# Patient Record
Sex: MALE | Race: WHITE | Employment: OTHER | URBAN - METROPOLITAN AREA
[De-identification: names, ages, dates, MRNs, and addresses within clinical notes are randomized per-mention and may not be internally consistent; named-entity substitution may affect disease eponyms.]

---

## 2018-01-12 PROBLEM — L97.929 VENOUS ULCER OF LEFT LEG (HCC): Status: ACTIVE | Noted: 2018-01-12

## 2018-01-12 PROBLEM — I83.029 VENOUS ULCER OF LEFT LEG (HCC): Status: ACTIVE | Noted: 2018-01-12

## 2018-01-12 PROBLEM — L97.922 NON-PRESSURE CHRONIC ULCER OF LEFT LOWER LEG WITH FAT LAYER EXPOSED (HCC): Status: ACTIVE | Noted: 2018-01-12

## 2018-03-16 ENCOUNTER — HOSPITAL ENCOUNTER (OUTPATIENT)
Dept: WOUND CARE | Age: 78
Discharge: HOME | End: 2018-03-16
Payer: COMMERCIAL

## 2018-03-16 VITALS
DIASTOLIC BLOOD PRESSURE: 70 MMHG | HEART RATE: 68 BPM | TEMPERATURE: 97.4 F | BODY MASS INDEX: 34.27 KG/M2 | RESPIRATION RATE: 18 BRPM | SYSTOLIC BLOOD PRESSURE: 118 MMHG | WEIGHT: 253 LBS | HEIGHT: 72 IN

## 2018-03-16 PROCEDURE — 11042 DBRDMT SUBQ TIS 1ST 20SQCM/<: CPT

## 2018-03-16 NOTE — PROGRESS NOTES
[facility]aling Center Followup Visit Note    Referring Physician : Shanon Drake DO [address] RECORD NUMBER:  [id number]  AGE: [de-identified] y.o. GENDER: male  : 1940  EPISODE DATE:  3/16/2018    Subjective:     Chief Complaint   Patient presents with    Wound Check     bilateral lower legs      HISTORY of PRESENT ILLNESS MATTHEW Horn is a [de-identified] y.o. male who presents today for wound/ulcer evaluation.    History of Wound Context:  1 month     Wound/Ulcer Pain Timing/Severity: constant  Quality of pain: sharp  Severity:  1 / 10   Modifying Factors: None  Associated Signs/Symptoms: edema    Ulcer Identification:  Ulcer Type: venous  Contributing Factors: edema, venous stasis and lymphedema    Diabetic/Pressure/Non Pressure Ulcers only:  Ulcer: N/A    Wound: N/A        PAST MEDICAL HISTORY      Diagnosis Date    Diabetes mellitus ([address]     Enlarged prostate     Hypertension      Past Surgical History:   Procedure Laterality Date    DOPPLER ECHOCARDIOGRAPHY N/A     FRACTURE SURGERY       Family History   Problem Relation Age of Onset    Diabetes Mother     Kidney Disease Mother     Cancer Father      esophagus    Cancer Sister      Social History   Substance Use Topics    Smoking status: Former Smoker     Packs/day: 2.00     Years: 50.00     Types: Cigarettes     Start date: 3/30/1955     Quit date: 3/30/2006    Smokeless tobacco: Never Used    Alcohol use No     Allergies   Allergen Reactions    Levaquin [Levofloxacin] Other (See Comments)     Lethargy when taken with celebrex     Current Outpatient Prescriptions on File Prior to Encounter   Medication Sig Dispense Refill    Fluticasone Propionate (FLONASE ALLERGY RELIEF NA) 50 mcg by Nasal route daily 2 SPRAYS      insulin detemir (LEVEMIR) 100 UNIT/ML injection vial Inject 10 Units into the skin nightly      diphenhydrAMINE (BENADRYL) 25 MG capsule Take 25 mg by mouth every 6 hours as needed for Itching      708       Wound 04/06/16 Other (Comment) Right (Active)   Number of days: 708       Wound 04/06/16 Other (Comment) Posterior; Left (Active)   Number of days: 708       Wound 04/06/16 Other (Comment) Medial;Left (Active)   Number of days: 708       Wound 10/18/16 Skin tear Ankle Outer;Left (Active)   Number of days: 514       Wound 10/18/16 Skin tear Leg Outer;Left (Active)   Number of days: 514       Wound 10/18/16 Skin tear Elbow Right; Outer (Active)   Number of days: 514       Wound 10/18/16 Skin tear Elbow Outer;Left (Active)   Number of days: 514       Wound 10/19/16 Other (Comment) Foot Right (Active)   Number of days: 513       Wound 08/04/17 Venous ulcer Leg Left;Upper new wound, #1 venous wound left upper leg. date of onset May 2017 (Active)   Wound Type Wound 10/6/2017  9:21 AM   Wound Venous 10/6/2017  9:21 AM   Dressing Status Changed 10/20/2017  9:57 AM   Dressing Changed Changed/New 10/20/2017  9:57 AM   Dressing/Treatment Alginate 10/20/2017  9:57 AM   Wound Cleansed Rinsed/Irrigated with saline 10/20/2017  9:57 AM   Dressing Change Due 10/13/17 10/6/2017 10:23 AM   Wound Length (cm) 0 cm 10/25/2017  4:06 PM   Wound Width (cm) 0 cm 10/25/2017  4:06 PM   Wound Depth (cm)  0 10/25/2017  4:06 PM   Calculated Wound Size (cm^2) (l*w) 0 cm^2 10/25/2017  4:06 PM   Change in Wound Size % (l*w) 100 10/25/2017  4:06 PM   Wound Assessment Epithelialization 10/25/2017  4:06 PM   Drainage Amount Moderate 10/20/2017  8:52 AM   Drainage Description Serosanguinous; Yellow 10/20/2017  8:52 AM   Odor None 10/20/2017  8:52 AM   Susy-wound Assessment Red 10/25/2017  4:06 PM   Red%Wound Bed 100 10/6/2017  9:21 AM   Debridement per physician Full thickness 10/20/2017  9:03 AM   Time out Yes 10/20/2017  9:03 AM   Procedural Pain 1 10/20/2017  9:03 AM   Post procedural Pain 1 10/20/2017  9:03 AM   Number of days: 224       Wound 08/04/17 (Active)   Number of days: 224       Wound 09/08/17 Venous ulcer Ankle Left;Lateral Wound #4 venous ulcer acquired 9/8/2017 (Active)   Wound Type Wound 10/6/2017  9:08 AM   Wound Venous 10/6/2017  9:08 AM   Dressing Status Changed 9/29/2017 10:33 AM   Dressing Changed Changed/New 9/29/2017 10:33 AM   Dressing/Treatment Alginate 9/8/2017 10:14 AM   Wound Cleansed Rinsed/Irrigated with saline 9/29/2017 10:33 AM   Wound Length (cm) 0 cm 10/6/2017  9:21 AM   Wound Width (cm) 0 cm 10/6/2017  9:21 AM   Wound Depth (cm)  0 10/6/2017  9:21 AM   Calculated Wound Size (cm^2) (l*w) 0 cm^2 10/6/2017  9:21 AM   Change in Wound Size % (l*w) 100 10/6/2017  9:21 AM   Wound Assessment Pink 9/29/2017  9:36 AM   Drainage Amount Moderate 10/6/2017  9:08 AM   Drainage Description Serosanguinous 10/6/2017  9:08 AM   Odor None 10/6/2017  9:08 AM   Susy-wound Assessment Dry; Intact 9/29/2017  9:36 AM   [place]%Wound Bed 100 9/22/2017 10:04 AM   Red%Wound Bed 30 9/15/2017 10:12 AM   Yellow%Wound Bed 20 9/15/2017 10:12 AM   Debridement per physician Subcutaneous 9/29/2017 10:00 AM   Time out Yes 9/29/2017 10:00 AM   Procedural Pain 2 9/29/2017 10:00 AM   Post procedural Pain 1 9/29/2017 10:00 AM   Number of days: 189       Wound 09/29/17 Venous ulcer Leg Right;Lateral new wound, #5  right lateral leg.  date of onset  sept. 29,2017 (Active)   Wound Type Wound 10/6/2017  9:08 AM   Wound Venous 10/6/2017  9:08 AM   Dressing Status Changed 10/6/2017 10:23 AM   Dressing Changed Changed/New 10/6/2017 10:23 AM   Dressing/Treatment Other (Comment) 10/6/2017 10:23 AM   Wound Cleansed Rinsed/Irrigated with saline 10/6/2017 10:23 AM   Dressing Change Due 10/13/17 10/6/2017 10:23 AM   Wound Length (cm) 0 cm 10/13/2017 10:00 AM   Wound Width (cm) 0 cm 10/13/2017 10:00 AM   Wound Depth (cm)  0 10/13/2017 10:00 AM   Calculated Wound Size (cm^2) (l*w) 0 cm^2 10/13/2017 10:00 AM   Change in Wound Size % (l*w) 100 10/13/2017 10:00 AM   Wound Assessment Red 10/6/2017  9:08 AM   Drainage Amount Small 10/6/2017  9:08 AM   Drainage Description Serosanguinous 10/6/2017  9:08 AM   Odor None 10/6/2017  9:08 AM   Susy-wound Assessment Purple; Intact;Dry 10/6/2017  9:08 AM   Red%Wound Bed 100 10/6/2017  9:08 AM   Yellow%Wound Bed 20 9/29/2017  9:33 AM   Debridement per physician Full thickness 10/6/2017  9:24 AM   Time out Yes 10/6/2017  9:24 AM   Procedural Pain 0 10/6/2017  9:24 AM   Post procedural Pain 1 9/29/2017 10:00 AM   Number of days: 168       Wound 11/22/17 Venous ulcer Leg Left;Distal;Lateral new wound, #6 venous. datw of onset nov.22, 2017 (Active)   Wound Image   3/16/2018 10:39 AM   Wound Type Wound 11/22/2017  8:20 AM   Wound Venous 11/22/2017  8:20 AM   Dressing Status Changed 3/9/2018 11:00 AM   Dressing Changed Changed/New 3/9/2018 11:00 AM   Dressing/Treatment Dry dressing 3/9/2018 11:00 AM   Wound Cleansed Rinsed/Irrigated with saline 3/9/2018 11:00 AM   Dressing Change Due 02/09/18 2/2/2018 10:37 AM   Wound Length (cm) 0.5 cm 3/16/2018 11:21 AM   Wound Width (cm) 0.5 cm 3/16/2018 11:21 AM   Wound Depth (cm)  0.1 3/16/2018 11:21 AM   Calculated Wound Size (cm^2) (l*w) 0.25 cm^2 3/16/2018 11:21 AM   Change in Wound Size % (l*w) 95.78 3/16/2018 11:21 AM   Wound Assessment Red 3/16/2018 10:39 AM   Drainage Amount Scant 3/16/2018 10:39 AM   Drainage Description Serosanguinous 3/16/2018 10:39 AM   Odor None 3/16/2018 10:39 AM   Susy-wound Assessment Pink;Dry 3/16/2018 10:39 AM   Red%Wound Bed 100 2/9/2018 10:12 AM   Debridement per physician Subcutaneous 3/2/2018 10:01 AM   Time out Yes 3/16/2018 11:21 AM   Procedural Pain 0 3/16/2018 11:21 AM   Post procedural Pain 0 12/22/2017 11:42 AM   Number of days: 114       Wound 02/23/18 Venous ulcer Leg Right;Posterior; Lower wound # 7 acquired 22/18   lim 1 (Active)   Wound Image   3/16/2018 10:39 AM   Wound Type Wound 2/23/2018 10:06 AM   Wound Venous 2/23/2018 10:06 AM   Dressing Status Changed 3/9/2018 11:00 AM   Dressing Changed Changed/New 3/9/2018 11:00 AM   Dressing/Treatment Dry dressing

## 2018-03-23 ENCOUNTER — HOSPITAL ENCOUNTER (OUTPATIENT)
Dept: WOUND CARE | Age: 78
Discharge: HOME | End: 2018-03-23
Payer: COMMERCIAL

## 2018-03-23 VITALS
TEMPERATURE: 98 F | BODY MASS INDEX: 33.18 KG/M2 | WEIGHT: 245 LBS | HEART RATE: 84 BPM | HEIGHT: 72 IN | SYSTOLIC BLOOD PRESSURE: 118 MMHG | DIASTOLIC BLOOD PRESSURE: 70 MMHG | RESPIRATION RATE: 16 BRPM

## 2018-03-23 PROCEDURE — 97597 DBRDMT OPN WND 1ST 20 CM/<: CPT

## 2018-03-23 RX ORDER — DIPHENHYDRAMINE HYDROCHLORIDE, ZINC ACETATE 2; .1 G/100G; G/100G
1 CREAM TOPICAL 3 TIMES DAILY PRN
Status: ON HOLD | COMMUNITY
End: 2018-07-20 | Stop reason: HOSPADM

## 2018-03-23 NOTE — PROGRESS NOTES
Wound Healing Center Followup Visit Note    Referring Physician : DO [address] RECORD NUMBER:  [id number]  AGE: [de-identified] y.o. GENDER: male  : 1940  EPISODE DATE:  3/23/2018    Subjective:     Chief Complaint   Patient presents with    Wound Check     patient here for treatment of left leg ulcer      HISTORY of PRESENT ILLNESS HPI   Melanie Marquez is a [de-identified] y.o. male who presents today for wound/ulcer evaluation.    History of Wound Context:   4 months    Wound/Ulcer Pain Timing/Severity: none  Quality of pain: dull  Severity:  1 / 10   Modifying Factors: Pain worsens with walking  Associated Signs/Symptoms: edema    Ulcer Identification:  Ulcer Type: venous  Contributing Factors: venous stasis and lymphedema    Diabetic/Pressure/Non Pressure Ulcers only:  Ulcer: Non-Pressure ulcer, fat layer exposed    Wound: N/A        PAST MEDICAL HISTORY      Diagnosis Date    Diabetes mellitus ([facility] [address]     Enlarged prostate     Hypertension      Past Surgical History:   Procedure Laterality Date    DOPPLER ECHOCARDIOGRAPHY N/A     FRACTURE SURGERY       Family History   Problem Relation Age of Onset    Diabetes Mother     Kidney Disease Mother     Cancer Father      esophagus    Cancer Sister      Social History   Substance Use Topics    Smoking status: Former Smoker     Packs/day: 2.00     Years: 50.00     Types: Cigarettes     Start date: 3/30/1955     Quit date: 3/30/2006    Smokeless tobacco: Never Used    Alcohol use No     Allergies   Allergen Reactions    Levaquin [Levofloxacin] Other (See Comments)     Lethargy when taken with celebrex     Current Outpatient Prescriptions on File Prior to Encounter   Medication Sig Dispense Refill    Fluticasone Propionate (FLONASE ALLERGY RELIEF NA) 50 mcg by Nasal route daily 2 SPRAYS      insulin detemir (LEVEMIR) 100 UNIT/ML injection vial Inject 10 Units into the skin nightly      diphenhydrAMINE (BENADRYL) 25 MG capsule Take 25 mg by mouth every 6 hours as needed for Itching      Cholecalciferol (VITAMIN D3) 5000 UNITS TABS Take 5,000 Units by mouth daily      mineral oil-hydrophilic petrolatum (AQUAPHOR) ointment As directed      tamsulosin (FLOMAX) 0.4 MG capsule Take 0.4 mg by mouth daily.  atorvastatin (LIPITOR) 20 MG tablet Take 20 mg by mouth daily.  latanoprost (XALATAN) 0.005 % ophthalmic solution Place 1 drop into both eyes nightly       meclizine (ANTIVERT) 25 MG tablet Take 12.5 mg by mouth 3 times daily as needed.  acetaminophen (TYLENOL) 325 MG tablet Take 650 mg by mouth every 4 hours as needed.  busPIRone (BUSPAR) 7.5 MG tablet Take 7.5 mg by mouth daily.  lactulose (CHRONULAC) 10 GM/15ML solution Take 20 g by mouth daily Indications: EVERY MONDAY, WEDNESDAY, FRIDAY      glucose (GLUTOSE) 40 % GEL Take 15 g by mouth as needed 45 g 1    albuterol (PROVENTIL) (2.5 MG/3ML) 0.083% nebulizer solution Take 2.5 mg by nebulization every 6 hours as needed       magnesium hydroxide (MILK OF MAGNESIA) 400 MG/5ML suspension Take 30 mLs by mouth daily as needed.  bisacodyl (DULCOLAX) 5 MG EC tablet Take 5 mg by mouth daily as needed. No current facility-administered medications on file prior to encounter.         REVIEW OF SYSTEMS See HPI    Objective:    /70   Pulse 84   Temp 98 °F (36.7 °C) (Oral)   Resp 16   Ht 6' (1.829 m)   Wt 245 lb (111.1 kg)   BMI 33.23 kg/m²   Wt Readings from Last 3 Encounters:   03/23/18 245 lb (111.1 kg)   03/16/18 253 lb (114.8 kg)   03/02/18 253 lb (114.8 kg)     PHYSICAL EXAM  CONSTITUTIONAL:   Awake, alert, cooperative   EYES:  lids and lashes normal   ENT: external ears and nose without lesions   NECK:  supple, symmetrical, trachea midline   SKIN:  Open wound Present    Assessment:     Patient Active Problem List   Diagnosis Code    Cellulitis L03.90    Sepsis ([facility] [address] A41.9    Dementia P31.69    Metabolic encephalopathy X33.76    Diabetes IF WRAPS SLIDE DOWN ON LEG. IF THIS OCCURS, CAREFULLY CUT WRAPS OFF AND RESUME PREVIOUS DRESSING CHANGE ORDERS TILL SEEN AGAIN IN WOUND CENTER. DO NOT ALLOW WRAPS TO BECOME WET. ENCOURAGE LEG ELEVATION   PATIENT IS ALMOST HEALED, DR Dionne Rodrigues WOULD LIKE TO ORDER JUXTALITE COMPRESSION GARMENTS,  I WILL CONTACT FACILITY ABOUT THIS  [address] [place] [address],[address] followup visit __________1 WEEK__dr. wallace___friday____________  (Please note your next appointment above and if you are unable to keep, kindly give a 24 hour notice.  Thank you.)      Physician signature:__________________________  Santana Drew  If you experience any of the following, please call the [facility] during business hours:      * Increase in Pain  * Temperature over 101  * Increase in drainage from your wound  * Drainage with a foul odor  * Bleeding  * Increase in swelling  * Need for compression bandage changes due to slippage, breakthrough drainage.      If you need medical attention outside of the business hours of the [facility] please contact your PCP or go to the nearest emergency room        Electronically signed by Amanda Boyd DPM on 3/23/2018 at 9:58 AM

## 2018-03-28 ENCOUNTER — HOSPITAL ENCOUNTER (OUTPATIENT)
Dept: WOUND CARE | Age: 78
Discharge: HOME | End: 2018-03-28
Payer: COMMERCIAL

## 2018-03-28 VITALS
TEMPERATURE: 97.6 F | SYSTOLIC BLOOD PRESSURE: 116 MMHG | RESPIRATION RATE: 16 BRPM | HEART RATE: 88 BPM | DIASTOLIC BLOOD PRESSURE: 66 MMHG

## 2018-03-28 PROCEDURE — 99212 OFFICE O/P EST SF 10 MIN: CPT

## 2018-03-28 NOTE — PLAN OF CARE
Problem: Wound:  Goal: Will show signs of wound healing; wound closure and no evidence of infection  Will show signs of wound healing; wound closure and no evidence of infection   Outcome: Completed Date Met: 03/28/18      Problem: Venous:  Goal: Signs of wound healing will improve  Signs of wound healing will improve   Outcome: Completed Date Met: 03/28/18      Problem: Compression therapy:  Goal: Will be free from complications associated with compression therapy  Will be free from complications associated with compression therapy   Outcome: Completed Date Met: 03/28/18

## 2018-03-28 NOTE — PROGRESS NOTES
detemir (LEVEMIR) 100 UNIT/ML injection vial Inject 10 Units into the skin nightly      diphenhydrAMINE (BENADRYL) 25 MG capsule Take 25 mg by mouth every 6 hours as needed for Itching      lactulose (CHRONULAC) 10 GM/15ML solution Take 20 g by mouth daily Indications: EVERY MONDAY, WEDNESDAY, FRIDAY      Cholecalciferol (VITAMIN D3) 5000 UNITS TABS Take 5,000 Units by mouth daily      mineral oil-hydrophilic petrolatum (AQUAPHOR) ointment As directed      tamsulosin (FLOMAX) 0.4 MG capsule Take 0.4 mg by mouth daily.  atorvastatin (LIPITOR) 20 MG tablet Take 20 mg by mouth daily.  latanoprost (XALATAN) 0.005 % ophthalmic solution Place 1 drop into both eyes nightly       acetaminophen (TYLENOL) 325 MG tablet Take 650 mg by mouth every 4 hours as needed.  busPIRone (BUSPAR) 7.5 MG tablet Take 7.5 mg by mouth daily.  glucose (GLUTOSE) 40 % GEL Take 15 g by mouth as needed 45 g 1    albuterol (PROVENTIL) (2.5 MG/3ML) 0.083% nebulizer solution Take 2.5 mg by nebulization every 6 hours as needed       meclizine (ANTIVERT) 25 MG tablet Take 12.5 mg by mouth 3 times daily as needed.  magnesium hydroxide (MILK OF MAGNESIA) 400 MG/5ML suspension Take 30 mLs by mouth daily as needed.  bisacodyl (DULCOLAX) 5 MG EC tablet Take 5 mg by mouth daily as needed. No current facility-administered medications on file prior to encounter.         REVIEW OF SYSTEMS See HPI    Objective:    /66   Pulse 88   Temp 97.6 °F (36.4 °C) (Oral)   Resp 16   Wt Readings from Last 3 Encounters:   03/23/18 245 lb (111.1 kg)   03/16/18 253 lb (114.8 kg)   03/02/18 253 lb (114.8 kg)     PHYSICAL EXAM  CONSTITUTIONAL:   Awake, alert, cooperative   EYES:  lids and lashes normal   ENT: external ears and nose without lesions   NECK:  supple, symmetrical, trachea midline   SKIN:  Open wound Present    Assessment:     Problem List Items Addressed This Visit     None        Wound/Ulcer #: 6    Post Wound Cleansed Rinsed/Irrigated with saline 10/20/2017  9:57 AM   Dressing Change Due 10/13/17 10/6/2017 10:23 AM   Wound Length (cm) 0 cm 10/25/2017  4:06 PM   Wound Width (cm) 0 cm 10/25/2017  4:06 PM   Wound Depth (cm)  0 10/25/2017  4:06 PM   Calculated Wound Size (cm^2) (l*w) 0 cm^2 10/25/2017  4:06 PM   Change in Wound Size % (l*w) 100 10/25/2017  4:06 PM   Wound Assessment Epithelialization 10/25/2017  4:06 PM   Drainage Amount Moderate 10/20/2017  8:52 AM   Drainage Description Serosanguinous; Yellow 10/20/2017  8:52 AM   Odor None 10/20/2017  8:52 AM   Susy-wound Assessment Red 10/25/2017  4:06 PM   Red%Wound Bed 100 10/6/2017  9:21 AM   Debridement per physician Full thickness 10/20/2017  9:03 AM   Time out Yes 10/20/2017  9:03 AM   Procedural Pain 1 10/20/2017  9:03 AM   Post procedural Pain 1 10/20/2017  9:03 AM   Number of days: 235       Wound 08/04/17 (Active)   Number of days: 235       Wound 09/08/17 Venous ulcer Ankle Left;Lateral Wound #4 venous ulcer acquired 9/8/2017 (Active)   Wound Type Wound 10/6/2017  9:08 AM   Wound Venous 10/6/2017  9:08 AM   Dressing Status Changed 9/29/2017 10:33 AM   Dressing Changed Changed/New 9/29/2017 10:33 AM   Dressing/Treatment Alginate 9/8/2017 10:14 AM   Wound Cleansed Rinsed/Irrigated with saline 9/29/2017 10:33 AM   Wound Length (cm) 0 cm 10/6/2017  9:21 AM   Wound Width (cm) 0 cm 10/6/2017  9:21 AM   Wound Depth (cm)  0 10/6/2017  9:21 AM   Calculated Wound Size (cm^2) (l*w) 0 cm^2 10/6/2017  9:21 AM   Change in Wound Size % (l*w) 100 10/6/2017  9:21 AM   Wound Assessment Pink 9/29/2017  9:36 AM   Drainage Amount Moderate 10/6/2017  9:08 AM   Drainage Description Serosanguinous 10/6/2017  9:08 AM   Odor None 10/6/2017  9:08 AM   Susy-wound Assessment Dry; Intact 9/29/2017  9:36 AM   [place]%Wound Bed 100 9/22/2017 10:04 AM   Red%Wound Bed 30 9/15/2017 10:12 AM   Yellow%Wound Bed 20 9/15/2017 10:12 AM   Debridement per physician Subcutaneous 9/29/2017 10:00 AM AM   Wound Depth (cm)  .1 3/28/2018  8:18 AM   Calculated Wound Size (cm^2) (l*w) 0.16 cm^2 3/28/2018  8:18 AM   Change in Wound Size % (l*w) 97.3 3/28/2018  8:18 AM   Wound Assessment Red 3/28/2018  8:18 AM   Drainage Amount Scant 3/28/2018  8:18 AM   Drainage Description Yellow 3/28/2018  8:18 AM   Odor None 3/23/2018  9:21 AM   Susy-wound Assessment Dry 3/28/2018  8:18 AM   Red%Wound Bed 100 3/28/2018  8:18 AM   Debridement per physician Subcutaneous 3/2/2018 10:01 AM   Time out Yes 3/23/2018  9:53 AM   Procedural Pain 0 3/23/2018  9:53 AM   Post procedural Pain 0 12/22/2017 11:42 AM   Number of days: 125         Plan:   Treatment Note please see attached Discharge Instructions    Written patient dismissal instructions given to patient and signed by patient or POA. Healed     Discharge Instructions       Visit Discharge/Physician Orders      Discharge condition: Stable      Assessment of pain at discharge: none  Anesthetic used: 2% lidocaine gel      Discharge to: ECF      Left via:public transportation      Accompanied by: aide      [facility]: [facility]; LEFT LEG COMPRESSION WRAPS ON FOR THE WEEK. DO NOT DISTURB     Dressing Orders: TO left  LEG WOUND CLEANSE WITH NORMAL SALINE SOLUTION. APPLY HYDRAPHOR OINTMENT . APPLY PROMOGRAN,  THEN APPLY UNNA BOOT FOR 7 DAYS ,KEEP WRAP CLEAN AND DRY. DO NOT CHANGE OR ALLOW TO BECOME WET.    TO RIGHT LEG  APPLY HYDROPHORTO DRY FLAKEY AREAS , , KERLEX, COBAN WRAP, 1 X WEEK     Treatment Orders: KEEP LEG WRAPS CLEAN AND DRY. REMOVE ONLY IF PATIENT COMPLAINS OF PAIN OR IF WRAPS SLIDE DOWN ON LEG. IF THIS OCCURS, CAREFULLY CUT WRAPS OFF AND RESUME PREVIOUS DRESSING CHANGE ORDERS TILL SEEN AGAIN IN WOUND CENTER. DO NOT ALLOW WRAPS TO BECOME WET.  ENCOURAGE LEG ELEVATION   PATIENT IS ALMOST HEALED, DR Kurt Workman WOULD LIKE TO ORDER JUXTALITE COMPRESSION GARMENTS,  I WILL CONTACT FACILITY ABOUT THIS  [facility] followup visit __________1 WEEK__dr. wallace___wednesday__________  (Please note

## 2018-05-25 ENCOUNTER — HOSPITAL ENCOUNTER (OUTPATIENT)
Dept: WOUND CARE | Age: 78
Discharge: HOME | End: 2018-05-25
Payer: COMMERCIAL

## 2018-05-25 VITALS
RESPIRATION RATE: 20 BRPM | TEMPERATURE: 97.9 F | HEIGHT: 72 IN | BODY MASS INDEX: 33.18 KG/M2 | WEIGHT: 245 LBS | SYSTOLIC BLOOD PRESSURE: 100 MMHG | HEART RATE: 80 BPM | DIASTOLIC BLOOD PRESSURE: 68 MMHG

## 2018-05-25 DIAGNOSIS — L97.921 NON-PRESSURE CHRONIC ULCER LEFT LOWER LEG, LIMITED TO BREAKDOWN SKIN (HCC): ICD-10-CM

## 2018-05-25 PROBLEM — I83.029 VENOUS ULCER OF LEFT LEG (HCC): Chronic | Status: ACTIVE | Noted: 2018-01-12

## 2018-05-25 PROBLEM — L97.929 VENOUS ULCER OF LEFT LEG (HCC): Chronic | Status: ACTIVE | Noted: 2018-01-12

## 2018-05-25 PROCEDURE — 99214 OFFICE O/P EST MOD 30 MIN: CPT

## 2018-06-01 ENCOUNTER — HOSPITAL ENCOUNTER (OUTPATIENT)
Dept: WOUND CARE | Age: 78
Discharge: HOME | End: 2018-06-01
Payer: COMMERCIAL

## 2018-06-01 VITALS
RESPIRATION RATE: 18 BRPM | HEIGHT: 72 IN | BODY MASS INDEX: 33.18 KG/M2 | SYSTOLIC BLOOD PRESSURE: 98 MMHG | WEIGHT: 245 LBS | HEART RATE: 68 BPM | TEMPERATURE: 97.8 F | DIASTOLIC BLOOD PRESSURE: 62 MMHG

## 2018-06-01 PROCEDURE — 11042 DBRDMT SUBQ TIS 1ST 20SQCM/<: CPT

## 2018-06-08 ENCOUNTER — HOSPITAL ENCOUNTER (OUTPATIENT)
Dept: WOUND CARE | Age: 78
Discharge: HOME | End: 2018-06-08
Payer: COMMERCIAL

## 2018-06-08 VITALS
RESPIRATION RATE: 18 BRPM | SYSTOLIC BLOOD PRESSURE: 104 MMHG | DIASTOLIC BLOOD PRESSURE: 60 MMHG | HEART RATE: 80 BPM | TEMPERATURE: 97.4 F

## 2018-06-08 PROCEDURE — 11042 DBRDMT SUBQ TIS 1ST 20SQCM/<: CPT

## 2018-06-15 ENCOUNTER — HOSPITAL ENCOUNTER (OUTPATIENT)
Age: 78
Discharge: HOME | End: 2018-06-17
Payer: COMMERCIAL

## 2018-06-15 ENCOUNTER — HOSPITAL ENCOUNTER (OUTPATIENT)
Dept: WOUND CARE | Age: 78
Discharge: HOME | End: 2018-06-15
Payer: COMMERCIAL

## 2018-06-15 VITALS
DIASTOLIC BLOOD PRESSURE: 60 MMHG | RESPIRATION RATE: 18 BRPM | TEMPERATURE: 97.7 F | HEART RATE: 72 BPM | SYSTOLIC BLOOD PRESSURE: 104 MMHG

## 2018-06-15 PROCEDURE — 87075 CULTR BACTERIA EXCEPT BLOOD: CPT

## 2018-06-15 PROCEDURE — 11042 DBRDMT SUBQ TIS 1ST 20SQCM/<: CPT

## 2018-06-15 PROCEDURE — 87186 SC STD MICRODIL/AGAR DIL: CPT

## 2018-06-15 PROCEDURE — 87070 CULTURE OTHR SPECIMN AEROBIC: CPT

## 2018-06-18 LAB
ANAEROBIC CULTURE: NORMAL
ORGANISM: ABNORMAL
WOUND/ABSCESS: ABNORMAL
WOUND/ABSCESS: ABNORMAL

## 2018-06-20 ENCOUNTER — HOSPITAL ENCOUNTER (OUTPATIENT)
Dept: WOUND CARE | Age: 78
Discharge: HOME | End: 2018-06-20
Payer: COMMERCIAL

## 2018-06-20 VITALS
RESPIRATION RATE: 18 BRPM | DIASTOLIC BLOOD PRESSURE: 64 MMHG | TEMPERATURE: 98 F | HEART RATE: 72 BPM | SYSTOLIC BLOOD PRESSURE: 110 MMHG

## 2018-06-20 DIAGNOSIS — L10.9 BULLOUS PEMPHIGUS: Primary | ICD-10-CM

## 2018-06-20 PROCEDURE — 99213 OFFICE O/P EST LOW 20 MIN: CPT | Performed by: FAMILY MEDICINE

## 2018-06-20 PROCEDURE — 99213 OFFICE O/P EST LOW 20 MIN: CPT

## 2018-06-27 ENCOUNTER — HOSPITAL ENCOUNTER (OUTPATIENT)
Dept: WOUND CARE | Age: 78
Discharge: HOME | End: 2018-06-27

## 2018-07-11 ENCOUNTER — HOSPITAL ENCOUNTER (OUTPATIENT)
Dept: WOUND CARE | Age: 78
Discharge: HOME | End: 2018-07-11
Payer: COMMERCIAL

## 2018-07-11 VITALS
HEART RATE: 72 BPM | TEMPERATURE: 98.1 F | RESPIRATION RATE: 16 BRPM | SYSTOLIC BLOOD PRESSURE: 94 MMHG | DIASTOLIC BLOOD PRESSURE: 60 MMHG

## 2018-07-11 PROCEDURE — 11042 DBRDMT SUBQ TIS 1ST 20SQCM/<: CPT

## 2018-07-11 NOTE — PROGRESS NOTES
Other (Comment) Posterior; Left (Active)   Number of days: 825       Wound 04/06/16 Other (Comment) Medial;Left (Active)   Number of days: 825       Wound 10/18/16 Skin tear Ankle Outer;Left (Active)   Number of days: 631       Wound 10/18/16 Skin tear Leg Outer;Left (Active)   Number of days: 631       Wound 10/18/16 Skin tear Elbow Right; Outer (Active)   Number of days: 631       Wound 10/18/16 Skin tear Elbow Outer;Left (Active)   Number of days: 631       Wound 10/19/16 Other (Comment) Foot Right (Active)   Number of days: 630       Wound 08/04/17 Venous ulcer Leg Left;Upper new wound, #1 venous wound left upper leg. date of onset May 2017 (Active)   Wound Type Wound 10/6/2017  9:21 AM   Wound Venous 10/6/2017  9:21 AM   Dressing Status Changed 10/20/2017  9:57 AM   Dressing Changed Changed/New 10/20/2017  9:57 AM   Dressing/Treatment Alginate 10/20/2017  9:57 AM   Wound Cleansed Rinsed/Irrigated with saline 10/20/2017  9:57 AM   Dressing Change Due 10/13/17 10/6/2017 10:23 AM   Wound Length (cm) 0 cm 10/25/2017  4:06 PM   Wound Width (cm) 0 cm 10/25/2017  4:06 PM   Wound Depth (cm)  0 10/25/2017  4:06 PM   Calculated Wound Size (cm^2) (l*w) 0 cm^2 10/25/2017  4:06 PM   Change in Wound Size % (l*w) 100 10/25/2017  4:06 PM   Wound Assessment Epithelialization 10/25/2017  4:06 PM   Drainage Amount Moderate 10/20/2017  8:52 AM   Drainage Description Serosanguinous; Yellow 10/20/2017  8:52 AM   Odor None 10/20/2017  8:52 AM   Susy-wound Assessment Red 10/25/2017  4:06 PM   Red%Wound Bed 100 10/6/2017  9:21 AM   Debridement per physician Full thickness 10/20/2017  9:03 AM   Time out Yes 10/20/2017  9:03 AM   Procedural Pain 1 10/20/2017  9:03 AM   Post procedural Pain 1 10/20/2017  9:03 AM   Number of days: 341       Wound 08/04/17 (Active)   Number of days: 341       Wound 09/08/17 Venous ulcer Ankle Left;Lateral Wound #4 venous ulcer acquired 9/8/2017 (Active)   Wound Type Wound 10/6/2017  9:08 AM   Wound Venous swelling  * Need for compression bandage changes due to slippage, breakthrough drainage.     If you need medical attention outside of the business hours of the [address] [place] [address] Road please contact your PCP or go to the nearest emergency room.         Electronically signed by Mark Magana DPM on 7/11/2018 at 12:02 PM

## 2018-07-17 ENCOUNTER — HOSPITAL ENCOUNTER (INPATIENT)
Age: 78
LOS: 4 days | Discharge: NURSING, REHAB OR LONG-TERM CARE | DRG: 641 | End: 2018-07-21
Attending: INTERNAL MEDICINE | Admitting: INTERNAL MEDICINE
Payer: COMMERCIAL

## 2018-07-17 ENCOUNTER — APPOINTMENT (OUTPATIENT)
Dept: GENERAL RADIOLOGY | Age: 78
DRG: 641 | End: 2018-07-17
Attending: INTERNAL MEDICINE
Payer: COMMERCIAL

## 2018-07-17 PROBLEM — E86.0 DEHYDRATION: Status: ACTIVE | Noted: 2018-07-17

## 2018-07-17 LAB
ALBUMIN SERPL-MCNC: 2.9 G/DL (ref 3.5–5.2)
ALP BLD-CCNC: 326 U/L (ref 40–129)
ALT SERPL-CCNC: 61 U/L (ref 0–40)
ANION GAP SERPL CALCULATED.3IONS-SCNC: 18 MMOL/L (ref 7–16)
AST SERPL-CCNC: 147 U/L (ref 0–39)
BILIRUB SERPL-MCNC: 0.5 MG/DL (ref 0–1.2)
BUN BLDV-MCNC: 34 MG/DL (ref 8–23)
CALCIUM SERPL-MCNC: 9 MG/DL (ref 8.6–10.2)
CHLORIDE BLD-SCNC: 105 MMOL/L (ref 98–107)
CO2: 19 MMOL/L (ref 22–29)
CREAT SERPL-MCNC: 1.2 MG/DL (ref 0.7–1.2)
GFR AFRICAN AMERICAN: >60
GFR NON-AFRICAN AMERICAN: 58 ML/MIN/1.73
GLUCOSE BLD-MCNC: 119 MG/DL (ref 74–109)
HBA1C MFR BLD: 6 % (ref 4–5.6)
HCT VFR BLD CALC: 38.6 % (ref 37–54)
HEMOGLOBIN: 11.9 G/DL (ref 12.5–16.5)
MCH RBC QN AUTO: 25.3 PG (ref 26–35)
MCHC RBC AUTO-ENTMCNC: 30.8 % (ref 32–34.5)
MCV RBC AUTO: 82 FL (ref 80–99.9)
METER GLUCOSE: 114 MG/DL (ref 70–110)
PDW BLD-RTO: 18 FL (ref 11.5–15)
PLATELET # BLD: 322 E9/L (ref 130–450)
PMV BLD AUTO: 9.7 FL (ref 7–12)
POTASSIUM SERPL-SCNC: 4.2 MMOL/L (ref 3.5–5)
RBC # BLD: 4.71 E12/L (ref 3.8–5.8)
SODIUM BLD-SCNC: 142 MMOL/L (ref 132–146)
TOTAL PROTEIN: 7.3 G/DL (ref 6.4–8.3)
WBC # BLD: 9.9 E9/L (ref 4.5–11.5)

## 2018-07-17 PROCEDURE — 36415 COLL VENOUS BLD VENIPUNCTURE: CPT

## 2018-07-17 PROCEDURE — 6360000002 HC RX W HCPCS: Performed by: INTERNAL MEDICINE

## 2018-07-17 PROCEDURE — 82962 GLUCOSE BLOOD TEST: CPT

## 2018-07-17 PROCEDURE — 2580000003 HC RX 258: Performed by: INTERNAL MEDICINE

## 2018-07-17 PROCEDURE — 74018 RADEX ABDOMEN 1 VIEW: CPT

## 2018-07-17 PROCEDURE — 85027 COMPLETE CBC AUTOMATED: CPT

## 2018-07-17 PROCEDURE — 83036 HEMOGLOBIN GLYCOSYLATED A1C: CPT

## 2018-07-17 PROCEDURE — 1200000000 HC SEMI PRIVATE

## 2018-07-17 PROCEDURE — 6370000000 HC RX 637 (ALT 250 FOR IP): Performed by: INTERNAL MEDICINE

## 2018-07-17 PROCEDURE — 87040 BLOOD CULTURE FOR BACTERIA: CPT

## 2018-07-17 PROCEDURE — 80053 COMPREHEN METABOLIC PANEL: CPT

## 2018-07-17 PROCEDURE — 71045 X-RAY EXAM CHEST 1 VIEW: CPT

## 2018-07-17 RX ORDER — ACETAMINOPHEN 325 MG/1
650 TABLET ORAL EVERY 4 HOURS PRN
Status: DISCONTINUED | OUTPATIENT
Start: 2018-07-17 | End: 2018-07-21 | Stop reason: HOSPADM

## 2018-07-17 RX ORDER — MECLIZINE HCL 12.5 MG/1
12.5 TABLET ORAL 3 TIMES DAILY PRN
Status: DISCONTINUED | OUTPATIENT
Start: 2018-07-17 | End: 2018-07-21 | Stop reason: HOSPADM

## 2018-07-17 RX ORDER — LATANOPROST 50 UG/ML
1 SOLUTION/ DROPS OPHTHALMIC NIGHTLY
Status: DISCONTINUED | OUTPATIENT
Start: 2018-07-17 | End: 2018-07-21 | Stop reason: HOSPADM

## 2018-07-17 RX ORDER — TAMSULOSIN HYDROCHLORIDE 0.4 MG/1
0.4 CAPSULE ORAL
Status: DISCONTINUED | OUTPATIENT
Start: 2018-07-17 | End: 2018-07-21 | Stop reason: HOSPADM

## 2018-07-17 RX ORDER — ATORVASTATIN CALCIUM 20 MG/1
20 TABLET, FILM COATED ORAL DAILY
Status: ON HOLD | COMMUNITY
End: 2021-01-05 | Stop reason: ALTCHOICE

## 2018-07-17 RX ORDER — DEXTROSE MONOHYDRATE 50 MG/ML
100 INJECTION, SOLUTION INTRAVENOUS PRN
Status: DISCONTINUED | OUTPATIENT
Start: 2018-07-17 | End: 2018-07-21 | Stop reason: HOSPADM

## 2018-07-17 RX ORDER — BUSPIRONE HYDROCHLORIDE 5 MG/1
7.5 TABLET ORAL DAILY
Status: DISCONTINUED | OUTPATIENT
Start: 2018-07-17 | End: 2018-07-21 | Stop reason: HOSPADM

## 2018-07-17 RX ORDER — ALBUTEROL SULFATE 2.5 MG/3ML
2.5 SOLUTION RESPIRATORY (INHALATION) EVERY 6 HOURS PRN
Status: DISCONTINUED | OUTPATIENT
Start: 2018-07-17 | End: 2018-07-21 | Stop reason: HOSPADM

## 2018-07-17 RX ORDER — ATORVASTATIN CALCIUM 20 MG/1
20 TABLET, FILM COATED ORAL DAILY
Status: DISCONTINUED | OUTPATIENT
Start: 2018-07-17 | End: 2018-07-21 | Stop reason: HOSPADM

## 2018-07-17 RX ORDER — MIRTAZAPINE 15 MG/1
7.5 TABLET, FILM COATED ORAL DAILY
Status: DISCONTINUED | OUTPATIENT
Start: 2018-07-17 | End: 2018-07-21 | Stop reason: HOSPADM

## 2018-07-17 RX ORDER — LACTULOSE 10 G/15ML
20 SOLUTION ORAL DAILY
Status: DISCONTINUED | OUTPATIENT
Start: 2018-07-17 | End: 2018-07-18

## 2018-07-17 RX ORDER — AZATHIOPRINE 50 MG/1
50 TABLET ORAL DAILY
COMMUNITY
End: 2018-07-25 | Stop reason: ALTCHOICE

## 2018-07-17 RX ORDER — AZATHIOPRINE 50 MG/1
50 TABLET ORAL DAILY
Status: DISCONTINUED | OUTPATIENT
Start: 2018-07-17 | End: 2018-07-21 | Stop reason: HOSPADM

## 2018-07-17 RX ORDER — MIRTAZAPINE 15 MG/1
7.5 TABLET, FILM COATED ORAL NIGHTLY
Status: ON HOLD | COMMUNITY
End: 2020-12-23 | Stop reason: HOSPADM

## 2018-07-17 RX ORDER — NICOTINE POLACRILEX 4 MG
15 LOZENGE BUCCAL PRN
Status: DISCONTINUED | OUTPATIENT
Start: 2018-07-17 | End: 2018-07-21 | Stop reason: HOSPADM

## 2018-07-17 RX ORDER — DEXTROSE MONOHYDRATE 25 G/50ML
12.5 INJECTION, SOLUTION INTRAVENOUS PRN
Status: DISCONTINUED | OUTPATIENT
Start: 2018-07-17 | End: 2018-07-21 | Stop reason: HOSPADM

## 2018-07-17 RX ORDER — SODIUM CHLORIDE 9 MG/ML
INJECTION, SOLUTION INTRAVENOUS CONTINUOUS
Status: DISCONTINUED | OUTPATIENT
Start: 2018-07-17 | End: 2018-07-20

## 2018-07-17 RX ADMIN — MIRTAZAPINE 7.5 MG: 15 TABLET, FILM COATED ORAL at 20:40

## 2018-07-17 RX ADMIN — LATANOPROST 1 DROP: 50 SOLUTION OPHTHALMIC at 20:40

## 2018-07-17 RX ADMIN — ATORVASTATIN CALCIUM 20 MG: 20 TABLET, FILM COATED ORAL at 20:40

## 2018-07-17 RX ADMIN — SODIUM CHLORIDE: 9 INJECTION, SOLUTION INTRAVENOUS at 13:49

## 2018-07-18 ENCOUNTER — HOSPITAL ENCOUNTER (OUTPATIENT)
Dept: WOUND CARE | Age: 78
Discharge: HOME | End: 2018-07-18
Payer: COMMERCIAL

## 2018-07-18 ENCOUNTER — APPOINTMENT (OUTPATIENT)
Dept: CT IMAGING | Age: 78
DRG: 641 | End: 2018-07-18
Attending: INTERNAL MEDICINE
Payer: COMMERCIAL

## 2018-07-18 LAB
AMYLASE: 26 U/L (ref 20–100)
BACTERIA: ABNORMAL /HPF
BILIRUBIN URINE: NEGATIVE
BLOOD, URINE: ABNORMAL
CLARITY: CLEAR
COLOR: YELLOW
EPITHELIAL CELLS, UA: ABNORMAL /HPF
GAMMA GLUTAMYL TRANSFERASE: 169 U/L (ref 10–71)
GLUCOSE URINE: NEGATIVE MG/DL
KETONES, URINE: ABNORMAL MG/DL
LEUKOCYTE ESTERASE, URINE: NEGATIVE
LIPASE: 16 U/L (ref 13–60)
METER GLUCOSE: 83 MG/DL (ref 70–110)
METER GLUCOSE: 85 MG/DL (ref 70–110)
METER GLUCOSE: 85 MG/DL (ref 70–110)
METER GLUCOSE: 90 MG/DL (ref 70–110)
METER GLUCOSE: 93 MG/DL (ref 70–110)
NITRITE, URINE: NEGATIVE
PH UA: 5.5 (ref 5–9)
PROTEIN UA: NEGATIVE MG/DL
RBC UA: ABNORMAL /HPF (ref 0–2)
SPECIFIC GRAVITY UA: 1.02 (ref 1–1.03)
UROBILINOGEN, URINE: 1 E.U./DL
WBC UA: ABNORMAL /HPF (ref 0–5)

## 2018-07-18 PROCEDURE — 80074 ACUTE HEPATITIS PANEL: CPT

## 2018-07-18 PROCEDURE — 97530 THERAPEUTIC ACTIVITIES: CPT

## 2018-07-18 PROCEDURE — 82150 ASSAY OF AMYLASE: CPT

## 2018-07-18 PROCEDURE — 6370000000 HC RX 637 (ALT 250 FOR IP): Performed by: INTERNAL MEDICINE

## 2018-07-18 PROCEDURE — 97165 OT EVAL LOW COMPLEX 30 MIN: CPT

## 2018-07-18 PROCEDURE — 87088 URINE BACTERIA CULTURE: CPT

## 2018-07-18 PROCEDURE — 74177 CT ABD & PELVIS W/CONTRAST: CPT

## 2018-07-18 PROCEDURE — G8987 SELF CARE CURRENT STATUS: HCPCS

## 2018-07-18 PROCEDURE — 71260 CT THORAX DX C+: CPT

## 2018-07-18 PROCEDURE — 1200000000 HC SEMI PRIVATE

## 2018-07-18 PROCEDURE — 6360000004 HC RX CONTRAST MEDICATION: Performed by: RADIOLOGY

## 2018-07-18 PROCEDURE — 82962 GLUCOSE BLOOD TEST: CPT

## 2018-07-18 PROCEDURE — G8988 SELF CARE GOAL STATUS: HCPCS

## 2018-07-18 PROCEDURE — G8979 MOBILITY GOAL STATUS: HCPCS | Performed by: PHYSICAL THERAPIST

## 2018-07-18 PROCEDURE — 81001 URINALYSIS AUTO W/SCOPE: CPT

## 2018-07-18 PROCEDURE — 2580000003 HC RX 258: Performed by: INTERNAL MEDICINE

## 2018-07-18 PROCEDURE — 83690 ASSAY OF LIPASE: CPT

## 2018-07-18 PROCEDURE — G8978 MOBILITY CURRENT STATUS: HCPCS | Performed by: PHYSICAL THERAPIST

## 2018-07-18 PROCEDURE — 82977 ASSAY OF GGT: CPT

## 2018-07-18 PROCEDURE — 36415 COLL VENOUS BLD VENIPUNCTURE: CPT

## 2018-07-18 PROCEDURE — 97161 PT EVAL LOW COMPLEX 20 MIN: CPT | Performed by: PHYSICAL THERAPIST

## 2018-07-18 RX ORDER — SODIUM CHLORIDE 0.9 % (FLUSH) 0.9 %
10 SYRINGE (ML) INJECTION
Status: ACTIVE | OUTPATIENT
Start: 2018-07-18 | End: 2018-07-18

## 2018-07-18 RX ADMIN — TAMSULOSIN HYDROCHLORIDE 0.4 MG: 0.4 CAPSULE ORAL at 18:10

## 2018-07-18 RX ADMIN — LATANOPROST 1 DROP: 50 SOLUTION OPHTHALMIC at 22:13

## 2018-07-18 RX ADMIN — SODIUM CHLORIDE: 9 INJECTION, SOLUTION INTRAVENOUS at 05:54

## 2018-07-18 RX ADMIN — SODIUM CHLORIDE: 9 INJECTION, SOLUTION INTRAVENOUS at 19:51

## 2018-07-18 RX ADMIN — ATORVASTATIN CALCIUM 20 MG: 20 TABLET, FILM COATED ORAL at 22:13

## 2018-07-18 RX ADMIN — MIRTAZAPINE 7.5 MG: 15 TABLET, FILM COATED ORAL at 22:14

## 2018-07-18 RX ADMIN — IOPAMIDOL 110 ML: 755 INJECTION, SOLUTION INTRAVENOUS at 12:47

## 2018-07-18 ASSESSMENT — PAIN SCALES - GENERAL: PAINLEVEL_OUTOF10: 0

## 2018-07-18 NOTE — FLOWSHEET NOTE
Left;Proximal;Lateral  Wound Description (Comments): wound#9 , acquired 6-6-18  Pre-existing. .. Wound Type Wound   Wound Venous   Dressing/Treatment Dry dressing;Xeroform   Wound Length (cm) 1 cm   Wound Width (cm) 1 cm   Wound Depth (cm)  0.1   Calculated Wound Size (cm^2) (l*w) 1 cm^2   Change in Wound Size % (l*w) -185.71   Wound Assessment (red)   Drainage Amount Scant   Drainage Description Serosanguinous   Odor None   Susy-wound Assessment Fragile   Red%Wound Bed 100   Wound 06/15/18 #11 Left Foot Anterior 3rd Toe, acquired 6-10-18, lim 1   Date First Assessed/Time First Assessed: 06/15/18 1135   Wound Description (Comments): #11 Left Foot Anterior 3rd Toe, acquired 6-10-18, lim 1  Pre-existing: Yes  Photo Taken: Yes   Wound Type Wound   Dressing/Treatment Dry dressing;Xeroform   Wound Length (cm) 1.8 cm   Wound Width (cm) 2 cm   Wound Depth (cm)  0.1   Calculated Wound Size (cm^2) (l*w) 3.6 cm^2   Change in Wound Size % (l*w) -200   Wound Assessment Fragile   Drainage Amount None   Susy-wound Assessment Fragile   Non-staged Wound Description Partial thickness   Red%Wound Bed 25   Black%Wound Bed 75  (dried scabed)   Wound 06/08/18 Venous ulcer Leg Left; Lower; Lateral;Distal wound #10  acquired 6-6-18   Date First Assessed/Time First Assessed: 06/08/18 1126   Wound Type: Venous ulcer  Wound Event: Gradually Appeared  Location: Leg  Wound Location Orientation: Left; Lower; Lateral;Distal  Wound Description (Comments): wound #10  acquired 6-6-18  Pre-exi. ..    Wound Type Wound   Wound Venous   Dressing/Treatment Dry dressing;Xeroform   Wound Length (cm) 2 cm   Wound Width (cm) 1.4 cm   Wound Depth (cm)  0.1   Calculated Wound Size (cm^2) (l*w) 2.8 cm^2   Change in Wound Size % (l*w) 86.17   Wound Assessment Red   Drainage Amount Scant   Drainage Description Serosanguinous   Odor None   Susy-wound Assessment Fragile   Red%Wound Bed 100   Wound 05/25/18 Venous ulcer Pretibial Left wound #8 acquired 5/17/18

## 2018-07-18 NOTE — PROGRESS NOTES
Occupational Therapy  OCCUPATIONAL THERAPY INITIAL EVALUATION      Date:2018  Patient Name: Melissa Suárez  MRN: [id number]  : 1940  Room: [address] [place] [address]     Evaluating OT: Elicia Leija OTR/L #[id number]      Recommended Adaptive Equipment: tbd   AM-PAC Daily Activity Raw Score: 17  G-Code: CK    Diagnosis: Dehydration     Past Medical History:   Past Medical History:   Diagnosis Date    Anxiety     Benign localized hyperplasia of prostate with urinary obstruction     Bullous pemphigoid     [facility] ([facility]     Enlarged prostate     Hyperlipidemia     Hypertension     Muscle weakness     Rheumatoid disease ([facility]       Precautions: Fall risk     Home Living: Pt lives at [facility]    Bathroom setup: handicap accessible   Equipment owned: w/w    Prior Level of Function: mod I with ADLs; assist with IADLs; ambulated with w/w  Driving: no  Occupation: na    Pain Level: pt denies pain this session    Cognition: oriented x 4; follows 2 step directions. fair  Problem solving skills  good  Memory   good  Sequencing   fair   safety    Sensory:   Hearing: wfl  Vision: wfl    Glasses: yes [x] no [] reading []      UE Assessment:  Hand Dominance: Right [x]  Left []     Strength ROM Additional Info:    RUE   4/5 wfl good  and wfl FMC/dexterity noted during ADL tasks     LUE 4/5 wfl good  and wfl FMC/dexterity noted during ADL tasks     Sensation:wfl  Tone: wfl  Edema:none noted     Functional Assessment:   Initial Eval  Comments   Feeding  indep    Grooming  Min A standing   Upper Body Dressing SBA     Lower Body Dressing Max A    Bathing Mod A    Toileting  NT    Bed Mobility  Supine to Sit: Min A  Sit to Supine:min A    Functional Transfers Mod A x2  Cuing on hand placement, body mechanics and safety    Functional Mobility Min A therapist facilitated functional ambulation in room with w/w - cuing on posture, w/w management and safety.       Sit balance: SBA  Stand balance: complete LB self-care tasks with min A  STG 2 :  Pt will complete functional transfers with min A  STG 3 :  Pt will demonstrate F activity tolerance while completing ADL task. STG 4 :  Pt will complete functional ambulation / item retrieval task with CGA   Pt participated in establishment of the following goals    Patient and/or family understands diagnosis, prognosis and plan of care: yes    Pt educated on safety and OT POC. Patient  verbalized fair understanding of education, requires additional education     [] Malnutrition indicators have been identified and nursing has been notified to ensure a dietitian consult is ordered.      AM-PAC Daily Activity Inpatient   How much help for putting on and taking off regular lower body clothing?: A Lot  How much help for Bathing?: A Lot  How much help for Toileting?: A Little  How much help for putting on and taking off regular upper body clothing?: A Little  How much help for taking care of personal grooming?: A Little  How much help for eating meals?: None  AM-PAC Inpatient Daily Activity Raw Score: 17  AM-PAC Inpatient ADL T-Scale Score : 37.26  ADL Inpatient CMS 0-100% Score: 50.11  ADL Inpatient CMS G-Code Modifier : CK     low Evaluation completed +  Timed Treatment: 9 minutes  Tx Time in: 1411  TxTime out: [address] OTR/L #[id number]

## 2018-07-19 ENCOUNTER — APPOINTMENT (OUTPATIENT)
Dept: ULTRASOUND IMAGING | Age: 78
DRG: 641 | End: 2018-07-19
Attending: INTERNAL MEDICINE
Payer: COMMERCIAL

## 2018-07-19 LAB
ALBUMIN SERPL-MCNC: 2.7 G/DL (ref 3.5–5.2)
ALP BLD-CCNC: 340 U/L (ref 40–129)
ALT SERPL-CCNC: 41 U/L (ref 0–40)
AST SERPL-CCNC: 116 U/L (ref 0–39)
BILIRUB SERPL-MCNC: 0.7 MG/DL (ref 0–1.2)
BILIRUBIN DIRECT: 0.2 MG/DL (ref 0–0.3)
BILIRUBIN, INDIRECT: 0.5 MG/DL (ref 0–1)
HAV IGM SER IA-ACNC: NORMAL
HEPATITIS B CORE IGM ANTIBODY: NORMAL
HEPATITIS B SURFACE ANTIGEN INTERPRETATION: NORMAL
HEPATITIS C ANTIBODY INTERPRETATION: NORMAL
METER GLUCOSE: 77 MG/DL (ref 70–110)
METER GLUCOSE: 83 MG/DL (ref 70–110)
METER GLUCOSE: 84 MG/DL (ref 70–110)
METER GLUCOSE: 94 MG/DL (ref 70–110)
TOTAL PROTEIN: 6.2 G/DL (ref 6.4–8.3)
URINE CULTURE, ROUTINE: NORMAL

## 2018-07-19 PROCEDURE — 76770 US EXAM ABDO BACK WALL COMP: CPT

## 2018-07-19 PROCEDURE — 36415 COLL VENOUS BLD VENIPUNCTURE: CPT

## 2018-07-19 PROCEDURE — 2580000003 HC RX 258: Performed by: INTERNAL MEDICINE

## 2018-07-19 PROCEDURE — 82962 GLUCOSE BLOOD TEST: CPT

## 2018-07-19 PROCEDURE — 80076 HEPATIC FUNCTION PANEL: CPT

## 2018-07-19 PROCEDURE — 6370000000 HC RX 637 (ALT 250 FOR IP): Performed by: INTERNAL MEDICINE

## 2018-07-19 PROCEDURE — 1200000000 HC SEMI PRIVATE

## 2018-07-19 RX ADMIN — Medication: at 14:55

## 2018-07-19 RX ADMIN — TAMSULOSIN HYDROCHLORIDE 0.4 MG: 0.4 CAPSULE ORAL at 18:12

## 2018-07-19 RX ADMIN — ATORVASTATIN CALCIUM 20 MG: 20 TABLET, FILM COATED ORAL at 22:31

## 2018-07-19 RX ADMIN — Medication: at 22:31

## 2018-07-19 RX ADMIN — MIRTAZAPINE 7.5 MG: 15 TABLET, FILM COATED ORAL at 22:31

## 2018-07-19 RX ADMIN — SODIUM CHLORIDE: 9 INJECTION, SOLUTION INTRAVENOUS at 11:18

## 2018-07-19 ASSESSMENT — PAIN SCALES - GENERAL
PAINLEVEL_OUTOF10: 0
PAINLEVEL_OUTOF10: 0

## 2018-07-19 NOTE — H&P
chest pain. No shortness of breath. Other than the  above is negative. PHYSICAL EXAMINATION:  VITAL SIGNS:  Temperature 97.2, blood pressure 108/61, respirations 16, and  pulse 71. HEAD:  Normocephalic, atraumatic. NECK:  Supple. LUNGS:  Clear. HEART:  Regular. ABDOMEN:  Soft, nontender. Positive bowel sounds. EXTREMITIES:  No cyanosis, clubbing, or edema. ASSESSMENT:  Weight loss, anorexia, and diarrhea, etiology unclear. History of type 2 diabetes, hypertension, bullous pemphigoid, and left leg  wound. PLAN:  IV fluids. GI workup maybe. Depending on findings, malignant  workup. Please see orders.         Hoyle Heimlich, MD    D: 07/18/2018 17:33:26       T: 07/18/2018 18:21:24     /[email or web address]  Job#: [id number]     Doc#: [id number]    CC:

## 2018-07-19 NOTE — PROGRESS NOTES
(actual per EMR 10/2016, noted 245# stated wt hx more recently)  · % Weight Change: overall 13% wt loss x >1 year, noted apparent 16# wt loss (6.8%) over last 2 weeks per physician note     · Ideal Body Wt: 190 lb (86.2 kg), % Ideal Body 115%  · BMI Classification: BMI 25.0 - 29.9 Overweight  · Comparative Standards (Estimated Nutrition Needs):  · Estimated Daily Total Kcal:  (MSJ 1781 x 1.2 SF)  · Estimated Daily Protein (g): 130-150    Estimated Intake vs Estimated Needs: Intake Less Than Needs    Nutrition Risk Level: Moderate    Nutrition Interventions:   Continued Inpatient Monitoring, Education not appropriate at this time, Coordination of Care    Nutrition Evaluation:   · Evaluation: Goals set   · Goals: Consume >50% meals/ONS    · Monitoring: Meal Intake, Supplement Intake, Diet Tolerance, Skin Integrity, Wound Healing, Fluid Balance, Weight, Comparative Standards, Pertinent Labs, Nausea or Vomiting, Diarrhea    See Adult Nutrition Doc Flowsheet for more detail.      Electronically signed by Lee Ann Beltre RD, LD on 7/19/18 at 12:04 PM    Contact Number: [phone]

## 2018-07-19 NOTE — CARE COORDINATION
Social Work Discharge Planning:  [facility] is from [facility] left a message for the patient's son Lizzeth Franklin. SW spoke with Kurt Shanks from [facility] the patient will need a precert to return. N-17 generated and ambulance form is in the patient's soft chart. SW following and will assist as needed.   Electronically signed by TRUMAN Guardado on 7/19/2018 at 10:49 AM

## 2018-07-19 NOTE — PROGRESS NOTES
07/17/2018    MCV 82.0 07/17/2018    MCH 25.3 07/17/2018    MCHC 30.8 07/17/2018    RDW 18.0 07/17/2018    SEGSPCT 64 09/27/2013    LYMPHOPCT 23 10/24/2016    MONOPCT 12 10/24/2016    BASOPCT 1 10/24/2016    MONOSABS 0.76 10/24/2016    LYMPHSABS 1.53 10/24/2016    EOSABS 0.20 10/24/2016    BASOSABS 0.07 10/24/2016     CMP:    Lab Results   Component Value Date     07/17/2018    K 4.2 07/17/2018     07/17/2018    CO2 19 07/17/2018    BUN 34 07/17/2018    CREATININE 1.2 07/17/2018    GFRAA >60 07/17/2018    LABGLOM 58 07/17/2018    GLUCOSE 119 07/17/2018    GLUCOSE 138 03/26/2011    PROT 7.3 07/17/2018    LABALBU 2.9 07/17/2018    LABALBU 4.0 03/26/2011    CALCIUM 9.0 07/17/2018    BILITOT 0.5 07/17/2018    ALKPHOS 326 07/17/2018     07/17/2018    ALT 61 07/17/2018        Assessment:    Patient Active Problem List   Diagnosis    Cellulitis    Sepsis ([facility]    Dementia    Metabolic encephalopathy    Diabetes mellitus type 2, uncontrolled ([facility]    Hyperlipidemia LDL goal <100    Essential hypertension    Venous ulcer of left leg (HCC)    Non-pressure chronic ulcer of left lower leg with fat layer exposed ([facility]    Non-pressure chronic ulcer left lower leg, limited to breakdown skin (HCC)    Dehydration       Plan:  Weight loss /anorexia/ ct scans rel;atively unremakable / elevated LFTS / RENAL CYST CHECK US /GI CONSULT RETURN BACK TO [facility]/BULLOUS PEMPHIGOID         Tomasa Gooden  7:39 AM  7/19/2018

## 2018-07-20 LAB
METER GLUCOSE: 177 MG/DL (ref 70–110)
METER GLUCOSE: 87 MG/DL (ref 70–110)
METER GLUCOSE: 89 MG/DL (ref 70–110)
METER GLUCOSE: 95 MG/DL (ref 70–110)

## 2018-07-20 PROCEDURE — 6360000002 HC RX W HCPCS: Performed by: INTERNAL MEDICINE

## 2018-07-20 PROCEDURE — 87324 CLOSTRIDIUM AG IA: CPT

## 2018-07-20 PROCEDURE — 6360000002 HC RX W HCPCS

## 2018-07-20 PROCEDURE — 1200000000 HC SEMI PRIVATE

## 2018-07-20 PROCEDURE — 2580000003 HC RX 258: Performed by: INTERNAL MEDICINE

## 2018-07-20 PROCEDURE — 6370000000 HC RX 637 (ALT 250 FOR IP): Performed by: INTERNAL MEDICINE

## 2018-07-20 PROCEDURE — 82962 GLUCOSE BLOOD TEST: CPT

## 2018-07-20 RX ORDER — ONDANSETRON 2 MG/ML
4 INJECTION INTRAMUSCULAR; INTRAVENOUS EVERY 6 HOURS PRN
Status: DISCONTINUED | OUTPATIENT
Start: 2018-07-20 | End: 2018-07-21 | Stop reason: HOSPADM

## 2018-07-20 RX ORDER — ONDANSETRON 2 MG/ML
INJECTION INTRAMUSCULAR; INTRAVENOUS
Status: COMPLETED
Start: 2018-07-20 | End: 2018-07-20

## 2018-07-20 RX ORDER — SODIUM CHLORIDE 9 MG/ML
INJECTION, SOLUTION INTRAVENOUS CONTINUOUS
Status: DISCONTINUED | OUTPATIENT
Start: 2018-07-20 | End: 2018-07-21 | Stop reason: HOSPADM

## 2018-07-20 RX ADMIN — INSULIN LISPRO 1 UNITS: 100 INJECTION, SOLUTION INTRAVENOUS; SUBCUTANEOUS at 12:28

## 2018-07-20 RX ADMIN — MIRTAZAPINE 7.5 MG: 15 TABLET, FILM COATED ORAL at 21:44

## 2018-07-20 RX ADMIN — AZATHIOPRINE 50 MG: 50 TABLET ORAL at 08:54

## 2018-07-20 RX ADMIN — VITAMIN D, TAB 1000IU (100/BT) 5000 UNITS: 25 TAB at 08:54

## 2018-07-20 RX ADMIN — Medication: at 08:55

## 2018-07-20 RX ADMIN — SODIUM CHLORIDE: 9 INJECTION, SOLUTION INTRAVENOUS at 12:28

## 2018-07-20 RX ADMIN — ATORVASTATIN CALCIUM 20 MG: 20 TABLET, FILM COATED ORAL at 21:14

## 2018-07-20 RX ADMIN — LATANOPROST 1 DROP: 50 SOLUTION OPHTHALMIC at 21:15

## 2018-07-20 RX ADMIN — ONDANSETRON: 2 INJECTION INTRAMUSCULAR; INTRAVENOUS at 11:42

## 2018-07-20 RX ADMIN — BUSPIRONE HYDROCHLORIDE 7.5 MG: 5 TABLET ORAL at 08:54

## 2018-07-20 RX ADMIN — Medication: at 21:19

## 2018-07-20 RX ADMIN — TAMSULOSIN HYDROCHLORIDE 0.4 MG: 0.4 CAPSULE ORAL at 17:22

## 2018-07-20 ASSESSMENT — PAIN SCALES - GENERAL
PAINLEVEL_OUTOF10: 0
PAINLEVEL_OUTOF10: 0

## 2018-07-20 NOTE — CONSULTS
[address]                   [address] [facility]  [facility]                                   CONSULTATION    PATIENT NAME: Deborah Dozier                   :        1940  MED REC NO:   [id number]                            ROOM:       8409  ACCOUNT NO:   [de-identified]                           ADMIT DATE: 2018  PROVIDER:     Piedad Luna MD    CONSULT DATE:  2018    REASON FOR CONSULTATION:  Poor appetite and abnormal liver function  studies. HISTORY OF PRESENT ILLNESS:  This is an 49-year-old male who is a resident  at [facility] and has apparently been there for couple of years. He has a  history of bullous pemphigoid which initially was treated with and  responded to doxycycline. The flaring on his disease apparently occurred  when the doxycycline was withdrawn. It was re-instituted in conjunction  with azathioprine. It seems that in a timeline consistent with the  introduction of these medications, appetite waned, diarrhea developed and  he apparently lost weight. It looks like Remeron was introduced without  benefit, so he was admitted. At this point, doxycycline has been  withdrawn, diarrhea seems not to be an issue since hospitalization and his  Imuran remains. CAT scan of the abdomen was done without significant  findings. There was no evidence of hepatobiliary disease. LABORATORY DATA:  His lab work revealed an alkaline phosphatase of 326 with  an AST of 147 and GGT of 169 and a normal serum bilirubin. Two days later,  his albumin 2.7, his ALT 41, . He has hematocrit of 39 and MCV of  82. White count of 9.9 and platelet count of 723,308. Hepatitis A, B and  C studies are negative. An echocardiogram from 2017 yielded normal  result with exception of an ejection fraction of 70% and moderate left  ventricular hypertrophy without valvular disease. At this point, he suggests he is eating okay.
suspicious lesions noted on exam.    LABORATORY DATA:  The patient's labs were reviewed at this time. The CTs,  chest x-rays, and MRIs were also reviewed and noted at this point. ALLERGIES:  Consistent for LEVAQUIN. The patient does state he cannot take  ANTIBIOTICS, but he does not know what problems he has with him. SOCIAL HISTORY:  Significant for smoker. No alcohol currently. PAST MEDICAL HISTORY:  He has had a history of dehydration, venous ulcers,  non-pressure chronic ulcers of the lower extremities, cellulitis, sepsis,  dementia, metabolic encephalopathy, diabetes type 2, hyperlipidemia, and  essential hypertension. PAST SURGICAL HISTORY:  Consistent for fracture surgery. MEDICATIONS:  Currently, Omnipaque, miconazole nitrate, Tylenol, Proventil,  Lipitor, Imuran, MiraLax, Dulcolax, BuSpar, vitamin D, Xalatan, Antivert,  Remeron, Flomax, and Humalog. OUTPATIENT MEDICINES:  Remeron, Imuran, Lipitor, Flonase, Levemir,  Benadryl, lactulose, vitamin D, Flomax, Xalatan, and Proventil. ASSESSMENT:  1. History of bullous pemphigoid, good control at this time. 2.  Leg ulcers, stasis and venous ulcers. 3.  Edema of lower extremities. 4.  Stasis dermatitis. 5.  Nausea and vomiting, etiology unknown. PLAN:  1. Treatments reviewed with the patient and his daughter in detail at this  time. We will continue the Imuran at this point and see if his nausea and  vomiting improve. If it does not, we will stop the Imuran and see how he  does after that and consider other treatment options for his bullous  pemphigoid. Wound care to continue care for the leg ulcers, edema, and  stasis dermatitis. 2.  The patient will follow up as an outpatient. Call with any problems,  questions, or concerns.         Emily Baker DO    D: 07/18/2018 18:11:43       T: 07/18/2018 22:13:02     WILL/[email or web address]_[email or web address]  Job#: [id number]     Doc#: [id number]    CC:

## 2018-07-20 NOTE — PROGRESS NOTES
07/17/2018    MCV 82.0 07/17/2018    MCH 25.3 07/17/2018    MCHC 30.8 07/17/2018    RDW 18.0 07/17/2018    SEGSPCT 64 09/27/2013    LYMPHOPCT 23 10/24/2016    MONOPCT 12 10/24/2016    BASOPCT 1 10/24/2016    MONOSABS 0.76 10/24/2016    LYMPHSABS 1.53 10/24/2016    EOSABS 0.20 10/24/2016    BASOSABS 0.07 10/24/2016     CMP:    Lab Results   Component Value Date     07/17/2018    K 4.2 07/17/2018     07/17/2018    CO2 19 07/17/2018    BUN 34 07/17/2018    CREATININE 1.2 07/17/2018    GFRAA >60 07/17/2018    LABGLOM 58 07/17/2018    GLUCOSE 119 07/17/2018    GLUCOSE 138 03/26/2011    PROT 6.2 07/19/2018    LABALBU 2.7 07/19/2018    LABALBU 4.0 03/26/2011    CALCIUM 9.0 07/17/2018    BILITOT 0.7 07/19/2018    ALKPHOS 340 07/19/2018     07/19/2018    ALT 41 07/19/2018        Assessment:    Patient Active Problem List   Diagnosis    Cellulitis    Sepsis ([facility] [address]    Dementia    Metabolic encephalopathy    Diabetes mellitus type 2, uncontrolled ([facility] [address] [facility]    Hyperlipidemia LDL goal <100    Essential hypertension    Venous ulcer of left leg (HCC)    Non-pressure chronic ulcer of left lower leg with fat layer exposed ([facility] [address] [facility]    Non-pressure chronic ulcer left lower leg, limited to breakdown skin (HCC)    Dehydration       Plan:  Reviewed gi consult and dw pt ok for dc will need tov watch po intake and weight if continues to decline then dw  Family and pt will be held re options called son on phone left message no answer          Tomasa Gooden  8:04 AM  7/20/2018

## 2018-07-20 NOTE — PROGRESS NOTES
Type and Reason for Visit: Initial, Positive Nutrition Screen, Consult, Calorie Count    Current diet and supplement order:  Dietary Nutrition Supplements: Frozen Oral Supplement  Dietary Nutrition Supplements: Wound Healing Oral Supplement  DIET CLEAR LIQUID;    Comparative Standards (Estimated Nutrition Needs):   · Estimated Daily Total Kcal:  (MSJ 1781 x 1.2 SF)  · Estimated Daily Protein (g): 130-150    Date Consumed PO Intake Kcal %   Kcal met PO Intake grams protein %  Protein met   Comments   7/20 60 3% 0 0 Only 1 meal ticket saved. Pt is now on clear liquids. Intervention & Recommendations:   1.   Continue Calorie Count      Electronically signed by Addison Hackett RD, LD on 7/20/18 at 3:15 PM    Contact Number: x [phone]

## 2018-07-20 NOTE — CARE COORDINATION
Social Work/Discharge Planning    Per RN, pt's discharge is being held. SW called [facility] and canceled transport. Liaison from [facility] aware and stated that precert is valid through today. If pt does not discharge today, updated PT/OT notes will be needed and precert will need resubmitted on Monday. SW following.     Electronically signed by TRUMAN Shepherd on 7/20/2018 at 11:01 AM

## 2018-07-21 VITALS
TEMPERATURE: 97.9 F | OXYGEN SATURATION: 93 % | HEIGHT: 74 IN | DIASTOLIC BLOOD PRESSURE: 58 MMHG | WEIGHT: 219.6 LBS | SYSTOLIC BLOOD PRESSURE: 118 MMHG | HEART RATE: 68 BPM | BODY MASS INDEX: 28.18 KG/M2 | RESPIRATION RATE: 16 BRPM

## 2018-07-21 LAB
C DIFFICILE TOXIN, EIA: NORMAL
METER GLUCOSE: 65 MG/DL (ref 70–110)
METER GLUCOSE: 76 MG/DL (ref 70–110)
METER GLUCOSE: 76 MG/DL (ref 70–110)

## 2018-07-21 PROCEDURE — 6360000002 HC RX W HCPCS: Performed by: INTERNAL MEDICINE

## 2018-07-21 PROCEDURE — 2580000003 HC RX 258: Performed by: INTERNAL MEDICINE

## 2018-07-21 PROCEDURE — 82962 GLUCOSE BLOOD TEST: CPT

## 2018-07-21 PROCEDURE — 6370000000 HC RX 637 (ALT 250 FOR IP): Performed by: INTERNAL MEDICINE

## 2018-07-21 RX ADMIN — Medication: at 08:59

## 2018-07-21 RX ADMIN — SODIUM CHLORIDE: 9 INJECTION, SOLUTION INTRAVENOUS at 01:03

## 2018-07-21 RX ADMIN — AZATHIOPRINE 50 MG: 50 TABLET ORAL at 08:59

## 2018-07-21 RX ADMIN — BUSPIRONE HYDROCHLORIDE 7.5 MG: 5 TABLET ORAL at 08:58

## 2018-07-21 RX ADMIN — TAMSULOSIN HYDROCHLORIDE 0.4 MG: 0.4 CAPSULE ORAL at 17:11

## 2018-07-21 RX ADMIN — SODIUM CHLORIDE: 9 INJECTION, SOLUTION INTRAVENOUS at 13:31

## 2018-07-21 RX ADMIN — VITAMIN D, TAB 1000IU (100/BT) 5000 UNITS: 25 TAB at 08:59

## 2018-07-21 ASSESSMENT — PAIN SCALES - GENERAL: PAINLEVEL_OUTOF10: 0

## 2018-07-21 NOTE — PROGRESS NOTES
solution 2.5 mg, 2.5 mg, Nebulization, Q6H PRN, Aure Bill MD    atorvastatin (LIPITOR) tablet 20 mg, 20 mg, Oral, Daily, Tomasa Gooden MD, 20 mg at 07/20/18 2114    azaTHIOprine (IMURAN) tablet 50 mg, 50 mg, Oral, Daily, Tomasa Gooden MD, 50 mg at 07/21/18 0859    bisacodyl (DULCOLAX) EC tablet 5 mg, 5 mg, Oral, Daily PRN, Aure Bill MD    busPIRone (BUSPAR) tablet 7.5 mg, 7.5 mg, Oral, Daily, Aure Bill MD, 7.5 mg at 07/21/18 0858    vitamin D (CHOLECALCIFEROL) tablet 5,000 Units, 5,000 Units, Oral, Daily, Aure Bill MD, 5,000 Units at 07/21/18 0859    latanoprost (XALATAN) 0.005 % ophthalmic solution 1 drop, 1 drop, Both Eyes, Nightly, Tomasa Gooden MD, 1 drop at 07/20/18 2115    magnesium hydroxide (MILK OF MAGNESIA) 400 MG/5ML suspension 30 mL, 30 mL, Oral, Daily PRN, Aure Bill MD    meclizine (ANTIVERT) tablet 12.5 mg, 12.5 mg, Oral, TID PRN, Aure Bill MD    mirtazapine (REMERON) tablet 7.5 mg, 7.5 mg, Oral, Daily, Tomasa Gooden MD, 7.5 mg at 07/20/18 2144    tamsulosin (FLOMAX) capsule 0.4 mg, 0.4 mg, Oral, Dinner, Aure Bill MD, 0.4 mg at 07/20/18 1722    insulin lispro (HUMALOG) injection vial 0-6 Units, 0-6 Units, Subcutaneous, TID WC, Tomasa Gooden MD, 1 Units at 07/20/18 1228    glucose (GLUTOSE) 40 % oral gel 15 g, 15 g, Oral, PRN, Tomasa Gooden MD    dextrose 50 % solution 12.5 g, 12.5 g, Intravenous, PRN, Aure Bill MD    glucagon (rDNA) injection 1 mg, 1 mg, Intramuscular, PRN, Tomasa Gooden MD    dextrose 5 % solution, 100 mL/hr, Intravenous, PRN, Aure Bill MD    A/P:      Patient Active Problem List   Diagnosis    Cellulitis    S[facility]is ([email or web address]    Dementia    Metabolic encephalopathy    [facility], [facility]    Hyperlipidemia LDL goal <100    Essential hypertension    [facility] ulcer of left leg ([facility]    Non-pressure

## 2018-07-21 NOTE — DISCHARGE SUMMARY
Physician Discharge Summary     Patient ID:  Silvana Rodas  [id number]  7 y.o.  1940    Admit date: 7/17/2018    Discharge date and time: 7/21/2018  5:49 PM     Admitting Physician: Asif Sharma MD     Discharge Physician: Arian Nunez    Admission Diagnoses: Dehydration [E86.0]    Discharge Diagnoses:   Dehydration  Elevated lfts  Weight loss,   anorexia,    diarrhea, etiology unclear. History of type 2 diabetes,   hypertension,   bullous pemphigoid,    left leg wound. -poa    Admission Condition: poor    Discharged Condition: fair    Indication for Admission: anorexia, wt loss, dehydration    Hospital Course: seen by consultants.  ivf  Consults: GI and derm    Significant Diagnostic Studies: labs:       CBC:   Lab Results   Component Value Date    WBC 9.9 07/17/2018    RBC 4.71 07/17/2018    HGB 11.9 07/17/2018    HCT 38.6 07/17/2018    MCV 82.0 07/17/2018    MCH 25.3 07/17/2018    MCHC 30.8 07/17/2018    RDW 18.0 07/17/2018     07/17/2018    MPV 9.7 07/17/2018     CMP:    Lab Results   Component Value Date     07/17/2018    K 4.2 07/17/2018     07/17/2018    CO2 19 07/17/2018    BUN 34 07/17/2018    CREATININE 1.2 07/17/2018    GFRAA >60 07/17/2018    LABGLOM 58 07/17/2018    GLUCOSE 119 07/17/2018    GLUCOSE 138 03/26/2011    PROT 6.2 07/19/2018    LABALBU 2.7 07/19/2018    LABALBU 4.0 03/26/2011    CALCIUM 9.0 07/17/2018    BILITOT 0.7 07/19/2018    ALKPHOS 340 07/19/2018     07/19/2018    ALT 41 07/19/2018       Treatments: IV hydration    Discharge Exam:  BP (!) 118/58   Pulse 68   Temp 97.9 °F (36.6 °C) (Temporal)   Resp 16   Ht 6' 2\" (1.88 m)   Wt 219 lb 9.6 oz (99.6 kg)   SpO2 93%   BMI 28.19 kg/m²     General Appearance:    Alert, cooperative, no distress, appears stated age   Head:    Normocephalic, without obvious abnormality, atraumatic   Eyes:    PERRL, conjunctiva/corneas clear, EOM's intact, fundi     benign, both eyes        Ears:    Normal TM's and by mouth dailyHistorical Med      Fluticasone Propionate (FLONASE ALLERGY RELIEF NA) 50 mcg by Nasal route daily 2 SPRAYSHistorical Med      Cholecalciferol (VITAMIN D3) 5000 UNITS TABS Take 5,000 Units by mouth daily      glucose (GLUTOSE) 40 % GEL Take 15 g by mouth as needed, Disp-45 g, R-1      mineral oil-hydrophilic petrolatum (AQUAPHOR) ointment As directed, DC to SNF      tamsulosin (FLOMAX) 0.4 MG capsule Take 0.4 mg by mouth daily. albuterol (PROVENTIL) (2.5 MG/3ML) 0.083% nebulizer solution Take 2.5 mg by nebulization every 6 hours as needed Historical Med      latanoprost (XALATAN) 0.005 % ophthalmic solution Place 1 drop into both eyes nightly       acetaminophen (TYLENOL) 325 MG tablet Take 650 mg by mouth every 4 hours as needed. magnesium hydroxide (MILK OF MAGNESIA) 400 MG/5ML suspension Take 30 mLs by mouth daily as needed. bisacodyl (DULCOLAX) 5 MG EC tablet Take 5 mg by mouth daily as needed. busPIRone (BUSPAR) 7.5 MG tablet Take 7.5 mg by mouth daily. STOP taking these medications       diphenhydrAMINE-zinc acetate (BANOPHEN) 2-0.1 % cream Comments:   Reason for Stopping:         insulin detemir (LEVEMIR) 100 UNIT/ML injection vial Comments:   Reason for Stopping:         diphenhydrAMINE (BENADRYL) 25 MG capsule Comments:   Reason for Stopping:         lactulose (CHRONULAC) 10 GM/15ML solution Comments:   Reason for Stopping:         meclizine (ANTIVERT) 25 MG tablet Comments:   Reason for Stopping:             Activity: activity as tolerated  Diet: regular diet  Wound Care: keep wound clean and dry    Follow-up with dr in 2 days.     SignedAudrea Rom    7/21/2018  6:06 PM

## 2018-07-21 NOTE — PLAN OF CARE
Problem: Falls - Risk of:  Goal: Will remain free from falls  Will remain free from falls   Outcome: Met This Shift      Problem: Risk for Impaired Skin Integrity  Goal: Tissue integrity - skin and mucous membranes  Structural intactness and normal physiological function of skin and  mucous membranes.    Outcome: Met This Shift      Problem: Nausea/Vomiting:  Goal: Absence of nausea/vomiting  Absence of nausea/vomiting   Outcome: Met This Shift      Problem: Skin Integrity:  Goal: Skin integrity will stabilize  Skin integrity will stabilize   Outcome: Met This Shift

## 2018-07-22 LAB — CULTURE, BLOOD 2: NORMAL

## 2018-07-25 ENCOUNTER — HOSPITAL ENCOUNTER (OUTPATIENT)
Dept: WOUND CARE | Age: 78
Discharge: HOME | End: 2018-07-25
Payer: COMMERCIAL

## 2018-07-25 VITALS
SYSTOLIC BLOOD PRESSURE: 104 MMHG | TEMPERATURE: 98.6 F | RESPIRATION RATE: 18 BRPM | DIASTOLIC BLOOD PRESSURE: 60 MMHG | HEART RATE: 80 BPM

## 2018-07-25 PROCEDURE — 99213 OFFICE O/P EST LOW 20 MIN: CPT

## 2018-07-25 RX ORDER — NYSTATIN 10B UNIT
POWDER (EA) MISCELLANEOUS 2 TIMES DAILY
COMMUNITY
End: 2018-07-26

## 2018-07-25 RX ORDER — ARGININE/ASCORBATE SOD/VITE AC 4.5 G/9.2G
1 POWDER IN PACKET (EA) ORAL 2 TIMES DAILY
Status: ON HOLD | COMMUNITY
End: 2020-12-23 | Stop reason: HOSPADM

## 2018-07-25 RX ORDER — TRIAMCINOLONE ACETONIDE 1 MG/G
CREAM TOPICAL EVERY 8 HOURS
COMMUNITY
End: 2019-01-08 | Stop reason: ALTCHOICE

## 2018-07-25 RX ORDER — KETOCONAZOLE 20 MG/ML
SHAMPOO TOPICAL DAILY PRN
COMMUNITY
End: 2018-07-26

## 2018-07-25 NOTE — PROGRESS NOTES
Wound 10/18/16 Skin tear Elbow Outer;Left (Active)   Number of days: 645       Wound 10/19/16 Other (Comment) Foot Right (Active)   Number of days: 644       Wound 08/04/17 Venous ulcer Leg Left;Upper new wound, #1 venous wound left upper leg. date of onset May 2017 (Active)   Number of days: 355       Wound 08/04/17 (Active)   Number of days: 355       Wound 09/08/17 Venous ulcer Ankle Left;Lateral Wound #4 venous ulcer acquired 9/8/2017 (Active)   Number of days: 320       Wound 09/29/17 Venous ulcer Leg Right;Lateral new wound, #5  right lateral leg. date of onset  sept. 29,2017 (Active)   Number of days: 299       Wound 05/25/18 Venous ulcer Pretibial Left wound #8 acquired 5/17/18 (Active)   Wound Image   7/11/2018 11:44 AM   Wound Type Wound 7/18/2018  2:00 PM   Wound Venous 7/11/2018 11:44 AM   Dressing Status Clean;Dry; Intact 7/11/2018 12:24 PM   Dressing Changed Changed/New 7/11/2018 12:24 PM   Dressing/Treatment Dry dressing;Xeroform 7/18/2018  2:00 PM   Wound Cleansed Rinsed/Irrigated with saline 7/11/2018 12:24 PM   Wound Length (cm) 0.5 cm 7/25/2018  2:08 PM   Wound Width (cm) 0.5 cm 7/25/2018  2:08 PM   Wound Depth (cm)  0.1 7/25/2018  2:08 PM   Calculated Wound Size (cm^2) (l*w) 0.25 cm^2 7/25/2018  2:08 PM   Change in Wound Size % (l*w) 99.22 7/25/2018  2:08 PM   Wound Assessment Red 7/25/2018  2:08 PM   Drainage Amount Scant 7/25/2018  2:08 PM   Drainage Description Serous 7/25/2018  2:08 PM   Odor None 7/25/2018  2:08 PM   Susy-wound Assessment Pink 7/25/2018  2:08 PM   Non-staged Wound Description Partial thickness 7/18/2018  2:00 PM   Red%Wound Bed 100 7/18/2018  2:00 PM   Debridement per physician None 7/25/2018  2:47 PM   Time out Yes 7/11/2018 11:48 AM   Number of days: 61       Wound 06/08/18 Venous ulcer Leg Left;Proximal;Lateral wound#9 , acquired 6-6-18 (Active)   Wound Image   7/11/2018 11:44 AM   Wound Type Wound 7/21/2018 11:03 AM   Wound Venous 7/18/2018  2:00 PM   Dressing Status Amount Scant 7/25/2018  1:45 PM   Drainage Description Serosanguinous 7/25/2018  1:45 PM   Odor None 7/25/2018  1:45 PM   Susy-wound Assessment Pink 7/25/2018  1:45 PM   Non-staged Wound Description Partial thickness 7/18/2018  2:00 PM   Red%Wound Bed 100 7/18/2018  2:00 PM   Debridement per physician None 7/25/2018  2:47 PM   Number of days: 35       Wound 07/11/18 Venous ulcer Leg Medial;Lower; Left #13 acq: 7-11-18 (Active)   Wound Image   7/11/2018 11:44 AM   Wound Type Wound 7/21/2018 11:03 AM   Wound Venous 7/11/2018 11:44 AM   Dressing Status Clean;Dry; Intact 7/21/2018 11:03 AM   Dressing Changed Changed/New 7/20/2018  5:53 AM   Dressing/Treatment Dry dressing;Xeroform 7/20/2018  5:53 AM   Wound Cleansed Rinsed/Irrigated with saline 7/20/2018  5:53 AM   Dressing Change Due 07/21/18 7/21/2018 11:03 AM   Wound Length (cm) 4 cm 7/25/2018  1:45 PM   Wound Width (cm) 1 cm 7/25/2018  1:45 PM   Wound Depth (cm)  0.1 7/25/2018  1:45 PM   Calculated Wound Size (cm^2) (l*w) 4 cm^2 7/25/2018  1:45 PM   Change in Wound Size % (l*w) -471.43 7/25/2018  1:45 PM   Wound Assessment Red 7/25/2018  1:45 PM   Drainage Amount Small 7/25/2018  1:45 PM   Drainage Description Serous; Yellow 7/25/2018  1:45 PM   Odor None 7/25/2018  1:45 PM   Susy-wound Assessment Fragile 7/25/2018  1:45 PM   Red%Wound Bed 100 7/18/2018  2:00 PM   Debridement per physician None 7/25/2018  2:47 PM   Time out Yes 7/11/2018 11:48 AM   Number of days: 14       Wound 07/18/18 Skin tear Scrotum (Active)   Wound Type Wound 7/18/2018 10:28 PM   Dressing Status Other (Comment) 7/18/2018 10:28 PM   Dressing Changed Changed/New 7/18/2018 10:28 PM   Wound Cleansed Rinsed/Irrigated with saline 7/18/2018 10:28 PM   Number of days: 6        Other physical exam findings:        Assessment:     Patient Active Problem List   Diagnosis Code    Cellulitis L03.90    Sepsis ([facility] [address] A41.9    Dementia N26.38    Metabolic encephalopathy Q62.58    Diabetes mellitus type 2,

## 2018-07-26 ENCOUNTER — HOSPITAL ENCOUNTER (INPATIENT)
Age: 78
LOS: 7 days | Discharge: NURSING, REHAB OR LONG-TERM CARE | DRG: 641 | End: 2018-08-02
Attending: EMERGENCY MEDICINE | Admitting: INTERNAL MEDICINE
Payer: COMMERCIAL

## 2018-07-26 ENCOUNTER — APPOINTMENT (OUTPATIENT)
Dept: GENERAL RADIOLOGY | Age: 78
DRG: 641 | End: 2018-07-26
Payer: COMMERCIAL

## 2018-07-26 DIAGNOSIS — E87.20 LACTIC ACIDOSIS: ICD-10-CM

## 2018-07-26 DIAGNOSIS — E86.0 DEHYDRATION: Primary | ICD-10-CM

## 2018-07-26 DIAGNOSIS — I95.9 HYPOTENSION, UNSPECIFIED HYPOTENSION TYPE: ICD-10-CM

## 2018-07-26 LAB
ALBUMIN SERPL-MCNC: 2.9 G/DL (ref 3.5–5.2)
ALP BLD-CCNC: 279 U/L (ref 40–129)
ALT SERPL-CCNC: 25 U/L (ref 0–40)
ANION GAP SERPL CALCULATED.3IONS-SCNC: 14 MMOL/L (ref 7–16)
AST SERPL-CCNC: 42 U/L (ref 0–39)
BACTERIA: ABNORMAL /HPF
BASOPHILS ABSOLUTE: 0.03 E9/L (ref 0–0.2)
BASOPHILS RELATIVE PERCENT: 0.4 % (ref 0–2)
BILIRUB SERPL-MCNC: 0.3 MG/DL (ref 0–1.2)
BILIRUBIN URINE: NEGATIVE
BLOOD, URINE: NEGATIVE
BUN BLDV-MCNC: 16 MG/DL (ref 8–23)
CALCIUM SERPL-MCNC: 8.3 MG/DL (ref 8.6–10.2)
CASTS: ABNORMAL /LPF
CHLORIDE BLD-SCNC: 105 MMOL/L (ref 98–107)
CLARITY: CLEAR
CO2: 22 MMOL/L (ref 22–29)
COLOR: YELLOW
CREAT SERPL-MCNC: 0.8 MG/DL (ref 0.7–1.2)
EOSINOPHILS ABSOLUTE: 0.04 E9/L (ref 0.05–0.5)
EOSINOPHILS RELATIVE PERCENT: 0.5 % (ref 0–6)
EPITHELIAL CELLS, UA: ABNORMAL /HPF
GFR AFRICAN AMERICAN: >60
GFR NON-AFRICAN AMERICAN: >60 ML/MIN/1.73
GLUCOSE BLD-MCNC: 187 MG/DL (ref 74–109)
GLUCOSE URINE: NEGATIVE MG/DL
HCT VFR BLD CALC: 35.7 % (ref 37–54)
HEMOGLOBIN: 11.1 G/DL (ref 12.5–16.5)
IMMATURE GRANULOCYTES #: 0.03 E9/L
IMMATURE GRANULOCYTES %: 0.4 % (ref 0–5)
KETONES, URINE: NEGATIVE MG/DL
LACTIC ACID: 2.9 MMOL/L (ref 0.5–2.2)
LEUKOCYTE ESTERASE, URINE: NEGATIVE
LIPASE: 25 U/L (ref 13–60)
LYMPHOCYTES ABSOLUTE: 1.21 E9/L (ref 1.5–4)
LYMPHOCYTES RELATIVE PERCENT: 16.2 % (ref 20–42)
MCH RBC QN AUTO: 25.3 PG (ref 26–35)
MCHC RBC AUTO-ENTMCNC: 31.1 % (ref 32–34.5)
MCV RBC AUTO: 81.3 FL (ref 80–99.9)
MONOCYTES ABSOLUTE: 1.06 E9/L (ref 0.1–0.95)
MONOCYTES RELATIVE PERCENT: 14.2 % (ref 2–12)
MUCUS: PRESENT
NEUTROPHILS ABSOLUTE: 5.1 E9/L (ref 1.8–7.3)
NEUTROPHILS RELATIVE PERCENT: 68.3 % (ref 43–80)
NITRITE, URINE: NEGATIVE
PDW BLD-RTO: 19 FL (ref 11.5–15)
PH UA: 5.5 (ref 5–9)
PLATELET # BLD: 331 E9/L (ref 130–450)
PMV BLD AUTO: 9.3 FL (ref 7–12)
POTASSIUM SERPL-SCNC: 3.8 MMOL/L (ref 3.5–5)
PROTEIN UA: NORMAL MG/DL
RBC # BLD: 4.39 E12/L (ref 3.8–5.8)
RBC UA: ABNORMAL /HPF (ref 0–2)
RENAL EPITHELIAL, UA: ABNORMAL /HPF
SODIUM BLD-SCNC: 141 MMOL/L (ref 132–146)
SPECIFIC GRAVITY UA: 1.02 (ref 1–1.03)
TOTAL PROTEIN: 6.6 G/DL (ref 6.4–8.3)
UROBILINOGEN, URINE: 0.2 E.U./DL
WBC # BLD: 7.5 E9/L (ref 4.5–11.5)
WBC UA: ABNORMAL /HPF (ref 0–5)

## 2018-07-26 PROCEDURE — 81001 URINALYSIS AUTO W/SCOPE: CPT

## 2018-07-26 PROCEDURE — 2580000003 HC RX 258: Performed by: EMERGENCY MEDICINE

## 2018-07-26 PROCEDURE — 99285 EMERGENCY DEPT VISIT HI MDM: CPT

## 2018-07-26 PROCEDURE — 6360000002 HC RX W HCPCS: Performed by: EMERGENCY MEDICINE

## 2018-07-26 PROCEDURE — 96361 HYDRATE IV INFUSION ADD-ON: CPT

## 2018-07-26 PROCEDURE — 96360 HYDRATION IV INFUSION INIT: CPT

## 2018-07-26 PROCEDURE — 80053 COMPREHEN METABOLIC PANEL: CPT

## 2018-07-26 PROCEDURE — 83605 ASSAY OF LACTIC ACID: CPT

## 2018-07-26 PROCEDURE — 85025 COMPLETE CBC W/AUTO DIFF WBC: CPT

## 2018-07-26 PROCEDURE — 2060000000 HC ICU INTERMEDIATE R&B

## 2018-07-26 PROCEDURE — 87186 SC STD MICRODIL/AGAR DIL: CPT

## 2018-07-26 PROCEDURE — 87149 DNA/RNA DIRECT PROBE: CPT

## 2018-07-26 PROCEDURE — 87077 CULTURE AEROBIC IDENTIFY: CPT

## 2018-07-26 PROCEDURE — 87088 URINE BACTERIA CULTURE: CPT

## 2018-07-26 PROCEDURE — 71045 X-RAY EXAM CHEST 1 VIEW: CPT

## 2018-07-26 PROCEDURE — 87040 BLOOD CULTURE FOR BACTERIA: CPT

## 2018-07-26 PROCEDURE — 83690 ASSAY OF LIPASE: CPT

## 2018-07-26 RX ORDER — 0.9 % SODIUM CHLORIDE 0.9 %
1000 INTRAVENOUS SOLUTION INTRAVENOUS ONCE
Status: COMPLETED | OUTPATIENT
Start: 2018-07-26 | End: 2018-07-26

## 2018-07-26 RX ORDER — ALBUTEROL SULFATE 2.5 MG/3ML
2.5 SOLUTION RESPIRATORY (INHALATION) EVERY 6 HOURS PRN
COMMUNITY
End: 2018-10-17 | Stop reason: ALTCHOICE

## 2018-07-26 RX ADMIN — PIPERACILLIN SODIUM AND TAZOBACTAM SODIUM 4.5 G: 4; .5 INJECTION, POWDER, LYOPHILIZED, FOR SOLUTION INTRAVENOUS at 21:47

## 2018-07-26 RX ADMIN — SODIUM CHLORIDE 1000 ML: 9 INJECTION, SOLUTION INTRAVENOUS at 17:15

## 2018-07-26 ASSESSMENT — PAIN SCALES - GENERAL: PAINLEVEL_OUTOF10: 0

## 2018-07-26 NOTE — ED NOTES
Elizabeth Harper is a [de-identified] y.o. male who presented to the ED on 07/26/18 complaining of   Chief Complaint   Patient presents with    Weight Loss     loss of appetite; not eating or drinking x 2 days; from [facility]; Dr sent patient in for hydration and possible peg tube evaluation; recent weight loss of 20lbs per son       Pt from [facility]. Was sent in for approximately 20 lb weight loss in the past 2 weeks per the patient. Also, he states he is dehydrated. No nausea or vomiting. No pain present. Pt merely states that he isn't hungry. No CP or SOB. Pt with multiple wounds on abdomen and BLE. Britton Dallas.  Genia Montiel RN  07/26/18 [id number]

## 2018-07-26 NOTE — ED PROVIDER NOTES
Calcium 8.3 (L) 8.6 - 10.2 mg/dL    Total Protein 6.6 6.4 - 8.3 g/dL    Alb 2.9 (L) 3.5 - 5.2 g/dL    Total Bilirubin 0.3 0.0 - 1.2 mg/dL    Alkaline Phosphatase 279 (H) 40 - 129 U/L    ALT 25 0 - 40 U/L    AST 42 (H) 0 - 39 U/L   Lipase   Result Value Ref Range    Lipase 25 13 - 60 U/L   Lactic Acid, Plasma   Result Value Ref Range    Lactic Acid 2.9 (H) 0.5 - 2.2 mmol/L   Urinalysis   Result Value Ref Range    Color, UA Yellow Straw/Yellow    Clarity, UA Clear Clear    Glucose, Ur Negative Negative mg/dL    Bilirubin Urine Negative Negative    Ketones, Urine Negative Negative mg/dL    Specific Gravity, UA 1.025 1.005 - 1.030    Blood, Urine Negative Negative    pH, UA 5.5 5.0 - 9.0    Protein, UA TRACE Negative mg/dL    Urobilinogen, Urine 0.2 <2.0 E.U./dL    Nitrite, Urine Negative Negative    Leukocyte Esterase, Urine Negative Negative   Microscopic Urinalysis   Result Value Ref Range    Casts MODERATE /LPF    Mucus, UA Present     WBC, UA 0-1 0 - 5 /HPF    RBC, UA 0-1 0 - 2 /HPF    Epi Cells RARE /HPF    Renal Epithelial, Urine RARE /HPF    Bacteria, UA MANY (A) /HPF   Basic metabolic panel   Result Value Ref Range    Sodium 142 132 - 146 mmol/L    Potassium 3.7 3.5 - 5.0 mmol/L    Chloride 108 (H) 98 - 107 mmol/L    CO2 23 22 - 29 mmol/L    Anion Gap 11 7 - 16 mmol/L    Glucose 118 (H) 74 - 109 mg/dL    BUN 14 8 - 23 mg/dL    CREATININE 0.7 0.7 - 1.2 mg/dL    GFR Non-African American >60 >=60 mL/min/1.73    GFR African American >60     Calcium 8.1 (L) 8.6 - 10.2 mg/dL   CBC   Result Value Ref Range    WBC 6.7 4.5 - 11.5 E9/L    RBC 3.94 3.80 - 5.80 E12/L    Hemoglobin 9.9 (L) 12.5 - 16.5 g/dL    Hematocrit 32.5 (L) 37.0 - 54.0 %    MCV 82.5 80.0 - 99.9 fL    MCH 25.1 (L) 26.0 - 35.0 pg    MCHC 30.5 (L) 32.0 - 34.5 %    RDW 18.7 (H) 11.5 - 15.0 fL    Platelets 281 181 - 606 E9/L    MPV 9.4 7.0 - 12.0 fL   Urinalysis   Result Value Ref Range    Color, UA Yellow Straw/Yellow    Clarity, UA Clear Clear todays results, in addition to providing specific details for the plan of care and counseling regarding the diagnosis and prognosis. Their questions are answered at this time and they are agreeable with the plan. Medical Decision Making Rationale: This patient presented emergency room with failure to thrive and decreased oral intake with 20 pound weight loss since recent admission at the beginning of the month. The patient is not eating and refusing to drink fluids. The family is considering acute feeding and the patient is not candidate to go back to the assisted living facility      --------------------------------- ADDITIONAL PROVIDER NOTES ---------------------------------        I discussed the results of the test in details with the family. The patient will require admission. Consultation:  I Spoke with PCP who agreed to admit the patient        Clinical Impression:  1. Dehydration    2. Lactic acidosis    3.  Hypotension, unspecified hypotension type           Mayra Ramirez MD  08/07/18 [id number]

## 2018-07-27 PROBLEM — E43 SEVERE PROTEIN-CALORIE MALNUTRITION (HCC): Chronic | Status: ACTIVE | Noted: 2018-07-27

## 2018-07-27 LAB
ANION GAP SERPL CALCULATED.3IONS-SCNC: 11 MMOL/L (ref 7–16)
BILIRUBIN URINE: NEGATIVE
BLOOD, URINE: NEGATIVE
BUN BLDV-MCNC: 14 MG/DL (ref 8–23)
CALCIUM SERPL-MCNC: 8.1 MG/DL (ref 8.6–10.2)
CHLORIDE BLD-SCNC: 108 MMOL/L (ref 98–107)
CLARITY: CLEAR
CO2: 23 MMOL/L (ref 22–29)
COLOR: YELLOW
CREAT SERPL-MCNC: 0.7 MG/DL (ref 0.7–1.2)
GFR AFRICAN AMERICAN: >60
GFR NON-AFRICAN AMERICAN: >60 ML/MIN/1.73
GLUCOSE BLD-MCNC: 118 MG/DL (ref 74–109)
GLUCOSE URINE: NEGATIVE MG/DL
HCT VFR BLD CALC: 32.5 % (ref 37–54)
HEMOGLOBIN: 9.9 G/DL (ref 12.5–16.5)
KETONES, URINE: NEGATIVE MG/DL
LEUKOCYTE ESTERASE, URINE: NEGATIVE
MCH RBC QN AUTO: 25.1 PG (ref 26–35)
MCHC RBC AUTO-ENTMCNC: 30.5 % (ref 32–34.5)
MCV RBC AUTO: 82.5 FL (ref 80–99.9)
METER GLUCOSE: 112 MG/DL (ref 70–110)
METER GLUCOSE: 127 MG/DL (ref 70–110)
METER GLUCOSE: 96 MG/DL (ref 70–110)
NITRITE, URINE: NEGATIVE
PDW BLD-RTO: 18.7 FL (ref 11.5–15)
PH UA: 5.5 (ref 5–9)
PLATELET # BLD: 299 E9/L (ref 130–450)
PMV BLD AUTO: 9.4 FL (ref 7–12)
POTASSIUM SERPL-SCNC: 3.7 MMOL/L (ref 3.5–5)
PROTEIN UA: NEGATIVE MG/DL
RBC # BLD: 3.94 E12/L (ref 3.8–5.8)
SODIUM BLD-SCNC: 142 MMOL/L (ref 132–146)
SPECIFIC GRAVITY UA: >=1.03 (ref 1–1.03)
UROBILINOGEN, URINE: 0.2 E.U./DL
WBC # BLD: 6.7 E9/L (ref 4.5–11.5)

## 2018-07-27 PROCEDURE — 87088 URINE BACTERIA CULTURE: CPT

## 2018-07-27 PROCEDURE — 80048 BASIC METABOLIC PNL TOTAL CA: CPT

## 2018-07-27 PROCEDURE — G8988 SELF CARE GOAL STATUS: HCPCS

## 2018-07-27 PROCEDURE — 6360000002 HC RX W HCPCS: Performed by: INTERNAL MEDICINE

## 2018-07-27 PROCEDURE — G8981 BODY POS CURRENT STATUS: HCPCS | Performed by: PHYSICAL THERAPIST

## 2018-07-27 PROCEDURE — 2060000000 HC ICU INTERMEDIATE R&B

## 2018-07-27 PROCEDURE — 87070 CULTURE OTHR SPECIMN AEROBIC: CPT

## 2018-07-27 PROCEDURE — 6370000000 HC RX 637 (ALT 250 FOR IP): Performed by: INTERNAL MEDICINE

## 2018-07-27 PROCEDURE — 82962 GLUCOSE BLOOD TEST: CPT

## 2018-07-27 PROCEDURE — 2580000003 HC RX 258: Performed by: INTERNAL MEDICINE

## 2018-07-27 PROCEDURE — 97165 OT EVAL LOW COMPLEX 30 MIN: CPT

## 2018-07-27 PROCEDURE — 2580000003 HC RX 258

## 2018-07-27 PROCEDURE — G8982 BODY POS GOAL STATUS: HCPCS | Performed by: PHYSICAL THERAPIST

## 2018-07-27 PROCEDURE — 36415 COLL VENOUS BLD VENIPUNCTURE: CPT

## 2018-07-27 PROCEDURE — 85027 COMPLETE CBC AUTOMATED: CPT

## 2018-07-27 PROCEDURE — G8987 SELF CARE CURRENT STATUS: HCPCS

## 2018-07-27 PROCEDURE — 97162 PT EVAL MOD COMPLEX 30 MIN: CPT | Performed by: PHYSICAL THERAPIST

## 2018-07-27 PROCEDURE — 81003 URINALYSIS AUTO W/O SCOPE: CPT

## 2018-07-27 PROCEDURE — 87186 SC STD MICRODIL/AGAR DIL: CPT

## 2018-07-27 RX ORDER — ARGININE/ASCORBATE SOD/VITE AC 4.5 G/9.2G
4.5 POWDER IN PACKET (EA) ORAL 2 TIMES DAILY
Status: DISCONTINUED | OUTPATIENT
Start: 2018-07-27 | End: 2018-07-27 | Stop reason: CLARIF

## 2018-07-27 RX ORDER — TRIAMCINOLONE ACETONIDE 1 MG/G
CREAM TOPICAL EVERY 8 HOURS
Status: DISCONTINUED | OUTPATIENT
Start: 2018-07-27 | End: 2018-08-02 | Stop reason: HOSPADM

## 2018-07-27 RX ORDER — CHOLECALCIFEROL (VITAMIN D3) 50 MCG
5000 TABLET ORAL DAILY
Status: DISCONTINUED | OUTPATIENT
Start: 2018-07-27 | End: 2018-08-02 | Stop reason: HOSPADM

## 2018-07-27 RX ORDER — BUSPIRONE HYDROCHLORIDE 5 MG/1
7.5 TABLET ORAL DAILY
Status: DISCONTINUED | OUTPATIENT
Start: 2018-07-27 | End: 2018-08-02 | Stop reason: HOSPADM

## 2018-07-27 RX ORDER — DEXTROSE MONOHYDRATE 50 MG/ML
100 INJECTION, SOLUTION INTRAVENOUS PRN
Status: DISCONTINUED | OUTPATIENT
Start: 2018-07-27 | End: 2018-08-02 | Stop reason: HOSPADM

## 2018-07-27 RX ORDER — SODIUM CHLORIDE 0.9 % (FLUSH) 0.9 %
10 SYRINGE (ML) INJECTION PRN
Status: DISCONTINUED | OUTPATIENT
Start: 2018-07-27 | End: 2018-08-02 | Stop reason: HOSPADM

## 2018-07-27 RX ORDER — FLUTICASONE PROPIONATE 50 MCG
2 SPRAY, SUSPENSION (ML) NASAL DAILY
Status: DISCONTINUED | OUTPATIENT
Start: 2018-07-27 | End: 2018-08-02 | Stop reason: HOSPADM

## 2018-07-27 RX ORDER — NICOTINE POLACRILEX 4 MG
15 LOZENGE BUCCAL PRN
Status: DISCONTINUED | OUTPATIENT
Start: 2018-07-27 | End: 2018-07-27 | Stop reason: SDUPTHER

## 2018-07-27 RX ORDER — LATANOPROST 50 UG/ML
1 SOLUTION/ DROPS OPHTHALMIC NIGHTLY
Status: DISCONTINUED | OUTPATIENT
Start: 2018-07-27 | End: 2018-08-02 | Stop reason: HOSPADM

## 2018-07-27 RX ORDER — DEXTROSE MONOHYDRATE 25 G/50ML
12.5 INJECTION, SOLUTION INTRAVENOUS PRN
Status: DISCONTINUED | OUTPATIENT
Start: 2018-07-27 | End: 2018-08-02 | Stop reason: HOSPADM

## 2018-07-27 RX ORDER — ACETAMINOPHEN 325 MG/1
650 TABLET ORAL EVERY 4 HOURS PRN
Status: DISCONTINUED | OUTPATIENT
Start: 2018-07-27 | End: 2018-08-02 | Stop reason: HOSPADM

## 2018-07-27 RX ORDER — SODIUM CHLORIDE 9 MG/ML
INJECTION, SOLUTION INTRAVENOUS CONTINUOUS
Status: DISCONTINUED | OUTPATIENT
Start: 2018-07-27 | End: 2018-08-02 | Stop reason: HOSPADM

## 2018-07-27 RX ORDER — SODIUM CHLORIDE 0.9 % (FLUSH) 0.9 %
10 SYRINGE (ML) INJECTION 2 TIMES DAILY
Status: DISCONTINUED | OUTPATIENT
Start: 2018-07-27 | End: 2018-08-02 | Stop reason: HOSPADM

## 2018-07-27 RX ORDER — SODIUM CHLORIDE 0.9 % (FLUSH) 0.9 %
SYRINGE (ML) INJECTION
Status: COMPLETED
Start: 2018-07-27 | End: 2018-07-27

## 2018-07-27 RX ORDER — ALBUTEROL SULFATE 2.5 MG/3ML
2.5 SOLUTION RESPIRATORY (INHALATION) EVERY 6 HOURS PRN
Status: DISCONTINUED | OUTPATIENT
Start: 2018-07-27 | End: 2018-08-02 | Stop reason: HOSPADM

## 2018-07-27 RX ORDER — TAMSULOSIN HYDROCHLORIDE 0.4 MG/1
0.4 CAPSULE ORAL EVERY EVENING
Status: DISCONTINUED | OUTPATIENT
Start: 2018-07-27 | End: 2018-08-02 | Stop reason: HOSPADM

## 2018-07-27 RX ORDER — ATORVASTATIN CALCIUM 20 MG/1
20 TABLET, FILM COATED ORAL DAILY
Status: DISCONTINUED | OUTPATIENT
Start: 2018-07-27 | End: 2018-08-02 | Stop reason: HOSPADM

## 2018-07-27 RX ORDER — NICOTINE POLACRILEX 4 MG
15 LOZENGE BUCCAL PRN
Status: DISCONTINUED | OUTPATIENT
Start: 2018-07-27 | End: 2018-08-02 | Stop reason: HOSPADM

## 2018-07-27 RX ADMIN — SODIUM CHLORIDE: 9 INJECTION, SOLUTION INTRAVENOUS at 02:26

## 2018-07-27 RX ADMIN — TRIAMCINOLONE ACETONIDE: 1 CREAM TOPICAL at 23:49

## 2018-07-27 RX ADMIN — CHOLECALCIFEROL TAB 50 MCG (2000 UNIT) 5000 UNITS: 50 TAB at 08:54

## 2018-07-27 RX ADMIN — SODIUM CHLORIDE: 9 INJECTION, SOLUTION INTRAVENOUS at 21:45

## 2018-07-27 RX ADMIN — BUSPIRONE HYDROCHLORIDE 7.5 MG: 5 TABLET ORAL at 08:53

## 2018-07-27 RX ADMIN — PETROLATUM: 42 OINTMENT TOPICAL at 20:51

## 2018-07-27 RX ADMIN — TAMSULOSIN HYDROCHLORIDE 0.4 MG: 0.4 CAPSULE ORAL at 17:30

## 2018-07-27 RX ADMIN — ACETAMINOPHEN 650 MG: 325 TABLET ORAL at 20:54

## 2018-07-27 RX ADMIN — ENOXAPARIN SODIUM 40 MG: 40 INJECTION, SOLUTION INTRAVENOUS; SUBCUTANEOUS at 08:54

## 2018-07-27 RX ADMIN — TRIAMCINOLONE ACETONIDE: 1 CREAM TOPICAL at 16:47

## 2018-07-27 RX ADMIN — Medication 10 ML: at 15:00

## 2018-07-27 RX ADMIN — FLUTICASONE PROPIONATE 2 SPRAY: 50 SPRAY, METERED NASAL at 08:53

## 2018-07-27 RX ADMIN — TRIAMCINOLONE ACETONIDE: 1 CREAM TOPICAL at 08:54

## 2018-07-27 RX ADMIN — SODIUM CHLORIDE: 9 INJECTION, SOLUTION INTRAVENOUS at 11:03

## 2018-07-27 RX ADMIN — LATANOPROST 1 DROP: 50 SOLUTION OPHTHALMIC at 20:51

## 2018-07-27 RX ADMIN — SODIUM CHLORIDE, PRESERVATIVE FREE: 5 INJECTION INTRAVENOUS at 14:30

## 2018-07-27 RX ADMIN — ATORVASTATIN CALCIUM 20 MG: 20 TABLET, FILM COATED ORAL at 08:54

## 2018-07-27 RX ADMIN — PETROLATUM 1 APPLICATOR: 42 OINTMENT TOPICAL at 08:54

## 2018-07-27 ASSESSMENT — PAIN DESCRIPTION - ONSET: ONSET: SUDDEN

## 2018-07-27 ASSESSMENT — PAIN SCALES - GENERAL
PAINLEVEL_OUTOF10: 0
PAINLEVEL_OUTOF10: 3
PAINLEVEL_OUTOF10: 0

## 2018-07-27 ASSESSMENT — PAIN DESCRIPTION - DESCRIPTORS: DESCRIPTORS: HEADACHE

## 2018-07-27 ASSESSMENT — PAIN DESCRIPTION - FREQUENCY: FREQUENCY: INTERMITTENT

## 2018-07-27 ASSESSMENT — PAIN DESCRIPTION - PAIN TYPE: TYPE: ACUTE PAIN

## 2018-07-27 ASSESSMENT — PAIN DESCRIPTION - LOCATION: LOCATION: HEAD

## 2018-07-27 NOTE — CARE COORDINATION
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 PT  ALERT; DISCUSSED  CURRENT  TREATMENT PLAN/COURSE  OF  CARE;  PT UNDERSTANDS. STATES HE  IS  NOT  REALLY  THRILLED  ABOUT  \"FEEDING TUBE\"BUT  REALIZES  HE  IS  NOT  EATING  ENOUGH  TO  SUSTAIN  NOURISHMENT. PT  STATES  [facility]  [facility]. WILL FOLLOW ALONG  WITH SOCIAL WORK FOR  ANY  ADDITIONAL  NEEDS. Buck Olguin RN,CM.

## 2018-07-27 NOTE — PROGRESS NOTES
OCCUPATIONAL THERAPY INITIAL EVALUATION      Date:2018  Patient Name: Miranda Wong  MRN: [id number]  : 1940  Room: [address] [place] [address]     Evaluating OT: Martha Chaudhry OTR/L [id number]    Recommended Adaptive Equipment: TBD     AM-PAC Inpatient Daily Activity Raw Score:   G code: CL      Diagnosis: hypotension  Surgery:   Past Surgical History:   Procedure Laterality Date    DOPPLER ECHOCARDIOGRAPHY N/A     FRACTURE SURGERY        Past Medical History:   Past Medical History:   Diagnosis Date    Anxiety     Benign localized hyperplasia of prostate with urinary obstruction     Bullous pemphigoid     Diabetes mellitus ([facility] [address]     Enlarged prostate     Hyperlipidemia     Hypertension     Muscle weakness     Rheumatoid disease ([facility] [address]      Precautions: Falls, wounds BLE and buttocks     Home Living: Pt lives at [facility]      Prior Level of Function: Pt reports having assist with ADL's at [facility]. Pt poor historian regarding the length of time it has been since he was out of bed.      Pain Level: pt c/o pain in legs  this session     Cognition: oriented x 2  Person and place      Vision/Perception  Hearing: WFL  Vision: grossly WLF  ; Glasses: yes [x] no [] reading []        UE Assessment:  Hand Dominance: Right []  Left []     ROM Strength Additional Info:    RUE  AROM WFL  4-/5 4-/5  and WFK FMC/dexterity noted during ADL tasks     LUE AROM WFL  4-/5 4-/5  and WFL FMC/dexterity noted during ADL tasks     Sensation: WFL  Tone: WFL   Edema:BLE    Functional Assessment:   Initial Status 18 Comments   Feeding  Dep  Pt has not been eating lately   Grooming  Mod A  Set up in bed    Upper Body Dressing Max A      Lower Body Dressing Dep    Bathing DNT    Toileting  Dep     Bed Mobility  Supine to Sit: Dep +2  Sit to Supine:Dep+2    Functional Transfers NA   Ma +1 to maintain sitting on EOB   Functional Mobility NA       Sit balance: Poor  Stand balance: DNT  Endurance/Activity tolerance: Poor alexa to pain                      Comments: Upon arrival pt supine in bed. At end of session pt supine in bed with all devices within reach, all lines and tubes intact. Treatment: bed mobility Dep +2, supine to  sit Dep +2, Sitting balance at EOB poor with Max A to maintain balance as pt leaned to side    Assessment of current deficits   Functional mobility [x]  ROM [] Strength [x]  Cognition []  ADLs [x]   IADLs [x] Safety Awareness [] Endurance [x]  Fine Motor Coordination [] Balance [x] Vision/perception [] Sensation []   Gross Motor Coordination []     Eval Complexity: lwo  Profile and History- brief   Assessment of Occupational Performance and Identification of Deficits- 5+  Clinical Decision Making- low    Treatment frequency:PRN 1-3 tx per week     Plan of Care:   ADL retraining [x]   Equipment needs [x]   Neuromuscular re-education [] Energy Conservation Techniques [x]  Functional Transfer training [x] Patient and/or Family Education [x]  Functional Mobility training [x]  Environmental Modifications []  Cognitive re-training []   Compensatory techniques for ADLs []  Splinting Needs []   Positioning/joint protection []  Other: []      Rehab Potential: fair    Patient / Family Goal: pt did not state a goal    Short term goals  Time Frame: 1-2 weeks  1. Increase feeding to set up  2. Increase grooming to set up at chair level  3. Increase UB bath/ dress to [address] A   4. Increase sitting balance to Fair to participate in ADL tasks from seated position. 5. Increase toilet transfers including clothing management and personal hygiene to  Max A SPS      Patient and/or family understands diagnosis, prognosis and plan of care: yes    [] Malnutrition indicators have been identified and nursing has been notified to ensure a dietitian consult is ordered.      Time In: 13:38  Time Out: 14:04      Jono Watson, OTR/L [id number]

## 2018-07-27 NOTE — PLAN OF CARE
Problem: Nutrition  Goal: Optimal nutrition therapy  Outcome: Ongoing  Nutrition Problem: Severe malnutrition, in context of chronic illness  Intervention: Food and/or Nutrient Delivery: Continue current diet, Start ONS (Maxwell BID)  Nutritional Goals: Consume >50% meals/ONS

## 2018-07-27 NOTE — PROGRESS NOTES
[facility]S INTERMEDIATE 1  Physical Therapy Initial Evaluation    Name: Ann Montiel  : 1940  MRN: [id number]    Date of Service: 2018    Evaluating Therapist: Wendy Garvin PT, DPT       Equipment Recommendations: to be determined. Room #: 9545/6583-N  DIAGNOSIS: hypotension  PRECAUTIONS: fall risk    Social:  Pt lives at nursing facility. Prior to admission:  Pt is a questionable historian. 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 Pt reports he walks by himself with a walker. Later reports he hasn't been out of bed in weeks. Initial Evaluation  Date:  Treatment      Short Term/ Long Term   Goals   Was pt agreeable to Eval/treatment? yes     Does pt have pain? LE's     Bed Mobility  Rolling: dep x2  Supine to sit: dep x 2  Sit to supine: dep x 2  Scooting: dep x 2  Max A    Transfers NT  Max A    Ambulation    NT  NT   Stair negotiation: ascended and descended  NT  NT   AM-PAC Raw Score        ROM/ Strength:   Pt does perform partial ROM  Of B ankles. Otherwise pt refused ROM and strength testing due to pain. Balance: sitting balance - max A   Sensation: unable to formally assess. Edema: B LEs  Endurance: poor       ASSESSMENT  Pt displays functional ability as noted in the objective portion of this evaluation. Comments/Treatment:  Pt found laying in bed agreeable to evaluation with max encouragement. Pt limited by c/o pain in LE's and pt self limiting. Pt left laying in bed with call button in reach and wound care RN's present. Patient education  Pt educated on mobility. Patient response to education:   Pt verbalized understanding Pt demonstrated skill Pt requires further education in this area     x     Rehab potential is fair  for reaching above PT goals. Pts/ family goals   1. None stated. Patient and or family understand(s) diagnosis, prognosis, and plan of care.     PLAN  PT care will be provided in accordance with the objectives noted

## 2018-07-27 NOTE — FLOWSHEET NOTE
Susy-wound Assessment (discolored purple. )   Wound 07/27/18 Leg Right   Date First Assessed/Time First Assessed: 07/27/18 1618   Location: Leg  Wound Location Orientation: Right  Pre-existing: Yes  Photo Taken: Yes   Wound Image    Wound Type Wound   Dressing Status Changed   Dressing Changed Changed/New   Dressing/Treatment Alginate;Non adherent   Wound Cleansed Rinsed/Irrigated with saline   Dressing Change Due 07/28/18   Wound Length (cm) 6 cm   Wound Width (cm) 6 cm   Wound Depth (cm)  0.2   Calculated Wound Size (cm^2) (l*w) 36 cm^2   Wound Assessment Red   Drainage Amount Moderate   Drainage Description Serosanguinous   Odor None   Susy-wound Assessment Fragile   Wound 07/27/18 Leg Right   Date First Assessed/Time First Assessed: 07/27/18 1621   Location: Leg  Wound Location Orientation: Right  Pre-existing: Yes  Photo Taken: Yes   Wound Image    Wound Type Wound   Dressing Status Changed   Dressing Changed Changed/New   Dressing/Treatment Alginate;Non adherent   Wound Cleansed Rinsed/Irrigated with saline   Dressing Change Due 07/28/18   Wound Length (cm) 4 cm   Wound Width (cm) 4 cm   Wound Depth (cm)  0.2   Calculated Wound Size (cm^2) (l*w) 16 cm^2   Wound Assessment Red   Susy-wound Assessment Fragile       Impression:  Multiple lower extremity wounds, DTI to scrotum extending to  sacral and sandra ischium. Dry wounds to L. Toes. Heels intact. Interventions in place:  Lower leg wounds cleansed with NSS. Applied adaptic then opticell. Covered with ABD then kerlix. Multiple scars over body . Pt. Has extreme pain with touch. Plan: Daily dressings to sandra lower legs. Keep toe wounds clean and dry. Aquaphor to buttocks and scrotum, low air loss mattress. SOS precautions.        [place] Sox 7/27/2018 4:26 PM

## 2018-07-27 NOTE — CARE COORDINATION
Social Work/Discharge Planning:  Met with patient and his son Negrito Pace and completed initial assessment. Explained Social Work role and discussed transition of care/discharge planning. Patient was admitted from [facility] and plans to return at discharge. Called liaison Wily  at [facility] and she stated patient is a Medicaid bed hold but will require a pre-cert to return. Electronic N-17 in EPIC and transport form in soft chart. Will continue to follow and assist with discharge planning.   Electronically signed by TRUMAN Lorenzo on 2018 at 12:54 PM

## 2018-07-27 NOTE — CONSULTS
[facility] CONSULT NOTE  Reason for Consult:  LEG ULCERS  Requesting Physician:  DR Dominique White    Chief Complaint   Patient presents with    Weight Loss     loss of appetite; not eating or drinking x 2 days; from [facility]; Dr sent patient in for hydration and possible peg tube evaluation; recent weight loss of 20lbs per son       History Obtained From:  EXTENSIVE CHART REVIEW AND PT    HISTORY OF PRESENT ILLNESS              The patient is a [de-identified] y.o.  MAN with significant past medical history of  BULLOUS PEMPHIGOID who presents with  Weight Loss (loss of appetite; not eating or drinking x 2 days; from [facility]; patient in for hydration and possible peg tube evaluation; recent weight loss of 20lbs per son)  THIS PT IS KNOWN TO INFECTIOUS DISEASE AND WAS SEEN IN 2016 FOR MRSA OF INFECTED LOWER EXT ULCER AND DX OF B. PEMPHIGOID  WAS MADE BY  BX FROM DERM. IN 2017 HE WAS RE ADMITTED AND SEEN BY ID FOR CELLULITIS OF LOWER EXT; AT THAT TIME GPB STREP AND MRSA CULTURED. MORE RECENTLY 6/ 18 CULT + MSSA.    DR HAZEL FOLLOWS FROM DERM AND PT HAS BEEN ON STEROIDS FOR THE  B.P.  MINOCYCYLINE WAS USED FOR THE ULCERS AND THE B.P. BY DERM          Past Medical History:   Diagnosis Date    Anxiety     Benign localized hyperplasia of prostate with urinary obstruction     Bullous pemphigoid     Diabetes mellitus ([address]     Enlarged prostate     Hyperlipidemia     Hypertension     Muscle weakness     Rheumatoid disease ([address]        Past Surgical History:   Procedure Laterality Date    DOPPLER ECHOCARDIOGRAPHY N/A     FRACTURE SURGERY         Current Facility-Administered Medications   Medication Dose Route Frequency Provider Last Rate Last Dose    0.9 % sodium chloride infusion   Intravenous Continuous Tomasa Gooden  mL/hr at 07/27/18 1103      acetaminophen (TYLENOL) tablet 650 mg  650 mg Oral Q4H PRN Tomasa Gooden MD        albuterol (PROVENTIL) nebulizer solution 2.5 mg  2.5 mg Nebulization Q6H PRN [facility] MD KEITH        atorvastatin (LIPITOR) tablet 20 mg  20 mg Oral Daily Tomasa Gooden MD   20 mg at 07/27/18 0854    bisacodyl (DULCOLAX) EC tablet 5 mg  5 mg Oral Daily PRN [facility], MD        busPIRone (BUSPAR) tablet 7.5 mg  7.5 mg Oral Daily Tomasa Gooden MD   7.5 mg at 07/27/18 2238    vitamin D tablet 5,000 Units  5,000 Units Oral Daily [facility], MD   5,000 Units at 07/27/18 0854    fluticasone (FLONASE) 50 MCG/ACT nasal spray 2 spray  2 spray Nasal Daily [facility], MD   2 spray at 07/27/18 0853    latanoprost (XALATAN) 0.005 % ophthalmic solution 1 drop  1 drop Both Eyes Nightly [facility] MD KEITH        magnesium hydroxide (MILK OF MAGNESIA) 400 MG/5ML suspension 30 mL  30 mL Oral Daily PRN [facility] MD KEITH        mineral oil-hydrophilic petrolatum (AQUAPHOR) ointment 1 applicator  1 applicator Topical BID [facility], MD   1 applicator at 16/95/16 5238    tamsulosin (FLOMAX) capsule 0.4 mg  0.4 mg Oral QPM Tomasa Gooden MD        triamcinolone (KENALOG) 0.1 % cream   Topical Q8H Tomasa Gooden MD        enoxaparin (LOVENOX) injection 40 mg  40 mg Subcutaneous Daily Tomasa Gooden MD   40 mg at 07/27/18 0854    insulin lispro (HUMALOG) injection vial 0-6 Units  0-6 Units Subcutaneous TID  Tomasa Gooden MD        glucose (GLUTOSE) 40 % oral gel 15 g  15 g Oral PRN Tomasa Gooden MD        dextrose 50 % solution 12.5 g  12.5 g Intravenous PRN Tomasa Gooden MD        glucagon (rDNA) injection 1 mg  1 mg Intramuscular PRN Tomasa Gooden MD        dextrose 5 % solution  100 mL/hr Intravenous PRN [facility] MD KEITH           Allergies   Allergen Reactions    Levaquin [Levofloxacin] Other (See Comments)     Lethargy when taken with celebrex        Social History     Social History    Marital status:       Spouse name: N/A    Number of no distress  Skin: Warm and dry. LOWER EXT ULCER HEALED ON RT AND GRANULATING ON LEFT  Head: Normocephalic. No masses, lesions or tenderness noted. Eyes: Conjunctivae appear normal. PERRL. Ears: External ears normal.  Nose/Sinuses: Nares normal. Septum midline. Mucosa normal. No drainage. Oropharynx: Oropharynx clear with no exudate seen. Neck: Neck supple. No jugular venous distension, lymphadenopathy or thyromegaly. Trachea midline  Lungs: Lungs clear to auscultation bilaterally. No ronchi, crackle or rale noted. Heart: S1 S2  Regular rate and rhythm. +murmur or gallop noted. Abdomen: Abdomen soft, non-tender. BS normal. No masses, organomegaly. No hernia noted. Extremities: No edema, Peripheral pulses palpable. Musculoskeletal: Muscular strength POOR C FLEX AND SOME CONTRACTURES .  No joint effusion,+++ tenderness WITH MOVING LOWER EXT  Admission on 07/26/2018   Component Date Value Ref Range Status    WBC 07/26/2018 7.5  4.5 - 11.5 E9/L Final    RBC 07/26/2018 4.39  3.80 - 5.80 E12/L Final    Hemoglobin 07/26/2018 11.1* 12.5 - 16.5 g/dL Final    Hematocrit 07/26/2018 35.7* 37.0 - 54.0 % Final    MCV 07/26/2018 81.3  80.0 - 99.9 fL Final    MCH 07/26/2018 25.3* 26.0 - 35.0 pg Final    MCHC 07/26/2018 31.1* 32.0 - 34.5 % Final    RDW 07/26/2018 19.0* 11.5 - 15.0 fL Final    Platelets 39/10/3518 331  130 - 450 E9/L Final    MPV 07/26/2018 9.3  7.0 - 12.0 fL Final    Neutrophils % 07/26/2018 68.3  43.0 - 80.0 % Final    Immature Granulocytes % 07/26/2018 0.4  0.0 - 5.0 % Final    Lymphocytes % 07/26/2018 16.2* 20.0 - 42.0 % Final    Monocytes % 07/26/2018 14.2* 2.0 - 12.0 % Final    Eosinophils % 07/26/2018 0.5  0.0 - 6.0 % Final    Basophils % 07/26/2018 0.4  0.0 - 2.0 % Final    Neutrophils # 07/26/2018 5.10  1.80 - 7.30 E9/L Final    Immature Granulocytes # 07/26/2018 0.03  E9/L Final    Lymphocytes # 07/26/2018 1.21* 1.50 - 4.00 E9/L Final    Monocytes # 07/26/2018 1.06* 0.10 - 0.95 E9/L Final    Eosinophils # 07/26/2018 0.04* 0.05 - 0.50 E9/L Final    Basophils # 07/26/2018 0.03  0.00 - 0.20 E9/L Final    Sodium 07/26/2018 141  132 - 146 mmol/L Final    Potassium 07/26/2018 3.8  3.5 - 5.0 mmol/L Final    Chloride 07/26/2018 105  98 - 107 mmol/L Final    CO2 07/26/2018 22  22 - 29 mmol/L Final    Anion Gap 07/26/2018 14  7 - 16 mmol/L Final    Glucose 07/26/2018 187* 74 - 109 mg/dL Final    BUN 07/26/2018 16  8 - 23 mg/dL Final    CREATININE 07/26/2018 0.8  0.7 - 1.2 mg/dL Final    GFR Non- 07/26/2018 >60  >=60 mL/min/1.73 Final    GFR  07/26/2018 >60   Final    Calcium 07/26/2018 8.3* 8.6 - 10.2 mg/dL Final    Total Protein 07/26/2018 6.6  6.4 - 8.3 g/dL Final    Alb 07/26/2018 2.9* 3.5 - 5.2 g/dL Final    Total Bilirubin 07/26/2018 0.3  0.0 - 1.2 mg/dL Final    Alkaline Phosphatase 07/26/2018 279* 40 - 129 U/L Final    ALT 07/26/2018 25  0 - 40 U/L Final    AST 07/26/2018 42* 0 - 39 U/L Final    Lipase 07/26/2018 25  13 - 60 U/L Final    Lactic Acid 07/26/2018 2.9* 0.5 - 2.2 mmol/L Final    Color, UA 07/26/2018 Yellow  Straw/Yellow Final    Clarity, UA 07/26/2018 Clear  Clear Final    Glucose, Ur 07/26/2018 Negative  Negative mg/dL Final    Bilirubin Urine 07/26/2018 Negative  Negative Final    Ketones, Urine 07/26/2018 Negative  Negative mg/dL Final    Specific Gravity, UA 07/26/2018 1.025  1.005 - 1.030 Final    Blood, Urine 07/26/2018 Negative  Negative Final    pH, UA 07/26/2018 5.5  5.0 - 9.0 Final    Protein, UA 07/26/2018 TRACE  Negative mg/dL Final    Urobilinogen, Urine 07/26/2018 0.2  <2.0 E.U./dL Final    Nitrite, Urine 07/26/2018 Negative  Negative Final    Leukocyte Esterase, Urine 07/26/2018 Negative  Negative Final    Casts 07/26/2018 MODERATE  /LPF Final    Mucus, UA 07/26/2018 Present   Final    WBC, UA 07/26/2018 0-1  0 - 5 /HPF Final    RBC, UA 07/26/2018 0-1  0 - 2 /HPF Final    Epi Cells 07/26/2018 RARE  /HPF Final    Renal Epithelial, Urine 07/26/2018 RARE  /HPF Final    Bacteria, UA 07/26/2018 MANY* /HPF Final    Sodium 07/27/2018 142  132 - 146 mmol/L Final    Potassium 07/27/2018 3.7  3.5 - 5.0 mmol/L Final    Chloride 07/27/2018 108* 98 - 107 mmol/L Final    CO2 07/27/2018 23  22 - 29 mmol/L Final    Anion Gap 07/27/2018 11  7 - 16 mmol/L Final    Glucose 07/27/2018 118* 74 - 109 mg/dL Final    BUN 07/27/2018 14  8 - 23 mg/dL Final    CREATININE 07/27/2018 0.7  0.7 - 1.2 mg/dL Final    GFR Non- 07/27/2018 >60  >=60 mL/min/1.73 Final    GFR  07/27/2018 >60   Final    Calcium 07/27/2018 8.1* 8.6 - 10.2 mg/dL Final    WBC 07/27/2018 6.7  4.5 - 11.5 E9/L Final    RBC 07/27/2018 3.94  3.80 - 5.80 E12/L Final    Hemoglobin 07/27/2018 9.9* 12.5 - 16.5 g/dL Final    Hematocrit 07/27/2018 32.5* 37.0 - 54.0 % Final    MCV 07/27/2018 82.5  80.0 - 99.9 fL Final    MCH 07/27/2018 25.1* 26.0 - 35.0 pg Final    MCHC 07/27/2018 30.5* 32.0 - 34.5 % Final    RDW 07/27/2018 18.7* 11.5 - 15.0 fL Final    Platelets 98/85/5910 299  130 - 450 E9/L Final    MPV 07/27/2018 9.4  7.0 - 12.0 fL Final    Meter Glucose 07/27/2018 96  70 - 110 mg/dL Final    Ventricular Rate 07/26/2018 105  BPM Incomplete    Atrial Rate 07/26/2018 105  BPM Incomplete    P-R Interval 07/26/2018 186  ms Incomplete    QRS Duration 07/26/2018 84  ms Incomplete    Q-T Interval 07/26/2018 364  ms Incomplete    QTc Calculation (Bazett) 07/26/2018 481  ms Incomplete    P Axis 07/26/2018 -25  degrees Incomplete    R Axis 07/26/2018 -32  degrees Incomplete    T Axis 07/26/2018 59  degrees Incomplete   Admission on 07/17/2018, Discharged on 07/21/2018   Component Date Value Ref Range Status    Sodium 07/17/2018 142  132 - 146 mmol/L Final    Potassium 07/17/2018 4.2  3.5 - 5.0 mmol/L Final    Chloride 07/17/2018 105  98 - 107 mmol/L Final    CO2 07/17/2018 19* 22 - 29 mmol/L Final    Anion Gap 07/17/2018 18* 7 - 16 mmol/L Final    Glucose 07/17/2018 119* 74 - 109 mg/dL Final    BUN 07/17/2018 34* 8 - 23 mg/dL Final    CREATININE 07/17/2018 1.2  0.7 - 1.2 mg/dL Final    GFR Non- 07/17/2018 58  >=60 mL/min/1.73 Final    GFR  07/17/2018 >60   Final    Calcium 07/17/2018 9.0  8.6 - 10.2 mg/dL Final    Total Protein 07/17/2018 7.3  6.4 - 8.3 g/dL Final    Alb 07/17/2018 2.9* 3.5 - 5.2 g/dL Final    Total Bilirubin 07/17/2018 0.5  0.0 - 1.2 mg/dL Final    Alkaline Phosphatase 07/17/2018 326* 40 - 129 U/L Final    ALT 07/17/2018 61* 0 - 40 U/L Final    AST 07/17/2018 147* 0 - 39 U/L Final    WBC 07/17/2018 9.9  4.5 - 11.5 E9/L Final    RBC 07/17/2018 4.71  3.80 - 5.80 E12/L Final    Hemoglobin 07/17/2018 11.9* 12.5 - 16.5 g/dL Final    Hematocrit 07/17/2018 38.6  37.0 - 54.0 % Final    MCV 07/17/2018 82.0  80.0 - 99.9 fL Final    MCH 07/17/2018 25.3* 26.0 - 35.0 pg Final    MCHC 07/17/2018 30.8* 32.0 - 34.5 % Final    RDW 07/17/2018 18.0* 11.5 - 15.0 fL Final    Platelets 99/04/8607 322  130 - 450 E9/L Final    MPV 07/17/2018 9.7  7.0 - 12.0 fL Final    Color, UA 07/18/2018 Yellow  Straw/Yellow Final    Clarity, UA 07/18/2018 Clear  Clear Final    Glucose, Ur 07/18/2018 Negative  Negative mg/dL Final    Bilirubin Urine 07/18/2018 Negative  Negative Final    Ketones, Urine 07/18/2018 TRACE* Negative mg/dL Final    Specific Gravity, UA 07/18/2018 1.020  1.005 - 1.030 Final    Blood, Urine 07/18/2018 TRACE* Negative Final    pH, UA 07/18/2018 5.5  5.0 - 9.0 Final    Protein, UA 07/18/2018 Negative  Negative mg/dL Final    Urobilinogen, Urine 07/18/2018 1.0  <2.0 E.U./dL Final    Nitrite, Urine 07/18/2018 Negative  Negative Final    Leukocyte Esterase, Urine 07/18/2018 Negative  Negative Final    Urine Culture, Routine 07/18/2018    Final                    Value:,000 CFU/mL  Mixed yang isolated.  Further workup and sensitivity testing  is not routinely indicated and will not be performed.   Mixed yang isolated includes:  Mixed gram positive organisms  Mixed gram negative rods      Culture, Blood 2 07/17/2018 5 Days- no growth   Final    Hemoglobin A1C 07/17/2018 6.0* 4.0 - 5.6 % Final    Meter Glucose 07/17/2018 114* 70 - 110 mg/dL Final    Meter Glucose 07/18/2018 85  70 - 110 mg/dL Final    Hep A IgM 07/18/2018 Non-Reactive  NON REACT Final    Hep B Core Ab, IgM 07/18/2018 Non-Reactive  NON REACT Final    Hep B S Ag Interp 07/18/2018 Non-Reactive  NON REACT Final    Hep C Ab Interp 07/18/2018 Non-Reactive  NON REACT Final    Amylase 07/18/2018 26  20 - 100 U/L Final    Lipase 07/18/2018 16  13 - 60 U/L Final    GGT 07/18/2018 169* 10 - 71 U/L Final    Meter Glucose 07/18/2018 93  70 - 110 mg/dL Final    Meter Glucose 07/18/2018 83  70 - 110 mg/dL Final    Meter Glucose 07/18/2018 85  70 - 110 mg/dL Final    WBC, UA 07/18/2018 0-1  0 - 5 /HPF Final    RBC, UA 07/18/2018 0-1  0 - 2 /HPF Final    Epi Cells 07/18/2018 RARE  /HPF Final    Bacteria, UA 07/18/2018 RARE* /HPF Final    Meter Glucose 07/18/2018 90  70 - 110 mg/dL Final    Meter Glucose 07/19/2018 77  70 - 110 mg/dL Final    Total Protein 07/19/2018 6.2* 6.4 - 8.3 g/dL Final    Alb 07/19/2018 2.7* 3.5 - 5.2 g/dL Final    Alkaline Phosphatase 07/19/2018 340* 40 - 129 U/L Final    ALT 07/19/2018 41* 0 - 40 U/L Final    AST 07/19/2018 116* 0 - 39 U/L Final    Total Bilirubin 07/19/2018 0.7  0.0 - 1.2 mg/dL Final    Bilirubin, Direct 07/19/2018 0.2  0.0 - 0.3 mg/dL Final    Bilirubin, Indirect 07/19/2018 0.5  0.0 - 1.0 mg/dL Final    Meter Glucose 07/19/2018 84  70 - 110 mg/dL Final    Meter Glucose 07/19/2018 94  70 - 110 mg/dL Final    Meter Glucose 07/19/2018 83  70 - 110 mg/dL Final    Meter Glucose 07/20/2018 95  70 - 110 mg/dL Final    C difficile Toxin, EIA 07/20/2018    Final                    Value:Result: C Difficile Toxins A and B not detected  *  *  Normal Range: Not detected      Meter Glucose 07/20/2018 177* 70 - 110 mg/dL Final    Meter Glucose 07/20/2018 89  70 - 110 mg/dL Final    Meter Glucose 07/20/2018 87  70 - 110 mg/dL Final    Meter Glucose 07/21/2018 76  70 - 110 mg/dL Final    Meter Glucose 07/21/2018 76  70 - 110 mg/dL Final    Meter Glucose 07/21/2018 65* 70 - 110 mg/dL Final       ASSESSMENT:  FAILURE TO THRIVE  CHRONIC LEG ULCER STABLE  POSSIBLE BPH AND RETENSION   MILD PRE RENAL AZOTEMIA  ANEMIA         PLAN:  NO ATB NOW   BLADDER SCAN; GREEN IF Janene Smiley MD, Ed Day

## 2018-07-27 NOTE — PATIENT CARE CONFERENCE
P Quality Flow/Interdisciplinary Rounds Progress Note        Quality Flow Rounds held on July 27, 2018    Disciplines Attending:  Bedside Nurse, ,  and Nursing Unit Gunnar Hernandes was admitted on 7/26/2018  3:35 PM    Anticipated Discharge Date:       Disposition:    Nick Score:  Nick Scale Score: 16    Readmission Score:         Discussed patient goal for the day, patient clinical progression, and barriers to discharge. The following Goal(s) of the Day/Commitment(s) have been identified:  IV antibiotics and cont to monitor.        Blaire Prescott  July 27, 2018

## 2018-07-27 NOTE — PROGRESS NOTES
Nutrition Assessment    Type and Reason for Visit: Initial, Positive Nutrition Screen    Nutrition Recommendations: Continue current diet, Start Maxwell BID. Please consult if EN is to be initiated    Malnutrition Assessment:  · Malnutrition Status: Meets the criteria for severe malnutrition  · Context: Chronic illness  · Findings of the 6 clinical characteristics of malnutrition (Minimum of 2 out of 6 clinical characteristics is required to make the diagnosis of moderate or severe Protein Calorie Malnutrition based on AND/ASPEN Guidelines):  1. Energy Intake-Less than or equal to 75%, greater than or equal to 1 month    2. Weight Loss-10% loss or greater, in 6 months  3. Fat Loss-Moderate subcutaneous fat loss, Orbital  4. Muscle Loss-Moderate muscle mass loss, Temples (temporalis muscle), Clavicles (pectoralis and deltoids)  5. Fluid Accumulation-No significant fluid accumulation, Extremities  6.   Strength-Not measured    Nutrition Diagnosis:   · Problem: Severe malnutrition, in context of chronic illness  · Etiology: related to Insufficient energy/nutrient consumption     Signs and symptoms:  as evidenced by Diet history of poor intake, Presence of wounds, Weight loss greater than or equal to 10% in 6 months, Moderate loss of subcutaneous fat, Moderate muscle loss, Nausea, Vomiting, Diarrhea    Nutrition Assessment:  · Subjective Assessment: noted w/ recent 20# wt loss and poor PO intake x 4-6 wks PTA; family agreeable to PEG  · Nutrition-Focused Physical Findings: Pt alert, active BS, N/V/D, +1 generalized edema, loss of appetite  · Wound Type: Venous Stasis, Open Wounds, Multiple, Wound Consult Pending  · Current Nutrition Therapies:  · Oral Diet Orders: Carb Control 4 Carbs/Meal   · Oral Diet intake: % (breakfast this AM)  · Oral Nutrition Supplement (ONS) Orders: None  · Anthropometric Measures:  · Ht: 6' (182.9 cm)   · Current Body Wt: 216 lb (98 kg) (7/26 actual)  · Admission Body Wt: 216 lb

## 2018-07-28 ENCOUNTER — ANESTHESIA EVENT (OUTPATIENT)
Dept: OPERATING ROOM | Age: 78
DRG: 641 | End: 2018-07-28
Payer: COMMERCIAL

## 2018-07-28 ENCOUNTER — ANESTHESIA (OUTPATIENT)
Dept: OPERATING ROOM | Age: 78
DRG: 641 | End: 2018-07-28
Payer: COMMERCIAL

## 2018-07-28 LAB
BASOPHILS ABSOLUTE: 0.04 E9/L (ref 0–0.2)
BASOPHILS RELATIVE PERCENT: 0.7 % (ref 0–2)
C-REACTIVE PROTEIN: 14.5 MG/DL (ref 0–0.4)
EOSINOPHILS ABSOLUTE: 0.18 E9/L (ref 0.05–0.5)
EOSINOPHILS RELATIVE PERCENT: 3 % (ref 0–6)
HCT VFR BLD CALC: 31.6 % (ref 37–54)
HEMOGLOBIN: 9.7 G/DL (ref 12.5–16.5)
IMMATURE GRANULOCYTES #: 0.03 E9/L
IMMATURE GRANULOCYTES %: 0.5 % (ref 0–5)
LYMPHOCYTES ABSOLUTE: 1.31 E9/L (ref 1.5–4)
LYMPHOCYTES RELATIVE PERCENT: 21.6 % (ref 20–42)
MCH RBC QN AUTO: 25.2 PG (ref 26–35)
MCHC RBC AUTO-ENTMCNC: 30.7 % (ref 32–34.5)
MCV RBC AUTO: 82.1 FL (ref 80–99.9)
METER GLUCOSE: 114 MG/DL (ref 70–110)
METER GLUCOSE: 115 MG/DL (ref 70–110)
METER GLUCOSE: 118 MG/DL (ref 70–110)
METER GLUCOSE: 83 MG/DL (ref 70–110)
MONOCYTES ABSOLUTE: 0.81 E9/L (ref 0.1–0.95)
MONOCYTES RELATIVE PERCENT: 13.3 % (ref 2–12)
NEUTROPHILS ABSOLUTE: 3.7 E9/L (ref 1.8–7.3)
NEUTROPHILS RELATIVE PERCENT: 60.9 % (ref 43–80)
PDW BLD-RTO: 18.6 FL (ref 11.5–15)
PLATELET # BLD: 282 E9/L (ref 130–450)
PMV BLD AUTO: 9.5 FL (ref 7–12)
RBC # BLD: 3.85 E12/L (ref 3.8–5.8)
SEDIMENTATION RATE, ERYTHROCYTE: 68 MM/HR (ref 0–15)
WBC # BLD: 6.1 E9/L (ref 4.5–11.5)

## 2018-07-28 PROCEDURE — 86140 C-REACTIVE PROTEIN: CPT

## 2018-07-28 PROCEDURE — 85651 RBC SED RATE NONAUTOMATED: CPT

## 2018-07-28 PROCEDURE — 36415 COLL VENOUS BLD VENIPUNCTURE: CPT

## 2018-07-28 PROCEDURE — 87040 BLOOD CULTURE FOR BACTERIA: CPT

## 2018-07-28 PROCEDURE — 6360000002 HC RX W HCPCS: Performed by: INTERNAL MEDICINE

## 2018-07-28 PROCEDURE — 82962 GLUCOSE BLOOD TEST: CPT

## 2018-07-28 PROCEDURE — 6360000002 HC RX W HCPCS: Performed by: SPECIALIST

## 2018-07-28 PROCEDURE — 2580000003 HC RX 258: Performed by: SPECIALIST

## 2018-07-28 PROCEDURE — 85025 COMPLETE CBC W/AUTO DIFF WBC: CPT

## 2018-07-28 PROCEDURE — 6370000000 HC RX 637 (ALT 250 FOR IP): Performed by: INTERNAL MEDICINE

## 2018-07-28 PROCEDURE — 2060000000 HC ICU INTERMEDIATE R&B

## 2018-07-28 PROCEDURE — 2580000003 HC RX 258: Performed by: INTERNAL MEDICINE

## 2018-07-28 PROCEDURE — 90670 PCV13 VACCINE IM: CPT | Performed by: INTERNAL MEDICINE

## 2018-07-28 PROCEDURE — G0009 ADMIN PNEUMOCOCCAL VACCINE: HCPCS | Performed by: INTERNAL MEDICINE

## 2018-07-28 RX ADMIN — TAMSULOSIN HYDROCHLORIDE 0.4 MG: 0.4 CAPSULE ORAL at 18:50

## 2018-07-28 RX ADMIN — TRIAMCINOLONE ACETONIDE: 1 CREAM TOPICAL at 16:09

## 2018-07-28 RX ADMIN — VANCOMYCIN HYDROCHLORIDE 2000 MG: 10 INJECTION, POWDER, LYOPHILIZED, FOR SOLUTION INTRAVENOUS at 16:07

## 2018-07-28 RX ADMIN — BUSPIRONE HYDROCHLORIDE 7.5 MG: 5 TABLET ORAL at 09:33

## 2018-07-28 RX ADMIN — CHOLECALCIFEROL TAB 50 MCG (2000 UNIT) 5000 UNITS: 50 TAB at 09:33

## 2018-07-28 RX ADMIN — PETROLATUM: 42 OINTMENT TOPICAL at 21:03

## 2018-07-28 RX ADMIN — ACETAMINOPHEN 650 MG: 325 TABLET ORAL at 10:21

## 2018-07-28 RX ADMIN — ATORVASTATIN CALCIUM 20 MG: 20 TABLET, FILM COATED ORAL at 09:33

## 2018-07-28 RX ADMIN — SODIUM CHLORIDE: 9 INJECTION, SOLUTION INTRAVENOUS at 08:00

## 2018-07-28 RX ADMIN — PETROLATUM: 42 OINTMENT TOPICAL at 09:33

## 2018-07-28 RX ADMIN — FLUTICASONE PROPIONATE 2 SPRAY: 50 SPRAY, METERED NASAL at 09:33

## 2018-07-28 RX ADMIN — TRIAMCINOLONE ACETONIDE: 1 CREAM TOPICAL at 09:33

## 2018-07-28 RX ADMIN — PNEUMOCOCCAL 13-VALENT CONJUGATE VACCINE 0.5 ML: 2.2; 2.2; 2.2; 2.2; 2.2; 4.4; 2.2; 2.2; 2.2; 2.2; 2.2; 2.2; 2.2 INJECTION, SUSPENSION INTRAMUSCULAR at 13:34

## 2018-07-28 RX ADMIN — LATANOPROST 1 DROP: 50 SOLUTION OPHTHALMIC at 21:03

## 2018-07-28 RX ADMIN — ENOXAPARIN SODIUM 40 MG: 40 INJECTION, SOLUTION INTRAVENOUS; SUBCUTANEOUS at 09:32

## 2018-07-28 ASSESSMENT — PAIN SCALES - GENERAL
PAINLEVEL_OUTOF10: 3
PAINLEVEL_OUTOF10: 0

## 2018-07-28 ASSESSMENT — PAIN DESCRIPTION - LOCATION: LOCATION: HEAD

## 2018-07-28 ASSESSMENT — PAIN DESCRIPTION - DESCRIPTORS: DESCRIPTORS: HEADACHE

## 2018-07-28 ASSESSMENT — PAIN DESCRIPTION - PAIN TYPE: TYPE: ACUTE PAIN

## 2018-07-28 ASSESSMENT — PAIN DESCRIPTION - ONSET: ONSET: GRADUAL

## 2018-07-28 ASSESSMENT — PAIN DESCRIPTION - PROGRESSION: CLINICAL_PROGRESSION: GRADUALLY WORSENING

## 2018-07-28 NOTE — PROGRESS NOTES
07/27/2018    MCV 82.5 07/27/2018    MCH 25.1 07/27/2018    MCHC 30.5 07/27/2018    RDW 18.7 07/27/2018    SEGSPCT 64 09/27/2013    LYMPHOPCT 16.2 07/26/2018    MONOPCT 14.2 07/26/2018    BASOPCT 0.4 07/26/2018    MONOSABS 1.06 07/26/2018    LYMPHSABS 1.21 07/26/2018    EOSABS 0.04 07/26/2018    BASOSABS 0.03 07/26/2018        Assessment:    Patient Active Problem List   Diagnosis    Cellulitis    Sepsis ([address]    Dementia    Metabolic encephalopathy    Diabetes mellitus type 2, uncontrolled ([address]    Hyperlipidemia LDL goal <100    Essential hypertension    Venous ulcer of left leg (HCC)    Non-pressure chronic ulcer of left lower leg with fat layer exposed ([address]    Non-pressure chronic ulcer left lower leg, limited to breakdown skin (HCC)    Dehydration    Hypotension    Severe protein-calorie malnutrition ([address]       Plan:  Reviewed all issues pt and family to decide on peg       Tomasa Gooden  7:25 AM  7/28/2018

## 2018-07-28 NOTE — PROGRESS NOTES
palpation. No masses felt. No hepatosplenomegaly. Extremities: No clubbing, no cyanosis, no edema. Bilateral knee effusion. Both of them are slightly warm to the touch. I could not tensed range of motion. No overlying erythema. Legs are wrapped in Kerlix. They're both discolored. No cellulitis. Lines: peripheral    Laboratory and Tests Review:  Lab Results   Component Value Date    WBC 6.7 07/27/2018    WBC 7.5 07/26/2018    WBC 9.9 07/17/2018    HGB 9.9 (L) 07/27/2018    HCT 32.5 (L) 07/27/2018    MCV 82.5 07/27/2018     07/27/2018     Lab Results   Component Value Date    NEUTROABS 5.10 07/26/2018    NEUTROABS 3.98 10/24/2016    NEUTROABS 2.94 10/23/2016     No results found for: CRP  No results found for: CRPHS  No results found for: SEDRATE  Lab Results   Component Value Date    ALT 25 07/26/2018    AST 42 (H) 07/26/2018     (H) 07/18/2018    ALKPHOS 279 (H) 07/26/2018    BILITOT 0.3 07/26/2018     Lab Results   Component Value Date     07/27/2018    K 3.7 07/27/2018     07/27/2018    CO2 23 07/27/2018    BUN 14 07/27/2018    CREATININE 0.7 07/27/2018    CREATININE 0.8 07/26/2018    CREATININE 1.2 07/17/2018    GFRAA >60 07/27/2018    LABGLOM >60 07/27/2018    GLUCOSE 118 07/27/2018    GLUCOSE 138 03/26/2011    PROT 6.6 07/26/2018    LABALBU 2.9 07/26/2018    LABALBU 4.0 03/26/2011    CALCIUM 8.1 07/27/2018    BILITOT 0.3 07/26/2018    ALKPHOS 279 07/26/2018    AST 42 07/26/2018    ALT 25 07/26/2018     Radiology:      Microbiology:   Urine culture 7/26/18 negative so far  Urine culture 7/27/18 pending  Blood culture 7/26/18 GPC in clusters in 1 of 4 bottles  Wound culture leg 7/27/18 Staphylococcus aureus  Wound culture scrotum 7/27/18 Staphylococcus aureus  Wound culture leg/ankle 7/27/18 Staphylococcus aureus    ASSESSMENT:  · Chronic bilateral leg ulcers, colonized with Staphylococcus aureus  · GPC bacteremia. Possible septicemia from leg ulcers  · Left knee pain.  Patient has

## 2018-07-28 NOTE — ANESTHESIA PRE PROCEDURE
650 mg by mouth every 4 hours as needed. Historical Provider, MD   magnesium hydroxide (MILK OF MAGNESIA) 400 MG/5ML suspension Take 30 mLs by mouth daily as needed. Historical Provider, MD   bisacodyl (DULCOLAX) 5 MG EC tablet Take 5 mg by mouth daily as needed.     Historical Provider, MD       Current medications:    Current Facility-Administered Medications   Medication Dose Route Frequency Provider Last Rate Last Dose    vancomycin (VANCOCIN) 1,750 mg in dextrose 5 % 500 mL IVPB  1,750 mg Intravenous Once Renetta Soto MD        0.9 % sodium chloride infusion   Intravenous Continuous Tomasa Gooden MD 75 mL/hr at 07/28/18 0800      acetaminophen (TYLENOL) tablet 650 mg  650 mg Oral Q4H PRN Tomasa Gooden MD   650 mg at 07/28/18 1021    albuterol (PROVENTIL) nebulizer solution 2.5 mg  2.5 mg Nebulization Q6H PRN Tomasa Gooden MD        atorvastatin (LIPITOR) tablet 20 mg  20 mg Oral Daily Tomasa Gooden MD   20 mg at 07/28/18 0933    bisacodyl (DULCOLAX) EC tablet 5 mg  5 mg Oral Daily PRN Mindy Lackey MD        busPIRone (BUSPAR) tablet 7.5 mg  7.5 mg Oral Daily Tomasa Gooden MD   7.5 mg at 07/28/18 0933    vitamin D tablet 5,000 Units  5,000 Units Oral Daily Mindy Lackey MD   5,000 Units at 07/28/18 0933    fluticasone (FLONASE) 50 MCG/ACT nasal spray 2 spray  2 spray Nasal Daily Tomasa Gooden MD   2 spray at 07/28/18 0933    latanoprost (XALATAN) 0.005 % ophthalmic solution 1 drop  1 drop Both Eyes Nightly Tomasa Gooden MD   1 drop at 07/27/18 2051    magnesium hydroxide (MILK OF MAGNESIA) 400 MG/5ML suspension 30 mL  30 mL Oral Daily PRN Mindy Lackey MD        tamsulosin (FLOMAX) capsule 0.4 mg  0.4 mg Oral QPM Tomasa Gooden MD   0.4 mg at 07/28/18 1850    triamcinolone (KENALOG) 0.1 % cream   Topical Q8H Tomasa Gooden MD        enoxaparin (LOVENOX) injection 40 mg  40 mg Subcutaneous Daily Tomasa MD Berkley   40 mg at 07/28/18 0932    insulin lispro (HUMALOG) injection vial 0-6 Units  0-6 Units Subcutaneous TID WC Tomasa Gooden MD        glucose (GLUTOSE) 40 % oral gel 15 g  15 g Oral PRN Tomasa Gooden MD        dextrose 50 % solution 12.5 g  12.5 g Intravenous PRN Cheli Barillas MD        glucagon (rDNA) injection 1 mg  1 mg Intramuscular PRN Tomasa Gooden MD        dextrose 5 % solution  100 mL/hr Intravenous PRN Tomasa Gooden MD        sodium chloride flush 0.9 % injection 10 mL  10 mL Intravenous BID Tomasa Gooden MD        sodium chloride flush 0.9 % injection 10 mL  10 mL Intravenous PRN Tomasa Gooden MD   10 mL at 07/27/18 1500    mineral oil-hydrophilic petrolatum (AQUAPHOR) ointment   Topical BID Cheli Barillas MD           Allergies:     Allergies   Allergen Reactions    Levaquin [Levofloxacin] Other (See Comments)     Lethargy when taken with celebrex       Problem List:    Patient Active Problem List   Diagnosis Code    Cellulitis L03.90    Sepsis ([address] A41.9    Dementia P06.01    Metabolic encephalopathy J94.40    Diabetes mellitus type 2, uncontrolled ([address] E11.65    Hyperlipidemia LDL goal <100 E78.5    Essential hypertension I10    Venous ulcer of left leg (HCC) I83.029    Non-pressure chronic ulcer of left lower leg with fat layer exposed ([address] L97.922    Non-pressure chronic ulcer left lower leg, limited to breakdown skin ([address] L97.921    Dehydration E86.0    Hypotension I95.9    Severe protein-calorie malnutrition ([address] E43       Past Medical History:        Diagnosis Date    Anxiety     Benign localized hyperplasia of prostate with urinary obstruction     Bullous pemphigoid     Diabetes mellitus ([address]     Enlarged prostate     Hyperlipidemia     Hypertension     Muscle weakness     Rheumatoid disease ([address]        Past Surgical History:        Procedure Laterality Date    DOPPLER ECHOCARDIOGRAPHY N/A  FRACTURE SURGERY         Social History:    Social History   Substance Use Topics    Smoking status: Former Smoker     Packs/day: 2.00     Years: 50.00     Types: Cigarettes     Start date: 3/30/1955     Quit date: 3/30/2006    Smokeless tobacco: Never Used    Alcohol use No                                Counseling given: Not Answered      Vital Signs (Current):   Vitals:    07/27/18 2345 07/28/18 0530 07/28/18 0758 07/28/18 1615   BP: (!) 104/55  112/63 121/64   Pulse: 70  78 65   Resp: 16  16 16   Temp: 98.2 °F (36.8 °C)  97.7 °F (36.5 °C) 96 °F (35.6 °C)   TempSrc: Oral  Oral Axillary   SpO2: 98% 95% 96%    Weight:       Height:                                                  BP Readings from Last 3 Encounters:   07/28/18 121/64   07/25/18 104/60   07/21/18 (!) 118/58       NPO Status:  greater than 8 hours                                                                               BMI:   Wt Readings from Last 3 Encounters:   07/26/18 216 lb 14.4 oz (98.4 kg)   07/19/18 219 lb 9.6 oz (99.6 kg)   06/01/18 245 lb (111.1 kg)     Body mass index is 29.42 kg/m². CBC:   Lab Results   Component Value Date    WBC 6.1 07/28/2018    RBC 3.85 07/28/2018    HGB 9.7 07/28/2018    HCT 31.6 07/28/2018    MCV 82.1 07/28/2018    RDW 18.6 07/28/2018     07/28/2018       CMP:   Lab Results   Component Value Date     07/27/2018    K 3.7 07/27/2018     07/27/2018    CO2 23 07/27/2018    BUN 14 07/27/2018    CREATININE 0.7 07/27/2018    GFRAA >60 07/27/2018    LABGLOM >60 07/27/2018    GLUCOSE 118 07/27/2018    GLUCOSE 138 03/26/2011    PROT 6.6 07/26/2018    CALCIUM 8.1 07/27/2018    BILITOT 0.3 07/26/2018    ALKPHOS 279 07/26/2018    AST 42 07/26/2018    ALT 25 07/26/2018       POC Tests: No results for input(s): POCGLU, POCNA, POCK, POCCL, POCBUN, POCHEMO, POCHCT in the last 72 hours.     Coags:   Lab Results   Component Value Date    PROTIME 13.7 10/17/2016    INR 1.2 10/17/2016    APTT 33.7

## 2018-07-28 NOTE — CONSULTS
declined an endoscopic evaluation, and I was unable to  reach the family. He was discharged back to [facility] to be  readmitted under similar circumstances of a very poor appetite and ongoing  weight loss. Despite his poor intake, his BUN and creatinine are normal.    PAST MEDICAL HISTORY:  Remarkable for bullous pemphigoid, hypertension,  hyperlipidemia, enlarged prostate, and diabetes. Apparently, he has never  been treated for depression. The Remeron was introduced prior to his last  admission. MEDICATIONS:  On his arrival at this hospital on this admission were  Levemir, Remeron, Lipitor, Flonase, Flomax, BuSpar, Proventil, Tylenol, and  Dulcolax. ALLERGIES:  There is a QUINOLONE allergy. SOCIAL HISTORY:  He is . [facility] ''  resident. Reformed smoker. Having smoked two packs of cigarettes a day for about 50 years. Apparently, alcohol was never a significant issue. REVIEW OF SYSTEMS:  Poor appetite. He used to have a preference for  sweets, but ignores it all. Mucus in the mouth and expectoration noted  intermittently by the family. They denies abdominal pain. He has no  current complaints regarding voiding, but he had the Ballesteros catheter placed  apparently for residual.  No bleeding is evident, and seen by Infectious  Disease for chronic lower extremity ulcers with no antibiotics will be  administered at this time. PHYSICAL EXAMINATION:  GENERAL:  He is elderly. He is alert. Seems a little bit restless and  little in the way of eye contact. No spontaneous speech output. Nasal  cannula. VITAL SIGNS:  Afebrile. Normotensive. HEENT:  Mild pallor. NECK:  No neck vein elevation or adenopathy. CHEST:  Lungs are clear. CARDIOVASCULAR:  Heart tones are normal.  ABDOMEN:  His abdomen remains benign. IMAGING AND LABORATORY DATA:  I reviewed the CAT scan from the last  admission and find that really unremarkable. The alkaline phosphatase may  be medication-induced.

## 2018-07-28 NOTE — PROGRESS NOTES
+ BC GPC. Appetite still poor  Son at bedside  Will plan PEG tube tomorrow. Queried if any questions and had none.

## 2018-07-29 VITALS — DIASTOLIC BLOOD PRESSURE: 57 MMHG | OXYGEN SATURATION: 96 % | SYSTOLIC BLOOD PRESSURE: 92 MMHG

## 2018-07-29 LAB
ALBUMIN SERPL-MCNC: 2.4 G/DL (ref 3.5–5.2)
ALP BLD-CCNC: 181 U/L (ref 40–129)
ALT SERPL-CCNC: 13 U/L (ref 0–40)
ANION GAP SERPL CALCULATED.3IONS-SCNC: 10 MMOL/L (ref 7–16)
AST SERPL-CCNC: 16 U/L (ref 0–39)
BILIRUB SERPL-MCNC: 0.6 MG/DL (ref 0–1.2)
BUN BLDV-MCNC: 10 MG/DL (ref 8–23)
CALCIUM SERPL-MCNC: 8.1 MG/DL (ref 8.6–10.2)
CHLORIDE BLD-SCNC: 105 MMOL/L (ref 98–107)
CO2: 25 MMOL/L (ref 22–29)
CREAT SERPL-MCNC: 0.7 MG/DL (ref 0.7–1.2)
GFR AFRICAN AMERICAN: >60
GFR NON-AFRICAN AMERICAN: >60 ML/MIN/1.73
GLUCOSE BLD-MCNC: 98 MG/DL (ref 74–109)
INR BLD: 1.3
METER GLUCOSE: 109 MG/DL (ref 70–110)
METER GLUCOSE: 111 MG/DL (ref 70–110)
ORGANISM: ABNORMAL
POTASSIUM SERPL-SCNC: 3.7 MMOL/L (ref 3.5–5)
PROTHROMBIN TIME: 14.7 SEC (ref 9.3–12.4)
SODIUM BLD-SCNC: 140 MMOL/L (ref 132–146)
TOTAL PROTEIN: 5.9 G/DL (ref 6.4–8.3)
URINE CULTURE, ROUTINE: NORMAL
VANCOMYCIN RANDOM: 13.4 MCG/ML (ref 5–40)
WOUND/ABSCESS: ABNORMAL

## 2018-07-29 PROCEDURE — 3700000001 HC ADD 15 MINUTES (ANESTHESIA): Performed by: INTERNAL MEDICINE

## 2018-07-29 PROCEDURE — 6370000000 HC RX 637 (ALT 250 FOR IP): Performed by: INTERNAL MEDICINE

## 2018-07-29 PROCEDURE — 6360000002 HC RX W HCPCS: Performed by: INTERNAL MEDICINE

## 2018-07-29 PROCEDURE — 0DJ08ZZ INSPECTION OF UPPER INTESTINAL TRACT, VIA NATURAL OR ARTIFICIAL OPENING ENDOSCOPIC: ICD-10-PCS | Performed by: SURGERY

## 2018-07-29 PROCEDURE — 3600007501: Performed by: INTERNAL MEDICINE

## 2018-07-29 PROCEDURE — 36415 COLL VENOUS BLD VENIPUNCTURE: CPT

## 2018-07-29 PROCEDURE — 2580000003 HC RX 258: Performed by: SPECIALIST

## 2018-07-29 PROCEDURE — 85610 PROTHROMBIN TIME: CPT

## 2018-07-29 PROCEDURE — 2060000000 HC ICU INTERMEDIATE R&B

## 2018-07-29 PROCEDURE — 2709999900 HC NON-CHARGEABLE SUPPLY: Performed by: INTERNAL MEDICINE

## 2018-07-29 PROCEDURE — 82962 GLUCOSE BLOOD TEST: CPT

## 2018-07-29 PROCEDURE — 6360000002 HC RX W HCPCS: Performed by: SPECIALIST

## 2018-07-29 PROCEDURE — 2580000003 HC RX 258: Performed by: NURSE ANESTHETIST, CERTIFIED REGISTERED

## 2018-07-29 PROCEDURE — 7100000010 HC PHASE II RECOVERY - FIRST 15 MIN: Performed by: INTERNAL MEDICINE

## 2018-07-29 PROCEDURE — 7100000011 HC PHASE II RECOVERY - ADDTL 15 MIN: Performed by: INTERNAL MEDICINE

## 2018-07-29 PROCEDURE — 6360000002 HC RX W HCPCS: Performed by: NURSE ANESTHETIST, CERTIFIED REGISTERED

## 2018-07-29 PROCEDURE — 3700000000 HC ANESTHESIA ATTENDED CARE: Performed by: INTERNAL MEDICINE

## 2018-07-29 PROCEDURE — 2580000003 HC RX 258: Performed by: INTERNAL MEDICINE

## 2018-07-29 PROCEDURE — 80202 ASSAY OF VANCOMYCIN: CPT

## 2018-07-29 PROCEDURE — 3600007511: Performed by: INTERNAL MEDICINE

## 2018-07-29 PROCEDURE — 80053 COMPREHEN METABOLIC PANEL: CPT

## 2018-07-29 RX ORDER — PROPOFOL 10 MG/ML
INJECTION, EMULSION INTRAVENOUS PRN
Status: DISCONTINUED | OUTPATIENT
Start: 2018-07-29 | End: 2018-07-29 | Stop reason: SDUPTHER

## 2018-07-29 RX ORDER — SODIUM CHLORIDE 9 MG/ML
INJECTION, SOLUTION INTRAVENOUS CONTINUOUS PRN
Status: DISCONTINUED | OUTPATIENT
Start: 2018-07-29 | End: 2018-07-29 | Stop reason: SDUPTHER

## 2018-07-29 RX ADMIN — SODIUM CHLORIDE: 9 INJECTION, SOLUTION INTRAVENOUS at 10:30

## 2018-07-29 RX ADMIN — SODIUM CHLORIDE: 9 INJECTION, SOLUTION INTRAVENOUS at 00:07

## 2018-07-29 RX ADMIN — TRIAMCINOLONE ACETONIDE: 1 CREAM TOPICAL at 09:22

## 2018-07-29 RX ADMIN — VANCOMYCIN HYDROCHLORIDE 1750 MG: 1 INJECTION, POWDER, LYOPHILIZED, FOR SOLUTION INTRAVENOUS at 06:45

## 2018-07-29 RX ADMIN — CHOLECALCIFEROL TAB 50 MCG (2000 UNIT) 5000 UNITS: 50 TAB at 14:21

## 2018-07-29 RX ADMIN — FLUTICASONE PROPIONATE 2 SPRAY: 50 SPRAY, METERED NASAL at 09:15

## 2018-07-29 RX ADMIN — BUSPIRONE HYDROCHLORIDE 7.5 MG: 5 TABLET ORAL at 12:25

## 2018-07-29 RX ADMIN — PETROLATUM: 42 OINTMENT TOPICAL at 20:11

## 2018-07-29 RX ADMIN — TRIAMCINOLONE ACETONIDE: 1 CREAM TOPICAL at 00:07

## 2018-07-29 RX ADMIN — ENOXAPARIN SODIUM 40 MG: 40 INJECTION, SOLUTION INTRAVENOUS; SUBCUTANEOUS at 12:24

## 2018-07-29 RX ADMIN — LATANOPROST 1 DROP: 50 SOLUTION OPHTHALMIC at 20:11

## 2018-07-29 RX ADMIN — PETROLATUM: 42 OINTMENT TOPICAL at 09:22

## 2018-07-29 RX ADMIN — ATORVASTATIN CALCIUM 20 MG: 20 TABLET, FILM COATED ORAL at 12:25

## 2018-07-29 RX ADMIN — TRIAMCINOLONE ACETONIDE: 1 CREAM TOPICAL at 15:16

## 2018-07-29 RX ADMIN — PROPOFOL 300 MG: 10 INJECTION, EMULSION INTRAVENOUS at 10:37

## 2018-07-29 RX ADMIN — TAMSULOSIN HYDROCHLORIDE 0.4 MG: 0.4 CAPSULE ORAL at 17:05

## 2018-07-29 ASSESSMENT — PULMONARY FUNCTION TESTS
PIF_VALUE: 1
PIF_VALUE: 0
PIF_VALUE: 1
PIF_VALUE: 0
PIF_VALUE: 1
PIF_VALUE: 0
PIF_VALUE: 1

## 2018-07-29 ASSESSMENT — PAIN SCALES - GENERAL
PAINLEVEL_OUTOF10: 0

## 2018-07-29 ASSESSMENT — PAIN DESCRIPTION - LOCATION
LOCATION: ABDOMEN

## 2018-07-29 NOTE — ANESTHESIA POSTPROCEDURE EVALUATION
Department of Anesthesiology  Postprocedure Note    Patient: Silvana Rodas  MRN: [id number]  YOB: 1940  Date of evaluation: 7/29/2018  Time:  12:10 PM     Procedure Summary     Date:  07/29/18 Room / Location:  [facility] OR  / [facility] OR    Anesthesia Start:  1033 Anesthesia Stop:  1106    Procedures:       EGD DIAGNOSTIC ONLY (N/A )      anesthesia pre-op assessment Diagnosis:  (GI BLEED)    Surgeon:  Cricket Mcghee MD Responsible Provider:  Kasey Noriega DO    Anesthesia Type:  MAC ASA Status:  3          Anesthesia Type: MAC    Navarro Phase I:      Navarro Phase II: Navarro Score: 10    Last vitals: Reviewed and per EMR flowsheets.        Anesthesia Post Evaluation    Patient location during evaluation: PACU  Patient participation: complete - patient participated  Level of consciousness: awake and alert  Airway patency: patent  Nausea & Vomiting: no nausea and no vomiting  Complications: no  Cardiovascular status: hemodynamically stable  Respiratory status: acceptable  Hydration status: euvolemic

## 2018-07-29 NOTE — PROGRESS NOTES
1130 report called to [address] [place] [address] Nikki rn and called out to family wating room to have family members return to room 446 pt is connected to CMR and is in view

## 2018-07-29 NOTE — BRIEF OP NOTE
Brief Postoperative Note    Liana Castillo  YOB: 1940 [id number]    Pre-operative Diagnosis: protein calorie malnutrition    Post-operative Diagnosis: normal esophagus and duodenum, gastritis     Procedure: PEG,attempt, failure, see below    Anesthesia: MAC    Surgeons/Assistants: rahul    Estimated Blood Loss: 0    Complications: None    Specimens: Was Not Obtained    Findings: No major pathology in the UGI tract. The only place a PEG could be placed would have been at the immediate level of the xiphoid and the rib. Elected not to place, though technically possible, believe it would be too uncomfortable for him so close to the rib margin. Will need to consider possible laporoscopic placement.     Electronically signed by Maik Carrera MD on 7/29/2018 at 10:58 AM

## 2018-07-30 ENCOUNTER — ANESTHESIA EVENT (OUTPATIENT)
Dept: OPERATING ROOM | Age: 78
DRG: 641 | End: 2018-07-30
Payer: COMMERCIAL

## 2018-07-30 LAB
METER GLUCOSE: 100 MG/DL (ref 70–110)
METER GLUCOSE: 111 MG/DL (ref 70–110)
METER GLUCOSE: 112 MG/DL (ref 70–110)
URINE CULTURE, ROUTINE: NORMAL

## 2018-07-30 PROCEDURE — 6370000000 HC RX 637 (ALT 250 FOR IP): Performed by: INTERNAL MEDICINE

## 2018-07-30 PROCEDURE — 6360000002 HC RX W HCPCS: Performed by: INTERNAL MEDICINE

## 2018-07-30 PROCEDURE — 93306 TTE W/DOPPLER COMPLETE: CPT

## 2018-07-30 PROCEDURE — 82962 GLUCOSE BLOOD TEST: CPT

## 2018-07-30 PROCEDURE — 2060000000 HC ICU INTERMEDIATE R&B

## 2018-07-30 PROCEDURE — 2580000003 HC RX 258: Performed by: INTERNAL MEDICINE

## 2018-07-30 RX ADMIN — BUSPIRONE HYDROCHLORIDE 7.5 MG: 5 TABLET ORAL at 10:00

## 2018-07-30 RX ADMIN — TAMSULOSIN HYDROCHLORIDE 0.4 MG: 0.4 CAPSULE ORAL at 17:20

## 2018-07-30 RX ADMIN — LATANOPROST 1 DROP: 50 SOLUTION OPHTHALMIC at 20:13

## 2018-07-30 RX ADMIN — TRIAMCINOLONE ACETONIDE: 1 CREAM TOPICAL at 09:48

## 2018-07-30 RX ADMIN — PETROLATUM: 42 OINTMENT TOPICAL at 20:14

## 2018-07-30 RX ADMIN — ATORVASTATIN CALCIUM 20 MG: 20 TABLET, FILM COATED ORAL at 09:46

## 2018-07-30 RX ADMIN — PETROLATUM: 42 OINTMENT TOPICAL at 09:48

## 2018-07-30 RX ADMIN — TRIAMCINOLONE ACETONIDE: 1 CREAM TOPICAL at 17:20

## 2018-07-30 RX ADMIN — FLUTICASONE PROPIONATE 2 SPRAY: 50 SPRAY, METERED NASAL at 09:53

## 2018-07-30 RX ADMIN — ENOXAPARIN SODIUM 40 MG: 40 INJECTION, SOLUTION INTRAVENOUS; SUBCUTANEOUS at 09:46

## 2018-07-30 RX ADMIN — CHOLECALCIFEROL TAB 50 MCG (2000 UNIT) 5000 UNITS: 50 TAB at 09:46

## 2018-07-30 RX ADMIN — Medication 10 ML: at 09:48

## 2018-07-30 RX ADMIN — SODIUM CHLORIDE: 9 INJECTION, SOLUTION INTRAVENOUS at 13:13

## 2018-07-30 RX ADMIN — TRIAMCINOLONE ACETONIDE: 1 CREAM TOPICAL at 00:26

## 2018-07-30 RX ADMIN — TRIAMCINOLONE ACETONIDE: 1 CREAM TOPICAL at 23:48

## 2018-07-30 ASSESSMENT — PAIN SCALES - GENERAL
PAINLEVEL_OUTOF10: 0

## 2018-07-30 NOTE — CONSULTS
GENERAL SURGERY  CONSULT NOTE  7/30/2018    Physician Consulted: Dr. Surekha Thomas  Reason for Consult: G tube placement   Referring Physician: Dr. Stefano Ryder    MATTHEW Burgos is a [de-identified] y.o. male who is s/p attempted PEG placement on 7/29. Per Dr. Guillermina Aguayo note, transillumination was only visualized sub-xiphoid so the procedure was aborted. The patient has a history of DM, HTN, HLD. Past Medical History:   Diagnosis Date    Anxiety     Benign localized hyperplasia of prostate with urinary obstruction     Bullous pemphigoid     Diabetes mellitus (HCC)     Enlarged prostate     Hyperlipidemia     Hypertension     Muscle weakness     Rheumatoid disease ([facility]        Past Surgical History:   Procedure Laterality Date    DOPPLER ECHOCARDIOGRAPHY N/A     FRACTURE SURGERY      UPPER GASTROINTESTINAL ENDOSCOPY N/A 7/29/2018    EGD DIAGNOSTIC ONLY performed by Tanika Greene MD at [facility] OR       Medications Prior to Admission:    Prior to Admission medications    Medication Sig Start Date End Date Taking? Authorizing Provider   triamcinolone (KENALOG) 0.1 % cream Apply topically every 8 hours Apply topically 2 times daily.     Yes Historical Provider, MD   Nutritional Supplements (ARGINAID) PACK Take 4.5 g by mouth 2 times daily   Yes Historical Provider, MD   insulin detemir (LEVEMIR) 100 UNIT/ML injection vial Inject 10 Units into the skin nightly   Yes Historical Provider, MD   mirtazapine (REMERON) 15 MG tablet Take 7.5 mg by mouth daily   Yes Historical Provider, MD   atorvastatin (LIPITOR) 20 MG tablet Take 20 mg by mouth daily   Yes Historical Provider, MD   Fluticasone Propionate (FLONASE ALLERGY RELIEF NA) 50 mcg by Nasal route daily 2 SPRAYS   Yes Historical Provider, MD   Cholecalciferol (VITAMIN D3) 5000 UNITS TABS Take 5,000 Units by mouth daily   Yes Historical Provider, MD   mineral oil-hydrophilic petrolatum (AQUAPHOR) ointment As directed  Patient taking differently: Apply 1 applicator topically 2 times daily As directed 4/6/16  Yes Kisha Norwood, DO   tamsulosin (FLOMAX) 0.4 MG capsule Take 0.4 mg by mouth every evening    Yes Historical Provider, MD   latanoprost (XALATAN) 0.005 % ophthalmic solution Place 1 drop into both eyes nightly    Yes Historical Provider, MD   busPIRone (BUSPAR) 7.5 MG tablet Take 7.5 mg by mouth daily. Yes Historical Provider, MD   albuterol (PROVENTIL) (2.5 MG/3ML) 0.083% nebulizer solution Take 2.5 mg by nebulization every 6 hours as needed for Wheezing    Historical Provider, MD   glucose (GLUTOSE) 40 % GEL Take 15 g by mouth as needed 4/6/16   Kisha Norwood DO   acetaminophen (TYLENOL) 325 MG tablet Take 650 mg by mouth every 4 hours as needed. Historical Provider, MD   magnesium hydroxide (MILK OF MAGNESIA) 400 MG/5ML suspension Take 30 mLs by mouth daily as needed. Historical Provider, MD   bisacodyl (DULCOLAX) 5 MG EC tablet Take 5 mg by mouth daily as needed.     Historical Provider, MD       Allergies   Allergen Reactions    Levaquin [Levofloxacin] Other (See Comments)     Lethargy when taken with celebrex       Family History   Problem Relation Age of Onset    Diabetes Mother     Kidney Disease Mother     Cancer Father         esophagus    Cancer Sister        Social History   Substance Use Topics    Smoking status: Former Smoker     Packs/day: 2.00     Years: 50.00     Types: Cigarettes     Start date: 3/30/1955     Quit date: 3/30/2006    Smokeless tobacco: Never Used    Alcohol use No       Review of Systems   General ROS: negative  Hematological and Lymphatic ROS: negative  Respiratory ROS: negative  Cardiovascular ROS: no chest pain or dyspnea on exertion  Gastrointestinal ROS: negative  Genito-Urinary ROS: negative  Musculoskeletal ROS: negative    PHYSICAL EXAM:    Vitals:    07/30/18 0015   BP: 126/63   Pulse: 72   Resp: 18   Temp: 98 °F (36.7 °C)   SpO2:        General Appearance:  Resting comfortably  Lungs:  symmetric chest rise Heart:  Regular rate   Abdomen:  Soft, nondistended     LABS:  CBC  Recent Labs      18   1536   WBC  6.1   HGB  9.7*   HCT  31.6*   PLT  282     BMP  Recent Labs      18   NA  140   K  3.7   CL  105   CO2  25   BUN  10   CREATININE  0.7   CALCIUM  8.1*     Liver Function  Recent Labs      18   BILITOT  0.6   AST  16   ALT  13   ALKPHOS  181*   PROT  5.9*   LABALBU  2.4*     No results for input(s): LACTATE in the last 72 hours. Recent Labs      18   INR  1.3   Xr Chest Portable    Result Date: 2018  Patient MRN:  [id number] : 1940 Age: [de-identified] years Gender: Male Order Date:  2018 4:30 PM Exam: XR CHEST PORTABLE Number of views:  1 portable AP upright view of the chest Indication: Weakness Comparison: Chest CT dated 2018 and chest radiograph dated 2018. FINDINGS:  Atherosclerotic disease is noted in the aorta. The cardiac silhouette is prominent. No pulmonary airspace consolidation, pleural effusion, or pneumothorax is seen. Cardiomegaly. No sign of acute pulmonary airspace consolidation. ASSESSMENT:  [de-identified] y.o. male s/p attempted G tube placement         PLAN:   1.  Plan for OR tomorrow w/ PEG, possible lap G tube     Electronically signed by Roxanna Leiva M.D, on 2018 at 9:09 AM

## 2018-07-30 NOTE — PROGRESS NOTES
[address] [facility]  [facility]  Progress Note    SUBJECTIVE:  Chief Complaint   Patient presents with    Weight Loss     loss of appetite; not eating or drinking x 2 days; from [facility];  sent patient in for hydration and possible peg tube evaluation; recent weight loss of 20lbs per son     Patient is tolerating medications. No reported adverse drug reactions. No nausea, vomiting, diarrhea. Admits to having left knee pain. Remains afebrile. Back from EGD. No new complaints. Review of systems:  As stated above in the chief complaint, otherwise negative. Medications:  Scheduled Meds:   ceFAZolin  3 g Intravenous See Admin Instructions    atorvastatin  20 mg Oral Daily    busPIRone  7.5 mg Oral Daily    vitamin D  5,000 Units Oral Daily    fluticasone  2 spray Nasal Daily    latanoprost  1 drop Both Eyes Nightly    tamsulosin  0.4 mg Oral QPM    triamcinolone   Topical Q8H    enoxaparin  40 mg Subcutaneous Daily    insulin lispro  0-6 Units Subcutaneous TID WC    sodium chloride flush  10 mL Intravenous BID    mineral oil-hydrophilic petrolatum   Topical BID     Continuous Infusions:   sodium chloride 75 mL/hr at 18 0007    dextrose       PRN Meds:acetaminophen, albuterol, bisacodyl, magnesium hydroxide, glucose, dextrose, glucagon (rDNA), dextrose, sodium chloride flush    OBJECTIVE:  /63   Pulse 72   Temp 98 °F (36.7 °C) (Oral)   Resp 18   Ht 6' (1.829 m)   Wt 220 lb (99.8 kg)   SpO2 98%   BMI 29.84 kg/m²   Temp  Av.3 °F (36.8 °C)  Min: 97 °F (36.1 °C)  Max: 101.3 °F (38.5 °C)  Constitutional: The patient is awake, alert, and oriented. No distress. Skin: Warm and dry. No rashes were noted. HEENT: Eyes show round, and reactive pupils. No jaundice. Moist mucous membranes, no ulcerations, no thrush. Neck: Supple to movements. Chest: No wheezing, crackles, or rhonchi. Cardiovascular: S1 and S2 are rhythmic and regular.  No murmurs appreciated. Abdomen: Positive bowel sounds to auscultation. Benign to palpation. Extremities: No clubbing, no cyanosis, no edema. Bilateral knee effusion. Both of them are slightly warm to the touch. I could not tensed range of motion. No overlying erythema. Legs are wrapped in Kerlix. They're both discolored. No cellulitis.   Lines: peripheral    Laboratory and Tests Review:  Lab Results   Component Value Date    WBC 6.1 07/28/2018    WBC 6.7 07/27/2018    WBC 7.5 07/26/2018    HGB 9.7 (L) 07/28/2018    HCT 31.6 (L) 07/28/2018    MCV 82.1 07/28/2018     07/28/2018     Lab Results   Component Value Date    NEUTROABS 3.70 07/28/2018    NEUTROABS 5.10 07/26/2018    NEUTROABS 3.98 10/24/2016     Lab Results   Component Value Date    CRP 14.5 (H) 07/28/2018     No results found for: CRP  Lab Results   Component Value Date    SEDRATE 68 (H) 07/28/2018     Lab Results   Component Value Date    ALT 13 07/29/2018    AST 16 07/29/2018     (H) 07/18/2018    ALKPHOS 181 (H) 07/29/2018    BILITOT 0.6 07/29/2018     Lab Results   Component Value Date     07/29/2018    K 3.7 07/29/2018     07/29/2018    CO2 25 07/29/2018    BUN 10 07/29/2018    CREATININE 0.7 07/29/2018    CREATININE 0.7 07/27/2018    CREATININE 0.8 07/26/2018    GFRAA >60 07/29/2018    LABGLOM >60 07/29/2018    GLUCOSE 98 07/29/2018    GLUCOSE 138 03/26/2011    PROT 5.9 07/29/2018    LABALBU 2.4 07/29/2018    LABALBU 4.0 03/26/2011    CALCIUM 8.1 07/29/2018    BILITOT 0.6 07/29/2018    ALKPHOS 181 07/29/2018    AST 16 07/29/2018    ALT 13 07/29/2018     Radiology:      Microbiology:   Urine culture 7/26/18 negative so far  Urine culture 7/27/18 pending  Blood culture 7/26/18 Staphylococcus capitis in clusters in 1 of 4 bottles  Wound culture leg 7/27/18 Staphylococcus aureus  Wound culture scrotum 7/27/18 Staphylococcus aureus  Wound culture leg/ankle 7/27/18 Staphylococcus aureus    ASSESSMENT:  · Chronic bilateral leg ulcers,

## 2018-07-30 NOTE — PROGRESS NOTES
Pt seen/ examined. ROS x 10 neg. Except: doing okay. Patient has no complaints. Chart all reviewed. Discussed with my partner  Chart reviewed. meds reviewed. D/w nursing + family as available. EXAM:  /63   Pulse 72   Temp 98 °F (36.7 °C) (Oral)   Resp 18   Ht 6' (1.829 m)   Wt 220 lb (99.8 kg)   SpO2 98%   BMI 29.84 kg/m²   GEN: A+O NAD. HEENT:NCAT. EOMI. RAMÓN   NECK:  No JVD. No bruits. Trach midline. No thyromegaly. LUNGS: CTA w/o rales, rhonchi, wheezes. Good excursion. CV: rrr  positive murmur noted  ABD: soft, non tender. Nl BS. No organomegaly. EXT: no clubbing, cyanosis, edema.   NEURO:   Labs/data reviewed  LABS: CBC:   Lab Results   Component Value Date    WBC 6.1 07/28/2018    RBC 3.85 07/28/2018    HGB 9.7 07/28/2018    HCT 31.6 07/28/2018    MCV 82.1 07/28/2018    MCH 25.2 07/28/2018    MCHC 30.7 07/28/2018    RDW 18.6 07/28/2018     07/28/2018    MPV 9.5 07/28/2018     CMP:    Lab Results   Component Value Date     07/29/2018    K 3.7 07/29/2018     07/29/2018    CO2 25 07/29/2018    BUN 10 07/29/2018    CREATININE 0.7 07/29/2018    GFRAA >60 07/29/2018    LABGLOM >60 07/29/2018    GLUCOSE 98 07/29/2018    GLUCOSE 138 03/26/2011    PROT 5.9 07/29/2018    LABALBU 2.4 07/29/2018    LABALBU 4.0 03/26/2011    CALCIUM 8.1 07/29/2018    BILITOT 0.6 07/29/2018    ALKPHOS 181 07/29/2018    AST 16 07/29/2018    ALT 13 07/29/2018       Current Facility-Administered Medications:     0.9 % sodium chloride infusion, , Intravenous, Continuous, Tomasa Gooden MD, Last Rate: 75 mL/hr at 07/29/18 0007    acetaminophen (TYLENOL) tablet 650 mg, 650 mg, Oral, Q4H PRN, Tomasa Gooden MD, 650 mg at 07/28/18 1021    albuterol (PROVENTIL) nebulizer solution 2.5 mg, 2.5 mg, Nebulization, Q6H PRN, Tomasa Gooden MD    atorvastatin (LIPITOR) tablet 20 mg, 20 mg, Oral, Daily, Tomasa Gooden MD, 20 mg at 07/29/18 1225    bisacodyl (DULCOLAX) EC tablet 5 mg, 5 mg, Oral, Daily PRN, Asif Sharma MD    busPIRone (BUSPAR) tablet 7.5 mg, 7.5 mg, Oral, Daily, Tomasa Gooden MD, 7.5 mg at 07/29/18 1225    vitamin D tablet 5,000 Units, 5,000 Units, Oral, Daily, Asif Sharma MD, 5,000 Units at 07/29/18 1421    fluticasone (FLONASE) 50 MCG/ACT nasal spray 2 spray, 2 spray, Nasal, Daily, Tomasa Gooden MD, 2 spray at 07/29/18 0915    latanoprost (XALATAN) 0.005 % ophthalmic solution 1 drop, 1 drop, Both Eyes, Nightly, Tomasa Gooden MD, 1 drop at 07/29/18 2011    magnesium hydroxide (MILK OF MAGNESIA) 400 MG/5ML suspension 30 mL, 30 mL, Oral, Daily PRN, Asif Sharma MD    tamsulosin (FLOMAX) capsule 0.4 mg, 0.4 mg, Oral, QPM, Asif Sharma MD, 0.4 mg at 07/29/18 1705    triamcinolone (KENALOG) 0.1 % cream, , Topical, Q8H, Tomasa Gooden MD    enoxaparin (LOVENOX) injection 40 mg, 40 mg, Subcutaneous, Daily, Tomasa Gooden MD, 40 mg at 07/29/18 1224    insulin lispro (HUMALOG) injection vial 0-6 Units, 0-6 Units, Subcutaneous, TID WC, Tomasa Gooden MD    glucose (GLUTOSE) 40 % oral gel 15 g, 15 g, Oral, PRN, Tomasa Gooden MD    dextrose 50 % solution 12.5 g, 12.5 g, Intravenous, PRN, Asif Sharma MD    glucagon (rDNA) injection 1 mg, 1 mg, Intramuscular, PRN, Tomasa Gooden MD    dextrose 5 % solution, 100 mL/hr, Intravenous, PRN, Asif Sharma MD    sodium chloride flush 0.9 % injection 10 mL, 10 mL, Intravenous, BID, Tomasa Gooden MD    sodium chloride flush 0.9 % injection 10 mL, 10 mL, Intravenous, PRN, Asif Sharma MD, 10 mL at 07/27/18 1500    mineral oil-hydrophilic petrolatum (AQUAPHOR) ointment, , Topical, BID, Asif Sharma MD    A/P:      Patient Active Problem List   Diagnosis    Cellulitis    Sepsis ([facility]    Dementia    Metabolic encephalopathy    Diabetes mellitus type 2, uncontrolled ([facility]    Hyperlipidemia LDL goal <100    Essential hypertension    Venous ulcer of left leg (HCC)    Non-pressure chronic ulcer of left lower leg with fat layer exposed ([address]    Non-pressure chronic ulcer left lower leg, limited to breakdown skin (HCC)    Dehydration    Hypotension    Severe protein-calorie malnutrition (HCC)   ·  Chronic bilateral leg ulcers, colonized with Staphylococcus aureus  · GPC bacteremia. Possible septicemia from leg ulcers  Left knee pain. Patient has bilateral knee effusions. No erythema to suspect septic arthritis, however there is a positive blood culture and patient may have seeded his knee  Aortic stenosis    PLAN:workup of blood cultures per ID. Orthopedic consult?   ROMANA?

## 2018-07-30 NOTE — PROGRESS NOTES
[address] [place] [address] [facility]  Progress Note    SUBJECTIVE:  Chief Complaint   Patient presents with    Weight Loss     loss of appetite; not eating or drinking x 2 days; from [facility];  sent patient in for hydration and possible peg tube evaluation; recent weight loss of 20lbs per son     Patient is tolerating medications. No reported adverse drug reactions. No nausea, vomiting, diarrhea. Admits to having left knee pain. Remains afebrile. Back from EGD. No new complaints. Review of systems:  As stated above in the chief complaint, otherwise negative. Medications:  Scheduled Meds:   ceFAZolin  3 g Intravenous See Admin Instructions    atorvastatin  20 mg Oral Daily    busPIRone  7.5 mg Oral Daily    vitamin D  5,000 Units Oral Daily    fluticasone  2 spray Nasal Daily    latanoprost  1 drop Both Eyes Nightly    tamsulosin  0.4 mg Oral QPM    triamcinolone   Topical Q8H    enoxaparin  40 mg Subcutaneous Daily    insulin lispro  0-6 Units Subcutaneous TID WC    sodium chloride flush  10 mL Intravenous BID    mineral oil-hydrophilic petrolatum   Topical BID     Continuous Infusions:   sodium chloride 75 mL/hr at 18 0007    dextrose       PRN Meds:acetaminophen, albuterol, bisacodyl, magnesium hydroxide, glucose, dextrose, glucagon (rDNA), dextrose, sodium chloride flush    OBJECTIVE:  /70   Pulse 88   Temp 97.9 °F (36.6 °C) (Oral)   Resp 20   Ht 6' (1.829 m)   Wt 220 lb (99.8 kg)   SpO2 95%   BMI 29.84 kg/m²   Temp  Av.9 °F (36.6 °C)  Min: 97.7 °F (36.5 °C)  Max: 98 °F (36.7 °C)  Constitutional: The patient is awake, alert, and oriented. No distress. Skin: Warm and dry. No rashes were noted. HEENT: Eyes show round, and reactive pupils. No jaundice. Moist mucous membranes, no ulcerations, no thrush. Neck: Supple to movements. Chest: No wheezing, crackles, or rhonchi.    Cardiovascular: S1 and S2 are rhythmic and regular. + murmurs

## 2018-07-31 ENCOUNTER — ANESTHESIA (OUTPATIENT)
Dept: OPERATING ROOM | Age: 78
DRG: 641 | End: 2018-07-31
Payer: COMMERCIAL

## 2018-07-31 VITALS — SYSTOLIC BLOOD PRESSURE: 146 MMHG | DIASTOLIC BLOOD PRESSURE: 75 MMHG | OXYGEN SATURATION: 93 %

## 2018-07-31 LAB
LV EF: 58 %
LVEF MODALITY: NORMAL
METER GLUCOSE: 94 MG/DL (ref 70–110)
METER GLUCOSE: 96 MG/DL (ref 70–110)
METER GLUCOSE: 98 MG/DL (ref 70–110)

## 2018-07-31 PROCEDURE — 3600007512: Performed by: SURGERY

## 2018-07-31 PROCEDURE — 6370000000 HC RX 637 (ALT 250 FOR IP): Performed by: INTERNAL MEDICINE

## 2018-07-31 PROCEDURE — 6360000002 HC RX W HCPCS: Performed by: ANESTHESIOLOGY

## 2018-07-31 PROCEDURE — 93306 TTE W/DOPPLER COMPLETE: CPT

## 2018-07-31 PROCEDURE — 6360000002 HC RX W HCPCS: Performed by: STUDENT IN AN ORGANIZED HEALTH CARE EDUCATION/TRAINING PROGRAM

## 2018-07-31 PROCEDURE — 3600007502: Performed by: SURGERY

## 2018-07-31 PROCEDURE — 97530 THERAPEUTIC ACTIVITIES: CPT

## 2018-07-31 PROCEDURE — 2580000003 HC RX 258: Performed by: INTERNAL MEDICINE

## 2018-07-31 PROCEDURE — 0DH63UZ INSERTION OF FEEDING DEVICE INTO STOMACH, PERCUTANEOUS APPROACH: ICD-10-PCS | Performed by: SURGERY

## 2018-07-31 PROCEDURE — 2709999900 HC NON-CHARGEABLE SUPPLY: Performed by: SURGERY

## 2018-07-31 PROCEDURE — 2580000003 HC RX 258

## 2018-07-31 PROCEDURE — 2060000000 HC ICU INTERMEDIATE R&B

## 2018-07-31 PROCEDURE — 3700000001 HC ADD 15 MINUTES (ANESTHESIA): Performed by: SURGERY

## 2018-07-31 PROCEDURE — 82962 GLUCOSE BLOOD TEST: CPT

## 2018-07-31 PROCEDURE — 7100000011 HC PHASE II RECOVERY - ADDTL 15 MIN: Performed by: SURGERY

## 2018-07-31 PROCEDURE — 3700000000 HC ANESTHESIA ATTENDED CARE: Performed by: SURGERY

## 2018-07-31 PROCEDURE — 2580000003 HC RX 258: Performed by: STUDENT IN AN ORGANIZED HEALTH CARE EDUCATION/TRAINING PROGRAM

## 2018-07-31 PROCEDURE — 7100000010 HC PHASE II RECOVERY - FIRST 15 MIN: Performed by: SURGERY

## 2018-07-31 PROCEDURE — 2500000003 HC RX 250 WO HCPCS: Performed by: SURGERY

## 2018-07-31 PROCEDURE — 6360000002 HC RX W HCPCS

## 2018-07-31 RX ORDER — SODIUM CHLORIDE 9 MG/ML
INJECTION, SOLUTION INTRAVENOUS CONTINUOUS PRN
Status: DISCONTINUED | OUTPATIENT
Start: 2018-07-31 | End: 2018-07-31 | Stop reason: SDUPTHER

## 2018-07-31 RX ORDER — PROPOFOL 10 MG/ML
INJECTION, EMULSION INTRAVENOUS PRN
Status: DISCONTINUED | OUTPATIENT
Start: 2018-07-31 | End: 2018-07-31 | Stop reason: SDUPTHER

## 2018-07-31 RX ORDER — MORPHINE SULFATE 2 MG/ML
1 INJECTION, SOLUTION INTRAMUSCULAR; INTRAVENOUS EVERY 5 MIN PRN
Status: DISCONTINUED | OUTPATIENT
Start: 2018-07-31 | End: 2018-07-31

## 2018-07-31 RX ORDER — MORPHINE SULFATE 2 MG/ML
2 INJECTION, SOLUTION INTRAMUSCULAR; INTRAVENOUS EVERY 5 MIN PRN
Status: DISCONTINUED | OUTPATIENT
Start: 2018-07-31 | End: 2018-07-31

## 2018-07-31 RX ORDER — PROMETHAZINE HYDROCHLORIDE 25 MG/ML
25 INJECTION, SOLUTION INTRAMUSCULAR; INTRAVENOUS PRN
Status: DISCONTINUED | OUTPATIENT
Start: 2018-07-31 | End: 2018-07-31

## 2018-07-31 RX ORDER — LIDOCAINE HYDROCHLORIDE 10 MG/ML
INJECTION, SOLUTION EPIDURAL; INFILTRATION; INTRACAUDAL; PERINEURAL PRN
Status: DISCONTINUED | OUTPATIENT
Start: 2018-07-31 | End: 2018-07-31 | Stop reason: HOSPADM

## 2018-07-31 RX ORDER — LABETALOL HYDROCHLORIDE 5 MG/ML
5 INJECTION, SOLUTION INTRAVENOUS EVERY 10 MIN PRN
Status: DISCONTINUED | OUTPATIENT
Start: 2018-07-31 | End: 2018-07-31

## 2018-07-31 RX ORDER — MEPERIDINE HYDROCHLORIDE 25 MG/ML
12.5 INJECTION INTRAMUSCULAR; INTRAVENOUS; SUBCUTANEOUS EVERY 5 MIN PRN
Status: DISCONTINUED | OUTPATIENT
Start: 2018-07-31 | End: 2018-07-31

## 2018-07-31 RX ADMIN — SODIUM CHLORIDE: 9 INJECTION, SOLUTION INTRAVENOUS at 13:24

## 2018-07-31 RX ADMIN — FLUTICASONE PROPIONATE 2 SPRAY: 50 SPRAY, METERED NASAL at 09:19

## 2018-07-31 RX ADMIN — PETROLATUM: 42 OINTMENT TOPICAL at 09:20

## 2018-07-31 RX ADMIN — MORPHINE SULFATE 2 MG: 2 INJECTION, SOLUTION INTRAMUSCULAR; INTRAVENOUS at 14:16

## 2018-07-31 RX ADMIN — LATANOPROST 1 DROP: 50 SOLUTION OPHTHALMIC at 21:45

## 2018-07-31 RX ADMIN — MORPHINE SULFATE 2 MG: 2 INJECTION, SOLUTION INTRAMUSCULAR; INTRAVENOUS at 14:21

## 2018-07-31 RX ADMIN — SODIUM CHLORIDE: 9 INJECTION, SOLUTION INTRAVENOUS at 22:16

## 2018-07-31 RX ADMIN — PETROLATUM: 42 OINTMENT TOPICAL at 21:45

## 2018-07-31 RX ADMIN — CHOLECALCIFEROL TAB 50 MCG (2000 UNIT) 5000 UNITS: 50 TAB at 09:16

## 2018-07-31 RX ADMIN — ATORVASTATIN CALCIUM 20 MG: 20 TABLET, FILM COATED ORAL at 09:16

## 2018-07-31 RX ADMIN — SODIUM CHLORIDE: 9 INJECTION, SOLUTION INTRAVENOUS at 00:42

## 2018-07-31 RX ADMIN — PROPOFOL 150 MG: 10 INJECTION, EMULSION INTRAVENOUS at 13:31

## 2018-07-31 RX ADMIN — TRIAMCINOLONE ACETONIDE: 1 CREAM TOPICAL at 09:51

## 2018-07-31 RX ADMIN — BUSPIRONE HYDROCHLORIDE 7.5 MG: 5 TABLET ORAL at 09:16

## 2018-07-31 RX ADMIN — CEFAZOLIN 3 G: 1 INJECTION, POWDER, FOR SOLUTION INTRAMUSCULAR; INTRAVENOUS; PARENTERAL at 13:24

## 2018-07-31 RX ADMIN — TRIAMCINOLONE ACETONIDE: 1 CREAM TOPICAL at 23:49

## 2018-07-31 RX ADMIN — TAMSULOSIN HYDROCHLORIDE 0.4 MG: 0.4 CAPSULE ORAL at 17:31

## 2018-07-31 RX ADMIN — TRIAMCINOLONE ACETONIDE: 1 CREAM TOPICAL at 17:31

## 2018-07-31 ASSESSMENT — PULMONARY FUNCTION TESTS
PIF_VALUE: 0

## 2018-07-31 ASSESSMENT — PAIN SCALES - GENERAL
PAINLEVEL_OUTOF10: 3
PAINLEVEL_OUTOF10: 8
PAINLEVEL_OUTOF10: 0
PAINLEVEL_OUTOF10: 7

## 2018-07-31 ASSESSMENT — PAIN DESCRIPTION - PAIN TYPE: TYPE: SURGICAL PAIN

## 2018-07-31 NOTE — PLAN OF CARE
Problem: Nutrition  Goal: Optimal nutrition therapy  Outcome: Ongoing  Nutrition Problem: Severe malnutrition, in context of chronic illness  Intervention: Food and/or Nutrient Delivery: Start Tube Feeding, Start oral diet  Nutritional Goals: Pt tolerate EN to meet at least 75% needs, stable dry wt, promote wound healing

## 2018-07-31 NOTE — PROGRESS NOTES
carrie yeh. Pt was left in bed with call light left by patient. Chair/bed alarm: yes      Pt is making limited progress toward established Physical Therapy goals. Continue with physical therapy current plan of care.     Ada PT [id number]

## 2018-07-31 NOTE — PROGRESS NOTES
[id number]    bisacodyl (DULCOLAX) EC tablet 5 mg, 5 mg, Oral, Daily PRN, Raegan Coello MD    busPIRone (BUSPAR) tablet 7.5 mg, 7.5 mg, Oral, Daily, Raegan Coello MD, 7.5 mg at 07/30/18 1000    vitamin D tablet 5,000 Units, 5,000 Units, Oral, Daily, Raegan Coello MD, 5,000 Units at 07/30/18 0946    fluticasone (FLONASE) 50 MCG/ACT nasal spray 2 spray, 2 spray, Nasal, Daily, Tomasa Gooden MD, 2 spray at 07/30/18 0953    latanoprost (XALATAN) 0.005 % ophthalmic solution 1 drop, 1 drop, Both Eyes, Nightly, Tomasa Gooden MD, 1 drop at 07/30/18 2013    magnesium hydroxide (MILK OF MAGNESIA) 400 MG/5ML suspension 30 mL, 30 mL, Oral, Daily PRN, Raegan Coello MD    tamsulosin (FLOMAX) capsule 0.4 mg, 0.4 mg, Oral, QPM, Tomasa Gooden MD, 0.4 mg at 07/30/18 1720    triamcinolone (KENALOG) 0.1 % cream, , Topical, Q8H, Tomasa Gooden MD    enoxaparin (LOVENOX) injection 40 mg, 40 mg, Subcutaneous, Daily, Tomasa Gooden MD, 40 mg at 07/30/18 0946    insulin lispro (HUMALOG) injection vial 0-6 Units, 0-6 Units, Subcutaneous, TID WC, Tomasa Gooden MD    glucose (GLUTOSE) 40 % oral gel 15 g, 15 g, Oral, PRN, Tomasa Gooden MD    dextrose 50 % solution 12.5 g, 12.5 g, Intravenous, PRN, Raegan Coello MD    glucagon (rDNA) injection 1 mg, 1 mg, Intramuscular, PRN, Tomasa Gooden MD    dextrose 5 % solution, 100 mL/hr, Intravenous, PRN, Raegan Coello MD    sodium chloride flush 0.9 % injection 10 mL, 10 mL, Intravenous, BID, Tomasa Gooden MD, 10 mL at 07/30/18 0948    sodium chloride flush 0.9 % injection 10 mL, 10 mL, Intravenous, PRN, Raegan Coello MD, 10 mL at 07/27/18 1500    mineral oil-hydrophilic petrolatum (AQUAPHOR) ointment, , Topical, BID, Raegan Coello MD    A/P:      Patient Active Problem List   Diagnosis    Cellulitis    [facility]    Dementia    Metabolic encephalopathy    Diabetes mellitus type 2, uncontrolled ([facility]    Hyperlipidemia LDL goal <100    Essential hypertension    Venous ulcer of left leg (HCC)    Non-pressure chronic ulcer of left lower leg with fat layer exposed (HCC)    Non-pressure chronic ulcer left lower leg, limited to breakdown skin (HCC)    Dehydration    Hypotension    Severe protein-calorie malnutrition (HCC)   ·  Chronic bilateral leg ulcers, colonized with Staphylococcus aureus  · Coagulase-negative Staphylococcus bacteremia. Probable contaminant  · Left knee pain. Patient has bilateral knee effusions. No erythema to suspect septic arthritis, however there is a positive blood culture and patient may have seeded his knee   aortic stenosis    PLAN: PEG tube.

## 2018-07-31 NOTE — OP NOTE
DATE OF PROCEDURE:  7/31/2018       PREOPERATIVE DIAGNOSES:  Malnutrition      POSTOPERATIVE DIAGNOSES:  Same     OPERATION:  Percutaneous endoscopically guided gastrostomy tube insertion. SURGEON:  Dr. Krishan Mccollum M.D.     Janessa Hand M.D. ANESTHESIA: LMAC. ESTIMATED BLOOD LOSS:  Less than 5 mL. COMPLICATIONS: None     CONDITION:  Stable. INDICATIONS: Carmina Turcios is a [de-identified] y.o. male with malnutrition. HE had an attempted PEG which was aborted due to an inability to transilluminate safely over the abdomen. After discussion with the family, decision was made to proceed with EGD, possible PEG, possible laparoscopic G tube placement. Before the procedure, the risk, benefits, expected outcome, and alternatives to the procedure. Risks included but are not limited to bleeding, infection, respiratory distress, hypotension, and perforation of the esophagus, stomach, or duodenum, which could in turn require further procedures. Patient's family that they stated that they understood the risks and wished to proceed with the gastrostomy tube placement. PROCEDURE:  The procedure was done in the endoscopic suite. Time-out was performed confirming the patient and procedure to be done. First, the endoscope was inserted into the oropharynx and through the esophagus into the stomach, where the light was able to be visualized through the abdominal wall. The abdomen was then prepped and draped. The area of illumination was anesthetized with 1% lidocaine. A small 1-cm incision was then made. An Angiocatheter was passed under direct visualized into the stomach wall. A guidewire was then passed through the angiocatheter into the stomach. .  The guidewire was grasped by the endoscopy grasper and pulled out through the mouth. The gastrostomy tube was then attached to the guidewire. The guidewire was pulled back along with the gastrostomy tube.   The gastrostomy tube was pulled to

## 2018-07-31 NOTE — PROGRESS NOTES
Cardiovascular: S1 and S2 are rhythmic and regular. + murmurs appreciated. Abdomen: Positive bowel sounds to auscultation. Benign to palpation. Extremities: No clubbing, no cyanosis, no edema. Bilateral knee effusion. Both of them are slightly warm to the touch. I could not tensed range of motion. No overlying erythema. Legs are wrapped in Kerlix. They're both discolored. No cellulitis.   Lines: peripheral    Laboratory and Tests Review:  Lab Results   Component Value Date    WBC 6.1 07/28/2018    WBC 6.7 07/27/2018    WBC 7.5 07/26/2018    HGB 9.7 (L) 07/28/2018    HCT 31.6 (L) 07/28/2018    MCV 82.1 07/28/2018     07/28/2018     Lab Results   Component Value Date    NEUTROABS 3.70 07/28/2018    NEUTROABS 5.10 07/26/2018    NEUTROABS 3.98 10/24/2016     Lab Results   Component Value Date    CRP 14.5 (H) 07/28/2018     No results found for: CRPHS  Lab Results   Component Value Date    SEDRATE 68 (H) 07/28/2018     Lab Results   Component Value Date    ALT 13 07/29/2018    AST 16 07/29/2018     (H) 07/18/2018    ALKPHOS 181 (H) 07/29/2018    BILITOT 0.6 07/29/2018     Lab Results   Component Value Date     07/29/2018    K 3.7 07/29/2018     07/29/2018    CO2 25 07/29/2018    BUN 10 07/29/2018    CREATININE 0.7 07/29/2018    CREATININE 0.7 07/27/2018    CREATININE 0.8 07/26/2018    GFRAA >60 07/29/2018    LABGLOM >60 07/29/2018    GLUCOSE 98 07/29/2018    GLUCOSE 138 03/26/2011    PROT 5.9 07/29/2018    LABALBU 2.4 07/29/2018    LABALBU 4.0 03/26/2011    CALCIUM 8.1 07/29/2018    BILITOT 0.6 07/29/2018    ALKPHOS 181 07/29/2018    AST 16 07/29/2018    ALT 13 07/29/2018     Radiology:      Microbiology:   Urine culture 7/26/18 negative so far  Urine culture 7/27/18 pending  Blood culture 7/26/18 Staphylococcus capitis in clusters in 1 of 4 bottles MSSE  Wound culture leg 7/27/18 Staphylococcus aureus  Wound culture scrotum 7/27/18 Staphylococcus aureus  Wound culture leg/ankle 7/27/18 Staphylococcus aureus    ASSESSMENT:  · Chronic bilateral leg ulcers, colonized with Staphylococcus aureus  · Coagulase-negative Staphylococcus bacteremia. Probable contaminant  · Left knee pain. Patient has bilateral knee effusions.  No erythema to suspect septic arthritis, however there is a positive blood culture and patient may have seeded his knee    PLAN:  · Patient received 1 dose of Vancomycin on 7/28/18  · Continue wound care  ·  STATUS OF AV PENDING  · STOP ISOLATION    Phil Corley

## 2018-07-31 NOTE — PROGRESS NOTES
Assessment:  · Subjective Assessment: Pt family agree to PEG-Oral intake 1-25% at meals currently  · Nutrition-Focused Physical Findings: trace edema, poor appetite, +BS, -I/O >2L  · Wound Type: Multiple, Deep Tissue Injury (LE wounds pain to touch per wound RN consult)  · Current Nutrition Therapies:  · Oral Diet Orders: NPO   · Oral Diet intake: 1-25% prior to NPO 7/31  · Anthropometric Measures:  · Ht: 6' (182.9 cm)   · Current Body Wt: 214 lb (97.1 kg) (7/31 actual - down 2# in last 4 days)  · Admission Body Wt: 216 lb (98 kg) (7/26 first actual)  · Usual Body Wt: 262 lb (118.8 kg) (12/2017 @ wound care visit)  · % Weight Change: 18% wt loss,  6 mos  · Ideal Body Wt: 178 lb (80.7 kg), % Ideal Body 120%  · BMI Classification: BMI 25.0 - 29.9 Overweight  · Comparative Standards (Estimated Nutrition Needs):  · Estimated Daily Total Kcal:   · Estimated Daily Protein (g): 120-145    Estimated Intake vs Estimated Needs: Intake Less Than Needs    Nutrition Risk Level: High    Nutrition Interventions:   Start Tube Feeding, Start oral diet  Continued Inpatient Monitoring, Coordination of Care, Education Not Indicated    Nutrition Evaluation:   · Evaluation: Goals set   · Goals: Pt tolerate EN to meet at least 75% needs, stable dry wt, promote wound healing    · Monitoring: Meal Intake, TF Intake, Skin Integrity, Wound Healing, Fluid Balance, Ascites/Edema, Weight, Comparative Standards, Pertinent Labs    See Adult Nutrition Doc Flowsheet for more detail.      Electronically signed by Sarah Schafer RD, CNSC, LD on 7/31/18 at 10:49 AM    Contact Number: [phone]

## 2018-08-01 ENCOUNTER — HOSPITAL ENCOUNTER (OUTPATIENT)
Dept: WOUND CARE | Age: 78
Discharge: HOME | End: 2018-08-01
Payer: COMMERCIAL

## 2018-08-01 LAB
BLOOD CULTURE, ROUTINE: NORMAL
CULTURE, BLOOD 2: ABNORMAL
CULTURE, BLOOD 2: ABNORMAL
METER GLUCOSE: 153 MG/DL (ref 70–110)
METER GLUCOSE: 163 MG/DL (ref 70–110)
METER GLUCOSE: 174 MG/DL (ref 70–110)
ORGANISM: ABNORMAL

## 2018-08-01 PROCEDURE — 6370000000 HC RX 637 (ALT 250 FOR IP): Performed by: INTERNAL MEDICINE

## 2018-08-01 PROCEDURE — 2580000003 HC RX 258: Performed by: INTERNAL MEDICINE

## 2018-08-01 PROCEDURE — 97530 THERAPEUTIC ACTIVITIES: CPT

## 2018-08-01 PROCEDURE — 6360000002 HC RX W HCPCS: Performed by: INTERNAL MEDICINE

## 2018-08-01 PROCEDURE — 82962 GLUCOSE BLOOD TEST: CPT

## 2018-08-01 PROCEDURE — 2060000000 HC ICU INTERMEDIATE R&B

## 2018-08-01 RX ADMIN — TRIAMCINOLONE ACETONIDE: 1 CREAM TOPICAL at 16:22

## 2018-08-01 RX ADMIN — INSULIN LISPRO 1 UNITS: 100 INJECTION, SOLUTION INTRAVENOUS; SUBCUTANEOUS at 09:15

## 2018-08-01 RX ADMIN — PETROLATUM: 42 OINTMENT TOPICAL at 09:05

## 2018-08-01 RX ADMIN — ENOXAPARIN SODIUM 40 MG: 40 INJECTION, SOLUTION INTRAVENOUS; SUBCUTANEOUS at 09:04

## 2018-08-01 RX ADMIN — TAMSULOSIN HYDROCHLORIDE 0.4 MG: 0.4 CAPSULE ORAL at 17:08

## 2018-08-01 RX ADMIN — LATANOPROST 1 DROP: 50 SOLUTION OPHTHALMIC at 20:55

## 2018-08-01 RX ADMIN — FLUTICASONE PROPIONATE 2 SPRAY: 50 SPRAY, METERED NASAL at 09:05

## 2018-08-01 RX ADMIN — INSULIN LISPRO 1 UNITS: 100 INJECTION, SOLUTION INTRAVENOUS; SUBCUTANEOUS at 11:51

## 2018-08-01 RX ADMIN — PETROLATUM: 42 OINTMENT TOPICAL at 20:56

## 2018-08-01 RX ADMIN — TRIAMCINOLONE ACETONIDE: 1 CREAM TOPICAL at 09:05

## 2018-08-01 RX ADMIN — BUSPIRONE HYDROCHLORIDE 7.5 MG: 5 TABLET ORAL at 09:03

## 2018-08-01 RX ADMIN — SODIUM CHLORIDE: 9 INJECTION, SOLUTION INTRAVENOUS at 10:13

## 2018-08-01 RX ADMIN — INSULIN LISPRO 1 UNITS: 100 INJECTION, SOLUTION INTRAVENOUS; SUBCUTANEOUS at 16:26

## 2018-08-01 RX ADMIN — ATORVASTATIN CALCIUM 20 MG: 20 TABLET, FILM COATED ORAL at 09:03

## 2018-08-01 RX ADMIN — SODIUM CHLORIDE: 9 INJECTION, SOLUTION INTRAVENOUS at 23:59

## 2018-08-01 RX ADMIN — CHOLECALCIFEROL TAB 50 MCG (2000 UNIT) 5000 UNITS: 50 TAB at 09:03

## 2018-08-01 ASSESSMENT — PAIN SCALES - GENERAL
PAINLEVEL_OUTOF10: 0
PAINLEVEL_OUTOF10: 0

## 2018-08-01 NOTE — PROGRESS NOTES
[address] [place] [address] [facility]  Progress Note    SUBJECTIVE:  Chief Complaint   Patient presents with    Weight Loss     loss of appetite; not eating or drinking x 2 days; from [facility];  sent patient in for hydration and possible peg tube evaluation; recent weight loss of 20lbs per son     Patient is tolerating medications. No reported adverse drug reactions. No nausea, vomiting, diarrhea. Admits to having left knee pain. Remains afebrile. Back from EGD. No new complaints. Review of systems:  As stated above in the chief complaint, otherwise negative. Medications:  Scheduled Meds:   ceFAZolin  3 g Intravenous See Admin Instructions    atorvastatin  20 mg Oral Daily    busPIRone  7.5 mg Oral Daily    vitamin D  5,000 Units Oral Daily    fluticasone  2 spray Nasal Daily    latanoprost  1 drop Both Eyes Nightly    tamsulosin  0.4 mg Oral QPM    triamcinolone   Topical Q8H    enoxaparin  40 mg Subcutaneous Daily    insulin lispro  0-6 Units Subcutaneous TID WC    sodium chloride flush  10 mL Intravenous BID    mineral oil-hydrophilic petrolatum   Topical BID     Continuous Infusions:   sodium chloride 75 mL/hr at 18 1013    dextrose       PRN Meds:acetaminophen, albuterol, bisacodyl, magnesium hydroxide, glucose, dextrose, glucagon (rDNA), dextrose, sodium chloride flush    OBJECTIVE:  /64   Pulse 98   Temp 98.6 °F (37 °C) (Oral)   Resp 18   Ht 6' (1.829 m)   Wt 217 lb (98.4 kg)   SpO2 95%   BMI 29.43 kg/m²   Temp  Av.9 °F (36.6 °C)  Min: 96.8 °F (36 °C)  Max: 98.6 °F (37 °C)  Constitutional: The patient is awake, alert, and oriented. No distress. Skin: Warm and dry. No rashes were noted. HEENT: Eyes show round, and reactive pupils. No jaundice. Moist mucous membranes, no ulcerations, no thrush. Neck: Supple to movements. Chest: No wheezing, crackles, or rhonchi.    Cardiovascular: S1 and S2 are rhythmic and regular. + murmurs appreciated. Abdomen: Positive bowel sounds to auscultation. Benign to palpation. Extremities: No clubbing, no cyanosis, no edema. Bilateral knee effusion. Both of them are slightly warm to the touch. I could not tensed range of motion. No overlying erythema. Legs are wrapped in Kerlix. They're both discolored. No cellulitis.   Lines: peripheral    Laboratory and Tests Review:  Lab Results   Component Value Date    WBC 6.1 07/28/2018    WBC 6.7 07/27/2018    WBC 7.5 07/26/2018    HGB 9.7 (L) 07/28/2018    HCT 31.6 (L) 07/28/2018    MCV 82.1 07/28/2018     07/28/2018     Lab Results   Component Value Date    NEUTROABS 3.70 07/28/2018    NEUTROABS 5.10 07/26/2018    NEUTROABS 3.98 10/24/2016     Lab Results   Component Value Date    CRP 14.5 (H) 07/28/2018     No results found for: CRP  Lab Results   Component Value Date    SEDRATE 68 (H) 07/28/2018     Lab Results   Component Value Date    ALT 13 07/29/2018    AST 16 07/29/2018     (H) 07/18/2018    ALKPHOS 181 (H) 07/29/2018    BILITOT 0.6 07/29/2018     Lab Results   Component Value Date     07/29/2018    K 3.7 07/29/2018     07/29/2018    CO2 25 07/29/2018    BUN 10 07/29/2018    CREATININE 0.7 07/29/2018    CREATININE 0.7 07/27/2018    CREATININE 0.8 07/26/2018    GFRAA >60 07/29/2018    LABGLOM >60 07/29/2018    GLUCOSE 98 07/29/2018    GLUCOSE 138 03/26/2011    PROT 5.9 07/29/2018    LABALBU 2.4 07/29/2018    LABALBU 4.0 03/26/2011    CALCIUM 8.1 07/29/2018    BILITOT 0.6 07/29/2018    ALKPHOS 181 07/29/2018    AST 16 07/29/2018    ALT 13 07/29/2018     Radiology:      Microbiology:   Urine culture 7/26/18 negative so far  Urine culture 7/27/18 pending  Blood culture 7/26/18 Staphylococcus capitis in clusters in 1 of 4 bottles MSSE  Wound culture leg 7/27/18 Staphylococcus aureus  Wound culture scrotum 7/27/18 Staphylococcus aureus  Wound culture leg/ankle 7/27/18 Staphylococcus aureus    ASSESSMENT:  · Chronic bilateral leg

## 2018-08-01 NOTE — PROGRESS NOTES
Robert Galvan MD, 20 mg at 08/01/18 3164    bisacodyl (DULCOLAX) EC tablet 5 mg, 5 mg, Oral, Daily PRN, Robert Galvan MD    busPIRone (BUSPAR) tablet 7.5 mg, 7.5 mg, Oral, Daily, Robert Galvan MD, 7.5 mg at 08/01/18 0903    vitamin D tablet 5,000 Units, 5,000 Units, Oral, Daily, Robert Galvan MD, 5,000 Units at 08/01/18 0903    fluticasone (FLONASE) 50 MCG/ACT nasal spray 2 spray, 2 spray, Nasal, Daily, Robert Galvan MD, 2 spray at 08/01/18 0905    latanoprost (XALATAN) 0.005 % ophthalmic solution 1 drop, 1 drop, Both Eyes, Nightly, Robert Galvan MD, 1 drop at 07/31/18 2145    magnesium hydroxide (MILK OF MAGNESIA) 400 MG/5ML suspension 30 mL, 30 mL, Oral, Daily PRN, Robert Galvan MD    tamsulosin (FLOMAX) capsule 0.4 mg, 0.4 mg, Oral, QPM, Robert Galvan MD, 0.4 mg at 07/31/18 1731    triamcinolone (KENALOG) 0.1 % cream, , Topical, Q8H, Robert Galvan MD    enoxaparin (LOVENOX) injection 40 mg, 40 mg, Subcutaneous, Daily, Robert Galvan MD, 40 mg at 08/01/18 0904    insulin lispro (HUMALOG) injection vial 0-6 Units, 0-6 Units, Subcutaneous, TID WC, Robert Galvan MD, 1 Units at 08/01/18 1151    glucose (GLUTOSE) 40 % oral gel 15 g, 15 g, Oral, PRN, Tomasa Gooden MD    dextrose 50 % solution 12.5 g, 12.5 g, Intravenous, PRN, Robert Galvan MD    glucagon (rDNA) injection 1 mg, 1 mg, Intramuscular, PRN, Tomasa Gooden MD    dextrose 5 % solution, 100 mL/hr, Intravenous, PRN, Tomasa Gooden MD    sodium chloride flush 0.9 % injection 10 mL, 10 mL, Intravenous, BID, Tomasa Gooden MD, 10 mL at 07/30/18 0948    sodium chloride flush 0.9 % injection 10 mL, 10 mL, Intravenous, PRN, Tomasa Gooden MD, 10 mL at 07/27/18 1500    mineral oil-hydrophilic petrolatum (AQUAPHOR) ointment, , Topical, BID, Robert Galvan MD    A/P:      Patient Active Problem List   Diagnosis    Cellulitis    Sepsis ([facility] [address]    Dementia    Metabolic encephalopathy    Diabetes mellitus type 2, uncontrolled ([facility] [address] [facility]    Hyperlipidemia LDL goal <100    Essential hypertension    Venous ulcer of left leg (HCC)    Non-pressure chronic ulcer of left lower leg with fat layer exposed (HCC)    Non-pressure chronic ulcer left lower leg, limited to breakdown skin (HCC)    Dehydration    Hypotension    Severe protein-calorie malnutrition (HCC)   ·  Chronic bilateral leg ulcers, colonized with Staphylococcus aureus  · Coagulase-negative Staphylococcus bacteremia. Probable contaminant  · Left knee pain. Patient has bilateral knee effusions.  No erythema to suspect septic arthritis, however there is a positive blood culture and patient may have seeded his knee     PLAN:  · Patient received 1 dose of Vancomycin on 7/28/18  · Continue wound care  · CHECK STATUS OF AV  · FOLLOW UP IN OFFICE IN 1 MONTH  BLOOD CULT IN 3 WEEKS

## 2018-08-01 NOTE — CARE COORDINATION
Social Work/Discharge Planning:  Called liaison Pita Oliveros from [facility] and requested for facility to start pre-cert. Will continue to follow and wait for pre-cert.   Electronically signed by TRUMAN Mcleod on 8/1/2018 at 9:12 AM

## 2018-08-01 NOTE — PROGRESS NOTES
demonstrated skill Pt requires further education in this area   no With assist yes     Additional Comments: poor standing balance with R LE sliding out in front of pt. Pt was left in bed with call light left by patient. Chair/bed alarm: yes    Continue with physical therapy current plan of care.     Ada HEWITT [id number]

## 2018-08-01 NOTE — PROGRESS NOTES
Continue with current OT Plan of care.   [] Prepare for Discharge    Gwendolyn GRIMM/L [id number]    Total Tx Time: 15

## 2018-08-02 VITALS
TEMPERATURE: 98.4 F | OXYGEN SATURATION: 96 % | SYSTOLIC BLOOD PRESSURE: 140 MMHG | BODY MASS INDEX: 32.32 KG/M2 | HEIGHT: 72 IN | DIASTOLIC BLOOD PRESSURE: 72 MMHG | HEART RATE: 78 BPM | RESPIRATION RATE: 18 BRPM | WEIGHT: 238.6 LBS

## 2018-08-02 LAB
ALBUMIN SERPL-MCNC: 2.3 G/DL (ref 3.5–5.2)
ALP BLD-CCNC: 123 U/L (ref 40–129)
ALT SERPL-CCNC: 6 U/L (ref 0–40)
ANION GAP SERPL CALCULATED.3IONS-SCNC: 9 MMOL/L (ref 7–16)
AST SERPL-CCNC: 15 U/L (ref 0–39)
BILIRUB SERPL-MCNC: 0.2 MG/DL (ref 0–1.2)
BLOOD CULTURE, ROUTINE: NORMAL
BUN BLDV-MCNC: 13 MG/DL (ref 8–23)
CALCIUM SERPL-MCNC: 7.7 MG/DL (ref 8.6–10.2)
CHLORIDE BLD-SCNC: 103 MMOL/L (ref 98–107)
CO2: 28 MMOL/L (ref 22–29)
CREAT SERPL-MCNC: 0.6 MG/DL (ref 0.7–1.2)
CULTURE, BLOOD 2: NORMAL
GFR AFRICAN AMERICAN: >60
GFR NON-AFRICAN AMERICAN: >60 ML/MIN/1.73
GLUCOSE BLD-MCNC: 166 MG/DL (ref 74–109)
HCT VFR BLD CALC: 30 % (ref 37–54)
HEMOGLOBIN: 9.4 G/DL (ref 12.5–16.5)
MCH RBC QN AUTO: 25.3 PG (ref 26–35)
MCHC RBC AUTO-ENTMCNC: 31.3 % (ref 32–34.5)
MCV RBC AUTO: 80.9 FL (ref 80–99.9)
METER GLUCOSE: 147 MG/DL (ref 70–110)
METER GLUCOSE: 159 MG/DL (ref 70–110)
METER GLUCOSE: 162 MG/DL (ref 70–110)
METER GLUCOSE: 165 MG/DL (ref 70–110)
PDW BLD-RTO: 18.4 FL (ref 11.5–15)
PLATELET # BLD: 292 E9/L (ref 130–450)
PMV BLD AUTO: 8.9 FL (ref 7–12)
POTASSIUM SERPL-SCNC: 3.1 MMOL/L (ref 3.5–5)
RBC # BLD: 3.71 E12/L (ref 3.8–5.8)
SEDIMENTATION RATE, ERYTHROCYTE: 78 MM/HR (ref 0–15)
SODIUM BLD-SCNC: 140 MMOL/L (ref 132–146)
TOTAL PROTEIN: 5.5 G/DL (ref 6.4–8.3)
WBC # BLD: 5.7 E9/L (ref 4.5–11.5)

## 2018-08-02 PROCEDURE — 87040 BLOOD CULTURE FOR BACTERIA: CPT

## 2018-08-02 PROCEDURE — 2580000003 HC RX 258: Performed by: INTERNAL MEDICINE

## 2018-08-02 PROCEDURE — 36415 COLL VENOUS BLD VENIPUNCTURE: CPT

## 2018-08-02 PROCEDURE — 80053 COMPREHEN METABOLIC PANEL: CPT

## 2018-08-02 PROCEDURE — 87070 CULTURE OTHR SPECIMN AEROBIC: CPT

## 2018-08-02 PROCEDURE — 87077 CULTURE AEROBIC IDENTIFY: CPT

## 2018-08-02 PROCEDURE — 85027 COMPLETE CBC AUTOMATED: CPT

## 2018-08-02 PROCEDURE — 85651 RBC SED RATE NONAUTOMATED: CPT

## 2018-08-02 PROCEDURE — 82962 GLUCOSE BLOOD TEST: CPT

## 2018-08-02 PROCEDURE — 6370000000 HC RX 637 (ALT 250 FOR IP): Performed by: INTERNAL MEDICINE

## 2018-08-02 PROCEDURE — 6360000002 HC RX W HCPCS: Performed by: INTERNAL MEDICINE

## 2018-08-02 PROCEDURE — 87186 SC STD MICRODIL/AGAR DIL: CPT

## 2018-08-02 PROCEDURE — 87205 SMEAR GRAM STAIN: CPT

## 2018-08-02 RX ORDER — DOXYCYCLINE HYCLATE 100 MG/1
100 CAPSULE ORAL EVERY 12 HOURS SCHEDULED
Qty: 20 CAPSULE | Refills: 0 | Status: ON HOLD | OUTPATIENT
Start: 2018-08-02 | End: 2018-08-15 | Stop reason: HOSPADM

## 2018-08-02 RX ORDER — DOXYCYCLINE HYCLATE 100 MG/1
100 CAPSULE ORAL EVERY 12 HOURS SCHEDULED
Status: DISCONTINUED | OUTPATIENT
Start: 2018-08-02 | End: 2018-08-02 | Stop reason: HOSPADM

## 2018-08-02 RX ORDER — POTASSIUM BICARBONATE 25 MEQ/1
25 TABLET, EFFERVESCENT ORAL ONCE
Status: COMPLETED | OUTPATIENT
Start: 2018-08-02 | End: 2018-08-02

## 2018-08-02 RX ADMIN — TRIAMCINOLONE ACETONIDE: 1 CREAM TOPICAL at 00:04

## 2018-08-02 RX ADMIN — INSULIN LISPRO 1 UNITS: 100 INJECTION, SOLUTION INTRAVENOUS; SUBCUTANEOUS at 13:26

## 2018-08-02 RX ADMIN — Medication 10 ML: at 09:44

## 2018-08-02 RX ADMIN — POTASSIUM BICARBONATE 25 MEQ: 25 TABLET, EFFERVESCENT ORAL at 17:00

## 2018-08-02 RX ADMIN — BUSPIRONE HYDROCHLORIDE 7.5 MG: 5 TABLET ORAL at 09:44

## 2018-08-02 RX ADMIN — TRIAMCINOLONE ACETONIDE: 1 CREAM TOPICAL at 17:12

## 2018-08-02 RX ADMIN — CHOLECALCIFEROL TAB 50 MCG (2000 UNIT) 5000 UNITS: 50 TAB at 09:44

## 2018-08-02 RX ADMIN — FLUTICASONE PROPIONATE 2 SPRAY: 50 SPRAY, METERED NASAL at 09:45

## 2018-08-02 RX ADMIN — INSULIN LISPRO 1 UNITS: 100 INJECTION, SOLUTION INTRAVENOUS; SUBCUTANEOUS at 17:01

## 2018-08-02 RX ADMIN — TRIAMCINOLONE ACETONIDE: 1 CREAM TOPICAL at 09:45

## 2018-08-02 RX ADMIN — TAMSULOSIN HYDROCHLORIDE 0.4 MG: 0.4 CAPSULE ORAL at 17:00

## 2018-08-02 RX ADMIN — INSULIN LISPRO 1 UNITS: 100 INJECTION, SOLUTION INTRAVENOUS; SUBCUTANEOUS at 09:54

## 2018-08-02 RX ADMIN — ATORVASTATIN CALCIUM 20 MG: 20 TABLET, FILM COATED ORAL at 09:44

## 2018-08-02 RX ADMIN — PETROLATUM: 42 OINTMENT TOPICAL at 09:44

## 2018-08-02 RX ADMIN — ENOXAPARIN SODIUM 40 MG: 40 INJECTION, SOLUTION INTRAVENOUS; SUBCUTANEOUS at 09:43

## 2018-08-02 ASSESSMENT — PAIN SCALES - GENERAL
PAINLEVEL_OUTOF10: 0
PAINLEVEL_OUTOF10: 0

## 2018-08-02 NOTE — PROGRESS NOTES
regular. + murmurs appreciated. Abdomen: Positive bowel sounds to auscultation. Benign to palpation. Extremities: No clubbing, no cyanosis, no edema. Bilateral knee effusion. Both of them are slightly warm to the touch. I could not tensed range of motion. + overlying erythema. Rt ankle which is new. Legs are wrapped in Kerlix. They're both discolored.    Lines: peripheral    Laboratory and Tests Review:  Lab Results   Component Value Date    WBC 6.1 07/28/2018    WBC 6.7 07/27/2018    WBC 7.5 07/26/2018    HGB 9.7 (L) 07/28/2018    HCT 31.6 (L) 07/28/2018    MCV 82.1 07/28/2018     07/28/2018     Lab Results   Component Value Date    NEUTROABS 3.70 07/28/2018    NEUTROABS 5.10 07/26/2018    NEUTROABS 3.98 10/24/2016     Lab Results   Component Value Date    CRP 14.5 (H) 07/28/2018     No results found for: [facility]  Lab Results   Component Value Date    SEDRATE 68 (H) 07/28/2018     Lab Results   Component Value Date    ALT 13 07/29/2018    AST 16 07/29/2018     (H) 07/18/2018    ALKPHOS 181 (H) 07/29/2018    BILITOT 0.6 07/29/2018     Lab Results   Component Value Date     07/29/2018    K 3.7 07/29/2018     07/29/2018    CO2 25 07/29/2018    BUN 10 07/29/2018    CREATININE 0.7 07/29/2018    CREATININE 0.7 07/27/2018    CREATININE 0.8 07/26/2018    GFRAA >60 07/29/2018    LABGLOM >60 07/29/2018    GLUCOSE 98 07/29/2018    GLUCOSE 138 03/26/2011    PROT 5.9 07/29/2018    LABALBU 2.4 07/29/2018    LABALBU 4.0 03/26/2011    CALCIUM 8.1 07/29/2018    BILITOT 0.6 07/29/2018    ALKPHOS 181 07/29/2018    AST 16 07/29/2018    ALT 13 07/29/2018     Radiology:      Microbiology:   Urine culture 7/26/18 negative so far  Urine culture 7/27/18 pending  Blood culture 7/26/18 Staphylococcus capitis in clusters in 1 of 4 bottles MSSE  Wound culture leg 7/27/18 Staphylococcus aureus  Wound culture scrotum 7/27/18 Staphylococcus aureus  Wound culture leg/ankle 7/27/18 Staphylococcus

## 2018-08-02 NOTE — PROGRESS NOTES
20 mg at 08/01/18 9477    bisacodyl (DULCOLAX) EC tablet 5 mg, 5 mg, Oral, Daily PRN, Delfina King MD    busPIRone (BUSPAR) tablet 7.5 mg, 7.5 mg, Oral, Daily, Delfina King MD, 7.5 mg at 08/01/18 0903    vitamin D tablet 5,000 Units, 5,000 Units, Oral, Daily, Delfina King MD, 5,000 Units at 08/01/18 0903    fluticasone (FLONASE) 50 MCG/ACT nasal spray 2 spray, 2 spray, Nasal, Daily, Tomasa Gooden MD, 2 spray at 08/01/18 0905    latanoprost (XALATAN) 0.005 % ophthalmic solution 1 drop, 1 drop, Both Eyes, Nightly, Tomasa Gooden MD, 1 drop at 08/01/18 2055    magnesium hydroxide (MILK OF MAGNESIA) 400 MG/5ML suspension 30 mL, 30 mL, Oral, Daily PRN, Delfina King MD    tamsulosin (FLOMAX) capsule 0.4 mg, 0.4 mg, Oral, QPM, Tomasa Gooden MD, 0.4 mg at 08/01/18 1708    triamcinolone (KENALOG) 0.1 % cream, , Topical, Q8H, Tomasa Gooden MD    enoxaparin (LOVENOX) injection 40 mg, 40 mg, Subcutaneous, Daily, Tomasa Gooden MD, 40 mg at 08/01/18 0904    insulin lispro (HUMALOG) injection vial 0-6 Units, 0-6 Units, Subcutaneous, TID WC, Tomasa Gooden MD, 1 Units at 08/01/18 1626    glucose (GLUTOSE) 40 % oral gel 15 g, 15 g, Oral, PRN, Tomasa Gooden MD    dextrose 50 % solution 12.5 g, 12.5 g, Intravenous, PRN, Delfina King MD    glucagon (rDNA) injection 1 mg, 1 mg, Intramuscular, PRN, Tomasa Gooden MD    dextrose 5 % solution, 100 mL/hr, Intravenous, PRN, Delfina King MD    sodium chloride flush 0.9 % injection 10 mL, 10 mL, Intravenous, BID, Tomasa Gooden MD, 10 mL at 07/30/18 0948    sodium chloride flush 0.9 % injection 10 mL, 10 mL, Intravenous, PRN, Tomasa Gooden MD, 10 mL at 07/27/18 1500    mineral oil-hydrophilic petrolatum (AQUAPHOR) ointment, , Topical, BID, Delfina King MD    A/P:      Patient Active Problem List   Diagnosis    Cellulitis    Sepsis ([facility] [address]    Dementia    Metabolic encephalopathy    Diabetes mellitus type 2, uncontrolled ([facility] [address]    Hyperlipidemia LDL goal <100    Essential hypertension    Venous ulcer of left leg (HCC)    Non-pressure chronic ulcer of left lower leg with fat layer exposed (HCC)    Non-pressure chronic ulcer left lower leg, limited to breakdown skin (HCC)    Dehydration    Hypotension    [facility]        PLAN:we'll discharge when okay with everyone

## 2018-08-04 NOTE — DISCHARGE SUMMARY
Supplements (ARGINAID) PACK Take 4.5 g by mouth 2 times dailyHistorical Med      insulin detemir (LEVEMIR) 100 UNIT/ML injection vial Inject 10 Units into the skin nightlyHistorical Med      mirtazapine (REMERON) 15 MG tablet Take 7.5 mg by mouth dailyHistorical Med      atorvastatin (LIPITOR) 20 MG tablet Take 20 mg by mouth dailyHistorical Med      Fluticasone Propionate (FLONASE ALLERGY RELIEF NA) 50 mcg by Nasal route daily 2 SPRAYSHistorical Med      Cholecalciferol (VITAMIN D3) 5000 UNITS TABS Take 5,000 Units by mouth daily      glucose (GLUTOSE) 40 % GEL Take 15 g by mouth as needed, Disp-45 g, R-1      mineral oil-hydrophilic petrolatum (AQUAPHOR) ointment As directed, DC to SNF      tamsulosin (FLOMAX) 0.4 MG capsule Take 0.4 mg by mouth every evening Historical Med      latanoprost (XALATAN) 0.005 % ophthalmic solution Place 1 drop into both eyes nightly       acetaminophen (TYLENOL) 325 MG tablet Take 650 mg by mouth every 4 hours as needed. magnesium hydroxide (MILK OF MAGNESIA) 400 MG/5ML suspension Take 30 mLs by mouth daily as needed. bisacodyl (DULCOLAX) 5 MG EC tablet Take 5 mg by mouth daily as needed. busPIRone (BUSPAR) 7.5 MG tablet Take 7.5 mg by mouth daily. STOP taking these medications       ketoconazole (NIZORAL) 2 % shampoo Comments:   Reason for Stopping:         nystatin (MYCOSTATIN) POWD powder Comments:   Reason for Stopping:             Activity: activity as tolerated  Diet: tube feeds  Wound Care: keep wound clean and dry    Follow-up with dr in 2 days.   · Patient received 1 dose of Vancomycin on 7/28/18  · Continue wound care-start doxy  · CHECK STATUS OF AV  · FOLLOW UP IN OFFICE IN 1 MONTH  · BLOOD CULT IN 3 WEEKS    Signed:  Iker Cotto    8/4/2018  1:54 PM

## 2018-08-06 LAB
CULTURE SURGICAL: ABNORMAL
GRAM STAIN RESULT: ABNORMAL
ORGANISM: ABNORMAL
ORGANISM: ABNORMAL

## 2018-08-07 LAB
BLOOD CULTURE, ROUTINE: NORMAL
CULTURE, BLOOD 2: NORMAL
EKG ATRIAL RATE: 105 BPM
EKG P AXIS: -25 DEGREES
EKG P-R INTERVAL: 186 MS
EKG Q-T INTERVAL: 364 MS
EKG QRS DURATION: 84 MS
EKG QTC CALCULATION (BAZETT): 481 MS
EKG R AXIS: -32 DEGREES
EKG T AXIS: 59 DEGREES
EKG VENTRICULAR RATE: 105 BPM

## 2018-08-08 ENCOUNTER — HOSPITAL ENCOUNTER (OUTPATIENT)
Dept: WOUND CARE | Age: 78
Discharge: HOME | DRG: 854 | End: 2018-08-08
Payer: COMMERCIAL

## 2018-08-08 VITALS
SYSTOLIC BLOOD PRESSURE: 98 MMHG | TEMPERATURE: 99.2 F | HEART RATE: 84 BPM | DIASTOLIC BLOOD PRESSURE: 54 MMHG | WEIGHT: 238 LBS | HEIGHT: 72 IN | RESPIRATION RATE: 18 BRPM | BODY MASS INDEX: 32.23 KG/M2

## 2018-08-08 PROCEDURE — 11042 DBRDMT SUBQ TIS 1ST 20SQCM/<: CPT

## 2018-08-08 PROCEDURE — 0JBN0ZZ EXCISION OF RIGHT LOWER LEG SUBCUTANEOUS TISSUE AND FASCIA, OPEN APPROACH: ICD-10-PCS | Performed by: SURGERY

## 2018-08-08 PROCEDURE — 11045 DBRDMT SUBQ TISS EACH ADDL: CPT

## 2018-08-08 PROCEDURE — 0JBP0ZZ EXCISION OF LEFT LOWER LEG SUBCUTANEOUS TISSUE AND FASCIA, OPEN APPROACH: ICD-10-PCS | Performed by: SURGERY

## 2018-08-08 NOTE — PROGRESS NOTES
[facility] Follow-Up Progress Note    Lee Ann Boone  AGE: [de-identified] y.o. GENDER: male  : 1940    TODAY'S DATE:  2018    Subjective:    Lee Ann Boone is a [de-identified] y.o. male who presents today for follow-up examination and treatment of a delayed-healing wound(s), extensive bilaterlal lower extremity stasis wounds The patient denies any problems since last visit. Objective:    BP (!) 98/54   Pulse 84   Temp 99.2 °F (37.3 °C) (Oral)   Resp 18   Ht 6' (1.829 m)   Wt 238 lb (108 kg)   BMI 32.28 kg/m²     (Wound Reference Date is when first assessed. Measurements shown are from today's visit.)    Pressure Ulcer 16 Buttocks Right (Active)   Number of days: 854       Wound 16 Knee Medial;Right (Active)   Number of days: 859       Wound 16 Knee Lateral;Right (Active)   Number of days: 859       Wound 16 Ankle Right;Posterior (Active)   Number of days: 859       Wound 16 Ankle Lateral;Left (Active)   Number of days: 859       Wound 16 Ankle Left; Anterior (Active)   Number of days: 859       Wound 16 Knee Left;Lateral (Active)   Number of days: 859       Wound 16 Blister Ankle Lateral;Left (Active)   Number of days: 858       Wound 16 Ankle Posterior (Active)   Number of days: 854       Wound 16 Knee Posterior;Right (Active)   Number of days: 854       Wound 16 Knee Lateral;Outer;Right (Active)   Number of days: 854       Wound 16 Other (Comment) Right (Active)   Number of days: 854       Wound 16 Other (Comment) Posterior; Left (Active)   Number of days: 854       Wound //16 Other (Comment) Medial;Left (Active)   Number of days: 854       Wound 10/18/16 Skin tear Ankle Outer;Left (Active)   Number of days: 659       Wound 10/18/16 Skin tear Leg Outer;Left (Active)   Number of days: 659       Wound 10/19/16 Other (Comment) Foot Right (Active)   Number of days: 658       Wound 17 Venous ulcer Leg Left;Upper new wound, #1 venous wound left upper leg. date of onset May 2017 (Active)   Wound Type Wound 10/6/2017  9:21 AM   Wound Venous 10/6/2017  9:21 AM   Dressing Status Changed 10/20/2017  9:57 AM   Dressing Changed Changed/New 10/20/2017  9:57 AM   Dressing/Treatment Alginate 10/20/2017  9:57 AM   Wound Cleansed Rinsed/Irrigated with saline 10/20/2017  9:57 AM   Dressing Change Due 10/13/17 10/6/2017 10:23 AM   Wound Length (cm) 0 cm 10/25/2017  4:06 PM   Wound Width (cm) 0 cm 10/25/2017  4:06 PM   Wound Depth (cm)  0 10/25/2017  4:06 PM   Calculated Wound Size (cm^2) (l*w) 0 cm^2 10/25/2017  4:06 PM   Change in Wound Size % (l*w) 100 10/25/2017  4:06 PM   Wound Assessment Epithelialization 10/25/2017  4:06 PM   Drainage Amount Moderate 10/20/2017  8:52 AM   Drainage Description Serosanguinous; Yellow 10/20/2017  8:52 AM   Odor None 10/20/2017  8:52 AM   Susy-wound Assessment Red 10/25/2017  4:06 PM   Red%Wound Bed 100 10/6/2017  9:21 AM   Debridement per physician Full thickness 10/20/2017  9:03 AM   Time out Yes 10/20/2017  9:03 AM   Procedural Pain 1 10/20/2017  9:03 AM   Post procedural Pain 1 10/20/2017  9:03 AM   Number of days: 369       Wound 08/04/17 (Active)   Number of days: 369       Wound 09/08/17 Venous ulcer Ankle Left;Lateral Wound #4 venous ulcer acquired 9/8/2017 (Active)   Wound Type Wound 10/6/2017  9:08 AM   Wound Venous 10/6/2017  9:08 AM   Dressing Status Changed 9/29/2017 10:33 AM   Dressing Changed Changed/New 9/29/2017 10:33 AM   Dressing/Treatment Alginate 9/8/2017 10:14 AM   Wound Cleansed Rinsed/Irrigated with saline 9/29/2017 10:33 AM   Wound Length (cm) 0 cm 10/6/2017  9:21 AM   Wound Width (cm) 0 cm 10/6/2017  9:21 AM   Wound Depth (cm)  0 10/6/2017  9:21 AM   Calculated Wound Size (cm^2) (l*w) 0 cm^2 10/6/2017  9:21 AM   Change in Wound Size % (l*w) 100 10/6/2017  9:21 AM   Wound Assessment Pink 9/29/2017  9:36 AM   Drainage Amount Moderate 10/6/2017  9:08 AM   Drainage Description Serosanguinous 10/6/2017  9:08 AM   Odor None 10/6/2017  9:08 AM   Susy-wound Assessment Dry; Intact 9/29/2017  9:36 AM   [place]%Wound Bed 100 9/22/2017 10:04 AM   Red%Wound Bed 30 9/15/2017 10:12 AM   Yellow%Wound Bed 20 9/15/2017 10:12 AM   Debridement per physician Subcutaneous 9/29/2017 10:00 AM   Time out Yes 9/29/2017 10:00 AM   Procedural Pain 2 9/29/2017 10:00 AM   Post procedural Pain 1 9/29/2017 10:00 AM   Number of days: 334       Wound 09/29/17 Venous ulcer Leg Right;Lateral new wound, #5  right lateral leg. date of onset  sept. 29,2017 (Active)   Wound Type Wound 10/6/2017  9:08 AM   Wound Venous 10/6/2017  9:08 AM   Dressing Status Changed 10/6/2017 10:23 AM   Dressing Changed Changed/New 10/6/2017 10:23 AM   Dressing/Treatment Other (Comment) 10/6/2017 10:23 AM   Wound Cleansed Rinsed/Irrigated with saline 10/6/2017 10:23 AM   Dressing Change Due 10/13/17 10/6/2017 10:23 AM   Wound Length (cm) 0 cm 10/13/2017 10:00 AM   Wound Width (cm) 0 cm 10/13/2017 10:00 AM   Wound Depth (cm)  0 10/13/2017 10:00 AM   Calculated Wound Size (cm^2) (l*w) 0 cm^2 10/13/2017 10:00 AM   Change in Wound Size % (l*w) 100 10/13/2017 10:00 AM   Wound Assessment Red 10/6/2017  9:08 AM   Drainage Amount Small 10/6/2017  9:08 AM   Drainage Description Serosanguinous 10/6/2017  9:08 AM   Odor None 10/6/2017  9:08 AM   Susy-wound Assessment Purple; Intact;Dry 10/6/2017  9:08 AM   Red%Wound Bed 100 10/6/2017  9:08 AM   Yellow%Wound Bed 20 9/29/2017  9:33 AM   Debridement per physician Full thickness 10/6/2017  9:24 AM   Time out Yes 10/6/2017  9:24 AM   Procedural Pain 0 10/6/2017  9:24 AM   Post procedural Pain 1 9/29/2017 10:00 AM   Number of days: 313       Wound 05/25/18 Venous ulcer Pretibial Left wound #8 acquired 5/17/18 (Active)   Wound Image   7/11/2018 11:44 AM   Wound Type Wound 8/2/2018  8:55 AM   Wound Venous 7/11/2018 11:44 AM   Dressing Status Clean;Dry; Intact 8/2/2018  8:55 AM   Dressing Changed Changed/New 8/2/2018  6:45 AM (cm)  0 8/8/2018  1:41 PM   Calculated Wound Size (cm^2) (l*w) 0 cm^2 8/8/2018  1:41 PM   Change in Wound Size % (l*w) 100 8/8/2018  1:41 PM   Wound Assessment Dry;Brown 8/1/2018  8:45 PM   Drainage Amount None 8/1/2018  8:45 PM   Drainage Description Serosanguinous 7/11/2018 11:44 AM   Odor None 8/1/2018  8:45 PM   Susy-wound Assessment Fragile; Intact 8/1/2018  8:45 PM   Non-staged Wound Description Partial thickness 7/18/2018  2:00 PM   Red%Wound Bed 25 7/18/2018  2:00 PM   Black%Wound Bed 75 7/18/2018  2:00 PM   Culture Taken No 7/26/2018 10:52 PM   Debridement per physician None 7/25/2018  2:47 PM   Time out Yes 7/11/2018 11:48 AM   Procedural Pain 0 6/15/2018 11:50 AM   Number of days: 54       Wound 06/20/18 Abdomen #12  blocked (Acquired June 2018) (Active)   Wound Image   7/18/2018  2:00 PM   Wound Type Wound 8/2/2018  8:55 AM   Dressing Status Clean;Dry; Intact 7/27/2018  8:45 AM   Dressing Changed Changed/New 7/25/2018  2:53 PM   Dressing/Treatment Open to air 8/2/2018  8:55 AM   Wound Cleansed Rinsed/Irrigated with saline 7/26/2018 10:52 PM   Dressing Change Due 07/21/18 7/21/2018 11:03 AM   Wound Length (cm) 22.5 cm 8/8/2018  1:41 PM   Wound Width (cm) 10 cm 8/8/2018  1:41 PM   Wound Depth (cm)  0.1 8/8/2018  1:41 PM   Calculated Wound Size (cm^2) (l*w) 225 cm^2 8/8/2018  1:41 PM   Change in Wound Size % (l*w) -1025 8/8/2018  1:41 PM   Wound Assessment Pink;Red 8/8/2018  1:41 PM   Drainage Amount Moderate 8/8/2018  1:41 PM   Drainage Description Green; [place] 8/8/2018  1:41 PM   Odor None 8/8/2018  1:41 PM   Susy-wound Assessment Dry; Intact 8/8/2018  1:41 PM   Non-staged Wound Description Partial thickness 7/18/2018  2:00 PM   Red%Wound Bed 100 7/18/2018  2:00 PM   Culture Taken No 7/26/2018 10:52 PM   Debridement per physician None 7/25/2018  2:47 PM   Number of days: 49       Wound 07/11/18 Venous ulcer Leg Medial;Lower;Left;Posterior #13 acq: 7-11-18 (Active)   Wound Image   7/11/2018 11:44 AM   Wound Type 8/2/2018  8:55 AM   Wound Length (cm) 4 cm 8/1/2018  6:30 AM   Wound Width (cm) 4 cm 8/1/2018  6:30 AM   Wound Depth (cm)  0.2 8/1/2018  6:30 AM   Calculated Wound Size (cm^2) (l*w) 16 cm^2 8/1/2018  6:30 AM   Change in Wound Size % (l*w) 0 8/1/2018  6:30 AM   Wound Assessment Bleeding;Red 8/2/2018  6:45 AM   Drainage Amount Small 8/2/2018  6:45 AM   Drainage Description Serosanguinous 8/2/2018  6:45 AM   Odor None 8/2/2018  6:45 AM   Susy-wound Assessment Fragile;Painful;Red 8/2/2018  6:45 AM   Number of days: 11       Wound 08/08/18 Venous ulcer Leg Right;Proximal;Medial;Posterior #14 blocked  acq: 8-2-18 (Active)   Wound Image   8/8/2018  2:04 PM   Wound Length (cm) 3.5 cm 8/8/2018  2:04 PM   Wound Width (cm) 2.7 cm 8/8/2018  2:04 PM   Wound Depth (cm)  0.1 8/8/2018  2:04 PM   Calculated Wound Size (cm^2) (l*w) 9.45 cm^2 8/8/2018  2:04 PM   Wound Assessment Red 8/8/2018  2:04 PM   Drainage Amount Moderate 8/8/2018  2:04 PM   Drainage Description Serosanguinous 8/8/2018  2:04 PM   Odor None 8/8/2018  2:04 PM   Susy-wound Assessment Dry; Intact; Pink 8/8/2018  2:04 PM   Number of days: 0       Wound 08/08/18 Skin tear Ankle Right;Distal;Medial #15 acq: 8-2-18 (Active)   Wound Image   8/8/2018  2:08 PM   Wound Length (cm) 3 cm 8/8/2018  2:08 PM   Wound Width (cm) 3 cm 8/8/2018  2:08 PM   Wound Depth (cm)  0.1 8/8/2018  2:08 PM   Calculated Wound Size (cm^2) (l*w) 9 cm^2 8/8/2018  2:08 PM   Wound Assessment Red 8/8/2018  2:08 PM   Drainage Amount Moderate 8/8/2018  2:08 PM   Drainage Description Serosanguinous 8/8/2018  2:08 PM   Odor None 8/8/2018  2:08 PM   Susy-wound Assessment Dry; Intact 8/8/2018  2:08 PM   Number of days: 0       Wound 08/08/18 Skin tear Ankle Left;Medial #16 acq: 8-2-18 (Active)   Wound Image   8/8/2018  2:08 PM   Wound Length (cm) 3.5 cm 8/8/2018  2:08 PM   Wound Width (cm) 5 cm 8/8/2018  2:08 PM   Wound Depth (cm)  0.1 8/8/2018  2:08 PM   Calculated Wound Size (cm^2) (l*w) 17.5 cm^2 8/8/2018 were given. Dressing: xerofrorm gauze and unna boots applied Offloading. 4. Follow up by vascular surgey: 1 week. 4: FU tomorrow by dermatology for his abdominal wounds  Jethro Anders MD      [facility] Procedure Note    Linda Burgos  AGE: [de-identified] y.o. GENDER: male  : 1940    TODAY'S DATE:  2018    The patient was identified and the procedure was confirmed. Lidocaine gel soaked gauze was applied at beginning of wound evaluation. The multiple venous stasis wound bilateral lower extremities wound was excisionally debrided sharply of fibrotic, necrotic, and hyperkeratotic tissue, including a layer of surrounding healthy tissue using curette for a total surface area of < 20 cm2. The wound(s) was debrided down through and including subcutaneous tissue. 100% of the wound was debrided. There was minimal bleeding that was controlled with pressure. Patient tolerated procedure well and was given proper instruction. Jethro Anders, 2 [facility] Follow-Up Progress Note    Linda Burgos  AGE: [de-identified] y.o.    GENDER: male  : 1940    TODAY'S DATE:  2018

## 2018-08-11 ENCOUNTER — APPOINTMENT (OUTPATIENT)
Dept: GENERAL RADIOLOGY | Age: 78
DRG: 854 | End: 2018-08-11
Payer: COMMERCIAL

## 2018-08-11 ENCOUNTER — APPOINTMENT (OUTPATIENT)
Dept: CT IMAGING | Age: 78
DRG: 854 | End: 2018-08-11
Payer: COMMERCIAL

## 2018-08-11 ENCOUNTER — HOSPITAL ENCOUNTER (INPATIENT)
Age: 78
LOS: 4 days | Discharge: NURSING, REHAB OR LONG-TERM CARE | DRG: 854 | End: 2018-08-15
Attending: EMERGENCY MEDICINE | Admitting: INTERNAL MEDICINE
Payer: COMMERCIAL

## 2018-08-11 DIAGNOSIS — A04.72 CLOSTRIDIUM DIFFICILE DIARRHEA: Primary | ICD-10-CM

## 2018-08-11 DIAGNOSIS — A41.9 SEPTICEMIA (HCC): ICD-10-CM

## 2018-08-11 LAB
ACANTHOCYTES: ABNORMAL
ALBUMIN SERPL-MCNC: 3 G/DL (ref 3.5–5.2)
ALP BLD-CCNC: 142 U/L (ref 40–129)
ALT SERPL-CCNC: 48 U/L (ref 0–40)
ANION GAP SERPL CALCULATED.3IONS-SCNC: 13 MMOL/L (ref 7–16)
ANISOCYTOSIS: ABNORMAL
AST SERPL-CCNC: 79 U/L (ref 0–39)
BACTERIA: ABNORMAL /HPF
BASOPHILS ABSOLUTE: 0.06 E9/L (ref 0–0.2)
BASOPHILS RELATIVE PERCENT: 0.3 % (ref 0–2)
BILIRUB SERPL-MCNC: 0.3 MG/DL (ref 0–1.2)
BILIRUBIN URINE: NEGATIVE
BLOOD, URINE: ABNORMAL
BUN BLDV-MCNC: 31 MG/DL (ref 8–23)
BURR CELLS: ABNORMAL
CALCIUM SERPL-MCNC: 9.2 MG/DL (ref 8.6–10.2)
CHLORIDE BLD-SCNC: 95 MMOL/L (ref 98–107)
CLARITY: CLEAR
CO2: 25 MMOL/L (ref 22–29)
COLOR: YELLOW
CREAT SERPL-MCNC: 0.7 MG/DL (ref 0.7–1.2)
EKG ATRIAL RATE: 118 BPM
EKG P AXIS: 43 DEGREES
EKG P-R INTERVAL: 182 MS
EKG Q-T INTERVAL: 312 MS
EKG QRS DURATION: 70 MS
EKG QTC CALCULATION (BAZETT): 437 MS
EKG R AXIS: -13 DEGREES
EKG T AXIS: 5 DEGREES
EKG VENTRICULAR RATE: 118 BPM
EOSINOPHILS ABSOLUTE: 0.27 E9/L (ref 0.05–0.5)
EOSINOPHILS RELATIVE PERCENT: 1.2 % (ref 0–6)
GFR AFRICAN AMERICAN: >60
GFR NON-AFRICAN AMERICAN: >60 ML/MIN/1.73
GLUCOSE BLD-MCNC: 151 MG/DL (ref 74–109)
GLUCOSE URINE: NEGATIVE MG/DL
HCT VFR BLD CALC: 39.7 % (ref 37–54)
HEMOGLOBIN: 11.9 G/DL (ref 12.5–16.5)
IMMATURE GRANULOCYTES #: 0.14 E9/L
IMMATURE GRANULOCYTES %: 0.6 % (ref 0–5)
KETONES, URINE: NEGATIVE MG/DL
LACTIC ACID, SEPSIS: 3.4 MMOL/L (ref 0.5–1.9)
LACTIC ACID, SEPSIS: 3.7 MMOL/L (ref 0.5–1.9)
LEUKOCYTE ESTERASE, URINE: ABNORMAL
LYMPHOCYTES ABSOLUTE: 1.06 E9/L (ref 1.5–4)
LYMPHOCYTES RELATIVE PERCENT: 4.7 % (ref 20–42)
MCH RBC QN AUTO: 24.8 PG (ref 26–35)
MCHC RBC AUTO-ENTMCNC: 30 % (ref 32–34.5)
MCV RBC AUTO: 82.7 FL (ref 80–99.9)
MONOCYTES ABSOLUTE: 0.92 E9/L (ref 0.1–0.95)
MONOCYTES RELATIVE PERCENT: 4.1 % (ref 2–12)
NEUTROPHILS ABSOLUTE: 20.18 E9/L (ref 1.8–7.3)
NEUTROPHILS RELATIVE PERCENT: 89.1 % (ref 43–80)
NITRITE, URINE: NEGATIVE
OVALOCYTES: ABNORMAL
PDW BLD-RTO: 18.6 FL (ref 11.5–15)
PH UA: 5 (ref 5–9)
PLATELET # BLD: 572 E9/L (ref 130–450)
PMV BLD AUTO: 11.3 FL (ref 7–12)
POIKILOCYTES: ABNORMAL
POLYCHROMASIA: ABNORMAL
POTASSIUM SERPL-SCNC: 6.1 MMOL/L (ref 3.5–5)
PROTEIN UA: 30 MG/DL
RBC # BLD: 4.8 E12/L (ref 3.8–5.8)
RBC UA: ABNORMAL /HPF (ref 0–2)
RENAL EPITHELIAL, UA: ABNORMAL /HPF
SODIUM BLD-SCNC: 133 MMOL/L (ref 132–146)
SPECIFIC GRAVITY UA: 1.01 (ref 1–1.03)
TOTAL PROTEIN: 8.1 G/DL (ref 6.4–8.3)
UROBILINOGEN, URINE: 0.2 E.U./DL
WBC # BLD: 22.6 E9/L (ref 4.5–11.5)
WBC UA: ABNORMAL /HPF (ref 0–5)

## 2018-08-11 PROCEDURE — 87070 CULTURE OTHR SPECIMN AEROBIC: CPT

## 2018-08-11 PROCEDURE — 87324 CLOSTRIDIUM AG IA: CPT

## 2018-08-11 PROCEDURE — 6360000004 HC RX CONTRAST MEDICATION: Performed by: RADIOLOGY

## 2018-08-11 PROCEDURE — 87081 CULTURE SCREEN ONLY: CPT

## 2018-08-11 PROCEDURE — 2580000003 HC RX 258: Performed by: STUDENT IN AN ORGANIZED HEALTH CARE EDUCATION/TRAINING PROGRAM

## 2018-08-11 PROCEDURE — 87186 SC STD MICRODIL/AGAR DIL: CPT

## 2018-08-11 PROCEDURE — 94761 N-INVAS EAR/PLS OXIMETRY MLT: CPT

## 2018-08-11 PROCEDURE — 99285 EMERGENCY DEPT VISIT HI MDM: CPT

## 2018-08-11 PROCEDURE — 83605 ASSAY OF LACTIC ACID: CPT

## 2018-08-11 PROCEDURE — 2580000003 HC RX 258: Performed by: EMERGENCY MEDICINE

## 2018-08-11 PROCEDURE — 2580000003 HC RX 258: Performed by: INTERNAL MEDICINE

## 2018-08-11 PROCEDURE — 6360000002 HC RX W HCPCS: Performed by: INTERNAL MEDICINE

## 2018-08-11 PROCEDURE — 6370000000 HC RX 637 (ALT 250 FOR IP): Performed by: INTERNAL MEDICINE

## 2018-08-11 PROCEDURE — 2060000000 HC ICU INTERMEDIATE R&B

## 2018-08-11 PROCEDURE — 6360000002 HC RX W HCPCS: Performed by: STUDENT IN AN ORGANIZED HEALTH CARE EDUCATION/TRAINING PROGRAM

## 2018-08-11 PROCEDURE — 74177 CT ABD & PELVIS W/CONTRAST: CPT

## 2018-08-11 PROCEDURE — 93005 ELECTROCARDIOGRAM TRACING: CPT | Performed by: STUDENT IN AN ORGANIZED HEALTH CARE EDUCATION/TRAINING PROGRAM

## 2018-08-11 PROCEDURE — 6370000000 HC RX 637 (ALT 250 FOR IP): Performed by: SPECIALIST

## 2018-08-11 PROCEDURE — 96374 THER/PROPH/DIAG INJ IV PUSH: CPT

## 2018-08-11 PROCEDURE — 2500000003 HC RX 250 WO HCPCS: Performed by: INTERNAL MEDICINE

## 2018-08-11 PROCEDURE — 85025 COMPLETE CBC W/AUTO DIFF WBC: CPT

## 2018-08-11 PROCEDURE — 87040 BLOOD CULTURE FOR BACTERIA: CPT

## 2018-08-11 PROCEDURE — 81001 URINALYSIS AUTO W/SCOPE: CPT

## 2018-08-11 PROCEDURE — 71045 X-RAY EXAM CHEST 1 VIEW: CPT

## 2018-08-11 PROCEDURE — 80053 COMPREHEN METABOLIC PANEL: CPT

## 2018-08-11 RX ORDER — ATORVASTATIN CALCIUM 20 MG/1
20 TABLET, FILM COATED ORAL DAILY
Status: DISCONTINUED | OUTPATIENT
Start: 2018-08-11 | End: 2018-08-15 | Stop reason: HOSPADM

## 2018-08-11 RX ORDER — ACETAMINOPHEN 325 MG/1
650 TABLET ORAL EVERY 4 HOURS PRN
Status: DISCONTINUED | OUTPATIENT
Start: 2018-08-11 | End: 2018-08-11 | Stop reason: SDUPTHER

## 2018-08-11 RX ORDER — SODIUM CHLORIDE 0.9 % (FLUSH) 0.9 %
10 SYRINGE (ML) INJECTION EVERY 12 HOURS SCHEDULED
Status: DISCONTINUED | OUTPATIENT
Start: 2018-08-11 | End: 2018-08-11 | Stop reason: SDUPTHER

## 2018-08-11 RX ORDER — LATANOPROST 50 UG/ML
1 SOLUTION/ DROPS OPHTHALMIC NIGHTLY
Status: DISCONTINUED | OUTPATIENT
Start: 2018-08-11 | End: 2018-08-15 | Stop reason: HOSPADM

## 2018-08-11 RX ORDER — SODIUM CHLORIDE 0.9 % (FLUSH) 0.9 %
10 SYRINGE (ML) INJECTION PRN
Status: DISCONTINUED | OUTPATIENT
Start: 2018-08-11 | End: 2018-08-11 | Stop reason: SDUPTHER

## 2018-08-11 RX ORDER — ALBUTEROL SULFATE 2.5 MG/3ML
2.5 SOLUTION RESPIRATORY (INHALATION) EVERY 6 HOURS PRN
Status: DISCONTINUED | OUTPATIENT
Start: 2018-08-11 | End: 2018-08-15 | Stop reason: HOSPADM

## 2018-08-11 RX ORDER — ONDANSETRON 2 MG/ML
4 INJECTION INTRAMUSCULAR; INTRAVENOUS EVERY 6 HOURS PRN
Status: DISCONTINUED | OUTPATIENT
Start: 2018-08-11 | End: 2018-08-15 | Stop reason: HOSPADM

## 2018-08-11 RX ORDER — BUSPIRONE HYDROCHLORIDE 5 MG/1
7.5 TABLET ORAL DAILY
Status: DISCONTINUED | OUTPATIENT
Start: 2018-08-11 | End: 2018-08-15 | Stop reason: HOSPADM

## 2018-08-11 RX ORDER — ACETAMINOPHEN 325 MG/1
650 TABLET ORAL EVERY 4 HOURS PRN
Status: DISCONTINUED | OUTPATIENT
Start: 2018-08-11 | End: 2018-08-15 | Stop reason: HOSPADM

## 2018-08-11 RX ORDER — TAMSULOSIN HYDROCHLORIDE 0.4 MG/1
0.4 CAPSULE ORAL EVERY EVENING
Status: DISCONTINUED | OUTPATIENT
Start: 2018-08-11 | End: 2018-08-15 | Stop reason: HOSPADM

## 2018-08-11 RX ORDER — SODIUM CHLORIDE 0.9 % (FLUSH) 0.9 %
10 SYRINGE (ML) INJECTION PRN
Status: DISCONTINUED | OUTPATIENT
Start: 2018-08-11 | End: 2018-08-15 | Stop reason: HOSPADM

## 2018-08-11 RX ORDER — MIRTAZAPINE 15 MG/1
7.5 TABLET, FILM COATED ORAL DAILY
Status: DISCONTINUED | OUTPATIENT
Start: 2018-08-11 | End: 2018-08-15 | Stop reason: HOSPADM

## 2018-08-11 RX ORDER — CHOLECALCIFEROL (VITAMIN D3) 50 MCG
5000 TABLET ORAL DAILY
Status: DISCONTINUED | OUTPATIENT
Start: 2018-08-11 | End: 2018-08-15 | Stop reason: HOSPADM

## 2018-08-11 RX ORDER — SODIUM CHLORIDE 9 MG/ML
INJECTION, SOLUTION INTRAVENOUS ONCE
Status: COMPLETED | OUTPATIENT
Start: 2018-08-11 | End: 2018-08-11

## 2018-08-11 RX ORDER — ONDANSETRON 2 MG/ML
4 INJECTION INTRAMUSCULAR; INTRAVENOUS EVERY 8 HOURS PRN
Status: DISCONTINUED | OUTPATIENT
Start: 2018-08-11 | End: 2018-08-15 | Stop reason: HOSPADM

## 2018-08-11 RX ORDER — ONDANSETRON 2 MG/ML
4 INJECTION INTRAMUSCULAR; INTRAVENOUS ONCE
Status: COMPLETED | OUTPATIENT
Start: 2018-08-11 | End: 2018-08-11

## 2018-08-11 RX ORDER — 0.9 % SODIUM CHLORIDE 0.9 %
1000 INTRAVENOUS SOLUTION INTRAVENOUS ONCE
Status: COMPLETED | OUTPATIENT
Start: 2018-08-11 | End: 2018-08-11

## 2018-08-11 RX ORDER — SODIUM CHLORIDE 0.9 % (FLUSH) 0.9 %
10 SYRINGE (ML) INJECTION EVERY 12 HOURS SCHEDULED
Status: DISCONTINUED | OUTPATIENT
Start: 2018-08-11 | End: 2018-08-15 | Stop reason: HOSPADM

## 2018-08-11 RX ADMIN — IOPAMIDOL 110 ML: 755 INJECTION, SOLUTION INTRAVENOUS at 15:28

## 2018-08-11 RX ADMIN — Medication 5000 UNITS: at 21:28

## 2018-08-11 RX ADMIN — Medication 250 MG: at 21:31

## 2018-08-11 RX ADMIN — ATORVASTATIN CALCIUM 20 MG: 20 TABLET, FILM COATED ORAL at 21:29

## 2018-08-11 RX ADMIN — ONDANSETRON HYDROCHLORIDE 4 MG: 2 INJECTION, SOLUTION INTRAMUSCULAR; INTRAVENOUS at 16:03

## 2018-08-11 RX ADMIN — PETROLATUM: 42 OINTMENT TOPICAL at 21:27

## 2018-08-11 RX ADMIN — TAMSULOSIN HYDROCHLORIDE 0.4 MG: 0.4 CAPSULE ORAL at 21:30

## 2018-08-11 RX ADMIN — SODIUM CHLORIDE 1000 ML: 9 INJECTION, SOLUTION INTRAVENOUS at 16:03

## 2018-08-11 RX ADMIN — SODIUM CHLORIDE: 9 INJECTION, SOLUTION INTRAVENOUS at 14:48

## 2018-08-11 RX ADMIN — MICONAZOLE NITRATE: 20 POWDER TOPICAL at 21:27

## 2018-08-11 RX ADMIN — MIRTAZAPINE 7.5 MG: 15 TABLET, FILM COATED ORAL at 21:28

## 2018-08-11 RX ADMIN — ENOXAPARIN SODIUM 40 MG: 40 INJECTION, SOLUTION INTRAVENOUS; SUBCUTANEOUS at 21:27

## 2018-08-11 RX ADMIN — Medication 10 ML: at 21:26

## 2018-08-11 RX ADMIN — BUSPIRONE HYDROCHLORIDE 7.5 MG: 5 TABLET ORAL at 21:27

## 2018-08-11 RX ADMIN — LATANOPROST 1 DROP: 50 SOLUTION OPHTHALMIC at 21:27

## 2018-08-11 ASSESSMENT — ENCOUNTER SYMPTOMS
ABDOMINAL PAIN: 0
NAUSEA: 1
BLOOD IN STOOL: 0
COUGH: 0
DIARRHEA: 1
CONSTIPATION: 0
VOMITING: 1

## 2018-08-11 ASSESSMENT — PAIN SCALES - GENERAL: PAINLEVEL_OUTOF10: 0

## 2018-08-11 NOTE — ED PROVIDER NOTES
CREATININE 0.7 0.7 - 1.2 mg/dL    GFR Non-African American >60 >=60 mL/min/1.73    GFR African American >60     Calcium 9.2 8.6 - 10.2 mg/dL    Total Protein 8.1 6.4 - 8.3 g/dL    Alb 3.0 (L) 3.5 - 5.2 g/dL    Total Bilirubin 0.3 0.0 - 1.2 mg/dL    Alkaline Phosphatase 142 (H) 40 - 129 U/L    ALT 48 (H) 0 - 40 U/L    AST 79 (H) 0 - 39 U/L   Urinalysis   Result Value Ref Range    Color, UA Yellow Straw/Yellow    Clarity, UA Clear Clear    Glucose, Ur Negative Negative mg/dL    Bilirubin Urine Negative Negative    Ketones, Urine Negative Negative mg/dL    Specific Gravity, UA 1.015 1.005 - 1.030    Blood, Urine MODERATE (A) Negative    pH, UA 5.0 5.0 - 9.0    Protein, UA 30 (A) Negative mg/dL    Urobilinogen, Urine 0.2 <2.0 E.U./dL    Nitrite, Urine Negative Negative    Leukocyte Esterase, Urine MODERATE (A) Negative   Microscopic Urinalysis   Result Value Ref Range    WBC, UA 10-20 (A) 0 - 5 /HPF    RBC, UA 2-5 0 - 2 /HPF    Renal Epithelial, Urine RARE /HPF    Bacteria, UA MODERATE (A) /HPF   EKG 12 Lead   Result Value Ref Range    Ventricular Rate 118 BPM    Atrial Rate 118 BPM    P-R Interval 182 ms    QRS Duration 70 ms    Q-T Interval 312 ms    QTc Calculation (Bazett) 437 ms    P Axis 43 degrees    R Axis -13 degrees    T Axis 5 degrees       RADIOLOGY:  CT ABDOMEN PELVIS W IV CONTRAST Additional Contrast? None   Final Result   1. Fluid contents in the colon correlates with diarrhea. 2. PEG catheter in proper position. Air distention with some   dilatation of the stomach. See above comments and recommendations. 3. No acute inflammatory changes in the omental mesenteric fat planes,   free intraperitoneal air, ascites. 4. Uncomplicated diverticulosis of the sigmoid colon. XR CHEST PORTABLE   Final Result   No acute cardiopulmonary process. EKG: This EKG is signed and interpreted by me.     Rate: 118  Rhythm: Sinus  Interpretation: sinus tachycardia  Comparison: stable as compared to patient's most recent EKG      ------------------------- NURSING NOTES AND VITALS REVIEWED ---------------------------  Date / Time Roomed:  8/11/2018  1:59 PM  ED Bed Assignment:  CAROLYN/CAROLYN    The nursing notes within the ED encounter and vital signs as below have been reviewed. Patient Vitals for the past 24 hrs:   BP Temp Temp src Pulse Resp SpO2 Height Weight   08/11/18 1725 104/81 97.8 °F (36.6 °C) Oral 115 14 94 % - -   08/11/18 1630 112/76 - - 119 - 94 % - -   08/11/18 1615 100/65 - - 122 - 94 % - -   08/11/18 1408 94/63 97.9 °F (36.6 °C) Oral 113 18 94 % 6' (1.829 m) 238 lb (108 kg)       Oxygen Saturation Interpretation: Normal    ------------------------------------------ PROGRESS NOTES ------------------------------------------  Re-evaluation(s):  Time: 1600  Patients symptoms show no change  Repeat physical examination is not changed    Counseling:  I have spoken with the patient and discussed todays results, in addition to providing specific details for the plan of care and counseling regarding the diagnosis and prognosis. Their questions are answered at this time and they are agreeable with the plan of admission.    --------------------------------- ADDITIONAL PROVIDER NOTES ---------------------------------  Consultations:  Time: 1700. Spoke with Dr. Chavo Weldon. Discussed case. They will admit the patient. This patient's ED course included: a personal history and physicial examination, re-evaluation prior to disposition, multiple bedside re-evaluations, IV medications, cardiac monitoring and continuous pulse oximetry    This patient has remained hemodynamically stable during their ED course. Diagnosis:  1. Clostridium difficile diarrhea    2. Septicemia ([facility] [address]        Disposition:  Patient's disposition: Admit to telemetry  Patient's condition is stable.          Richard Ta., DO  Resident  08/11/18 [id number]

## 2018-08-11 NOTE — ED NOTES
mellitus ([facility] [address] Enlarged prostate; Hyperlipidemia; Hypertension; Muscle weakness; and [facility] Past Surgical History:  has a past surgical history that includes fracture surgery; doppler echocardiography (N/A); Upper gastrointestinal endoscopy (N/A, 7/29/2018); and pr egd percutaneous placement gastrostomy tube (N/A, 7/31/2018). Social History:  reports that he quit smoking about 12 years ago. His smoking use included Cigarettes. He started smoking about 63 years ago. He has a 100.00 pack-year smoking history. He has never used smokeless tobacco. He reports that he does not drink alcohol or use drugs. Family History: family history includes Cancer in his father and sister; Diabetes in his mother; Kidney Disease in his mother. The patients home medications have been reviewed. Allergies: Levaquin [levofloxacin]  --------------------------------------------------------------------------------------------------------------  Nursing notes and vital signs reviewed           On My Exam:    Constitutional/General: Alert and oriented x3  Head: Normocephalic and atraumatic  Eyes: PERRL, EOMI, conjunctiva normal, sclera non icteric  Mouth: Oropharynx clear, handling secretions, no trismus, no asymmetry of the posterior oropharynx or uvular edema  Neck: Supple, full ROM, no stridor, no meningeal signs  Respiratory: Lungs clear to auscultation bilaterally, no wheezes, rales, or rhonchi. Not in respiratory distress  Cardiovascular:  Tachycardic but Regular rhythm. No murmurs, no aortic murmurs, no gallops, or rubs. 2+ distal pulses. Equal extremity pulses. Chest: No chest wall tenderness  GI:  Abdomen Soft, Non tender, Non distended. +BS. No rebound, guarding, or rigidity. No pulsatile masses. G tube in place. 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 Warm and well perfused, no clubbing, cyanosis, or edema.  Capillary refill <3 seconds  Integument: skin warm and dry. No rashes. Neurologic: GCS 15, no focal deficits, symmetric strength 5/5 in the upper and lower extremities bilaterally  Psychiatric: Normal Affect        Medical Decision Making:  Sepsis secondary to likely Cdiff. Elevated wbc, foul smelling watery diarrhea, recent antibiotics, needs admission    Improving with IVF  Bryant covering Berkley to admit      DIAGNOSIS:  1. Clostridium difficile diarrhea    2.  Septicemia [facility])                         Edgardo Patiño MD  08/11/18 7099

## 2018-08-11 NOTE — ED NOTES
PT has multiple documented open wounds prior to arriving at emergency dept.      Patricio Yao RN  08/11/18 2429

## 2018-08-12 LAB
ALBUMIN SERPL-MCNC: 2.8 G/DL (ref 3.5–5.2)
ALP BLD-CCNC: 113 U/L (ref 40–129)
ALT SERPL-CCNC: 42 U/L (ref 0–40)
ANION GAP SERPL CALCULATED.3IONS-SCNC: 14 MMOL/L (ref 7–16)
AST SERPL-CCNC: 56 U/L (ref 0–39)
BASOPHILS ABSOLUTE: 0.03 E9/L (ref 0–0.2)
BASOPHILS RELATIVE PERCENT: 0.3 % (ref 0–2)
BILIRUB SERPL-MCNC: 0.3 MG/DL (ref 0–1.2)
BUN BLDV-MCNC: 33 MG/DL (ref 8–23)
C DIFFICILE TOXIN, EIA: NORMAL
C-REACTIVE PROTEIN: 7.3 MG/DL (ref 0–0.4)
CALCIUM SERPL-MCNC: 8.6 MG/DL (ref 8.6–10.2)
CHLORIDE BLD-SCNC: 102 MMOL/L (ref 98–107)
CO2: 23 MMOL/L (ref 22–29)
CREAT SERPL-MCNC: 0.8 MG/DL (ref 0.7–1.2)
EOSINOPHILS ABSOLUTE: 0.17 E9/L (ref 0.05–0.5)
EOSINOPHILS RELATIVE PERCENT: 1.9 % (ref 0–6)
GFR AFRICAN AMERICAN: >60
GFR NON-AFRICAN AMERICAN: >60 ML/MIN/1.73
GLUCOSE BLD-MCNC: 125 MG/DL (ref 74–109)
HCT VFR BLD CALC: 31 % (ref 37–54)
HEMOGLOBIN: 9.5 G/DL (ref 12.5–16.5)
IMMATURE GRANULOCYTES #: 0.05 E9/L
IMMATURE GRANULOCYTES %: 0.6 % (ref 0–5)
LACTIC ACID: 1.6 MMOL/L (ref 0.5–2.2)
LYMPHOCYTES ABSOLUTE: 1.09 E9/L (ref 1.5–4)
LYMPHOCYTES RELATIVE PERCENT: 12.3 % (ref 20–42)
MCH RBC QN AUTO: 25.1 PG (ref 26–35)
MCHC RBC AUTO-ENTMCNC: 30.6 % (ref 32–34.5)
MCV RBC AUTO: 81.8 FL (ref 80–99.9)
MONOCYTES ABSOLUTE: 0.66 E9/L (ref 0.1–0.95)
MONOCYTES RELATIVE PERCENT: 7.5 % (ref 2–12)
NEUTROPHILS ABSOLUTE: 6.84 E9/L (ref 1.8–7.3)
NEUTROPHILS RELATIVE PERCENT: 77.4 % (ref 43–80)
PDW BLD-RTO: 18 FL (ref 11.5–15)
PLATELET # BLD: 574 E9/L (ref 130–450)
PMV BLD AUTO: 9.2 FL (ref 7–12)
POTASSIUM SERPL-SCNC: 4.8 MMOL/L (ref 3.5–5)
RBC # BLD: 3.79 E12/L (ref 3.8–5.8)
SEDIMENTATION RATE, ERYTHROCYTE: 111 MM/HR (ref 0–15)
SODIUM BLD-SCNC: 139 MMOL/L (ref 132–146)
TOTAL PROTEIN: 7.2 G/DL (ref 6.4–8.3)
WBC # BLD: 8.8 E9/L (ref 4.5–11.5)

## 2018-08-12 PROCEDURE — 6360000002 HC RX W HCPCS: Performed by: INTERNAL MEDICINE

## 2018-08-12 PROCEDURE — 85651 RBC SED RATE NONAUTOMATED: CPT

## 2018-08-12 PROCEDURE — 86140 C-REACTIVE PROTEIN: CPT

## 2018-08-12 PROCEDURE — 6370000000 HC RX 637 (ALT 250 FOR IP): Performed by: INTERNAL MEDICINE

## 2018-08-12 PROCEDURE — 80053 COMPREHEN METABOLIC PANEL: CPT

## 2018-08-12 PROCEDURE — 2580000003 HC RX 258: Performed by: INTERNAL MEDICINE

## 2018-08-12 PROCEDURE — 36415 COLL VENOUS BLD VENIPUNCTURE: CPT

## 2018-08-12 PROCEDURE — 2060000000 HC ICU INTERMEDIATE R&B

## 2018-08-12 PROCEDURE — 83605 ASSAY OF LACTIC ACID: CPT

## 2018-08-12 PROCEDURE — 85025 COMPLETE CBC W/AUTO DIFF WBC: CPT

## 2018-08-12 RX ADMIN — Medication 5000 UNITS: at 09:00

## 2018-08-12 RX ADMIN — Medication 250 MG: at 17:12

## 2018-08-12 RX ADMIN — MICONAZOLE NITRATE: 20 POWDER TOPICAL at 09:00

## 2018-08-12 RX ADMIN — TAMSULOSIN HYDROCHLORIDE 0.4 MG: 0.4 CAPSULE ORAL at 17:12

## 2018-08-12 RX ADMIN — ATORVASTATIN CALCIUM 20 MG: 20 TABLET, FILM COATED ORAL at 08:59

## 2018-08-12 RX ADMIN — MIRTAZAPINE 7.5 MG: 15 TABLET, FILM COATED ORAL at 09:00

## 2018-08-12 RX ADMIN — Medication 250 MG: at 12:37

## 2018-08-12 RX ADMIN — BUSPIRONE HYDROCHLORIDE 7.5 MG: 5 TABLET ORAL at 09:00

## 2018-08-12 RX ADMIN — LATANOPROST 1 DROP: 50 SOLUTION OPHTHALMIC at 21:47

## 2018-08-12 RX ADMIN — ONDANSETRON 4 MG: 2 INJECTION INTRAMUSCULAR; INTRAVENOUS at 10:53

## 2018-08-12 RX ADMIN — Medication 250 MG: at 05:42

## 2018-08-12 RX ADMIN — ENOXAPARIN SODIUM 40 MG: 40 INJECTION, SOLUTION INTRAVENOUS; SUBCUTANEOUS at 08:59

## 2018-08-12 RX ADMIN — Medication 250 MG: at 00:19

## 2018-08-12 RX ADMIN — Medication 10 ML: at 21:47

## 2018-08-12 RX ADMIN — ACETAMINOPHEN 650 MG: 325 TABLET ORAL at 23:29

## 2018-08-12 RX ADMIN — MICONAZOLE NITRATE: 20 POWDER TOPICAL at 21:46

## 2018-08-12 RX ADMIN — Medication 250 MG: at 23:35

## 2018-08-12 ASSESSMENT — PAIN SCALES - GENERAL
PAINLEVEL_OUTOF10: 0
PAINLEVEL_OUTOF10: 0
PAINLEVEL_OUTOF10: 4
PAINLEVEL_OUTOF10: 0

## 2018-08-12 ASSESSMENT — PAIN DESCRIPTION - DESCRIPTORS: DESCRIPTORS: HEADACHE

## 2018-08-12 ASSESSMENT — PAIN DESCRIPTION - PAIN TYPE: TYPE: ACUTE PAIN

## 2018-08-12 ASSESSMENT — PAIN DESCRIPTION - LOCATION: LOCATION: HEAD

## 2018-08-12 NOTE — CONSULTS
Packs/day: 2.00     Years: 50.00     Types: Cigarettes     Start date: 3/30/1955     Quit date: 3/30/2006    Smokeless tobacco: Never Used    Alcohol use No    Drug use: No    Sexual activity: Not Asked     Other Topics Concern    None     Social History Narrative    None      Family History:   Family History   Problem Relation Age of Onset    Diabetes Mother     Kidney Disease Mother     Cancer Father         esophagus    Cancer Sister    . Otherwise non-pertinent to the chief complaint. REVIEW OF SYSTEMS:    Constitutional: Negative for fevers, chills, diaphoresis  Neurologic: Negative   Psychiatric: Negative  Rheumatologic: Negative   Endocrine: Negative  Hematologic: Negative  Immunologic: Negative  ENT: Negative  Respiratory: Negative   Cardiovascular: Negative  GI: As in the HPI  : Negative  Musculoskeletal: Negative  Skin: Bullous pemphigoid    PHYSICAL EXAM:    Vitals:   /62   Pulse 70   Temp 97.1 °F (36.2 °C) (Oral)   Resp 17   Ht 6' (1.829 m)   Wt 223 lb 3.2 oz (101.2 kg)   SpO2 97%   BMI 30.27 kg/m²   Constitutional: The patient is awake, alert, and oriented. Son present. Skin: Warm and dry. Multiple, flat, hypopigmented lesions noted on trunk and lower extremities, compatible with previous unroofed blisters from bullous pemphigoid. Both legs are wrapped in Ace. No lymphangitic streaks or cellulitis noted. HEENT: Eyes show round, and reactive pupils. No jaundice. Moist mucous membranes, no ulcerations, no thrush. Neck: Supple to movements. No lymphadenopathy. Chest: No use of accessory muscles to breathe. Symmetrical expansion. Auscultation reveals no wheezing, crackles, or rhonchi. Cardiovascular: S1 and S2 are rhythmic and regular. No murmurs appreciated. Abdomen: Positive bowel sounds to auscultation. Benign to palpation. No masses felt. No hepatosplenomegaly. PEG in place. Extremities: No clubbing, no cyanosis, no edema. See above.   Lines:

## 2018-08-12 NOTE — PROGRESS NOTES
Pt poor historian, unable to answer questions about his condition. Spoke with Alba Nuñez from [facility] where patient resides. She was able to inform me that patient was receiving TF due to poor oral intake. Wounds on body were due to \"bullous pemphigoid\" which started as blisters and once popped, bled and turned into the wounds seen on patient. Patient is a diabetic.   Dennis Lopez

## 2018-08-12 NOTE — PROGRESS NOTES
Spoke to RN at [employer] regarding diet and peg tube feeding orders, Dr. Mana Hanna updated.

## 2018-08-13 LAB
METER GLUCOSE: 110 MG/DL (ref 70–110)
METER GLUCOSE: 98 MG/DL (ref 70–110)
ORGANISM: ABNORMAL

## 2018-08-13 PROCEDURE — 87186 SC STD MICRODIL/AGAR DIL: CPT

## 2018-08-13 PROCEDURE — 82962 GLUCOSE BLOOD TEST: CPT

## 2018-08-13 PROCEDURE — 2580000003 HC RX 258: Performed by: INTERNAL MEDICINE

## 2018-08-13 PROCEDURE — 6370000000 HC RX 637 (ALT 250 FOR IP): Performed by: INTERNAL MEDICINE

## 2018-08-13 PROCEDURE — 2060000000 HC ICU INTERMEDIATE R&B

## 2018-08-13 PROCEDURE — 6360000002 HC RX W HCPCS: Performed by: INTERNAL MEDICINE

## 2018-08-13 PROCEDURE — 87088 URINE BACTERIA CULTURE: CPT

## 2018-08-13 PROCEDURE — 87077 CULTURE AEROBIC IDENTIFY: CPT

## 2018-08-13 RX ORDER — DEXTROSE MONOHYDRATE 25 G/50ML
12.5 INJECTION, SOLUTION INTRAVENOUS PRN
Status: DISCONTINUED | OUTPATIENT
Start: 2018-08-13 | End: 2018-08-15 | Stop reason: HOSPADM

## 2018-08-13 RX ORDER — DEXTROSE MONOHYDRATE 50 MG/ML
100 INJECTION, SOLUTION INTRAVENOUS PRN
Status: DISCONTINUED | OUTPATIENT
Start: 2018-08-13 | End: 2018-08-15 | Stop reason: HOSPADM

## 2018-08-13 RX ORDER — NICOTINE POLACRILEX 4 MG
15 LOZENGE BUCCAL PRN
Status: DISCONTINUED | OUTPATIENT
Start: 2018-08-13 | End: 2018-08-15 | Stop reason: HOSPADM

## 2018-08-13 RX ADMIN — PETROLATUM: 42 OINTMENT TOPICAL at 16:20

## 2018-08-13 RX ADMIN — Medication 10 ML: at 21:33

## 2018-08-13 RX ADMIN — Medication 10 ML: at 07:58

## 2018-08-13 RX ADMIN — Medication 5000 UNITS: at 07:57

## 2018-08-13 RX ADMIN — MIRTAZAPINE 7.5 MG: 15 TABLET, FILM COATED ORAL at 07:56

## 2018-08-13 RX ADMIN — Medication 250 MG: at 05:49

## 2018-08-13 RX ADMIN — BUSPIRONE HYDROCHLORIDE 7.5 MG: 5 TABLET ORAL at 07:57

## 2018-08-13 RX ADMIN — TAMSULOSIN HYDROCHLORIDE 0.4 MG: 0.4 CAPSULE ORAL at 17:39

## 2018-08-13 RX ADMIN — Medication 250 MG: at 12:35

## 2018-08-13 RX ADMIN — ATORVASTATIN CALCIUM 20 MG: 20 TABLET, FILM COATED ORAL at 07:57

## 2018-08-13 RX ADMIN — PETROLATUM: 42 OINTMENT TOPICAL at 21:33

## 2018-08-13 RX ADMIN — LATANOPROST 1 DROP: 50 SOLUTION OPHTHALMIC at 21:33

## 2018-08-13 RX ADMIN — MICONAZOLE NITRATE: 20 POWDER TOPICAL at 07:59

## 2018-08-13 RX ADMIN — ENOXAPARIN SODIUM 40 MG: 40 INJECTION, SOLUTION INTRAVENOUS; SUBCUTANEOUS at 21:33

## 2018-08-13 RX ADMIN — MICONAZOLE NITRATE: 20 POWDER TOPICAL at 21:33

## 2018-08-13 ASSESSMENT — PAIN SCALES - GENERAL
PAINLEVEL_OUTOF10: 0

## 2018-08-13 NOTE — PLAN OF CARE
Problem: Falls - Risk of:  Goal: Will remain free from falls  Will remain free from falls   Outcome: Met This Shift  Patient educated on fall and safety precautions. Call light within reach.  Hourly rounding continues

## 2018-08-13 NOTE — PROGRESS NOTES
Subjective: patient seen this am events all noted complex case  See assessment     The patient is awake and alert. No new  problems overnight. Denies chest pain, angina, and dyspnea. Denies abdominal pain. Tolerating diet. No nausea or vomiting. ROS:  Constitutional: negative except for anorexia, fatigue and malaise  Gastrointestinal: negative except for diarrhea    Objective:      ondansetron (ZOFRAN) injection 4 mg Q8H PRN   acetaminophen (TYLENOL) tablet 650 mg Q4H PRN   albuterol (PROVENTIL) nebulizer solution 2.5 mg Q6H PRN   atorvastatin (LIPITOR) tablet 20 mg Daily   busPIRone (BUSPAR) tablet 7.5 mg Daily   vitamin D tablet 5,000 Units Daily   latanoprost (XALATAN) 0.005 % ophthalmic solution 1 drop Nightly   mirtazapine (REMERON) tablet 7.5 mg Daily   tamsulosin (FLOMAX) capsule 0.4 mg QPM   sodium chloride flush 0.9 % injection 10 mL 2 times per day   sodium chloride flush 0.9 % injection 10 mL PRN   ondansetron (ZOFRAN) injection 4 mg Q6H PRN   enoxaparin (LOVENOX) injection 40 mg Daily   vancomycin (VANCOCIN) oral solution 250 mg 4 times per day   miconazole (MICOTIN) 2 % powder BID   mineral oil-hydrophilic petrolatum (AQUAPHOR) ointment BID PRN         /62   Pulse 73   Temp 98.3 °F (36.8 °C) (Oral)   Resp 16   Ht 6' (1.829 m)   Wt 225 lb (102.1 kg)   SpO2 94%   BMI 30.52 kg/m²     Lungs:  Normal expansion. Clear to auscultation. No rales, rhonchi, or wheezing. Heart:  Heart regular rate and rhythm  Murmur(s)-  2/6 systolic at 2nd left intercostal space  Abdomen:  Soft, non-tender, normal bowel sounds. No bruits, organomegaly or masses.   Extremities: leg wounds     CBC with Differential:    Lab Results   Component Value Date    WBC 8.8 08/12/2018    RBC 3.79 08/12/2018    HGB 9.5 08/12/2018    HCT 31.0 08/12/2018     08/12/2018    MCV 81.8 08/12/2018    MCH 25.1 08/12/2018    MCHC 30.6 08/12/2018    RDW 18.0 08/12/2018    SEGSPCT 64 09/27/2013    LYMPHOPCT 12.3 08/12/2018 MONOPCT 7.5 08/12/2018    BASOPCT 0.3 08/12/2018    MONOSABS 0.66 08/12/2018    LYMPHSABS 1.09 08/12/2018    EOSABS 0.17 08/12/2018    BASOSABS 0.03 08/12/2018     CMP:    Lab Results   Component Value Date     08/12/2018    K 4.8 08/12/2018     08/12/2018    CO2 23 08/12/2018    BUN 33 08/12/2018    CREATININE 0.8 08/12/2018    GFRAA >60 08/12/2018    LABGLOM >60 08/12/2018    GLUCOSE 125 08/12/2018    GLUCOSE 138 03/26/2011    PROT 7.2 08/12/2018    LABALBU 2.8 08/12/2018    LABALBU 4.0 03/26/2011    CALCIUM 8.6 08/12/2018    BILITOT 0.3 08/12/2018    ALKPHOS 113 08/12/2018    AST 56 08/12/2018    ALT 42 08/12/2018        Assessment:    Patient Active Problem List   Diagnosis    Cellulitis    Sepsis ([facility] [address]    Dementia    Metabolic encephalopathy    Diabetes mellitus type 2, uncontrolled ([address]    Hyperlipidemia LDL goal <100    Essential hypertension    Venous ulcer of left leg (HCC)    Non-pressure chronic ulcer of left lower leg with fat layer exposed ([address]    Non-pressure chronic ulcer left lower leg, limited to breakdown skin (HCC)    Dehydration    Hypotension    Severe protein-calorie malnutrition (HCC)       Plan:  Complex case hist of leg wounds and bullous pemphigoid going to wound care and  seein derm on immuran and doxycycline developed nausea and vomiting and anorexia and weight loss and in July admitted had elevated lfts had been on imuran and doxy doxy was stopped and eventually I stopped imuran due to LFTS ELEVATED ADMITTED AGAIN IN [facility] AND HAD PEG PLACED LAST ADMISSION AND HAD POSITIVIE BLOOD CULTURE WITH COAG NEG STAFF /ECHO NOTED NOW AGAIN WITH GI ISSUES AND ESR /CRP HIGH AND C DIFF NEGATIVE WILL AWAIT  REPEAT CULTURES    SEE ORDERS AND ID INPUT         Elvin Guzmán  6:41 AM  8/13/2018

## 2018-08-13 NOTE — PROGRESS NOTES
Infectious Disease  Progress Note  [facility]    Chief Complaint: loose BM    Subjective:  Patient is awake alert no acute distress. There's been no fevers chills or night sweats. No drainage from his eyes ears nose and throat; no nausea or vomiting, but loose bm, - hematemesis or hematochezia. No chest pain or palpitations. No abdominal pain. No frequency burning or hematuria. No SOB, cough, productive sputum or hemoptysis. No focal motor sensory loss or psychiatric problems. Negative for new endocrine issues and no blood dyscrasias. No hematemesis or hematochezia. No rash.     Scheduled Meds:   insulin lispro  0-6 Units Subcutaneous TID WC    insulin lispro  0-3 Units Subcutaneous Nightly    mineral oil-hydrophilic petrolatum   Topical BID    atorvastatin  20 mg Oral Daily    busPIRone  7.5 mg Oral Daily    vitamin D  5,000 Units Oral Daily    latanoprost  1 drop Both Eyes Nightly    mirtazapine  7.5 mg Oral Daily    tamsulosin  0.4 mg Oral QPM    sodium chloride flush  10 mL Intravenous 2 times per day    enoxaparin  40 mg Subcutaneous Daily    vancomycin  250 mg Oral 4 times per day    miconazole   Topical BID     Continuous Infusions:   dextrose       PRN Meds:glucose, dextrose, glucagon (rDNA), dextrose, ondansetron, acetaminophen, albuterol, sodium chloride flush, ondansetron, mineral oil-hydrophilic petrolatum    Patient Vitals for the past 24 hrs:   BP Temp Temp src Pulse Resp SpO2 Weight   08/13/18 1215 (!) 110/59 98 °F (36.7 °C) Oral 66 16 97 % -   08/13/18 0747 (!) 104/57 97.8 °F (36.6 °C) Oral 64 16 95 % -   08/13/18 0525 - - - - - - 225 lb (102.1 kg)   08/13/18 0041 106/62 98.3 °F (36.8 °C) Oral 73 16 94 % -   08/12/18 2115 (!) 96/52 97.7 °F (36.5 °C) Oral 74 16 92 % -       CBC with Differential:  Lab Results   Component Value Date    WBC 8.8 08/12/2018    WBC 22.6 08/11/2018    WBC 5.7 08/02/2018    WBC 6.1 07/28/2018    WBC 6.7 07/27/2018    RBC 3.79 08/12/2018    RBC 4.80 08/11/2018 08/12/2018    CO2 23 08/12/2018    BUN 33 08/12/2018    BUN 31 08/11/2018    BUN 13 08/02/2018    BUN 10 07/29/2018    BUN 14 07/27/2018    CREATININE 0.8 08/12/2018    CREATININE 0.7 08/11/2018    CREATININE 0.6 08/02/2018    CREATININE 0.7 07/29/2018    CREATININE 0.7 07/27/2018    GFRAA >60 08/12/2018    LABGLOM >60 08/12/2018    GLUCOSE 125 08/12/2018    GLUCOSE 138 03/26/2011    PROT 7.2 08/12/2018    LABALBU 2.8 08/12/2018    LABALBU 4.0 03/26/2011    CALCIUM 8.6 08/12/2018    BILITOT 0.3 08/12/2018    ALKPHOS 113 08/12/2018    AST 56 08/12/2018    ALT 42 08/12/2018     Lab Results   Component Value Date    CRP 7.3 (H) 08/12/2018    CRP 14.5 (H) 07/28/2018     Lab Results   Component Value Date    SEDRATE 111 (H) 08/12/2018    SEDRATE 78 (H) 08/02/2018    SEDRATE 68 (H) 07/28/2018         BP (!) 110/59   Pulse 66   Temp 98 °F (36.7 °C) (Oral)   Resp 16   Ht 6' (1.829 m)   Wt 225 lb (102.1 kg)   SpO2 97%   BMI 30.52 kg/m²     Physical Exam  Const/Neuro- unchanged, no signs of acute distress, Alert  ENMT- Within Normal Limits, Normocephalic, mucous membranes pink/moist, No thrush  Neck: Neck supple  Heart- Regular, Rate, Rhythm- no murmur appreciated. Lungs- clear to ascultation. Respirations even and nonlabored. Abdomen- Soft, bowel sounds positive, non tender  Musculo/Extremities-  Equal and symmetrical, no edema. No tenderness. Skin:  Warm and dry, free from rashes. Wound leg colonized    Cultures reviewed  Wounds c mrsa  Stool neg foe c d  Radiology reviewed  CT ABDOMEN PELVIS W IV CONTRAST Additional Contrast? None   Final Result   1. Fluid contents in the colon correlates with diarrhea. 2. PEG catheter in proper position. Air distention with some   dilatation of the stomach. See above comments and recommendations. 3. No acute inflammatory changes in the omental mesenteric fat planes,   free intraperitoneal air, ascites. 4. Uncomplicated diverticulosis of the sigmoid colon.             XR

## 2018-08-13 NOTE — H&P
discharged back to the  facility on doxycycline and note to repeat blood culture. Echocardiogram  was done at that time, transthoracic, please see report. The patient now  comes back in with similar symptoms. In the emergency room, he did not  have a fever, but he had significant leukocytosis. His sed rate is 111. His CRP was 7.3. Consultation with Infectious Disease was obtained. PAST MEDICAL HISTORY:  Chronic leg ulcers, bullous pemphigoid, essential  hypertension, aortic stenosis, type 2 diabetes, anxiety, mild cognitive  impairment, dehydration. SURGICAL HISTORY:  Includes the above with feeding tube placed. MEDICATIONS PRIOR TO ADMISSION:  Doxycycline, Proventil, Kenalog, Remeron,  Lipitor, Flomax, Xalatan, BuSpar. PHYSICAL EXAMINATION:  GENERAL:  Awake, alert, frail, elderly male. VITAL SIGNS:  Afebrile at 97.9, respirations 17, blood pressure 110/58. HEENT:  Head normocephalic, atraumatic. Sclerae are nonicteric. NECK:  Supple. LUNGS:  Clear. HEART:  Regular rate and rhythm. 2/6 systolic murmur at right upper  sternal border. ABDOMEN:  Soft. Positive bowel sounds. Feeding tube in place. EXTREMITIES:  No cyanosis, clubbing, or edema. Leg wounds noted, wrapped  at this time. ASSESSMENT:  Recurrent nausea, vomiting, diarrhea, etiology unclear. Rule-out C. diff, although C. diff. toxin is negative from the lab. Significant elevation in sed rate and CRP. Bullous pemphigoid,  malnutrition, anorexia, status post PEG. PLAN:  Please see orders.         Gamaliel Messina MD    D: 08/13/2018 6:56:19       T: 08/13/2018 6:58:08     [email or web address]  Job#: [id number]     Doc#: [id number]    CC:

## 2018-08-14 LAB
ALBUMIN SERPL-MCNC: 2.8 G/DL (ref 3.5–5.2)
ALP BLD-CCNC: 110 U/L (ref 40–129)
ALT SERPL-CCNC: 26 U/L (ref 0–40)
ANION GAP SERPL CALCULATED.3IONS-SCNC: 10 MMOL/L (ref 7–16)
AST SERPL-CCNC: 28 U/L (ref 0–39)
BILIRUB SERPL-MCNC: 0.2 MG/DL (ref 0–1.2)
BUN BLDV-MCNC: 20 MG/DL (ref 8–23)
CALCIUM SERPL-MCNC: 8.8 MG/DL (ref 8.6–10.2)
CHLORIDE BLD-SCNC: 102 MMOL/L (ref 98–107)
CO2: 29 MMOL/L (ref 22–29)
CREAT SERPL-MCNC: 0.7 MG/DL (ref 0.7–1.2)
GFR AFRICAN AMERICAN: >60
GFR NON-AFRICAN AMERICAN: >60 ML/MIN/1.73
GLUCOSE BLD-MCNC: 101 MG/DL (ref 74–109)
METER GLUCOSE: 102 MG/DL (ref 70–110)
METER GLUCOSE: 109 MG/DL (ref 70–110)
METER GLUCOSE: 112 MG/DL (ref 70–110)
METER GLUCOSE: 130 MG/DL (ref 70–110)
ORGANISM: ABNORMAL
ORGANISM: ABNORMAL
POTASSIUM SERPL-SCNC: 4.3 MMOL/L (ref 3.5–5)
RHEUMATOID FACTOR: <10 IU/ML (ref 0–13)
SODIUM BLD-SCNC: 141 MMOL/L (ref 132–146)
TOTAL PROTEIN: 6.8 G/DL (ref 6.4–8.3)
WOUND/ABSCESS: ABNORMAL

## 2018-08-14 PROCEDURE — 36415 COLL VENOUS BLD VENIPUNCTURE: CPT

## 2018-08-14 PROCEDURE — 86431 RHEUMATOID FACTOR QUANT: CPT

## 2018-08-14 PROCEDURE — 2060000000 HC ICU INTERMEDIATE R&B

## 2018-08-14 PROCEDURE — 86200 CCP ANTIBODY: CPT

## 2018-08-14 PROCEDURE — 6370000000 HC RX 637 (ALT 250 FOR IP): Performed by: INTERNAL MEDICINE

## 2018-08-14 PROCEDURE — G8987 SELF CARE CURRENT STATUS: HCPCS

## 2018-08-14 PROCEDURE — 2580000003 HC RX 258: Performed by: INTERNAL MEDICINE

## 2018-08-14 PROCEDURE — 97161 PT EVAL LOW COMPLEX 20 MIN: CPT

## 2018-08-14 PROCEDURE — 6360000002 HC RX W HCPCS: Performed by: INTERNAL MEDICINE

## 2018-08-14 PROCEDURE — 86038 ANTINUCLEAR ANTIBODIES: CPT

## 2018-08-14 PROCEDURE — 97165 OT EVAL LOW COMPLEX 30 MIN: CPT

## 2018-08-14 PROCEDURE — G8988 SELF CARE GOAL STATUS: HCPCS

## 2018-08-14 PROCEDURE — 80053 COMPREHEN METABOLIC PANEL: CPT

## 2018-08-14 PROCEDURE — 82962 GLUCOSE BLOOD TEST: CPT

## 2018-08-14 RX ADMIN — ATORVASTATIN CALCIUM 20 MG: 20 TABLET, FILM COATED ORAL at 09:00

## 2018-08-14 RX ADMIN — ENOXAPARIN SODIUM 40 MG: 40 INJECTION, SOLUTION INTRAVENOUS; SUBCUTANEOUS at 09:05

## 2018-08-14 RX ADMIN — PETROLATUM: 42 OINTMENT TOPICAL at 09:00

## 2018-08-14 RX ADMIN — PETROLATUM: 42 OINTMENT TOPICAL at 20:53

## 2018-08-14 RX ADMIN — LATANOPROST 1 DROP: 50 SOLUTION OPHTHALMIC at 20:54

## 2018-08-14 RX ADMIN — BUSPIRONE HYDROCHLORIDE 7.5 MG: 5 TABLET ORAL at 09:00

## 2018-08-14 RX ADMIN — Medication 10 ML: at 20:54

## 2018-08-14 RX ADMIN — Medication 10 ML: at 09:00

## 2018-08-14 RX ADMIN — MICONAZOLE NITRATE: 20 POWDER TOPICAL at 09:00

## 2018-08-14 RX ADMIN — MICONAZOLE NITRATE: 20 POWDER TOPICAL at 20:54

## 2018-08-14 RX ADMIN — Medication 5000 UNITS: at 08:59

## 2018-08-14 RX ADMIN — MIRTAZAPINE 7.5 MG: 15 TABLET, FILM COATED ORAL at 08:59

## 2018-08-14 RX ADMIN — TAMSULOSIN HYDROCHLORIDE 0.4 MG: 0.4 CAPSULE ORAL at 18:20

## 2018-08-14 ASSESSMENT — PAIN SCALES - GENERAL
PAINLEVEL_OUTOF10: 0

## 2018-08-14 NOTE — PROGRESS NOTES
08/12/2018    BUN 31 08/11/2018    BUN 13 08/02/2018    BUN 10 07/29/2018    CREATININE 0.7 08/14/2018    CREATININE 0.8 08/12/2018    CREATININE 0.7 08/11/2018    CREATININE 0.6 08/02/2018    CREATININE 0.7 07/29/2018    GFRAA >60 08/14/2018    LABGLOM >60 08/14/2018    GLUCOSE 101 08/14/2018    GLUCOSE 138 03/26/2011    PROT 6.8 08/14/2018    LABALBU 2.8 08/14/2018    LABALBU 4.0 03/26/2011    CALCIUM 8.8 08/14/2018    BILITOT 0.2 08/14/2018    ALKPHOS 110 08/14/2018    AST 28 08/14/2018    ALT 26 08/14/2018     Lab Results   Component Value Date    CRP 7.3 (H) 08/12/2018    CRP 14.5 (H) 07/28/2018     Lab Results   Component Value Date    SEDRATE 111 (H) 08/12/2018    SEDRATE 78 (H) 08/02/2018    SEDRATE 68 (H) 07/28/2018         /73   Pulse 72   Temp 97.7 °F (36.5 °C) (Oral)   Resp 16   Ht 6' (1.829 m)   Wt 220 lb 3.2 oz (99.9 kg)   SpO2 96%   BMI 29.86 kg/m²     Physical Exam  Const/Neuro- unchanged, no signs of acute distress, Alert  ENMT- Within Normal Limits, Normocephalic, mucous membranes pink/moist, No thrush  Neck: Neck supple  Heart- Regular, Rate, Rhythm- no murmur appreciated. Lungs- clear to ascultation. Respirations even and nonlabored. Abdomen- Soft, bowel sounds positive, non tender  Musculo/Extremities-  Equal and symmetrical, no edema. No tenderness. Skin:  Warm and dry, free from rashes. Wound leg colonized    Cultures reviewed  Wounds c mrsa  Stool neg foe c d  Radiology reviewed  CT ABDOMEN PELVIS W IV CONTRAST Additional Contrast? None   Final Result   1. Fluid contents in the colon correlates with diarrhea. 2. PEG catheter in proper position. Air distention with some   dilatation of the stomach. See above comments and recommendations. 3. No acute inflammatory changes in the omental mesenteric fat planes,   free intraperitoneal air, ascites. 4. Uncomplicated diverticulosis of the sigmoid colon.             XR CHEST PORTABLE   Final Result   No acute cardiopulmonary process.           Assessment  Vol contraction /  anemia  immunocompromised host  c diff neg  Plan  Follow up clinically      Electronically signed by Fran Perez MD on 8/14/2018 at 7:22 PM

## 2018-08-14 NOTE — PROGRESS NOTES
P Quality Flow/Interdisciplinary Rounds Progress Note        Quality Flow Rounds held on August 14, 2018    Disciplines Attending:  Bedside Nurse, ,  and Nursing Unit Gunnar Hernandes was admitted on 8/11/2018  1:59 PM    Anticipated Discharge Date:  Expected Discharge Date: 08/14/18    Disposition:    Nick Score:  Nick Scale Score: 14    Readmission Risk              Risk of Unplanned Readmission:        19             Discussed patient goal for the day, patient clinical progression, and barriers to discharge.   The following Goal(s) of the Day/Commitment(s) have been identified:  Other  PT/Ot to eval dc planning      Donna Landers  August 14, 2018

## 2018-08-14 NOTE — CONSULTS
[id number] [address]                                   CONSULTATION    PATIENT NAME: Keiko Alexadnra                   :        1940  MED REC NO:   [id number]                            ROOM:       0423  ACCOUNT NO:   [de-identified]                           ADMIT DATE: 2018  PROVIDER:     Jasbir Powell MD    CONSULT DATE:  2018    REASON FOR CONSULTATION:  Nausea, vomiting, and diarrhea. HISTORY OF PRESENT ILLNESS:  This is a nursing home resident specifically [address] [place] [address],[address].    He recently was hospitalized for failure to thrive. He had had  an elevated alkaline phosphatase, which assumed to be related to  azathioprine, which had been initiated for bullous pemphigoid. Withholding  the azathioprine resulted in decrease in his liver function studies. Skin  disease at that time seemed quiescent and he had been also treated with  doxycycline for the same in the past, apparently long-term without adverse  consequence. He failed to improve after his initial discharge and returned with a  request for percutaneous gastrostomy tube. I performed an EGD, but found  the only window for placement to be immediately under the left rib margin  and right immediately next to the xiphoid. I thought that was probably  going to prove to be uncomfortable for him, so I deferred placement of the  PEG tube and asked Surgery to see him for possible laparoscopic assistance  for more distal placement. They elected to place it in the place that I  had identified and apparently tolerated well and without pain. He has been  receiving tube feedings. He was readmitted to the hospital and he comes in  with nausea, vomiting, and diarrhea. Acknowledges diarrhea, but suggests  that it has since resolved.   His white count on entry was quite high at  22,000, but the following day, it was normal, but his sedimentation rate  was 111.  His hematocrit was 48 on admission, dropping to 31 without  evidence of GI bleeding. He has now resumed tube feedings. He denies  abdominal pain. He had a CAT scan of the abdomen, which was pretty much unremarkable,  confirming PEG placement just below the left rib margin. There is no  intraabdominal complication of its placement. He has been afebrile. He  has been dealing with ulcers of the lower extremities. The skin disorder  overlying  the abdomen at the time of his last admission was quiescent. PAST MEDICAL HISTORY:  Includes hypertension, hyperlipidemia, diabetes, and  bullous pemphigoid. PAST SURGICAL HISTORY:  Includes EGD and PEG tube placement. MEDICATIONS:  Prior to his admission included insulin, doxycycline,  Proventil, Remeron, Lipitor, Flomax, Tylenol, milk of magnesia, and BuSpar. ALLERGIES:  There is an allergy to [facility]. SOCIAL HISTORY:  He is . He is a former smoker. He consumes no  alcohol. Again, he is a resident of [facility].    PHYSICAL EXAMINATION:  GENERAL:  He is alert and oriented, resting comfortably. HEENT:  He is mildly pale, but not jaundiced. VITAL SIGNS:  He is afebrile. NEUROLOGIC:  Cranial nerves are intact. NECK:  Veins are flat. LUNGS:  Fields are clear. HEART:  Tones are normal.  ABDOMEN:  The PEG tube is located immediately under the left rib margin,  very close to the xiphoid. The surrounding skin not underneath the bumper  is now erythematous, excoriated, and weeping. The skin over the lower  portion of the abdomen is intact and healed. He is receiving tube feedings  through his PEG tube. ASSESSMENT:  It would appear that the liver function abnormalities  previously noted were azathioprine related. Minor transaminases remain,  but are very nonspecific. The PEG tube was placed where I thought it would  be uncomfortable, but apparently it has been tolerated. No complications  related to its placement.   The skin

## 2018-08-14 NOTE — CARE COORDINATION
Social Work / Discharge Planning : SW did speak to Maday from [facility] to please submit pre-cert once all therapy information completed. SW to await pre-cert. SW to follow . Electronically signed by TRUMAN Branch on 8/14/2018 at 2:30 PM

## 2018-08-15 ENCOUNTER — HOSPITAL ENCOUNTER (OUTPATIENT)
Dept: WOUND CARE | Age: 78
Discharge: HOME | End: 2018-08-15
Payer: COMMERCIAL

## 2018-08-15 VITALS
HEART RATE: 88 BPM | SYSTOLIC BLOOD PRESSURE: 106 MMHG | OXYGEN SATURATION: 99 % | BODY MASS INDEX: 29.82 KG/M2 | HEIGHT: 72 IN | WEIGHT: 220.2 LBS | DIASTOLIC BLOOD PRESSURE: 64 MMHG | TEMPERATURE: 98.3 F | RESPIRATION RATE: 18 BRPM

## 2018-08-15 LAB
ANTI-NUCLEAR ANTIBODY (ANA): NEGATIVE
BASOPHILS ABSOLUTE: 0.03 E9/L (ref 0–0.2)
BASOPHILS RELATIVE PERCENT: 0.4 % (ref 0–2)
EOSINOPHILS ABSOLUTE: 0.4 E9/L (ref 0.05–0.5)
EOSINOPHILS RELATIVE PERCENT: 5 % (ref 0–6)
HCT VFR BLD CALC: 34.3 % (ref 37–54)
HEMOGLOBIN: 10.4 G/DL (ref 12.5–16.5)
IMMATURE GRANULOCYTES #: 0.08 E9/L
IMMATURE GRANULOCYTES %: 1 % (ref 0–5)
LYMPHOCYTES ABSOLUTE: 1.75 E9/L (ref 1.5–4)
LYMPHOCYTES RELATIVE PERCENT: 22 % (ref 20–42)
MCH RBC QN AUTO: 25.3 PG (ref 26–35)
MCHC RBC AUTO-ENTMCNC: 30.3 % (ref 32–34.5)
MCV RBC AUTO: 83.5 FL (ref 80–99.9)
METER GLUCOSE: 130 MG/DL (ref 70–110)
METER GLUCOSE: 139 MG/DL (ref 70–110)
MONOCYTES ABSOLUTE: 0.78 E9/L (ref 0.1–0.95)
MONOCYTES RELATIVE PERCENT: 9.8 % (ref 2–12)
NEUTROPHILS ABSOLUTE: 4.93 E9/L (ref 1.8–7.3)
NEUTROPHILS RELATIVE PERCENT: 61.8 % (ref 43–80)
ORGANISM: ABNORMAL
PDW BLD-RTO: 17.4 FL (ref 11.5–15)
PLATELET # BLD: 585 E9/L (ref 130–450)
PMV BLD AUTO: 8.9 FL (ref 7–12)
RBC # BLD: 4.11 E12/L (ref 3.8–5.8)
URINE CULTURE, ROUTINE: ABNORMAL
URINE CULTURE, ROUTINE: ABNORMAL
WBC # BLD: 8 E9/L (ref 4.5–11.5)

## 2018-08-15 PROCEDURE — 85025 COMPLETE CBC W/AUTO DIFF WBC: CPT

## 2018-08-15 PROCEDURE — 6370000000 HC RX 637 (ALT 250 FOR IP): Performed by: INTERNAL MEDICINE

## 2018-08-15 PROCEDURE — 36415 COLL VENOUS BLD VENIPUNCTURE: CPT

## 2018-08-15 PROCEDURE — 6360000002 HC RX W HCPCS: Performed by: INTERNAL MEDICINE

## 2018-08-15 PROCEDURE — 2580000003 HC RX 258: Performed by: INTERNAL MEDICINE

## 2018-08-15 PROCEDURE — 11042 DBRDMT SUBQ TIS 1ST 20SQCM/<: CPT

## 2018-08-15 PROCEDURE — 82962 GLUCOSE BLOOD TEST: CPT

## 2018-08-15 RX ADMIN — PETROLATUM: 42 OINTMENT TOPICAL at 08:35

## 2018-08-15 RX ADMIN — Medication 5000 UNITS: at 08:34

## 2018-08-15 RX ADMIN — BUSPIRONE HYDROCHLORIDE 7.5 MG: 5 TABLET ORAL at 08:35

## 2018-08-15 RX ADMIN — ATORVASTATIN CALCIUM 20 MG: 20 TABLET, FILM COATED ORAL at 08:35

## 2018-08-15 RX ADMIN — MICONAZOLE NITRATE: 20 POWDER TOPICAL at 08:35

## 2018-08-15 RX ADMIN — MIRTAZAPINE 7.5 MG: 15 TABLET, FILM COATED ORAL at 08:35

## 2018-08-15 RX ADMIN — ENOXAPARIN SODIUM 40 MG: 40 INJECTION, SOLUTION INTRAVENOUS; SUBCUTANEOUS at 08:34

## 2018-08-15 RX ADMIN — Medication 10 ML: at 08:34

## 2018-08-15 ASSESSMENT — PAIN SCALES - GENERAL
PAINLEVEL_OUTOF10: 0

## 2018-08-15 NOTE — PROGRESS NOTES
Subjective: patient seen for follow up doing ok voices no new issues overnight     The patient is awake and alert. No problems overnight. Denies chest pain, angina, and dyspnea. Denies abdominal pain. Tolerating diet. No nausea or vomiting. ROS:  A comprehensive review of systems was negative. Objective:      insulin lispro (HUMALOG) injection vial 0-6 Units TID WC   insulin lispro (HUMALOG) injection vial 0-3 Units Nightly   glucose (GLUTOSE) 40 % oral gel 15 g PRN   dextrose 50 % solution 12.5 g PRN   glucagon (rDNA) injection 1 mg PRN   dextrose 5 % solution PRN   mineral oil-hydrophilic petrolatum (AQUAPHOR) ointment BID   ondansetron (ZOFRAN) injection 4 mg Q8H PRN   acetaminophen (TYLENOL) tablet 650 mg Q4H PRN   albuterol (PROVENTIL) nebulizer solution 2.5 mg Q6H PRN   atorvastatin (LIPITOR) tablet 20 mg Daily   busPIRone (BUSPAR) tablet 7.5 mg Daily   vitamin D tablet 5,000 Units Daily   latanoprost (XALATAN) 0.005 % ophthalmic solution 1 drop Nightly   mirtazapine (REMERON) tablet 7.5 mg Daily   tamsulosin (FLOMAX) capsule 0.4 mg QPM   sodium chloride flush 0.9 % injection 10 mL 2 times per day   sodium chloride flush 0.9 % injection 10 mL PRN   ondansetron (ZOFRAN) injection 4 mg Q6H PRN   enoxaparin (LOVENOX) injection 40 mg Daily   miconazole (MICOTIN) 2 % powder BID   mineral oil-hydrophilic petrolatum (AQUAPHOR) ointment BID PRN         /66   Pulse 86   Temp 97.8 °F (36.6 °C) (Oral)   Resp 18   Ht 6' (1.829 m)   Wt 220 lb 3.2 oz (99.9 kg)   SpO2 94%   BMI 29.86 kg/m²     Lungs:  Normal expansion. Clear to auscultation. No rales, rhonchi, or wheezing. Heart:  Heart regular rate and rhythm3/6 [facility]   Abdomen:  Soft, non-tender, normal bowel sounds. No bruits, organomegaly or masses. peg   Extremities: Extremities warm to touch, pink, with no edema.     CBC with Differential:    Lab Results   Component Value Date    WBC 8.0 08/15/2018    RBC 4.11 08/15/2018    HGB 10.4

## 2018-08-15 NOTE — PLAN OF CARE
P Quality Flow/Interdisciplinary Rounds Progress Note        Quality Flow Rounds held on August 15, 2018    Disciplines Attending:  Bedside Nurse, ,  and Nursing Unit Gunnar Hernandes was admitted on 8/11/2018  1:59 PM    Anticipated Discharge Date:  Expected Discharge Date: 08/14/18    Disposition:    Nick Score:  Nick Scale Score: 14    Readmission Risk              Risk of Unplanned Readmission:        19             Discussed patient goal for the day, patient clinical progression, and barriers to discharge.   The following Goal(s) of the Day/Commitment(s) have been identified:  SNF Discharge - Check H&P Update, Medication Reconciliation, and Transfer Form      Matthew Acevedo  August 15, 2018

## 2018-08-15 NOTE — PROGRESS NOTES
Infectious Disease  Progress Note  [facility]    Chief Complaint: loose BM    Subjective:  Patient is awake alert no acute distress. There's been no fevers chills or night sweats. No drainage from his eyes ears nose and throat; no nausea or vomiting, but loose bm, - hematemesis or hematochezia. No chest pain or palpitations. No abdominal pain. No frequency burning or hematuria. No SOB, cough, productive sputum or hemoptysis. No focal motor sensory loss or psychiatric problems. Negative for new endocrine issues and no blood dyscrasias. No hematemesis or hematochezia. No rash.     Scheduled Meds:   insulin lispro  0-6 Units Subcutaneous TID WC    insulin lispro  0-3 Units Subcutaneous Nightly    mineral oil-hydrophilic petrolatum   Topical BID    atorvastatin  20 mg Oral Daily    busPIRone  7.5 mg Oral Daily    vitamin D  5,000 Units Oral Daily    latanoprost  1 drop Both Eyes Nightly    mirtazapine  7.5 mg Oral Daily    tamsulosin  0.4 mg Oral QPM    sodium chloride flush  10 mL Intravenous 2 times per day    enoxaparin  40 mg Subcutaneous Daily    miconazole   Topical BID     Continuous Infusions:   dextrose       PRN Meds:glucose, dextrose, glucagon (rDNA), dextrose, ondansetron, acetaminophen, albuterol, sodium chloride flush, ondansetron, mineral oil-hydrophilic petrolatum    Patient Vitals for the past 24 hrs:   BP Temp Temp src Pulse Resp SpO2   08/15/18 1215 106/64 98.3 °F (36.8 °C) Oral 88 18 99 %   08/15/18 0830 105/73 98.5 °F (36.9 °C) Oral 95 18 93 %   08/15/18 0001 102/66 97.8 °F (36.6 °C) Oral 86 18 94 %   08/14/18 2040 107/64 97.7 °F (36.5 °C) Oral 72 18 95 %   08/14/18 1545 110/73 97.7 °F (36.5 °C) Oral 72 16 96 %       CBC with Differential:    Lab Results   Component Value Date    WBC 8.0 08/15/2018    WBC 8.8 08/12/2018    WBC 22.6 08/11/2018    WBC 5.7 08/02/2018    WBC 6.1 07/28/2018    RBC 4.11 08/15/2018    RBC 3.79 08/12/2018    RBC 4.80 08/11/2018    RBC 3.71 08/02/2018    RBC 3.85 07/28/2018    HGB 10.4 08/15/2018    HGB 9.5 08/12/2018    HGB 11.9 08/11/2018    HGB 9.4 08/02/2018    HGB 9.7 07/28/2018    HCT 34.3 08/15/2018    HCT 31.0 08/12/2018    HCT 39.7 08/11/2018    HCT 30.0 08/02/2018    HCT 31.6 07/28/2018     08/15/2018     08/12/2018     08/11/2018     08/02/2018     07/28/2018    MCV 83.5 08/15/2018    MCV 81.8 08/12/2018    MCV 82.7 08/11/2018    MCV 80.9 08/02/2018    MCV 82.1 07/28/2018    MCH 25.3 08/15/2018    MCH 25.1 08/12/2018    MCH 24.8 08/11/2018    MCH 25.3 08/02/2018    MCH 25.2 07/28/2018    MCHC 30.3 08/15/2018    MCHC 30.6 08/12/2018    MCHC 30.0 08/11/2018    MCHC 31.3 08/02/2018    MCHC 30.7 07/28/2018    RDW 17.4 08/15/2018    RDW 18.0 08/12/2018    RDW 18.6 08/11/2018    RDW 18.4 08/02/2018    RDW 18.6 07/28/2018    SEGSPCT 64 09/27/2013    LYMPHOPCT 22.0 08/15/2018    LYMPHOPCT 12.3 08/12/2018    LYMPHOPCT 4.7 08/11/2018    LYMPHOPCT 21.6 07/28/2018    LYMPHOPCT 16.2 07/26/2018    MONOPCT 9.8 08/15/2018    MONOPCT 7.5 08/12/2018    MONOPCT 4.1 08/11/2018    MONOPCT 13.3 07/28/2018    MONOPCT 14.2 07/26/2018    BASOPCT 0.4 08/15/2018    BASOPCT 0.3 08/12/2018    BASOPCT 0.3 08/11/2018    BASOPCT 0.7 07/28/2018    BASOPCT 0.4 07/26/2018    MONOSABS 0.78 08/15/2018    MONOSABS 0.66 08/12/2018    MONOSABS 0.92 08/11/2018    MONOSABS 0.81 07/28/2018    MONOSABS 1.06 07/26/2018    LYMPHSABS 1.75 08/15/2018    LYMPHSABS 1.09 08/12/2018    LYMPHSABS 1.06 08/11/2018    LYMPHSABS 1.31 07/28/2018    LYMPHSABS 1.21 07/26/2018    EOSABS 0.40 08/15/2018    EOSABS 0.17 08/12/2018    EOSABS 0.27 08/11/2018    EOSABS 0.18 07/28/2018    EOSABS 0.04 07/26/2018    BASOSABS 0.03 08/15/2018    BASOSABS 0.03 08/12/2018    BASOSABS 0.06 08/11/2018    BASOSABS 0.04 07/28/2018    BASOSABS 0.03 07/26/2018     CMP:    Lab Results   Component Value Date     08/14/2018    K 4.3 08/14/2018     08/14/2018    CO2 29 08/14/2018    BUN 20

## 2018-08-16 PROBLEM — E86.0 DEHYDRATION: Status: RESOLVED | Noted: 2018-07-17 | Resolved: 2018-08-16

## 2018-08-16 LAB
BLOOD CULTURE, ROUTINE: NORMAL
CCP IGG ANTIBODIES: 4 UNITS (ref 0–19)
CULTURE, BLOOD 2: NORMAL

## 2018-08-20 NOTE — OP NOTE
[id number] [address]                                 OPERATIVE REPORT    PATIENT NAME: Keiko Alexandra                   :        1940  MED REC NO:   [id number]                            ROOM:       5430  ACCOUNT NO:   [de-identified]                           ADMIT DATE: 2018  PROVIDER:     Jasbir Powell MD    DATE OF PROCEDURE:  2018    PROCEDURE PERFORMED:  Esophagogastroduodenoscopy. PREOPERATIVE DIAGNOSIS:  PEG tube placement for failure to thrive and  protein calorie malnutrition. POSTOPERATIVE DIAGNOSES:  1. Normal esophagus and duodenum. 2.  Unremarkable stomach with extensive nonerosive gastritis. 3.  PEG tube was not placed, see below. INDICATION:  This is an 78-year-old male who struggled to eat and has been  losing weight. Question is whether it is related to medications for his  bullous pemphigoid or not and these have been withdrawn in the form of  azathioprine and doxycycline, but persistent weight loss and anorexia  remain. Imaging to date is negative. Preop is monitored anesthesia care. DESCRIPTION OF PROCEDURE:  With the patient in the supine position,  sedation was given. The esophagus was intubated. The esophagus was  normal.  Stomach contained nonerosive erythema without nodularity or  neoplasm. The pylorus bulb and postbulbar duodenum were normal.    Attention was then directed towards placement of a PEG tube and the skin  was prepped with chlorhexidine. With the light dim, the only point of  transillumination to allow a window for placement was immediately under the  left rib margin and immediately in case of the xiphoid. It was felt in  this position the tube placement would directly rub off against the _____  rib margin and perhaps be uncomfortable and painful for location.   At this  point, I decided to abort the procedure hoping that laparoscopic surgical  assistance might allow a more reasonable placement to assuring a long-term  comfort for the patient.         Sweetie Matthews MD    D: 08/20/2018 8:44:11       T: 08/20/2018 9:06:33     [email or web address]  Job#: [id number]     Doc#: [id number]    CC:

## 2018-08-22 ENCOUNTER — HOSPITAL ENCOUNTER (OUTPATIENT)
Dept: WOUND CARE | Age: 78
Discharge: HOME | End: 2018-08-22
Payer: COMMERCIAL

## 2018-08-22 VITALS
HEIGHT: 72 IN | TEMPERATURE: 98.3 F | RESPIRATION RATE: 18 BRPM | HEART RATE: 72 BPM | WEIGHT: 220 LBS | SYSTOLIC BLOOD PRESSURE: 98 MMHG | BODY MASS INDEX: 29.8 KG/M2 | DIASTOLIC BLOOD PRESSURE: 58 MMHG

## 2018-08-22 DIAGNOSIS — L97.911 NON-PRESSURE CHRONIC ULCER RIGHT LOWER LEG, LIMITED TO BREAKDOWN SKIN (HCC): Chronic | ICD-10-CM

## 2018-08-22 PROCEDURE — 97597 DBRDMT OPN WND 1ST 20 CM/<: CPT | Performed by: SURGERY

## 2018-08-22 PROCEDURE — 97597 DBRDMT OPN WND 1ST 20 CM/<: CPT

## 2018-08-22 NOTE — CONSULTS
Social History   Substance Use Topics    Smoking status: Former Smoker     Packs/day: 2.00     Years: 50.00     Types: Cigarettes     Start date: 3/30/1955     Quit date: 3/30/2006    Smokeless tobacco: Never Used    Alcohol use No     Allergies   Allergen Reactions    Levaquin [Levofloxacin] Other (See Comments)     Lethargy when taken with celebrex     Current Outpatient Prescriptions on File Prior to Encounter   Medication Sig Dispense Refill    insulin lispro (HUMALOG) 100 UNIT/ML injection vial Inject 0-6 Units into the skin 3 times daily (with meals) 1 vial 3    triamcinolone (KENALOG) 0.1 % cream Apply topically every 8 hours Apply topically 2 times daily.  Nutritional Supplements (ARGINAID) PACK Take 4.5 g by mouth 2 times daily      insulin detemir (LEVEMIR) 100 UNIT/ML injection vial Inject 10 Units into the skin nightly      mirtazapine (REMERON) 15 MG tablet Take 7.5 mg by mouth daily      atorvastatin (LIPITOR) 20 MG tablet Take 20 mg by mouth daily      mineral oil-hydrophilic petrolatum (AQUAPHOR) ointment As directed (Patient taking differently: Apply 1 applicator topically 2 times daily As directed)      tamsulosin (FLOMAX) 0.4 MG capsule Take 0.4 mg by mouth every evening       latanoprost (XALATAN) 0.005 % ophthalmic solution Place 1 drop into both eyes nightly       magnesium hydroxide (MILK OF MAGNESIA) 400 MG/5ML suspension Take 30 mLs by mouth daily as needed.  busPIRone (BUSPAR) 7.5 MG tablet Take 7.5 mg by mouth daily.  albuterol (PROVENTIL) (2.5 MG/3ML) 0.083% nebulizer solution Take 2.5 mg by nebulization every 6 hours as needed for Wheezing      Cholecalciferol (VITAMIN D3) 5000 UNITS TABS Take 5,000 Units by mouth daily      glucose (GLUTOSE) 40 % GEL Take 15 g by mouth as needed 45 g 1    acetaminophen (TYLENOL) 325 MG tablet Take 650 mg by mouth every 4 hours as needed.  bisacodyl (DULCOLAX) 5 MG EC tablet Take 5 mg by mouth daily as needed. Red 10/25/2017  4:06 PM   Red%Wound Bed 100 10/6/2017  9:21 AM   Debridement per physician Full thickness 10/20/2017  9:03 AM   Time out Yes 10/20/2017  9:03 AM   Procedural Pain 1 10/20/2017  9:03 AM   Post procedural Pain 1 10/20/2017  9:03 AM   Number of days: 383       Wound 08/04/17 (Active)   Number of days: 383       Wound 09/08/17 Venous ulcer Ankle Left;Lateral Wound #4 venous ulcer acquired 9/8/2017 (Active)   Wound Type Wound 10/6/2017  9:08 AM   Wound Venous 10/6/2017  9:08 AM   Dressing Status Changed 9/29/2017 10:33 AM   Dressing Changed Changed/New 9/29/2017 10:33 AM   Dressing/Treatment Alginate 9/8/2017 10:14 AM   Wound Cleansed Rinsed/Irrigated with saline 9/29/2017 10:33 AM   Wound Length (cm) 0 cm 10/6/2017  9:21 AM   Wound Width (cm) 0 cm 10/6/2017  9:21 AM   Wound Depth (cm)  0 10/6/2017  9:21 AM   Calculated Wound Size (cm^2) (l*w) 0 cm^2 10/6/2017  9:21 AM   Change in Wound Size % (l*w) 100 10/6/2017  9:21 AM   Wound Assessment Pink 9/29/2017  9:36 AM   Drainage Amount Moderate 10/6/2017  9:08 AM   Drainage Description Serosanguinous 10/6/2017  9:08 AM   Odor None 10/6/2017  9:08 AM   Susy-wound Assessment Dry; Intact 9/29/2017  9:36 AM   Lorena%Wound Bed 100 9/22/2017 10:04 AM   Red%Wound Bed 30 9/15/2017 10:12 AM   Yellow%Wound Bed 20 9/15/2017 10:12 AM   Debridement per physician Subcutaneous 9/29/2017 10:00 AM   Time out Yes 9/29/2017 10:00 AM   Procedural Pain 2 9/29/2017 10:00 AM   Post procedural Pain 1 9/29/2017 10:00 AM   Number of days: 348       Wound 09/29/17 Venous ulcer Leg Right;Lateral new wound, #5  right lateral leg.  date of onset  sept. 29,2017 (Active)   Wound Type Wound 10/6/2017  9:08 AM   Wound Venous 10/6/2017  9:08 AM   Dressing Status Changed 10/6/2017 10:23 AM   Dressing Changed Changed/New 10/6/2017 10:23 AM   Dressing/Treatment Other (Comment) 10/6/2017 10:23 AM   Wound Cleansed Rinsed/Irrigated with saline 10/6/2017 10:23 AM   Dressing Change Due 10/13/17 10/6/2017 10:23 AM   Wound Length (cm) 0 cm 10/13/2017 10:00 AM   Wound Width (cm) 0 cm 10/13/2017 10:00 AM   Wound Depth (cm)  0 10/13/2017 10:00 AM   Calculated Wound Size (cm^2) (l*w) 0 cm^2 10/13/2017 10:00 AM   Change in Wound Size % (l*w) 100 10/13/2017 10:00 AM   Wound Assessment Red 10/6/2017  9:08 AM   Drainage Amount Small 10/6/2017  9:08 AM   Drainage Description Serosanguinous 10/6/2017  9:08 AM   Odor None 10/6/2017  9:08 AM   Susy-wound Assessment Purple; Intact;Dry 10/6/2017  9:08 AM   Red%Wound Bed 100 10/6/2017  9:08 AM   Yellow%Wound Bed 20 9/29/2017  9:33 AM   Debridement per physician Full thickness 10/6/2017  9:24 AM   Time out Yes 10/6/2017  9:24 AM   Procedural Pain 0 10/6/2017  9:24 AM   Post procedural Pain 1 9/29/2017 10:00 AM   Number of days: 327       Wound 05/25/18 Venous ulcer Pretibial Left wound #8 acquired 5/17/18 left [facility]ibial #4 at [facility] (Active)   Wound Image   8/22/2018  2:27 PM   Wound Type Wound 8/15/2018  2:40 PM   Wound Venous 7/11/2018 11:44 AM   Dressing Status Clean;Dry;New drainage 8/15/2018  2:40 PM   Dressing Changed Changed/New 8/14/2018  6:05 AM   Dressing/Treatment Other (Comment) 8/15/2018  2:40 PM   Wound Cleansed Rinsed/Irrigated with saline 8/15/2018  2:40 PM   Dressing Change Due 08/16/18 8/15/2018  2:40 PM   Wound Length (cm) 6.5 cm 8/22/2018  2:27 PM   Wound Width (cm) 4.6 cm 8/22/2018  2:27 PM   Wound Depth (cm)  0.1 8/22/2018  2:27 PM   Calculated Wound Size (cm^2) (l*w) 29.9 cm^2 8/22/2018  2:27 PM   Change in Wound Size % (l*w) 7.29 8/22/2018  2:27 PM   Wound Assessment Pink 8/22/2018  2:27 PM   Drainage Amount None 8/22/2018  2:27 PM   Drainage Description Sanguinous 8/11/2018  8:15 PM   Odor None 8/22/2018  2:27 PM   Susy-wound Assessment Fragile 8/22/2018  2:27 PM   Culture Taken Yes 7/26/2018 10:52 PM   Debridement per physician None 7/25/2018  2:47 PM   Time out Yes 7/11/2018 11:48 AM   Procedural Pain 0 6/15/2018 11:50 AM   Post procedural Pain 0 Taken Yes 7/26/2018 10:52 PM   Debridement per physician None 7/25/2018  2:47 PM   Time out Yes 8/8/2018  2:18 PM   Number of days: 42       Wound 07/26/18 Leg Left; Lower;Dorsal (Active)   Wound Type Wound 8/1/2018  6:30 AM   Dressing Status Changed 8/1/2018  6:30 AM   Dressing Changed Changed/New 8/1/2018  6:30 AM   Dressing/Treatment ABD; Other (Comment) 8/1/2018  6:30 AM   Wound Cleansed Rinsed/Irrigated with saline 8/1/2018  6:30 AM   Dressing Change Due 08/02/18 8/1/2018  6:30 AM   Wound Length (cm) 18 cm 8/1/2018  6:30 AM   Wound Assessment Bleeding;Drainage;Fragile;Painful;Granulation tissue 8/1/2018  6:30 AM   Drainage Amount Moderate 8/1/2018  6:30 AM   Drainage Description Yellow;Serosanguinous 8/1/2018  6:30 AM   Odor None 8/1/2018  6:30 AM   Susy-wound Assessment Dry;Fragile;Painful;Red 8/1/2018  6:30 AM   Culture Taken Yes 7/26/2018 10:52 PM   Number of days: 26       Wound 07/26/18 Ankle Left; Outer; Lower (Active)   Wound Type Wound 8/2/2018  8:55 AM   Dressing Status Clean;Dry; Intact 8/2/2018  8:55 AM   Dressing Changed Changed/New 8/2/2018  6:45 AM   Dressing/Treatment ABD; Other (Comment) 8/2/2018  6:45 AM   Wound Cleansed Rinsed/Irrigated with saline 8/2/2018  6:45 AM   Dressing Change Due 08/03/18 8/2/2018  8:55 AM   Wound Length (cm) 2 cm 8/1/2018  6:30 AM   Wound Width (cm) 1 cm 8/1/2018  6:30 AM   Calculated Wound Size (cm^2) (l*w) 2 cm^2 8/1/2018  6:30 AM   Change in Wound Size % (l*w) 0 8/1/2018  6:30 AM   Wound Assessment Intact; Other (Comment); Brianna Pike 8/2/2018  6:45 AM   Drainage Amount None 8/2/2018  6:45 AM   Drainage Description Serosanguinous; Yellow 8/1/2018  6:30 AM   Odor None 8/2/2018  6:45 AM   Susy-wound Assessment Dry;Fragile;Painful;Red 8/2/2018  6:45 AM   Culture Taken Yes 7/26/2018 10:52 PM   Number of days: 26       Wound 07/27/18 Leg Right (Active)   Wound Image   7/27/2018  4:13 PM   Wound Type Wound 8/2/2018  8:55 AM   Dressing Status Clean;Dry; Intact 8/2/2018  8:55 AM Dressing Changed Changed/New 8/2/2018  6:45 AM   Dressing/Treatment ABD; Other (Comment) 8/2/2018  6:45 AM   Wound Cleansed Rinsed/Irrigated with saline 8/2/2018  6:45 AM   Dressing Change Due 08/03/18 8/2/2018  8:55 AM   Wound Length (cm) 6 cm 8/1/2018  6:30 AM   Wound Width (cm) 6 cm 8/1/2018  6:30 AM   Wound Depth (cm)  0.2 8/1/2018  6:30 AM   Calculated Wound Size (cm^2) (l*w) 36 cm^2 8/1/2018  6:30 AM   Change in Wound Size % (l*w) 0 8/1/2018  6:30 AM   Wound Assessment Bleeding;Red 8/2/2018  6:45 AM   Drainage Amount Small 8/2/2018  6:45 AM   Drainage Description Serosanguinous 8/2/2018  6:45 AM   Odor None 8/2/2018  6:45 AM   Susy-wound Assessment Fragile 8/2/2018  6:45 AM   Number of days: 25       Wound 07/27/18 Leg Right (Active)   Wound Image   7/27/2018  4:13 PM   Wound Type Wound 8/2/2018  8:55 AM   Dressing Status Clean;Dry; Intact 8/2/2018  8:55 AM   Dressing Changed Changed/New 8/2/2018  6:45 AM   Dressing/Treatment ABD; Other (Comment) 8/2/2018  6:45 AM   Wound Cleansed Rinsed/Irrigated with saline 8/2/2018  6:45 AM   Dressing Change Due 08/03/18 8/2/2018  8:55 AM   Wound Length (cm) 4 cm 8/1/2018  6:30 AM   Wound Width (cm) 4 cm 8/1/2018  6:30 AM   Wound Depth (cm)  0.2 8/1/2018  6:30 AM   Calculated Wound Size (cm^2) (l*w) 16 cm^2 8/1/2018  6:30 AM   Change in Wound Size % (l*w) 0 8/1/2018  6:30 AM   Wound Assessment Bleeding;Red 8/2/2018  6:45 AM   Drainage Amount Small 8/2/2018  6:45 AM   Drainage Description Serosanguinous 8/2/2018  6:45 AM   Odor None 8/2/2018  6:45 AM   Susy-wound Assessment Fragile;Painful;Red 8/2/2018  6:45 AM   Number of days: 25       Wound 08/08/18 Venous ulcer Leg Right;Proximal;Medial;Posterior #14 blocked  acq: 8-2-18 (Active)   Wound Image   8/8/2018  2:04 PM   Wound Type Wound 8/15/2018  2:40 PM   Dressing Status Changed;Clean;Dry; Intact 8/15/2018  2:40 PM   Dressing Changed Changed/New 8/15/2018  2:40 PM   Dressing/Treatment Non adherent; Alginate;ABD;Other (Comment) 8/15/2018  2:40 PM   Wound Cleansed Rinsed/Irrigated with saline 8/15/2018  2:40 PM   Dressing Change Due 08/16/18 8/15/2018  2:40 PM   Wound Length (cm) 1 cm 8/15/2018  2:40 PM   Wound Width (cm) 1 cm 8/15/2018  2:40 PM   Wound Depth (cm)  0.2 8/13/2018  1:46 PM   Calculated Wound Size (cm^2) (l*w) 1 cm^2 8/15/2018  2:40 PM   Change in Wound Size % (l*w) 89.42 8/15/2018  2:40 PM   Wound Assessment Red;Bleeding 8/15/2018  2:40 PM   Drainage Amount Scant 8/15/2018  2:40 PM   Drainage Description Serosanguinous 8/15/2018  2:40 PM   Odor None 8/15/2018  2:40 PM   Susy-wound Assessment Red 8/15/2018  2:40 PM   Debridement per physician None 8/8/2018  2:18 PM   Number of days: 14       Wound 08/08/18 Skin tear Ankle Right;Distal;Medial #15 acq: 8-2-18 right dital medial #1 at [facility] (Active)   Wound Image   8/22/2018  2:27 PM   Wound Type Wound 8/15/2018  2:40 PM   Dressing Status Clean;Dry; Intact 8/15/2018  2:40 PM   Dressing Changed Changed/New 8/14/2018  6:05 AM   Dressing/Treatment Non adherent; Alginate;ABD;Other (Comment) 8/15/2018  2:40 PM   Wound Cleansed Rinsed/Irrigated with saline 8/15/2018  2:40 PM   Dressing Change Due 08/16/18 8/15/2018  2:40 PM   Wound Length (cm) 1.2 cm 8/22/2018  2:27 PM   Wound Width (cm) 0.9 cm 8/22/2018  2:27 PM   Wound Depth (cm)  0.1 8/22/2018  2:27 PM   Calculated Wound Size (cm^2) (l*w) 1.08 cm^2 8/22/2018  2:27 PM   Change in Wound Size % (l*w) 88 8/22/2018  2:27 PM   Wound Assessment Red 8/22/2018  2:27 PM   Drainage Amount Scant 8/22/2018  2:27 PM   Drainage Description Serosanguinous 8/22/2018  2:27 PM   Odor None 8/22/2018  2:27 PM   Susy-wound Assessment Fragile 8/22/2018  2:27 PM   Debridement per physician None 8/8/2018  2:18 PM   Number of days: 14       Wound 08/08/18 Skin tear Ankle Left;Medial #16 acq: 8-2-18 # 2 at [facility] left medial (Active)   Wound Image   8/22/2018  2:27 PM   Wound Type Wound 8/15/2018  2:40 PM   Dressing Status Clean;Dry; Intact 8/15/2018  2:40 PM   Dressing Changed Changed/New 8/14/2018  6:05 AM   Dressing/Treatment Alginate;Non adherent;ABD;Other (Comment) 8/15/2018  2:40 PM   Wound Cleansed Rinsed/Irrigated with saline 8/15/2018  2:40 PM   Dressing Change Due 08/16/18 8/15/2018  2:40 PM   Wound Length (cm) 6.5 cm 8/22/2018  2:27 PM   Wound Width (cm) 9 cm 8/22/2018  2:27 PM   Wound Depth (cm)  0.1 8/22/2018  2:27 PM   Calculated Wound Size (cm^2) (l*w) 58.5 cm^2 8/22/2018  2:27 PM   Change in Wound Size % (l*w) -234.29 8/22/2018  2:27 PM   Wound Assessment Red 8/22/2018  2:27 PM   Drainage Amount Small 8/22/2018  2:27 PM   Drainage Description Serous; Yellow 8/22/2018  2:27 PM   Odor None 8/22/2018  2:27 PM   Susy-wound Assessment Red 8/15/2018  2:40 PM   Time out Yes 8/8/2018  2:18 PM   Number of days: 14       Wound 08/08/18 Skin tear Foot Left;Dorsal #17 acq: 8-2-18 (Active)   Wound Image   8/8/2018  2:08 PM   Wound Type Wound 8/15/2018  2:40 PM   Dressing Status Clean;Dry; Intact 8/15/2018  2:40 PM   Dressing Changed Changed/New 8/14/2018  6:05 AM   Dressing/Treatment Other (Comment) 8/15/2018  2:40 PM   Wound Cleansed Rinsed/Irrigated with saline 8/15/2018  2:40 PM   Dressing Change Due 08/16/18 8/15/2018  2:40 PM   Wound Length (cm) 1 cm 8/15/2018  2:40 PM   Wound Width (cm) 1 cm 8/15/2018  2:40 PM   Wound Depth (cm)  0.1 8/11/2018  8:15 PM   Calculated Wound Size (cm^2) (l*w) 1 cm^2 8/15/2018  2:40 PM   Change in Wound Size % (l*w) 41.18 8/15/2018  2:40 PM   Wound Assessment Red 8/15/2018  2:40 PM   Drainage Amount Scant 8/15/2018  2:40 PM   Drainage Description Serosanguinous 8/15/2018  2:40 PM   Odor None 8/15/2018  2:40 PM   Susy-wound Assessment Red 8/15/2018  2:40 PM   Time out Yes 8/8/2018  2:18 PM   Number of days: 14       Wound 08/13/18 Buttocks Right (Active)   Wound Type Wound 8/15/2018  2:40 PM   Wound Pressure Stage  2 8/13/2018  1:46 PM   Dressing Status Clean;Dry; Intact 8/15/2018  2:40 PM   Dressing Changed

## 2018-08-29 ENCOUNTER — HOSPITAL ENCOUNTER (OUTPATIENT)
Dept: WOUND CARE | Age: 78
Discharge: HOME | End: 2018-08-29
Payer: COMMERCIAL

## 2018-08-29 ENCOUNTER — HOSPITAL ENCOUNTER (OUTPATIENT)
Age: 78
Discharge: HOME | End: 2018-08-31
Payer: COMMERCIAL

## 2018-08-29 VITALS
RESPIRATION RATE: 16 BRPM | HEART RATE: 74 BPM | TEMPERATURE: 97.7 F | DIASTOLIC BLOOD PRESSURE: 70 MMHG | SYSTOLIC BLOOD PRESSURE: 110 MMHG

## 2018-08-29 PROCEDURE — 87070 CULTURE OTHR SPECIMN AEROBIC: CPT

## 2018-08-29 PROCEDURE — 87075 CULTR BACTERIA EXCEPT BLOOD: CPT

## 2018-08-29 PROCEDURE — 11045 DBRDMT SUBQ TISS EACH ADDL: CPT

## 2018-08-29 PROCEDURE — 11042 DBRDMT SUBQ TIS 1ST 20SQCM/<: CPT

## 2018-08-29 PROCEDURE — 87205 SMEAR GRAM STAIN: CPT

## 2018-08-29 PROCEDURE — 87186 SC STD MICRODIL/AGAR DIL: CPT

## 2018-08-29 NOTE — PROGRESS NOTES
 Smokeless tobacco: Never Used    Alcohol use No     Allergies   Allergen Reactions    Levaquin [Levofloxacin] Other (See Comments)     Lethargy when taken with celebrex     Current Outpatient Prescriptions on File Prior to Encounter   Medication Sig Dispense Refill    insulin lispro (HUMALOG) 100 UNIT/ML injection vial Inject 0-6 Units into the skin 3 times daily (with meals) 1 vial 3    Nutritional Supplements (ARGINAID) PACK Take 4.5 g by mouth 2 times daily      insulin detemir (LEVEMIR) 100 UNIT/ML injection vial Inject 10 Units into the skin nightly      mirtazapine (REMERON) 15 MG tablet Take 7.5 mg by mouth daily      atorvastatin (LIPITOR) 20 MG tablet Take 20 mg by mouth daily      Cholecalciferol (VITAMIN D3) 5000 UNITS TABS Take 5,000 Units by mouth daily      mineral oil-hydrophilic petrolatum (AQUAPHOR) ointment As directed (Patient taking differently: Apply 1 applicator topically 2 times daily As directed)      tamsulosin (FLOMAX) 0.4 MG capsule Take 0.4 mg by mouth every evening       latanoprost (XALATAN) 0.005 % ophthalmic solution Place 1 drop into both eyes nightly       busPIRone (BUSPAR) 7.5 MG tablet Take 7.5 mg by mouth daily.  albuterol (PROVENTIL) (2.5 MG/3ML) 0.083% nebulizer solution Take 2.5 mg by nebulization every 6 hours as needed for Wheezing      triamcinolone (KENALOG) 0.1 % cream Apply topically every 8 hours Apply topically 2 times daily.  glucose (GLUTOSE) 40 % GEL Take 15 g by mouth as needed 45 g 1    acetaminophen (TYLENOL) 325 MG tablet Take 650 mg by mouth every 4 hours as needed.  magnesium hydroxide (MILK OF MAGNESIA) 400 MG/5ML suspension Take 30 mLs by mouth daily as needed.  bisacodyl (DULCOLAX) 5 MG EC tablet Take 5 mg by mouth daily as needed. No current facility-administered medications on file prior to encounter.         REVIEW OF SYSTEMS See HPI    Objective:    /70   Pulse 74   Temp 97.7 °F (36.5 °C)   Resp 16 (Active)   Number of days: 874       Wound 04/06/16 Knee Posterior;Right (Active)   Number of days: 874       Wound 04/06/16 Knee Lateral;Outer;Right (Active)   Number of days: 134       Wound 04/06/16 Other (Comment) Right (Active)   Number of days: 021       Wound 04/06/16 Other (Comment) Posterior; Left (Active)   Number of days: 874       Wound 04/06/16 Other (Comment) Medial;Left (Active)   Number of days: 349       Wound 10/18/16 Skin tear Ankle Outer;Left (Active)   Number of days: 680       Wound 10/18/16 Skin tear Leg Outer;Left (Active)   Number of days: 680       Wound 10/19/16 Other (Comment) Foot Right (Active)   Number of days: 679       Wound 08/04/17 Venous ulcer Leg Left;Upper new wound, #1 venous wound left upper leg. date of onset May 2017 (Active)   Wound Type Wound 10/6/2017  9:21 AM   Wound Venous 10/6/2017  9:21 AM   Dressing Status Changed 10/20/2017  9:57 AM   Dressing Changed Changed/New 10/20/2017  9:57 AM   Dressing/Treatment Alginate 10/20/2017  9:57 AM   Wound Cleansed Rinsed/Irrigated with saline 10/20/2017  9:57 AM   Dressing Change Due 10/13/17 10/6/2017 10:23 AM   Wound Length (cm) 0 cm 10/25/2017  4:06 PM   Wound Width (cm) 0 cm 10/25/2017  4:06 PM   Wound Depth (cm)  0 10/25/2017  4:06 PM   Calculated Wound Size (cm^2) (l*w) 0 cm^2 10/25/2017  4:06 PM   Change in Wound Size % (l*w) 100 10/25/2017  4:06 PM   Wound Assessment Epithelialization 10/25/2017  4:06 PM   Drainage Amount Moderate 10/20/2017  8:52 AM   Drainage Description Serosanguinous; Yellow 10/20/2017  8:52 AM   Odor None 10/20/2017  8:52 AM   Susy-wound Assessment Red 10/25/2017  4:06 PM   Red%Wound Bed 100 10/6/2017  9:21 AM   Debridement per physician Full thickness 10/20/2017  9:03 AM   Time out Yes 10/20/2017  9:03 AM   Procedural Pain 1 10/20/2017  9:03 AM   Post procedural Pain 1 10/20/2017  9:03 AM   Number of days: 390       Wound 08/04/17 (Active)   Number of days: 390       Wound 09/08/17 Venous ulcer Ankle Description Serosanguinous 10/6/2017  9:08 AM   Odor None 10/6/2017  9:08 AM   Susy-wound Assessment Purple; Intact;Dry 10/6/2017  9:08 AM   Red%Wound Bed 100 10/6/2017  9:08 AM   Yellow%Wound Bed 20 9/29/2017  9:33 AM   Debridement per physician Full thickness 10/6/2017  9:24 AM   Time out Yes 10/6/2017  9:24 AM   Procedural Pain 0 10/6/2017  9:24 AM   Post procedural Pain 1 9/29/2017 10:00 AM   Number of days: 334       Wound 05/25/18 Venous ulcer Pretibial Left wound #8 acquired 5/17/18 left pretibial #4 at [facility]/ blocked to include medial aspect (Active)   Wound Image   8/22/2018  2:27 PM   Wound Type Wound 8/15/2018  2:40 PM   Wound Venous 7/11/2018 11:44 AM   Dressing Status Clean;Dry;New drainage 8/15/2018  2:40 PM   Dressing Changed Changed/New 8/14/2018  6:05 AM   Dressing/Treatment Other (Comment) 8/15/2018  2:40 PM   Wound Cleansed Rinsed/Irrigated with saline 8/15/2018  2:40 PM   Dressing Change Due 08/16/18 8/15/2018  2:40 PM   Wound Length (cm) 6.5 cm 8/29/2018 10:58 AM   Wound Width (cm) 11 cm 8/29/2018 10:58 AM   Wound Depth (cm)  0.2 8/29/2018 10:58 AM   Calculated Wound Size (cm^2) (l*w) 71.5 cm^2 8/29/2018 10:58 AM   Change in Wound Size % (l*w) -121.71 8/29/2018 10:58 AM   Wound Assessment Bleeding;Drainage; Red 8/29/2018 10:24 AM   Drainage Amount Moderate 8/29/2018 10:24 AM   Drainage Description Serosanguinous 8/29/2018 10:24 AM   Odor None 8/29/2018 10:24 AM   Susy-wound Assessment Fragile 8/29/2018 10:24 AM   Red%Wound Bed 90 8/29/2018 10:24 AM   Yellow%Wound Bed 10 8/29/2018 10:24 AM   Culture Taken Yes 7/26/2018 10:52 PM   Debridement per physician None 7/25/2018  2:47 PM   Time out Yes 8/29/2018 10:58 AM   Procedural Pain 0 6/15/2018 11:50 AM   Post procedural Pain 0 5/25/2018 10:37 AM   Number of days: 96       Wound 06/20/18 Abdomen #12  blocked (Acquired June 2018) (Active)   Wound Type Wound 8/15/2018  2:40 PM   Dressing Status Other (Comment) 8/12/2018  9:15 PM   Dressing Changed Changed/New 8/8/2018  3:37 PM   Dressing/Treatment ABD 8/15/2018  2:40 PM   Wound Cleansed Not Cleansed 8/11/2018  8:15 PM   Wound Length (cm) 22.5 cm 8/8/2018  1:41 PM   Wound Width (cm) 10 cm 8/8/2018  1:41 PM   Wound Depth (cm)  0.1 8/8/2018  1:41 PM   Calculated Wound Size (cm^2) (l*w) 225 cm^2 8/8/2018  1:41 PM   Change in Wound Size % (l*w) -1025 8/8/2018  1:41 PM   Wound Assessment Red 8/11/2018  8:15 PM   Drainage Amount Scant 8/11/2018  8:15 PM   Drainage Description Sanguinous 8/11/2018  8:15 PM   Odor None 8/11/2018  8:15 PM   Susy-wound Assessment Dry; Intact 8/8/2018  1:41 PM   Culture Taken No 7/26/2018 10:52 PM   Debridement per physician None 8/8/2018  2:18 PM   Number of days: 69       Wound 07/11/18 Venous ulcer Leg Lower;Left;Lateral #13 acq: 7-11-18 #3 at [facility] left lateral (Active)   Wound Image   8/22/2018  2:31 PM   Wound Type Wound 8/15/2018  2:40 PM   Wound Venous 7/11/2018 11:44 AM   Dressing Status Clean;Dry; Intact 8/22/2018  3:41 PM   Dressing Changed Changed/New 8/22/2018  3:41 PM   Dressing/Treatment Alginate;Non adherent;ABD;Other (Comment) 8/15/2018  2:40 PM   Wound Cleansed Rinsed/Irrigated with saline 8/22/2018  3:41 PM   Dressing Change Due 08/16/18 8/15/2018  2:40 PM   Wound Length (cm) 2.5 cm 8/29/2018 10:58 AM   Wound Width (cm) 10 cm 8/29/2018 10:58 AM   Wound Depth (cm)  0.2 8/29/2018 10:58 AM   Calculated Wound Size (cm^2) (l*w) 25 cm^2 8/29/2018 10:58 AM   Change in Wound Size % (l*w) -3471.43 8/29/2018 10:58 AM   Wound Assessment Bleeding;Drainage;Red;Yellow 8/29/2018 10:24 AM   Drainage Amount Small 8/29/2018 10:24 AM   Drainage Description Serosanguinous 8/29/2018 10:24 AM   Odor None 8/29/2018 10:24 AM   Susy-wound Assessment Fragile 8/29/2018 10:24 AM   Red%Wound Bed 80 8/29/2018 10:24 AM   Yellow%Wound Bed 20 8/29/2018 10:24 AM   Culture Taken Yes 7/26/2018 10:52 PM   Debridement per physician None 7/25/2018  2:47 PM   Time out Yes 8/29/2018 10:58 AM Number of days: 48       Wound 07/26/18 Leg Left; Lower;Dorsal (Active)   Wound Type Wound 8/1/2018  6:30 AM   Dressing Status Changed 8/1/2018  6:30 AM   Dressing Changed Changed/New 8/1/2018  6:30 AM   Dressing/Treatment ABD; Other (Comment) 8/1/2018  6:30 AM   Wound Cleansed Rinsed/Irrigated with saline 8/1/2018  6:30 AM   Dressing Change Due 08/02/18 8/1/2018  6:30 AM   Wound Length (cm) 18 cm 8/1/2018  6:30 AM   Wound Assessment Bleeding;Drainage;Fragile;Painful;Granulation tissue 8/1/2018  6:30 AM   Drainage Amount Moderate 8/1/2018  6:30 AM   Drainage Description Yellow;Serosanguinous 8/1/2018  6:30 AM   Odor None 8/1/2018  6:30 AM   Susy-wound Assessment Dry;Fragile;Painful;Red 8/1/2018  6:30 AM   Culture Taken Yes 7/26/2018 10:52 PM   Number of days: 33       Wound 07/26/18 Ankle Left; Outer; Lower (Active)   Wound Type Wound 8/2/2018  8:55 AM   Dressing Status Clean;Dry; Intact 8/2/2018  8:55 AM   Dressing Changed Changed/New 8/2/2018  6:45 AM   Dressing/Treatment ABD; Other (Comment) 8/2/2018  6:45 AM   Wound Cleansed Rinsed/Irrigated with saline 8/2/2018  6:45 AM   Dressing Change Due 08/03/18 8/2/2018  8:55 AM   Wound Length (cm) 2 cm 8/1/2018  6:30 AM   Wound Width (cm) 1 cm 8/1/2018  6:30 AM   Calculated Wound Size (cm^2) (l*w) 2 cm^2 8/1/2018  6:30 AM   Change in Wound Size % (l*w) 0 8/1/2018  6:30 AM   Wound Assessment Intact; Other (Comment); Camella Feeling 8/2/2018  6:45 AM   Drainage Amount None 8/2/2018  6:45 AM   Drainage Description Serosanguinous; Yellow 8/1/2018  6:30 AM   Odor None 8/2/2018  6:45 AM   Susy-wound Assessment Dry;Fragile;Painful;Red 8/2/2018  6:45 AM   Culture Taken Yes 7/26/2018 10:52 PM   Number of days: 33       Wound 07/27/18 Leg Right (Active)   Wound Image   7/27/2018  4:13 PM   Wound Type Wound 8/2/2018  8:55 AM   Dressing Status Clean;Dry; Intact 8/2/2018  8:55 AM   Dressing Changed Changed/New 8/2/2018  6:45 AM   Dressing/Treatment ABD; Other (Comment) 8/2/2018  6:45 AM   Wound Cleansed Rinsed/Irrigated with saline 8/2/2018  6:45 AM   Dressing Change Due 08/03/18 8/2/2018  8:55 AM   Wound Length (cm) 6 cm 8/1/2018  6:30 AM   Wound Width (cm) 6 cm 8/1/2018  6:30 AM   Wound Depth (cm)  0.2 8/1/2018  6:30 AM   Calculated Wound Size (cm^2) (l*w) 36 cm^2 8/1/2018  6:30 AM   Change in Wound Size % (l*w) 0 8/1/2018  6:30 AM   Wound Assessment Bleeding;Red 8/2/2018  6:45 AM   Drainage Amount Small 8/2/2018  6:45 AM   Drainage Description Serosanguinous 8/2/2018  6:45 AM   Odor None 8/2/2018  6:45 AM   Susy-wound Assessment Fragile 8/2/2018  6:45 AM   Number of days: 32       Wound 07/27/18 Leg Right (Active)   Wound Image   7/27/2018  4:13 PM   Wound Type Wound 8/2/2018  8:55 AM   Dressing Status Clean;Dry; Intact 8/2/2018  8:55 AM   Dressing Changed Changed/New 8/2/2018  6:45 AM   Dressing/Treatment ABD; Other (Comment) 8/2/2018  6:45 AM   Wound Cleansed Rinsed/Irrigated with saline 8/2/2018  6:45 AM   Dressing Change Due 08/03/18 8/2/2018  8:55 AM   Wound Length (cm) 4 cm 8/1/2018  6:30 AM   Wound Width (cm) 4 cm 8/1/2018  6:30 AM   Wound Depth (cm)  0.2 8/1/2018  6:30 AM   Calculated Wound Size (cm^2) (l*w) 16 cm^2 8/1/2018  6:30 AM   Change in Wound Size % (l*w) 0 8/1/2018  6:30 AM   Wound Assessment Bleeding;Red 8/2/2018  6:45 AM   Drainage Amount Small 8/2/2018  6:45 AM   Drainage Description Serosanguinous 8/2/2018  6:45 AM   Odor None 8/2/2018  6:45 AM   Susy-wound Assessment Fragile;Painful;Red 8/2/2018  6:45 AM   Number of days: 32       Wound 08/08/18 Venous ulcer Leg Right;Proximal;Medial;Posterior #14 blocked  acq: 8-2-18 (Active)   Wound Image   8/8/2018  2:04 PM   Wound Type Wound 8/15/2018  2:40 PM   Dressing Status Changed;Clean;Dry; Intact 8/15/2018  2:40 PM   Dressing Changed Changed/New 8/15/2018  2:40 PM   Dressing/Treatment Non adherent; Alginate;ABD;Other (Comment) 8/15/2018  2:40 PM   Wound Cleansed Rinsed/Irrigated with saline 8/15/2018  2:40 PM   Dressing Change Due 08/16/18 traumatic  left lateral ankle onset august 29, 2018 (Active)   Wound Image   8/29/2018 10:24 AM   Wound Type Incision 8/29/2018 10:24 AM   Wound Traumatic 8/29/2018 10:24 AM   Wound Length (cm) 7 cm 8/29/2018 10:58 AM   Wound Width (cm) 4.5 cm 8/29/2018 10:58 AM   Wound Depth (cm)  0.2 8/29/2018 10:58 AM   Calculated Wound Size (cm^2) (l*w) 31.5 cm^2 8/29/2018 10:58 AM   Change in Wound Size % (l*w) 0 8/29/2018 10:58 AM   Wound Assessment Drainage; Red 8/29/2018 10:24 AM   Drainage Amount Moderate 8/29/2018 10:24 AM   Drainage Description Serosanguinous 8/29/2018 10:24 AM   Susy-wound Assessment Clean;Fragile 8/29/2018 10:24 AM   Red%Wound Bed 100 8/29/2018 10:24 AM   Time out Yes 8/29/2018 10:58 AM   Number of days: 0       Wound 08/29/18  Toe (Comment  which one) Anterior new wound, #19 right second toe. date of onset aug 29,2018 (Active)   Wound Image   8/29/2018 10:24 AM   Wound Type Wound 8/29/2018 10:24 AM   Wound Diabetic [place] 1 8/29/2018 10:24 AM   Wound Length (cm) 1 cm 8/29/2018 10:58 AM   Wound Width (cm) 1.2 cm 8/29/2018 10:58 AM   Wound Depth (cm)  0.1 8/29/2018 10:58 AM   Calculated Wound Size (cm^2) (l*w) 1.2 cm^2 8/29/2018 10:58 AM   Change in Wound Size % (l*w) 0 8/29/2018 10:58 AM   Wound Assessment Dark edges;Light purple 8/29/2018 10:24 AM   Drainage Amount None 8/29/2018 10:24 AM   Odor None 8/29/2018 10:24 AM   Time out Yes 8/29/2018 10:58 AM   Number of days: 0     Percent of Wound/Ulcer Debrided: 100%    Total Surface Area Debrided:  147 sq cm     Estimated Blood Loss:  Minimal  Hemostasis Achieved:  by pressure    Procedural Pain:  2  / 10   Post Procedural Pain:  3 / 10     Response to treatment:  Well tolerated by patient. Plan:   Treatment Note please see attached Discharge Instructions    Written patient dismissal instructions given to patient and signed by patient or POA.          Discharge Instructions       Visit Discharge/Physician Orders     Discharge condition:

## 2018-08-29 NOTE — PLAN OF CARE
Problem: Wound:  Goal: Will show signs of wound healing; wound closure and no evidence of infection  Will show signs of wound healing; wound closure and no evidence of infection    Outcome: Ongoing      Problem: Venous:  Goal: Signs of wound healing will improve  Signs of wound healing will improve    Outcome: Ongoing      Problem: Compression therapy:  Goal: Will be free from complications associated with compression therapy  Will be free from complications associated with compression therapy    Outcome: Ongoing      Problem: Skin Integrity:  Goal: Skin integrity will stabilize  Skin integrity will stabilize   Outcome: Ongoing

## 2018-08-31 LAB — ANAEROBIC CULTURE: NORMAL

## 2018-09-01 LAB
GRAM STAIN RESULT: ABNORMAL
ORGANISM: ABNORMAL
WOUND/ABSCESS: ABNORMAL
WOUND/ABSCESS: ABNORMAL

## 2018-09-03 NOTE — DISCHARGE SUMMARY
08/14/2018    BUN 20 08/14/2018    CREATININE 0.7 08/14/2018    GFRAA >60 08/14/2018    LABGLOM >60 08/14/2018    GLUCOSE 101 08/14/2018    GLUCOSE 138 03/26/2011    PROT 6.8 08/14/2018    LABALBU 2.8 08/14/2018    LABALBU 4.0 03/26/2011    CALCIUM 8.8 08/14/2018    BILITOT 0.2 08/14/2018    ALKPHOS 110 08/14/2018    AST 28 08/14/2018    ALT 26 08/14/2018        Consults: ID and GI    Procedures: NONE     Hospital Course: ADMITTED WITH DIARRHEA FROM [facility] CT SCAN AND GI AND ID INPUT NO INFECTIOUS SOURCE WAS DCTO SHERRIE STABLE CONDITION     Discharge Exam:    /64   Pulse 88   Temp 98.3 °F (36.8 °C) (Oral)   Resp 18   Ht 6' (1.829 m)   Wt 220 lb 3.2 oz (99.9 kg)   SpO2 99%   BMI 29.86 kg/m²     Lungs:  Normal expansion. Clear to auscultation. No rales, rhonchi, or wheezing. Heart:  Heart sounds are normal.  Regular rate and rhythm without murmur, gallop or rub. Abdomen:  Soft, non-tender, normal bowel sounds. No bruits, organomegaly or masses. Extremities: Extremities warm to touch, pink, with no edema. Disposition: long term care facility    Patient Instructions:   Discharge Medication List as of 8/15/2018  5:32 PM      CONTINUE these medications which have NOT CHANGED    Details   insulin lispro (HUMALOG) 100 UNIT/ML injection vial Inject 0-6 Units into the skin 3 times daily (with meals), Disp-1 vial, R-3Normal      albuterol (PROVENTIL) (2.5 MG/3ML) 0.083% nebulizer solution Take 2.5 mg by nebulization every 6 hours as needed for WheezingHistorical Med      triamcinolone (KENALOG) 0.1 % cream Apply topically every 8 hours Apply topically 2 times daily.  , Topical, EVERY 8 HOURS, Historical Med      Nutritional Supplements (ARGINAID) PACK Take 4.5 g by mouth 2 times dailyHistorical Med      insulin detemir (LEVEMIR) 100 UNIT/ML injection vial Inject 10 Units into the skin nightlyHistorical Med      mirtazapine (REMERON) 15 MG tablet Take 7.5 mg by mouth dailyHistorical Med      atorvastatin

## 2018-09-05 ENCOUNTER — HOSPITAL ENCOUNTER (OUTPATIENT)
Dept: WOUND CARE | Age: 78
Discharge: HOME | End: 2018-09-05
Payer: COMMERCIAL

## 2018-09-05 VITALS
TEMPERATURE: 97.8 F | HEART RATE: 72 BPM | SYSTOLIC BLOOD PRESSURE: 110 MMHG | RESPIRATION RATE: 16 BRPM | WEIGHT: 220 LBS | BODY MASS INDEX: 29.84 KG/M2 | DIASTOLIC BLOOD PRESSURE: 70 MMHG

## 2018-09-05 PROCEDURE — 11045 DBRDMT SUBQ TISS EACH ADDL: CPT

## 2018-09-05 PROCEDURE — 11042 DBRDMT SUBQ TIS 1ST 20SQCM/<: CPT

## 2018-09-05 RX ORDER — SULFAMETHOXAZOLE AND TRIMETHOPRIM 800; 160 MG/1; MG/1
1 TABLET ORAL 2 TIMES DAILY
Qty: 20 TABLET | Refills: 0 | Status: SHIPPED | OUTPATIENT
Start: 2018-09-05 | End: 2018-09-12 | Stop reason: ALTCHOICE

## 2018-09-05 NOTE — PROGRESS NOTES
29.84 kg/m²   Wt Readings from Last 3 Encounters:   09/05/18 220 lb (99.8 kg)   08/22/18 220 lb (99.8 kg)   08/14/18 220 lb 3.2 oz (99.9 kg)     PHYSICAL EXAM  CONSTITUTIONAL:   Awake, alert, cooperative   EYES:  lids and lashes normal   ENT: external ears and nose without lesions   NECK:  supple, symmetrical, trachea midline   SKIN:  Open wound Present    Assessment:     Problem List Items Addressed This Visit     None        Procedure Note  Indications:  Based on my examination of this patient's wound(s)/ulcer(s) today, debridement is required to promote healing and evaluate the wound base. Performed by: Valentin Lozano DPM    Consent obtained:  Yes    Time out taken:  Yes    Pain Control: Anesthetic  Anesthetic: 2% Lidocaine Gel Topical     Debridement:Excisional Debridement    Using #15 blade scalpel the wound(s)/ulcer(s) was/were sharply debrided down through and including the removal of subcutaneous tissue. Devitalized Tissue Debrided:  fibrin, biofilm, slough and necrotic/eschar to stimulate bleeding to promote healing, post debridement good bleeding base and wound edges noted    Pre Debridement Measurements:  Are located in the Wound/Ulcer Documentation Flow Sheet    Wound/Ulcer #: 5, 8, 13, 14, 15, 16, 18 and 19    Post Debridement Measurements:  Wound/Ulcer Descriptions are Pre Debridement except measurements:    Pressure Ulcer 04/06/16 Buttocks Right (Active)   Number of days: 882       Wound 03/31/16 Knee Medial;Right (Active)   Number of days: 887       Wound 03/31/16 Knee Lateral;Right (Active)   Number of days: 887       Wound 03/31/16 Ankle Right;Posterior (Active)   Number of days: 887       Wound 03/31/16 Ankle Lateral;Left (Active)   Number of days: 887       Wound 03/31/16 Ankle Left; Anterior (Active)   Number of days: 887       Wound 03/31/16 Knee Left;Lateral (Active)   Number of days: 887       Wound 04/02/16 Blister Ankle Lateral;Left (Active)   Number of days: 885       Wound 09/08/17 Venous ulcer Ankle Left;Lateral Wound #4 venous ulcer acquired 9/8/2017 (Active)   Wound Type Wound 10/6/2017  9:08 AM   Wound Venous 10/6/2017  9:08 AM   Dressing Status Changed 9/29/2017 10:33 AM   Dressing Changed Changed/New 9/29/2017 10:33 AM   Dressing/Treatment Alginate 9/8/2017 10:14 AM   Wound Cleansed Rinsed/Irrigated with saline 9/29/2017 10:33 AM   Wound Length (cm) 0 cm 10/6/2017  9:21 AM   Wound Width (cm) 0 cm 10/6/2017  9:21 AM   Wound Depth (cm)  0 10/6/2017  9:21 AM   Calculated Wound Size (cm^2) (l*w) 0 cm^2 10/6/2017  9:21 AM   Change in Wound Size % (l*w) 100 10/6/2017  9:21 AM   Wound Assessment Pink 9/29/2017  9:36 AM   Drainage Amount Moderate 10/6/2017  9:08 AM   Drainage Description Serosanguinous 10/6/2017  9:08 AM   Odor None 10/6/2017  9:08 AM   Susy-wound Assessment Dry; Intact 9/29/2017  9:36 AM   [place]%Wound Bed 100 9/22/2017 10:04 AM   Red%Wound Bed 30 9/15/2017 10:12 AM   Yellow%Wound Bed 20 9/15/2017 10:12 AM   Debridement per physician Subcutaneous 9/29/2017 10:00 AM   Time out Yes 9/29/2017 10:00 AM   Procedural Pain 2 9/29/2017 10:00 AM   Post procedural Pain 1 9/29/2017 10:00 AM   Number of days: 362       Wound 09/29/17 Venous ulcer Leg Right;Lateral new wound, #5  right lateral leg.  date of onset  sept. 29,2017 (Active)   Wound Type Wound 10/6/2017  9:08 AM   Wound Venous 10/6/2017  9:08 AM   Dressing Status Changed 10/6/2017 10:23 AM   Dressing Changed Changed/New 10/6/2017 10:23 AM   Dressing/Treatment Other (Comment) 10/6/2017 10:23 AM   Wound Cleansed Rinsed/Irrigated with saline 10/6/2017 10:23 AM   Dressing Change Due 10/13/17 10/6/2017 10:23 AM   Wound Length (cm) 0 cm 10/13/2017 10:00 AM   Wound Width (cm) 0 cm 10/13/2017 10:00 AM   Wound Depth (cm)  0 10/13/2017 10:00 AM   Calculated Wound Size (cm^2) (l*w) 0 cm^2 10/13/2017 10:00 AM   Change in Wound Size % (l*w) 100 10/13/2017 10:00 AM   Wound Assessment Red 10/6/2017  9:08 AM   Drainage Amount Small 3:37 PM   Dressing/Treatment ABD 8/15/2018  2:40 PM   Wound Cleansed Not Cleansed 8/11/2018  8:15 PM   Wound Length (cm) 22.5 cm 8/8/2018  1:41 PM   Wound Width (cm) 10 cm 8/8/2018  1:41 PM   Wound Depth (cm)  0.1 8/8/2018  1:41 PM   Calculated Wound Size (cm^2) (l*w) 225 cm^2 8/8/2018  1:41 PM   Change in Wound Size % (l*w) -1025 8/8/2018  1:41 PM   Wound Assessment Red 8/11/2018  8:15 PM   Drainage Amount Scant 8/11/2018  8:15 PM   Drainage Description Sanguinous 8/11/2018  8:15 PM   Odor None 8/11/2018  8:15 PM   Susy-wound Assessment Dry; Intact 8/8/2018  1:41 PM   Debridement per physician None 8/8/2018  2:18 PM   Number of days: 76       Wound 07/11/18 Venous ulcer Leg Lower;Left;Lateral #13 acq: 7-11-18 #3 at [facility] left lateral (Active)   Wound Image   8/22/2018  2:31 PM   Wound Type Wound 8/15/2018  2:40 PM   Wound Venous 7/11/2018 11:44 AM   Dressing Status New drainage; Changed 8/29/2018 11:38 AM   Dressing Changed Changed/New 8/29/2018 11:38 AM   Dressing/Treatment Alginate;Non adherent;ABD;Other (Comment) 8/15/2018  2:40 PM   Wound Cleansed Rinsed/Irrigated with saline 8/29/2018 11:38 AM   Dressing Change Due 08/16/18 8/15/2018  2:40 PM   Wound Length (cm) 2.8 cm 9/5/2018  9:54 AM   Wound Width (cm) 9.8 cm 9/5/2018  9:54 AM   Wound Depth (cm)  .2 9/5/2018  9:54 AM   Calculated Wound Size (cm^2) (l*w) 27.44 cm^2 9/5/2018  9:54 AM   Change in Wound Size % (l*w) -3820 9/5/2018  9:54 AM   Wound Assessment Bleeding;Red 9/5/2018  9:54 AM   Drainage Amount Moderate 9/5/2018  9:54 AM   Drainage Description Serosanguinous 9/5/2018  9:54 AM   Odor None 9/5/2018  9:54 AM   Susy-wound Assessment Fragile;Pink 9/5/2018  9:54 AM   Red%Wound Bed 80 8/29/2018 10:24 AM   Yellow%Wound Bed 20 8/29/2018 10:24 AM   Culture Taken Yes 8/29/2018 10:58 AM   Debridement per physician None 7/25/2018  2:47 PM   Time out Yes 8/29/2018 10:58 AM   Number of days: 55       Wound 07/26/18 Leg Left; Lower;Dorsal (Active)   Number of days: 40       Wound 07/26/18 Ankle Left; Outer; Lower (Active)   Number of days: 40       Wound 07/27/18 Leg Right (Active)   Number of days: 39       Wound 07/27/18 Leg Right (Active)   Number of days: 39       Wound 08/08/18 Venous ulcer Leg Right;Proximal;Medial;Posterior #14 blocked  acq: 8-2-18 (Active)   Wound Image   8/8/2018  2:04 PM   Wound Type Wound 8/15/2018  2:40 PM   Dressing Status Changed 8/29/2018 11:38 AM   Dressing Changed Changed/New 8/29/2018 11:38 AM   Dressing/Treatment Non adherent; Alginate;ABD;Other (Comment) 8/15/2018  2:40 PM   Wound Cleansed Rinsed/Irrigated with saline 8/29/2018 11:38 AM   Dressing Change Due 08/16/18 8/15/2018  2:40 PM   Wound Length (cm) 3 cm 9/5/2018  9:54 AM   Wound Width (cm) 1 cm 9/5/2018  9:54 AM   Wound Depth (cm)  .1 9/5/2018  9:54 AM   Calculated Wound Size (cm^2) (l*w) 3 cm^2 9/5/2018  9:54 AM   Change in Wound Size % (l*w) 68.25 9/5/2018  9:54 AM   Wound Assessment Bleeding;Red 9/5/2018  9:54 AM   Drainage Amount Large 9/5/2018  9:54 AM   Drainage Description Serosanguinous 9/5/2018  9:54 AM   Odor None 9/5/2018  9:54 AM   Susy-wound Assessment Pink 9/5/2018  9:54 AM   Red%Wound Bed 100 8/29/2018 10:24 AM   Debridement per physician None 8/8/2018  2:18 PM   Time out Yes 8/29/2018 10:58 AM   Number of days: 27       Wound 08/08/18 Skin tear Ankle Right;Distal;Medial #15 acq: 8-2-18 right dital medial #1 at [facility] (Active)   Wound Image   8/22/2018  2:27 PM   Wound Type Wound 8/15/2018  2:40 PM   Dressing Status New drainage; Changed 8/29/2018 11:38 AM   Dressing Changed Changed/New 8/29/2018 11:38 AM   Dressing/Treatment Non adherent; Alginate;ABD;Other (Comment) 8/15/2018  2:40 PM   Wound Cleansed Rinsed/Irrigated with saline 8/29/2018 11:38 AM   Dressing Change Due 08/16/18 8/15/2018  2:40 PM   Wound Length (cm) 5 cm 9/5/2018  9:54 AM   Wound Width (cm) 4.2 cm 9/5/2018  9:54 AM   Wound Depth (cm)  .1 9/5/2018  9:54 AM   Calculated Wound Size (cm^2) (l*w) 2:40 PM   Dressing Change Due 08/16/18 8/15/2018  2:40 PM   Wound Length (cm) 2 cm 8/22/2018  3:12 PM   Wound Width (cm) 1.2 cm 8/22/2018  3:12 PM   Wound Depth (cm)  0.1 8/22/2018  3:12 PM   Calculated Wound Size (cm^2) (l*w) 2.4 cm^2 8/22/2018  3:12 PM   Change in Wound Size % (l*w) -20 8/22/2018  3:12 PM   Wound Assessment Red 8/15/2018  2:40 PM   Drainage Amount None 8/15/2018  2:40 PM   Drainage Description Serosanguinous 8/13/2018  1:46 PM   Odor None 8/15/2018  2:40 PM   Time out Yes 8/22/2018  3:12 PM   Number of days: 22       Wound 08/22/18 Venous ulcer Toe (Comment  which one) Left wound # 5 [facility] left 3rd toe venous acquired 8/2/18 (Active)   Wound Image   8/22/2018  2:53 PM   Wound Type Wound 8/22/2018  2:53 PM   Wound Venous 8/22/2018  2:53 PM   Dressing Status Clean;Dry; Intact 8/22/2018  3:41 PM   Dressing Changed Changed/New 8/22/2018  3:41 PM   Wound Cleansed Rinsed/Irrigated with saline 8/22/2018  3:41 PM   Wound Length (cm) 1 cm 8/22/2018  3:12 PM   Wound Width (cm) 1 cm 8/22/2018  3:12 PM   Wound Depth (cm)  0.1 8/22/2018  3:12 PM   Calculated Wound Size (cm^2) (l*w) 1 cm^2 8/22/2018  3:12 PM   Change in Wound Size % (l*w) -23.46 8/22/2018  3:12 PM   Wound Assessment Dry;Brown 8/22/2018  2:53 PM   Drainage Amount None 8/22/2018  2:53 PM   Odor None 8/22/2018  2:53 PM   Susy-wound Assessment Purple 8/22/2018  2:53 PM   Non-staged Wound Description Full thickness 8/22/2018  2:53 PM   Time out Yes 8/22/2018  3:12 PM   Number of days: 13       Wound 08/29/18  Ankle Left;Lateral new wound, #18. traumatic  left lateral ankle onset august 29, 2018 (Active)   Wound Image   8/29/2018 10:24 AM   Wound Type Incision 8/29/2018 10:24 AM   Wound Traumatic 8/29/2018 10:24 AM   Dressing Status New drainage; Changed 8/29/2018 11:38 AM   Dressing Changed Changed/New 8/29/2018 11:38 AM   Wound Cleansed Rinsed/Irrigated with saline 8/29/2018 11:38 AM   Wound Length (cm) 7 cm 9/5/2018  9:54 AM   Wound Width Orders     Discharge condition: Stable     Assessment of pain at discharge: none     Anesthetic used: 2% lidocaine     Discharge to: ECF     Left via:public transportation     Accompanied by: caregiver     [facility]: [facility]      Dressing Orders:  cleanse bilateral lower leg, ankles, left pretibial ulcers, RIGHT SECOND TOE  and dorsal foot ulcer with normal saline at each dressing change. Apply Mela, moistened with normal saline and to fit inside wound bed and wound edges. Apply unna boots then coban. Leave wraps on for 1 week     For abdominal wounds, keep covered and dry. Treat according to dermatology orders.     DR Marilu Molina PATIENT 8/22/18  NOTE REVIEWED TODAY     Treatment Orders: . Encourage patient to elevate legs when seated.     [facility] followup visit ___________1_ WEEK BDM________________  (Please note your next appointment above and if you are unable to keep, kindly give a 24 hour notice. Thank you.)    Physician signature:__________________________      If you experience any of the following, please call the [facility] during business hours:    * Increase in Pain  * Temperature over 101  * Increase in drainage from your wound  * Drainage with a foul odor  * Bleeding  * Increase in swelling  * Need for compression bandage changes due to slippage, breakthrough drainage. If you need medical attention outside of the business hours of the [facility] please contact your PCP or go to the nearest emergency room.         Electronically signed by Sebastian Baron DPM on 9/5/2018 at 10:24 AM

## 2018-09-05 NOTE — PLAN OF CARE
Problem: Compression therapy:  Goal: Will be free from complications associated with compression therapy  Will be free from complications associated with compression therapy    Outcome: Ongoing

## 2018-09-12 ENCOUNTER — HOSPITAL ENCOUNTER (OUTPATIENT)
Dept: WOUND CARE | Age: 78
Discharge: HOME | End: 2018-09-12
Payer: COMMERCIAL

## 2018-09-12 VITALS
HEART RATE: 72 BPM | TEMPERATURE: 97.8 F | WEIGHT: 220 LBS | RESPIRATION RATE: 16 BRPM | SYSTOLIC BLOOD PRESSURE: 112 MMHG | BODY MASS INDEX: 29.84 KG/M2 | DIASTOLIC BLOOD PRESSURE: 72 MMHG

## 2018-09-12 PROCEDURE — 11042 DBRDMT SUBQ TIS 1ST 20SQCM/<: CPT

## 2018-09-12 PROCEDURE — 11045 DBRDMT SUBQ TISS EACH ADDL: CPT

## 2018-09-12 RX ORDER — DIPHENHYDRAMINE HCL 25 MG
25 CAPSULE ORAL EVERY 6 HOURS PRN
Status: ON HOLD | COMMUNITY
End: 2019-01-13 | Stop reason: HOSPADM

## 2018-09-12 RX ORDER — PREDNISONE 1 MG/1
1 TABLET ORAL DAILY
COMMUNITY
End: 2018-09-19 | Stop reason: ALTCHOICE

## 2018-09-12 NOTE — PLAN OF CARE
Problem: Wound:  Goal: Will show signs of wound healing; wound closure and no evidence of infection  Will show signs of wound healing; wound closure and no evidence of infection    Outcome: Ongoing      Problem: Compression therapy:  Goal: Will be free from complications associated with compression therapy  Will be free from complications associated with compression therapy    Outcome: Ongoing      Problem: Skin Integrity:  Goal: Skin integrity will stabilize  Skin integrity will stabilize   Outcome: Ongoing

## 2018-09-12 NOTE — PROGRESS NOTES
Left;Anterior (Active)   Number of days: 894       Wound 03/31/16 Knee Left;Lateral (Active)   Number of days: 894       Wound 04/02/16 Blister Ankle Lateral;Left (Active)   Number of days: 892       Wound 04/06/16 Ankle Posterior (Active)   Number of days: 888       Wound 04/06/16 Knee Posterior;Right (Active)   Number of days: 888       Wound 04/06/16 Knee Lateral;Outer;Right (Active)   Number of days: 888       Wound 04/06/16 Other (Comment) Right (Active)   Number of days: 888       Wound 04/06/16 Other (Comment) Posterior; Left (Active)   Number of days: 888       Wound 04/06/16 Other (Comment) Medial;Left (Active)   Number of days: 888       Wound 10/18/16 Skin tear Ankle Outer;Left (Active)   Number of days: 694       Wound 10/18/16 Skin tear Leg Outer;Left (Active)   Number of days: 694       Wound 10/19/16 Other (Comment) Foot Right (Active)   Number of days: 693       Wound 08/04/17 Venous ulcer Leg Left;Upper new wound, #1 venous wound left upper leg. date of onset May 2017 (Active)   Wound Type Wound 10/6/2017  9:21 AM   Wound Venous 10/6/2017  9:21 AM   Dressing Status Changed 10/20/2017  9:57 AM   Dressing Changed Changed/New 10/20/2017  9:57 AM   Dressing/Treatment Alginate 10/20/2017  9:57 AM   Wound Cleansed Rinsed/Irrigated with saline 10/20/2017  9:57 AM   Dressing Change Due 10/13/17 10/6/2017 10:23 AM   Wound Length (cm) 0 cm 10/25/2017  4:06 PM   Wound Width (cm) 0 cm 10/25/2017  4:06 PM   Wound Depth (cm)  0 10/25/2017  4:06 PM   Calculated Wound Size (cm^2) (l*w) 0 cm^2 10/25/2017  4:06 PM   Change in Wound Size % (l*w) 100 10/25/2017  4:06 PM   Wound Assessment Epithelialization 10/25/2017  4:06 PM   Drainage Amount Moderate 10/20/2017  8:52 AM   Drainage Description Serosanguinous; Yellow 10/20/2017  8:52 AM   Odor None 10/20/2017  8:52 AM   Susy-wound Assessment Red 10/25/2017  4:06 PM   Red%Wound Bed 100 10/6/2017  9:21 AM   Debridement per physician Full thickness 10/20/2017  9:03 AM Time out Yes 10/20/2017  9:03 AM   Procedural Pain 1 10/20/2017  9:03 AM   Post procedural Pain 1 10/20/2017  9:03 AM   Number of days: 403       Wound 08/04/17 (Active)   Number of days: 403       Wound 09/08/17 Venous ulcer Ankle Left;Lateral Wound #4 venous ulcer acquired 9/8/2017 (Active)   Wound Type Wound 10/6/2017  9:08 AM   Wound Venous 10/6/2017  9:08 AM   Dressing Status Changed 9/29/2017 10:33 AM   Dressing Changed Changed/New 9/29/2017 10:33 AM   Dressing/Treatment Alginate 9/8/2017 10:14 AM   Wound Cleansed Rinsed/Irrigated with saline 9/29/2017 10:33 AM   Wound Length (cm) 0 cm 10/6/2017  9:21 AM   Wound Width (cm) 0 cm 10/6/2017  9:21 AM   Wound Depth (cm)  0 10/6/2017  9:21 AM   Calculated Wound Size (cm^2) (l*w) 0 cm^2 10/6/2017  9:21 AM   Change in Wound Size % (l*w) 100 10/6/2017  9:21 AM   Wound Assessment Pink 9/29/2017  9:36 AM   Drainage Amount Moderate 10/6/2017  9:08 AM   Drainage Description Serosanguinous 10/6/2017  9:08 AM   Odor None 10/6/2017  9:08 AM   Susy-wound Assessment Dry; Intact 9/29/2017  9:36 AM   [place]%Wound Bed 100 9/22/2017 10:04 AM   Red%Wound Bed 30 9/15/2017 10:12 AM   Yellow%Wound Bed 20 9/15/2017 10:12 AM   Debridement per physician Subcutaneous 9/29/2017 10:00 AM   Time out Yes 9/29/2017 10:00 AM   Procedural Pain 2 9/29/2017 10:00 AM   Post procedural Pain 1 9/29/2017 10:00 AM   Number of days: 368       Wound 09/29/17 Venous ulcer Leg Right;Lateral new wound, #5  right lateral leg.  date of onset  sept. 29,2017 (Active)   Wound Type Wound 10/6/2017  9:08 AM   Wound Venous 10/6/2017  9:08 AM   Dressing Status Changed 10/6/2017 10:23 AM   Dressing Changed Changed/New 10/6/2017 10:23 AM   Dressing/Treatment Other (Comment) 10/6/2017 10:23 AM   Wound Cleansed Rinsed/Irrigated with saline 10/6/2017 10:23 AM   Dressing Change Due 10/13/17 10/6/2017 10:23 AM   Wound Length (cm) 0 cm 10/13/2017 10:00 AM   Wound Width (cm) 0 cm 10/13/2017 10:00 AM   Wound Depth (cm)  0 10/13/2017 10:00 AM   Calculated Wound Size (cm^2) (l*w) 0 cm^2 10/13/2017 10:00 AM   Change in Wound Size % (l*w) 100 10/13/2017 10:00 AM   Wound Assessment Red 10/6/2017  9:08 AM   Drainage Amount Small 10/6/2017  9:08 AM   Drainage Description Serosanguinous 10/6/2017  9:08 AM   Odor None 10/6/2017  9:08 AM   Susy-wound Assessment Purple; Intact;Dry 10/6/2017  9:08 AM   Red%Wound Bed 100 10/6/2017  9:08 AM   Yellow%Wound Bed 20 9/29/2017  9:33 AM   Debridement per physician Full thickness 10/6/2017  9:24 AM   Time out Yes 10/6/2017  9:24 AM   Procedural Pain 0 10/6/2017  9:24 AM   Post procedural Pain 1 9/29/2017 10:00 AM   Number of days: 347       Wound 05/25/18 Venous ulcer Pretibial Left wound #8 acquired 5/17/18 left pretibial #4 at [facility]/ blocked to include medial aspect (Active)   Wound Image   8/22/2018  2:27 PM   Wound Type Wound 8/15/2018  2:40 PM   Wound Venous 7/11/2018 11:44 AM   Dressing Status Clean;Dry; Intact 9/5/2018 10:44 AM   Dressing Changed Changed/New 9/5/2018 10:44 AM   Dressing/Treatment Dry dressing 9/5/2018 10:44 AM   Wound Cleansed Rinsed/Irrigated with saline 9/5/2018 10:44 AM   Dressing Change Due 08/16/18 8/15/2018  2:40 PM   Wound Length (cm) 17 cm 9/12/2018  8:41 AM   Wound Width (cm) 12 cm 9/12/2018  8:41 AM   Wound Depth (cm)  .2 9/12/2018  8:41 AM   Calculated Wound Size (cm^2) (l*w) 204 cm^2 9/12/2018  8:41 AM   Change in Wound Size % (l*w) -532.56 9/12/2018  8:41 AM   Wound Assessment Bleeding;Red 9/12/2018  8:41 AM   Drainage Amount Large 9/12/2018  8:41 AM   Drainage Description Serosanguinous 9/12/2018  8:41 AM   Odor None 9/12/2018  8:41 AM   Susy-wound Assessment Fragile;Pink 9/12/2018  8:41 AM   Red%Wound Bed 90 8/29/2018 10:24 AM   Yellow%Wound Bed 10 8/29/2018 10:24 AM   Debridement per physician None 7/25/2018  2:47 PM   Time out Yes 9/5/2018 10:24 AM   Procedural Pain 0 9/5/2018 10:24 AM   Post procedural Pain 0 5/25/2018 10:37 AM   Number of days: 109 Wound Length (cm) 4.8 cm 9/12/2018  8:41 AM   Wound Width (cm) 5.3 cm 9/12/2018  8:41 AM   Wound Depth (cm)  .2 9/12/2018  8:41 AM   Calculated Wound Size (cm^2) (l*w) 25.44 cm^2 9/12/2018  8:41 AM   Change in Wound Size % (l*w) -182.67 9/12/2018  8:41 AM   Wound Assessment Red;Pink 9/12/2018  8:41 AM   Drainage Amount Large 9/12/2018  8:41 AM   Drainage Description Serosanguinous 9/12/2018  8:41 AM   Odor None 9/12/2018  8:41 AM   Susy-wound Assessment Fragile;Pink 9/12/2018  8:41 AM   Red%Wound Bed 80 8/29/2018 10:24 AM   Yellow%Wound Bed 20 8/29/2018 10:24 AM   Debridement per physician None 8/8/2018  2:18 PM   Time out Yes 9/5/2018 10:24 AM   Procedural Pain 0 9/5/2018 10:24 AM   Number of days: 34       Wound 08/08/18 Skin tear Ankle Left;Medial #16 acq: 8-2-18 # 2 at [facility] left medial (Active)   Wound Image   8/22/2018  2:27 PM   Wound Type Wound 8/15/2018  2:40 PM   Dressing Status Clean;Dry; Intact 9/5/2018 10:44 AM   Dressing Changed Changed/New 9/5/2018 10:44 AM   Dressing/Treatment Dry dressing 9/5/2018 10:44 AM   Wound Cleansed Rinsed/Irrigated with saline 9/5/2018 10:44 AM   Dressing Change Due 08/16/18 8/15/2018  2:40 PM   Wound Length (cm) 2.8 cm 9/12/2018  8:41 AM   Wound Width (cm) 2.5 cm 9/12/2018  8:41 AM   Wound Depth (cm)  .2 9/12/2018  8:41 AM   Calculated Wound Size (cm^2) (l*w) 7 cm^2 9/12/2018  8:41 AM   Change in Wound Size % (l*w) 60 9/12/2018  8:41 AM   Wound Assessment Red 9/12/2018  8:41 AM   Drainage Amount Large 9/12/2018  8:41 AM   Drainage Description Serosanguinous 9/12/2018  8:41 AM   Odor None 9/12/2018  8:41 AM   Susy-wound Assessment Fragile;Pink 9/12/2018  8:41 AM   Red%Wound Bed 80 8/29/2018 10:24 AM   Yellow%Wound Bed 20 8/29/2018 10:24 AM   Time out Yes 9/5/2018 10:24 AM   Procedural Pain 0 9/5/2018 10:24 AM   Number of days: 34       Wound 08/13/18 Buttocks Right (Active)   Wound Type Wound 8/15/2018  2:40 PM   Wound Pressure Stage  2 8/13/2018  1:46 PM   Dressing Clean;Dry; Intact 9/5/2018 10:44 AM   Dressing Changed Changed/New 9/5/2018 10:44 AM   Dressing/Treatment Dry dressing 9/5/2018 10:44 AM   Wound Cleansed Rinsed/Irrigated with saline 9/5/2018 10:44 AM   Wound Length (cm) 2.5 cm 9/12/2018  8:41 AM   Wound Width (cm) 2.4 cm 9/12/2018  8:41 AM   Wound Depth (cm)  .2 9/12/2018  8:41 AM   Calculated Wound Size (cm^2) (l*w) 6 cm^2 9/12/2018  8:41 AM   Change in Wound Size % (l*w) 80.95 9/12/2018  8:41 AM   Wound Assessment Pink;Red 9/12/2018  8:41 AM   Drainage Amount Moderate 9/12/2018  8:41 AM   Drainage Description Serosanguinous 9/12/2018  8:41 AM   Odor None 9/12/2018  8:41 AM   Susy-wound Assessment Fragile;Pink 9/12/2018  8:41 AM   Red%Wound Bed 100 8/29/2018 10:24 AM   Time out Yes 9/5/2018 10:24 AM   Procedural Pain 0 9/5/2018 10:24 AM   Number of days: 13       Wound 08/29/18  Toe (Comment  which one) Anterior new wound, #19 right second toe. date of onset aug 29,2018 (Active)   Wound Image   8/29/2018 10:24 AM   Wound Type Wound 8/29/2018 10:24 AM   Wound Diabetic [place] 1 8/29/2018 10:24 AM   Dressing Status Clean;Dry; Intact 9/5/2018 10:44 AM   Dressing Changed Changed/New 9/5/2018 10:44 AM   Dressing/Treatment Dry dressing 9/5/2018 10:44 AM   Wound Cleansed Rinsed/Irrigated with saline 9/5/2018 10:44 AM   Wound Length (cm) 1.5 cm 9/12/2018  8:41 AM   Wound Width (cm) 41.6 cm 9/12/2018  8:41 AM   Wound Depth (cm)  .1 9/12/2018  8:41 AM   Calculated Wound Size (cm^2) (l*w) 62.4 cm^2 9/12/2018  8:41 AM   Change in Wound Size % (l*w) -5100 9/12/2018  8:41 AM   Wound Assessment Brown;[place] 9/12/2018  8:41 AM   Drainage Amount Small 9/12/2018  8:41 AM   Drainage Description [place];Yellow 9/12/2018  8:41 AM   Odor None 9/12/2018  8:41 AM   Susy-wound Assessment Fragile;Pink 9/12/2018  8:41 AM   Debridement per physician None 9/5/2018 10:24 AM   Time out Yes 8/29/2018 10:58 AM   Number of days: 13     Percent of Wound/Ulcer Debrided: 100%    Total Surface Area Debrided:

## 2018-09-19 ENCOUNTER — HOSPITAL ENCOUNTER (OUTPATIENT)
Dept: WOUND CARE | Age: 78
Discharge: HOME | End: 2018-09-19
Payer: COMMERCIAL

## 2018-09-19 VITALS
RESPIRATION RATE: 18 BRPM | DIASTOLIC BLOOD PRESSURE: 70 MMHG | HEART RATE: 68 BPM | SYSTOLIC BLOOD PRESSURE: 120 MMHG | TEMPERATURE: 97.7 F

## 2018-09-19 PROCEDURE — 11045 DBRDMT SUBQ TISS EACH ADDL: CPT

## 2018-09-19 PROCEDURE — 11042 DBRDMT SUBQ TIS 1ST 20SQCM/<: CPT

## 2018-09-19 RX ORDER — PREDNISONE 10 MG/1
10 TABLET ORAL EVERY MORNING
Status: ON HOLD | COMMUNITY
End: 2019-04-15 | Stop reason: HOSPADM

## 2018-09-19 RX ORDER — FOLIC ACID 1 MG/1
1 TABLET ORAL
Status: ON HOLD | COMMUNITY
End: 2020-12-23 | Stop reason: HOSPADM

## 2018-09-19 NOTE — PROGRESS NOTES
[facility] Followup Visit Note    Referring Physician : No primary care provider on file. Keegan Negrete 38 RECORD NUMBER:  [id number]  AGE: [de-identified] y.o. GENDER: male  : 1940  EPISODE DATE:  2018    Subjective:     Chief Complaint   Patient presents with    Wound Check     [facility] Wound Care Follow Up Appt      HISTORY of PRESENT ILLNESS MATTHEW Clark is a [de-identified] y.o. male who presents today for wound/ulcer evaluation.    History of Wound Context:  1 month     Wound/Ulcer Pain Timing/Severity: constant  Quality of pain: aching  Severity:  2 / 10   Modifying Factors: Pain worsens with walking  Associated Signs/Symptoms: edema, erythema and drainage    Ulcer Identification:  Ulcer Type: venous  Contributing Factors: edema and venous stasis    Diabetic/Pressure/Non Pressure Ulcers only:  Ulcer: Non-Pressure ulcer, fat layer exposed    Wound: N/A        PAST MEDICAL HISTORY      Diagnosis Date    Anxiety     Benign localized hyperplasia of prostate with urinary obstruction     Bullous pemphigoid     Diabetes mellitus (HCC)     Enlarged prostate     Hyperlipidemia     Hypertension     Muscle weakness     Rheumatoid disease (HCC)      Past Surgical History:   Procedure Laterality Date    DOPPLER ECHOCARDIOGRAPHY N/A     FRACTURE SURGERY      SD EGD PERCUTANEOUS PLACEMENT GASTROSTOMY TUBE N/A 2018    EGD  PEG TUBE INSERTION,  (ENDO STAFF NEEDED) performed by Bartolo Clark MD at [place] [address] N/A 2018    EGD DIAGNOSTIC ONLY performed by Jeet Chaparro MD at [facility] OR     Family History   Problem Relation Age of Onset    Diabetes Mother     Kidney Disease Mother     Cancer Father         esophagus    Cancer Sister      Social History   Substance Use Topics    Smoking status: Former Smoker     Packs/day: 2.00     Years: 50.00     Types: Cigarettes     Start date: 3/30/1955     Quit date: 3/30/2006    Smokeless tobacco: Never Used   [employer] Pulse 68   Temp 97.7 °F (36.5 °C) (Oral)   Resp 18   Wt Readings from Last 3 Encounters:   09/12/18 220 lb (99.8 kg)   09/05/18 220 lb (99.8 kg)   08/22/18 220 lb (99.8 kg)     PHYSICAL EXAM  CONSTITUTIONAL:   Awake, alert, cooperative   EYES:  lids and lashes normal   ENT: external ears and nose without lesions   NECK:  supple, symmetrical, trachea midline   SKIN:  Open wound Present    Assessment:     Problem List Items Addressed This Visit     None        Procedure Note  Indications:  Based on my examination of this patient's wound(s)/ulcer(s) today, debridement is required to promote healing and evaluate the wound base. Performed by: Renate Barton DPM    Consent obtained:  Yes    Time out taken:  Yes    Pain Control: Anesthetic  Anesthetic: None     Debridement:Excisional Debridement    Using #15 blade scalpel the wound(s)/ulcer(s) was/were sharply debrided down through and including the removal of subcutaneous tissue. Devitalized Tissue Debrided:  fibrin, biofilm and slough to stimulate bleeding to promote healing, post debridement good bleeding base and wound edges noted    Pre Debridement Measurements:  Are located in the Wound/Ulcer Documentation Flow Sheet    Wound/Ulcer #: 8, 12, 13, 14, 15, 16, 17, 18 and 19    Post Debridement Measurements:  Wound/Ulcer Descriptions are Pre Debridement except measurements:    Pressure Ulcer 04/06/16 Buttocks Right (Active)   Number of days: 896       Wound 03/31/16 Knee Medial;Right (Active)   Number of days: 901       Wound 03/31/16 Knee Lateral;Right (Active)   Number of days: 901       Wound 03/31/16 Ankle Right;Posterior (Active)   Number of days: 901       Wound 03/31/16 Ankle Lateral;Left (Active)   Number of days: 901       Wound 03/31/16 Ankle Left; Anterior (Active)   Number of days: 901       Wound 03/31/16 Knee Left;Lateral (Active)   Number of days: 901       Wound 04/02/16 Blister Ankle Lateral;Left (Active)   Number of days: 194 Wound 09/08/17 Venous ulcer Ankle Left;Lateral Wound #4 venous ulcer acquired 9/8/2017 (Active)   Wound Type Wound 10/6/2017  9:08 AM   Wound Venous 10/6/2017  9:08 AM   Dressing Status Changed 9/29/2017 10:33 AM   Dressing Changed Changed/New 9/29/2017 10:33 AM   Dressing/Treatment Alginate 9/8/2017 10:14 AM   Wound Cleansed Rinsed/Irrigated with saline 9/29/2017 10:33 AM   Wound Length (cm) 0 cm 10/6/2017  9:21 AM   Wound Width (cm) 0 cm 10/6/2017  9:21 AM   Wound Depth (cm)  0 10/6/2017  9:21 AM   Calculated Wound Size (cm^2) (l*w) 0 cm^2 10/6/2017  9:21 AM   Change in Wound Size % (l*w) 100 10/6/2017  9:21 AM   Wound Assessment Pink 9/29/2017  9:36 AM   Drainage Amount Moderate 10/6/2017  9:08 AM   Drainage Description Serosanguinous 10/6/2017  9:08 AM   Odor None 10/6/2017  9:08 AM   Susy-wound Assessment Dry; Intact 9/29/2017  9:36 AM   Boron%Wound Bed 100 9/22/2017 10:04 AM   Red%Wound Bed 30 9/15/2017 10:12 AM   Yellow%Wound Bed 20 9/15/2017 10:12 AM   Debridement per physician Subcutaneous 9/29/2017 10:00 AM   Time out Yes 9/29/2017 10:00 AM   Procedural Pain 2 9/29/2017 10:00 AM   Post procedural Pain 1 9/29/2017 10:00 AM   Number of days: 375       Wound 09/29/17 Venous ulcer Leg Right;Lateral new wound, #5  right lateral leg.  date of onset  sept. 29,2017 (Active)   Wound Type Wound 10/6/2017  9:08 AM   Wound Venous 10/6/2017  9:08 AM   Dressing Status Changed 10/6/2017 10:23 AM   Dressing Changed Changed/New 10/6/2017 10:23 AM   Dressing/Treatment Other (Comment) 10/6/2017 10:23 AM   Wound Cleansed Rinsed/Irrigated with saline 10/6/2017 10:23 AM   Dressing Change Due 10/13/17 10/6/2017 10:23 AM   Wound Length (cm) 0 cm 10/13/2017 10:00 AM   Wound Width (cm) 0 cm 10/13/2017 10:00 AM   Wound Depth (cm)  0 10/13/2017 10:00 AM   Calculated Wound Size (cm^2) (l*w) 0 cm^2 10/13/2017 10:00 AM   Change in Wound Size % (l*w) 100 10/13/2017 10:00 AM   Wound Assessment Red 10/6/2017  9:08 AM   Drainage Amount Small Rinsed/Irrigated with saline; Wound cleanser 8/8/2018  3:37 PM   Wound Length (cm) 22.5 cm 8/8/2018  1:41 PM   Wound Width (cm) 10 cm 8/8/2018  1:41 PM   Wound Depth (cm)  0.1 8/8/2018  1:41 PM   Calculated Wound Size (cm^2) (l*w) 225 cm^2 8/8/2018  1:41 PM   Change in Wound Size % (l*w) -1025 8/8/2018  1:41 PM   Wound Assessment Pink;Red 8/8/2018  1:41 PM   Drainage Amount Moderate 8/8/2018  1:41 PM   Drainage Description Green; Gomez 8/8/2018  1:41 PM   Odor None 8/8/2018  1:41 PM   Susy-wound Assessment Dry; Intact 8/8/2018  1:41 PM   Debridement per physician None 8/8/2018  2:18 PM   Number of days: 90       Wound 07/11/18 Venous ulcer Leg Lower;Left;Lateral #13 acq: 7-11- left lateral  (#3 at [facility]) (Active)   Wound Image   8/22/2018  2:31 PM   Wound Type Wound 7/11/2018 11:44 AM   Wound Venous 7/11/2018 11:44 AM   Dressing Status Clean;Dry; Intact 9/12/2018  9:20 AM   Dressing Changed Changed/New 9/12/2018  9:20 AM   Dressing/Treatment Alginate;Dry dressing 9/12/2018  9:20 AM   Wound Cleansed Rinsed/Irrigated with saline 9/12/2018  9:20 AM   Wound Length (cm) 5 cm 9/19/2018  8:38 AM   Wound Width (cm) 5 cm 9/19/2018  8:38 AM   Wound Depth (cm)  0.2 9/19/2018  8:38 AM   Calculated Wound Size (cm^2) (l*w) 25 cm^2 9/19/2018  8:38 AM   Change in Wound Size % (l*w) -3471.43 9/19/2018  8:38 AM   Wound Assessment Bleeding;Red 9/19/2018  8:38 AM   Drainage Amount Moderate 9/19/2018  8:38 AM   Drainage Description Serosanguinous 9/19/2018  8:38 AM   Odor None 9/19/2018  8:38 AM   Susy-wound Assessment Fragile;Pink 9/19/2018  8:38 AM   Red%Wound Bed 80 8/29/2018 10:24 AM   Yellow%Wound Bed 20 8/29/2018 10:24 AM   Culture Taken Yes 8/29/2018 10:58 AM   Debridement per physician None 7/25/2018  2:47 PM   Time out Yes 9/12/2018  9:21 AM   Procedural Pain 0 9/5/2018 10:24 AM   Number of days: 69       Wound 07/26/18 Leg Left; Lower;Dorsal (Active)   Number of days: 54       Wound 07/26/18 Ankle Left; Outer; Lower (Active) Number of days: 54       Wound 07/27/18 Leg Right (Active)   Number of days: 53       Wound 07/27/18 Leg Right (Active)   Number of days: 53       Wound 08/08/18 Venous ulcer Leg Right;Proximal;Medial;Posterior #14 blocked  acq: 8-2-18 (Active)   Wound Image   8/8/2018  2:04 PM   Dressing Status Clean;Dry; Intact 9/12/2018  9:20 AM   Dressing Changed Changed/New 9/12/2018  9:20 AM   Dressing/Treatment Alginate;Dry dressing 9/12/2018  9:20 AM   Wound Cleansed Rinsed/Irrigated with saline 9/12/2018  9:20 AM   Wound Length (cm) 1 cm 9/19/2018  8:38 AM   Wound Width (cm) 0.4 cm 9/19/2018  8:38 AM   Wound Depth (cm)  0.1 9/19/2018  8:38 AM   Calculated Wound Size (cm^2) (l*w) 0.4 cm^2 9/19/2018  8:38 AM   Change in Wound Size % (l*w) 95.77 9/19/2018  8:38 AM   Wound Assessment Dry; Intact; Red 9/19/2018  8:38 AM   Drainage Amount None 9/19/2018  8:38 AM   Drainage Description Serosanguinous 9/12/2018  8:41 AM   Odor None 9/19/2018  8:38 AM   Susy-wound Assessment Pink 9/19/2018  8:38 AM   Red%Wound Bed 100 8/29/2018 10:24 AM   Debridement per physician None 8/8/2018  2:18 PM   Time out Yes 9/12/2018  9:21 AM   Number of days: 41       Wound 08/08/18 Skin tear Ankle Right;Distal;Medial #15 acq: 8-2-18 right dital medial (#[facility] at [facility]) (Active)   Wound Image   8/22/2018  2:27 PM   Dressing Status Clean;Dry; Intact 9/12/2018  9:20 AM   Dressing Changed Changed/New 9/12/2018  9:20 AM   Dressing/Treatment Alginate;Dry dressing 9/12/2018  9:20 AM   Wound Cleansed Rinsed/Irrigated with saline 9/12/2018  9:20 AM   Wound Length (cm) 4.5 cm 9/19/2018  8:38 AM   Wound Width (cm) 4 cm 9/19/2018  8:38 AM   Wound Depth (cm)  0.2 9/19/2018  8:38 AM   Calculated Wound Size (cm^2) (l*w) 18 cm^2 9/19/2018  8:38 AM   Change in Wound Size % (l*w) -100 9/19/2018  8:38 AM   Wound Assessment Pink;Red 9/19/2018  8:38 AM   Drainage Amount Moderate 9/19/2018  8:38 AM   Drainage Description Serosanguinous 9/19/2018  8:38 AM   Odor None Clean;Dry; Intact 8/22/2018  3:41 PM   Dressing Changed Changed/New 8/22/2018  3:41 PM   Wound Cleansed Rinsed/Irrigated with saline 8/22/2018  3:41 PM   Wound Length (cm) 1 cm 8/22/2018  3:12 PM   Wound Width (cm) 1 cm 8/22/2018  3:12 PM   Wound Depth (cm)  0.1 8/22/2018  3:12 PM   Calculated Wound Size (cm^2) (l*w) 1 cm^2 8/22/2018  3:12 PM   Change in Wound Size % (l*w) -23.46 8/22/2018  3:12 PM   Wound Assessment Dry;Brown 8/22/2018  2:53 PM   Drainage Amount None 8/22/2018  2:53 PM   Odor None 8/22/2018  2:53 PM   Susy-wound Assessment Purple 8/22/2018  2:53 PM   Non-staged Wound Description Full thickness 8/22/2018  2:53 PM   Time out Yes 8/22/2018  3:12 PM   Number of days: 27       Wound 08/29/18  Ankle Left;Lateral new wound, #18. traumatic  left lateral ankle onset august 29, 2018 (Active)   Wound Image   8/29/2018 10:24 AM   Wound Type Incision 8/29/2018 10:24 AM   Wound Traumatic 8/29/2018 10:24 AM   Dressing Status Clean;Dry; Intact 9/12/2018  9:20 AM   Dressing Changed Changed/New 9/12/2018  9:20 AM   Dressing/Treatment Alginate;Dry dressing 9/12/2018  9:20 AM   Wound Cleansed Rinsed/Irrigated with saline 9/12/2018  9:20 AM   Wound Length (cm) 2 cm 9/19/2018  8:38 AM   Wound Width (cm) 1.6 cm 9/19/2018  8:38 AM   Wound Depth (cm)  0.1 9/19/2018  8:38 AM   Calculated Wound Size (cm^2) (l*w) 3.2 cm^2 9/19/2018  8:38 AM   Change in Wound Size % (l*w) 89.84 9/19/2018  8:38 AM   Wound Assessment Red 9/19/2018  8:38 AM   Drainage Amount Small 9/19/2018  8:38 AM   Drainage Description Serosanguinous 9/19/2018  8:38 AM   Odor None 9/19/2018  8:38 AM   Susy-wound Assessment Fragile;Pink 9/19/2018  8:38 AM   Red%Wound Bed 100 8/29/2018 10:24 AM   Time out Yes 9/12/2018  9:21 AM   Procedural Pain 0 9/5/2018 10:24 AM   Number of days: 20       Wound 08/29/18  Toe (Comment  which one) Anterior new wound, #19 right second toe.  date of onset aug 29,2018 (Active)   Wound Image   8/29/2018 10:24 AM   Wound Type Wound other day. In clinic. Ulcers cleansed with normal saline. Alginate, dry dressing, kerlex and coban applied.     For abdominal wounds, keep covered and dry. Treat according to dermatology orders.     Treatment Orders: DR. Paxton Wolfe. THANK YOU     [address] [place] [address],[address] followup visit ___________1_ WEEK BDM________________  (Please note your next appointment above and if you are unable to keep, kindly give a 24 hour notice. Thank you.)    Physician signature:__________________________      If you experience any of the following, please call the [facility] during business hours:    * Increase in Pain  * Temperature over 101  * Increase in drainage from your wound  * Drainage with a foul odor  * Bleeding  * Increase in swelling  * Need for compression bandage changes due to slippage, breakthrough drainage. If you need medical attention outside of the business hours of the [facility] please contact your PCP or go to the nearest emergency room.         Electronically signed by Ricardo Pate DPM on 9/19/2018 at 9:11 AM

## 2018-09-19 NOTE — PLAN OF CARE
Problem: Skin Integrity:  Goal: Skin integrity will stabilize  Skin integrity will stabilize   Outcome: Ongoing      Problem: Wound:  Goal: Will show signs of wound healing; wound closure and no evidence of infection  Will show signs of wound healing; wound closure and no evidence of infection    Outcome: Ongoing      Problem: Compression therapy:  Goal: Will be free from complications associated with compression therapy  Will be free from complications associated with compression therapy    Outcome: Ongoing

## 2018-09-26 ENCOUNTER — HOSPITAL ENCOUNTER (OUTPATIENT)
Dept: WOUND CARE | Age: 78
Discharge: HOME | End: 2018-09-26
Payer: COMMERCIAL

## 2018-09-26 VITALS
HEART RATE: 60 BPM | SYSTOLIC BLOOD PRESSURE: 122 MMHG | TEMPERATURE: 98.2 F | DIASTOLIC BLOOD PRESSURE: 60 MMHG | RESPIRATION RATE: 18 BRPM

## 2018-09-26 PROCEDURE — 11045 DBRDMT SUBQ TISS EACH ADDL: CPT

## 2018-09-26 PROCEDURE — 11042 DBRDMT SUBQ TIS 1ST 20SQCM/<: CPT

## 2018-09-26 ASSESSMENT — PAIN DESCRIPTION - ORIENTATION: ORIENTATION: RIGHT;LEFT

## 2018-09-26 ASSESSMENT — PAIN DESCRIPTION - LOCATION: LOCATION: LEG

## 2018-09-26 ASSESSMENT — PAIN DESCRIPTION - DESCRIPTORS: DESCRIPTORS: ACHING

## 2018-09-26 NOTE — PROGRESS NOTES
by mouth daily as needed.  bisacodyl (DULCOLAX) 5 MG EC tablet Take 5 mg by mouth daily as needed.  busPIRone (BUSPAR) 7.5 MG tablet Take 7.5 mg by mouth daily. No current facility-administered medications on file prior to encounter. REVIEW OF SYSTEMS See HPI    Objective:    /60   Pulse 60   Temp 98.2 °F (36.8 °C) (Oral)   Resp 18   Wt Readings from Last 3 Encounters:   09/12/18 220 lb (99.8 kg)   09/05/18 220 lb (99.8 kg)   08/22/18 220 lb (99.8 kg)     PHYSICAL EXAM  CONSTITUTIONAL:   Awake, alert, cooperative   EYES:  lids and lashes normal   ENT: external ears and nose without lesions   NECK:  supple, symmetrical, trachea midline   SKIN:  Open wound Present    Assessment:     Problem List Items Addressed This Visit     None        Procedure Note  Indications:  Based on my examination of this patient's wound(s)/ulcer(s) today, debridement is required to promote healing and evaluate the wound base. Performed by: Yuniel Morris DPM    Consent obtained:  Yes    Time out taken:  Yes    Pain Control: Anesthetic  Anesthetic: 2% Lidocaine Gel Topical     Debridement:Excisional Debridement    Using #15 blade scalpel the wound(s)/ulcer(s) was/were sharply debrided down through and including the removal of subcutaneous tissue.         Devitalized Tissue Debrided:  fibrin, biofilm and slough to stimulate bleeding to promote healing, post debridement good bleeding base and wound edges noted    Pre Debridement Measurements:  Are located in the Wound/Ulcer Documentation Flow Sheet    Wound/Ulcer #: 5, 8, 13, 14, 15, 16, 18, 19 and 20    Post Debridement Measurements:  Wound/Ulcer Descriptions are Pre Debridement except measurements:    Pressure Ulcer 04/06/16 Buttocks Right (Active)   Number of days: 903       Wound 03/31/16 Knee Medial;Right (Active)   Number of days: 908       Wound 03/31/16 Knee Lateral;Right (Active)   Number of days: 908       Wound 03/31/16 Ankle AM   Procedural Pain 1 9/19/2018  9:13 AM   Post procedural Pain 0 9/19/2018  9:13 AM   Number of days: 124       Wound 06/20/18 Abdomen #12  blocked (Acquired June 2018) (Active)   Wound Image   9/26/2018  9:55 AM   Dressing Status Clean;Dry; Intact 8/8/2018  3:37 PM   Dressing Changed Changed/New 8/8/2018  3:37 PM   Dressing/Treatment Dry dressing 8/8/2018  3:37 PM   Wound Cleansed Rinsed/Irrigated with saline; Wound cleanser 8/8/2018  3:37 PM   Wound Length (cm) 0 cm 9/26/2018  9:55 AM   Wound Width (cm) 0 cm 9/26/2018  9:55 AM   Wound Depth (cm)  0 9/26/2018  9:55 AM   Calculated Wound Size (cm^2) (l*w) 0 cm^2 9/26/2018  9:55 AM   Change in Wound Size % (l*w) 100 9/26/2018  9:55 AM   Wound Assessment Pink;Red 8/8/2018  1:41 PM   Drainage Amount Moderate 8/8/2018  1:41 PM   Drainage Description Green; Gomez 8/8/2018  1:41 PM   Odor None 8/8/2018  1:41 PM   Susy-wound Assessment Dry; Intact 8/8/2018  1:41 PM   Debridement per physician None 8/8/2018  2:18 PM   Number of days: 97       Wound 07/11/18 Venous ulcer Leg Lower;Left;Lateral #13 acq: 7-11- left lateral  (#3 at [facility]) (Active)   Wound Image   9/26/2018  9:55 AM   Wound Type Wound 7/11/2018 11:44 AM   Wound Venous 7/11/2018 11:44 AM   Dressing Status Clean;Dry; Intact 9/19/2018  9:30 AM   Dressing Changed Changed/New 9/19/2018  9:30 AM   Dressing/Treatment Alginate;Dry dressing 9/19/2018  9:30 AM   Wound Cleansed Rinsed/Irrigated with saline 9/19/2018  9:30 AM   Wound Length (cm) 6 cm 9/26/2018  9:55 AM   Wound Width (cm) 6 cm 9/26/2018  9:55 AM   Wound Depth (cm)  0.1 9/26/2018  9:55 AM   Calculated Wound Size (cm^2) (l*w) 36 cm^2 9/26/2018  9:55 AM   Change in Wound Size % (l*w) -5042.86 9/26/2018  9:55 AM   Wound Assessment Drainage; Red 9/26/2018  9:55 AM   Drainage Amount Moderate 9/26/2018  9:55 AM   Drainage Description Serosanguinous 9/26/2018  9:55 AM   Odor None 9/19/2018  8:38 AM   Susy-wound Assessment Fragile;Pink 9/26/2018  9:55 AM 9:55 AM   Change in Wound Size % (l*w) 70.79 9/26/2018  9:55 AM   Wound Assessment Red 9/26/2018  9:55 AM   Drainage Amount Moderate 9/26/2018  9:55 AM   Drainage Description Serosanguinous 9/26/2018  9:55 AM   Odor None 9/26/2018  9:55 AM   Susy-wound Assessment Fragile;Pink 9/26/2018  9:55 AM   Red%Wound Bed 100 8/29/2018 10:24 AM   Time out Yes 9/12/2018  9:21 AM   Procedural Pain 0 9/5/2018 10:24 AM   Number of days: 27       Wound 08/29/18  Toe (Comment  which one) Anterior new wound, #19 right second toe. date of onset aug 29,2018 (Active)   Wound Image   9/26/2018  9:55 AM   Wound Type Wound 8/29/2018 10:24 AM   Wound Diabetic [place] 1 8/29/2018 10:24 AM   Dressing Status Clean;Dry; Intact 9/19/2018  9:30 AM   Dressing Changed Changed/New 9/19/2018  9:30 AM   Dressing/Treatment Alginate;Dry dressing 9/19/2018  9:30 AM   Wound Cleansed Rinsed/Irrigated with saline 9/19/2018  9:30 AM   Wound Length (cm) 0.2 cm 9/26/2018  9:55 AM   Wound Width (cm) 0.3 cm 9/26/2018  9:55 AM   Wound Depth (cm)  0.1 9/26/2018  9:55 AM   Calculated Wound Size (cm^2) (l*w) 0.06 cm^2 9/26/2018  9:55 AM   Change in Wound Size % (l*w) 95 9/26/2018  9:55 AM   Wound Assessment Dry;Pink 9/26/2018  9:55 AM   Drainage Amount None 9/26/2018  9:55 AM   Drainage Description Gomez;Yellow 9/12/2018  8:41 AM   Odor None 9/26/2018  9:55 AM   Susy-wound Assessment Dry;Pink 9/26/2018  9:55 AM   Debridement per physician None 9/5/2018 10:24 AM   Time out Yes 9/19/2018  9:13 AM   Procedural Pain 0 9/19/2018  9:13 AM   Post procedural Pain 0 9/19/2018  9:13 AM   Number of days: 27       Wound 09/26/18 Leg Left;Medial #20 Left Medial Leg BLOCKED (Acquired 9.26.18) (Active)   Wound Image   9/26/2018  9:55 AM   Wound Length (cm) 18 cm 9/26/2018  9:55 AM   Wound Width (cm) 12 cm 9/26/2018  9:55 AM   Wound Depth (cm)  0.1 9/26/2018  9:55 AM   Calculated Wound Size (cm^2) (l*w) 216 cm^2 9/26/2018  9:55 AM   Wound Assessment Red 9/26/2018  9:55 AM   Drainage Amount Moderate 9/26/2018  9:55 AM   Drainage Description Serosanguinous; Yellow 9/26/2018  9:55 AM   Susy-wound Assessment Dry;Fragile 9/26/2018  9:55 AM   Number of days: 0     Percent of Wound/Ulcer Debrided: 100%    Total Surface Area Debrided:  477 sq cm     Estimated Blood Loss:  Estimated amount of blood loss is 5ml. Hemostasis Achieved:  by pressure    Procedural Pain:  2  / 10   Post Procedural Pain:  4 / 10     Response to treatment:  Well tolerated by patient. Plan:   Treatment Note please see attached Discharge Instructions    Written patient dismissal instructions given to patient and signed by patient or POA. Discharge Instructions       Visit Discharge/Physician Orders     Discharge condition: Stable     Assessment of pain at discharge: none     Anesthetic used: 2% lidocaine     Discharge to: ECF     Left via:public transportation     Accompanied by: caregiver     [facility]: [facility]      Dressing Orders: [address] TO START FOR BASE OF TOES AND TO ANKLE!! cleanse bilateral lower leg, ankles, left pretibial ulcers, RIGHT SECOND TOE  and dorsal foot ulcer with normal saline at each dressing change. May wash legs with aloe vista foam, rinse and pat dry.  Apply gentamicin to ulcers. Cover with dry dressing, kerlex and coban.  May start this dressing tomorrow and change every other day. In clinic. Ulcers cleansed with normal saline.  Alginate, dry dressing, kerlex and coban applied.     For abdominal wounds, keep covered and dry. Treat according to dermatology orders.     Treatment Orders:   PATIENT TO CONTINUE TO FOLLOW UP WITH DERMATOLOGIST ALSO, DR. Adan Arreguin. THANK YOU     [facility] followup visit ___________1_ WEEK BDM________________  (Please note your next appointment above and if you are unable to keep, kindly give a 24 hour notice.  Thank you.)    Physician signature:__________________________      If you experience any of the following, please call the [address] [place] [address] Road during business

## 2018-10-03 ENCOUNTER — HOSPITAL ENCOUNTER (OUTPATIENT)
Dept: WOUND CARE | Age: 78
Discharge: HOME | End: 2018-10-03
Payer: COMMERCIAL

## 2018-10-03 VITALS
RESPIRATION RATE: 18 BRPM | HEART RATE: 60 BPM | WEIGHT: 220 LBS | BODY MASS INDEX: 29.84 KG/M2 | DIASTOLIC BLOOD PRESSURE: 64 MMHG | SYSTOLIC BLOOD PRESSURE: 124 MMHG | TEMPERATURE: 98.4 F

## 2018-10-03 PROCEDURE — 11045 DBRDMT SUBQ TISS EACH ADDL: CPT

## 2018-10-03 PROCEDURE — 11042 DBRDMT SUBQ TIS 1ST 20SQCM/<: CPT

## 2018-10-03 NOTE — PROGRESS NOTES
Alcohol use No     Allergies   Allergen Reactions    Levaquin [Levofloxacin] Other (See Comments)     Lethargy when taken with celebrex     Current Outpatient Prescriptions on File Prior to Encounter   Medication Sig Dispense Refill    predniSONE (DELTASONE) 10 MG tablet Take 10 mg by mouth daily Administer 3 tablets once daily for 14 days      methotrexate (RHEUMATREX) 2.5 MG chemo tablet Take 2.5 mg by mouth once a week Give twice daily every week      folic acid (FOLVITE) 1 MG tablet Take 1 mg by mouth daily      DiphenhydrAMINE HCl (BENADRYL PO) Take 25 mg by mouth every 6 hours as needed      insulin lispro (HUMALOG) 100 UNIT/ML injection vial Inject 0-6 Units into the skin 3 times daily (with meals) 1 vial 3    albuterol (PROVENTIL) (2.5 MG/3ML) 0.083% nebulizer solution Take 2.5 mg by nebulization every 6 hours as needed for Wheezing      triamcinolone (KENALOG) 0.1 % cream Apply topically every 8 hours Apply topically 2 times daily.  Nutritional Supplements (ARGINAID) PACK Take 4.5 g by mouth 2 times daily      insulin detemir (LEVEMIR) 100 UNIT/ML injection vial Inject 10 Units into the skin nightly      mirtazapine (REMERON) 15 MG tablet Take 7.5 mg by mouth daily      atorvastatin (LIPITOR) 20 MG tablet Take 20 mg by mouth daily      Cholecalciferol (VITAMIN D3) 5000 UNITS TABS Take 5,000 Units by mouth daily      glucose (GLUTOSE) 40 % GEL Take 15 g by mouth as needed 45 g 1    mineral oil-hydrophilic petrolatum (AQUAPHOR) ointment As directed (Patient taking differently: Apply 1 applicator topically 2 times daily As directed)      tamsulosin (FLOMAX) 0.4 MG capsule Take 0.4 mg by mouth every evening       latanoprost (XALATAN) 0.005 % ophthalmic solution Place 1 drop into both eyes nightly       acetaminophen (TYLENOL) 325 MG tablet Take 650 mg by mouth every 4 hours as needed.  magnesium hydroxide (MILK OF MAGNESIA) 400 MG/5ML suspension Take 30 mLs by mouth daily as needed. Due 10/13/17 10/6/2017 10:23 AM   Wound Length (cm) 0 cm 10/13/2017 10:00 AM   Wound Width (cm) 0 cm 10/13/2017 10:00 AM   Wound Depth (cm)  0 10/13/2017 10:00 AM   Calculated Wound Size (cm^2) (l*w) 0 cm^2 10/13/2017 10:00 AM   Change in Wound Size % (l*w) 100 10/13/2017 10:00 AM   Wound Assessment Red 10/6/2017  9:08 AM   Drainage Amount Small 10/6/2017  9:08 AM   Drainage Description Serosanguinous 10/6/2017  9:08 AM   Odor None 10/6/2017  9:08 AM   Susy-wound Assessment Purple; Intact;Dry 10/6/2017  9:08 AM   Red%Wound Bed 100 10/6/2017  9:08 AM   Yellow%Wound Bed 20 9/29/2017  9:33 AM   Debridement per physician Full thickness 10/6/2017  9:24 AM   Time out Yes 10/6/2017  9:24 AM   Procedural Pain 0 10/6/2017  9:24 AM   Post procedural Pain 1 9/29/2017 10:00 AM   Number of days: 368       Wound 07/11/18 Venous ulcer Leg Lower;Left;Lateral #13 acq: 7-11- left lateral  (#3 at [facility]) (Active)   Wound Image   9/26/2018  9:55 AM   Wound Type Wound 7/11/2018 11:44 AM   Wound Venous 7/11/2018 11:44 AM   Dressing Status Clean;Dry; Intact 9/26/2018 10:38 AM   Dressing Changed Changed/New 9/26/2018 10:38 AM   Dressing/Treatment ABD; Alginate;Dry dressing 9/26/2018 10:38 AM   Wound Cleansed Rinsed/Irrigated with saline 9/26/2018 10:38 AM   Wound Length (cm) 5 cm 10/3/2018  8:52 AM   Wound Width (cm) 8.3 cm 10/3/2018  8:52 AM   Wound Depth (cm)  .1 10/3/2018  8:52 AM   Calculated Wound Size (cm^2) (l*w) 41.5 cm^2 10/3/2018  8:52 AM   Change in Wound Size % (l*w) -5828.57 10/3/2018  8:52 AM   Wound Assessment Red 10/3/2018  8:52 AM   Drainage Amount Moderate 10/3/2018  8:52 AM   Drainage Description Serosanguinous 10/3/2018  8:52 AM   Odor None 10/3/2018  8:52 AM   Susy-wound Assessment Fragile;Pink 10/3/2018  8:52 AM   Red%Wound Bed 80 8/29/2018 10:24 AM   Yellow%Wound Bed 20 8/29/2018 10:24 AM   Culture Taken Yes 8/29/2018 10:58 AM   Debridement per physician None 7/25/2018  2:47 PM   Time out Yes 9/26/2018 10:23 AM Procedural Pain 0 9/26/2018 10:23 AM   Post procedural Pain 0 9/26/2018 10:23 AM   Number of days: 83       Wound 07/26/18 Leg Left; Lower;Dorsal (Active)   Number of days: 68       Wound 07/26/18 Ankle Left; Outer; Lower (Active)   Number of days: 68       Wound 07/27/18 Leg Right (Active)   Number of days: 67       Wound 07/27/18 Leg Right (Active)   Number of days: 67       Wound 08/08/18 Venous ulcer Leg Right;Proximal;Medial;Posterior #14 blocked  acq: 8-2-18 (Active)   Wound Image   10/3/2018  8:52 AM   Dressing Status Clean;Dry; Intact 9/26/2018 10:38 AM   Dressing Changed Changed/New 9/26/2018 10:38 AM   Dressing/Treatment ABD; Alginate;Dry dressing 9/26/2018 10:38 AM   Wound Cleansed Rinsed/Irrigated with saline 9/26/2018 10:38 AM   Wound Length (cm) 0 cm 10/3/2018  8:52 AM   Wound Width (cm) 0 cm 10/3/2018  8:52 AM   Wound Depth (cm)  0 10/3/2018  8:52 AM   Calculated Wound Size (cm^2) (l*w) 0 cm^2 10/3/2018  8:52 AM   Change in Wound Size % (l*w) 100 10/3/2018  8:52 AM   Wound Assessment Red 9/26/2018  9:55 AM   Drainage Amount Small 9/26/2018  9:55 AM   Drainage Description Serosanguinous 9/26/2018  9:55 AM   Odor None 9/26/2018  9:55 AM   Susy-wound Assessment Dry;Pink 9/26/2018  9:55 AM   Red%Wound Bed 100 8/29/2018 10:24 AM   Debridement per physician None 8/8/2018  2:18 PM   Time out Yes 9/26/2018 10:23 AM   Procedural Pain 0 9/26/2018 10:23 AM   Post procedural Pain 0 9/26/2018 10:23 AM   Number of days: 55       Wound 08/08/18 Skin tear Ankle Right;Distal;Medial #15 acq: 8-2-18 right dital medial (#1 at [facility]) (Active)   Wound Image   9/26/2018  9:55 AM   Dressing Status Clean;Dry; Intact 9/26/2018 10:38 AM   Dressing Changed Changed/New 9/26/2018 10:38 AM   Dressing/Treatment ABD; Alginate;Dry dressing 9/26/2018 10:38 AM   Wound Cleansed Rinsed/Irrigated with saline; Wound cleanser 9/26/2018 10:38 AM   Wound Length (cm) 3 cm 10/3/2018  8:52 AM   Wound Width (cm) 5 cm 10/3/2018  8:52 AM   Wound Depth (cm)  .1 10/3/2018  8:52 AM   Calculated Wound Size (cm^2) (l*w) 15 cm^2 10/3/2018  8:52 AM   Change in Wound Size % (l*w) -66.67 10/3/2018  8:52 AM   Wound Assessment Yellow;Red 10/3/2018  8:52 AM   Drainage Amount Moderate 10/3/2018  8:52 AM   Drainage Description Yellow 10/3/2018  8:52 AM   Odor None 10/3/2018  8:52 AM   Susy-wound Assessment Pink; Intact 10/3/2018  8:52 AM   Red%Wound Bed 80 8/29/2018 10:24 AM   Yellow%Wound Bed 20 8/29/2018 10:24 AM   Debridement per physician None 8/8/2018  2:18 PM   Time out Yes 9/26/2018 10:23 AM   Procedural Pain 0 9/26/2018 10:23 AM   Post procedural Pain 0 9/26/2018 10:23 AM   Number of days: 55       Wound 08/08/18 Skin tear Ankle Left;Medial #16 acq: 8-2-18 (# 2 at [facility]) left medial (Active)   Wound Image   9/26/2018  9:55 AM   Dressing Status Clean;Dry; Intact 9/26/2018 10:38 AM   Dressing Changed Changed/New 9/26/2018 10:38 AM   Dressing/Treatment ABD; Alginate;Dry dressing 9/26/2018 10:38 AM   Wound Cleansed Rinsed/Irrigated with saline 9/26/2018 10:38 AM   Wound Length (cm) 0.7 cm 10/3/2018  8:52 AM   Wound Width (cm) 0.8 cm 10/3/2018  8:52 AM   Wound Depth (cm)  .1 10/3/2018  8:52 AM   Calculated Wound Size (cm^2) (l*w) 0.56 cm^2 10/3/2018  8:52 AM   Change in Wound Size % (l*w) 96.8 10/3/2018  8:52 AM   Wound Assessment Red 10/3/2018  8:52 AM   Drainage Amount Moderate 10/3/2018  8:52 AM   Drainage Description Yellow 10/3/2018  8:52 AM   Odor None 10/3/2018  8:52 AM   Susy-wound Assessment Intact; Pink 10/3/2018  8:52 AM   Red%Wound Bed 80 8/29/2018 10:24 AM   Yellow%Wound Bed 20 8/29/2018 10:24 AM   Time out Yes 9/26/2018 10:23 AM   Procedural Pain 0 9/26/2018 10:23 AM   Post procedural Pain 0 9/26/2018 10:23 AM   Number of days: 55       Wound 08/13/18 Buttocks Right (Active)   Wound Length (cm) 2 cm 8/22/2018  3:12 PM   Wound Width (cm) 1.2 cm 8/22/2018  3:12 PM   Wound Depth (cm)  0.1 8/22/2018  3:12 PM   Calculated Wound Size (cm^2) (l*w) 2.4 cm^2 Odor None 10/3/2018  8:52 AM   Susy-wound Assessment Dry;Pink 10/3/2018  8:52 AM   Red%Wound Bed 100 8/29/2018 10:24 AM   Time out Yes 9/26/2018 10:23 AM   Procedural Pain 0 9/26/2018 10:23 AM   Post procedural Pain 0 9/26/2018 10:23 AM   Number of days: 34       Wound 08/29/18  Toe (Comment  which one) Anterior new wound, #19 right second toe. date of onset aug 29,2018 (Active)   Wound Image   10/3/2018  8:52 AM   Wound Type Wound 8/29/2018 10:24 AM   Wound Diabetic [place] 1 8/29/2018 10:24 AM   Dressing Status Clean;Dry; Intact 9/26/2018 10:38 AM   Dressing Changed Changed/New 9/26/2018 10:38 AM   Dressing/Treatment ABD; Alginate 9/26/2018 10:38 AM   Wound Cleansed Rinsed/Irrigated with saline 9/26/2018 10:38 AM   Wound Length (cm) 0 cm 10/3/2018  8:52 AM   Wound Width (cm) 0 cm 10/3/2018  8:52 AM   Wound Depth (cm)  0 10/3/2018  8:52 AM   Calculated Wound Size (cm^2) (l*w) 0 cm^2 10/3/2018  8:52 AM   Change in Wound Size % (l*w) 100 10/3/2018  8:52 AM   Wound Assessment Dry;Pink 9/26/2018  9:55 AM   Drainage Amount None 9/26/2018  9:55 AM   Drainage Description Gomez;Yellow 9/12/2018  8:41 AM   Odor None 9/26/2018  9:55 AM   Susy-wound Assessment Dry;Pink 9/26/2018  9:55 AM   Debridement per physician None 9/5/2018 10:24 AM   Time out Yes 9/26/2018 10:23 AM   Procedural Pain 0 9/26/2018 10:23 AM   Post procedural Pain 0 9/26/2018 10:23 AM   Number of days: 34       Wound 09/26/18 Leg Left;Medial #20 Left Medial Leg BLOCKED (Acquired 9.26.18) (Active)   Wound Image   9/26/2018  9:55 AM   Dressing Status Clean;Dry; Intact 9/26/2018 10:38 AM   Dressing Changed Changed/New 9/26/2018 10:38 AM   Dressing/Treatment ABD; Alginate;Dry dressing 9/26/2018 10:38 AM   Wound Cleansed Rinsed/Irrigated with saline; Other (Comment) 9/26/2018 10:38 AM   Wound Length (cm) 14.2 cm 10/3/2018  8:52 AM   Wound Width (cm) 7 cm 10/3/2018  8:52 AM   Wound Depth (cm)  .1 10/3/2018  8:52 AM   Calculated Wound Size (cm^2) (l*w) 99.4 cm^2 10/3/2018  8:52 AM   Change in Wound Size % (l*w) 53.98 10/3/2018  8:52 AM   Wound Assessment Red 10/3/2018  8:52 AM   Drainage Amount Moderate 10/3/2018  8:52 AM   Drainage Description Yellow 10/3/2018  8:52 AM   Odor None 10/3/2018  8:52 AM   Susy-wound Assessment Dry;Fragile 10/3/2018  8:52 AM   Time out Yes 9/26/2018 10:23 AM   Procedural Pain 0 9/26/2018 10:23 AM   Post procedural Pain 0 9/26/2018 10:23 AM   Number of days: 6     Percent of Wound/Ulcer Debrided: 100%    Total Surface Area Debrided:  132 sq cm     Estimated Blood Loss:  Minimal  Hemostasis Achieved:  by pressure    Procedural Pain:  3  / 10   Post Procedural Pain:  4 / 10     Response to treatment:  Well tolerated by patient. Plan:   Treatment Note please see attached Discharge Instructions    Written patient dismissal instructions given to patient and signed by patient or POA. Discharge Instructions       Visit Discharge/Physician Orders     Discharge condition: Stable     Assessment of pain at discharge: none     Anesthetic used: 2% lidocaine     Discharge to: ECF     Left via:public transportation     Accompanied by: caregiver     [facility]: [facility]      Dressing Orders:  DRESSING NEEDS TO START FOR BASE OF TOES AND TO ANKLE. DOING A GREAT JOB!! Cleanse bilateral lower leg, ankles, left pretibial ulcers, RIGHT SECOND TOE  and dorsal foot ulcer with normal saline at each dressing change. May wash legs with aloe vista foam, rinse and pat dry.  Apply gentamicin to ulcers. Cover with dry dressing, kerlex and coban.  May start this dressing tomorrow and change every other day. In clinic. Ulcers cleansed with normal saline.  Alginate, dry dressing, kerlex and coban applied.     For abdominal wounds, keep covered and dry. Treat according to dermatology orders.     Treatment Orders:   PATIENT TO CONTINUE TO FOLLOW UP WITH DERMATOLOGIST ALSO, DR. Candelaria Elizabeth.  THANK YOU     [facility] followup visit ___________1_ WEEK BDM_______Made

## 2018-10-10 ENCOUNTER — HOSPITAL ENCOUNTER (OUTPATIENT)
Dept: WOUND CARE | Age: 78
Discharge: HOME | End: 2018-10-10
Payer: COMMERCIAL

## 2018-10-10 VITALS
HEART RATE: 72 BPM | HEIGHT: 72 IN | TEMPERATURE: 97.6 F | BODY MASS INDEX: 29.8 KG/M2 | SYSTOLIC BLOOD PRESSURE: 130 MMHG | WEIGHT: 220 LBS | DIASTOLIC BLOOD PRESSURE: 70 MMHG | RESPIRATION RATE: 16 BRPM

## 2018-10-10 PROCEDURE — 11045 DBRDMT SUBQ TISS EACH ADDL: CPT

## 2018-10-10 PROCEDURE — 11042 DBRDMT SUBQ TIS 1ST 20SQCM/<: CPT

## 2018-10-10 NOTE — PROGRESS NOTES
[facility] Followup Visit Note    Referring Physician : No primary care provider on file. Keeagn Negrete 38 RECORD NUMBER:  [id number]  AGE: [de-identified] y.o. GENDER: male  : 1940  EPISODE DATE:  10/10/2018    Subjective:     Chief Complaint   Patient presents with    Wound Check     patient here for treatment of  bilateral lower leg ulcers and left ankle ulcer      HISTORY of PRESENT ILLNESS HPI   Violette Moffett is a [de-identified] y.o. male who presents today for wound/ulcer evaluation.    History of Wound Context:  2 months     Wound/Ulcer Pain Timing/Severity: constant  Quality of pain: aching  Severity:  2 / 10   Modifying Factors: Pain worsens with walking  Associated Signs/Symptoms: edema, erythema and drainage    Ulcer Identification:  Ulcer Type: venous  Contributing Factors: edema    Diabetic/Pressure/Non Pressure Ulcers only:  Ulcer: Non-Pressure ulcer, fat layer exposed    Wound: N/A        PAST MEDICAL HISTORY      Diagnosis Date    Anxiety     Benign localized hyperplasia of prostate with urinary obstruction     Bullous pemphigoid     Diabetes mellitus (HCC)     Enlarged prostate     Hyperlipidemia     Hypertension     Muscle weakness     Rheumatoid disease (HCC)      Past Surgical History:   Procedure Laterality Date    DOPPLER ECHOCARDIOGRAPHY N/A     FRACTURE SURGERY      HI EGD PERCUTANEOUS PLACEMENT GASTROSTOMY TUBE N/A 2018    EGD  PEG TUBE INSERTION,  (ENDO STAFF NEEDED) performed by Sade Zee MD at [address] [place] [address] N/A 2018    EGD DIAGNOSTIC ONLY performed by Princess Juan Alberto MD at [facility] OR     Family History   Problem Relation Age of Onset    Diabetes Mother     Kidney Disease Mother     Cancer Father         esophagus    Cancer Sister      Social History   Substance Use Topics    Smoking status: Former Smoker     Packs/day: 2.00     Years: 50.00     Types: Cigarettes     Start date: 3/30/1955     Quit date: 3/30/2006    Wound 03/31/16 Ankle Right;Posterior (Active)   Number of days: 922       Wound 03/31/16 Ankle Lateral;Left (Active)   Number of days: 922       Wound 03/31/16 Ankle Left; Anterior (Active)   Number of days: 922       Wound 03/31/16 Knee Left;Lateral (Active)   Number of days: 922       Wound 04/02/16 Blister Ankle Lateral;Left (Active)   Number of days: 920       Wound 04/06/16 Ankle Posterior (Active)   Number of days: 916       Wound 04/06/16 Knee Posterior;Right (Active)   Number of days: 916       Wound 04/06/16 Knee Lateral;Outer;Right (Active)   Number of days: 916       Wound 04/06/16 Other (Comment) Right (Active)   Number of days: 916       Wound 04/06/16 Other (Comment) Posterior; Left (Active)   Number of days: 916       Wound 04/06/16 Other (Comment) Medial;Left (Active)   Number of days: 916       Wound 10/18/16 Skin tear Ankle Outer;Left (Active)   Number of days: 722       Wound 10/18/16 Skin tear Leg Outer;Left (Active)   Number of days: 722       Wound 10/19/16 Other (Comment) Foot Right (Active)   Number of days: 721       Wound 08/04/17 Venous ulcer Leg Left;Upper new wound, #1 venous wound left upper leg. date of onset May 2017 (Active)   Wound Type Wound 10/6/2017  9:21 AM   Wound Venous 10/6/2017  9:21 AM   Dressing Status Changed 10/20/2017  9:57 AM   Dressing Changed Changed/New 10/20/2017  9:57 AM   Dressing/Treatment Alginate 10/20/2017  9:57 AM   Wound Cleansed Rinsed/Irrigated with saline 10/20/2017  9:57 AM   Dressing Change Due 10/13/17 10/6/2017 10:23 AM   Wound Length (cm) 0 cm 10/25/2017  4:06 PM   Wound Width (cm) 0 cm 10/25/2017  4:06 PM   Wound Depth (cm)  0 10/25/2017  4:06 PM   Calculated Wound Size (cm^2) (l*w) 0 cm^2 10/25/2017  4:06 PM   Change in Wound Size % (l*w) 100 10/25/2017  4:06 PM   Wound Assessment Epithelialization 10/25/2017  4:06 PM   Drainage Amount Moderate 10/20/2017  8:52 AM   Drainage Description Serosanguinous; Yellow 10/20/2017  8:52 AM   Odor None 10/10/2018  8:27 AM   Odor None 10/10/2018  8:27 AM   Susy-wound Assessment Fragile 10/10/2018  8:27 AM   Red%Wound Bed 100 10/10/2018  8:27 AM   Time out Yes 10/10/2018  9:11 AM   Procedural Pain 0 10/10/2018  9:11 AM   Post procedural Pain 0 10/10/2018  9:11 AM   Number of days: 13     Percent of Wound/Ulcer Debrided: 100%    Total Surface Area Debrided:  168 sq cm     Estimated Blood Loss:  Minimal  Hemostasis Achieved:  by pressure    Procedural Pain:  2  / 10   Post Procedural Pain:  3 / 10     Response to treatment:  Well tolerated by patient. Plan:   Treatment Note please see attached Discharge Instructions    Written patient dismissal instructions given to patient and signed by patient or POA. Discharge Instructions       Visit Discharge/Physician Orders     Discharge condition: Stable     Assessment of pain at discharge: none     Anesthetic used: 2% lidocaine     Discharge to: ECF     Left via:public transportation     Accompanied by: caregiver     [facility]      Dressing Orders:  DRESSING NEEDS TO START FOR BASE OF TOES AND TO ANKLE. DOING A GREAT JOB!! Right second toe and right proximal-medial posterior ulcers are healed.      Cleanse bilateral lower leg, ankles, left pretibial ulcers,   and dorsal foot ulcer with normal saline at each dressing change. May wash legs with aloe vista foam, rinse and pat dry. Continue to apply gentamicin to ulcers. Cover with dry dressing, kerlex and coban.  May start this dressing tomorrow and change every other day. In clinic. Ulcers cleansed with normal saline.  Alginate, dry dressing, kerlex and coban applied.      Treatment Orders:   PATIENT TO CONTINUE TO FOLLOW UP WITH DERMATOLOGIST ALSO, DR. Cricket Luna. THANK YOU     [facility] followup visit ___________1_ WEEK BDM_______Made Complex_______  (Please note your next appointment above and if you are unable to keep, kindly give a 24 hour notice.  Thank you.)    Physician

## 2018-10-17 ENCOUNTER — HOSPITAL ENCOUNTER (OUTPATIENT)
Dept: WOUND CARE | Age: 78
Discharge: HOME | End: 2018-10-17
Payer: COMMERCIAL

## 2018-10-17 VITALS
TEMPERATURE: 98 F | SYSTOLIC BLOOD PRESSURE: 112 MMHG | DIASTOLIC BLOOD PRESSURE: 68 MMHG | HEART RATE: 68 BPM | RESPIRATION RATE: 18 BRPM

## 2018-10-17 PROCEDURE — 11042 DBRDMT SUBQ TIS 1ST 20SQCM/<: CPT

## 2018-10-17 PROCEDURE — 11045 DBRDMT SUBQ TISS EACH ADDL: CPT

## 2018-10-17 NOTE — PROGRESS NOTES
Never Used    Alcohol use No     Allergies   Allergen Reactions    Levaquin [Levofloxacin] Other (See Comments)     Lethargy when taken with celebrex     Current Outpatient Prescriptions on File Prior to Encounter   Medication Sig Dispense Refill    predniSONE (DELTASONE) 10 MG tablet Take 10 mg by mouth daily Administer 3 tablets once daily for 14 days      methotrexate (RHEUMATREX) 2.5 MG chemo tablet Take 2.5 mg by mouth once a week Give twice daily every week      folic acid (FOLVITE) 1 MG tablet Take 1 mg by mouth daily      DiphenhydrAMINE HCl (BENADRYL PO) Take 25 mg by mouth every 6 hours as needed      insulin lispro (HUMALOG) 100 UNIT/ML injection vial Inject 0-6 Units into the skin 3 times daily (with meals) 1 vial 3    triamcinolone (KENALOG) 0.1 % cream Apply topically every 8 hours Apply topically 2 times daily.  Nutritional Supplements (ARGINAID) PACK Take 4.5 g by mouth 2 times daily      insulin detemir (LEVEMIR) 100 UNIT/ML injection vial Inject 10 Units into the skin nightly      mirtazapine (REMERON) 15 MG tablet Take 7.5 mg by mouth daily      atorvastatin (LIPITOR) 20 MG tablet Take 20 mg by mouth daily      Cholecalciferol (VITAMIN D3) 5000 UNITS TABS Take 5,000 Units by mouth daily      glucose (GLUTOSE) 40 % GEL Take 15 g by mouth as needed 45 g 1    mineral oil-hydrophilic petrolatum (AQUAPHOR) ointment As directed (Patient taking differently: Apply 1 applicator topically 2 times daily As directed)      tamsulosin (FLOMAX) 0.4 MG capsule Take 0.4 mg by mouth every evening       latanoprost (XALATAN) 0.005 % ophthalmic solution Place 1 drop into both eyes nightly       acetaminophen (TYLENOL) 325 MG tablet Take 650 mg by mouth every 4 hours as needed.  magnesium hydroxide (MILK OF MAGNESIA) 400 MG/5ML suspension Take 30 mLs by mouth daily as needed.  bisacodyl (DULCOLAX) 5 MG EC tablet Take 5 mg by mouth daily as needed.       busPIRone (BUSPAR) 7.5 MG tablet Take 7.5 mg by mouth daily. No current facility-administered medications on file prior to encounter. REVIEW OF SYSTEMS See HPI    Objective:    /68   Pulse 68   Temp 98 °F (36.7 °C) (Oral)   Resp 18   Wt Readings from Last 3 Encounters:   10/10/18 220 lb (99.8 kg)   10/03/18 220 lb (99.8 kg)   09/12/18 220 lb (99.8 kg)     PHYSICAL EXAM  CONSTITUTIONAL:   Awake, alert, cooperative   EYES:  lids and lashes normal   ENT: external ears and nose without lesions   NECK:  supple, symmetrical, trachea midline   SKIN:  Open wound Present    Assessment:     Problem List Items Addressed This Visit     None        Procedure Note  Indications:  Based on my examination of this patient's wound(s)/ulcer(s) today, debridement is required to promote healing and evaluate the wound base. Performed by: Josh Ambrosio DPM    Consent obtained:  Yes    Time out taken:  Yes    Pain Control: Anesthetic  Anesthetic: 2% Lidocaine Gel Topical     Debridement:Excisional Debridement    Using #15 blade scalpel the wound(s)/ulcer(s) was/were sharply debrided down through and including the removal of subcutaneous tissue. Devitalized Tissue Debrided:  fibrin, biofilm and slough to stimulate bleeding to promote healing, post debridement good bleeding base and wound edges noted    Pre Debridement Measurements:  Are located in the Wound/Ulcer Documentation Flow Sheet    Wound/Ulcer #: 5, 10, 13, 15, 17, 18 and 20    Post Debridement Measurements:  Wound/Ulcer Descriptions are Pre Debridement except measurements:    Pressure Ulcer 04/06/16 Buttocks Right (Active)   Number of days: 924       Wound 03/31/16 Knee Medial;Right (Active)   Number of days: 929       Wound 03/31/16 Knee Lateral;Right (Active)   Number of days: 929       Wound 03/31/16 Ankle Right;Posterior (Active)   Number of days: 929       Wound 03/31/16 Ankle Lateral;Left (Active)   Number of days: 929       Wound 03/31/16 Ankle Left; Anterior 8:56 AM   Change in Wound Size % (l*w) 98 10/17/2018  8:56 AM   Wound Assessment Drainage;Red 10/17/2018  8:28 AM   Drainage Amount Scant 10/17/2018  8:28 AM   Drainage Description Serosanguinous 10/17/2018  8:28 AM   Odor None 10/17/2018  8:28 AM   Susy-wound Assessment Clean;Dry; Intact; Pink 10/17/2018  8:28 AM   Red%Wound Bed 100 10/10/2018  8:27 AM   Yellow%Wound Bed 20 8/29/2018 10:24 AM   Time out Yes 10/17/2018  8:56 AM   Procedural Pain 0 10/17/2018  8:56 AM   Post procedural Pain 0 10/17/2018  8:56 AM   Number of days: 69       Wound 08/13/18 Buttocks Right (Active)   Wound Length (cm) 2 cm 8/22/2018  3:12 PM   Wound Width (cm) 1.2 cm 8/22/2018  3:12 PM   Wound Depth (cm)  0.1 8/22/2018  3:12 PM   Calculated Wound Size (cm^2) (l*w) 2.4 cm^2 8/22/2018  3:12 PM   Change in Wound Size % (l*w) -20 8/22/2018  3:12 PM   Time out Yes 8/22/2018  3:12 PM   Number of days: 64       Wound 08/22/18 Venous ulcer Toe (Comment  which one) Left wound # 5 [facility] left 3rd toe venous acquired 8/2/18 (Active)   Wound Image   8/22/2018  2:53 PM   Wound Type Wound 8/22/2018  2:53 PM   Wound Venous 8/22/2018  2:53 PM   Dressing Status Clean;Dry; Intact 8/22/2018  3:41 PM   Dressing Changed Changed/New 8/22/2018  3:41 PM   Wound Cleansed Rinsed/Irrigated with saline 8/22/2018  3:41 PM   Wound Length (cm) 1 cm 8/22/2018  3:12 PM   Wound Width (cm) 1 cm 8/22/2018  3:12 PM   Wound Depth (cm)  0.1 8/22/2018  3:12 PM   Calculated Wound Size (cm^2) (l*w) 1 cm^2 8/22/2018  3:12 PM   Change in Wound Size % (l*w) -23.46 8/22/2018  3:12 PM   Wound Assessment Dry;Brown 8/22/2018  2:53 PM   Drainage Amount None 8/22/2018  2:53 PM   Odor None 8/22/2018  2:53 PM   Susy-wound Assessment Purple 8/22/2018  2:53 PM   Non-staged Wound Description Full thickness 8/22/2018  2:53 PM   Time out Yes 8/22/2018  3:12 PM   Number of days: 55       Wound 08/29/18  Ankle Left;Lateral new wound, #18. traumatic  left lateral ankle onset august 29, 2018 drainage from your wound  * Drainage with a foul odor  * Bleeding  * Increase in swelling  * Need for compression bandage changes due to slippage, breakthrough drainage. If you need medical attention outside of the business hours of the [address] [place] [address] Road please contact your PCP or go to the nearest emergency room.         Electronically signed by Cristopher Harden DPM on 10/17/2018 at 9:14 AM

## 2018-10-24 ENCOUNTER — HOSPITAL ENCOUNTER (OUTPATIENT)
Dept: WOUND CARE | Age: 78
Discharge: HOME | End: 2018-10-24
Payer: COMMERCIAL

## 2018-10-24 VITALS
RESPIRATION RATE: 18 BRPM | TEMPERATURE: 97.7 F | SYSTOLIC BLOOD PRESSURE: 110 MMHG | WEIGHT: 220 LBS | HEART RATE: 62 BPM | BODY MASS INDEX: 29.8 KG/M2 | DIASTOLIC BLOOD PRESSURE: 68 MMHG | HEIGHT: 72 IN

## 2018-10-24 PROCEDURE — 11045 DBRDMT SUBQ TISS EACH ADDL: CPT

## 2018-10-24 PROCEDURE — 11042 DBRDMT SUBQ TIS 1ST 20SQCM/<: CPT

## 2018-10-24 NOTE — PROGRESS NOTES
Used    Alcohol use No     Allergies   Allergen Reactions    Levaquin [Levofloxacin] Other (See Comments)     Lethargy when taken with celebrex     Current Outpatient Prescriptions on File Prior to Encounter   Medication Sig Dispense Refill    predniSONE (DELTASONE) 10 MG tablet Take 10 mg by mouth daily Administer 3 tablets once daily for 14 days      methotrexate (RHEUMATREX) 2.5 MG chemo tablet Take 2.5 mg by mouth once a week Give twice daily every week      folic acid (FOLVITE) 1 MG tablet Take 1 mg by mouth daily      DiphenhydrAMINE HCl (BENADRYL PO) Take 25 mg by mouth every 6 hours as needed      insulin lispro (HUMALOG) 100 UNIT/ML injection vial Inject 0-6 Units into the skin 3 times daily (with meals) 1 vial 3    triamcinolone (KENALOG) 0.1 % cream Apply topically every 8 hours Apply topically 2 times daily.  Nutritional Supplements (ARGINAID) PACK Take 4.5 g by mouth 2 times daily      insulin detemir (LEVEMIR) 100 UNIT/ML injection vial Inject 10 Units into the skin nightly      mirtazapine (REMERON) 15 MG tablet Take 7.5 mg by mouth daily      atorvastatin (LIPITOR) 20 MG tablet Take 20 mg by mouth daily      Cholecalciferol (VITAMIN D3) 5000 UNITS TABS Take 5,000 Units by mouth daily      glucose (GLUTOSE) 40 % GEL Take 15 g by mouth as needed 45 g 1    mineral oil-hydrophilic petrolatum (AQUAPHOR) ointment As directed (Patient taking differently: Apply 1 applicator topically 2 times daily As directed)      tamsulosin (FLOMAX) 0.4 MG capsule Take 0.4 mg by mouth every evening       latanoprost (XALATAN) 0.005 % ophthalmic solution Place 1 drop into both eyes nightly       acetaminophen (TYLENOL) 325 MG tablet Take 650 mg by mouth every 4 hours as needed.  magnesium hydroxide (MILK OF MAGNESIA) 400 MG/5ML suspension Take 30 mLs by mouth daily as needed.  bisacodyl (DULCOLAX) 5 MG EC tablet Take 5 mg by mouth daily as needed.       busPIRone (BUSPAR) 7.5 MG tablet Take days: 936       Wound 03/31/16 Ankle Left; Anterior (Active)   Number of days: 936       Wound 03/31/16 Knee Left;Lateral (Active)   Number of days: 936       Wound 04/02/16 Blister Ankle Lateral;Left (Active)   Number of days: 934       Wound 04/06/16 Ankle Posterior (Active)   Number of days: 930       Wound 04/06/16 Knee Posterior;Right (Active)   Number of days: 930       Wound 04/06/16 Knee Lateral;Outer;Right (Active)   Number of days: 930       Wound 04/06/16 Other (Comment) Right (Active)   Number of days: 930       Wound 04/06/16 Other (Comment) Posterior; Left (Active)   Number of days: 930       Wound 04/06/16 Other (Comment) Medial;Left (Active)   Number of days: 930       Wound 10/18/16 Skin tear Ankle Outer;Left (Active)   Number of days: 736       Wound 10/18/16 Skin tear Leg Outer;Left (Active)   Number of days: 736       Wound 10/19/16 Other (Comment) Foot Right (Active)   Number of days: 735       Wound 08/04/17 Venous ulcer Leg Left;Upper new wound, #1 venous wound left upper leg. date of onset May 2017 (Active)   Wound Type Wound 10/6/2017  9:21 AM   Wound Venous 10/6/2017  9:21 AM   Dressing Status Changed 10/20/2017  9:57 AM   Dressing Changed Changed/New 10/20/2017  9:57 AM   Dressing/Treatment Alginate 10/20/2017  9:57 AM   Wound Cleansed Rinsed/Irrigated with saline 10/20/2017  9:57 AM   Dressing Change Due 10/13/17 10/6/2017 10:23 AM   Wound Length (cm) 0 cm 10/25/2017  4:06 PM   Wound Width (cm) 0 cm 10/25/2017  4:06 PM   Wound Depth (cm)  0 10/25/2017  4:06 PM   Calculated Wound Size (cm^2) (l*w) 0 cm^2 10/25/2017  4:06 PM   Change in Wound Size % (l*w) 100 10/25/2017  4:06 PM   Wound Assessment Epithelialization 10/25/2017  4:06 PM   Drainage Amount Moderate 10/20/2017  8:52 AM   Drainage Description Serosanguinous; Yellow 10/20/2017  8:52 AM   Odor None 10/20/2017  8:52 AM   Susy-wound Assessment Red 10/25/2017  4:06 PM   Red%Wound Bed 100 10/6/2017  9:21 AM   Debridement per physician Full thickness 10/20/2017  9:03 AM   Time out Yes 10/20/2017  9:03 AM   Procedural Pain 1 10/20/2017  9:03 AM   Post procedural Pain 1 10/20/2017  9:03 AM   Number of days: 446       Wound 08/04/17 (Active)   Number of days: 446       Wound 09/08/17 Venous ulcer Ankle Left;Lateral Wound #4 venous ulcer acquired 9/8/2017 (Active)   Wound Type Wound 10/6/2017  9:08 AM   Wound Venous 10/6/2017  9:08 AM   Dressing Status Changed 9/29/2017 10:33 AM   Dressing Changed Changed/New 9/29/2017 10:33 AM   Dressing/Treatment Alginate 9/8/2017 10:14 AM   Wound Cleansed Rinsed/Irrigated with saline 9/29/2017 10:33 AM   Wound Length (cm) 0 cm 10/6/2017  9:21 AM   Wound Width (cm) 0 cm 10/6/2017  9:21 AM   Wound Depth (cm)  0 10/6/2017  9:21 AM   Calculated Wound Size (cm^2) (l*w) 0 cm^2 10/6/2017  9:21 AM   Change in Wound Size % (l*w) 100 10/6/2017  9:21 AM   Wound Assessment Pink 9/29/2017  9:36 AM   Drainage Amount Moderate 10/6/2017  9:08 AM   Drainage Description Serosanguinous 10/6/2017  9:08 AM   Odor None 10/6/2017  9:08 AM   Susy-wound Assessment Dry; Intact 9/29/2017  9:36 AM   [place]%Wound Bed 100 9/22/2017 10:04 AM   Red%Wound Bed 30 9/15/2017 10:12 AM   Yellow%Wound Bed 20 9/15/2017 10:12 AM   Debridement per physician Subcutaneous 9/29/2017 10:00 AM   Time out Yes 9/29/2017 10:00 AM   Procedural Pain 2 9/29/2017 10:00 AM   Post procedural Pain 1 9/29/2017 10:00 AM   Number of days: 411       Wound 09/29/17 Venous ulcer Leg Right;Lateral new wound, #5  right lateral leg.  date of onset  sept. 29,2017 (Active)   Wound Type Wound 10/6/2017  9:08 AM   Wound Venous 10/6/2017  9:08 AM   Dressing Status Changed 10/6/2017 10:23 AM   Dressing Changed Changed/New 10/6/2017 10:23 AM   Dressing/Treatment Other (Comment) 10/6/2017 10:23 AM   Wound Cleansed Rinsed/Irrigated with saline 10/6/2017 10:23 AM   Dressing Change Due 10/13/17 10/6/2017 10:23 AM   Wound Length (cm) 0 cm 10/13/2017 10:00 AM   Wound Width (cm) 0 cm 10/13/2017  Alginate, dry dressing, kerlex and coban applied.      Treatment Orders:   PATIENT TO CONTINUE TO FOLLOW UP WITH DERMATOLOGIST ALSO, DR. Sidney Roach. THANK YOU     [address] [place] [address],[address] followup visit ___________1_ WEEK BDM_______Made Complex_______  (Please note your next appointment above and if you are unable to keep, kindly give a 24 hour notice. Thank you.)    Physician signature:__________________________      If you experience any of the following, please call the [facility] during business hours:    * Increase in Pain  * Temperature over 101  * Increase in drainage from your wound  * Drainage with a foul odor  * Bleeding  * Increase in swelling  * Need for compression bandage changes due to slippage, breakthrough drainage. If you need medical attention outside of the business hours of the [facility] please contact your PCP or go to the nearest emergency room.         Electronically signed by Jose L Man DPM on 10/24/2018 at 10:22 AM

## 2018-10-31 ENCOUNTER — HOSPITAL ENCOUNTER (OUTPATIENT)
Dept: WOUND CARE | Age: 78
Discharge: HOME | End: 2018-10-31
Payer: COMMERCIAL

## 2018-10-31 ENCOUNTER — HOSPITAL ENCOUNTER (OUTPATIENT)
Age: 78
Discharge: HOME | End: 2018-11-02
Payer: COMMERCIAL

## 2018-10-31 VITALS
DIASTOLIC BLOOD PRESSURE: 70 MMHG | RESPIRATION RATE: 18 BRPM | HEART RATE: 62 BPM | BODY MASS INDEX: 29.84 KG/M2 | WEIGHT: 220 LBS | SYSTOLIC BLOOD PRESSURE: 112 MMHG | TEMPERATURE: 97.8 F

## 2018-10-31 PROCEDURE — 11045 DBRDMT SUBQ TISS EACH ADDL: CPT

## 2018-10-31 PROCEDURE — 87077 CULTURE AEROBIC IDENTIFY: CPT

## 2018-10-31 PROCEDURE — 87070 CULTURE OTHR SPECIMN AEROBIC: CPT

## 2018-10-31 PROCEDURE — 87075 CULTR BACTERIA EXCEPT BLOOD: CPT

## 2018-10-31 PROCEDURE — 11042 DBRDMT SUBQ TIS 1ST 20SQCM/<: CPT

## 2018-10-31 PROCEDURE — 87186 SC STD MICRODIL/AGAR DIL: CPT

## 2018-10-31 NOTE — PROGRESS NOTES
mouth daily. No current facility-administered medications on file prior to encounter. REVIEW OF SYSTEMS See HPI    Objective:    /70   Pulse 62   Temp 97.8 °F (36.6 °C) (Oral)   Resp 18   Wt 220 lb (99.8 kg)   BMI 29.84 kg/m²   Wt Readings from Last 3 Encounters:   10/31/18 220 lb (99.8 kg)   10/24/18 220 lb (99.8 kg)   10/10/18 220 lb (99.8 kg)     PHYSICAL EXAM  CONSTITUTIONAL:   Awake, alert, cooperative   EYES:  lids and lashes normal   ENT: external ears and nose without lesions   NECK:  supple, symmetrical, trachea midline   SKIN:  Open wound Present    Assessment:     Problem List Items Addressed This Visit     None        Procedure Note  Indications:  Based on my examination of this patient's wound(s)/ulcer(s) today, debridement is required to promote healing and evaluate the wound base. Performed by: Luis Aguirre DPM    Consent obtained:  Yes    Time out taken:  Yes    Pain Control: Anesthetic  Anesthetic: 2% Lidocaine Gel Topical     Debridement:Excisional Debridement    Using #15 blade scalpel the wound(s)/ulcer(s) was/were sharply debrided down through and including the removal of subcutaneous tissue.         Devitalized Tissue Debrided:  fibrin, biofilm, slough and necrotic/eschar to stimulate bleeding to promote healing, post debridement good bleeding base and wound edges noted    Pre Debridement Measurements:  Are located in the Wound/Ulcer Documentation Flow Sheet    Wound/Ulcer #: 5, 13, 16, 18, 20 and 21    Post Debridement Measurements:  Wound/Ulcer Descriptions are Pre Debridement except measurements:    Pressure Ulcer 04/06/16 Buttocks Right (Active)   Number of days: 938       Wound 03/31/16 Knee Medial;Right (Active)   Number of days: 943       Wound 03/31/16 Knee Lateral;Right (Active)   Number of days: 943       Wound 03/31/16 Ankle Right;Posterior (Active)   Number of days: 943       Wound 03/31/16 Ankle Lateral;Left (Active)   Number of days: 762 10/20/2017  9:03 AM   Time out Yes 10/20/2017  9:03 AM   Procedural Pain 1 10/20/2017  9:03 AM   Post procedural Pain 1 10/20/2017  9:03 AM   Number of days: 453       Wound 08/04/17 (Active)   Number of days: 453       Wound 09/08/17 Venous ulcer Ankle Left;Lateral Wound #4 venous ulcer acquired 9/8/2017 (Active)   Wound Type Wound 10/6/2017  9:08 AM   Wound Venous 10/6/2017  9:08 AM   Dressing Status Changed 9/29/2017 10:33 AM   Dressing Changed Changed/New 9/29/2017 10:33 AM   Dressing/Treatment Alginate 9/8/2017 10:14 AM   Wound Cleansed Rinsed/Irrigated with saline 9/29/2017 10:33 AM   Wound Length (cm) 0 cm 10/6/2017  9:21 AM   Wound Width (cm) 0 cm 10/6/2017  9:21 AM   Wound Depth (cm)  0 10/6/2017  9:21 AM   Calculated Wound Size (cm^2) (l*w) 0 cm^2 10/6/2017  9:21 AM   Change in Wound Size % (l*w) 100 10/6/2017  9:21 AM   Wound Assessment Pink 9/29/2017  9:36 AM   Drainage Amount Moderate 10/6/2017  9:08 AM   Drainage Description Serosanguinous 10/6/2017  9:08 AM   Odor None 10/6/2017  9:08 AM   Susy-wound Assessment Dry; Intact 9/29/2017  9:36 AM   [place]%Wound Bed 100 9/22/2017 10:04 AM   Red%Wound Bed 30 9/15/2017 10:12 AM   Yellow%Wound Bed 20 9/15/2017 10:12 AM   Debridement per physician Subcutaneous 9/29/2017 10:00 AM   Time out Yes 9/29/2017 10:00 AM   Procedural Pain 2 9/29/2017 10:00 AM   Post procedural Pain 1 9/29/2017 10:00 AM   Number of days: 418       Wound 09/29/17 Venous ulcer Leg Right;Lateral new wound, #5  right lateral leg.  date of onset  sept. 29,2017 (Active)   Wound Type Wound 10/6/2017  9:08 AM   Wound Venous 10/6/2017  9:08 AM   Dressing Status Changed 10/6/2017 10:23 AM   Dressing Changed Changed/New 10/6/2017 10:23 AM   Dressing/Treatment Other (Comment) 10/6/2017 10:23 AM   Wound Cleansed Rinsed/Irrigated with saline 10/6/2017 10:23 AM   Dressing Change Due 10/13/17 10/6/2017 10:23 AM   Wound Length (cm) 0 cm 10/13/2017 10:00 AM   Wound Width (cm) 0 cm 10/13/2017 10:00 AM   Wound

## 2018-11-03 LAB
ANAEROBIC CULTURE: NORMAL
ORGANISM: ABNORMAL
WOUND/ABSCESS: ABNORMAL
WOUND/ABSCESS: ABNORMAL

## 2018-11-07 ENCOUNTER — HOSPITAL ENCOUNTER (OUTPATIENT)
Dept: WOUND CARE | Age: 78
Discharge: HOME | End: 2018-11-07
Payer: COMMERCIAL

## 2018-11-07 ENCOUNTER — HOSPITAL ENCOUNTER (OUTPATIENT)
Age: 78
Discharge: HOME | End: 2018-11-09
Payer: COMMERCIAL

## 2018-11-07 VITALS
TEMPERATURE: 97.6 F | BODY MASS INDEX: 29.84 KG/M2 | RESPIRATION RATE: 18 BRPM | DIASTOLIC BLOOD PRESSURE: 70 MMHG | HEART RATE: 68 BPM | SYSTOLIC BLOOD PRESSURE: 116 MMHG | WEIGHT: 220 LBS

## 2018-11-07 PROCEDURE — 87075 CULTR BACTERIA EXCEPT BLOOD: CPT

## 2018-11-07 PROCEDURE — 11045 DBRDMT SUBQ TISS EACH ADDL: CPT

## 2018-11-07 PROCEDURE — 87205 SMEAR GRAM STAIN: CPT

## 2018-11-07 PROCEDURE — 11042 DBRDMT SUBQ TIS 1ST 20SQCM/<: CPT

## 2018-11-07 PROCEDURE — 87077 CULTURE AEROBIC IDENTIFY: CPT

## 2018-11-07 PROCEDURE — 87070 CULTURE OTHR SPECIMN AEROBIC: CPT

## 2018-11-07 PROCEDURE — 87186 SC STD MICRODIL/AGAR DIL: CPT

## 2018-11-07 RX ORDER — DOXYCYCLINE HYCLATE 100 MG/1
100 CAPSULE ORAL 2 TIMES DAILY
Qty: 20 CAPSULE | Refills: 0 | Status: SHIPPED | OUTPATIENT
Start: 2018-11-07 | End: 2018-11-17

## 2018-11-07 RX ORDER — DOXYCYCLINE HYCLATE 100 MG/1
100 CAPSULE ORAL 2 TIMES DAILY
COMMUNITY
End: 2018-11-21 | Stop reason: ALTCHOICE

## 2018-11-07 NOTE — PROGRESS NOTES
Assessment Dry; Intact 9/29/2017  9:36 AM   [place]%Wound Bed 100 9/22/2017 10:04 AM   Red%Wound Bed 30 9/15/2017 10:12 AM   Yellow%Wound Bed 20 9/15/2017 10:12 AM   Debridement per physician Subcutaneous 9/29/2017 10:00 AM   Time out Yes 9/29/2017 10:00 AM   Procedural Pain 2 9/29/2017 10:00 AM   Post procedural Pain 1 9/29/2017 10:00 AM   Number of days: 425       Wound 09/29/17 Venous ulcer Leg Right;Lateral new wound, #5  right lateral leg. date of onset  sept. 29,2017 (Active)   Wound Type Wound 10/6/2017  9:08 AM   Wound Venous 10/6/2017  9:08 AM   Dressing Status Changed 10/6/2017 10:23 AM   Dressing Changed Changed/New 10/6/2017 10:23 AM   Dressing/Treatment Other (Comment) 10/6/2017 10:23 AM   Wound Cleansed Rinsed/Irrigated with saline 10/6/2017 10:23 AM   Dressing Change Due 10/13/17 10/6/2017 10:23 AM   Wound Length (cm) 0 cm 10/13/2017 10:00 AM   Wound Width (cm) 0 cm 10/13/2017 10:00 AM   Wound Depth (cm)  0 10/13/2017 10:00 AM   Calculated Wound Size (cm^2) (l*w) 0 cm^2 10/13/2017 10:00 AM   Change in Wound Size % (l*w) 100 10/13/2017 10:00 AM   Wound Assessment Red 10/6/2017  9:08 AM   Drainage Amount Small 10/6/2017  9:08 AM   Drainage Description Serosanguinous 10/6/2017  9:08 AM   Odor None 10/6/2017  9:08 AM   Susy-wound Assessment Purple; Intact;Dry 10/6/2017  9:08 AM   Red%Wound Bed 100 10/6/2017  9:08 AM   Yellow%Wound Bed 20 9/29/2017  9:33 AM   Debridement per physician Full thickness 10/6/2017  9:24 AM   Time out Yes 10/6/2017  9:24 AM   Procedural Pain 0 10/6/2017  9:24 AM   Post procedural Pain 1 9/29/2017 10:00 AM   Number of days: 404       Wound 07/11/18 Venous ulcer Leg Lower; Left #13 acq: 7-11- left lateral  (#3 at [facility]) (Active)   Wound Image   10/31/2018  9:58 AM   Wound Type Wound 7/11/2018 11:44 AM   Wound Venous 7/11/2018 11:44 AM   Dressing Status Clean;Dry; Intact 10/31/2018 11:03 AM   Dressing Changed Changed/New 10/31/2018 11:03 AM   Dressing/Treatment Alginate;Dry Rinsed/Irrigated with saline 10/31/2018 11:03 AM   Wound Length (cm) 12 cm 11/7/2018  1:01 PM   Wound Width (cm) 4 cm 11/7/2018  1:01 PM   Wound Depth (cm)  .1 11/7/2018  1:01 PM   Calculated Wound Size (cm^2) (l*w) 48 cm^2 11/7/2018  1:01 PM   Change in Wound Size % (l*w) 77.78 11/7/2018  1:01 PM   Wound Assessment Pink;Red 11/7/2018  1:01 PM   Drainage Amount Moderate 11/7/2018  1:01 PM   Drainage Description Serosanguinous 11/7/2018  1:01 PM   Odor None 11/7/2018  1:01 PM   Susy-wound Assessment Pink 11/7/2018  1:01 PM   Red%Wound Bed 100 10/10/2018  8:27 AM   Time out Yes 10/31/2018 10:32 AM   Procedural Pain 0 10/31/2018 10:32 AM   Post procedural Pain 0 10/31/2018 10:32 AM   Number of days: 42       Wound 10/24/18 Venous ulcer Pretibial Right #21 acq: 10-24-18 (Active)   Wound Image   10/31/2018  9:58 AM   Dressing Status Clean;Dry; Intact 10/31/2018 11:03 AM   Dressing Changed Changed/New 10/31/2018 11:03 AM   Dressing/Treatment Alginate;Dry dressing 10/31/2018 11:03 AM   Wound Cleansed Rinsed/Irrigated with saline 10/31/2018 11:03 AM   Wound Length (cm) 0.8 cm 11/7/2018  1:01 PM   Wound Width (cm) 1.3 cm 11/7/2018  1:01 PM   Wound Depth (cm)  .1 11/7/2018  1:01 PM   Calculated Wound Size (cm^2) (l*w) 1.04 cm^2 11/7/2018  1:01 PM   Change in Wound Size % (l*w) 0.95 11/7/2018  1:01 PM   Wound Assessment Black 11/7/2018  1:01 PM   Drainage Amount None 11/7/2018  1:01 PM   Drainage Description Serosanguinous 10/31/2018  9:55 AM   Odor None 11/7/2018  1:01 PM   Susy-wound Assessment Pink 11/7/2018  1:01 PM   Time out Yes 10/31/2018 10:32 AM   Procedural Pain 0 10/31/2018 10:32 AM   Post procedural Pain 0 10/31/2018 10:32 AM   Number of days: 14       Wound 10/31/18 Other (Comment) Foot Right;Dorsal #22 acq 10/27/18 (Active)   Wound Image   10/31/2018  9:58 AM   Wound Type Wound 10/31/2018  9:58 AM   Wound Traumatic 10/31/2018  9:58 AM   Dressing Status Clean;Dry; Intact 10/31/2018 11:03 AM   Dressing Changed Discussed appropriate home care of this wound. Dispensed dressing supplies and instructions on their use. Wound redressed. 3. Patient instructions were given. Dressing: Carolina Honer. 4. Follow up: 1 week. 5.vibramycin 100mg po BID  6.wound cultures obtained  Figueroa Marcelo MD      [facility] Procedure Note    Earnestine Faustin  AGE: [de-identified] y.o. GENDER: male  : 1940    TODAY'S DATE:  2018    The patient was identified and the procedure was confirmed. Lidocaine gel soaked gauze was applied at beginning of wound evaluation. The bilateral loer leg wound was excisionally debrided sharply of fibrotic, necrotic, and hyperkeratotic tissue, including a layer of surrounding healthy tissue using curette for a total surface area of < 20 cm2. The wound(s) was debrided down through and including full thickness tissue. 100% of the wound was debrided. There was minimal bleeding that was controlled with pressure. Patient tolerated procedure well and was given proper instruction.       Figueroa Marcelo MD

## 2018-11-10 LAB — ANAEROBIC CULTURE: NORMAL

## 2018-11-11 LAB
GRAM STAIN RESULT: ABNORMAL
ORGANISM: ABNORMAL
WOUND/ABSCESS: ABNORMAL
WOUND/ABSCESS: ABNORMAL

## 2018-11-14 ENCOUNTER — HOSPITAL ENCOUNTER (OUTPATIENT)
Dept: WOUND CARE | Age: 78
Discharge: HOME | End: 2018-11-14
Payer: COMMERCIAL

## 2018-11-14 VITALS
TEMPERATURE: 97.5 F | SYSTOLIC BLOOD PRESSURE: 114 MMHG | HEART RATE: 68 BPM | RESPIRATION RATE: 18 BRPM | WEIGHT: 220 LBS | DIASTOLIC BLOOD PRESSURE: 70 MMHG | BODY MASS INDEX: 29.84 KG/M2

## 2018-11-14 PROCEDURE — 11045 DBRDMT SUBQ TISS EACH ADDL: CPT

## 2018-11-14 PROCEDURE — 11042 DBRDMT SUBQ TIS 1ST 20SQCM/<: CPT

## 2018-11-14 NOTE — PROGRESS NOTES
Moderate 10/20/2017  8:52 AM   Drainage Description Serosanguinous; Yellow 10/20/2017  8:52 AM   Odor None 10/20/2017  8:52 AM   Susy-wound Assessment Red 10/25/2017  4:06 PM   Red%Wound Bed 100 10/6/2017  9:21 AM   Debridement per physician Full thickness 10/20/2017  9:03 AM   Time out Yes 10/20/2017  9:03 AM   Procedural Pain 1 10/20/2017  9:03 AM   Post procedural Pain 1 10/20/2017  9:03 AM   Number of days: 467       Wound 08/04/17 (Active)   Number of days: 467       Wound 09/08/17 Venous ulcer Ankle Left;Lateral Wound #4 venous ulcer acquired 9/8/2017 (Active)   Wound Type Wound 10/6/2017  9:08 AM   Wound Venous 10/6/2017  9:08 AM   Dressing Status Changed 9/29/2017 10:33 AM   Dressing Changed Changed/New 9/29/2017 10:33 AM   Dressing/Treatment Alginate 9/8/2017 10:14 AM   Wound Cleansed Rinsed/Irrigated with saline 9/29/2017 10:33 AM   Wound Length (cm) 0 cm 10/6/2017  9:21 AM   Wound Width (cm) 0 cm 10/6/2017  9:21 AM   Wound Depth (cm)  0 10/6/2017  9:21 AM   Calculated Wound Size (cm^2) (l*w) 0 cm^2 10/6/2017  9:21 AM   Change in Wound Size % (l*w) 100 10/6/2017  9:21 AM   Wound Assessment Pink 9/29/2017  9:36 AM   Drainage Amount Moderate 10/6/2017  9:08 AM   Drainage Description Serosanguinous 10/6/2017  9:08 AM   Odor None 10/6/2017  9:08 AM   Susy-wound Assessment Dry; Intact 9/29/2017  9:36 AM   [place]%Wound Bed 100 9/22/2017 10:04 AM   Red%Wound Bed 30 9/15/2017 10:12 AM   Yellow%Wound Bed 20 9/15/2017 10:12 AM   Debridement per physician Subcutaneous 9/29/2017 10:00 AM   Time out Yes 9/29/2017 10:00 AM   Procedural Pain 2 9/29/2017 10:00 AM   Post procedural Pain 1 9/29/2017 10:00 AM   Number of days: 432       Wound 09/29/17 Venous ulcer Leg Right;Lateral new wound, #5  right lateral leg.  date of onset  sept. 29,2017 (Active)   Wound Type Wound 10/6/2017  9:08 AM   Wound Venous 10/6/2017  9:08 AM   Dressing Status Changed 10/6/2017 10:23 AM   Dressing Changed Changed/New 10/6/2017 10:23 AM 8/29/2018 10:24 AM   Culture Taken Yes 8/29/2018 10:58 AM   Debridement per physician None 7/25/2018  2:47 PM   Time out Yes 11/14/2018 10:05 AM   Procedural Pain 0 11/14/2018 10:05 AM   Post procedural Pain 0 11/14/2018 10:05 AM   Number of days: 125       Wound 07/26/18 Leg Left; Lower;Dorsal (Active)   Number of days: 110       Wound 07/26/18 Ankle Left; Outer; Lower (Active)   Number of days: 110       Wound 07/27/18 Leg Right (Active)   Number of days: 109       Wound 07/27/18 Leg Right (Active)   Number of days: 109       Wound 08/13/18 Buttocks Right (Active)   Wound Length (cm) 2 cm 8/22/2018  3:12 PM   Wound Width (cm) 1.2 cm 8/22/2018  3:12 PM   Wound Depth (cm)  0.1 8/22/2018  3:12 PM   Calculated Wound Size (cm^2) (l*w) 2.4 cm^2 8/22/2018  3:12 PM   Change in Wound Size % (l*w) -20 8/22/2018  3:12 PM   Time out Yes 8/22/2018  3:12 PM   Number of days: 92       Wound 08/22/18 Venous ulcer Toe (Comment  which one) Left wound # 5 [facility] left 3rd toe venous acquired 8/2/18 (Active)   Wound Image   8/22/2018  2:53 PM   Wound Type Wound 8/22/2018  2:53 PM   Wound Venous 8/22/2018  2:53 PM   Dressing Status Clean;Dry; Intact 8/22/2018  3:41 PM   Dressing Changed Changed/New 8/22/2018  3:41 PM   Wound Cleansed Rinsed/Irrigated with saline 8/22/2018  3:41 PM   Wound Length (cm) 1 cm 8/22/2018  3:12 PM   Wound Width (cm) 1 cm 8/22/2018  3:12 PM   Wound Depth (cm)  0.1 8/22/2018  3:12 PM   Calculated Wound Size (cm^2) (l*w) 1 cm^2 8/22/2018  3:12 PM   Change in Wound Size % (l*w) -23.46 8/22/2018  3:12 PM   Wound Assessment Dry;Brown 8/22/2018  2:53 PM   Drainage Amount None 8/22/2018  2:53 PM   Odor None 8/22/2018  2:53 PM   Susy-wound Assessment Purple 8/22/2018  2:53 PM   Non-staged Wound Description Full thickness 8/22/2018  2:53 PM   Time out Yes 8/22/2018  3:12 PM   Number of days: 83       Wound 08/29/18  Ankle Left;Lateral new wound, #18. traumatic  left lateral ankle onset august 29, 2018 (Active) [place] None 11/14/2018  9:33 AM   Susy-wound Assessment Pink 11/14/2018  9:33 AM   Debridement per physician None 11/7/2018  1:43 PM   Time out Yes 11/14/2018 10:05 AM   Procedural Pain 0 11/14/2018 10:05 AM   Post procedural Pain 0 11/14/2018 10:05 AM   Number of days: 14       Wound 11/07/18 Other (Comment) Hip Right #23 acq 11/1/18 (Active)   Wound Image   11/7/2018  1:19 PM   Wound Type Wound 11/7/2018  1:19 PM   Wound Pressure Stage  3 11/7/2018  1:19 PM   Dressing Status Clean;Dry; Intact 11/7/2018  1:58 PM   Dressing Changed Changed/New 11/7/2018  1:58 PM   Dressing/Treatment Dry dressing;Xeroform 11/7/2018  1:58 PM   Wound Cleansed Rinsed/Irrigated with saline 11/7/2018  1:58 PM   Wound Length (cm) 2 cm 11/14/2018  9:33 AM   Wound Width (cm) 7.2 cm 11/14/2018  9:33 AM   Wound Depth (cm)  .1 11/14/2018  9:33 AM   Calculated Wound Size (cm^2) (l*w) 14.4 cm^2 11/14/2018  9:33 AM   Change in Wound Size % (l*w) 84.43 11/14/2018  9:33 AM   Wound Assessment Pink;Yellow 11/14/2018  9:33 AM   Drainage Amount Moderate 11/14/2018  9:33 AM   Drainage Description Serosanguinous 11/14/2018  9:33 AM   Odor None 11/14/2018  9:33 AM   Susy-wound Assessment Pink 11/14/2018  9:33 AM   Debridement per physician None 11/14/2018 10:05 AM   Time out Yes 11/14/2018 10:05 AM   Procedural Pain 0 11/14/2018 10:05 AM   Post procedural Pain 0 11/14/2018 10:05 AM   Number of days: 6       Wound 11/07/18 Other (Comment) Buttocks Right #24 acq 11/1/18 (Active)   Wound Image   11/7/2018  1:19 PM   Wound Type Wound 11/7/2018  1:19 PM   Wound Pressure Stage  3 11/7/2018  1:19 PM   Dressing Status Clean;Dry; Intact 11/7/2018  1:58 PM   Dressing Changed Changed/New 11/7/2018  1:58 PM   Dressing/Treatment Dry dressing;Xeroform 11/7/2018  1:58 PM   Wound Cleansed Rinsed/Irrigated with saline 11/7/2018  1:58 PM   Wound Length (cm) 7 cm 11/14/2018  9:33 AM   Wound Width (cm) 6.5 cm 11/14/2018  9:33 AM   Wound Depth (cm)  .2 11/14/2018  9:33 AM Treatment Note please see attached Discharge Instructions    Written patient dismissal instructions given to patient and signed by patient or POA. Discharge Instructions       Visit Discharge/Physician Orders     Discharge condition: Stable     Assessment of pain at discharge: none     Anesthetic used: 2% lidocaine     Discharge to: ECF     Left via:public transportation     Accompanied by: caregiver     [facility]: [facility]      Dressing Orders:   Cleanse bilateral lower leg, ankles, left pretibial ulcers,   foot ulcers and ULCER OF RIGHT PRETIBIAL with normal saline at each dressing change. May wash legs with aloe vista foam, rinse and pat dry. Continue to apply gentamicin to ulcers. HOWEVER, COVER WITH ALGINATE.  Secure with dry dressing, kerlex and coban.   Change every other day. In clinic today. Ulcers cleansed with normal saline. Silver  Alginate, dry dressing, kerlex and coban applied.       TO BUTTOCK ULCERS:  Cleanse ulcers with normal saline. Apply xeroform and dry dressing. Change daily and as needed of soiled. Patient to turn side to side while in bed. Patient to be on low air loss mattress.          Treatment Orders:   PATIENT TO CONTINUE TO FOLLOW UP WITH DERMATOLOGIST ALSO, DR. Evelin Crews. THANK YOU     Tissue C&S taken  Of left leg ulcer reviewed in Clinic today     Patient to continue: doxycycline hyclate (VIBRAMYCIN) 100 MG capsule  Take 100 mg by mouth 2 times daily Take until completed        [facility] followup visit ___________2_ WEEKs BDM__with Dr. Neel Kingsley with Dr. Reinaldo Hartman in ONE week for address buttock ulcer__ (Complex)_______  (Please note your next appointment above and if you are unable to keep, kindly give a 24 hour notice.  Thank you.)    Physician signature:__________________________      If you experience any of the following, please call the [address] during business hours:    * Increase in Pain  * Temperature over 101  * Increase in drainage from your wound  * Drainage

## 2018-11-21 ENCOUNTER — HOSPITAL ENCOUNTER (OUTPATIENT)
Dept: WOUND CARE | Age: 78
Discharge: HOME | End: 2018-11-21
Payer: COMMERCIAL

## 2018-11-21 VITALS
HEIGHT: 72 IN | BODY MASS INDEX: 29.8 KG/M2 | WEIGHT: 220 LBS | TEMPERATURE: 97.7 F | SYSTOLIC BLOOD PRESSURE: 112 MMHG | HEART RATE: 72 BPM | RESPIRATION RATE: 18 BRPM | DIASTOLIC BLOOD PRESSURE: 56 MMHG

## 2018-11-21 PROCEDURE — 11042 DBRDMT SUBQ TIS 1ST 20SQCM/<: CPT

## 2018-11-21 PROCEDURE — 11045 DBRDMT SUBQ TISS EACH ADDL: CPT

## 2018-11-28 ENCOUNTER — APPOINTMENT (OUTPATIENT)
Dept: GENERAL RADIOLOGY | Age: 78
DRG: 871 | End: 2018-11-28
Payer: COMMERCIAL

## 2018-11-28 ENCOUNTER — HOSPITAL ENCOUNTER (OUTPATIENT)
Dept: WOUND CARE | Age: 78
Discharge: HOME | End: 2018-11-28
Payer: COMMERCIAL

## 2018-11-28 ENCOUNTER — HOSPITAL ENCOUNTER (INPATIENT)
Age: 78
LOS: 2 days | Discharge: NURSING, REHAB OR LONG-TERM CARE | DRG: 871 | End: 2018-11-30
Attending: EMERGENCY MEDICINE | Admitting: INTERNAL MEDICINE
Payer: COMMERCIAL

## 2018-11-28 DIAGNOSIS — R65.10 SIRS (SYSTEMIC INFLAMMATORY RESPONSE SYNDROME) (HCC): ICD-10-CM

## 2018-11-28 DIAGNOSIS — N39.0 URINARY TRACT INFECTION WITHOUT HEMATURIA, SITE UNSPECIFIED: ICD-10-CM

## 2018-11-28 DIAGNOSIS — A41.9 SEPTICEMIA (HCC): Primary | ICD-10-CM

## 2018-11-28 DIAGNOSIS — G93.41 METABOLIC ENCEPHALOPATHY: ICD-10-CM

## 2018-11-28 DIAGNOSIS — J18.9 HCAP (HEALTHCARE-ASSOCIATED PNEUMONIA): ICD-10-CM

## 2018-11-28 LAB
ALBUMIN SERPL-MCNC: 3.4 G/DL (ref 3.5–5.2)
ALP BLD-CCNC: 104 U/L (ref 40–129)
ALT SERPL-CCNC: 9 U/L (ref 0–40)
ANION GAP SERPL CALCULATED.3IONS-SCNC: 13 MMOL/L (ref 7–16)
AST SERPL-CCNC: 12 U/L (ref 0–39)
BACTERIA: ABNORMAL /HPF
BILIRUB SERPL-MCNC: 0.3 MG/DL (ref 0–1.2)
BILIRUBIN URINE: NEGATIVE
BLOOD, URINE: ABNORMAL
BUN BLDV-MCNC: 25 MG/DL (ref 8–23)
CALCIUM SERPL-MCNC: 8.8 MG/DL (ref 8.6–10.2)
CHLORIDE BLD-SCNC: 100 MMOL/L (ref 98–107)
CLARITY: ABNORMAL
CO2: 26 MMOL/L (ref 22–29)
COLOR: YELLOW
CREAT SERPL-MCNC: 0.9 MG/DL (ref 0.7–1.2)
EKG ATRIAL RATE: 129 BPM
EKG Q-T INTERVAL: 300 MS
EKG QRS DURATION: 72 MS
EKG QTC CALCULATION (BAZETT): 454 MS
EKG R AXIS: -28 DEGREES
EKG T AXIS: 33 DEGREES
EKG VENTRICULAR RATE: 138 BPM
EPITHELIAL CELLS, UA: ABNORMAL /HPF
GFR AFRICAN AMERICAN: >60
GFR NON-AFRICAN AMERICAN: >60 ML/MIN/1.73
GLUCOSE BLD-MCNC: 119 MG/DL (ref 74–99)
GLUCOSE URINE: NEGATIVE MG/DL
HCT VFR BLD CALC: 35.3 % (ref 37–54)
HEMOGLOBIN: 10.7 G/DL (ref 12.5–16.5)
KETONES, URINE: NEGATIVE MG/DL
LACTIC ACID: 2.8 MMOL/L (ref 0.5–2.2)
LEUKOCYTE ESTERASE, URINE: ABNORMAL
LIPASE: 22 U/L (ref 13–60)
MCH RBC QN AUTO: 25.7 PG (ref 26–35)
MCHC RBC AUTO-ENTMCNC: 30.3 % (ref 32–34.5)
MCV RBC AUTO: 84.9 FL (ref 80–99.9)
METER GLUCOSE: 100 MG/DL (ref 74–99)
METER GLUCOSE: 105 MG/DL (ref 74–99)
METER GLUCOSE: 119 MG/DL (ref 74–99)
METER GLUCOSE: 121 MG/DL (ref 74–99)
NITRITE, URINE: NEGATIVE
PDW BLD-RTO: 17.9 FL (ref 11.5–15)
PH UA: 6 (ref 5–9)
PLATELET # BLD: 381 E9/L (ref 130–450)
PMV BLD AUTO: 8.4 FL (ref 7–12)
POTASSIUM SERPL-SCNC: 4.5 MMOL/L (ref 3.5–5)
PRO-BNP: 365 PG/ML (ref 0–450)
PROTEIN UA: NEGATIVE MG/DL
RBC # BLD: 4.16 E12/L (ref 3.8–5.8)
RBC UA: ABNORMAL /HPF (ref 0–2)
SODIUM BLD-SCNC: 139 MMOL/L (ref 132–146)
SPECIFIC GRAVITY UA: 1.01 (ref 1–1.03)
TOTAL PROTEIN: 7.2 G/DL (ref 6.4–8.3)
TROPONIN: <0.01 NG/ML (ref 0–0.03)
UROBILINOGEN, URINE: 0.2 E.U./DL
WBC # BLD: 21.5 E9/L (ref 4.5–11.5)
WBC UA: >20 /HPF (ref 0–5)

## 2018-11-28 PROCEDURE — 93005 ELECTROCARDIOGRAM TRACING: CPT | Performed by: EMERGENCY MEDICINE

## 2018-11-28 PROCEDURE — 80053 COMPREHEN METABOLIC PANEL: CPT

## 2018-11-28 PROCEDURE — 87088 URINE BACTERIA CULTURE: CPT

## 2018-11-28 PROCEDURE — 87186 SC STD MICRODIL/AGAR DIL: CPT

## 2018-11-28 PROCEDURE — 71045 X-RAY EXAM CHEST 1 VIEW: CPT

## 2018-11-28 PROCEDURE — 2580000003 HC RX 258: Performed by: INTERNAL MEDICINE

## 2018-11-28 PROCEDURE — 83605 ASSAY OF LACTIC ACID: CPT

## 2018-11-28 PROCEDURE — 87077 CULTURE AEROBIC IDENTIFY: CPT

## 2018-11-28 PROCEDURE — 6360000002 HC RX W HCPCS: Performed by: INTERNAL MEDICINE

## 2018-11-28 PROCEDURE — 2060000000 HC ICU INTERMEDIATE R&B

## 2018-11-28 PROCEDURE — 85027 COMPLETE CBC AUTOMATED: CPT

## 2018-11-28 PROCEDURE — 6370000000 HC RX 637 (ALT 250 FOR IP): Performed by: EMERGENCY MEDICINE

## 2018-11-28 PROCEDURE — 6360000002 HC RX W HCPCS: Performed by: EMERGENCY MEDICINE

## 2018-11-28 PROCEDURE — 2580000003 HC RX 258: Performed by: EMERGENCY MEDICINE

## 2018-11-28 PROCEDURE — 87040 BLOOD CULTURE FOR BACTERIA: CPT

## 2018-11-28 PROCEDURE — 96374 THER/PROPH/DIAG INJ IV PUSH: CPT

## 2018-11-28 PROCEDURE — 84484 ASSAY OF TROPONIN QUANT: CPT

## 2018-11-28 PROCEDURE — 2700000000 HC OXYGEN THERAPY PER DAY

## 2018-11-28 PROCEDURE — 83690 ASSAY OF LIPASE: CPT

## 2018-11-28 PROCEDURE — 81001 URINALYSIS AUTO W/SCOPE: CPT

## 2018-11-28 PROCEDURE — 83880 ASSAY OF NATRIURETIC PEPTIDE: CPT

## 2018-11-28 PROCEDURE — 99285 EMERGENCY DEPT VISIT HI MDM: CPT

## 2018-11-28 PROCEDURE — 36415 COLL VENOUS BLD VENIPUNCTURE: CPT

## 2018-11-28 PROCEDURE — 82962 GLUCOSE BLOOD TEST: CPT

## 2018-11-28 PROCEDURE — 6370000000 HC RX 637 (ALT 250 FOR IP): Performed by: INTERNAL MEDICINE

## 2018-11-28 RX ORDER — SODIUM CHLORIDE 0.9 % (FLUSH) 0.9 %
10 SYRINGE (ML) INJECTION PRN
Status: DISCONTINUED | OUTPATIENT
Start: 2018-11-28 | End: 2018-11-28 | Stop reason: SDUPTHER

## 2018-11-28 RX ORDER — ACETAMINOPHEN 650 MG/1
650 SUPPOSITORY RECTAL ONCE
Status: COMPLETED | OUTPATIENT
Start: 2018-11-28 | End: 2018-11-28

## 2018-11-28 RX ORDER — DEXTROSE MONOHYDRATE 25 G/50ML
12.5 INJECTION, SOLUTION INTRAVENOUS PRN
Status: DISCONTINUED | OUTPATIENT
Start: 2018-11-28 | End: 2018-11-30 | Stop reason: HOSPADM

## 2018-11-28 RX ORDER — SODIUM CHLORIDE 0.9 % (FLUSH) 0.9 %
10 SYRINGE (ML) INJECTION EVERY 12 HOURS SCHEDULED
Status: DISCONTINUED | OUTPATIENT
Start: 2018-11-28 | End: 2018-11-30 | Stop reason: HOSPADM

## 2018-11-28 RX ORDER — TAMSULOSIN HYDROCHLORIDE 0.4 MG/1
0.4 CAPSULE ORAL EVERY EVENING
Status: DISCONTINUED | OUTPATIENT
Start: 2018-11-28 | End: 2018-11-30 | Stop reason: HOSPADM

## 2018-11-28 RX ORDER — DEXTROSE MONOHYDRATE 50 MG/ML
100 INJECTION, SOLUTION INTRAVENOUS PRN
Status: DISCONTINUED | OUTPATIENT
Start: 2018-11-28 | End: 2018-11-30 | Stop reason: HOSPADM

## 2018-11-28 RX ORDER — ACETAMINOPHEN 325 MG/1
650 TABLET ORAL EVERY 4 HOURS PRN
Status: DISCONTINUED | OUTPATIENT
Start: 2018-11-28 | End: 2018-11-30 | Stop reason: HOSPADM

## 2018-11-28 RX ORDER — SODIUM CHLORIDE 0.9 % (FLUSH) 0.9 %
10 SYRINGE (ML) INJECTION PRN
Status: DISCONTINUED | OUTPATIENT
Start: 2018-11-28 | End: 2018-11-30 | Stop reason: HOSPADM

## 2018-11-28 RX ORDER — 0.9 % SODIUM CHLORIDE 0.9 %
1000 INTRAVENOUS SOLUTION INTRAVENOUS ONCE
Status: COMPLETED | OUTPATIENT
Start: 2018-11-28 | End: 2018-11-28

## 2018-11-28 RX ORDER — NICOTINE POLACRILEX 4 MG
15 LOZENGE BUCCAL PRN
Status: DISCONTINUED | OUTPATIENT
Start: 2018-11-28 | End: 2018-11-30 | Stop reason: HOSPADM

## 2018-11-28 RX ORDER — LATANOPROST 50 UG/ML
1 SOLUTION/ DROPS OPHTHALMIC NIGHTLY
Status: DISCONTINUED | OUTPATIENT
Start: 2018-11-28 | End: 2018-11-30 | Stop reason: HOSPADM

## 2018-11-28 RX ORDER — ATORVASTATIN CALCIUM 20 MG/1
20 TABLET, FILM COATED ORAL DAILY
Status: DISCONTINUED | OUTPATIENT
Start: 2018-11-28 | End: 2018-11-30 | Stop reason: HOSPADM

## 2018-11-28 RX ORDER — BUSPIRONE HYDROCHLORIDE 5 MG/1
7.5 TABLET ORAL DAILY
Status: DISCONTINUED | OUTPATIENT
Start: 2018-11-28 | End: 2018-11-30 | Stop reason: HOSPADM

## 2018-11-28 RX ORDER — MIRTAZAPINE 15 MG/1
7.5 TABLET, FILM COATED ORAL DAILY
Status: DISCONTINUED | OUTPATIENT
Start: 2018-11-28 | End: 2018-11-30 | Stop reason: HOSPADM

## 2018-11-28 RX ORDER — CHOLECALCIFEROL (VITAMIN D3) 50 MCG
5000 TABLET ORAL DAILY
Status: DISCONTINUED | OUTPATIENT
Start: 2018-11-28 | End: 2018-11-30 | Stop reason: HOSPADM

## 2018-11-28 RX ORDER — ONDANSETRON 2 MG/ML
4 INJECTION INTRAMUSCULAR; INTRAVENOUS EVERY 6 HOURS PRN
Status: DISCONTINUED | OUTPATIENT
Start: 2018-11-28 | End: 2018-11-30 | Stop reason: HOSPADM

## 2018-11-28 RX ORDER — FOLIC ACID 1 MG/1
1 TABLET ORAL DAILY
Status: DISCONTINUED | OUTPATIENT
Start: 2018-11-28 | End: 2018-11-30 | Stop reason: HOSPADM

## 2018-11-28 RX ORDER — DIPHENHYDRAMINE HCL 25 MG
25 TABLET ORAL EVERY 6 HOURS PRN
Status: DISCONTINUED | OUTPATIENT
Start: 2018-11-28 | End: 2018-11-30 | Stop reason: HOSPADM

## 2018-11-28 RX ADMIN — ENOXAPARIN SODIUM 40 MG: 40 INJECTION SUBCUTANEOUS at 10:34

## 2018-11-28 RX ADMIN — FOLIC ACID 1 MG: 1 TABLET ORAL at 10:37

## 2018-11-28 RX ADMIN — Medication 10 ML: at 10:35

## 2018-11-28 RX ADMIN — LATANOPROST 1 DROP: 50 SOLUTION/ DROPS OPHTHALMIC at 20:32

## 2018-11-28 RX ADMIN — CEFEPIME 2 G: 2 INJECTION, POWDER, FOR SOLUTION INTRAMUSCULAR; INTRAVENOUS at 17:08

## 2018-11-28 RX ADMIN — SODIUM CHLORIDE 1000 ML: 9 INJECTION, SOLUTION INTRAVENOUS at 03:21

## 2018-11-28 RX ADMIN — Medication 10 ML: at 20:36

## 2018-11-28 RX ADMIN — METHOTREXATE 5 MG: 2.5 TABLET ORAL at 17:08

## 2018-11-28 RX ADMIN — BUSPIRONE HYDROCHLORIDE 7.5 MG: 5 TABLET ORAL at 10:34

## 2018-11-28 RX ADMIN — MIRTAZAPINE 7.5 MG: 15 TABLET, FILM COATED ORAL at 10:37

## 2018-11-28 RX ADMIN — TAMSULOSIN HYDROCHLORIDE 0.4 MG: 0.4 CAPSULE ORAL at 20:32

## 2018-11-28 RX ADMIN — CEFEPIME HYDROCHLORIDE 2 G: 2 INJECTION, POWDER, FOR SOLUTION INTRAVENOUS at 05:18

## 2018-11-28 RX ADMIN — METHOTREXATE 5 MG: 2.5 TABLET ORAL at 12:31

## 2018-11-28 RX ADMIN — Medication 5000 UNITS: at 10:33

## 2018-11-28 RX ADMIN — Medication: at 20:33

## 2018-11-28 RX ADMIN — VANCOMYCIN HYDROCHLORIDE 2500 MG: 1 INJECTION, POWDER, LYOPHILIZED, FOR SOLUTION INTRAVENOUS at 05:48

## 2018-11-28 RX ADMIN — ACETAMINOPHEN 650 MG: 650 SUPPOSITORY RECTAL at 03:30

## 2018-11-28 RX ADMIN — ATORVASTATIN CALCIUM 20 MG: 20 TABLET, FILM COATED ORAL at 10:36

## 2018-11-28 RX ADMIN — SODIUM CHLORIDE 1000 ML: 9 INJECTION, SOLUTION INTRAVENOUS at 06:29

## 2018-11-28 ASSESSMENT — PAIN SCALES - GENERAL
PAINLEVEL_OUTOF10: 8
PAINLEVEL_OUTOF10: 0

## 2018-11-28 ASSESSMENT — ENCOUNTER SYMPTOMS: COUGH: 1

## 2018-11-29 LAB
BASOPHILS ABSOLUTE: 0.03 E9/L (ref 0–0.2)
BASOPHILS RELATIVE PERCENT: 0.2 % (ref 0–2)
EOSINOPHILS ABSOLUTE: 0.38 E9/L (ref 0.05–0.5)
EOSINOPHILS RELATIVE PERCENT: 2.9 % (ref 0–6)
HCT VFR BLD CALC: 31.3 % (ref 37–54)
HEMOGLOBIN: 9.5 G/DL (ref 12.5–16.5)
IMMATURE GRANULOCYTES #: 0.05 E9/L
IMMATURE GRANULOCYTES %: 0.4 % (ref 0–5)
LYMPHOCYTES ABSOLUTE: 1.34 E9/L (ref 1.5–4)
LYMPHOCYTES RELATIVE PERCENT: 10.3 % (ref 20–42)
MCH RBC QN AUTO: 25.7 PG (ref 26–35)
MCHC RBC AUTO-ENTMCNC: 30.4 % (ref 32–34.5)
MCV RBC AUTO: 84.8 FL (ref 80–99.9)
METER GLUCOSE: 123 MG/DL (ref 74–99)
METER GLUCOSE: 131 MG/DL (ref 74–99)
METER GLUCOSE: 160 MG/DL (ref 74–99)
METER GLUCOSE: 182 MG/DL (ref 74–99)
MONOCYTES ABSOLUTE: 0.85 E9/L (ref 0.1–0.95)
MONOCYTES RELATIVE PERCENT: 6.5 % (ref 2–12)
NEUTROPHILS ABSOLUTE: 10.41 E9/L (ref 1.8–7.3)
NEUTROPHILS RELATIVE PERCENT: 79.7 % (ref 43–80)
PDW BLD-RTO: 18.2 FL (ref 11.5–15)
PLATELET # BLD: 310 E9/L (ref 130–450)
PMV BLD AUTO: 8.8 FL (ref 7–12)
RBC # BLD: 3.69 E12/L (ref 3.8–5.8)
WBC # BLD: 13.1 E9/L (ref 4.5–11.5)

## 2018-11-29 PROCEDURE — G8987 SELF CARE CURRENT STATUS: HCPCS

## 2018-11-29 PROCEDURE — G8988 SELF CARE GOAL STATUS: HCPCS

## 2018-11-29 PROCEDURE — 2580000003 HC RX 258: Performed by: INTERNAL MEDICINE

## 2018-11-29 PROCEDURE — 97166 OT EVAL MOD COMPLEX 45 MIN: CPT

## 2018-11-29 PROCEDURE — 6360000002 HC RX W HCPCS: Performed by: INTERNAL MEDICINE

## 2018-11-29 PROCEDURE — 97535 SELF CARE MNGMENT TRAINING: CPT

## 2018-11-29 PROCEDURE — 2060000000 HC ICU INTERMEDIATE R&B

## 2018-11-29 PROCEDURE — 85025 COMPLETE CBC W/AUTO DIFF WBC: CPT

## 2018-11-29 PROCEDURE — 36415 COLL VENOUS BLD VENIPUNCTURE: CPT

## 2018-11-29 PROCEDURE — 6370000000 HC RX 637 (ALT 250 FOR IP): Performed by: INTERNAL MEDICINE

## 2018-11-29 PROCEDURE — 82962 GLUCOSE BLOOD TEST: CPT

## 2018-11-29 RX ADMIN — LATANOPROST 1 DROP: 50 SOLUTION/ DROPS OPHTHALMIC at 22:12

## 2018-11-29 RX ADMIN — Medication: at 22:12

## 2018-11-29 RX ADMIN — Medication 10 ML: at 07:51

## 2018-11-29 RX ADMIN — ENOXAPARIN SODIUM 40 MG: 40 INJECTION SUBCUTANEOUS at 07:51

## 2018-11-29 RX ADMIN — Medication: at 07:57

## 2018-11-29 RX ADMIN — CEFEPIME 2 G: 2 INJECTION, POWDER, FOR SOLUTION INTRAMUSCULAR; INTRAVENOUS at 05:40

## 2018-11-29 RX ADMIN — Medication 5000 UNITS: at 07:50

## 2018-11-29 RX ADMIN — TAMSULOSIN HYDROCHLORIDE 0.4 MG: 0.4 CAPSULE ORAL at 17:08

## 2018-11-29 RX ADMIN — FOLIC ACID 1 MG: 1 TABLET ORAL at 07:50

## 2018-11-29 RX ADMIN — CEFEPIME 2 G: 2 INJECTION, POWDER, FOR SOLUTION INTRAMUSCULAR; INTRAVENOUS at 17:08

## 2018-11-29 RX ADMIN — Medication 10 ML: at 22:13

## 2018-11-29 RX ADMIN — BUSPIRONE HYDROCHLORIDE 7.5 MG: 5 TABLET ORAL at 07:51

## 2018-11-29 RX ADMIN — ATORVASTATIN CALCIUM 20 MG: 20 TABLET, FILM COATED ORAL at 07:50

## 2018-11-29 RX ADMIN — MIRTAZAPINE 7.5 MG: 15 TABLET, FILM COATED ORAL at 07:50

## 2018-11-29 RX ADMIN — INSULIN LISPRO 2 UNITS: 100 INJECTION, SOLUTION INTRAVENOUS; SUBCUTANEOUS at 17:09

## 2018-11-29 RX ADMIN — Medication 10 ML: at 05:40

## 2018-11-29 ASSESSMENT — PAIN SCALES - GENERAL
PAINLEVEL_OUTOF10: 0

## 2018-11-30 ENCOUNTER — APPOINTMENT (OUTPATIENT)
Dept: ULTRASOUND IMAGING | Age: 78
DRG: 871 | End: 2018-11-30
Payer: COMMERCIAL

## 2018-11-30 VITALS
DIASTOLIC BLOOD PRESSURE: 63 MMHG | SYSTOLIC BLOOD PRESSURE: 112 MMHG | BODY MASS INDEX: 29.8 KG/M2 | WEIGHT: 220 LBS | TEMPERATURE: 99.1 F | HEIGHT: 72 IN | HEART RATE: 80 BPM | RESPIRATION RATE: 20 BRPM | OXYGEN SATURATION: 95 %

## 2018-11-30 LAB
METER GLUCOSE: 131 MG/DL (ref 74–99)
METER GLUCOSE: 178 MG/DL (ref 74–99)
ORGANISM: ABNORMAL
URINE CULTURE, ROUTINE: ABNORMAL
URINE CULTURE, ROUTINE: ABNORMAL

## 2018-11-30 PROCEDURE — 2580000003 HC RX 258: Performed by: INTERNAL MEDICINE

## 2018-11-30 PROCEDURE — 6370000000 HC RX 637 (ALT 250 FOR IP): Performed by: INTERNAL MEDICINE

## 2018-11-30 PROCEDURE — 6360000002 HC RX W HCPCS: Performed by: INTERNAL MEDICINE

## 2018-11-30 PROCEDURE — 97162 PT EVAL MOD COMPLEX 30 MIN: CPT

## 2018-11-30 PROCEDURE — 82962 GLUCOSE BLOOD TEST: CPT

## 2018-11-30 PROCEDURE — G8979 MOBILITY GOAL STATUS: HCPCS

## 2018-11-30 PROCEDURE — 76770 US EXAM ABDO BACK WALL COMP: CPT

## 2018-11-30 PROCEDURE — 97530 THERAPEUTIC ACTIVITIES: CPT

## 2018-11-30 PROCEDURE — G8978 MOBILITY CURRENT STATUS: HCPCS

## 2018-11-30 RX ORDER — KETOCONAZOLE 20 MG/G
CREAM TOPICAL
Qty: 30 G | Refills: 1 | DISCHARGE
Start: 2018-11-30 | End: 2019-01-08 | Stop reason: ALTCHOICE

## 2018-11-30 RX ORDER — CLOBETASOL PROPIONATE 0.5 MG/G
OINTMENT TOPICAL 2 TIMES DAILY
Status: DISCONTINUED | OUTPATIENT
Start: 2018-11-30 | End: 2018-11-30 | Stop reason: HOSPADM

## 2018-11-30 RX ORDER — KETOCONAZOLE 20 MG/G
CREAM TOPICAL 2 TIMES DAILY
Status: DISCONTINUED | OUTPATIENT
Start: 2018-11-30 | End: 2018-11-30 | Stop reason: HOSPADM

## 2018-11-30 RX ORDER — CEFDINIR 300 MG/1
300 CAPSULE ORAL 2 TIMES DAILY
Qty: 24 CAPSULE | Refills: 0 | DISCHARGE
Start: 2018-11-30 | End: 2018-12-12

## 2018-11-30 RX ORDER — CLOBETASOL PROPIONATE 0.5 MG/G
OINTMENT TOPICAL
DISCHARGE
Start: 2018-11-30 | End: 2019-04-09 | Stop reason: ALTCHOICE

## 2018-11-30 RX ADMIN — Medication: at 09:32

## 2018-11-30 RX ADMIN — Medication 10 ML: at 09:32

## 2018-11-30 RX ADMIN — MIRTAZAPINE 7.5 MG: 15 TABLET, FILM COATED ORAL at 09:31

## 2018-11-30 RX ADMIN — Medication 5000 UNITS: at 09:31

## 2018-11-30 RX ADMIN — CEFEPIME 2 G: 2 INJECTION, POWDER, FOR SOLUTION INTRAMUSCULAR; INTRAVENOUS at 05:29

## 2018-11-30 RX ADMIN — ATORVASTATIN CALCIUM 20 MG: 20 TABLET, FILM COATED ORAL at 09:31

## 2018-11-30 RX ADMIN — FOLIC ACID 1 MG: 1 TABLET ORAL at 09:31

## 2018-11-30 RX ADMIN — ENOXAPARIN SODIUM 40 MG: 40 INJECTION SUBCUTANEOUS at 09:32

## 2018-11-30 RX ADMIN — BUSPIRONE HYDROCHLORIDE 7.5 MG: 5 TABLET ORAL at 09:31

## 2018-11-30 RX ADMIN — INSULIN LISPRO 2 UNITS: 100 INJECTION, SOLUTION INTRAVENOUS; SUBCUTANEOUS at 12:46

## 2018-11-30 ASSESSMENT — PAIN SCALES - GENERAL: PAINLEVEL_OUTOF10: 0

## 2018-12-03 LAB
BLOOD CULTURE, ROUTINE: NORMAL
CULTURE, BLOOD 2: NORMAL

## 2018-12-04 NOTE — PROGRESS NOTES
cm^2 2018 12:15 PM   Wound Volume (cm^3) 0.8 cm^3 2018 12:15 PM   Wound Assessment Pink 2018 12:15 PM   Drainage Amount None 2018 12:15 PM   Susy-wound Assessment Fragile 2018 12:15 PM   Non-staged Wound Description Partial thickness 2018 12:15 PM   [place]%Wound Bed 100 2018 12:15 PM   Number of days: 5        Other physical exam findings:        Assessment:     Patient Active Problem List   Diagnosis Code    Cellulitis L03.90    Sepsis ([address] A41.9    Dementia Y61.56    Metabolic encephalopathy Y66.37    Diabetes mellitus type 2, uncontrolled ([address] E11.65    Hyperlipidemia LDL goal <100 E78.5    Essential hypertension I10    Venous ulcer of left leg ([facility]) I83.029, L97.929    Non-pressure chronic ulcer of left lower leg with fat layer exposed ([facility] [address] L97.922    Non-pressure chronic ulcer left lower leg, limited to breakdown skin ([facility]) L97.921    Hypotension I95.9    Severe protein-calorie malnutrition (HCC) E43    Non-pressure chronic ulcer right lower leg, limited to breakdown skin ([facility]) L97.911    Pressure injury of contiguous region involving back, buttock, and hip, stage 2 L89.42    Pressure injury of calf, stage 2 L89.892       Overall Wound Assessment  Wound has slightly improved. Size has decreased. Appearance has improved. Stage 2     (Please refer to nursing measurements and assessment regarding wound measurements.) Wound check - Care provided includes removal of existing dressing and visual inspection. Plan:       1. 100Debridement today. See Procedure Note below. 2. Discussed appropriate home care of this wound. Dispensed dressing supplies and instructions on their use. Wound redressed. 3. Patient instructions were given. Dressing: [place]. 4. Follow up: 2 week. Mookie Espino. Main Procedure Note    Yuri Gleason  AGE: [de-identified] y.o.    GENDER: male  : 1940    TODAY'S DATE:  2018    The patient was identified and the procedure was confirmed. Lidocaine gel soaked gauze was applied at beginning of wound evaluation. The BILATERAL lower leg wounsd were excisionally debrided sharply of fibrotic, necrotic, and hyperkeratotic tissue, including a layer of surrounding healthy tissue using curette for a total surface area of < 20 cm2. The wound(s) was debrided down through and including subcutaneous tissue. 100% of the wound was debrided. There was minimal bleeding that was controlled with pressure. Patient tolerated procedure well and was given proper instruction.       Clifford Groves MD

## 2018-12-05 ENCOUNTER — HOSPITAL ENCOUNTER (OUTPATIENT)
Dept: WOUND CARE | Age: 78
Discharge: HOME | End: 2018-12-05
Payer: COMMERCIAL

## 2018-12-07 NOTE — PROGRESS NOTES
11/30/2018  9:00 AM   Dressing Changed Changed/New 11/29/2018  6:43 PM   Dressing/Treatment Ace Wrap;Dry dressing;Xeroform 11/29/2018  6:43 PM   Wound Cleansed Rinsed/Irrigated with saline 11/29/2018  6:43 PM   Wound Length (cm) 34 cm 11/28/2018 12:15 PM   Wound Width (cm) 20 cm 11/28/2018 12:15 PM   Wound Depth (cm) 0.1 cm 11/28/2018 12:15 PM   Wound Surface Area (cm^2) 680 cm^2 11/28/2018 12:15 PM   Wound Volume (cm^3) 68 cm^3 11/28/2018 12:15 PM   Wound Assessment Red;Pink; Other (Comment) 11/29/2018  6:43 PM   Drainage Amount Scant 11/29/2018  6:43 PM   Drainage Description Serosanguinous 11/29/2018  6:43 PM   Odor None 11/29/2018  6:43 PM   Susy-wound Assessment Fragile 11/28/2018 12:15 PM   [place]%Wound Bed 100 11/28/2018 12:15 PM   Number of days: 8       Wound 11/28/18 Abdomen Right (Active)   Dressing Status Clean;Dry; Intact 11/30/2018  9:00 AM   Dressing Changed Changed/New 11/28/2018 12:15 PM   Dressing/Treatment Non adherent 11/28/2018 12:15 PM   Dressing Change Due 12/05/18 11/30/2018  9:00 AM   Wound Length (cm) 4 cm 11/28/2018 12:15 PM   Wound Width (cm) 8 cm 11/28/2018 12:15 PM   Wound Depth (cm) 0.1 cm 11/28/2018 12:15 PM   Wound Surface Area (cm^2) 32 cm^2 11/28/2018 12:15 PM   Wound Volume (cm^3) 3.2 cm^3 11/28/2018 12:15 PM   Wound Assessment KIP 11/30/2018  9:00 AM   Drainage Amount None 11/28/2018 12:15 PM   Susy-wound Assessment Fragile 11/28/2018 12:15 PM   Non-staged Wound Description Partial thickness 11/28/2018 12:15 PM   [place]%Wound Bed 100 11/28/2018 12:15 PM   Number of days: 8       Wound 11/28/18 Abdomen Left (Active)   Dressing Status Clean;Dry; Intact 11/30/2018  9:00 AM   Dressing Changed Changed/New 11/28/2018 12:15 PM   Dressing/Treatment Non adherent 11/28/2018 12:15 PM   Dressing Change Due 12/05/18 11/30/2018  9:00 AM   Wound Length (cm) 2 cm 11/28/2018 12:15 PM   Wound Width (cm) 4 cm 11/28/2018 12:15 PM   Wound Depth (cm) 0.1 cm 11/28/2018 12:15 PM   Wound Surface Area (cm^2) 8

## 2019-01-08 ENCOUNTER — HOSPITAL ENCOUNTER (INPATIENT)
Age: 79
LOS: 5 days | Discharge: NURSING, REHAB OR LONG-TERM CARE | DRG: 871 | End: 2019-01-13
Attending: EMERGENCY MEDICINE | Admitting: INTERNAL MEDICINE
Payer: COMMERCIAL

## 2019-01-08 ENCOUNTER — APPOINTMENT (OUTPATIENT)
Dept: GENERAL RADIOLOGY | Age: 79
DRG: 871 | End: 2019-01-08
Payer: COMMERCIAL

## 2019-01-08 ENCOUNTER — APPOINTMENT (OUTPATIENT)
Dept: CT IMAGING | Age: 79
DRG: 871 | End: 2019-01-08
Payer: COMMERCIAL

## 2019-01-08 DIAGNOSIS — L03.116 CELLULITIS OF LEFT LOWER EXTREMITY: ICD-10-CM

## 2019-01-08 DIAGNOSIS — J18.9 HCAP (HEALTHCARE-ASSOCIATED PNEUMONIA): Primary | ICD-10-CM

## 2019-01-08 LAB
ALBUMIN SERPL-MCNC: 3.5 G/DL (ref 3.5–5.2)
ALP BLD-CCNC: 98 U/L (ref 40–129)
ALT SERPL-CCNC: 23 U/L (ref 0–40)
ANION GAP SERPL CALCULATED.3IONS-SCNC: 17 MMOL/L (ref 7–16)
AST SERPL-CCNC: 34 U/L (ref 0–39)
BACTERIA: ABNORMAL /HPF
BILIRUB SERPL-MCNC: 0.5 MG/DL (ref 0–1.2)
BILIRUBIN URINE: NEGATIVE
BLOOD, URINE: ABNORMAL
BUN BLDV-MCNC: 24 MG/DL (ref 8–23)
CALCIUM SERPL-MCNC: 9 MG/DL (ref 8.6–10.2)
CHLORIDE BLD-SCNC: 97 MMOL/L (ref 98–107)
CLARITY: ABNORMAL
CO2: 24 MMOL/L (ref 22–29)
COLOR: YELLOW
CREAT SERPL-MCNC: 0.8 MG/DL (ref 0.7–1.2)
EKG ATRIAL RATE: 127 BPM
EKG P AXIS: 82 DEGREES
EKG P-R INTERVAL: 148 MS
EKG Q-T INTERVAL: 296 MS
EKG QRS DURATION: 70 MS
EKG QTC CALCULATION (BAZETT): 430 MS
EKG R AXIS: -38 DEGREES
EKG T AXIS: 56 DEGREES
EKG VENTRICULAR RATE: 127 BPM
GFR AFRICAN AMERICAN: >60
GFR NON-AFRICAN AMERICAN: >60 ML/MIN/1.73
GLUCOSE BLD-MCNC: 131 MG/DL (ref 74–99)
GLUCOSE URINE: NEGATIVE MG/DL
HCT VFR BLD CALC: 42.9 % (ref 37–54)
HEMOGLOBIN: 12.8 G/DL (ref 12.5–16.5)
KETONES, URINE: NEGATIVE MG/DL
LACTIC ACID, SEPSIS: 2.2 MMOL/L (ref 0.5–1.9)
LACTIC ACID, SEPSIS: 4.1 MMOL/L (ref 0.5–1.9)
LEUKOCYTE ESTERASE, URINE: ABNORMAL
MAGNESIUM: 1.7 MG/DL (ref 1.6–2.6)
MCH RBC QN AUTO: 25.8 PG (ref 26–35)
MCHC RBC AUTO-ENTMCNC: 29.8 % (ref 32–34.5)
MCV RBC AUTO: 86.3 FL (ref 80–99.9)
METER GLUCOSE: 169 MG/DL (ref 74–99)
NITRITE, URINE: POSITIVE
PDW BLD-RTO: 19.6 FL (ref 11.5–15)
PH UA: 6.5 (ref 5–9)
PLATELET # BLD: 250 E9/L (ref 130–450)
PMV BLD AUTO: 9.2 FL (ref 7–12)
POTASSIUM SERPL-SCNC: 5.4 MMOL/L (ref 3.5–5)
PROTEIN UA: NEGATIVE MG/DL
RBC # BLD: 4.97 E12/L (ref 3.8–5.8)
RBC UA: ABNORMAL /HPF (ref 0–2)
SODIUM BLD-SCNC: 138 MMOL/L (ref 132–146)
SPECIFIC GRAVITY UA: 1.01 (ref 1–1.03)
TOTAL PROTEIN: 7.5 G/DL (ref 6.4–8.3)
TROPONIN: <0.01 NG/ML (ref 0–0.03)
UROBILINOGEN, URINE: 0.2 E.U./DL
WBC # BLD: 11.1 E9/L (ref 4.5–11.5)
WBC UA: ABNORMAL /HPF (ref 0–5)

## 2019-01-08 PROCEDURE — 6370000000 HC RX 637 (ALT 250 FOR IP): Performed by: EMERGENCY MEDICINE

## 2019-01-08 PROCEDURE — 6370000000 HC RX 637 (ALT 250 FOR IP): Performed by: INTERNAL MEDICINE

## 2019-01-08 PROCEDURE — 96367 TX/PROPH/DG ADDL SEQ IV INF: CPT

## 2019-01-08 PROCEDURE — 82962 GLUCOSE BLOOD TEST: CPT

## 2019-01-08 PROCEDURE — 71045 X-RAY EXAM CHEST 1 VIEW: CPT

## 2019-01-08 PROCEDURE — 83605 ASSAY OF LACTIC ACID: CPT

## 2019-01-08 PROCEDURE — 2580000003 HC RX 258: Performed by: EMERGENCY MEDICINE

## 2019-01-08 PROCEDURE — 99285 EMERGENCY DEPT VISIT HI MDM: CPT

## 2019-01-08 PROCEDURE — 2500000003 HC RX 250 WO HCPCS: Performed by: INTERNAL MEDICINE

## 2019-01-08 PROCEDURE — 2140000000 HC CCU INTERMEDIATE R&B

## 2019-01-08 PROCEDURE — 36415 COLL VENOUS BLD VENIPUNCTURE: CPT

## 2019-01-08 PROCEDURE — 6360000002 HC RX W HCPCS: Performed by: EMERGENCY MEDICINE

## 2019-01-08 PROCEDURE — 2700000000 HC OXYGEN THERAPY PER DAY

## 2019-01-08 PROCEDURE — 80053 COMPREHEN METABOLIC PANEL: CPT

## 2019-01-08 PROCEDURE — 87040 BLOOD CULTURE FOR BACTERIA: CPT

## 2019-01-08 PROCEDURE — 84484 ASSAY OF TROPONIN QUANT: CPT

## 2019-01-08 PROCEDURE — 2580000003 HC RX 258: Performed by: INTERNAL MEDICINE

## 2019-01-08 PROCEDURE — 81001 URINALYSIS AUTO W/SCOPE: CPT

## 2019-01-08 PROCEDURE — 70450 CT HEAD/BRAIN W/O DYE: CPT

## 2019-01-08 PROCEDURE — 85027 COMPLETE CBC AUTOMATED: CPT

## 2019-01-08 PROCEDURE — 84145 PROCALCITONIN (PCT): CPT

## 2019-01-08 PROCEDURE — 83735 ASSAY OF MAGNESIUM: CPT

## 2019-01-08 PROCEDURE — 93005 ELECTROCARDIOGRAM TRACING: CPT | Performed by: EMERGENCY MEDICINE

## 2019-01-08 PROCEDURE — 96365 THER/PROPH/DIAG IV INF INIT: CPT

## 2019-01-08 RX ORDER — FOLIC ACID 1 MG/1
1 TABLET ORAL DAILY
Status: DISCONTINUED | OUTPATIENT
Start: 2019-01-08 | End: 2019-01-13 | Stop reason: HOSPADM

## 2019-01-08 RX ORDER — SODIUM PHOSPHATE, DIBASIC AND SODIUM PHOSPHATE, MONOBASIC 7; 19 G/133ML; G/133ML
1 ENEMA RECTAL
Status: ON HOLD | COMMUNITY
End: 2019-01-13 | Stop reason: HOSPADM

## 2019-01-08 RX ORDER — ATORVASTATIN CALCIUM 20 MG/1
20 TABLET, FILM COATED ORAL DAILY
Status: DISCONTINUED | OUTPATIENT
Start: 2019-01-08 | End: 2019-01-13 | Stop reason: HOSPADM

## 2019-01-08 RX ORDER — SODIUM CHLORIDE 0.9 % (FLUSH) 0.9 %
10 SYRINGE (ML) INJECTION PRN
Status: DISCONTINUED | OUTPATIENT
Start: 2019-01-08 | End: 2019-01-13 | Stop reason: HOSPADM

## 2019-01-08 RX ORDER — TAMSULOSIN HYDROCHLORIDE 0.4 MG/1
0.4 CAPSULE ORAL EVERY EVENING
Status: DISCONTINUED | OUTPATIENT
Start: 2019-01-08 | End: 2019-01-13 | Stop reason: HOSPADM

## 2019-01-08 RX ORDER — CHOLECALCIFEROL (VITAMIN D3) 50 MCG
5000 TABLET ORAL DAILY
Status: DISCONTINUED | OUTPATIENT
Start: 2019-01-08 | End: 2019-01-13 | Stop reason: HOSPADM

## 2019-01-08 RX ORDER — DEXTROSE MONOHYDRATE 25 G/50ML
12.5 INJECTION, SOLUTION INTRAVENOUS PRN
Status: DISCONTINUED | OUTPATIENT
Start: 2019-01-08 | End: 2019-01-13 | Stop reason: HOSPADM

## 2019-01-08 RX ORDER — MIRTAZAPINE 15 MG/1
7.5 TABLET, FILM COATED ORAL DAILY
Status: DISCONTINUED | OUTPATIENT
Start: 2019-01-08 | End: 2019-01-13 | Stop reason: HOSPADM

## 2019-01-08 RX ORDER — ONDANSETRON 2 MG/ML
4 INJECTION INTRAMUSCULAR; INTRAVENOUS EVERY 6 HOURS PRN
Status: DISCONTINUED | OUTPATIENT
Start: 2019-01-08 | End: 2019-01-13 | Stop reason: HOSPADM

## 2019-01-08 RX ORDER — DEXTROSE MONOHYDRATE 50 MG/ML
100 INJECTION, SOLUTION INTRAVENOUS PRN
Status: DISCONTINUED | OUTPATIENT
Start: 2019-01-08 | End: 2019-01-13 | Stop reason: HOSPADM

## 2019-01-08 RX ORDER — BUSPIRONE HYDROCHLORIDE 5 MG/1
7.5 TABLET ORAL DAILY
Status: DISCONTINUED | OUTPATIENT
Start: 2019-01-08 | End: 2019-01-13 | Stop reason: HOSPADM

## 2019-01-08 RX ORDER — SODIUM CHLORIDE 0.9 % (FLUSH) 0.9 %
10 SYRINGE (ML) INJECTION EVERY 12 HOURS SCHEDULED
Status: DISCONTINUED | OUTPATIENT
Start: 2019-01-08 | End: 2019-01-13 | Stop reason: HOSPADM

## 2019-01-08 RX ORDER — 0.9 % SODIUM CHLORIDE 0.9 %
30 INTRAVENOUS SOLUTION INTRAVENOUS ONCE
Status: DISCONTINUED | OUTPATIENT
Start: 2019-01-08 | End: 2019-01-08 | Stop reason: SDUPTHER

## 2019-01-08 RX ORDER — LATANOPROST 50 UG/ML
1 SOLUTION/ DROPS OPHTHALMIC NIGHTLY
Status: DISCONTINUED | OUTPATIENT
Start: 2019-01-08 | End: 2019-01-13 | Stop reason: HOSPADM

## 2019-01-08 RX ORDER — IPRATROPIUM BROMIDE AND ALBUTEROL SULFATE 2.5; .5 MG/3ML; MG/3ML
1 SOLUTION RESPIRATORY (INHALATION)
Status: DISCONTINUED | OUTPATIENT
Start: 2019-01-08 | End: 2019-01-13 | Stop reason: HOSPADM

## 2019-01-08 RX ORDER — NICOTINE POLACRILEX 4 MG
15 LOZENGE BUCCAL PRN
Status: DISCONTINUED | OUTPATIENT
Start: 2019-01-08 | End: 2019-01-13 | Stop reason: HOSPADM

## 2019-01-08 RX ORDER — 0.9 % SODIUM CHLORIDE 0.9 %
30 INTRAVENOUS SOLUTION INTRAVENOUS ONCE
Status: COMPLETED | OUTPATIENT
Start: 2019-01-08 | End: 2019-01-08

## 2019-01-08 RX ORDER — ACETAMINOPHEN 325 MG/1
650 TABLET ORAL ONCE
Status: COMPLETED | OUTPATIENT
Start: 2019-01-08 | End: 2019-01-08

## 2019-01-08 RX ORDER — ACETAMINOPHEN 325 MG/1
650 TABLET ORAL EVERY 4 HOURS PRN
Status: DISCONTINUED | OUTPATIENT
Start: 2019-01-08 | End: 2019-01-13 | Stop reason: HOSPADM

## 2019-01-08 RX ADMIN — SODIUM CHLORIDE 1000 ML: 9 INJECTION, SOLUTION INTRAVENOUS at 09:29

## 2019-01-08 RX ADMIN — SODIUM CHLORIDE 3132 ML: 9 INJECTION, SOLUTION INTRAVENOUS at 09:30

## 2019-01-08 RX ADMIN — ACETAMINOPHEN 650 MG: 325 TABLET, FILM COATED ORAL at 09:29

## 2019-01-08 RX ADMIN — VANCOMYCIN HYDROCHLORIDE 1.5 G: 10 INJECTION, POWDER, LYOPHILIZED, FOR SOLUTION INTRAVENOUS at 12:17

## 2019-01-08 RX ADMIN — MICONAZOLE NITRATE: 20.6 POWDER TOPICAL at 21:21

## 2019-01-08 RX ADMIN — LATANOPROST 1 DROP: 50 SOLUTION OPHTHALMIC at 21:21

## 2019-01-08 RX ADMIN — TAMSULOSIN HYDROCHLORIDE 0.4 MG: 0.4 CAPSULE ORAL at 21:20

## 2019-01-08 RX ADMIN — ATORVASTATIN CALCIUM 20 MG: 20 TABLET, FILM COATED ORAL at 21:20

## 2019-01-08 RX ADMIN — Medication 10 ML: at 21:26

## 2019-01-08 RX ADMIN — PIPERACILLIN SODIUM, TAZOBACTAM SODIUM 3.38 G: 3; .375 INJECTION, POWDER, LYOPHILIZED, FOR SOLUTION INTRAVENOUS at 11:41

## 2019-01-09 LAB
BASOPHILS ABSOLUTE: 0.02 E9/L (ref 0–0.2)
BASOPHILS RELATIVE PERCENT: 0.3 % (ref 0–2)
C-REACTIVE PROTEIN: 14.7 MG/DL (ref 0–0.4)
EOSINOPHILS ABSOLUTE: 0.22 E9/L (ref 0.05–0.5)
EOSINOPHILS RELATIVE PERCENT: 3.2 % (ref 0–6)
HCT VFR BLD CALC: 32.7 % (ref 37–54)
HEMOGLOBIN: 9.9 G/DL (ref 12.5–16.5)
IMMATURE GRANULOCYTES #: 0.04 E9/L
IMMATURE GRANULOCYTES %: 0.6 % (ref 0–5)
LACTIC ACID: 1.1 MMOL/L (ref 0.5–2.2)
LYMPHOCYTES ABSOLUTE: 1.1 E9/L (ref 1.5–4)
LYMPHOCYTES RELATIVE PERCENT: 15.9 % (ref 20–42)
MCH RBC QN AUTO: 26.2 PG (ref 26–35)
MCHC RBC AUTO-ENTMCNC: 30.3 % (ref 32–34.5)
MCV RBC AUTO: 86.5 FL (ref 80–99.9)
METER GLUCOSE: 106 MG/DL (ref 74–99)
METER GLUCOSE: 136 MG/DL (ref 74–99)
METER GLUCOSE: 169 MG/DL (ref 74–99)
METER GLUCOSE: 185 MG/DL (ref 74–99)
MONOCYTES ABSOLUTE: 0.54 E9/L (ref 0.1–0.95)
MONOCYTES RELATIVE PERCENT: 7.8 % (ref 2–12)
NEUTROPHILS ABSOLUTE: 5 E9/L (ref 1.8–7.3)
NEUTROPHILS RELATIVE PERCENT: 72.2 % (ref 43–80)
PDW BLD-RTO: 19.2 FL (ref 11.5–15)
PLATELET # BLD: 197 E9/L (ref 130–450)
PMV BLD AUTO: 9 FL (ref 7–12)
PROCALCITONIN: 0.13 NG/ML (ref 0–0.08)
RBC # BLD: 3.78 E12/L (ref 3.8–5.8)
WBC # BLD: 6.9 E9/L (ref 4.5–11.5)

## 2019-01-09 PROCEDURE — 2580000003 HC RX 258: Performed by: INTERNAL MEDICINE

## 2019-01-09 PROCEDURE — 36415 COLL VENOUS BLD VENIPUNCTURE: CPT

## 2019-01-09 PROCEDURE — 86140 C-REACTIVE PROTEIN: CPT

## 2019-01-09 PROCEDURE — 87486 CHLMYD PNEUM DNA AMP PROBE: CPT

## 2019-01-09 PROCEDURE — 6360000002 HC RX W HCPCS: Performed by: INTERNAL MEDICINE

## 2019-01-09 PROCEDURE — 2140000000 HC CCU INTERMEDIATE R&B

## 2019-01-09 PROCEDURE — 87633 RESP VIRUS 12-25 TARGETS: CPT

## 2019-01-09 PROCEDURE — 94664 DEMO&/EVAL PT USE INHALER: CPT

## 2019-01-09 PROCEDURE — 2700000000 HC OXYGEN THERAPY PER DAY

## 2019-01-09 PROCEDURE — 87581 M.PNEUMON DNA AMP PROBE: CPT

## 2019-01-09 PROCEDURE — 6370000000 HC RX 637 (ALT 250 FOR IP): Performed by: INTERNAL MEDICINE

## 2019-01-09 PROCEDURE — 82962 GLUCOSE BLOOD TEST: CPT

## 2019-01-09 PROCEDURE — 83605 ASSAY OF LACTIC ACID: CPT

## 2019-01-09 PROCEDURE — 87798 DETECT AGENT NOS DNA AMP: CPT

## 2019-01-09 PROCEDURE — 85025 COMPLETE CBC W/AUTO DIFF WBC: CPT

## 2019-01-09 PROCEDURE — 94640 AIRWAY INHALATION TREATMENT: CPT

## 2019-01-09 RX ADMIN — ACETAMINOPHEN 650 MG: 325 TABLET, FILM COATED ORAL at 01:31

## 2019-01-09 RX ADMIN — TAMSULOSIN HYDROCHLORIDE 0.4 MG: 0.4 CAPSULE ORAL at 20:42

## 2019-01-09 RX ADMIN — MIRTAZAPINE 7.5 MG: 15 TABLET, FILM COATED ORAL at 10:08

## 2019-01-09 RX ADMIN — IPRATROPIUM BROMIDE AND ALBUTEROL SULFATE 1 AMPULE: .5; 3 SOLUTION RESPIRATORY (INHALATION) at 11:53

## 2019-01-09 RX ADMIN — IPRATROPIUM BROMIDE AND ALBUTEROL SULFATE 1 AMPULE: .5; 3 SOLUTION RESPIRATORY (INHALATION) at 15:33

## 2019-01-09 RX ADMIN — IPRATROPIUM BROMIDE AND ALBUTEROL SULFATE 1 AMPULE: .5; 3 SOLUTION RESPIRATORY (INHALATION) at 19:33

## 2019-01-09 RX ADMIN — FOLIC ACID 1 MG: 1 TABLET ORAL at 10:09

## 2019-01-09 RX ADMIN — MICONAZOLE NITRATE: 20.6 POWDER TOPICAL at 20:41

## 2019-01-09 RX ADMIN — METHOTREXATE 7.5 MG: 2.5 TABLET ORAL at 10:10

## 2019-01-09 RX ADMIN — IPRATROPIUM BROMIDE AND ALBUTEROL SULFATE 1 AMPULE: .5; 3 SOLUTION RESPIRATORY (INHALATION) at 08:00

## 2019-01-09 RX ADMIN — CEFEPIME HYDROCHLORIDE 2 G: 2 INJECTION, POWDER, FOR SOLUTION INTRAVENOUS at 20:41

## 2019-01-09 RX ADMIN — ATORVASTATIN CALCIUM 20 MG: 20 TABLET, FILM COATED ORAL at 10:08

## 2019-01-09 RX ADMIN — Medication 1.5 G: at 17:08

## 2019-01-09 RX ADMIN — Medication: at 20:41

## 2019-01-09 RX ADMIN — Medication 5000 UNITS: at 10:09

## 2019-01-09 RX ADMIN — BUSPIRONE HYDROCHLORIDE 7.5 MG: 5 TABLET ORAL at 10:09

## 2019-01-09 RX ADMIN — ACETAMINOPHEN 650 MG: 325 TABLET, FILM COATED ORAL at 23:54

## 2019-01-09 RX ADMIN — METHOTREXATE 7.5 MG: 2.5 TABLET ORAL at 14:36

## 2019-01-09 RX ADMIN — LATANOPROST 1 DROP: 50 SOLUTION OPHTHALMIC at 20:42

## 2019-01-09 RX ADMIN — Medication 10 ML: at 10:11

## 2019-01-09 RX ADMIN — ENOXAPARIN SODIUM 40 MG: 40 INJECTION SUBCUTANEOUS at 10:10

## 2019-01-09 RX ADMIN — MICONAZOLE NITRATE: 20.6 POWDER TOPICAL at 10:13

## 2019-01-09 ASSESSMENT — PAIN SCALES - GENERAL
PAINLEVEL_OUTOF10: 0
PAINLEVEL_OUTOF10: 4
PAINLEVEL_OUTOF10: 0

## 2019-01-09 ASSESSMENT — PAIN DESCRIPTION - PAIN TYPE: TYPE: ACUTE PAIN

## 2019-01-09 ASSESSMENT — PAIN DESCRIPTION - ONSET: ONSET: ON-GOING

## 2019-01-09 ASSESSMENT — PAIN DESCRIPTION - ORIENTATION: ORIENTATION: RIGHT;LEFT;MID

## 2019-01-09 ASSESSMENT — PAIN DESCRIPTION - PROGRESSION: CLINICAL_PROGRESSION: NOT CHANGED

## 2019-01-09 ASSESSMENT — PAIN DESCRIPTION - LOCATION: LOCATION: HEAD

## 2019-01-09 ASSESSMENT — PAIN DESCRIPTION - FREQUENCY: FREQUENCY: INTERMITTENT

## 2019-01-09 ASSESSMENT — PAIN DESCRIPTION - DESCRIPTORS: DESCRIPTORS: HEADACHE

## 2019-01-10 LAB
FILM ARRAY ADENOVIRUS: ABNORMAL
FILM ARRAY BORDETELLA PERTUSSIS: ABNORMAL
FILM ARRAY CHLAMYDOPHILIA PNEUMONIAE: ABNORMAL
FILM ARRAY CORONAVIRUS 229E: ABNORMAL
FILM ARRAY CORONAVIRUS HKU1: ABNORMAL
FILM ARRAY CORONAVIRUS NL63: ABNORMAL
FILM ARRAY CORONAVIRUS OC43: ABNORMAL
FILM ARRAY INFLUENZA A VIRUS 09H1: ABNORMAL
FILM ARRAY INFLUENZA A VIRUS H1: ABNORMAL
FILM ARRAY INFLUENZA A VIRUS H3: ABNORMAL
FILM ARRAY INFLUENZA A VIRUS: ABNORMAL
FILM ARRAY INFLUENZA B: ABNORMAL
FILM ARRAY METAPNEUMOVIRUS: ABNORMAL
FILM ARRAY MYCOPLASMA PNEUMONIAE: ABNORMAL
FILM ARRAY PARAINFLUENZA VIRUS 1: ABNORMAL
FILM ARRAY PARAINFLUENZA VIRUS 2: ABNORMAL
FILM ARRAY PARAINFLUENZA VIRUS 3: ABNORMAL
FILM ARRAY PARAINFLUENZA VIRUS 4: ABNORMAL
FILM ARRAY RHINOVIRUS/ENTEROVIRUS: ABNORMAL
METER GLUCOSE: 132 MG/DL (ref 74–99)
METER GLUCOSE: 172 MG/DL (ref 74–99)
METER GLUCOSE: 177 MG/DL (ref 74–99)
METER GLUCOSE: 178 MG/DL (ref 74–99)
ORGANISM: ABNORMAL
VANCOMYCIN TROUGH: 18.1 MCG/ML (ref 5–16)

## 2019-01-10 PROCEDURE — 6370000000 HC RX 637 (ALT 250 FOR IP): Performed by: INTERNAL MEDICINE

## 2019-01-10 PROCEDURE — 94640 AIRWAY INHALATION TREATMENT: CPT

## 2019-01-10 PROCEDURE — 2580000003 HC RX 258: Performed by: INTERNAL MEDICINE

## 2019-01-10 PROCEDURE — 36415 COLL VENOUS BLD VENIPUNCTURE: CPT

## 2019-01-10 PROCEDURE — 2140000000 HC CCU INTERMEDIATE R&B

## 2019-01-10 PROCEDURE — 80202 ASSAY OF VANCOMYCIN: CPT

## 2019-01-10 PROCEDURE — 6360000002 HC RX W HCPCS: Performed by: INTERNAL MEDICINE

## 2019-01-10 PROCEDURE — 82962 GLUCOSE BLOOD TEST: CPT

## 2019-01-10 PROCEDURE — 2700000000 HC OXYGEN THERAPY PER DAY

## 2019-01-10 RX ADMIN — Medication 1.5 G: at 16:52

## 2019-01-10 RX ADMIN — INSULIN LISPRO 1 UNITS: 100 INJECTION, SOLUTION INTRAVENOUS; SUBCUTANEOUS at 06:43

## 2019-01-10 RX ADMIN — INSULIN LISPRO 1 UNITS: 100 INJECTION, SOLUTION INTRAVENOUS; SUBCUTANEOUS at 16:50

## 2019-01-10 RX ADMIN — Medication 10 ML: at 21:08

## 2019-01-10 RX ADMIN — MIRTAZAPINE 7.5 MG: 15 TABLET, FILM COATED ORAL at 08:22

## 2019-01-10 RX ADMIN — Medication: at 21:08

## 2019-01-10 RX ADMIN — IPRATROPIUM BROMIDE AND ALBUTEROL SULFATE 1 AMPULE: .5; 3 SOLUTION RESPIRATORY (INHALATION) at 17:51

## 2019-01-10 RX ADMIN — TAMSULOSIN HYDROCHLORIDE 0.4 MG: 0.4 CAPSULE ORAL at 18:11

## 2019-01-10 RX ADMIN — IPRATROPIUM BROMIDE AND ALBUTEROL SULFATE 1 AMPULE: .5; 3 SOLUTION RESPIRATORY (INHALATION) at 12:21

## 2019-01-10 RX ADMIN — Medication: at 08:24

## 2019-01-10 RX ADMIN — IPRATROPIUM BROMIDE AND ALBUTEROL SULFATE 1 AMPULE: .5; 3 SOLUTION RESPIRATORY (INHALATION) at 21:30

## 2019-01-10 RX ADMIN — BUSPIRONE HYDROCHLORIDE 7.5 MG: 5 TABLET ORAL at 08:22

## 2019-01-10 RX ADMIN — ATORVASTATIN CALCIUM 20 MG: 20 TABLET, FILM COATED ORAL at 08:22

## 2019-01-10 RX ADMIN — CEFEPIME HYDROCHLORIDE 2 G: 2 INJECTION, POWDER, FOR SOLUTION INTRAVENOUS at 21:07

## 2019-01-10 RX ADMIN — INSULIN LISPRO 1 UNITS: 100 INJECTION, SOLUTION INTRAVENOUS; SUBCUTANEOUS at 21:07

## 2019-01-10 RX ADMIN — Medication 10 ML: at 16:52

## 2019-01-10 RX ADMIN — Medication 10 ML: at 08:23

## 2019-01-10 RX ADMIN — FOLIC ACID 1 MG: 1 TABLET ORAL at 08:22

## 2019-01-10 RX ADMIN — Medication 10 ML: at 03:43

## 2019-01-10 RX ADMIN — CEFEPIME HYDROCHLORIDE 2 G: 2 INJECTION, POWDER, FOR SOLUTION INTRAVENOUS at 08:24

## 2019-01-10 RX ADMIN — Medication 1.5 G: at 03:43

## 2019-01-10 RX ADMIN — ENOXAPARIN SODIUM 40 MG: 40 INJECTION SUBCUTANEOUS at 08:23

## 2019-01-10 RX ADMIN — Medication 5000 UNITS: at 08:22

## 2019-01-10 RX ADMIN — MICONAZOLE NITRATE: 20.6 POWDER TOPICAL at 21:08

## 2019-01-10 RX ADMIN — MICONAZOLE NITRATE: 20.6 POWDER TOPICAL at 08:24

## 2019-01-10 RX ADMIN — IPRATROPIUM BROMIDE AND ALBUTEROL SULFATE 1 AMPULE: .5; 3 SOLUTION RESPIRATORY (INHALATION) at 09:07

## 2019-01-10 RX ADMIN — LATANOPROST 1 DROP: 50 SOLUTION OPHTHALMIC at 21:22

## 2019-01-10 ASSESSMENT — PAIN SCALES - GENERAL
PAINLEVEL_OUTOF10: 0

## 2019-01-10 ASSESSMENT — PAIN DESCRIPTION - LOCATION: LOCATION: HEAD

## 2019-01-10 ASSESSMENT — PAIN DESCRIPTION - ORIENTATION: ORIENTATION: RIGHT;LEFT;MID

## 2019-01-10 ASSESSMENT — PAIN DESCRIPTION - PAIN TYPE: TYPE: ACUTE PAIN

## 2019-01-11 ENCOUNTER — APPOINTMENT (OUTPATIENT)
Dept: GENERAL RADIOLOGY | Age: 79
DRG: 871 | End: 2019-01-11
Payer: COMMERCIAL

## 2019-01-11 LAB
ALBUMIN SERPL-MCNC: 3 G/DL (ref 3.5–5.2)
ALP BLD-CCNC: 67 U/L (ref 40–129)
ALT SERPL-CCNC: 12 U/L (ref 0–40)
ANION GAP SERPL CALCULATED.3IONS-SCNC: 12 MMOL/L (ref 7–16)
AST SERPL-CCNC: 12 U/L (ref 0–39)
BILIRUB SERPL-MCNC: 0.5 MG/DL (ref 0–1.2)
BUN BLDV-MCNC: 15 MG/DL (ref 8–23)
CALCIUM SERPL-MCNC: 8.3 MG/DL (ref 8.6–10.2)
CHLORIDE BLD-SCNC: 99 MMOL/L (ref 98–107)
CO2: 26 MMOL/L (ref 22–29)
CREAT SERPL-MCNC: 1 MG/DL (ref 0.7–1.2)
GFR AFRICAN AMERICAN: >60
GFR NON-AFRICAN AMERICAN: >60 ML/MIN/1.73
GLUCOSE BLD-MCNC: 143 MG/DL (ref 74–99)
HCT VFR BLD CALC: 34.4 % (ref 37–54)
HEMOGLOBIN: 10.4 G/DL (ref 12.5–16.5)
MCH RBC QN AUTO: 25.8 PG (ref 26–35)
MCHC RBC AUTO-ENTMCNC: 30.2 % (ref 32–34.5)
MCV RBC AUTO: 85.4 FL (ref 80–99.9)
METER GLUCOSE: 147 MG/DL (ref 74–99)
METER GLUCOSE: 151 MG/DL (ref 74–99)
METER GLUCOSE: 181 MG/DL (ref 74–99)
METER GLUCOSE: 217 MG/DL (ref 74–99)
PDW BLD-RTO: 18.7 FL (ref 11.5–15)
PLATELET # BLD: 240 E9/L (ref 130–450)
PMV BLD AUTO: 8.9 FL (ref 7–12)
POTASSIUM SERPL-SCNC: 3.7 MMOL/L (ref 3.5–5)
RBC # BLD: 4.03 E12/L (ref 3.8–5.8)
SODIUM BLD-SCNC: 137 MMOL/L (ref 132–146)
TOTAL PROTEIN: 6 G/DL (ref 6.4–8.3)
WBC # BLD: 5.6 E9/L (ref 4.5–11.5)

## 2019-01-11 PROCEDURE — 2140000000 HC CCU INTERMEDIATE R&B

## 2019-01-11 PROCEDURE — 6360000002 HC RX W HCPCS: Performed by: INTERNAL MEDICINE

## 2019-01-11 PROCEDURE — 85027 COMPLETE CBC AUTOMATED: CPT

## 2019-01-11 PROCEDURE — 2580000003 HC RX 258: Performed by: INTERNAL MEDICINE

## 2019-01-11 PROCEDURE — 94640 AIRWAY INHALATION TREATMENT: CPT

## 2019-01-11 PROCEDURE — 71045 X-RAY EXAM CHEST 1 VIEW: CPT

## 2019-01-11 PROCEDURE — 82962 GLUCOSE BLOOD TEST: CPT

## 2019-01-11 PROCEDURE — 87070 CULTURE OTHR SPECIMN AEROBIC: CPT

## 2019-01-11 PROCEDURE — 80053 COMPREHEN METABOLIC PANEL: CPT

## 2019-01-11 PROCEDURE — 93005 ELECTROCARDIOGRAM TRACING: CPT | Performed by: INTERNAL MEDICINE

## 2019-01-11 PROCEDURE — 2700000000 HC OXYGEN THERAPY PER DAY

## 2019-01-11 PROCEDURE — 36415 COLL VENOUS BLD VENIPUNCTURE: CPT

## 2019-01-11 PROCEDURE — 6370000000 HC RX 637 (ALT 250 FOR IP): Performed by: INTERNAL MEDICINE

## 2019-01-11 PROCEDURE — 97166 OT EVAL MOD COMPLEX 45 MIN: CPT

## 2019-01-11 PROCEDURE — 99222 1ST HOSP IP/OBS MODERATE 55: CPT | Performed by: INTERNAL MEDICINE

## 2019-01-11 PROCEDURE — 87186 SC STD MICRODIL/AGAR DIL: CPT

## 2019-01-11 RX ORDER — IPRATROPIUM BROMIDE AND ALBUTEROL SULFATE 2.5; .5 MG/3ML; MG/3ML
3 SOLUTION RESPIRATORY (INHALATION) EVERY 4 HOURS PRN
Qty: 360 ML | Refills: 1 | Status: ON HOLD
Start: 2019-01-11 | End: 2020-12-23 | Stop reason: HOSPADM

## 2019-01-11 RX ORDER — CEPHALEXIN 500 MG/1
500 CAPSULE ORAL 3 TIMES DAILY
Qty: 21 CAPSULE | Refills: 0 | Status: SHIPPED | OUTPATIENT
Start: 2019-01-11 | End: 2019-01-13

## 2019-01-11 RX ORDER — SULFAMETHOXAZOLE AND TRIMETHOPRIM 800; 160 MG/1; MG/1
1 TABLET ORAL 2 TIMES DAILY
Qty: 14 TABLET | Refills: 0 | Status: SHIPPED | OUTPATIENT
Start: 2019-01-11 | End: 2019-01-13

## 2019-01-11 RX ADMIN — FOLIC ACID 1 MG: 1 TABLET ORAL at 08:20

## 2019-01-11 RX ADMIN — Medication: at 08:21

## 2019-01-11 RX ADMIN — INSULIN LISPRO 1 UNITS: 100 INJECTION, SOLUTION INTRAVENOUS; SUBCUTANEOUS at 21:43

## 2019-01-11 RX ADMIN — Medication: at 21:28

## 2019-01-11 RX ADMIN — TAMSULOSIN HYDROCHLORIDE 0.4 MG: 0.4 CAPSULE ORAL at 18:42

## 2019-01-11 RX ADMIN — Medication 1.5 G: at 04:00

## 2019-01-11 RX ADMIN — MICONAZOLE NITRATE: 20.6 POWDER TOPICAL at 21:28

## 2019-01-11 RX ADMIN — MIRTAZAPINE 7.5 MG: 15 TABLET, FILM COATED ORAL at 08:20

## 2019-01-11 RX ADMIN — DEXTROSE MONOHYDRATE 2 G: 50 INJECTION, SOLUTION INTRAVENOUS at 21:28

## 2019-01-11 RX ADMIN — Medication 10 ML: at 08:19

## 2019-01-11 RX ADMIN — IPRATROPIUM BROMIDE AND ALBUTEROL SULFATE 1 AMPULE: .5; 3 SOLUTION RESPIRATORY (INHALATION) at 17:30

## 2019-01-11 RX ADMIN — Medication 1.5 G: at 15:55

## 2019-01-11 RX ADMIN — CEFEPIME HYDROCHLORIDE 2 G: 2 INJECTION, POWDER, FOR SOLUTION INTRAVENOUS at 08:19

## 2019-01-11 RX ADMIN — Medication 10 ML: at 04:00

## 2019-01-11 RX ADMIN — BUSPIRONE HYDROCHLORIDE 7.5 MG: 5 TABLET ORAL at 08:19

## 2019-01-11 RX ADMIN — IPRATROPIUM BROMIDE AND ALBUTEROL SULFATE 1 AMPULE: .5; 3 SOLUTION RESPIRATORY (INHALATION) at 22:12

## 2019-01-11 RX ADMIN — INSULIN LISPRO 1 UNITS: 100 INJECTION, SOLUTION INTRAVENOUS; SUBCUTANEOUS at 06:32

## 2019-01-11 RX ADMIN — Medication 10 ML: at 15:55

## 2019-01-11 RX ADMIN — ATORVASTATIN CALCIUM 20 MG: 20 TABLET, FILM COATED ORAL at 08:20

## 2019-01-11 RX ADMIN — ENOXAPARIN SODIUM 40 MG: 40 INJECTION SUBCUTANEOUS at 08:19

## 2019-01-11 RX ADMIN — MICONAZOLE NITRATE: 20.6 POWDER TOPICAL at 08:21

## 2019-01-11 RX ADMIN — LATANOPROST 1 DROP: 50 SOLUTION OPHTHALMIC at 21:29

## 2019-01-11 RX ADMIN — IPRATROPIUM BROMIDE AND ALBUTEROL SULFATE 1 AMPULE: .5; 3 SOLUTION RESPIRATORY (INHALATION) at 12:20

## 2019-01-11 RX ADMIN — IPRATROPIUM BROMIDE AND ALBUTEROL SULFATE 1 AMPULE: .5; 3 SOLUTION RESPIRATORY (INHALATION) at 09:01

## 2019-01-11 RX ADMIN — Medication 5000 UNITS: at 08:20

## 2019-01-11 ASSESSMENT — PAIN SCALES - GENERAL
PAINLEVEL_OUTOF10: 0
PAINLEVEL_OUTOF10: 0

## 2019-01-12 ENCOUNTER — APPOINTMENT (OUTPATIENT)
Dept: GENERAL RADIOLOGY | Age: 79
DRG: 871 | End: 2019-01-12
Payer: COMMERCIAL

## 2019-01-12 PROBLEM — I48.0 PAROXYSMAL ATRIAL FIBRILLATION (HCC): Status: ACTIVE | Noted: 2019-01-12

## 2019-01-12 LAB
ANION GAP SERPL CALCULATED.3IONS-SCNC: 15 MMOL/L (ref 7–16)
BUN BLDV-MCNC: 13 MG/DL (ref 8–23)
CALCIUM SERPL-MCNC: 8.6 MG/DL (ref 8.6–10.2)
CHLORIDE BLD-SCNC: 99 MMOL/L (ref 98–107)
CO2: 26 MMOL/L (ref 22–29)
CREAT SERPL-MCNC: 0.8 MG/DL (ref 0.7–1.2)
EKG ATRIAL RATE: 133 BPM
EKG P-R INTERVAL: 176 MS
EKG Q-T INTERVAL: 314 MS
EKG QRS DURATION: 82 MS
EKG QTC CALCULATION (BAZETT): 474 MS
EKG R AXIS: -33 DEGREES
EKG T AXIS: 51 DEGREES
EKG VENTRICULAR RATE: 137 BPM
GFR AFRICAN AMERICAN: >60
GFR NON-AFRICAN AMERICAN: >60 ML/MIN/1.73
GLUCOSE BLD-MCNC: 139 MG/DL (ref 74–99)
HCT VFR BLD CALC: 35.8 % (ref 37–54)
HEMOGLOBIN: 10.8 G/DL (ref 12.5–16.5)
MCH RBC QN AUTO: 25.7 PG (ref 26–35)
MCHC RBC AUTO-ENTMCNC: 30.2 % (ref 32–34.5)
MCV RBC AUTO: 85.2 FL (ref 80–99.9)
METER GLUCOSE: 131 MG/DL (ref 74–99)
METER GLUCOSE: 136 MG/DL (ref 74–99)
METER GLUCOSE: 136 MG/DL (ref 74–99)
METER GLUCOSE: 219 MG/DL (ref 74–99)
PDW BLD-RTO: 18.9 FL (ref 11.5–15)
PLATELET # BLD: 255 E9/L (ref 130–450)
PMV BLD AUTO: 8.8 FL (ref 7–12)
POTASSIUM SERPL-SCNC: 3.6 MMOL/L (ref 3.5–5)
PRO-BNP: 262 PG/ML (ref 0–450)
RBC # BLD: 4.2 E12/L (ref 3.8–5.8)
SODIUM BLD-SCNC: 140 MMOL/L (ref 132–146)
WBC # BLD: 4.5 E9/L (ref 4.5–11.5)

## 2019-01-12 PROCEDURE — 83880 ASSAY OF NATRIURETIC PEPTIDE: CPT

## 2019-01-12 PROCEDURE — 2140000000 HC CCU INTERMEDIATE R&B

## 2019-01-12 PROCEDURE — 82962 GLUCOSE BLOOD TEST: CPT

## 2019-01-12 PROCEDURE — 6370000000 HC RX 637 (ALT 250 FOR IP): Performed by: INTERNAL MEDICINE

## 2019-01-12 PROCEDURE — 71045 X-RAY EXAM CHEST 1 VIEW: CPT

## 2019-01-12 PROCEDURE — 93010 ELECTROCARDIOGRAM REPORT: CPT | Performed by: INTERNAL MEDICINE

## 2019-01-12 PROCEDURE — 36415 COLL VENOUS BLD VENIPUNCTURE: CPT

## 2019-01-12 PROCEDURE — 94640 AIRWAY INHALATION TREATMENT: CPT

## 2019-01-12 PROCEDURE — 6360000002 HC RX W HCPCS: Performed by: INTERNAL MEDICINE

## 2019-01-12 PROCEDURE — 85027 COMPLETE CBC AUTOMATED: CPT

## 2019-01-12 PROCEDURE — 2580000003 HC RX 258: Performed by: INTERNAL MEDICINE

## 2019-01-12 PROCEDURE — 2700000000 HC OXYGEN THERAPY PER DAY

## 2019-01-12 PROCEDURE — 80048 BASIC METABOLIC PNL TOTAL CA: CPT

## 2019-01-12 RX ADMIN — MICONAZOLE NITRATE: 20.6 POWDER TOPICAL at 21:05

## 2019-01-12 RX ADMIN — Medication: at 21:04

## 2019-01-12 RX ADMIN — Medication 10 ML: at 05:54

## 2019-01-12 RX ADMIN — Medication 10 ML: at 05:23

## 2019-01-12 RX ADMIN — INSULIN LISPRO 1 UNITS: 100 INJECTION, SOLUTION INTRAVENOUS; SUBCUTANEOUS at 21:06

## 2019-01-12 RX ADMIN — BUSPIRONE HYDROCHLORIDE 7.5 MG: 5 TABLET ORAL at 09:29

## 2019-01-12 RX ADMIN — ATORVASTATIN CALCIUM 20 MG: 20 TABLET, FILM COATED ORAL at 09:29

## 2019-01-12 RX ADMIN — DEXTROSE MONOHYDRATE 2 G: 50 INJECTION, SOLUTION INTRAVENOUS at 15:30

## 2019-01-12 RX ADMIN — MIRTAZAPINE 7.5 MG: 15 TABLET, FILM COATED ORAL at 09:29

## 2019-01-12 RX ADMIN — IPRATROPIUM BROMIDE AND ALBUTEROL SULFATE 1 AMPULE: .5; 3 SOLUTION RESPIRATORY (INHALATION) at 07:53

## 2019-01-12 RX ADMIN — IPRATROPIUM BROMIDE AND ALBUTEROL SULFATE 1 AMPULE: .5; 3 SOLUTION RESPIRATORY (INHALATION) at 11:26

## 2019-01-12 RX ADMIN — Medication 1.5 G: at 02:51

## 2019-01-12 RX ADMIN — IPRATROPIUM BROMIDE AND ALBUTEROL SULFATE 1 AMPULE: .5; 3 SOLUTION RESPIRATORY (INHALATION) at 22:01

## 2019-01-12 RX ADMIN — Medication 10 ML: at 02:52

## 2019-01-12 RX ADMIN — Medication 10 ML: at 09:30

## 2019-01-12 RX ADMIN — Medication 1.5 G: at 18:03

## 2019-01-12 RX ADMIN — DEXTROSE MONOHYDRATE 2 G: 50 INJECTION, SOLUTION INTRAVENOUS at 05:23

## 2019-01-12 RX ADMIN — Medication 5000 UNITS: at 09:30

## 2019-01-12 RX ADMIN — Medication 10 ML: at 03:59

## 2019-01-12 RX ADMIN — Medication 10 ML: at 21:05

## 2019-01-12 RX ADMIN — LATANOPROST 1 DROP: 50 SOLUTION OPHTHALMIC at 21:05

## 2019-01-12 RX ADMIN — ENOXAPARIN SODIUM 40 MG: 40 INJECTION SUBCUTANEOUS at 09:30

## 2019-01-12 RX ADMIN — IPRATROPIUM BROMIDE AND ALBUTEROL SULFATE 1 AMPULE: .5; 3 SOLUTION RESPIRATORY (INHALATION) at 17:43

## 2019-01-12 RX ADMIN — FOLIC ACID 1 MG: 1 TABLET ORAL at 09:30

## 2019-01-12 RX ADMIN — TAMSULOSIN HYDROCHLORIDE 0.4 MG: 0.4 CAPSULE ORAL at 18:03

## 2019-01-12 RX ADMIN — DEXTROSE MONOHYDRATE 2 G: 50 INJECTION, SOLUTION INTRAVENOUS at 21:05

## 2019-01-12 ASSESSMENT — PAIN SCALES - GENERAL
PAINLEVEL_OUTOF10: 0

## 2019-01-13 VITALS
OXYGEN SATURATION: 96 % | DIASTOLIC BLOOD PRESSURE: 50 MMHG | RESPIRATION RATE: 16 BRPM | HEART RATE: 96 BPM | WEIGHT: 231 LBS | SYSTOLIC BLOOD PRESSURE: 104 MMHG | TEMPERATURE: 98 F | BODY MASS INDEX: 31.29 KG/M2 | HEIGHT: 72 IN

## 2019-01-13 LAB
BLOOD CULTURE, ROUTINE: NORMAL
CULTURE, BLOOD 2: NORMAL
METER GLUCOSE: 135 MG/DL (ref 74–99)
METER GLUCOSE: 144 MG/DL (ref 74–99)

## 2019-01-13 PROCEDURE — 6360000002 HC RX W HCPCS: Performed by: INTERNAL MEDICINE

## 2019-01-13 PROCEDURE — 2580000003 HC RX 258: Performed by: INTERNAL MEDICINE

## 2019-01-13 PROCEDURE — 82962 GLUCOSE BLOOD TEST: CPT

## 2019-01-13 PROCEDURE — 6370000000 HC RX 637 (ALT 250 FOR IP): Performed by: INTERNAL MEDICINE

## 2019-01-13 PROCEDURE — 94640 AIRWAY INHALATION TREATMENT: CPT

## 2019-01-13 PROCEDURE — 2700000000 HC OXYGEN THERAPY PER DAY

## 2019-01-13 PROCEDURE — 2500000003 HC RX 250 WO HCPCS: Performed by: INTERNAL MEDICINE

## 2019-01-13 RX ORDER — METHYLPREDNISOLONE SODIUM SUCCINATE 125 MG/2ML
80 INJECTION, POWDER, LYOPHILIZED, FOR SOLUTION INTRAMUSCULAR; INTRAVENOUS ONCE
Status: COMPLETED | OUTPATIENT
Start: 2019-01-13 | End: 2019-01-13

## 2019-01-13 RX ORDER — SULFAMETHOXAZOLE AND TRIMETHOPRIM 800; 160 MG/1; MG/1
1 TABLET ORAL EVERY 12 HOURS SCHEDULED
Status: DISCONTINUED | OUTPATIENT
Start: 2019-01-13 | End: 2019-01-13 | Stop reason: HOSPADM

## 2019-01-13 RX ORDER — CEPHALEXIN 500 MG/1
500 CAPSULE ORAL EVERY 8 HOURS SCHEDULED
Status: DISCONTINUED | OUTPATIENT
Start: 2019-01-13 | End: 2019-01-13 | Stop reason: HOSPADM

## 2019-01-13 RX ORDER — SULFAMETHOXAZOLE AND TRIMETHOPRIM 800; 160 MG/1; MG/1
1 TABLET ORAL 2 TIMES DAILY
Qty: 10 TABLET | Refills: 0 | Status: SHIPPED | OUTPATIENT
Start: 2019-01-13 | End: 2019-01-18

## 2019-01-13 RX ORDER — CEPHALEXIN 500 MG/1
500 CAPSULE ORAL 3 TIMES DAILY
Qty: 15 CAPSULE | Refills: 0 | Status: SHIPPED | OUTPATIENT
Start: 2019-01-13 | End: 2019-01-18

## 2019-01-13 RX ADMIN — CEPHALEXIN 500 MG: 500 CAPSULE ORAL at 14:07

## 2019-01-13 RX ADMIN — FOLIC ACID 1 MG: 1 TABLET ORAL at 08:32

## 2019-01-13 RX ADMIN — ENOXAPARIN SODIUM 40 MG: 40 INJECTION SUBCUTANEOUS at 08:31

## 2019-01-13 RX ADMIN — Medication 10 ML: at 08:32

## 2019-01-13 RX ADMIN — METHYLPREDNISOLONE SODIUM SUCCINATE 80 MG: 125 INJECTION, POWDER, FOR SOLUTION INTRAMUSCULAR; INTRAVENOUS at 14:06

## 2019-01-13 RX ADMIN — IPRATROPIUM BROMIDE AND ALBUTEROL SULFATE 1 AMPULE: .5; 3 SOLUTION RESPIRATORY (INHALATION) at 11:09

## 2019-01-13 RX ADMIN — Medication 5000 UNITS: at 08:32

## 2019-01-13 RX ADMIN — ATORVASTATIN CALCIUM 20 MG: 20 TABLET, FILM COATED ORAL at 08:32

## 2019-01-13 RX ADMIN — MICONAZOLE NITRATE: 20.6 POWDER TOPICAL at 08:32

## 2019-01-13 RX ADMIN — Medication 1.5 G: at 06:35

## 2019-01-13 RX ADMIN — DEXTROSE MONOHYDRATE 2 G: 50 INJECTION, SOLUTION INTRAVENOUS at 06:12

## 2019-01-13 RX ADMIN — MIRTAZAPINE 7.5 MG: 15 TABLET, FILM COATED ORAL at 08:32

## 2019-01-13 RX ADMIN — INSULIN LISPRO 1 UNITS: 100 INJECTION, SOLUTION INTRAVENOUS; SUBCUTANEOUS at 06:43

## 2019-01-13 RX ADMIN — Medication: at 08:32

## 2019-01-13 RX ADMIN — IPRATROPIUM BROMIDE AND ALBUTEROL SULFATE 1 AMPULE: .5; 3 SOLUTION RESPIRATORY (INHALATION) at 07:36

## 2019-01-13 RX ADMIN — BUSPIRONE HYDROCHLORIDE 7.5 MG: 5 TABLET ORAL at 08:33

## 2019-01-13 RX ADMIN — Medication 10 ML: at 06:12

## 2019-01-13 ASSESSMENT — PAIN SCALES - GENERAL
PAINLEVEL_OUTOF10: 0

## 2019-01-14 ENCOUNTER — TELEPHONE (OUTPATIENT)
Dept: CARDIOLOGY CLINIC | Age: 79
End: 2019-01-14

## 2019-01-14 LAB
ORGANISM: ABNORMAL
WOUND/ABSCESS: ABNORMAL
WOUND/ABSCESS: ABNORMAL

## 2019-03-05 ENCOUNTER — HOSPITAL ENCOUNTER (EMERGENCY)
Age: 79
Discharge: HOME | End: 2019-03-05
Attending: EMERGENCY MEDICINE
Payer: COMMERCIAL

## 2019-03-05 VITALS
HEIGHT: 72 IN | RESPIRATION RATE: 16 BRPM | HEART RATE: 80 BPM | WEIGHT: 231 LBS | DIASTOLIC BLOOD PRESSURE: 67 MMHG | BODY MASS INDEX: 31.29 KG/M2 | TEMPERATURE: 98 F | SYSTOLIC BLOOD PRESSURE: 109 MMHG | OXYGEN SATURATION: 97 %

## 2019-03-05 DIAGNOSIS — S81.812A LACERATION OF LEFT LOWER EXTREMITY, INITIAL ENCOUNTER: Primary | ICD-10-CM

## 2019-03-05 PROCEDURE — 6360000002 HC RX W HCPCS: Performed by: EMERGENCY MEDICINE

## 2019-03-05 PROCEDURE — 12004 RPR S/N/AX/GEN/TRK7.6-12.5CM: CPT

## 2019-03-05 PROCEDURE — 90471 IMMUNIZATION ADMIN: CPT | Performed by: EMERGENCY MEDICINE

## 2019-03-05 PROCEDURE — 99283 EMERGENCY DEPT VISIT LOW MDM: CPT

## 2019-03-05 PROCEDURE — 2500000003 HC RX 250 WO HCPCS: Performed by: EMERGENCY MEDICINE

## 2019-03-05 PROCEDURE — 90715 TDAP VACCINE 7 YRS/> IM: CPT | Performed by: EMERGENCY MEDICINE

## 2019-03-05 RX ADMIN — LIDOCAINE HYDROCHLORIDE 10 ML: 10; .005 INJECTION, SOLUTION EPIDURAL; INFILTRATION; INTRACAUDAL; PERINEURAL at 06:18

## 2019-03-05 RX ADMIN — TETANUS TOXOID, REDUCED DIPHTHERIA TOXOID AND ACELLULAR PERTUSSIS VACCINE, ADSORBED 0.5 ML: 5; 2.5; 8; 8; 2.5 SUSPENSION INTRAMUSCULAR at 07:15

## 2019-03-05 ASSESSMENT — ENCOUNTER SYMPTOMS
BACK PAIN: 0
CHEST TIGHTNESS: 0
NAUSEA: 0
SINUS PAIN: 0
VOMITING: 0
SORE THROAT: 0
DIARRHEA: 0
COUGH: 0
SHORTNESS OF BREATH: 0
ABDOMINAL PAIN: 0

## 2019-03-05 ASSESSMENT — PAIN SCALES - GENERAL: PAINLEVEL_OUTOF10: 0

## 2019-04-09 ENCOUNTER — APPOINTMENT (OUTPATIENT)
Dept: CT IMAGING | Age: 79
DRG: 189 | End: 2019-04-09
Payer: COMMERCIAL

## 2019-04-09 ENCOUNTER — APPOINTMENT (OUTPATIENT)
Dept: GENERAL RADIOLOGY | Age: 79
DRG: 189 | End: 2019-04-09
Payer: COMMERCIAL

## 2019-04-09 ENCOUNTER — HOSPITAL ENCOUNTER (INPATIENT)
Age: 79
LOS: 6 days | Discharge: NURSING, REHAB OR LONG-TERM CARE | DRG: 189 | End: 2019-04-15
Attending: EMERGENCY MEDICINE | Admitting: INTERNAL MEDICINE
Payer: COMMERCIAL

## 2019-04-09 DIAGNOSIS — A41.9 SEPSIS, DUE TO UNSPECIFIED ORGANISM: Primary | ICD-10-CM

## 2019-04-09 DIAGNOSIS — L03.90 CELLULITIS, UNSPECIFIED CELLULITIS SITE: ICD-10-CM

## 2019-04-09 DIAGNOSIS — N30.00 ACUTE CYSTITIS WITHOUT HEMATURIA: ICD-10-CM

## 2019-04-09 LAB
ALBUMIN SERPL-MCNC: 3.6 G/DL (ref 3.5–5.2)
ALP BLD-CCNC: 71 U/L (ref 40–129)
ALT SERPL-CCNC: 8 U/L (ref 0–40)
ANION GAP SERPL CALCULATED.3IONS-SCNC: 15 MMOL/L (ref 7–16)
ANTISTREPTOLYSIN-O: 40 IU/ML (ref 0–200)
AST SERPL-CCNC: 18 U/L (ref 0–39)
BACTERIA: ABNORMAL /HPF
BASOPHILS ABSOLUTE: 0.03 E9/L (ref 0–0.2)
BASOPHILS RELATIVE PERCENT: 0.4 % (ref 0–2)
BILIRUB SERPL-MCNC: 0.6 MG/DL (ref 0–1.2)
BILIRUBIN URINE: NEGATIVE
BLOOD, URINE: ABNORMAL
BUN BLDV-MCNC: 23 MG/DL (ref 8–23)
C-REACTIVE PROTEIN: 13.3 MG/DL (ref 0–0.4)
CALCIUM SERPL-MCNC: 8.5 MG/DL (ref 8.6–10.2)
CASTS: ABNORMAL /LPF
CHLORIDE BLD-SCNC: 97 MMOL/L (ref 98–107)
CLARITY: CLEAR
CO2: 24 MMOL/L (ref 22–29)
COLOR: YELLOW
CORTISOL TOTAL: 10.51 MCG/DL (ref 2.68–18.4)
CREAT SERPL-MCNC: 1.1 MG/DL (ref 0.7–1.2)
EOSINOPHILS ABSOLUTE: 0.01 E9/L (ref 0.05–0.5)
EOSINOPHILS RELATIVE PERCENT: 0.1 % (ref 0–6)
GFR AFRICAN AMERICAN: >60
GFR NON-AFRICAN AMERICAN: >60 ML/MIN/1.73
GLUCOSE BLD-MCNC: 158 MG/DL (ref 74–99)
GLUCOSE URINE: NEGATIVE MG/DL
HCT VFR BLD CALC: 34.8 % (ref 37–54)
HEMOGLOBIN: 10.7 G/DL (ref 12.5–16.5)
IMMATURE GRANULOCYTES #: 0.05 E9/L
IMMATURE GRANULOCYTES %: 0.7 % (ref 0–5)
INFLUENZA A BY PCR: NOT DETECTED
INFLUENZA B BY PCR: NOT DETECTED
KETONES, URINE: ABNORMAL MG/DL
LACTIC ACID: 1.5 MMOL/L (ref 0.5–2.2)
LEUKOCYTE ESTERASE, URINE: NEGATIVE
LYMPHOCYTES ABSOLUTE: 1.22 E9/L (ref 1.5–4)
LYMPHOCYTES RELATIVE PERCENT: 16.3 % (ref 20–42)
MCH RBC QN AUTO: 27 PG (ref 26–35)
MCHC RBC AUTO-ENTMCNC: 30.7 % (ref 32–34.5)
MCV RBC AUTO: 87.7 FL (ref 80–99.9)
MONOCYTES ABSOLUTE: 0.68 E9/L (ref 0.1–0.95)
MONOCYTES RELATIVE PERCENT: 9.1 % (ref 2–12)
MUCUS: PRESENT
NEUTROPHILS ABSOLUTE: 5.5 E9/L (ref 1.8–7.3)
NEUTROPHILS RELATIVE PERCENT: 73.4 % (ref 43–80)
NITRITE, URINE: POSITIVE
PDW BLD-RTO: 19.3 FL (ref 11.5–15)
PH UA: 5.5 (ref 5–9)
PLATELET # BLD: 268 E9/L (ref 130–450)
PMV BLD AUTO: 9.1 FL (ref 7–12)
POTASSIUM REFLEX MAGNESIUM: 4.1 MMOL/L (ref 3.5–5)
PRO-BNP: 1081 PG/ML (ref 0–450)
PROCALCITONIN: 0.24 NG/ML (ref 0–0.08)
PROTEIN UA: 30 MG/DL
RBC # BLD: 3.97 E12/L (ref 3.8–5.8)
RBC UA: ABNORMAL /HPF (ref 0–2)
SEDIMENTATION RATE, ERYTHROCYTE: 55 MM/HR (ref 0–15)
SODIUM BLD-SCNC: 136 MMOL/L (ref 132–146)
SPECIFIC GRAVITY UA: 1.02 (ref 1–1.03)
TOTAL PROTEIN: 6.5 G/DL (ref 6.4–8.3)
TROPONIN: 0.01 NG/ML (ref 0–0.03)
UROBILINOGEN, URINE: 0.2 E.U./DL
WBC # BLD: 7.5 E9/L (ref 4.5–11.5)
WBC UA: ABNORMAL /HPF (ref 0–5)

## 2019-04-09 PROCEDURE — 87077 CULTURE AEROBIC IDENTIFY: CPT

## 2019-04-09 PROCEDURE — 85025 COMPLETE CBC W/AUTO DIFF WBC: CPT

## 2019-04-09 PROCEDURE — 87040 BLOOD CULTURE FOR BACTERIA: CPT

## 2019-04-09 PROCEDURE — 87798 DETECT AGENT NOS DNA AMP: CPT

## 2019-04-09 PROCEDURE — 6370000000 HC RX 637 (ALT 250 FOR IP): Performed by: EMERGENCY MEDICINE

## 2019-04-09 PROCEDURE — 81001 URINALYSIS AUTO W/SCOPE: CPT

## 2019-04-09 PROCEDURE — 87070 CULTURE OTHR SPECIMN AEROBIC: CPT

## 2019-04-09 PROCEDURE — 87633 RESP VIRUS 12-25 TARGETS: CPT

## 2019-04-09 PROCEDURE — 99285 EMERGENCY DEPT VISIT HI MDM: CPT

## 2019-04-09 PROCEDURE — 70450 CT HEAD/BRAIN W/O DYE: CPT

## 2019-04-09 PROCEDURE — 2580000003 HC RX 258: Performed by: EMERGENCY MEDICINE

## 2019-04-09 PROCEDURE — 80053 COMPREHEN METABOLIC PANEL: CPT

## 2019-04-09 PROCEDURE — 87502 INFLUENZA DNA AMP PROBE: CPT

## 2019-04-09 PROCEDURE — 36415 COLL VENOUS BLD VENIPUNCTURE: CPT

## 2019-04-09 PROCEDURE — 94640 AIRWAY INHALATION TREATMENT: CPT

## 2019-04-09 PROCEDURE — 93005 ELECTROCARDIOGRAM TRACING: CPT | Performed by: EMERGENCY MEDICINE

## 2019-04-09 PROCEDURE — 51702 INSERT TEMP BLADDER CATH: CPT

## 2019-04-09 PROCEDURE — 87081 CULTURE SCREEN ONLY: CPT

## 2019-04-09 PROCEDURE — 71045 X-RAY EXAM CHEST 1 VIEW: CPT

## 2019-04-09 PROCEDURE — 6360000002 HC RX W HCPCS: Performed by: EMERGENCY MEDICINE

## 2019-04-09 PROCEDURE — 86060 ANTISTREPTOLYSIN O TITER: CPT

## 2019-04-09 PROCEDURE — 85651 RBC SED RATE NONAUTOMATED: CPT

## 2019-04-09 PROCEDURE — 82533 TOTAL CORTISOL: CPT

## 2019-04-09 PROCEDURE — 87486 CHLMYD PNEUM DNA AMP PROBE: CPT

## 2019-04-09 PROCEDURE — 84145 PROCALCITONIN (PCT): CPT

## 2019-04-09 PROCEDURE — 6370000000 HC RX 637 (ALT 250 FOR IP): Performed by: INTERNAL MEDICINE

## 2019-04-09 PROCEDURE — 2000000000 HC ICU R&B

## 2019-04-09 PROCEDURE — 84484 ASSAY OF TROPONIN QUANT: CPT

## 2019-04-09 PROCEDURE — 86140 C-REACTIVE PROTEIN: CPT

## 2019-04-09 PROCEDURE — 87186 SC STD MICRODIL/AGAR DIL: CPT

## 2019-04-09 PROCEDURE — 83880 ASSAY OF NATRIURETIC PEPTIDE: CPT

## 2019-04-09 PROCEDURE — 83605 ASSAY OF LACTIC ACID: CPT

## 2019-04-09 PROCEDURE — 87581 M.PNEUMON DNA AMP PROBE: CPT

## 2019-04-09 PROCEDURE — 96374 THER/PROPH/DIAG INJ IV PUSH: CPT

## 2019-04-09 PROCEDURE — 6360000002 HC RX W HCPCS: Performed by: INTERNAL MEDICINE

## 2019-04-09 RX ORDER — BUDESONIDE 0.5 MG/2ML
0.5 INHALANT ORAL 2 TIMES DAILY
Status: DISCONTINUED | OUTPATIENT
Start: 2019-04-09 | End: 2019-04-11

## 2019-04-09 RX ORDER — SODIUM CHLORIDE 9 MG/ML
30 INJECTION, SOLUTION INTRAVENOUS ONCE
Status: COMPLETED | OUTPATIENT
Start: 2019-04-09 | End: 2019-04-09

## 2019-04-09 RX ORDER — ADHESIVE BANDAGE 3/4"
1 BANDAGE TOPICAL DAILY PRN
Status: ON HOLD | COMMUNITY
End: 2020-12-23 | Stop reason: HOSPADM

## 2019-04-09 RX ORDER — FORMOTEROL FUMARATE 20 UG/2ML
20 SOLUTION RESPIRATORY (INHALATION) 2 TIMES DAILY
Status: DISCONTINUED | OUTPATIENT
Start: 2019-04-09 | End: 2019-04-15 | Stop reason: HOSPADM

## 2019-04-09 RX ORDER — IPRATROPIUM BROMIDE AND ALBUTEROL SULFATE 2.5; .5 MG/3ML; MG/3ML
1 SOLUTION RESPIRATORY (INHALATION)
Status: DISCONTINUED | OUTPATIENT
Start: 2019-04-09 | End: 2019-04-11

## 2019-04-09 RX ORDER — SODIUM CHLORIDE 9 MG/ML
INJECTION, SOLUTION INTRAVENOUS CONTINUOUS
Status: DISCONTINUED | OUTPATIENT
Start: 2019-04-09 | End: 2019-04-10

## 2019-04-09 RX ORDER — LOPERAMIDE HYDROCHLORIDE 2 MG/1
2 CAPSULE ORAL EVERY 6 HOURS PRN
Status: ON HOLD | COMMUNITY
End: 2020-12-23 | Stop reason: HOSPADM

## 2019-04-09 RX ORDER — IPRATROPIUM BROMIDE AND ALBUTEROL SULFATE 2.5; .5 MG/3ML; MG/3ML
1 SOLUTION RESPIRATORY (INHALATION) ONCE
Status: COMPLETED | OUTPATIENT
Start: 2019-04-09 | End: 2019-04-09

## 2019-04-09 RX ADMIN — VANCOMYCIN HYDROCHLORIDE 2500 MG: 5 INJECTION, POWDER, LYOPHILIZED, FOR SOLUTION INTRAVENOUS at 16:05

## 2019-04-09 RX ADMIN — IPRATROPIUM BROMIDE AND ALBUTEROL SULFATE 1 AMPULE: .5; 3 SOLUTION RESPIRATORY (INHALATION) at 16:22

## 2019-04-09 RX ADMIN — BUDESONIDE 500 MCG: 0.5 SUSPENSION RESPIRATORY (INHALATION) at 19:21

## 2019-04-09 RX ADMIN — FORMOTEROL FUMARATE DIHYDRATE 20 MCG: 20 SOLUTION RESPIRATORY (INHALATION) at 19:21

## 2019-04-09 RX ADMIN — SODIUM CHLORIDE 3129 ML: 9 INJECTION, SOLUTION INTRAVENOUS at 10:05

## 2019-04-09 RX ADMIN — IPRATROPIUM BROMIDE AND ALBUTEROL SULFATE 1 AMPULE: .5; 3 SOLUTION RESPIRATORY (INHALATION) at 10:08

## 2019-04-09 RX ADMIN — SODIUM CHLORIDE: 9 INJECTION, SOLUTION INTRAVENOUS at 15:06

## 2019-04-09 RX ADMIN — CEFEPIME 2 G: 2 INJECTION, POWDER, FOR SOLUTION INTRAVENOUS at 12:05

## 2019-04-09 RX ADMIN — IPRATROPIUM BROMIDE AND ALBUTEROL SULFATE 1 AMPULE: .5; 3 SOLUTION RESPIRATORY (INHALATION) at 19:21

## 2019-04-09 ASSESSMENT — PAIN SCALES - PAIN ASSESSMENT IN ADVANCED DEMENTIA (PAINAD)
FACIALEXPRESSION: 0
CONSOLABILITY: 0
NEGVOCALIZATION: 0
BREATHING: 0
BODYLANGUAGE: 0
TOTALSCORE: 0

## 2019-04-09 ASSESSMENT — PAIN SCALES - GENERAL
PAINLEVEL_OUTOF10: 0
PAINLEVEL_OUTOF10: 0

## 2019-04-09 NOTE — ED PROVIDER NOTES
This is an 57-year-old male with a past medical history of atrial fibrillation, diabetes mellitus and rheumatoid arthritis who presents to the ED for evaluation of altered mental status. According to EMS, the patient was seen at wound care today for his chronic wound on his lower extremities and the staff noticed the patient be febrile, tachycardic and confused. They also told us EMS staff that the patient's wound looked worse than they typically are. The patient was given Tylenol in route as his temperature was 101.3 the patient was also hypoxic and was 81% on room air was placed on nasal cannula given DuoNeb's with EMS staff. A complete review of systems and history is limited secondary to patient's clinical condition. The history is provided by the patient. No  was used. Review of Systems   Unable to perform ROS: Mental status change       Physical Exam   Constitutional:   Appears stated age, somulent but awakes to voice   HENT:   Head: Normocephalic and atraumatic. Dry oral mucosa   Eyes: Pupils are equal, round, and reactive to light. EOM are normal.   Neck: Normal range of motion. Neck supple. No JVD present. Pulmonary/Chest:   Coarse breath sounds   Abdominal: Soft. He exhibits no mass. There is no tenderness. There is no guarding. Musculoskeletal: He exhibits no edema. Neurological:   Alert to self only   Skin: Skin is warm and dry. Capillary refill takes less than 2 seconds. Two open wounds to left lower extremity with minimal drainage with surrounding erythema and warmth to touch. Right lower extremity has open wound with minimal drainage and surroudning erythema. Multiple open wounds to right abdomen with ointment covering, no active bleeding   Nursing note and vitals reviewed.       Procedures    MDM  Number of Diagnoses or Management Options  Acute cystitis without hematuria:   Cellulitis, unspecified cellulitis site:   Sepsis, due to unspecified organism [facility]): Diagnosis management comments: This is an 78-year-old male with a past medical history of atrial fibrillation and diabetes mellitus who presented to the ED for evaluation of altered mental status as he was found at his wound care clinic to have increasing redness and drainage from his legs, was febrile and tachycardic as well as hyportensive. The patient was given Tylenol by the staff at the facility and here in the ED the patient had coarse breath sounds bilaterally and was tachycardic as well as tachypneic. The patient was also found to have a reported oxygen saturation of 81% by EMS staff placed on nasal cannula here in the ED. The patient had a broad workup and was given 30 mL bolus of normal saline which did improve his blood pressure. Patient was found to have findings suggestive of a urinary tract infection and clinically the patient likely had cellulitis for which she was treated with vancomycin and cefepime. The patient had MAPs that were near 65 and given his poor vitals intially and confusion, I sought it best to admit the patient to ICU. Dr. Sirisha Kenney was agreed to admission and Dr. Rosita Tovar was agreeable to this plan. I discussed the case with the patient's son at bed side who was too agreeable to plan. ED Course as of Apr 09 1502   Tue Apr 09, 2019   1019 ATTENDING PROVIDER ATTESTATION:     I have personally performed and/or participated in the history, exam, medical decision making, and procedures and agree with all pertinent clinical information unless otherwise noted. I have also reviewed and agree with the past medical, family and social history unless otherwise noted. I have discussed this patient in detail with the resident and provided the instruction and education regarding the evidence-based evaluation and treatment of AMS  History: This patient was sent from local nursing facility for altered mental status and suspicion of sepsis.     My findings: Deangelo Dhillon is a 78 y.o. male whom is in no distress. Physical exam reveals patient will answer to his name but, provides or other information. He appears sickly. Dry oral mucosa. He is tachycardic and tachypneic. Multiple open wounds to his lower extremities and right posterior lower thorax. Course and rhonchorous breath sounds bilaterally. My plan: Symptomatic and supportive care. Sepsis evaluation and admission. Electronically signed by Aneta Nunez DO on 4/9/19 at 10:20 AM          [TG]      ED Course User Index  [TG] Aneta Nunez DO       ED Course as of Apr 09 1502   Tue Apr 09, 2019   1019 ATTENDING PROVIDER ATTESTATION:     I have personally performed and/or participated in the history, exam, medical decision making, and procedures and agree with all pertinent clinical information unless otherwise noted. I have also reviewed and agree with the past medical, family and social history unless otherwise noted. I have discussed this patient in detail with the resident and provided the instruction and education regarding the evidence-based evaluation and treatment of AMS  History: This patient was sent from local nursing facility for altered mental status and suspicion of sepsis. My findings: Abe Lawson is a 78 y.o. male whom is in no distress. Physical exam reveals patient will answer to his name but, provides or other information. He appears sickly. Dry oral mucosa. He is tachycardic and tachypneic. Multiple open wounds to his lower extremities and right posterior lower thorax. Course and rhonchorous breath sounds bilaterally. My plan: Symptomatic and supportive care. Sepsis evaluation and admission.     Electronically signed by Aneta Nunez DO on 4/9/19 at 10:20 AM          [TG]      ED Course User Index  [TG] Aneta Nunez DO       --------------------------------------------- PAST HISTORY ---------------------------------------------  Past Medical History:  has a past medical history of Anxiety, Benign localized hyperplasia of prostate with urinary obstruction, Bullous pemphigoid, Diabetes mellitus ([facility] [address] Enlarged prostate, Hyperlipidemia, Hypertension, Muscle weakness, Paroxysmal atrial fibrillation ([facility] [address] and Rheumatoid disease ([facility] [address] Past Surgical History:  has a past surgical history that includes fracture surgery; doppler echocardiography (N/A); Upper gastrointestinal endoscopy (N/A, 7/29/2018); and pr egd percutaneous placement gastrostomy tube (N/A, 7/31/2018). Social History:  reports that he quit smoking about 13 years ago. His smoking use included cigarettes. He started smoking about 64 years ago. He has a 100.00 pack-year smoking history. He has never used smokeless tobacco. He reports that he does not drink alcohol or use drugs. Family History: family history includes Cancer in his father and sister; Diabetes in his mother; Kidney Disease in his mother. The patients home medications have been reviewed. Allergies: Imuran [azathioprine];  Levaquin [levofloxacin]; and Doxycycline    -------------------------------------------------- RESULTS -------------------------------------------------    Lab  Results for orders placed or performed during the hospital encounter of 04/09/19   Rapid influenza A/B antigens   Result Value Ref Range    Influenza A by PCR Not Detected Not Detected    Influenza B by PCR Not Detected Not Detected   Comprehensive Metabolic Panel w/ Reflex to MG   Result Value Ref Range    Sodium 136 132 - 146 mmol/L    Potassium reflex Magnesium 4.1 3.5 - 5.0 mmol/L    Chloride 97 (L) 98 - 107 mmol/L    CO2 24 22 - 29 mmol/L    Anion Gap 15 7 - 16 mmol/L    Glucose 158 (H) 74 - 99 mg/dL    BUN 23 8 - 23 mg/dL    CREATININE 1.1 0.7 - 1.2 mg/dL    GFR Non-African American >60 >=60 mL/min/1.73    GFR African American >60     Calcium 8.5 (L) 8.6 - 10.2 mg/dL    Total Protein 6.5 6.4 - 8.3 g/dL    Alb 3.6 3.5 - 5.2 g/dL    Total Bilirubin 0.6 0.0 - 1.2 mg/dL    Alkaline Phosphatase 71 40 - 129 U/L    ALT 8 0 - 40 U/L    AST 18 0 - 39 U/L   CBC Auto Differential   Result Value Ref Range    WBC 7.5 4.5 - 11.5 E9/L    RBC 3.97 3.80 - 5.80 E12/L    Hemoglobin 10.7 (L) 12.5 - 16.5 g/dL    Hematocrit 34.8 (L) 37.0 - 54.0 %    MCV 87.7 80.0 - 99.9 fL    MCH 27.0 26.0 - 35.0 pg    MCHC 30.7 (L) 32.0 - 34.5 %    RDW 19.3 (H) 11.5 - 15.0 fL    Platelets 209 236 - 924 E9/L    MPV 9.1 7.0 - 12.0 fL    Neutrophils % 73.4 43.0 - 80.0 %    Immature Granulocytes % 0.7 0.0 - 5.0 %    Lymphocytes % 16.3 (L) 20.0 - 42.0 %    Monocytes % 9.1 2.0 - 12.0 %    Eosinophils % 0.1 0.0 - 6.0 %    Basophils % 0.4 0.0 - 2.0 %    Neutrophils # 5.50 1.80 - 7.30 E9/L    Immature Granulocytes # 0.05 E9/L    Lymphocytes # 1.22 (L) 1.50 - 4.00 E9/L    Monocytes # 0.68 0.10 - 0.95 E9/L    Eosinophils # 0.01 (L) 0.05 - 0.50 E9/L    Basophils # 0.03 0.00 - 0.20 E9/L   Troponin   Result Value Ref Range    Troponin 0.01 0.00 - 0.03 ng/mL   Brain Natriuretic Peptide   Result Value Ref Range    Pro-BNP 1,081 (H) 0 - 450 pg/mL   Urinalysis, reflex to microscopic   Result Value Ref Range    Color, UA Yellow Straw/Yellow    Clarity, UA Clear Clear    Glucose, Ur Negative Negative mg/dL    Bilirubin Urine Negative Negative    Ketones, Urine TRACE (A) Negative mg/dL    Specific Gravity, UA 1.025 1.005 - 1.030    Blood, Urine SMALL (A) Negative    pH, UA 5.5 5.0 - 9.0    Protein, UA 30 (A) Negative mg/dL    Urobilinogen, Urine 0.2 <2.0 E.U./dL    Nitrite, Urine POSITIVE (A) Negative    Leukocyte Esterase, Urine Negative Negative   Lactic Acid, Plasma   Result Value Ref Range    Lactic Acid 1.5 0.5 - 2.2 mmol/L   Microscopic Urinalysis   Result Value Ref Range    Casts FEW /LPF    Mucus, UA Present     WBC, UA 10-20 (A) 0 - 5 /HPF    RBC, UA 2-5 0 - 2 /HPF    Bacteria, UA MANY (A) /HPF   EKG 12 Lead   Result Value Ref Range    Ventricular Rate 98 BPM    Atrial Rate 98 BPM    P-R Interval 158 ms    QRS Duration 76 ms    Q-T Interval 382 ms QTc Calculation (Bazett) 487 ms    P Axis 16 degrees    R Axis -23 degrees    T Axis 36 degrees       Radiology  CT Head WO Contrast   Final Result   Generalized cerebral atrophy. Small vessel ischemic change   involving the white matter. No apparent acute abnormality or   intracranial hemorrhage or hematoma. Pansinusitis with scattered   air/fluid levels. XR CHEST PORTABLE   Final Result   Cardiomegaly. CHF with interstitial edema. No definite   pneumonic consolidation. EKG: This EKG is signed and interpreted by me. Rate: 98  Rhythm: Sinus  Interpretation: PVCs  Comparison: changes compared to previous EKG and no previous EKG available      ------------------------- NURSING NOTES AND VITALS REVIEWED ---------------------------  Date / Time Roomed:  4/9/2019  9:24 AM  ED Bed Assignment:  09/09    The nursing notes within the ED encounter and vital signs as below have been reviewed.    Patient Vitals for the past 24 hrs:   BP Temp Temp src Pulse Resp SpO2 Height Weight   04/09/19 1421 120/67 -- -- 81 20 94 % -- --   04/09/19 1344 (!) 99/55 -- -- 79 20 97 % -- --   04/09/19 1230 (!) 101/50 -- -- 85 -- 96 % -- --   04/09/19 1226 (!) 101/50 -- -- -- -- -- -- --   04/09/19 1225 (!) 100/54 -- -- 87 -- -- -- --   04/09/19 1215 (!) 98/54 -- -- 87 -- -- -- --   04/09/19 1207 (!) 98/54 -- -- 83 -- -- -- --   04/09/19 1140 (!) 107/56 -- -- 88 -- -- -- --   04/09/19 1023 (!) 101/48 -- -- 92 -- 95 % -- --   04/09/19 0935 (!) 94/53 -- -- 128 -- -- -- --   04/09/19 0934 (!) 94/53 -- -- 122 -- -- -- --   04/09/19 0932 (!) 94/53 98.9 °F (37.2 °C) Oral 104 24 97 % 6' (1.829 m) 230 lb (104.3 kg)       Oxygen Saturation Interpretation: Improved after treatment    Sepsis Re-assessment of Volume Status and Tissue Perfusion  Required for septic shock (persistent hypotension after fluids OR lactic acid  > or = 4.0)    4/9/19   3:46 PM          Vital Signs:   Vitals:    04/09/19 1230 04/09/19 1344 04/09/19 1421 04/09/19 1507   BP: (!) 101/50 (!) 99/55 120/67 (!) 100/57   Pulse: 85 79 81 84   Resp:  20 20 22   Temp:    98.7 °F (37.1 °C)   TempSrc:       SpO2: 96% 97% 94% 94%   Weight:       Height:         Card/Pulm:  Rhythm: normal rate. Heart Sounds: Normal S1, S2  unlabored breathing and expiratory rhonchi. Capillary Refill: normal.  Radial Pulse:  equal.  Skin:  Warm. OR    Any two of the following:  - Measure CVP  - Measure ScvO2  - Bedside cardiovascular ultrasound  - Dynamic assessment of fluid responsiveness with passive leg raise or fluid   challenge      ------------------------------------------ PROGRESS NOTES ------------------------------------------  Re-evaluation(s):  Time: 1043. Patients symptoms are improving  Repeat physical examination is improved      I have spoken with the patient and discussed todays results, in addition to providing specific details for the plan of care and counseling regarding the diagnosis and prognosis. Their questions are answered at this time and they are agreeable with the plan.      --------------------------------- ADDITIONAL PROVIDER NOTES ---------------------------------  Consultations:  Spoke with Dr. Tatum Dense  They will admit this patient. Spoke with Dr. Lobito Armstrong and stated he would accept patient into the ICU    This patient's ED course included: a personal history and physicial examination, re-evaluation prior to disposition, multiple bedside re-evaluations, IV medications, cardiac monitoring, continuous pulse oximetry and complex medical decision making and emergency management    This patient has been closely monitored during their ED course. Please note that the withdrawal or failure to initiate urgent interventions for this patient would likely result in a life threatening deterioration or permanent disability. Accordingly this patient received 30 minutes of critical care time, excluding separately billable procedures. Clinical Impression  1.  Sepsis, due to unspecified organism ([facility] [address]    2. Acute cystitis without hematuria    3. Cellulitis, unspecified cellulitis site          Disposition  Patient's disposition: Admit to CCU/ICU  Patient's condition is stable.           Ivan Brennan DO  Resident  04/09/19 [id number]

## 2019-04-09 NOTE — ED NOTES
Report called, Nurse will be down shortly to transport patient.       Brian Archibald RN  04/09/19 2835

## 2019-04-09 NOTE — ED NOTES
BLE wounds/cellulitis. 7-10 open blisters on right hip. Open wound left abd fold. Blisters around groin/scrotum.        Zully Huang RN  04/09/19 1970

## 2019-04-09 NOTE — PROGRESS NOTES
Pt. Admitted from ED to room 213. Skin assessment completed by multiple RNs and skin sheet filled out. Multiple open blisters/wounds to R hip/thigh, L shin, R 3rd toe, R foot, L abdominal fold. R buttock, R mid back. Pt. Has a history of chronic wounds and is seen at a wound care center. Wound care consult placed and completed in [facility].

## 2019-04-09 NOTE — CONSULTS
Inpatient consult to Infectious Diseases  Consult performed by: Leatha Avendaño MD  Consult ordered by: Andrae Bautista DO        [facility]  [facility]  Consultation Note     Admit Date: 4/9/2019  9:24 AM    Reason for Consult:   UTI. Cellulitis     Attending Physician:  Estela Ross MD    HISTORY OF PRESENT ILLNESS:             The history is obtained from extensive review of available past medical records. The patient is a 78 y.o. male who is known to the ID service. The patient is a resident of [facility]. He has chronic wounds for which he goes to the wound care center. He was at the wound care center on 4/9/19 when he was noted to be febrile, tachycardic and confused. The wounds did not look any different than they were. Her temperature 101.3°F and was hypoxemic. He was given duo nebs, placed on oxygen and sent to the ED. In the ED he was given IV fluids for hypotension. He was treated with Vancomycin and Cefepime. The patient was admitted to the ICU. Once the patient was in the ICU ID was asked to see him in consultation. The patient is not a good historian. He does not know why he is in the hospital. He denies being short of breath, he does admit having a cough, denies any pain in his legs and did not know he had a fever. Past Medical History:        Diagnosis Date    Anxiety     Benign localized hyperplasia of prostate with urinary obstruction     Bullous pemphigoid     Diabetes mellitus ([address]     Enlarged prostate     Hyperlipidemia     Hypertension     Muscle weakness     Paroxysmal atrial fibrillation ([facility] [address] 1/12/2019    Rheumatoid disease ([facility] [address]      January 2019. Admitted to [facility] with fever, shortness of breath and oxygen desaturation. Treated with Vancomycin and Zosyn. Seen by Dr. Hilario Morgan and treated for cellulitis of the legs and septic shock. Patient is a positive for RSV.  He continued treatment with methotrexate for bullous pemphigoid. Patient's leg wounds were colonized with MRSA. Cefepime was changed over to Cefazolin. Eventually antibiotics were stopped after a few days and the patient was discharged with Cephalexin and Bactrim. November 2018. Admitted to [facility] with fever, chills and changes in mentation. Seen by Dr. Darrell Osman and treated for UTI with sepsis with Cefepime. Urine cultures grew Providencia stuartii. This was changed to Ceftriaxone and then to Cefdinir. August 2018. Admitted to [facility] with nausea, vomiting, abdominal pain and diarrhea. He was noted to have an elevated white count. He was treated with Vancomycin. Seen by ID for C. difficile infection. Treated with oral Vancomycin. C. difficile was negative. Antibiotics were stopped altogether and the patient was discharged after a few days. It was felt that he had volume contraction.     July 2018. Admitted to [facility] with failure to thrive and azotemia. Seen by Dr. Ann Reyez. Wound cultures were done which turned positive with Staphylococcus capitis. Chronic cultures on legs for culture and grew Staphylococcus aureus. He received 1 dose of Vancomycin. Discharged on oral Doxycycline.     August 2016. Admitted to [facility] after a fall. Seen by Dr. Radha Keene for redness in the left leg. Treated for cellulitis and lymphangitis to the left lower extremity. Cultures grew group D Streptococcus and MSSA. Treated with Cefazolin, followed by Cephalexin and Bactrim. Thereafter, instructed to resume minocyclineprescribed by dermatology.     April 2016. Admitted to [facility] for multiple nonhealing ulcers in lower extremities. Patient had been seen by dermatology and had a biopsy that showed bullous pemphigoid. Seen by Dr. Ann Reyez. Blood cultures showed Streptococcus gordonii, which was felt to be a contaminant.  He was treated with Nystatin and Fluconazole for     Past Surgical History:        Procedure Laterality Date    DOPPLER ECHOCARDIOGRAPHY N/A     FRACTURE SURGERY      [place] EGD PERCUTANEOUS PLACEMENT GASTROSTOMY TUBE N/A 2018    EGD  PEG TUBE INSERTION,  (ENDO STAFF NEEDED) performed by Reyes Majano MD at [address] [facility] 2018    EGD DIAGNOSTIC ONLY performed by Jackie Lynch MD at [address]     Current Medications:   Scheduled Meds:   vancomycin  2,500 mg Intravenous Once    ipratropium-albuterol  1 ampule Inhalation Q4H WA    budesonide  0.5 mg Nebulization BID    formoterol  20 mcg Nebulization BID     Continuous Infusions:   sodium chloride 100 mL/hr at 19 1506     PRN Meds:    Allergies:  Imuran [azathioprine]; Levaquin [levofloxacin]; and Doxycycline    Social History:   Social History     Socioeconomic History    Marital status:       Spouse name: None    Number of children: None    Years of education: None    Highest education level: None   Occupational History    None   Social Needs    Financial resource strain: None    Food insecurity:     Worry: None     Inability: None    Transportation needs:     Medical: None     Non-medical: None   Tobacco Use    Smoking status: Former Smoker     Packs/day: 2.00     Years: 50.00     Pack years: 100.00     Types: Cigarettes     Start date: 3/30/1955     Last attempt to quit: 3/30/2006     Years since quittin.0    Smokeless tobacco: Never Used   Substance and Sexual Activity    Alcohol use: No     Alcohol/week: 0.0 oz    Drug use: No    Sexual activity: Never   Lifestyle    Physical activity:     Days per week: None     Minutes per session: None    Stress: None   Relationships    Social connections:     Talks on phone: None     Gets together: None     Attends Yazidism service: None     Active member of club or organization: None     Attends meetings of clubs or organizations: None     Relationship status: None    Intimate partner violence:     Fear of current or ex partner: None     Emotionally abused: None     Physically abused: None     Forced sexual activity: None   Other Topics Concern    None   Social History Narrative    None      Patient is a resident at [facility]    Family History:       Problem Relation Age of Onset    Diabetes Mother     Kidney Disease Mother     Cancer Father         esophagus    Cancer Sister    . Otherwise non-pertinent to the chief complaint. REVIEW OF SYSTEMS:    A 10 point review of systems could not be obtained reliably from the patient. He was obviously having some slight respiratory distress and denied having any wheezing. Nursing reports no emesis or diarrhea. His Ballestreos catheter was inserted. He does have open wounds that reportedly look the better compared to previous weeks. PHYSICAL EXAM:    Vitals:   /82   Pulse 87   Temp 98.3 °F (36.8 °C) (Axillary)   Resp (!) 37   Ht 6' (1.829 m)   Wt 241 lb 6.5 oz (109.5 kg)   SpO2 98%   BMI 32.74 kg/m²   Constitutional: The patient is awake, alert, and disoriented times time and place. No distress. Skin: Warm and dry. No rashes were noted. HEENT: Eyes show round, and reactive pupils. No jaundice. Moist mucous membranes, no ulcerations, no thrush. Neck: Supple to movements. No lymphadenopathy. Chest: Using accessory muscles to breathe. Symmetrical expansion. Prolonged expiratory phase. Heart audible wheezing. Auscultation reveals no wheezing, crackles, or rhonchi. Cardiovascular: S1 and S2 are rhythmic and regular. No murmurs appreciated. Abdomen: Positive bowel sounds to auscultation. Benign to palpation. No masses felt. No hepatosplenomegaly. Extremities: No clubbing, no cyanosis, no edema. Both legs show round, superficial, red ulcerations with minimal to no surrounding erythema.   Lines: peripheral   Ballesteros catheter with clear urine    CBC+dif:  Recent Labs     04/09/19  1007   WBC 7.5   HGB 10.7*   HCT 34.8*   MCV 87.7      NEUTROABS 5.50     Lab Results   Component Value Date    CRP 14.7 PROCAL in the last 72 hours. Assessment:  · Probable viral upper respiratory tract infection. The patient does not appear to have a bacterial pneumonia  · Hypotension probably secondary to dehydration. Patient seems to have responded to fluids. Possible adrenal insufficiency  · Fever associated to the above  · History of bullous pemphigoid. Known to be colonized with MRSA in the past. No evidence of wound infections or cellulitis  · Probable bacteriuria. Doubt UTI    Plan:    · Vancomycin can be discontinued. Patient has received 1 dose of Cefepime. Patient can be observed off antibiotics  · Check cultures, baseline ESR, CRP, ASO titer  · Continue wound care  · Cortisol level  · Will follow with you    Thank you for having us see this patient in consultation. I will be discussing this case with the treating physicians.     [address] PM  4/9/2019

## 2019-04-09 NOTE — CONSULTS
Critical Care Admit/Consult Note         Patient - Majo Barroso   MRN -  [id number]   Rolanda # - [de-identified]   - 1940      Date of Admission -  2019  9:24 AM  Date of evaluation -  2019/0213-A   Hospital Day - 0            ADMIT/CONSULT DETAILS     Reason for Admit/Consult   Sepsis     Consulting Service/Physician   Consulting - Ariana Redman MD  Primary Care Physician - No primary care provider on file. HPI   The patient is a 78 y.o. male with significant past medical history as below that presented to the ED via EMS for altered mental status. Before being sent to the ED, the patient was seen at wound care for his lower extremity wounds. The wounds looked worse, and the patient was febrile, tachycardic, and confused. Therefore, he was taken to the ED to get evaluated. In the EMS, patient was placed on nasal cannula and given duonebs. In the ED, IVF bolus was given, cefepme, vancomycin, and duonebs were given. Before the IVF, patient's was hypotensive, however responded appropriately with the IVF. Labs were done and showed elevated Pro-BNP, positive nitrites in urine, and many bacteria on microscopic UA. CXR was done and showed CHF with interstitial edema. Patient was admitted to ICU for further management.                   Past Medical History         Diagnosis Date    Anxiety     Benign localized hyperplasia of prostate with urinary obstruction     Bullous pemphigoid     Diabetes mellitus (HCC)     Enlarged prostate     Hyperlipidemia     Hypertension     Muscle weakness     Paroxysmal atrial fibrillation ([address] 2019    Rheumatoid disease ([address]         Past Surgical History           Procedure Laterality Date    DOPPLER ECHOCARDIOGRAPHY N/A     FRACTURE SURGERY      [place] EGD PERCUTANEOUS PLACEMENT GASTROSTOMY TUBE N/A 2018    EGD  PEG TUBE INSERTION,  (ENDO STAFF NEEDED) performed by Tiny Dancer, MD at [address] [place] [address] Rd needed. busPIRone (BUSPAR) 7.5 MG tablet, Take 7.5 mg by mouth every morning     Diet/Nutrition   No diet orders on file    Allergies   Imuran [azathioprine]; Levaquin [levofloxacin]; and Doxycycline    Social History   Tobacco   reports that he quit smoking about 13 years ago. His smoking use included cigarettes. He started smoking about 64 years ago. He has a 100.00 pack-year smoking history. He has never used smokeless tobacco.    Alcohol     reports that he does not drink alcohol. Occupational history :    Family History         Problem Relation Age of Onset    Diabetes Mother     Kidney Disease Mother     Cancer Father         esophagus    Cancer Sister        Sleep History   n/a    ROS     REVIEW OF SYSTEMS: Limited due to mental status  CONSTITUTIONAL:  negative for  fevers, chills and sweats  RESPIRATORY:  positive for  dry cough and wheezing  negative for  hemoptysis and pleuritic pain  CARDIOVASCULAR:  negative for  chest pain, dyspnea  GASTROINTESTINAL:  negative for nausea and vomiting  GENITOURINARY:  negative for frequency and hesitancy  MUSCULOSKELETAL:  negative for  myalgias and muscle weakness    Lines and Devices   Peripheral line    Mechanical Ventilation Data   VENT SETTINGS (Comprehensive)     Additional Respiratory  Assessments  Pulse: 84  Resp: 22  SpO2: 94 %    ABG  No results found for: PH, PCO2, PO2, HCO3, O2SAT  No results found for: IFIO2, MODE, SETTIDVOL, SETPEEP        Vitals    height is 6' (1.829 m) and weight is 230 lb (104.3 kg). His temperature is 98.7 °F (37.1 °C). His blood pressure is 100/57 (abnormal) and his pulse is 84. His respiration is 22 and oxygen saturation is 94%.        Temperature Range: Temp: 98.7 °F (37.1 °C) Temp  Av.8 °F (37.1 °C)  Min: 98.7 °F (37.1 °C)  Max: 98.9 °F (37.2 °C)  BP Range:  Systolic (60ZTX), XWH:409 , Min:94 , RRW:634     Diastolic (31HLV), PTR:36, Min:48, Max:67    Pulse Range: Pulse  Av.3  Min: 79  Max: 128  Respiration Range: Resp  Av.5  Min: 20  Max: 24  Current Pulse Ox[de-identified]  SpO2: 94 %  24HR Pulse Ox Range:  SpO2  Av.5 %  Min: 94 %  Max: 97 %  Oxygen Amount and Delivery: O2 Flow Rate (L/min): 2 L/min      I/O (24 Hours)    Patient Vitals for the past 8 hrs:   BP Temp Temp src Pulse Resp SpO2 Height Weight   19 1507 (!) 100/57 98.7 °F (37.1 °C) -- 84 22 94 % -- --   19 1421 120/67 -- -- 81 20 94 % -- --   19 1344 (!) 99/55 -- -- 79 20 97 % -- --   19 1230 (!) 101/50 -- -- 85 -- 96 % -- --   19 1226 (!) 101/50 -- -- -- -- -- -- --   19 1225 (!) 100/54 -- -- 87 -- -- -- --   19 1215 (!) 98/54 -- -- 87 -- -- -- --   19 1207 (!) 98/54 -- -- 83 -- -- -- --   19 1140 (!) 107/56 -- -- 88 -- -- -- --   19 1023 (!) 101/48 -- -- 92 -- 95 % -- --   19 0935 (!) 94/53 -- -- 128 -- -- -- --   19 0934 (!) 94/53 -- -- 122 -- -- -- --   19 0932 (!) 94/53 98.9 °F (37.2 °C) Oral 104 24 97 % 6' (1.829 m) 230 lb (104.3 kg)       Intake/Output Summary (Last 24 hours) at 2019 1551  Last data filed at 2019 1503  Gross per 24 hour   Intake 3129 ml   Output --   Net 3129 ml     No intake/output data recorded.    Date 19 0000 - 19 2359   Shift 4235-5577 2629-2087 9913-4993 24 Hour Total   INTAKE   I.V.  9170(76)  3129(30)   Shift Total(mL/kg)  2346(91)  3129(30)   OUTPUT   Shift Total(mL/kg)       Weight (kg)  104.3 104.3 104.3     Patient Vitals for the past 96 hrs (Last 3 readings):   Weight   19 0932 230 lb (104.3 kg)         Exam     PHYSICAL EXAM:  CONSTITUTIONAL:  Awake and cooperative  EYES:  Lids and lashes normal, pupils equal, round and reactive to light, extra ocular muscles intact, sclera clear, conjunctiva normal  ENT:  Normocephalic, without obvious abnormality, atraumatic, sinuses nontender on palpation, external ears without lesions, oral pharynx with moist mucus membranes, tonsils without erythema or exudates, gums normal and good

## 2019-04-10 ENCOUNTER — APPOINTMENT (OUTPATIENT)
Dept: GENERAL RADIOLOGY | Age: 79
DRG: 189 | End: 2019-04-10
Payer: COMMERCIAL

## 2019-04-10 LAB
ANION GAP SERPL CALCULATED.3IONS-SCNC: 10 MMOL/L (ref 7–16)
ANISOCYTOSIS: ABNORMAL
BASOPHILS ABSOLUTE: 0 E9/L (ref 0–0.2)
BASOPHILS RELATIVE PERCENT: 0 % (ref 0–2)
BUN BLDV-MCNC: 16 MG/DL (ref 8–23)
CALCIUM SERPL-MCNC: 8 MG/DL (ref 8.6–10.2)
CHLORIDE BLD-SCNC: 104 MMOL/L (ref 98–107)
CO2: 25 MMOL/L (ref 22–29)
CREAT SERPL-MCNC: 0.9 MG/DL (ref 0.7–1.2)
EOSINOPHILS ABSOLUTE: 0.15 E9/L (ref 0.05–0.5)
EOSINOPHILS RELATIVE PERCENT: 3 % (ref 0–6)
FILM ARRAY ADENOVIRUS: ABNORMAL
FILM ARRAY BORDETELLA PERTUSSIS: ABNORMAL
FILM ARRAY CHLAMYDOPHILIA PNEUMONIAE: ABNORMAL
FILM ARRAY CORONAVIRUS 229E: ABNORMAL
FILM ARRAY CORONAVIRUS HKU1: ABNORMAL
FILM ARRAY CORONAVIRUS NL63: ABNORMAL
FILM ARRAY CORONAVIRUS OC43: ABNORMAL
FILM ARRAY INFLUENZA A VIRUS 09H1: ABNORMAL
FILM ARRAY INFLUENZA A VIRUS H1: ABNORMAL
FILM ARRAY INFLUENZA A VIRUS H3: ABNORMAL
FILM ARRAY INFLUENZA A VIRUS: ABNORMAL
FILM ARRAY INFLUENZA B: ABNORMAL
FILM ARRAY MYCOPLASMA PNEUMONIAE: ABNORMAL
FILM ARRAY PARAINFLUENZA VIRUS 1: ABNORMAL
FILM ARRAY PARAINFLUENZA VIRUS 2: ABNORMAL
FILM ARRAY PARAINFLUENZA VIRUS 3: ABNORMAL
FILM ARRAY PARAINFLUENZA VIRUS 4: ABNORMAL
FILM ARRAY RESPIRATORY SYNCITIAL VIRUS: ABNORMAL
FILM ARRAY RHINOVIRUS/ENTEROVIRUS: ABNORMAL
GFR AFRICAN AMERICAN: >60
GFR NON-AFRICAN AMERICAN: >60 ML/MIN/1.73
GLUCOSE BLD-MCNC: 109 MG/DL (ref 74–99)
HCT VFR BLD CALC: 32.7 % (ref 37–54)
HEMOGLOBIN: 9.7 G/DL (ref 12.5–16.5)
HYPOCHROMIA: ABNORMAL
LYMPHOCYTES ABSOLUTE: 0.93 E9/L (ref 1.5–4)
LYMPHOCYTES RELATIVE PERCENT: 19 % (ref 20–42)
MCH RBC QN AUTO: 27.1 PG (ref 26–35)
MCHC RBC AUTO-ENTMCNC: 29.7 % (ref 32–34.5)
MCV RBC AUTO: 91.3 FL (ref 80–99.9)
METER GLUCOSE: 121 MG/DL (ref 74–99)
METER GLUCOSE: 137 MG/DL (ref 74–99)
METER GLUCOSE: 222 MG/DL (ref 74–99)
METER GLUCOSE: 340 MG/DL (ref 74–99)
MONOCYTES ABSOLUTE: 0.34 E9/L (ref 0.1–0.95)
MONOCYTES RELATIVE PERCENT: 7 % (ref 2–12)
NEUTROPHILS ABSOLUTE: 3.48 E9/L (ref 1.8–7.3)
NEUTROPHILS RELATIVE PERCENT: 71 % (ref 43–80)
ORGANISM: ABNORMAL
OVALOCYTES: ABNORMAL
PDW BLD-RTO: 18.9 FL (ref 11.5–15)
PLATELET # BLD: 217 E9/L (ref 130–450)
PMV BLD AUTO: 9.4 FL (ref 7–12)
POIKILOCYTES: ABNORMAL
POLYCHROMASIA: ABNORMAL
POTASSIUM SERPL-SCNC: 4.1 MMOL/L (ref 3.5–5)
RBC # BLD: 3.58 E12/L (ref 3.8–5.8)
SODIUM BLD-SCNC: 139 MMOL/L (ref 132–146)
TEAR DROP CELLS: ABNORMAL
WBC # BLD: 4.9 E9/L (ref 4.5–11.5)

## 2019-04-10 PROCEDURE — 94664 DEMO&/EVAL PT USE INHALER: CPT

## 2019-04-10 PROCEDURE — 6360000002 HC RX W HCPCS: Performed by: INTERNAL MEDICINE

## 2019-04-10 PROCEDURE — 71045 X-RAY EXAM CHEST 1 VIEW: CPT

## 2019-04-10 PROCEDURE — 85025 COMPLETE CBC W/AUTO DIFF WBC: CPT

## 2019-04-10 PROCEDURE — 82962 GLUCOSE BLOOD TEST: CPT

## 2019-04-10 PROCEDURE — 2060000000 HC ICU INTERMEDIATE R&B

## 2019-04-10 PROCEDURE — 94640 AIRWAY INHALATION TREATMENT: CPT

## 2019-04-10 PROCEDURE — 6370000000 HC RX 637 (ALT 250 FOR IP): Performed by: INTERNAL MEDICINE

## 2019-04-10 PROCEDURE — 80048 BASIC METABOLIC PNL TOTAL CA: CPT

## 2019-04-10 PROCEDURE — 2700000000 HC OXYGEN THERAPY PER DAY

## 2019-04-10 PROCEDURE — 36415 COLL VENOUS BLD VENIPUNCTURE: CPT

## 2019-04-10 RX ORDER — PANTOPRAZOLE SODIUM 40 MG/1
40 TABLET, DELAYED RELEASE ORAL
Status: DISCONTINUED | OUTPATIENT
Start: 2019-04-11 | End: 2019-04-15 | Stop reason: HOSPADM

## 2019-04-10 RX ORDER — SODIUM CHLORIDE FOR INHALATION 0.9 %
3 VIAL, NEBULIZER (ML) INHALATION EVERY 4 HOURS PRN
Status: DISCONTINUED | OUTPATIENT
Start: 2019-04-10 | End: 2019-04-15 | Stop reason: HOSPADM

## 2019-04-10 RX ORDER — DEXTROSE MONOHYDRATE 50 MG/ML
100 INJECTION, SOLUTION INTRAVENOUS PRN
Status: DISCONTINUED | OUTPATIENT
Start: 2019-04-10 | End: 2019-04-15 | Stop reason: HOSPADM

## 2019-04-10 RX ORDER — FUROSEMIDE 10 MG/ML
20 INJECTION INTRAMUSCULAR; INTRAVENOUS ONCE
Status: COMPLETED | OUTPATIENT
Start: 2019-04-10 | End: 2019-04-10

## 2019-04-10 RX ORDER — METHYLPREDNISOLONE SODIUM SUCCINATE 40 MG/ML
40 INJECTION, POWDER, LYOPHILIZED, FOR SOLUTION INTRAMUSCULAR; INTRAVENOUS EVERY 8 HOURS
Status: DISCONTINUED | OUTPATIENT
Start: 2019-04-10 | End: 2019-04-13

## 2019-04-10 RX ORDER — NICOTINE POLACRILEX 4 MG
15 LOZENGE BUCCAL PRN
Status: DISCONTINUED | OUTPATIENT
Start: 2019-04-10 | End: 2019-04-15 | Stop reason: HOSPADM

## 2019-04-10 RX ORDER — INSULIN GLARGINE 100 [IU]/ML
10 INJECTION, SOLUTION SUBCUTANEOUS NIGHTLY
Status: DISCONTINUED | OUTPATIENT
Start: 2019-04-10 | End: 2019-04-15 | Stop reason: HOSPADM

## 2019-04-10 RX ORDER — DEXTROSE MONOHYDRATE 25 G/50ML
12.5 INJECTION, SOLUTION INTRAVENOUS PRN
Status: DISCONTINUED | OUTPATIENT
Start: 2019-04-10 | End: 2019-04-15 | Stop reason: HOSPADM

## 2019-04-10 RX ADMIN — BUDESONIDE 500 MCG: 0.5 SUSPENSION RESPIRATORY (INHALATION) at 21:47

## 2019-04-10 RX ADMIN — IPRATROPIUM BROMIDE AND ALBUTEROL SULFATE 1 AMPULE: .5; 3 SOLUTION RESPIRATORY (INHALATION) at 16:43

## 2019-04-10 RX ADMIN — METHYLPREDNISOLONE SODIUM SUCCINATE 40 MG: 40 INJECTION, POWDER, LYOPHILIZED, FOR SOLUTION INTRAMUSCULAR; INTRAVENOUS at 11:28

## 2019-04-10 RX ADMIN — METHYLPREDNISOLONE SODIUM SUCCINATE 40 MG: 40 INJECTION, POWDER, LYOPHILIZED, FOR SOLUTION INTRAMUSCULAR; INTRAVENOUS at 20:05

## 2019-04-10 RX ADMIN — ISODIUM CHLORIDE 3 ML: 0.03 SOLUTION RESPIRATORY (INHALATION) at 09:21

## 2019-04-10 RX ADMIN — FUROSEMIDE 20 MG: 10 INJECTION, SOLUTION INTRAVENOUS at 11:28

## 2019-04-10 RX ADMIN — INSULIN LISPRO 4 UNITS: 100 INJECTION, SOLUTION INTRAVENOUS; SUBCUTANEOUS at 20:03

## 2019-04-10 RX ADMIN — IPRATROPIUM BROMIDE AND ALBUTEROL SULFATE 1 AMPULE: .5; 3 SOLUTION RESPIRATORY (INHALATION) at 21:48

## 2019-04-10 RX ADMIN — IPRATROPIUM BROMIDE AND ALBUTEROL SULFATE 1 AMPULE: .5; 3 SOLUTION RESPIRATORY (INHALATION) at 09:11

## 2019-04-10 RX ADMIN — INSULIN GLARGINE 10 UNITS: 100 INJECTION, SOLUTION SUBCUTANEOUS at 20:04

## 2019-04-10 RX ADMIN — ENOXAPARIN SODIUM 40 MG: 40 INJECTION SUBCUTANEOUS at 11:28

## 2019-04-10 RX ADMIN — IPRATROPIUM BROMIDE AND ALBUTEROL SULFATE 1 AMPULE: .5; 3 SOLUTION RESPIRATORY (INHALATION) at 12:27

## 2019-04-10 RX ADMIN — FORMOTEROL FUMARATE DIHYDRATE 20 MCG: 20 SOLUTION RESPIRATORY (INHALATION) at 09:10

## 2019-04-10 RX ADMIN — BUDESONIDE 500 MCG: 0.5 SUSPENSION RESPIRATORY (INHALATION) at 09:10

## 2019-04-10 RX ADMIN — FORMOTEROL FUMARATE DIHYDRATE 20 MCG: 20 SOLUTION RESPIRATORY (INHALATION) at 21:48

## 2019-04-10 RX ADMIN — RACEPINEPHRINE HYDROCHLORIDE 11.25 MG: 11.25 SOLUTION RESPIRATORY (INHALATION) at 09:21

## 2019-04-10 RX ADMIN — INSULIN LISPRO 4 UNITS: 100 INJECTION, SOLUTION INTRAVENOUS; SUBCUTANEOUS at 16:49

## 2019-04-10 ASSESSMENT — PAIN SCALES - PAIN ASSESSMENT IN ADVANCED DEMENTIA (PAINAD)
FACIALEXPRESSION: 0
TOTALSCORE: 0
CONSOLABILITY: 0
BODYLANGUAGE: 0
NEGVOCALIZATION: 0
BODYLANGUAGE: 0
BODYLANGUAGE: 0
FACIALEXPRESSION: 0
NEGVOCALIZATION: 0
CONSOLABILITY: 0
TOTALSCORE: 0
CONSOLABILITY: 0
BODYLANGUAGE: 0
CONSOLABILITY: 0
BREATHING: 0
NEGVOCALIZATION: 0
TOTALSCORE: 0
TOTALSCORE: 0
BREATHING: 0
NEGVOCALIZATION: 0
BREATHING: 0
FACIALEXPRESSION: 0
FACIALEXPRESSION: 0
BREATHING: 0

## 2019-04-10 ASSESSMENT — PAIN SCALES - GENERAL
PAINLEVEL_OUTOF10: 0

## 2019-04-10 NOTE — PROGRESS NOTES
[facility]  [facility]  Progress Note    SUBJECTIVE:  Chief Complaint   Patient presents with    Fever     sent from [address] for r/o sepsis. tylenol given 0845    Tachycardia    Altered Mental Status     pt has increased confusion A&Ox1      Patient is still in the ICU. Patient is feeling well. Denies any dyspnea and slight audible expiratory wheezing. No nausea or vomiting. He has a dry cough. Denies any pain. Review of systems:  As stated above in the chief complaint, otherwise negative. Medications:  Scheduled Meds:   ipratropium-albuterol  1 ampule Inhalation Q4H WA    budesonide  0.5 mg Nebulization BID    formoterol  20 mcg Nebulization BID     Continuous Infusions:   sodium chloride 100 mL/hr at 19 1506     PRN Meds:sodium chloride nebulizer    OBJECTIVE:  /60   Pulse 67   Temp 97.5 °F (36.4 °C) (Axillary)   Resp (!) 31   Ht 6' (1.829 m)   Wt 247 lb 2.2 oz (112.1 kg)   SpO2 99%   BMI 33.52 kg/m²   Temp  Av.1 °F (36.7 °C)  Min: 97.5 °F (36.4 °C)  Max: 98.7 °F (37.1 °C)  Constitutional: The patient is awake, alert, and oriented. No distress. Receiving breathing treatments. Skin: Warm and dry. No rashes were noted. HEENT: Eyes show round, and reactive pupils. No jaundice. Moist mucous membranes, no ulcerations, no thrush. Neck: Supple to movements. No lymphadenopathy. Chest: Audible wheezing. He seems to be using accessory muscles to breathe. Auscultation reveals no wheezing, however. No crackles or rhonchi. Cardiovascular: S1 and S2 are rhythmic and regular. No murmurs appreciated. Abdomen: Positive bowel sounds to auscultation. Benign to palpation. No masses felt. No hepatosplenomegaly. Extremities: No clubbing, no cyanosis, no edema. Superficial ulcers, more on the left leg than the right. Minimal surrounding redness. No cellulitis. Lines: peripheral  Ballesteros catheter with clear urine.     Laboratory and Tests Review:  Lab Results   Component Value Date    WBC 4.9 04/10/2019    WBC 7.5 04/09/2019    WBC 4.5 01/12/2019    HGB 9.7 (L) 04/10/2019    HCT 32.7 (L) 04/10/2019    MCV 91.3 04/10/2019     04/10/2019     Lab Results   Component Value Date    NEUTROABS 3.48 04/10/2019    NEUTROABS 5.50 04/09/2019    NEUTROABS 5.00 01/09/2019     Lab Results   Component Value Date    CRP 13.3 (H) 04/09/2019    CRP 14.7 (H) 01/09/2019    CRP 7.3 (H) 08/12/2018     No results found for: CRPHS  Lab Results   Component Value Date    SEDRATE 55 (H) 04/09/2019    SEDRATE 111 (H) 08/12/2018    SEDRATE 78 (H) 08/02/2018     Lab Results   Component Value Date    ALT 8 04/09/2019    AST 18 04/09/2019     (H) 07/18/2018    ALKPHOS 71 04/09/2019    BILITOT 0.6 04/09/2019     Lab Results   Component Value Date     04/10/2019    K 4.1 04/10/2019    K 4.1 04/09/2019     04/10/2019    CO2 25 04/10/2019    BUN 16 04/10/2019    CREATININE 0.9 04/10/2019    CREATININE 1.1 04/09/2019    CREATININE 0.8 01/12/2019    GFRAA >60 04/10/2019    LABGLOM >60 04/10/2019    GLUCOSE 109 04/10/2019    GLUCOSE 138 03/26/2011    PROT 6.5 04/09/2019    LABALBU 3.6 04/09/2019    LABALBU 4.0 03/26/2011    CALCIUM 8.0 04/10/2019    BILITOT 0.6 04/09/2019    ALKPHOS 71 04/09/2019    AST 18 04/09/2019    ALT 8 04/09/2019     Radiology:      Microbiology:   Respiratory panel: Human Metapneumovirus  Influenza by PCR: Negative  Wound cultures leg pending  Urine culture pending  Nares MRSA: Pending  Procalcitonin: 0.24    ASSESSMENT:  · Human Metapneumovirus infection. Low procalcitonin level. Doubt bacterial infection  · Fever secondary to the above  · Hypotension. Cortisol level was normal, however relatively low for hypotension  · History of bullous pemphigoid and lower extremities.  None to have colonization with MRSA in the past. No evidence of cellulitis    PLAN:  · Antibiotics are not warranted at this time  · Supportive treatment  · Agree with steroids to help audible wheezing  · Check cultures  · Continue wound care    Case discussed with Dr. Ludivina Brown  10:04 AM  4/10/2019

## 2019-04-10 NOTE — PROGRESS NOTES
Wound care consult placed in ICU, no documentation that it was called. This nurse called report to wound care nurse at [phone]. Message read at [phone].

## 2019-04-10 NOTE — PROGRESS NOTES
(36.4 °C) Axillary 77 29 97 % -- --   04/10/19 0700 120/66 -- -- 63 13 99 % -- --   04/10/19 0600 120/66 -- -- 70 18 99 % -- --   04/10/19 0500 (!) 110/57 -- -- 62 24 99 % -- --   04/10/19 0400 (!) 110/57 97.9 °F (36.6 °C) Oral 72 16 95 % -- --   04/10/19 0300 -- -- -- 133 28 96 % 6' (1.829 m) 247 lb 2.2 oz (112.1 kg)         Intake/Output Summary (Last 24 hours) at 4/10/2019 0847  Last data filed at 4/10/2019 0800  Gross per 24 hour   Intake 4455 ml   Output 1875 ml   Net 2580 ml     Wt Readings from Last 2 Encounters:   04/10/19 247 lb 2.2 oz (112.1 kg)   03/05/19 231 lb (104.8 kg)     Body mass index is 33.52 kg/m². PHYSICAL EXAMINATION:    General appearance - alert, well appearing, and in no distress  Mental status - alert, oriented to person, place, and time  Eyes - pupils equal and reactive, extraocular eye movements intact  Ears - right ear normal, left ear normal  Nose - normal and patent, no erythema, discharge or polyps  Mouth - mucous membranes moist, pharynx normal without lesions  Neck - supple, no significant adenopathy  Chest - diffuse wheezing heard throughout all lung fields  Heart - normal rate, regular rhythm, normal S1, S2, no murmurs, rubs, clicks or gallops  Abdomen - soft, nontender, nondistended, no masses or organomegaly  Neurological - alert, oriented, normal speech, no focal findings or movement disorder noted}  Extremities - wounds and erythema present on bilateral LE's  Skin - as above      Any additional physical findings:    MEDICATIONS:    Scheduled Meds:   ipratropium-albuterol  1 ampule Inhalation Q4H WA    budesonide  0.5 mg Nebulization BID    formoterol  20 mcg Nebulization BID     Continuous Infusions:   sodium chloride 100 mL/hr at 04/09/19 1506     PRN Meds:          VENT SETTINGS (Comprehensive) (if applicable):      Additional Respiratory  Assessments  Pulse: 77  Resp: 29  SpO2: 97 %  Oral Care: Mouth moisturizer    ABGs:   No results for input(s): PH, PCO2, PO2, HCO3, BE, O2SAT in the last 72 hours. Laboratory findings:    Complete Blood Count:   Recent Labs     04/09/19  1007 04/10/19  0530   WBC 7.5 4.9   HGB 10.7* 9.7*   HCT 34.8* 32.7*    217        Last 3 Blood Glucose:   Recent Labs     04/09/19  1007 04/10/19  0530   GLUCOSE 158* 109*        PT/INR:    Lab Results   Component Value Date    PROTIME 14.7 07/29/2018    INR 1.3 07/29/2018     PTT:    Lab Results   Component Value Date    APTT 33.7 04/10/2015       Comprehensive Metabolic Profile:   Recent Labs     04/09/19  1007 04/10/19  0530    139   K 4.1 4.1   CL 97* 104   CO2 24 25   BUN 23 16   CREATININE 1.1 0.9   GLUCOSE 158* 109*   CALCIUM 8.5* 8.0*   PROT 6.5  --    LABALBU 3.6  --    BILITOT 0.6  --    ALKPHOS 71  --    AST 18  --    ALT 8  --       Magnesium:   Lab Results   Component Value Date    MG 1.7 01/08/2019     Phosphorus: No results found for: PHOS  Ionized Calcium: No results found for: CAION     Urinalysis:     Troponin:   Recent Labs     04/09/19  1007   TROPONINI 0.01       Microbiology:    Cultures during this admission:     Blood cultures:             [] None drawn   [x] Pending  [] Negative  []  Positive (Details: )  Urine Culture:               [x] None drawn  [] Pending  [] Negative  []  Positive (Details: )  Sputum Culture:           [x] None drawn  [] Pending  [] Negative  []  Positive (Details: )   Endotracheal aspirate: [x] None drawn  [] Pending  [] Negative  []  Positive (Details: )     Other pertinent Labs: Wound culture positive, further information to follow    Radiology/Imaging:     Chest Xray (4/10/2019):  No change from previous CXR      ASSESSMENT:     Active Problems:    Sepsis ([address]  Resolved Problems:    * No resolved hospital problems.  *      Additional assessment:    Human metapneumovirus +      PLAN:     WEAN PER PROTOCOL:  [] No   [] Yes  [x] N/A    DISCONTINUE ANY LABS:   [x] No   [] Yes    ICU PROPHYLAXIS:  Stress ulcer:  [x] PPI Agent  [] [id number] [] Sucralfate  [] Other:  VTE:   [x] Enoxaparin  [] Unfract.  Heparin Subcut  [] EPC Cuffs    NUTRITION:  [] NPO [] Tube Feeding (Specify: ) [] TPN  [x] PO (Diet: No diet orders on file)    HOME MEDICATIONS RECONCILED: [] No  [x] Yes    INSULIN DRIP:   [x] No   [] Yes    CONSULTATION NEEDED:  [] No   [x] Yes    FAMILY UPDATED:    [x] No   [] Yes    TRANSFER OUT OF ICU:   [] No   [x] Yes    ADDITIONAL PLAN:  Steroids started  Transfer out of ICU        Hilary Millan DO  Resident, PGY-1  4/10/2019  8:47 AM

## 2019-04-10 NOTE — PROGRESS NOTES
Pt arrived to floor with multiple wounds. Bilateral lower extremities were dressed with xeroform and kerlix per report from ICU nurse. Xeroform also placed on Right hip by ICU nurse. Received in report that wound care nurse would be seeing pt today. Pt does not want dressings removed until wound care see's him.

## 2019-04-10 NOTE — PLAN OF CARE
Problem: Skin Integrity:  Goal: Will show no infection signs and symptoms  Description  Will show no infection signs and symptoms  Outcome: Met This Shift     Problem: Skin Integrity:  Goal: Absence of new skin breakdown  Description  Absence of new skin breakdown  Outcome: Met This Shift

## 2019-04-10 NOTE — PROGRESS NOTES
Report called to [address] [place] [address] on [address] for patient to be transferred to room 430. All pertinent information disclosed during nurse to nurse report. Patient notified of upcoming transfer. Patient's son, Zhang Morrison, called and notified of upcoming transfer. Patient previously placed on cardiac monitor. Condition stable at this time. Awaiting transport.

## 2019-04-10 NOTE — CONSULTS
Pulmonary Consultation    Admit Date: 4/9/2019  Requesting Physician: No primary care provider on file. CC:  · Hypoxia  HPI:  · Jc Almanza is an 24-year-old white male admitted from the facility. He has a history of multiple open wounds of his followed actively by the infectious disease service. From a pulmonary standpoint, he recently had healthcare acquired pneumonia and has sleep apnea. · This p.m., he is resting comfortably after being admitted to the ICU. His hypotension has responded to fluids. No pressors on board. Cortisol level was noted to be relatively low for patient was hypotensive raising the possibility of adrenal insufficiency. PMH:    Past Medical History:   Diagnosis Date    Anxiety     Benign localized hyperplasia of prostate with urinary obstruction     Bullous pemphigoid     Diabetes mellitus ([facility]     Enlarged prostate     Hyperlipidemia     Hypertension     Muscle weakness     Paroxysmal atrial fibrillation ([facility] 1/12/2019    Rheumatoid disease ([facility]      PSH:     Past Surgical History:   Procedure Laterality Date    DOPPLER ECHOCARDIOGRAPHY N/A     FRACTURE SURGERY      [place] EGD PERCUTANEOUS PLACEMENT GASTROSTOMY TUBE N/A 7/31/2018    EGD  PEG TUBE INSERTION,  (ENDO STAFF NEEDED) performed by Tiny Dancer, MD at [address] [facility] N/A 7/29/2018    EGD DIAGNOSTIC ONLY performed by Darling Carter MD at [address] [place] [address] of Systems:    Unobtainable due to patient factors.       Social History:  · Alcohol:  No  · Tobacco:   Past  · Employment:  no silica or asbestos exposure  · Family:  No family history of lung disease    Medications:   dextrose        insulin glargine  10 Units Subcutaneous Nightly    insulin lispro  0-12 Units Subcutaneous TID WC    insulin lispro  0-6 Units Subcutaneous Nightly    methylPREDNISolone  40 mg Intravenous Q8H    [START ON 4/11/2019] pantoprazole  40 mg Oral QAM AC    enoxaparin  40 mg Subcutaneous Daily    ipratropium-albuterol  1 ampule Inhalation Q4H WA    budesonide  0.5 mg Nebulization BID    formoterol  20 mcg Nebulization BID       Vitals:  Tmax:  VITALS:  /62   Pulse 91   Temp 97.5 °F (36.4 °C) (Axillary)   Resp 18   Ht 6' (1.829 m)   Wt 247 lb 2.2 oz (112.1 kg)   SpO2 97%   BMI 33.52 kg/m²   24HR INTAKE/OUTPUT:      Intake/Output Summary (Last 24 hours) at 4/10/2019 1255  Last data filed at 4/10/2019 1200  Gross per 24 hour   Intake 4455 ml   Output 2325 ml   Net 2130 ml     CURRENT PULSE OXIMETRY:  SpO2: 97 %  24HR PULSE OXIMETRY RANGE:  SpO2  Av.9 %  Min: 94 %  Max: 100 %    EXAM:  General: No distress. Alert. Eyes: PERRL. No sclera icterus. No conjunctival injection. ENT: No discharge. Pharynx clear. Neck: Trachea midline. Normal thyroid. No jvd, no hjr. Resp: Scattered wheezing. No accessory muscle use. No rales. No rhonchi. CV: Regular rate. Regular rhythm. No murmur No rub. Abd: Non-tender. Non-distended. No masses. No organmegaly. Normal bowel sounds. Skin: Warm and dry. No nodule on exposed extremities. No rash on exposed extremities. Lymph: No cervical LAD. No supraclavicular LAD. Ext: No joint deformity. No clubbing. No cyanosis. No edema  Neuro: Awake. Follows commands. Positive pupils/gag/corneals. Normal pain response. Lab Results:  CBC:   Recent Labs     19  1007 04/10/19  0530   WBC 7.5 4.9   HGB 10.7* 9.7*   HCT 34.8* 32.7*   MCV 87.7 91.3    217       BMP:  Recent Labs     19  1007 04/10/19  0530    139   K 4.1 4.1   CL 97* 104   CO2 24 25   BUN 23 16   CREATININE 1.1 0.9    ALB:3,BILIDIR:3,BILITOT:3,ALKPHOS:3)@    PT/INR: No results for input(s): PROTIME, INR in the last 72 hours. Cultures:  Sputum: not available  Blood: not available    ABG:   No results for input(s): PH, PO2, PCO2, HCO3, BE, O2SAT, METHB, O2HB, COHB, O2CON, HHB, THB in the last 72 hours.           Films:     XR CHEST PORTABLE   Final Result   No interval change               CT Head WO Contrast   Final Result   Generalized cerebral atrophy. Small vessel ischemic change   involving the white matter. No apparent acute abnormality or   intracranial hemorrhage or hematoma. Pansinusitis with scattered   air/fluid levels. XR CHEST PORTABLE   Final Result   Cardiomegaly. CHF with interstitial edema. No definite   pneumonic consolidation. .        Assessment:  1. Fever with hypoxia: Human metapneumovirus infection. 2. Hypotension: Possibly sepsis, adrenal insufficiency and dehydration may be underlying  3. Recent healthcare associated pneumonia. He was followed by Dr. Vilma Chan:  1. Continue aerosol therapies  2. Continue steroids  3. Okay to stepdown okay with others      Thanks for letting us see this patient in consultation. Please contact us with any questions. Office [phone] or after hours through [employer], x [phone]. Please note that voice recognition technology was used in the preparation of this note and make therefore it may contain inadvertent transcription errors    Arnaldo Girard M.D., F.C.C.P.     [facility], [address] [place] [address], [address] [place] [address]

## 2019-04-10 NOTE — PLAN OF CARE
Problem: Falls - Risk of:  Goal: Will remain free from falls  Description  Will remain free from falls  4/10/2019 1117 by Yareli López RN  Outcome: Met This Shift  4/10/2019 0059 by William Sanchez RN  Outcome: Met This Shift  4/9/2019 2307 by William Sanchez RN  Outcome: Met This Shift  Goal: Absence of physical injury  Description  Absence of physical injury  4/10/2019 1117 by Yareli López RN  Outcome: Met This Shift  4/10/2019 0059 by William Sanchez RN  Outcome: Met This Shift  4/9/2019 2307 by William Sanchez RN  Outcome: Met This Shift     Problem: Risk for Impaired Skin Integrity  Goal: Tissue integrity - skin and mucous membranes  Description  Structural intactness and normal physiological function of skin and  mucous membranes. 4/10/2019 0059 by William Sanchez RN  Outcome: Met This Shift     Problem: Pain:  Goal: Pain level will decrease  Description  Pain level will decrease     4/10/2019 1117 by Yareli López RN  Outcome: Met This Shift  4/10/2019 0059 by William Sanchez RN  Outcome: Met This Shift     Problem: Body Temperature -  Risk of, Imbalanced  Goal: Body temperature is within normal range  4/10/2019 1117 by Yareli López RN  Outcome: Met This Shift  4/10/2019 0059 by William Sanchez RN  Outcome: Met This Shift     Problem: Mental Status - Impaired:  Goal: Mental status restored to baseline  4/10/2019 1117 by Yareli López RN  Outcome: Met This Shift  4/10/2019 0059 by William Sanchez RN  Outcome: Met This Shift     Problem: Skin Integrity - Risk of, Impaired  Goal: Skin will be intact without erythema or breakdown  4/10/2019 0059 by William Sanchez RN  Outcome: Met This Shift     Problem: Confusion - Acute:  Goal: Absence of continued neurological deterioration signs and symptoms  Description  Absence of continued neurological deterioration signs and symptoms  4/10/2019 1117 by Yareli López RN  Outcome: Met This Shift  4/10/2019 0059 by William Sanchez RN  Outcome:  Met This Shift  4/9/2019 2307 by Hyla Severs, RN  Outcome: Met This Shift  Goal: Mental status will be restored to baseline  Description  Mental status will be restored to baseline  4/10/2019 1117 by Trish Pettit RN  Outcome: Met This Shift  4/10/2019 0059 by Hyla Severs, RN  Outcome: Met This Shift  4/9/2019 2307 by Hyla Severs, RN  Outcome: Met This Shift     Problem: Discharge Planning:  Goal: Ability to perform activities of daily living will improve  Description  Ability to perform activities of daily living will improve  4/10/2019 0059 by Hyla Severs, RN  Outcome: Met This Shift  4/9/2019 2307 by Hyla Severs, RN  Outcome: Met This Shift  Goal: Participates in care planning  Description  Participates in care planning  4/10/2019 0059 by Hyla Severs, RN  Outcome: Met This Shift  4/9/2019 2307 by Hyla Severs, RN  Outcome: Met This Shift     Problem: Injury - Risk of, Physical Injury:  Goal: Will remain free from falls  Description  Will remain free from falls  4/10/2019 1117 by Trish Pettit RN  Outcome: Met This Shift  4/10/2019 0059 by Hyla Severs, RN  Outcome: Met This Shift  4/9/2019 2307 by Hyla Severs, RN  Outcome: Met This Shift  Goal: Absence of physical injury  Description  Absence of physical injury  4/10/2019 1117 by Trish Pettit RN  Outcome: Met This Shift  4/10/2019 0059 by Hyla Severs, RN  Outcome: Met This Shift  4/9/2019 2307 by Hyla Severs, RN  Outcome: Met This Shift     Problem: Mood - Altered:  Goal: Mood stable  Description  Mood stable  4/10/2019 1117 by Trish Pettit RN  Outcome: Met This Shift  4/10/2019 0059 by Hyla Severs, RN  Outcome: Met This Shift  4/9/2019 2307 by Hyla Severs, RN  Outcome: Met This Shift  Goal: Absence of abusive behavior  Description  Absence of abusive behavior  4/10/2019 1117 by Trish Pettit RN  Outcome: Met This Shift  4/10/2019 0059 by Hyla Severs, RN  Outcome: Met This Shift  4/9/2019 2307 by Nydia Molina Jose Miguel Trevino RN  Outcome: Met This Shift  Goal: Verbalizations of feeling emotionally comfortable while being cared for will increase  Description  Verbalizations of feeling emotionally comfortable while being cared for will increase  4/10/2019 1117 by Clinton Guzmán RN  Outcome: Met This Shift  4/10/2019 0059 by Victorino Cabrera RN  Outcome: Met This Shift  4/9/2019 2307 by Victorino Cabrera RN  Outcome: Met This Shift     Problem: Psychomotor Activity - Altered:  Goal: Absence of psychomotor disturbance signs and symptoms  Description  Absence of psychomotor disturbance signs and symptoms  4/10/2019 1117 by Clinton Guzmán RN  Outcome: Met This Shift  4/10/2019 0059 by Victorino Cabrera RN  Outcome: Met This Shift  4/9/2019 2307 by Victorino Cabrera RN  Outcome: Met This Shift     Problem: Sensory Perception - Impaired:  Goal: Demonstrations of improved sensory functioning will increase  Description  Demonstrations of improved sensory functioning will increase  4/10/2019 1117 by Clinton Guzmán RN  Outcome: Met This Shift  4/10/2019 0059 by Victorino Cabrera RN  Outcome: Met This Shift  4/9/2019 2307 by Victorino Cabrera RN  Outcome: Met This Shift  Goal: Decrease in sensory misperception frequency  Description  Decrease in sensory misperception frequency  4/10/2019 1117 by Clinton Guzmán RN  Outcome: Met This Shift  4/10/2019 0059 by Victorino Cabrera RN  Outcome: Met This Shift  4/9/2019 2307 by Victorino Cabrera RN  Outcome: Met This Shift  Goal: Able to refrain from responding to false sensory perceptions  Description  Able to refrain from responding to false sensory perceptions  4/10/2019 1117 by Clinton Guzmán RN  Outcome: Met This Shift  4/10/2019 0059 by Victroino Cabrera RN  Outcome: Met This Shift  4/9/2019 2307 by Victorino Cabrera RN  Outcome: Met This Shift  Goal: Demonstrates accurate environmental perceptions  Description  Demonstrates accurate environmental perceptions  4/10/2019 1117 by Clinton Guzmán RN  Outcome: Met This Shift  4/10/2019 0059 by Mery Campos RN  Outcome: Met This Shift  4/9/2019 2307 by Mery Campos RN  Outcome: Met This Shift  Goal: Able to distinguish between reality-based and nonreality-based thinking  Description  Able to distinguish between reality-based and nonreality-based thinking  4/10/2019 1117 by Litzy Padgett RN  Outcome: Met This Shift  4/10/2019 0059 by Mery Campos RN  Outcome: Met This Shift  4/9/2019 2307 by Mery Campos RN  Outcome: Met This Shift  Goal: Able to interrupt nonreality-based thinking  Description  Able to interrupt nonreality-based thinking  4/10/2019 1117 by Litzy Padgett RN  Outcome: Met This Shift  4/10/2019 0059 by Mery Campos RN  Outcome: Met This Shift  4/9/2019 2307 by Mery Campos RN  Outcome: Met This Shift     Problem: Sleep Pattern Disturbance:  Goal: Appears well-rested  Description  Appears well-rested  4/10/2019 1117 by Litzy Padgett RN  Outcome: Met This Shift  4/10/2019 0059 by Mery Campos RN  Outcome: Met This Shift  4/9/2019 2307 by Mery Campos RN  Outcome: Met This Shift

## 2019-04-10 NOTE — CARE COORDINATION
Met w/ patient. [facility] of . [facility]"" discharge plan is to return there. [facility]. Has walker, WC at NH. Currently on iv steroid.  Will continue to follow for discharge needs Pedro Nails

## 2019-04-10 NOTE — H&P
Take 10 mg by mouth every morning   methotrexate (RHEUMATREX) 2.5 MG chemo tablet, Take 7.5 mg by mouth See Admin Instructions Give 2 times a day every Wednesday  folic acid (FOLVITE) 1 MG tablet, Take 1 mg by mouth every morning   Nutritional Supplements (ARGINAID) PACK, Take 1 Package by mouth 2 times daily   mirtazapine (REMERON) 15 MG tablet, Take 7.5 mg by mouth nightly   atorvastatin (LIPITOR) 20 MG tablet, Take 20 mg by mouth every morning   Cholecalciferol (VITAMIN D3) 5000 UNITS TABS, Take 5,000 Units by mouth every morning   glucose (GLUTOSE) 40 % GEL, Take 15 g by mouth as needed  tamsulosin (FLOMAX) 0.4 MG capsule, Take 0.4 mg by mouth nightly   latanoprost (XALATAN) 0.005 % ophthalmic solution, Place 1 drop into both eyes nightly   acetaminophen (TYLENOL) 325 MG tablet, Take 650 mg by mouth every 4 hours as needed for Pain or Fever   magnesium hydroxide (MILK OF MAGNESIA) 400 MG/5ML suspension, Take 30 mLs by mouth daily as needed. bisacodyl (DULCOLAX) 5 MG EC tablet, Take 5 mg by mouth daily as needed. busPIRone (BUSPAR) 7.5 MG tablet, Take 7.5 mg by mouth every morning     Allergies:  Imuran [azathioprine]; Levaquin [levofloxacin]; and Doxycycline    Social History:   TOBACCO:   reports that he quit smoking about 13 years ago. His smoking use included cigarettes. He started smoking about 64 years ago. He has a 100.00 pack-year smoking history.  He has never used smokeless tobacco.    Family History:       Problem Relation Age of Onset    Diabetes Mother     Kidney Disease Mother     Cancer Father         esophagus    Cancer Sister      REVIEW OF SYSTEMS:  Review of systems not obtained due to patient factors - mental status  PHYSICAL EXAM:    Vitals:  /64   Pulse 77   Temp 97.5 °F (36.4 °C) (Axillary)   Resp 29   Ht 6' (1.829 m)   Wt 247 lb 2.2 oz (112.1 kg)   SpO2 97%   BMI 33.52 kg/m²     CONSTITUTIONAL:  awake, alert, cooperative, no apparent distress, and appears stated age  EYES:  Lids and lashes normal, pupils equal, round and reactive to light, extra ocular muscles intact, sclera clear, conjunctiva normal  ENT:  Normocephalic, without obvious abnormality, atraumatic, sinuses nontender on palpation, external ears without lesions, oral pharynx with moist mucus membranes, tonsils without erythema or exudates, gums normal and good dentition. NECK:  Supple, symmetrical, trachea midline, no adenopathy, thyroid symmetric, not enlarged and no tenderness, skin normal  HEMATOLOGIC/LYMPHATICS:  no cervical lymphadenopathy  BACK:  Symmetric, no curvature, spinous processes are non-tender on palpation, paraspinous muscles are non-tender on palpation, no costal vertebral tenderness  LUNGS:  No increased work of breathing, good air exchange, clear to auscultation bilaterally, no crackles or wheezing  CARDIOVASCULAR:  irregular  ABDOMEN:  Soft. Nontender. Normal bowel sounds  MUSCULOSKELETAL:  Multiple wounds noted  NEUROLOGIC:  Fused.   No focal motor deficits  SKIN:  Multiple open bullae    DATA:  CBC:   Lab Results   Component Value Date    WBC 4.9 04/10/2019    RBC 3.58 04/10/2019    HGB 9.7 04/10/2019    HCT 32.7 04/10/2019    MCV 91.3 04/10/2019    MCH 27.1 04/10/2019    MCHC 29.7 04/10/2019    RDW 18.9 04/10/2019     04/10/2019    MPV 9.4 04/10/2019     CMP:    Lab Results   Component Value Date     04/10/2019    K 4.1 04/10/2019    K 4.1 04/09/2019     04/10/2019    CO2 25 04/10/2019    BUN 16 04/10/2019    CREATININE 0.9 04/10/2019    GFRAA >60 04/10/2019    LABGLOM >60 04/10/2019    GLUCOSE 109 04/10/2019    GLUCOSE 138 03/26/2011    PROT 6.5 04/09/2019    LABALBU 3.6 04/09/2019    LABALBU 4.0 03/26/2011    CALCIUM 8.0 04/10/2019    BILITOT 0.6 04/09/2019    ALKPHOS 71 04/09/2019    AST 18 04/09/2019    ALT 8 04/09/2019     Urine Culture:  No components found for: CURINE  Blood Culture:  No components found for: CBLOOD, CFUNGUSBL  ASSESSMENT AND PLAN: AMS/metabolic encephalopathy from respiratory failure  Human metapnema virus causing stridor/respiratory failure  COPD  Chronic A. fib-evaluated by cardiology 3 months ago-do not to be a chronic anticoagulation patient  Bullous pemphigoid on chronic immunosuppression  ADDENDUM  Taper steroids as he improves  Wound care

## 2019-04-11 LAB
ANION GAP SERPL CALCULATED.3IONS-SCNC: 11 MMOL/L (ref 7–16)
ANISOCYTOSIS: ABNORMAL
BASOPHILS ABSOLUTE: 0 E9/L (ref 0–0.2)
BASOPHILS RELATIVE PERCENT: 0 % (ref 0–2)
BUN BLDV-MCNC: 23 MG/DL (ref 8–23)
BURR CELLS: ABNORMAL
CALCIUM SERPL-MCNC: 8.6 MG/DL (ref 8.6–10.2)
CHLORIDE BLD-SCNC: 104 MMOL/L (ref 98–107)
CO2: 23 MMOL/L (ref 22–29)
CREAT SERPL-MCNC: 0.9 MG/DL (ref 0.7–1.2)
EOSINOPHILS ABSOLUTE: 0 E9/L (ref 0.05–0.5)
EOSINOPHILS RELATIVE PERCENT: 0 % (ref 0–6)
GFR AFRICAN AMERICAN: >60
GFR NON-AFRICAN AMERICAN: >60 ML/MIN/1.73
GLUCOSE BLD-MCNC: 257 MG/DL (ref 74–99)
HCT VFR BLD CALC: 32.4 % (ref 37–54)
HEMOGLOBIN: 9.8 G/DL (ref 12.5–16.5)
HYPOCHROMIA: ABNORMAL
LYMPHOCYTES ABSOLUTE: 0.42 E9/L (ref 1.5–4)
LYMPHOCYTES RELATIVE PERCENT: 13.1 % (ref 20–42)
MCH RBC QN AUTO: 26.8 PG (ref 26–35)
MCHC RBC AUTO-ENTMCNC: 30.2 % (ref 32–34.5)
MCV RBC AUTO: 88.5 FL (ref 80–99.9)
METER GLUCOSE: 208 MG/DL (ref 74–99)
METER GLUCOSE: 209 MG/DL (ref 74–99)
METER GLUCOSE: 261 MG/DL (ref 74–99)
METER GLUCOSE: 305 MG/DL (ref 74–99)
MONOCYTES ABSOLUTE: 0.06 E9/L (ref 0.1–0.95)
MONOCYTES RELATIVE PERCENT: 1.7 % (ref 2–12)
NEUTROPHILS ABSOLUTE: 2.72 E9/L (ref 1.8–7.3)
NEUTROPHILS RELATIVE PERCENT: 85.2 % (ref 43–80)
NUCLEATED RED BLOOD CELLS: 0 /100 WBC
ORGANISM: ABNORMAL
ORGANISM: ABNORMAL
OVALOCYTES: ABNORMAL
PDW BLD-RTO: 18.5 FL (ref 11.5–15)
PLATELET # BLD: 255 E9/L (ref 130–450)
PMV BLD AUTO: 9.3 FL (ref 7–12)
POIKILOCYTES: ABNORMAL
POTASSIUM SERPL-SCNC: 3.6 MMOL/L (ref 3.5–5)
RBC # BLD: 3.66 E12/L (ref 3.8–5.8)
SODIUM BLD-SCNC: 138 MMOL/L (ref 132–146)
WBC # BLD: 3.2 E9/L (ref 4.5–11.5)
WOUND/ABSCESS: ABNORMAL
WOUND/ABSCESS: ABNORMAL

## 2019-04-11 PROCEDURE — 85025 COMPLETE CBC W/AUTO DIFF WBC: CPT

## 2019-04-11 PROCEDURE — 6360000002 HC RX W HCPCS: Performed by: INTERNAL MEDICINE

## 2019-04-11 PROCEDURE — 6370000000 HC RX 637 (ALT 250 FOR IP): Performed by: INTERNAL MEDICINE

## 2019-04-11 PROCEDURE — 2060000000 HC ICU INTERMEDIATE R&B

## 2019-04-11 PROCEDURE — 2700000000 HC OXYGEN THERAPY PER DAY

## 2019-04-11 PROCEDURE — 94640 AIRWAY INHALATION TREATMENT: CPT

## 2019-04-11 PROCEDURE — 82962 GLUCOSE BLOOD TEST: CPT

## 2019-04-11 PROCEDURE — 80048 BASIC METABOLIC PNL TOTAL CA: CPT

## 2019-04-11 PROCEDURE — 97165 OT EVAL LOW COMPLEX 30 MIN: CPT

## 2019-04-11 PROCEDURE — 36415 COLL VENOUS BLD VENIPUNCTURE: CPT

## 2019-04-11 RX ORDER — IPRATROPIUM BROMIDE AND ALBUTEROL SULFATE 2.5; .5 MG/3ML; MG/3ML
1 SOLUTION RESPIRATORY (INHALATION) EVERY 4 HOURS
Status: DISCONTINUED | OUTPATIENT
Start: 2019-04-11 | End: 2019-04-15 | Stop reason: HOSPADM

## 2019-04-11 RX ORDER — BUDESONIDE 0.5 MG/2ML
1 INHALANT ORAL 2 TIMES DAILY
Status: DISCONTINUED | OUTPATIENT
Start: 2019-04-11 | End: 2019-04-15 | Stop reason: HOSPADM

## 2019-04-11 RX ADMIN — IPRATROPIUM BROMIDE AND ALBUTEROL SULFATE 1 AMPULE: .5; 3 SOLUTION RESPIRATORY (INHALATION) at 17:04

## 2019-04-11 RX ADMIN — IPRATROPIUM BROMIDE AND ALBUTEROL SULFATE 1 AMPULE: .5; 3 SOLUTION RESPIRATORY (INHALATION) at 10:00

## 2019-04-11 RX ADMIN — BUDESONIDE 500 MCG: 0.5 SUSPENSION RESPIRATORY (INHALATION) at 10:00

## 2019-04-11 RX ADMIN — FORMOTEROL FUMARATE DIHYDRATE 20 MCG: 20 SOLUTION RESPIRATORY (INHALATION) at 10:00

## 2019-04-11 RX ADMIN — PANTOPRAZOLE SODIUM 40 MG: 40 TABLET, DELAYED RELEASE ORAL at 06:12

## 2019-04-11 RX ADMIN — INSULIN GLARGINE 10 UNITS: 100 INJECTION, SOLUTION SUBCUTANEOUS at 20:47

## 2019-04-11 RX ADMIN — IPRATROPIUM BROMIDE AND ALBUTEROL SULFATE 1 AMPULE: .5; 3 SOLUTION RESPIRATORY (INHALATION) at 19:46

## 2019-04-11 RX ADMIN — INSULIN LISPRO 4 UNITS: 100 INJECTION, SOLUTION INTRAVENOUS; SUBCUTANEOUS at 08:49

## 2019-04-11 RX ADMIN — METHYLPREDNISOLONE SODIUM SUCCINATE 40 MG: 40 INJECTION, POWDER, LYOPHILIZED, FOR SOLUTION INTRAMUSCULAR; INTRAVENOUS at 04:49

## 2019-04-11 RX ADMIN — INSULIN LISPRO 4 UNITS: 100 INJECTION, SOLUTION INTRAVENOUS; SUBCUTANEOUS at 16:32

## 2019-04-11 RX ADMIN — FORMOTEROL FUMARATE DIHYDRATE 20 MCG: 20 SOLUTION RESPIRATORY (INHALATION) at 19:46

## 2019-04-11 RX ADMIN — INSULIN LISPRO 6 UNITS: 100 INJECTION, SOLUTION INTRAVENOUS; SUBCUTANEOUS at 11:25

## 2019-04-11 RX ADMIN — ENOXAPARIN SODIUM 40 MG: 40 INJECTION SUBCUTANEOUS at 08:50

## 2019-04-11 RX ADMIN — METHYLPREDNISOLONE SODIUM SUCCINATE 40 MG: 40 INJECTION, POWDER, LYOPHILIZED, FOR SOLUTION INTRAMUSCULAR; INTRAVENOUS at 20:37

## 2019-04-11 RX ADMIN — INSULIN LISPRO 4 UNITS: 100 INJECTION, SOLUTION INTRAVENOUS; SUBCUTANEOUS at 20:47

## 2019-04-11 RX ADMIN — BUDESONIDE 1000 MCG: 0.5 SUSPENSION RESPIRATORY (INHALATION) at 19:46

## 2019-04-11 RX ADMIN — IPRATROPIUM BROMIDE AND ALBUTEROL SULFATE 1 AMPULE: .5; 3 SOLUTION RESPIRATORY (INHALATION) at 12:39

## 2019-04-11 RX ADMIN — METHYLPREDNISOLONE SODIUM SUCCINATE 40 MG: 40 INJECTION, POWDER, LYOPHILIZED, FOR SOLUTION INTRAMUSCULAR; INTRAVENOUS at 12:46

## 2019-04-11 ASSESSMENT — PAIN SCALES - GENERAL
PAINLEVEL_OUTOF10: 0
PAINLEVEL_OUTOF10: 0

## 2019-04-11 NOTE — PROGRESS NOTES
[address] [place] [address] [facility]  Progress Note    SUBJECTIVE:  Chief Complaint   Patient presents with    Fever     sent from [facility] for r/o sepsis. tylenol given 0845    Tachycardia    Altered Mental Status     pt has increased confusion A&Ox1      Patient is out of the ICU. Appetite is fair. Denies any pain. No nausea or vomiting. He remains afebrile. He still has a dry cough but denies dyspnea. Review of systems:  As stated above in the chief complaint, otherwise negative. Medications:  Scheduled Meds:   insulin glargine  10 Units Subcutaneous Nightly    insulin lispro  0-12 Units Subcutaneous TID WC    insulin lispro  0-6 Units Subcutaneous Nightly    methylPREDNISolone  40 mg Intravenous Q8H    pantoprazole  40 mg Oral QAM AC    enoxaparin  40 mg Subcutaneous Daily    ipratropium-albuterol  1 ampule Inhalation Q4H WA    budesonide  0.5 mg Nebulization BID    formoterol  20 mcg Nebulization BID     Continuous Infusions:   dextrose       PRN Meds:sodium chloride nebulizer, glucose, dextrose, glucagon (rDNA), dextrose    OBJECTIVE:  BP 98/71   Pulse 73   Temp 97.4 °F (36.3 °C) (Oral)   Resp 20   Ht 6' (1.829 m)   Wt 247 lb 11.2 oz (112.4 kg)   SpO2 96%   BMI 33.59 kg/m²   Temp  Av °F (36.7 °C)  Min: 97.4 °F (36.3 °C)  Max: 98.4 °F (36.9 °C)  Constitutional: Patient is awake and alert. Son present. No distress. Skin: Arm and dry. See below. HEENT: Round pupils. No ulcerations or thrush. Neck: Supple. Chest: No respiratory distress. Not using accessory muscles to breathe. Good breath sounds bilaterally. No wheezing. Cardiovascular: S1 and S2 rhythmic and regular. Abdomen: Bowel sounds. Round and soft to palpation. Extremities: No edema. Superficial ulcers, more on the left leg than the right. Minimal surrounding redness. No cellulitis. Lines: peripheral  Ballesteros catheter with clear urine.     Laboratory and Tests Review:  Lab Results   Component Value Date    WBC 3.2 (L) 04/11/2019    WBC 4.9 04/10/2019    WBC 7.5 04/09/2019    HGB 9.8 (L) 04/11/2019    HCT 32.4 (L) 04/11/2019    MCV 88.5 04/11/2019     04/11/2019     Lab Results   Component Value Date    NEUTROABS 2.72 04/11/2019    NEUTROABS 3.48 04/10/2019    NEUTROABS 5.50 04/09/2019     Lab Results   Component Value Date    CRP 13.3 (H) 04/09/2019    CRP 14.7 (H) 01/09/2019    CRP 7.3 (H) 08/12/2018     No results found for: CRPHS  Lab Results   Component Value Date    SEDRATE 55 (H) 04/09/2019    SEDRATE 111 (H) 08/12/2018    SEDRATE 78 (H) 08/02/2018     Lab Results   Component Value Date    ALT 8 04/09/2019    AST 18 04/09/2019     (H) 07/18/2018    ALKPHOS 71 04/09/2019    BILITOT 0.6 04/09/2019     Lab Results   Component Value Date     04/11/2019    K 3.6 04/11/2019    K 4.1 04/09/2019     04/11/2019    CO2 23 04/11/2019    BUN 23 04/11/2019    CREATININE 0.9 04/11/2019    CREATININE 0.9 04/10/2019    CREATININE 1.1 04/09/2019    GFRAA >60 04/11/2019    LABGLOM >60 04/11/2019    GLUCOSE 257 04/11/2019    GLUCOSE 138 03/26/2011    PROT 6.5 04/09/2019    LABALBU 3.6 04/09/2019    LABALBU 4.0 03/26/2011    CALCIUM 8.6 04/11/2019    BILITOT 0.6 04/09/2019    ALKPHOS 71 04/09/2019    AST 18 04/09/2019    ALT 8 04/09/2019     Radiology:      Microbiology:   Respiratory panel: Human Metapneumovirus  Influenza by PCR: Negative  Wound cultures leg Pseudomonas aeruginosa (light growth)  Urine culture pending  Nares MRSA: Positive  Procalcitonin: 0.24    ASSESSMENT:  · Human Metapneumovirus infection. Low procalcitonin level. No evidence of bacterial infection  · Fever secondary to Metapneumovirus  · Hypotension. Cortisol level was normal, however relatively low for hypotension  · History of bullous pemphigoid and lower extremities. 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  No infection  · Audible wheezing, improved with steroids    PLAN:  · Observe off antibiotics  · Continue wound care  · Patient can be discharged from [place] [id number]  12:33 PM  4/11/2019

## 2019-04-11 NOTE — PROGRESS NOTES
Pt seen/ examined. ROS x 10 neg. Except:   Chart reviewed. meds reviewed. D/w nursing + family as available. EXAM:  BP 98/71   Pulse 73   Temp 97.4 °F (36.3 °C) (Oral)   Resp 20   Ht 6' (1.829 m)   Wt 247 lb 11.2 oz (112.4 kg)   SpO2 96%   BMI 33.59 kg/m²   GEN: A+O NAD. HEENT:NCAT. EOMI. RAMÓN   NECK:  No JVD. No bruits. Trach midline. No thyromegaly. LUNGS: CTA w/o rales, rhonchi, wheezes. Good excursion. CV: rrr w/o mrg  ABD: soft, non tender. Nl BS. No organomegaly. EXT: no clubbing, cyanosis, edema.   NEURO:   Labs/data reviewed  LABS: CBC:   Lab Results   Component Value Date    WBC 3.2 04/11/2019    RBC 3.66 04/11/2019    HGB 9.8 04/11/2019    HCT 32.4 04/11/2019    MCV 88.5 04/11/2019    MCH 26.8 04/11/2019    MCHC 30.2 04/11/2019    RDW 18.5 04/11/2019     04/11/2019    MPV 9.3 04/11/2019     CMP:    Lab Results   Component Value Date     04/11/2019    K 3.6 04/11/2019    K 4.1 04/09/2019     04/11/2019    CO2 23 04/11/2019    BUN 23 04/11/2019    CREATININE 0.9 04/11/2019    GFRAA >60 04/11/2019    LABGLOM >60 04/11/2019    GLUCOSE 257 04/11/2019    GLUCOSE 138 03/26/2011    PROT 6.5 04/09/2019    LABALBU 3.6 04/09/2019    LABALBU 4.0 03/26/2011    CALCIUM 8.6 04/11/2019    BILITOT 0.6 04/09/2019    ALKPHOS 71 04/09/2019    AST 18 04/09/2019    ALT 8 04/09/2019       Current Facility-Administered Medications:     budesonide (PULMICORT) nebulizer suspension 1,000 mcg, 1 mg, Nebulization, BID, Cece Almeida MD    ipratropium-albuterol (DUONEB) nebulizer solution 1 ampule, 1 ampule, Inhalation, Q4H, Cece Almeida MD    sodium chloride nebulizer 0.9 % solution 3 mL, 3 mL, Nebulization, Q4H PRN, Jose Pro DO, 3 mL at 04/10/19 0921    insulin glargine (LANTUS) injection vial 10 Units, 10 Units, Subcutaneous, Nightly, Jose Pro DO, 10 Units at 04/10/19 2004    insulin lispro (HUMALOG) injection vial 0-12 Units, 0-12 Units, Subcutaneous, TID Yumiko PAZ Faustino, DO, 6 Units at 04/11/19 1125    insulin lispro (HUMALOG) injection vial 0-6 Units, 0-6 Units, Subcutaneous, Nightly, Francisco Jboby Parkinson, DO, 4 Units at 04/10/19 2003    glucose (GLUTOSE) 40 % oral gel 15 g, 15 g, Oral, PRN, Francisco Jll Labor, DO    dextrose 50 % solution 12.5 g, 12.5 g, Intravenous, PRN, Francisco Jll Labor, DO    glucagon (rDNA) injection 1 mg, 1 mg, Intramuscular, PRN, Francisco Jboby Labor, DO    dextrose 5 % solution, 100 mL/hr, Intravenous, PRN, [facility]    methylPREDNISolone sodium (SOLU-MEDROL) injection 40 mg, 40 mg, Intravenous, Q8H, Omar Harmon DO, 40 mg at 04/11/19 1246    pantoprazole (PROTONIX) tablet 40 mg, 40 mg, Oral, QAM AC, Omar Harmon DO, 40 mg at 04/11/19 0612    enoxaparin (LOVENOX) injection 40 mg, 40 mg, Subcutaneous, Daily, Ike Parkinson, DO, 40 mg at 04/11/19 0850    formoterol (PERFOROMIST) nebulizer solution 20 mcg, 20 mcg, Nebulization, BID, [facility] DO, 20 mcg at 04/11/19 1000    A/P:      Patient Active Problem List   Diagnosis    Cellulitis    Sepsis ([facility]    Dementia    Metabolic encephalopathy    Diabetes mellitus type 2, uncontrolled (HCC)    Hyperlipidemia LDL goal <100    Essential hypertension    Venous ulcer of left leg (HCC)    Non-pressure chronic ulcer of left lower leg with fat layer exposed (HCC)    Non-pressure chronic ulcer left lower leg, limited to breakdown skin (HCC)    Hypotension    Severe protein-calorie malnutrition (HCC)    Non-pressure chronic ulcer right lower leg, limited to breakdown skin (HCC)    Pressure injury of contiguous region involving back, buttock, and hip, stage 2    Pressure injury of calf, stage 2    HCAP (healthcare-associated pneumonia)    Paroxysmal atrial fibrillation ([facility]   1. Fever with hypoxia: human metapneumovirus infection. 2. Hypotension: possibly due to sepsis with underlying adrenal insufficiency and dehydration. 3. Recent healthcare associated pneumonia. Followed by Dr. Kylah Luis   4. p afib- not a  candidate for anticoagulation   5. Bullous pemphigoid   6. Acute on chronic respiratory failure with hypoxia   7. Acute on chronic diastolic CHF from A. fib-proBNP of 1,081   8. COPD   9. Metabolic encephalopathy        PLAN:increase activity. Recheck chest x-ray soon. Monitor labs.   No bacterial infection per ID

## 2019-04-11 NOTE — PLAN OF CARE
Problem: Falls - Risk of:  Goal: Will remain free from falls  Description  Will remain free from falls  Outcome: Met This Shift     Problem: Falls - Risk of:  Goal: Absence of physical injury  Description  Absence of physical injury  Outcome: Met This Shift     Problem: Confusion - Acute:  Goal: Absence of continued neurological deterioration signs and symptoms  Description  Absence of continued neurological deterioration signs and symptoms  Outcome: Met This Shift     Problem: Injury - Risk of, Physical Injury:  Goal: Will remain free from falls  Description  Will remain free from falls  Outcome: Met This Shift     Problem: Injury - Risk of, Physical Injury:  Goal: Absence of physical injury  Description  Absence of physical injury  Outcome: Met This Shift     Problem: Mood - Altered:  Goal: Mood stable  Description  Mood stable  Outcome: Met This Shift

## 2019-04-11 NOTE — PROGRESS NOTES
Physical Therapy    Facility/Department: [address] INTERMEDIATE 1  Initial Assessment    NAME: Ade Ball  : 1940  MRN: [id number]    Date of Service: 2019       REQUIRES PT FOLLOW UP: No       Patient Diagnosis(es): The primary encounter diagnosis was Sepsis, due to unspecified organism ([facility] [address] Diagnoses of Acute cystitis without hematuria and Cellulitis, unspecified cellulitis site were also pertinent to this visit. has a past medical history of Anxiety, Benign localized hyperplasia of prostate with urinary obstruction, Bullous pemphigoid, Diabetes mellitus ([facility] [address] Enlarged prostate, Hyperlipidemia, Hypertension, Muscle weakness, Paroxysmal atrial fibrillation ([facility] [address] [facility] and Rheumatoid disease ([facility] [address] [facility] has a past surgical history that includes fracture surgery; doppler echocardiography (N/A); Upper gastrointestinal endoscopy (N/A, 2018); and pr egd percutaneous placement gastrostomy tube (N/A, 2018). Evaluating Therapist: Lisseth Pfeiffer PT      Room #:  430   DIAGNOSIS: sepsis   PRECAUTIONS: falls, contact and droplet precautions     Social:  Pt [facility] at [facility]    Prior to admission: pt does not walk and staff uses a standing lift for transfers      Initial Evaluation  Date:  19   Was pt agreeable to Eval/treatment? yes    Does pt have pain?  none reported    Bed Mobility  Rolling:  Max assist   Supine to sit: max assist   Sit to supine:  Max assist   Scooting:  Dep assist x 2    Transfers Sit to stand:  Max assist x 2   Stand to sit: max assist x 2   Stand pivot:  NT    Ambulation   NT    LE ROM  AAROM WFL    LE strength  3-  To 3+/ 5    AM- PAC RAW score             Pt is alert and Oriented x 3     Balance: SBA sitting at EOB, max assist in stand   Endurance: poor   Bed  alarm: Yes      ASSESSMENT  Pt displays functional ability as noted in the objective portion of this evaluation. Comments/Treatment:   Pt RTB after mobility  With call light in reach.    Pt at baseline with

## 2019-04-11 NOTE — PROGRESS NOTES
Pulmonary Progress Note    Admit Date: 2019  Hospital day                               PCP: No primary care provider on file. Chief Complaint (s):  Patient Active Problem List   Diagnosis    Cellulitis    Sepsis ([facility] [address] [facility]    Dementia    Metabolic encephalopathy    Diabetes mellitus type 2, uncontrolled ([facility] [facility]    Hyperlipidemia LDL goal <100    Essential hypertension    Venous ulcer of left leg (HCC)    Non-pressure chronic ulcer of left lower leg with fat layer exposed ([facility] [facility]    Non-pressure chronic ulcer left lower leg, limited to breakdown skin ([facility] [facility]    Hypotension    Severe protein-calorie malnutrition (HCC)    Non-pressure chronic ulcer right lower leg, limited to breakdown skin (HCC)    Pressure injury of contiguous region involving back, buttock, and hip, stage 2    Pressure injury of calf, stage 2    HCAP (healthcare-associated pneumonia)    Paroxysmal atrial fibrillation (HCC)       Subjective:  · Awake, alert and markedly bronchospastic.       Vitals:  VITALS:  BP 98/71   Pulse 73   Temp 97.4 °F (36.3 °C) (Oral)   Resp 20   Ht 6' (1.829 m)   Wt 247 lb 11.2 oz (112.4 kg)   SpO2 96%   BMI 33.59 kg/m²     24HR INTAKE/OUTPUT:      Intake/Output Summary (Last 24 hours) at 2019 1055  Last data filed at 2019 0615  Gross per 24 hour   Intake --   Output 2025 ml   Net -2025 ml       24HR PULSE OXIMETRY RANGE:    SpO2  Av %  Min: 96 %  Max: 98 %    Medications:  IV:   dextrose         Scheduled Meds:   insulin glargine  10 Units Subcutaneous Nightly    insulin lispro  0-12 Units Subcutaneous TID WC    insulin lispro  0-6 Units Subcutaneous Nightly    methylPREDNISolone  40 mg Intravenous Q8H    pantoprazole  40 mg Oral QAM AC    enoxaparin  40 mg Subcutaneous Daily    ipratropium-albuterol  1 ampule Inhalation Q4H WA    budesonide  0.5 mg Nebulization BID    formoterol  20 mcg Nebulization BID       Diet:   DIET CARB CONTROL; Carb Control: 4 carbs/meal (approximate 1800 kcals/day)     EXAM:  General: No distress. Alert. Eyes: PERRL. No sclera icterus. No conjunctival injection. ENT: No discharge. Pharynx clear. Neck: Trachea midline. Normal thyroid. Resp: No accessory muscle use. No rales. Diffuse wheezing. No rhonchi. CV: Regular rate. Regular rhythm. No murmur or rub. Abd: Non-tender. Non-distended. No masses. No organomegaly. Normal bowel sounds. Skin: Warm and dry. No nodule on exposed extremities. No rash on exposed extremities. Ext: No cyanosis, clubbing, edema  Lymph: No cervical LAD. No supraclavicular LAD. M/S: No cyanosis. No joint deformity. No clubbing. Neuro: Awake. Follows commands. Positive pupils/gag/corneals. Normal pain response. Results:  CBC:   Recent Labs     04/09/19  1007 04/10/19  0530 04/11/19  0315   WBC 7.5 4.9 3.2*   HGB 10.7* 9.7* 9.8*   HCT 34.8* 32.7* 32.4*   MCV 87.7 91.3 88.5    217 255     BMP:   Recent Labs     04/09/19  1007 04/10/19  0530 04/11/19  0315    139 138   K 4.1 4.1 3.6   CL 97* 104 104   CO2 24 25 23   BUN 23 16 23   CREATININE 1.1 0.9 0.9     LIVER PROFILE:   Recent Labs     04/09/19  1007   AST 18   ALT 8   BILITOT 0.6   ALKPHOS 71     PT/INR: No results for input(s): PROTIME, INR in the last 72 hours. APTT: No results for input(s): APTT in the last 72 hours. Pathology:  1. N/A      Microbiology:  1. None    Recent ABG:   No results for input(s): PH, PO2, PCO2, HCO3, BE, O2SAT, METHB, O2HB, COHB, O2CON, HHB, THB in the last 72 hours. Recent Films:  XR CHEST PORTABLE   Final Result   No interval change               CT Head WO Contrast   Final Result   Generalized cerebral atrophy. Small vessel ischemic change   involving the white matter. No apparent acute abnormality or   intracranial hemorrhage or hematoma. Pansinusitis with scattered   air/fluid levels. XR CHEST PORTABLE   Final Result   Cardiomegaly. CHF with interstitial edema.  No definite pneumonic consolidation. Assessment:  1. Fever with hypoxia: human metapneumovirus infection. 2. Hypotension: possibly due to sepsis with underlying adrenal insufficiency and dehydration. 3. Recent healthcare associated pneumonia. Followed by Dr. Argelia Rodrigez. Plan:  1. Adjust aerosol therapies. 2. Continue steroids. Care reviewed with the staff and the patient's family as available. Please note that voice recognition technology was used in the preparation of this note and make therefore it may contain inadvertent transcription errors. Prosper Alvarado M.D., F.C.C.P.     [facility], [address] [place] [address], [address] [place] [address]

## 2019-04-11 NOTE — PLAN OF CARE
Problem: Increased nutrient needs (NI-5.1)  Goal: Food and/or Nutrient Delivery  Initiate ONS to promote wound healing  Description  Individualized approach for food/nutrient provision.   Outcome: Not Met This Shift

## 2019-04-11 NOTE — PROGRESS NOTES
4/11/2019  1:50 PM      Nutrition Assessment    Type and Reason for Visit: Initial, Consult, Positive Nutrition Screen(Nick, wounds)    Nutrition Recommendations: Continue current diet and initiated ONS to promote healing of multiple wounds. Nutrition Assessment: Pt has increased needs AEB multiple wounds. At risk for malnutriton d/t increased metabolic demand to promote wound healing. Malnutrition Assessment:  · Malnutrition Status: At risk for malnutrition  · Context: Chronic illness  · Findings of the 6 clinical characteristics of malnutrition (Minimum of 2 out of 6 clinical characteristics is required to make the diagnosis of moderate or severe Protein Calorie Malnutrition based on AND/ASPEN Guidelines):  1. Energy Intake-(100%), Unable to assess    2. Weight Loss-No significant weight loss, unable to assess  3. Fat Loss-No significant subcutaneous fat loss,    4. Muscle Loss-No significant muscle mass loss,    5. Fluid Accumulation-No significant fluid accumulation,    6.  Strength-Not measured    Nutrition Risk Level:  Moderate    Nutrient Needs:  · Estimated Daily Total Kcal: 5871-9258(41-94SVTV/AU CBW)  · Estimated Daily Protein (g): 105-125(1.4-1.7g/kg IBW)  · Estimated Daily Total Fluid (ml/day): 0120-9835(1 ml/kcal)    Nutrition Diagnosis:   · Problem: Increased nutrient needs  · Etiology: related to Increased demand for energy/nutrients     Signs and symptoms:  as evidenced by Presence of wounds    Objective Information:  · Nutrition-Focused Physical Findings: AMS x2, +dentures, +BS, trace edema, +I/O  · Wound Type: Multiple(5+ wounds, on legs, feet, scrotum, buttocks) Wound Care consult Pending  · Current Nutrition Therapies:  · Oral Diet Orders: Carb Control 4 Carbs/Meal   · Oral Diet intake: %(PTA and currently per Pt)  · Anthropometric Measures:  · Ht: 6' (182.9 cm)   · Current Body Wt: 247 lb (112 kg)(4/11 Bed)  · Admission Body Wt: 230 lb (104.3 kg)(4/9 Bed)  · Usual Body Wt: 245 lb (111.1 kg)(Per pt statement, EMR says 220# on 11/18)  · % Weight Change:  ,  None significant  · Ideal Body Wt: 166 lb (75.3 kg), % Ideal Body 149%  · BMI Classification: BMI 30.0 - 34.9 Obese Class I    Nutrition Interventions:   Continue current diet, Start ONS(Ensure HiPro chocolate and Maxwell BID)  Continued Inpatient Monitoring, Coordination of Care, Education Not Indicated(Discharge plan to return to NH)    Nutrition Evaluation:   · Evaluation: Goals set   · Goals: Promote wound healing, start and maintain ONS intake    · Monitoring: Meal Intake, Supplement Intake, Diet Tolerance, Skin Integrity, Wound Healing, I&O, Mental Status/Confusion, Weight, Pertinent Labs      Electronically signed by Latisha Spencer RD, CNSC, LD on 4/11/19 at 1:50 PM    Contact Number: [phone]

## 2019-04-11 NOTE — PROGRESS NOTES
Occupational Therapy  OCCUPATIONAL THERAPY INITIAL EVALUATION      Date:2019  Patient Name: Michelle Oakley  MRN: [id number]  : 1940  Room: [address] [place] [address]    Evaluating OT: Esme Perez OTR/L [id number]    AM-PAC Daily Activity Raw Score:     Recommended Adaptive Equipment: none      Diagnosis: Sepsis. Pt presents to ED due to AMS. Pertinent Medical History: anxiety, DM, HTN, afib, rheumatoid disease   Precautions: falls, O2     Home Living: Pt lives at [address] [place] [address]. Pt reports assist in all ADLs and staff manages IADLs. Pt reports using a STS lift for transfers at the facility and that he has not walked in \"forever\"    Pain Level: no reported pain    Cognition: A&O: 3/4. Pt is alert and oriented to self, temporal concepts, and place. Disoriented to situation. Pt has low motivation for any movement. Problem solving: poor   Judgement/safety: poor     Functional Assessment:   Initial Eval Status  Date: 19   Feeding Min A   Grooming Min A   UB Dressing Max A   LB Dressing dependent   Bathing dependent   Toileting dependent   Bed Mobility  Supine <> sit: Max A   Functional Transfers STS: Max A x2  2 trials   Functional Mobility NT   Balance Sitting: Good    Standing: poor plus            Strength ROM Additional Info:    RUE  Proximal: 3-/5  Distal: 3+/5 WFL, limited end range shoulder motions good  and wfl FMC/dexterity noted during ADL tasks       LUE Proximal: 3-/5  Distal: 3+/5 WFL, limited end range shoulder motions good  and wfl FMC/dexterity noted during ADL tasks         Hearing: WFL   Vision: WFL   Sensation:  No c/o numbness or tingling                             Pt presents at baseline as compared to previous OT notes from previous hospitalizations and per pt reports of his functioning at his LTC facility. Pt has low motivation throughout session for any movements requiring max encouragement. OT evaluation only.      Eval Complexity: Low    Upon arrival, patient supine in bed. At end of session, patient supine in bed with call light and phone within reach, all lines and tubes intact. Low Evaluation     Evaluation time includes thorough review of current medical information, gathering information on past medical history/social history and prior level of function, completion of standardized testing/informal observation of tasks and assessment of data.     Love Wheat OTR/L  [id number]

## 2019-04-11 NOTE — PROGRESS NOTES
Patient admitted with  acute on chronic respiratory failure and metabolic encephalopathy and  noted to have Sepsis. If possible, please document in progress notes and d/c summary if sepsis was POA:    no Sepsis was not present at the time of the order to admit to the hospital, Nor did sepsis developed during the hospital stay  Sepsis ruled out, after study  Fever secondary to virus  Relatively low cortisol level for hypotension-relative hypo-adrenal state  The medical record reflects the following:    Risk Factors: wounds, human metapneumovirus    Clinical Indicators: per critical care, \"sepsis, with fever at wound care clinic, and with hypotension, tachycardia, and tachypnea documented in ED. ..\", fever per outpatient wound care, tachypnea, tachycardia    Treatment: IV steroids, aerosols, no antibiotics ordered, labs, monitoring

## 2019-04-12 LAB
ANION GAP SERPL CALCULATED.3IONS-SCNC: 9 MMOL/L (ref 7–16)
ANISOCYTOSIS: ABNORMAL
BASOPHILS ABSOLUTE: 0 E9/L (ref 0–0.2)
BASOPHILS RELATIVE PERCENT: 0 % (ref 0–2)
BUN BLDV-MCNC: 39 MG/DL (ref 8–23)
CALCIUM SERPL-MCNC: 9 MG/DL (ref 8.6–10.2)
CHLORIDE BLD-SCNC: 102 MMOL/L (ref 98–107)
CO2: 28 MMOL/L (ref 22–29)
CREAT SERPL-MCNC: 0.9 MG/DL (ref 0.7–1.2)
EKG ATRIAL RATE: 98 BPM
EKG P AXIS: 16 DEGREES
EKG P-R INTERVAL: 158 MS
EKG Q-T INTERVAL: 382 MS
EKG QRS DURATION: 76 MS
EKG QTC CALCULATION (BAZETT): 487 MS
EKG R AXIS: -23 DEGREES
EKG T AXIS: 36 DEGREES
EKG VENTRICULAR RATE: 98 BPM
EOSINOPHILS ABSOLUTE: 0 E9/L (ref 0.05–0.5)
EOSINOPHILS RELATIVE PERCENT: 0 % (ref 0–6)
GFR AFRICAN AMERICAN: >60
GFR NON-AFRICAN AMERICAN: >60 ML/MIN/1.73
GLUCOSE BLD-MCNC: 212 MG/DL (ref 74–99)
HCT VFR BLD CALC: 32 % (ref 37–54)
HEMOGLOBIN: 9.5 G/DL (ref 12.5–16.5)
LYMPHOCYTES ABSOLUTE: 1.04 E9/L (ref 1.5–4)
LYMPHOCYTES RELATIVE PERCENT: 9.5 % (ref 20–42)
MCH RBC QN AUTO: 26.5 PG (ref 26–35)
MCHC RBC AUTO-ENTMCNC: 29.7 % (ref 32–34.5)
MCV RBC AUTO: 89.1 FL (ref 80–99.9)
METER GLUCOSE: 230 MG/DL (ref 74–99)
METER GLUCOSE: 275 MG/DL (ref 74–99)
METER GLUCOSE: 320 MG/DL (ref 74–99)
METER GLUCOSE: 353 MG/DL (ref 74–99)
MONOCYTES ABSOLUTE: 0.1 E9/L (ref 0.1–0.95)
MONOCYTES RELATIVE PERCENT: 0.9 % (ref 2–12)
NEUTROPHILS ABSOLUTE: 9.36 E9/L (ref 1.8–7.3)
NEUTROPHILS RELATIVE PERCENT: 89.6 % (ref 43–80)
NUCLEATED RED BLOOD CELLS: 0 /100 WBC
OVALOCYTES: ABNORMAL
PDW BLD-RTO: 18.6 FL (ref 11.5–15)
PLATELET # BLD: 303 E9/L (ref 130–450)
PMV BLD AUTO: 9.2 FL (ref 7–12)
POIKILOCYTES: ABNORMAL
POLYCHROMASIA: ABNORMAL
POTASSIUM SERPL-SCNC: 4 MMOL/L (ref 3.5–5)
RBC # BLD: 3.59 E12/L (ref 3.8–5.8)
SODIUM BLD-SCNC: 139 MMOL/L (ref 132–146)
WBC # BLD: 10.4 E9/L (ref 4.5–11.5)

## 2019-04-12 PROCEDURE — 2060000000 HC ICU INTERMEDIATE R&B

## 2019-04-12 PROCEDURE — 94640 AIRWAY INHALATION TREATMENT: CPT

## 2019-04-12 PROCEDURE — 2700000000 HC OXYGEN THERAPY PER DAY

## 2019-04-12 PROCEDURE — 85025 COMPLETE CBC W/AUTO DIFF WBC: CPT

## 2019-04-12 PROCEDURE — 6360000002 HC RX W HCPCS: Performed by: INTERNAL MEDICINE

## 2019-04-12 PROCEDURE — 6370000000 HC RX 637 (ALT 250 FOR IP): Performed by: INTERNAL MEDICINE

## 2019-04-12 PROCEDURE — 94760 N-INVAS EAR/PLS OXIMETRY 1: CPT

## 2019-04-12 PROCEDURE — 82962 GLUCOSE BLOOD TEST: CPT

## 2019-04-12 PROCEDURE — 80048 BASIC METABOLIC PNL TOTAL CA: CPT

## 2019-04-12 PROCEDURE — 2580000003 HC RX 258

## 2019-04-12 PROCEDURE — 36415 COLL VENOUS BLD VENIPUNCTURE: CPT

## 2019-04-12 RX ORDER — SODIUM CHLORIDE 0.9 % (FLUSH) 0.9 %
SYRINGE (ML) INJECTION
Status: COMPLETED
Start: 2019-04-12 | End: 2019-04-12

## 2019-04-12 RX ADMIN — INSULIN LISPRO 10 UNITS: 100 INJECTION, SOLUTION INTRAVENOUS; SUBCUTANEOUS at 16:06

## 2019-04-12 RX ADMIN — INSULIN LISPRO 4 UNITS: 100 INJECTION, SOLUTION INTRAVENOUS; SUBCUTANEOUS at 08:18

## 2019-04-12 RX ADMIN — BUDESONIDE 1000 MCG: 0.5 SUSPENSION RESPIRATORY (INHALATION) at 10:21

## 2019-04-12 RX ADMIN — PANTOPRAZOLE SODIUM 40 MG: 40 TABLET, DELAYED RELEASE ORAL at 06:37

## 2019-04-12 RX ADMIN — IPRATROPIUM BROMIDE AND ALBUTEROL SULFATE 1 AMPULE: .5; 3 SOLUTION RESPIRATORY (INHALATION) at 15:43

## 2019-04-12 RX ADMIN — INSULIN LISPRO 3 UNITS: 100 INJECTION, SOLUTION INTRAVENOUS; SUBCUTANEOUS at 21:15

## 2019-04-12 RX ADMIN — FORMOTEROL FUMARATE DIHYDRATE 20 MCG: 20 SOLUTION RESPIRATORY (INHALATION) at 10:21

## 2019-04-12 RX ADMIN — BUDESONIDE 1000 MCG: 0.5 SUSPENSION RESPIRATORY (INHALATION) at 20:34

## 2019-04-12 RX ADMIN — METHYLPREDNISOLONE SODIUM SUCCINATE 40 MG: 40 INJECTION, POWDER, LYOPHILIZED, FOR SOLUTION INTRAMUSCULAR; INTRAVENOUS at 20:10

## 2019-04-12 RX ADMIN — IPRATROPIUM BROMIDE AND ALBUTEROL SULFATE 1 AMPULE: .5; 3 SOLUTION RESPIRATORY (INHALATION) at 00:16

## 2019-04-12 RX ADMIN — IPRATROPIUM BROMIDE AND ALBUTEROL SULFATE 1 AMPULE: .5; 3 SOLUTION RESPIRATORY (INHALATION) at 20:34

## 2019-04-12 RX ADMIN — ENOXAPARIN SODIUM 40 MG: 40 INJECTION SUBCUTANEOUS at 08:18

## 2019-04-12 RX ADMIN — Medication 10 ML: at 21:15

## 2019-04-12 RX ADMIN — IPRATROPIUM BROMIDE AND ALBUTEROL SULFATE 1 AMPULE: .5; 3 SOLUTION RESPIRATORY (INHALATION) at 10:20

## 2019-04-12 RX ADMIN — FORMOTEROL FUMARATE DIHYDRATE 20 MCG: 20 SOLUTION RESPIRATORY (INHALATION) at 20:34

## 2019-04-12 RX ADMIN — METHYLPREDNISOLONE SODIUM SUCCINATE 40 MG: 40 INJECTION, POWDER, LYOPHILIZED, FOR SOLUTION INTRAMUSCULAR; INTRAVENOUS at 11:11

## 2019-04-12 RX ADMIN — INSULIN LISPRO 8 UNITS: 100 INJECTION, SOLUTION INTRAVENOUS; SUBCUTANEOUS at 11:11

## 2019-04-12 RX ADMIN — INSULIN GLARGINE 10 UNITS: 100 INJECTION, SOLUTION SUBCUTANEOUS at 21:15

## 2019-04-12 RX ADMIN — METHYLPREDNISOLONE SODIUM SUCCINATE 40 MG: 40 INJECTION, POWDER, LYOPHILIZED, FOR SOLUTION INTRAMUSCULAR; INTRAVENOUS at 03:26

## 2019-04-12 ASSESSMENT — PAIN SCALES - GENERAL
PAINLEVEL_OUTOF10: 0
PAINLEVEL_OUTOF10: 0

## 2019-04-12 NOTE — PROGRESS NOTES
04/12/19 0022   Oxygen Therapy/Pulse Ox   O2 Device Nasal cannula   O2 Flow Rate (L/min) 4 L/min   Treatment   Breath Sounds Post-Tx Left Diminished  (upper airway wheezes post tx)   Breath Sounds Post-Tx Right Diminished   Post-Tx Pulse 80   Post-Tx Resps 16

## 2019-04-12 NOTE — PROGRESS NOTES
Pt seen/ examined. ROS x 10 neg. Except:  more alert. Denies shortness of breath or pain  Chart reviewed. meds reviewed. D/w nursing + family as available. EXAM:  BP (!) 112/58   Pulse 82   Temp 97.8 °F (36.6 °C) (Oral)   Resp 18   Ht 6' (1.829 m)   Wt 240 lb 12.8 oz (109.2 kg)   SpO2 96%   BMI 32.66 kg/m²   GEN: A+O NAD. HEENT:NCAT. EOMI. RAMÓN   NECK:  No JVD. No bruits. Trach midline. No thyromegaly. LUNGS: CTA w/o rales, rhonchi, wheezes. Good excursion. CV: rrr w/o mrg  ABD: soft, non tender. Nl BS. No organomegaly. EXT: no clubbing, cyanosis, edema.   NEURO:   Labs/data reviewed  LABS: CBC:   Lab Results   Component Value Date    WBC 10.4 04/12/2019    RBC 3.59 04/12/2019    HGB 9.5 04/12/2019    HCT 32.0 04/12/2019    MCV 89.1 04/12/2019    MCH 26.5 04/12/2019    MCHC 29.7 04/12/2019    RDW 18.6 04/12/2019     04/12/2019    MPV 9.2 04/12/2019     CMP:    Lab Results   Component Value Date     04/12/2019    K 4.0 04/12/2019    K 4.1 04/09/2019     04/12/2019    CO2 28 04/12/2019    BUN 39 04/12/2019    CREATININE 0.9 04/12/2019    GFRAA >60 04/12/2019    LABGLOM >60 04/12/2019    GLUCOSE 212 04/12/2019    GLUCOSE 138 03/26/2011    PROT 6.5 04/09/2019    LABALBU 3.6 04/09/2019    LABALBU 4.0 03/26/2011    CALCIUM 9.0 04/12/2019    BILITOT 0.6 04/09/2019    ALKPHOS 71 04/09/2019    AST 18 04/09/2019    ALT 8 04/09/2019       Current Facility-Administered Medications:     budesonide (PULMICORT) nebulizer suspension 1,000 mcg, 1 mg, Nebulization, BID, Pedro Luis Diaz MD, 1,000 mcg at 04/11/19 1946    ipratropium-albuterol (DUONEB) nebulizer solution 1 ampule, 1 ampule, Inhalation, Q4H, Pedro Luis Diaz MD, 1 ampule at 04/12/19 0016    sodium chloride nebulizer 0.9 % solution 3 mL, 3 mL, Nebulization, Q4H PRN, Radha Finn, DO, 3 mL at 04/10/19 0921    insulin glargine (LANTUS) injection vial 10 Units, 10 Units, Subcutaneous, Nightly, Omar Harmon DO, 10 Units at 04/11/19 2047    insulin lispro (HUMALOG) injection vial 0-12 Units, 0-12 Units, Subcutaneous, TID WC, Nicko Brannoner, DO, 4 Units at 04/11/19 1632    insulin lispro (HUMALOG) injection vial 0-6 Units, 0-6 Units, Subcutaneous, Nightly, Nicko Ying, DO, 4 Units at 04/11/19 2047    glucose (GLUTOSE) 40 % oral gel 15 g, 15 g, Oral, PRN, Nicko Brannoner, DO    dextrose 50 % solution 12.5 g, 12.5 g, Intravenous, PRN, Nicko Brannoner, DO    glucagon (rDNA) injection 1 mg, 1 mg, Intramuscular, PRN, Nicko Brannoner, DO    dextrose 5 % solution, 100 mL/hr, Intravenous, PRN, Nicko Brannoner, DO    methylPREDNISolone sodium (SOLU-MEDROL) injection 40 mg, 40 mg, Intravenous, Q8H, Omar Harmon DO, 40 mg at 04/12/19 0326    pantoprazole (PROTONIX) tablet 40 mg, 40 mg, Oral, QAM AC, Omar Harmon, DO, 40 mg at 04/12/19 7502    enoxaparin (LOVENOX) injection 40 mg, 40 mg, Subcutaneous, Daily, Nicko Ying DO, 40 mg at 04/11/19 0850    formoterol (PERFOROMIST) nebulizer solution 20 mcg, 20 mcg, Nebulization, BID, Nicko Ying DO, 20 mcg at 04/11/19 1946    A/P:      Patient Active Problem List   Diagnosis    Cellulitis    Sepsis ([facility]    Dementia    Metabolic encephalopathy    Diabetes mellitus type 2, uncontrolled ([facility]    Hyperlipidemia LDL goal <100    Essential hypertension    Venous ulcer of left leg (HCC)    Non-pressure chronic ulcer of left lower leg with fat layer exposed ([facility] [address] [facility]    Non-pressure chronic ulcer left lower leg, limited to breakdown skin (HCC)    Hypotension    Severe protein-calorie malnutrition (HCC)    Non-pressure chronic ulcer right lower leg, limited to breakdown skin (HCC)    Pressure injury of contiguous region involving back, buttock, and hip, stage 2    Pressure injury of calf, stage 2    HCAP (healthcare-associated pneumonia)    Paroxysmal atrial fibrillation ([facility] [address]   1. Fever with hypoxia: human metapneumovirus infection.   2. Hypotension: possibly due to sepsis with underlying adrenal insufficiency and dehydration. 3. Recent healthcare associated pneumonia. Followed by Dr. Rupesh Harrisonr   4. p afib- not a  candidate for anticoagulation   5. Bullous pemphigoid   6. Acute on chronic respiratory failure with hypoxia   7. Acute on chronic diastolic CHF from A. fib-proBNP of 1,081   8. COPD   9. Metabolic encephalopathy        PLAN:increase activity.   Discharge when okay with others

## 2019-04-12 NOTE — PATIENT CARE CONFERENCE
[facility] Quality Flow/Interdisciplinary Rounds Progress Note        Quality Flow Rounds held on April 12, 2019    Disciplines Attending:  Bedside Nurse, ,  and Nursing Unit Gunnar Hernandes was admitted on 4/9/2019  9:24 AM    Anticipated Discharge Date:  Expected Discharge Date: 04/12/19    Disposition:    Nick Score:  Nick Scale Score: 14    Readmission Score:         Discussed patient goal for the day, patient clinical progression, and barriers to discharge. The following Goal(s) of the Day/Commitment(s) have been identified:  Discharge planning to [facility] and IV steroids.        Danish Levin  April 12, 2019

## 2019-04-12 NOTE — PROGRESS NOTES
[address] [place] [address] [facility]  Progress Note    SUBJECTIVE:  Chief Complaint   Patient presents with    Fever     sent from [facility] for r/o sepsis. tylenol given 0845    Tachycardia    Altered Mental Status     pt has increased confusion A&Ox1      Nursing reports no new complaints. Nausea or vomiting. No fever. Review of systems:  As stated above in the chief complaint, otherwise negative. Medications:  Scheduled Meds:   budesonide  1 mg Nebulization BID    ipratropium-albuterol  1 ampule Inhalation Q4H    insulin glargine  10 Units Subcutaneous Nightly    insulin lispro  0-12 Units Subcutaneous TID WC    insulin lispro  0-6 Units Subcutaneous Nightly    methylPREDNISolone  40 mg Intravenous Q8H    pantoprazole  40 mg Oral QAM AC    enoxaparin  40 mg Subcutaneous Daily    formoterol  20 mcg Nebulization BID     Continuous Infusions:   dextrose       PRN Meds:sodium chloride nebulizer, glucose, dextrose, glucagon (rDNA), dextrose    OBJECTIVE:  BP (!) 100/55   Pulse 80   Temp 97.3 °F (36.3 °C) (Axillary)   Resp 16   Ht 6' (1.829 m)   Wt 240 lb 12.8 oz (109.2 kg)   SpO2 99%   BMI 32.66 kg/m²   Temp  Av.6 °F (36.4 °C)  Min: 97.3 °F (36.3 °C)  Max: 97.8 °F (36.6 °C)  Constitutional: Patient is asleep. Opens eyes. Skin: Warm and dry  HEENT: No thrush. Chest: Good breath sounds. No wheezing, crackles or rhonchi. Cardiovascular: S1 and S2 rhythmic and regular. Abdomen: Positive bowel sounds. Soft to palpation. Extremities: Bilateral legs superficial ulcers. No drainage. Covered in Kerlix. No cellulitis. Lines: peripheral  Ballesteros catheter with clear urine.     Laboratory and Tests Review:  Lab Results   Component Value Date    WBC 10.4 2019    WBC 3.2 (L) 2019    WBC 4.9 04/10/2019    HGB 9.5 (L) 2019    HCT 32.0 (L) 2019    MCV 89.1 2019     2019     Lab Results   Component Value Date    NEUTROABS 9.36 (H) 2019

## 2019-04-12 NOTE — PROGRESS NOTES
(approximate 1800 kcals/day)  Dietary Nutrition Supplements: Low Calorie High Protein Supplement  Dietary Nutrition Supplements: Wound Healing Oral Supplement     EXAM:  General: No distress. Alert. Eyes: PERRL. No sclera icterus. No conjunctival injection. ENT: No discharge. Pharynx clear. Neck: Trachea midline. Normal thyroid. Resp: No accessory muscle use. No rales. Diffuse wheezing. No rhonchi. CV: Regular rate. Regular rhythm. No murmur or rub. Abd: Non-tender. Non-distended. No masses. No organomegaly. Normal bowel sounds. Skin: Warm and dry. No nodule on exposed extremities. No rash on exposed extremities. Ext: No cyanosis, clubbing, edema  Lymph: No cervical LAD. No supraclavicular LAD. M/S: No cyanosis. No joint deformity. No clubbing. Neuro: Awake. Follows commands. Positive pupils/gag/corneals. Normal pain response. Results:  CBC:   Recent Labs     04/10/19  0530 04/11/19 0315 04/12/19  0330   WBC 4.9 3.2* 10.4   HGB 9.7* 9.8* 9.5*   HCT 32.7* 32.4* 32.0*   MCV 91.3 88.5 89.1    255 303     BMP:   Recent Labs     04/10/19  0530 04/11/19  0315 04/12/19  0330    138 139   K 4.1 3.6 4.0    104 102   CO2 25 23 28   BUN 16 23 39*   CREATININE 0.9 0.9 0.9     LIVER PROFILE:   No results for input(s): AST, ALT, LIPASE, BILIDIR, BILITOT, ALKPHOS in the last 72 hours. Invalid input(s): AMYLASE,  ALB  PT/INR: No results for input(s): PROTIME, INR in the last 72 hours. APTT: No results for input(s): APTT in the last 72 hours. Pathology:  1. N/A      Microbiology:  1. None    Recent ABG:   No results for input(s): PH, PO2, PCO2, HCO3, BE, O2SAT, METHB, O2HB, COHB, O2CON, HHB, THB in the last 72 hours. Recent Films:  XR CHEST PORTABLE   Final Result   No interval change               CT Head WO Contrast   Final Result   Generalized cerebral atrophy. Small vessel ischemic change   involving the white matter.  No apparent acute abnormality or   intracranial hemorrhage or hematoma. Pansinusitis with scattered   air/fluid levels. XR CHEST PORTABLE   Final Result   Cardiomegaly. CHF with interstitial edema. No definite   pneumonic consolidation. Assessment:  1. Fever with hypoxia: human metapneumovirus infection. 2. Hypotension: possibly due to sepsis with underlying adrenal insufficiency and dehydration. 3. Recent healthcare associated pneumonia. Followed by Dr. Dutch Oppenheim. Plan:  1. Adjust aerosol therapies. 2. Continue steroids. 3. Try IPPB as this is resolving far too slowly    Care reviewed with the staff and the patient's family as available. Please note that voice recognition technology was used in the preparation of this note and make therefore it may contain inadvertent transcription errors. Marlin Lombardi M.D., F.C.C.P.     [facility], [address] [place] [address], [address] [place] [address]

## 2019-04-12 NOTE — PLAN OF CARE
Problem: Falls - Risk of:  Goal: Will remain free from falls  Description  Will remain free from falls  4/12/2019 0115 by Mina Hurd RN  Outcome: Met This Shift     Problem: Falls - Risk of:  Goal: Absence of physical injury  Description  Absence of physical injury  4/12/2019 0115 by Mina Hurd RN  Outcome: Met This Shift     Problem: Risk for Impaired Skin Integrity  Goal: Tissue integrity - skin and mucous membranes  Description  Structural intactness and normal physiological function of skin and  mucous membranes.   Outcome: Met This Shift     Problem: Pain:  Goal: Pain level will decrease  Description  Pain level will decrease     Outcome: Met This Shift     Problem: Confusion - Acute:  Goal: Absence of continued neurological deterioration signs and symptoms  Description  Absence of continued neurological deterioration signs and symptoms  4/12/2019 0115 by Mina Hurd RN  Outcome: Met This Shift     Problem: Injury - Risk of, Physical Injury:  Goal: Will remain free from falls  Description  Will remain free from falls  4/12/2019 0115 by Mina Hurd RN  Outcome: Met This Shift     Problem: Injury - Risk of, Physical Injury:  Goal: Absence of physical injury  Description  Absence of physical injury  4/12/2019 0115 by Mina Hurd RN  Outcome: Met This Shift     Problem: Mood - Altered:  Goal: Mood stable  Description  Mood stable  4/12/2019 0115 by Mina Hurd RN  Outcome: Met This Shift     Problem: Mood - Altered:  Goal: Absence of abusive behavior  Description  Absence of abusive behavior  Outcome: Met This Shift     Problem: Mood - Altered:  Goal: Verbalizations of feeling emotionally comfortable while being cared for will increase  Description  Verbalizations of feeling emotionally comfortable while being cared for will increase  Outcome: Met This Shift     Problem: Psychomotor Activity - Altered:  Goal: Absence of psychomotor disturbance signs and symptoms  Description  Absence of psychomotor disturbance signs and symptoms  Outcome: Met This Shift     Problem: Skin Integrity:  Goal: Will show no infection signs and symptoms  Description  Will show no infection signs and symptoms  Outcome: Met This Shift     Problem: Mental Status - Impaired:  Goal: Mental status restored to baseline  Outcome: Not Met This Shift     Problem: Confusion - Acute:  Goal: Mental status will be restored to baseline  Description  Mental status will be restored to baseline  Outcome: Not Met This Shift

## 2019-04-13 LAB
ANION GAP SERPL CALCULATED.3IONS-SCNC: 8 MMOL/L (ref 7–16)
ANISOCYTOSIS: ABNORMAL
BASOPHILS ABSOLUTE: 0 E9/L (ref 0–0.2)
BASOPHILS RELATIVE PERCENT: 0 % (ref 0–2)
BUN BLDV-MCNC: 56 MG/DL (ref 8–23)
CALCIUM SERPL-MCNC: 8.8 MG/DL (ref 8.6–10.2)
CHLORIDE BLD-SCNC: 106 MMOL/L (ref 98–107)
CO2: 28 MMOL/L (ref 22–29)
CREAT SERPL-MCNC: 0.9 MG/DL (ref 0.7–1.2)
EOSINOPHILS ABSOLUTE: 0 E9/L (ref 0.05–0.5)
EOSINOPHILS RELATIVE PERCENT: 0 % (ref 0–6)
GFR AFRICAN AMERICAN: >60
GFR NON-AFRICAN AMERICAN: >60 ML/MIN/1.73
GLUCOSE BLD-MCNC: 246 MG/DL (ref 74–99)
HCT VFR BLD CALC: 30.9 % (ref 37–54)
HEMOGLOBIN: 9.2 G/DL (ref 12.5–16.5)
HYPOCHROMIA: ABNORMAL
LYMPHOCYTES ABSOLUTE: 0.54 E9/L (ref 1.5–4)
LYMPHOCYTES RELATIVE PERCENT: 6.2 % (ref 20–42)
MCH RBC QN AUTO: 26.5 PG (ref 26–35)
MCHC RBC AUTO-ENTMCNC: 29.8 % (ref 32–34.5)
MCV RBC AUTO: 89 FL (ref 80–99.9)
METER GLUCOSE: 236 MG/DL (ref 74–99)
METER GLUCOSE: 270 MG/DL (ref 74–99)
METER GLUCOSE: 274 MG/DL (ref 74–99)
METER GLUCOSE: 300 MG/DL (ref 74–99)
MONOCYTES ABSOLUTE: 0.54 E9/L (ref 0.1–0.95)
MONOCYTES RELATIVE PERCENT: 6.2 % (ref 2–12)
NEUTROPHILS ABSOLUTE: 7.83 E9/L (ref 1.8–7.3)
NEUTROPHILS RELATIVE PERCENT: 86.7 % (ref 43–80)
NUCLEATED RED BLOOD CELLS: 0 /100 WBC
OVALOCYTES: ABNORMAL
PDW BLD-RTO: 18.9 FL (ref 11.5–15)
PLATELET # BLD: 279 E9/L (ref 130–450)
PMV BLD AUTO: 9.5 FL (ref 7–12)
POIKILOCYTES: ABNORMAL
POTASSIUM SERPL-SCNC: 4.5 MMOL/L (ref 3.5–5)
PROMYELOCYTES PERCENT: 0.9 % (ref 0–0)
RBC # BLD: 3.47 E12/L (ref 3.8–5.8)
SODIUM BLD-SCNC: 142 MMOL/L (ref 132–146)
WBC # BLD: 9 E9/L (ref 4.5–11.5)

## 2019-04-13 PROCEDURE — 6360000002 HC RX W HCPCS: Performed by: INTERNAL MEDICINE

## 2019-04-13 PROCEDURE — 2700000000 HC OXYGEN THERAPY PER DAY

## 2019-04-13 PROCEDURE — 85025 COMPLETE CBC W/AUTO DIFF WBC: CPT

## 2019-04-13 PROCEDURE — 6370000000 HC RX 637 (ALT 250 FOR IP): Performed by: INTERNAL MEDICINE

## 2019-04-13 PROCEDURE — 82962 GLUCOSE BLOOD TEST: CPT

## 2019-04-13 PROCEDURE — 2060000000 HC ICU INTERMEDIATE R&B

## 2019-04-13 PROCEDURE — 80048 BASIC METABOLIC PNL TOTAL CA: CPT

## 2019-04-13 PROCEDURE — 36415 COLL VENOUS BLD VENIPUNCTURE: CPT

## 2019-04-13 PROCEDURE — 94640 AIRWAY INHALATION TREATMENT: CPT

## 2019-04-13 RX ORDER — METHYLPREDNISOLONE SODIUM SUCCINATE 40 MG/ML
40 INJECTION, POWDER, LYOPHILIZED, FOR SOLUTION INTRAMUSCULAR; INTRAVENOUS EVERY 12 HOURS
Status: COMPLETED | OUTPATIENT
Start: 2019-04-13 | End: 2019-04-14

## 2019-04-13 RX ADMIN — FORMOTEROL FUMARATE DIHYDRATE 20 MCG: 20 SOLUTION RESPIRATORY (INHALATION) at 20:20

## 2019-04-13 RX ADMIN — INSULIN LISPRO 6 UNITS: 100 INJECTION, SOLUTION INTRAVENOUS; SUBCUTANEOUS at 13:10

## 2019-04-13 RX ADMIN — FORMOTEROL FUMARATE DIHYDRATE 20 MCG: 20 SOLUTION RESPIRATORY (INHALATION) at 08:10

## 2019-04-13 RX ADMIN — IPRATROPIUM BROMIDE AND ALBUTEROL SULFATE 1 AMPULE: .5; 3 SOLUTION RESPIRATORY (INHALATION) at 12:33

## 2019-04-13 RX ADMIN — INSULIN LISPRO 8 UNITS: 100 INJECTION, SOLUTION INTRAVENOUS; SUBCUTANEOUS at 16:05

## 2019-04-13 RX ADMIN — BUDESONIDE 1000 MCG: 0.5 SUSPENSION RESPIRATORY (INHALATION) at 20:20

## 2019-04-13 RX ADMIN — PANTOPRAZOLE SODIUM 40 MG: 40 TABLET, DELAYED RELEASE ORAL at 09:19

## 2019-04-13 RX ADMIN — METHYLPREDNISOLONE SODIUM SUCCINATE 40 MG: 40 INJECTION, POWDER, LYOPHILIZED, FOR SOLUTION INTRAMUSCULAR; INTRAVENOUS at 03:32

## 2019-04-13 RX ADMIN — IPRATROPIUM BROMIDE AND ALBUTEROL SULFATE 1 AMPULE: .5; 3 SOLUTION RESPIRATORY (INHALATION) at 23:33

## 2019-04-13 RX ADMIN — IPRATROPIUM BROMIDE AND ALBUTEROL SULFATE 1 AMPULE: .5; 3 SOLUTION RESPIRATORY (INHALATION) at 00:24

## 2019-04-13 RX ADMIN — IPRATROPIUM BROMIDE AND ALBUTEROL SULFATE 1 AMPULE: .5; 3 SOLUTION RESPIRATORY (INHALATION) at 16:39

## 2019-04-13 RX ADMIN — BUDESONIDE 1000 MCG: 0.5 SUSPENSION RESPIRATORY (INHALATION) at 08:10

## 2019-04-13 RX ADMIN — IPRATROPIUM BROMIDE AND ALBUTEROL SULFATE 1 AMPULE: .5; 3 SOLUTION RESPIRATORY (INHALATION) at 08:10

## 2019-04-13 RX ADMIN — INSULIN LISPRO 3 UNITS: 100 INJECTION, SOLUTION INTRAVENOUS; SUBCUTANEOUS at 20:54

## 2019-04-13 RX ADMIN — IPRATROPIUM BROMIDE AND ALBUTEROL SULFATE 1 AMPULE: .5; 3 SOLUTION RESPIRATORY (INHALATION) at 04:24

## 2019-04-13 RX ADMIN — ENOXAPARIN SODIUM 40 MG: 40 INJECTION SUBCUTANEOUS at 09:19

## 2019-04-13 RX ADMIN — METHYLPREDNISOLONE SODIUM SUCCINATE 40 MG: 40 INJECTION, POWDER, LYOPHILIZED, FOR SOLUTION INTRAMUSCULAR; INTRAVENOUS at 16:11

## 2019-04-13 RX ADMIN — INSULIN LISPRO 4 UNITS: 100 INJECTION, SOLUTION INTRAVENOUS; SUBCUTANEOUS at 09:56

## 2019-04-13 RX ADMIN — INSULIN GLARGINE 10 UNITS: 100 INJECTION, SOLUTION SUBCUTANEOUS at 20:53

## 2019-04-13 RX ADMIN — IPRATROPIUM BROMIDE AND ALBUTEROL SULFATE 1 AMPULE: .5; 3 SOLUTION RESPIRATORY (INHALATION) at 20:20

## 2019-04-13 ASSESSMENT — PAIN SCALES - GENERAL
PAINLEVEL_OUTOF10: 0
PAINLEVEL_OUTOF10: 0

## 2019-04-13 NOTE — PLAN OF CARE
Problem: Falls - Risk of:  Goal: Will remain free from falls  Description  Will remain free from falls  Outcome: Met This Shift  Goal: Absence of physical injury  Description  Absence of physical injury  Outcome: Met This Shift     Problem: Injury - Risk of, Physical Injury:  Goal: Will remain free from falls  Description  Will remain free from falls  Outcome: Met This Shift  Goal: Absence of physical injury  Description  Absence of physical injury  Outcome: Met This Shift

## 2019-04-13 NOTE — PROGRESS NOTES
Pt seen/ examined. ROS x 10 neg. Except:  more alert. Denies shortness of breath or pain  Chart reviewed. meds reviewed. D/w nursing + family as available. EXAM:  /60   Pulse 76   Temp 98 °F (36.7 °C) (Oral)   Resp 16   Ht 6' (1.829 m)   Wt 248 lb 6.4 oz (112.7 kg)   SpO2 95%   BMI 33.69 kg/m²   GEN: A+O NAD. HEENT:NCAT. EOMI. RAMÓN   NECK:  No JVD. No bruits. Trach midline. No thyromegaly. LUNGS: CTA w/o rales, rhonchi, wheezes. Good excursion. CV: rrr w/o mrg  ABD: soft, non tender. Nl BS. No organomegaly. EXT: no clubbing, cyanosis, edema.   NEURO:   Labs/data reviewed  LABS: CBC:   Lab Results   Component Value Date    WBC 9.0 04/13/2019    RBC 3.47 04/13/2019    HGB 9.2 04/13/2019    HCT 30.9 04/13/2019    MCV 89.0 04/13/2019    MCH 26.5 04/13/2019    MCHC 29.8 04/13/2019    RDW 18.9 04/13/2019     04/13/2019    MPV 9.5 04/13/2019     CMP:    Lab Results   Component Value Date     04/13/2019    K 4.5 04/13/2019    K 4.1 04/09/2019     04/13/2019    CO2 28 04/13/2019    BUN 56 04/13/2019    CREATININE 0.9 04/13/2019    GFRAA >60 04/13/2019    LABGLOM >60 04/13/2019    GLUCOSE 246 04/13/2019    GLUCOSE 138 03/26/2011    PROT 6.5 04/09/2019    LABALBU 3.6 04/09/2019    LABALBU 4.0 03/26/2011    CALCIUM 8.8 04/13/2019    BILITOT 0.6 04/09/2019    ALKPHOS 71 04/09/2019    AST 18 04/09/2019    ALT 8 04/09/2019       Current Facility-Administered Medications:     budesonide (PULMICORT) nebulizer suspension 1,000 mcg, 1 mg, Nebulization, BID, Sidra Borges MD, 1,000 mcg at 04/12/19 2034    ipratropium-albuterol (DUONEB) nebulizer solution 1 ampule, 1 ampule, Inhalation, Q4H, Sidra Borges MD, 1 ampule at 04/13/19 0424    sodium chloride nebulizer 0.9 % solution 3 mL, 3 mL, Nebulization, Q4H PRN, Shannon San DO, 3 mL at 04/10/19 0921    insulin glargine (LANTUS) injection vial 10 Units, 10 Units, Subcutaneous, Nightly, Shannon San DO, 10 Units at 04/12/19

## 2019-04-13 NOTE — PROGRESS NOTES
04/11/19  0315 04/12/19  0330 04/13/19  0340    139 142   K 3.6 4.0 4.5    102 106   CO2 23 28 28   BUN 23 39* 56*   CREATININE 0.9 0.9 0.9    ALB:3,BILIDIR:3,BILITOT:3,ALKPHOS:3)@    PT/INR: No results for input(s): PROTIME, INR in the last 72 hours. ABG:   No results for input(s): PH, PO2, PCO2, HCO3, BE, O2SAT, METHB, O2HB, COHB, O2CON, HHB, THB in the last 72 hours. Radiology/Other tests reviewed: none    Assessment:     Active Problems:    Sepsis ([address]  Resolved Problems:    * No resolved hospital problems. *      Plan:       1. Taper steroids as tolerated  2. Cont with nebs and EZPAP  3. Cont with oxygen  4. OOB to chair as tolerated        Thanks for letting us see this patient in consultation. Please contact us with any questions. Office [phone] or after hours through [employer], x [phone].

## 2019-04-13 NOTE — PLAN OF CARE
Problem: Falls - Risk of:  Goal: Will remain free from falls  Description  Will remain free from falls  Outcome: Met This Shift    Problem: Confusion - Acute:  Goal: Mental status will be restored to baseline  Description  Mental status will be restored to baseline  Outcome: Not Met This Shift     Problem: Injury - Risk of, Physical Injury:  Goal: Will remain free from falls  Description  Will remain free from falls  Outcome: Met This Shift

## 2019-04-13 NOTE — PROGRESS NOTES
[address] [place] [address] [facility]  Progress Note    SUBJECTIVE:  Chief Complaint   Patient presents with    Fever     sent from [facility] for r/o sepsis. tylenol given 0845    Tachycardia    Altered Mental Status     pt has increased confusion A&Ox1      Nursing reports no new complaints. Nausea or vomiting. No fever. Review of systems:  As stated above in the chief complaint, otherwise negative. Medications:  Scheduled Meds:   methylPREDNISolone  40 mg Intravenous Q12H    budesonide  1 mg Nebulization BID    ipratropium-albuterol  1 ampule Inhalation Q4H    insulin glargine  10 Units Subcutaneous Nightly    insulin lispro  0-12 Units Subcutaneous TID WC    insulin lispro  0-6 Units Subcutaneous Nightly    pantoprazole  40 mg Oral QAM AC    enoxaparin  40 mg Subcutaneous Daily    formoterol  20 mcg Nebulization BID     Continuous Infusions:   dextrose       PRN Meds:sodium chloride nebulizer, glucose, dextrose, glucagon (rDNA), dextrose    OBJECTIVE:  /68   Pulse 64   Temp 96.5 °F (35.8 °C) (Oral)   Resp 16   Ht 6' (1.829 m)   Wt 248 lb 6.4 oz (112.7 kg)   SpO2 95%   BMI 33.69 kg/m²   Temp  Av.3 °F (36.3 °C)  Min: 96.5 °F (35.8 °C)  Max: 98 °F (36.7 °C)  Constitutional: Patient is asleep. Opens eyes. Skin: Warm and dry  HEENT: No thrush. Chest: Good breath sounds. No wheezing, crackles or rhonchi. Cardiovascular: S1 and S2 rhythmic and regular. Abdomen: Positive bowel sounds. Soft to palpation. Extremities: Bilateral legs superficial ulcers. No drainage. Covered in Kerlix. No cellulitis. Lines: peripheral  Ballesteros catheter with clear urine.     Laboratory and Tests Review:  Lab Results   Component Value Date    WBC 9.0 2019    WBC 10.4 2019    WBC 3.2 (L) 2019    HGB 9.2 (L) 2019    HCT 30.9 (L) 2019    MCV 89.0 2019     2019     Lab Results   Component Value Date    NEUTROABS 7.83 (H) 2019 NEUTROABS 9.36 (H) 04/12/2019    NEUTROABS 2.72 04/11/2019     Lab Results   Component Value Date    CRP 13.3 (H) 04/09/2019    CRP 14.7 (H) 01/09/2019    CRP 7.3 (H) 08/12/2018     No results found for: [facility]  Lab Results   Component Value Date    SEDRATE 55 (H) 04/09/2019    SEDRATE 111 (H) 08/12/2018    SEDRATE 78 (H) 08/02/2018     Lab Results   Component Value Date    ALT 8 04/09/2019    AST 18 04/09/2019     (H) 07/18/2018    ALKPHOS 71 04/09/2019    BILITOT 0.6 04/09/2019     Lab Results   Component Value Date     04/13/2019    K 4.5 04/13/2019    K 4.1 04/09/2019     04/13/2019    CO2 28 04/13/2019    BUN 56 04/13/2019    CREATININE 0.9 04/13/2019    CREATININE 0.9 04/12/2019    CREATININE 0.9 04/11/2019    GFRAA >60 04/13/2019    LABGLOM >60 04/13/2019    GLUCOSE 246 04/13/2019    GLUCOSE 138 03/26/2011    PROT 6.5 04/09/2019    LABALBU 3.6 04/09/2019    LABALBU 4.0 03/26/2011    CALCIUM 8.8 04/13/2019    BILITOT 0.6 04/09/2019    ALKPHOS 71 04/09/2019    AST 18 04/09/2019    ALT 8 04/09/2019     Radiology:      Microbiology:   Respiratory panel: Human Metapneumovirus  Influenza by PCR: Negative  Wound cultures leg Pseudomonas aeruginosa (light growth)  Urine culture pending  Nares MRSA: Positive  Procalcitonin: 0.24    ASSESSMENT:  · Human Metapneumovirus infection. No evidence of bacterial infection  · Fever secondary to Metapneumovirus, resolved  · Hypotension has improved  · History of bullous pemphigoid and lower extremities. 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 No infection    PLAN:  · Observe off antibiotics  · Continue wound care  · Patient can be discharged from ID standpoint    [facility]  1:55 PM  4/13/2019  Pt seen and examined. Above discussed agree with advanced practice nurse. Labs, cultures, and radiographs reviewed. Face to Face encounter occurred. Changes made as necessary.      Xavier Liu MD

## 2019-04-14 LAB
ANION GAP SERPL CALCULATED.3IONS-SCNC: 9 MMOL/L (ref 7–16)
BASOPHILS ABSOLUTE: 0 E9/L (ref 0–0.2)
BASOPHILS RELATIVE PERCENT: 0 % (ref 0–2)
BLOOD CULTURE, ROUTINE: NORMAL
BUN BLDV-MCNC: 49 MG/DL (ref 8–23)
CALCIUM SERPL-MCNC: 8.8 MG/DL (ref 8.6–10.2)
CHLORIDE BLD-SCNC: 105 MMOL/L (ref 98–107)
CO2: 29 MMOL/L (ref 22–29)
CREAT SERPL-MCNC: 0.9 MG/DL (ref 0.7–1.2)
CULTURE, BLOOD 2: NORMAL
EOSINOPHILS ABSOLUTE: 0 E9/L (ref 0.05–0.5)
EOSINOPHILS RELATIVE PERCENT: 0 % (ref 0–6)
GFR AFRICAN AMERICAN: >60
GFR NON-AFRICAN AMERICAN: >60 ML/MIN/1.73
GLUCOSE BLD-MCNC: 222 MG/DL (ref 74–99)
HCT VFR BLD CALC: 31 % (ref 37–54)
HEMOGLOBIN: 9.1 G/DL (ref 12.5–16.5)
HYPOCHROMIA: ABNORMAL
LYMPHOCYTES ABSOLUTE: 0.97 E9/L (ref 1.5–4)
LYMPHOCYTES RELATIVE PERCENT: 9.6 % (ref 20–42)
MCH RBC QN AUTO: 26.2 PG (ref 26–35)
MCHC RBC AUTO-ENTMCNC: 29.4 % (ref 32–34.5)
MCV RBC AUTO: 89.3 FL (ref 80–99.9)
METER GLUCOSE: 192 MG/DL (ref 74–99)
METER GLUCOSE: 216 MG/DL (ref 74–99)
METER GLUCOSE: 229 MG/DL (ref 74–99)
METER GLUCOSE: 286 MG/DL (ref 74–99)
METER GLUCOSE: 335 MG/DL (ref 74–99)
MONOCYTES ABSOLUTE: 0.39 E9/L (ref 0.1–0.95)
MONOCYTES RELATIVE PERCENT: 4.3 % (ref 2–12)
MYELOCYTE PERCENT: 0.9 % (ref 0–0)
NEUTROPHILS ABSOLUTE: 8.25 E9/L (ref 1.8–7.3)
NEUTROPHILS RELATIVE PERCENT: 84.3 % (ref 43–80)
NUCLEATED RED BLOOD CELLS: 0 /100 WBC
OVALOCYTES: ABNORMAL
PDW BLD-RTO: 18.6 FL (ref 11.5–15)
PLATELET # BLD: 280 E9/L (ref 130–450)
PMV BLD AUTO: 9.2 FL (ref 7–12)
POIKILOCYTES: ABNORMAL
POTASSIUM SERPL-SCNC: 4.5 MMOL/L (ref 3.5–5)
PROMYELOCYTES PERCENT: 0.9 % (ref 0–0)
RBC # BLD: 3.47 E12/L (ref 3.8–5.8)
SODIUM BLD-SCNC: 143 MMOL/L (ref 132–146)
WBC # BLD: 9.7 E9/L (ref 4.5–11.5)

## 2019-04-14 PROCEDURE — 94640 AIRWAY INHALATION TREATMENT: CPT

## 2019-04-14 PROCEDURE — 36415 COLL VENOUS BLD VENIPUNCTURE: CPT

## 2019-04-14 PROCEDURE — 6360000002 HC RX W HCPCS: Performed by: INTERNAL MEDICINE

## 2019-04-14 PROCEDURE — 80048 BASIC METABOLIC PNL TOTAL CA: CPT

## 2019-04-14 PROCEDURE — 82962 GLUCOSE BLOOD TEST: CPT

## 2019-04-14 PROCEDURE — 2060000000 HC ICU INTERMEDIATE R&B

## 2019-04-14 PROCEDURE — 6370000000 HC RX 637 (ALT 250 FOR IP): Performed by: INTERNAL MEDICINE

## 2019-04-14 PROCEDURE — 6370000000 HC RX 637 (ALT 250 FOR IP): Performed by: CLINICAL NURSE SPECIALIST

## 2019-04-14 PROCEDURE — 2700000000 HC OXYGEN THERAPY PER DAY

## 2019-04-14 PROCEDURE — 85025 COMPLETE CBC W/AUTO DIFF WBC: CPT

## 2019-04-14 RX ADMIN — MUPIROCIN: 20 OINTMENT TOPICAL at 21:01

## 2019-04-14 RX ADMIN — INSULIN LISPRO 4 UNITS: 100 INJECTION, SOLUTION INTRAVENOUS; SUBCUTANEOUS at 09:06

## 2019-04-14 RX ADMIN — PANTOPRAZOLE SODIUM 40 MG: 40 TABLET, DELAYED RELEASE ORAL at 06:49

## 2019-04-14 RX ADMIN — FORMOTEROL FUMARATE DIHYDRATE 20 MCG: 20 SOLUTION RESPIRATORY (INHALATION) at 08:10

## 2019-04-14 RX ADMIN — METHYLPREDNISOLONE SODIUM SUCCINATE 40 MG: 40 INJECTION, POWDER, LYOPHILIZED, FOR SOLUTION INTRAMUSCULAR; INTRAVENOUS at 15:02

## 2019-04-14 RX ADMIN — IPRATROPIUM BROMIDE AND ALBUTEROL SULFATE 1 AMPULE: .5; 3 SOLUTION RESPIRATORY (INHALATION) at 08:10

## 2019-04-14 RX ADMIN — BUDESONIDE 1000 MCG: 0.5 SUSPENSION RESPIRATORY (INHALATION) at 19:27

## 2019-04-14 RX ADMIN — METHYLPREDNISOLONE SODIUM SUCCINATE 40 MG: 40 INJECTION, POWDER, LYOPHILIZED, FOR SOLUTION INTRAMUSCULAR; INTRAVENOUS at 04:16

## 2019-04-14 RX ADMIN — BUDESONIDE 1000 MCG: 0.5 SUSPENSION RESPIRATORY (INHALATION) at 08:10

## 2019-04-14 RX ADMIN — ENOXAPARIN SODIUM 40 MG: 40 INJECTION SUBCUTANEOUS at 09:03

## 2019-04-14 RX ADMIN — INSULIN LISPRO 4 UNITS: 100 INJECTION, SOLUTION INTRAVENOUS; SUBCUTANEOUS at 21:06

## 2019-04-14 RX ADMIN — FORMOTEROL FUMARATE DIHYDRATE 20 MCG: 20 SOLUTION RESPIRATORY (INHALATION) at 19:27

## 2019-04-14 RX ADMIN — IPRATROPIUM BROMIDE AND ALBUTEROL SULFATE 1 AMPULE: .5; 3 SOLUTION RESPIRATORY (INHALATION) at 19:27

## 2019-04-14 RX ADMIN — MUPIROCIN: 20 OINTMENT TOPICAL at 15:02

## 2019-04-14 RX ADMIN — INSULIN GLARGINE 10 UNITS: 100 INJECTION, SOLUTION SUBCUTANEOUS at 21:07

## 2019-04-14 RX ADMIN — IPRATROPIUM BROMIDE AND ALBUTEROL SULFATE 1 AMPULE: .5; 3 SOLUTION RESPIRATORY (INHALATION) at 23:37

## 2019-04-14 RX ADMIN — INSULIN LISPRO 4 UNITS: 100 INJECTION, SOLUTION INTRAVENOUS; SUBCUTANEOUS at 17:26

## 2019-04-14 RX ADMIN — INSULIN LISPRO 6 UNITS: 100 INJECTION, SOLUTION INTRAVENOUS; SUBCUTANEOUS at 12:02

## 2019-04-14 RX ADMIN — IPRATROPIUM BROMIDE AND ALBUTEROL SULFATE 1 AMPULE: .5; 3 SOLUTION RESPIRATORY (INHALATION) at 12:24

## 2019-04-14 RX ADMIN — IPRATROPIUM BROMIDE AND ALBUTEROL SULFATE 1 AMPULE: .5; 3 SOLUTION RESPIRATORY (INHALATION) at 03:55

## 2019-04-14 RX ADMIN — IPRATROPIUM BROMIDE AND ALBUTEROL SULFATE 1 AMPULE: .5; 3 SOLUTION RESPIRATORY (INHALATION) at 16:47

## 2019-04-14 ASSESSMENT — PAIN SCALES - GENERAL
PAINLEVEL_OUTOF10: 0

## 2019-04-14 NOTE — PROGRESS NOTES
Component Value Date    NEUTROABS 8.25 (H) 04/14/2019    NEUTROABS 7.83 (H) 04/13/2019    NEUTROABS 9.36 (H) 04/12/2019     Lab Results   Component Value Date    CRP 13.3 (H) 04/09/2019    CRP 14.7 (H) 01/09/2019    CRP 7.3 (H) 08/12/2018     No results found for: CRP  Lab Results   Component Value Date    SEDRATE 55 (H) 04/09/2019    SEDRATE 111 (H) 08/12/2018    SEDRATE 78 (H) 08/02/2018     Lab Results   Component Value Date    ALT 8 04/09/2019    AST 18 04/09/2019     (H) 07/18/2018    ALKPHOS 71 04/09/2019    BILITOT 0.6 04/09/2019     Lab Results   Component Value Date     04/14/2019    K 4.5 04/14/2019    K 4.1 04/09/2019     04/14/2019    CO2 29 04/14/2019    BUN 49 04/14/2019    CREATININE 0.9 04/14/2019    CREATININE 0.9 04/13/2019    CREATININE 0.9 04/12/2019    GFRAA >60 04/14/2019    LABGLOM >60 04/14/2019    GLUCOSE 222 04/14/2019    GLUCOSE 138 03/26/2011    PROT 6.5 04/09/2019    LABALBU 3.6 04/09/2019    LABALBU 4.0 03/26/2011    CALCIUM 8.8 04/14/2019    BILITOT 0.6 04/09/2019    ALKPHOS 71 04/09/2019    AST 18 04/09/2019    ALT 8 04/09/2019     Radiology:      Microbiology:   Respiratory panel: Human Metapneumovirus  Influenza by PCR: Negative  Wound cultures leg Pseudomonas aeruginosa (light growth)  Urine culture pending  Nares MRSA: Positive  Procalcitonin: 0.24    ASSESSMENT:  · Human Metapneumovirus infection. No evidence of bacterial infection  · Fever secondary to Metapneumovirus, resolved  · Hypotension has improved  · History of bullous pemphigoid and lower extremities. 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 No infection    PLAN:  · Observe off antibiotics  · Continue wound care  · Patient can be discharged from ID standpoint    [facility]  12:54 PM  4/14/2019  Pt seen and examined. Above discussed agree with advanced practice nurse. Labs, cultures, and radiographs reviewed. Face to Face encounter occurred.  Changes made as necessary.      Miranda Martin MD

## 2019-04-14 NOTE — PROGRESS NOTES
[facility] [address] [facility]  [address] [facility], [address], [address] [facility]      Pulmonary Progress Note      SUBJECTIVE:  Claims doing ok with breathing, minimal cough and not much sputum production, on NC    OBJECTIVE    Medications    Continuous Infusions:   dextrose         Scheduled Meds:   methylPREDNISolone  40 mg Intravenous Q12H    budesonide  1 mg Nebulization BID    ipratropium-albuterol  1 ampule Inhalation Q4H    insulin glargine  10 Units Subcutaneous Nightly    insulin lispro  0-12 Units Subcutaneous TID WC    insulin lispro  0-6 Units Subcutaneous Nightly    pantoprazole  40 mg Oral QAM AC    enoxaparin  40 mg Subcutaneous Daily    formoterol  20 mcg Nebulization BID       PRN Meds:sodium chloride nebulizer, glucose, dextrose, glucagon (rDNA), dextrose    Physical    VITALS:  /66   Pulse 76   Temp 97.9 °F (36.6 °C) (Axillary)   Resp 18   Ht 6' (1.829 m)   Wt 248 lb 12.8 oz (112.9 kg)   SpO2 99%   BMI 33.74 kg/m²     24HR INTAKE/OUTPUT:      Intake/Output Summary (Last 24 hours) at 2019 0941  Last data filed at 2019 0814  Gross per 24 hour   Intake 1090 ml   Output 575 ml   Net 515 ml       24HR PULSE OXIMETRY RANGE:    SpO2  Av.3 %  Min: 97 %  Max: 99 %    General appearance: alert, appears stated age and cooperative  Lungs: wheezes bilaterally occasionally  Heart: regular rate and rhythm, S1, S2 normal, no murmur, click, rub or gallop  Abdomen: soft, non-tender; bowel sounds normal; no masses,  no organomegaly  Extremities: extremities normal, atraumatic, no cyanosis or edema  Neurologic: Mental status: oriented to month, conversant, moving extremities    Data    CBC:   Recent Labs     19  0330 19  0340 19  0326   WBC 10.4 9.0 9.7   HGB 9.5* 9.2* 9.1*   HCT 32.0* 30.9* 31.0*   MCV 89.1 89.0 89.3    279 280       BMP:  Recent Labs     19  0330 19  0340 19  0326    142 143   K 4.0 4.5 4.5    106 105   CO2 28 28 29   BUN 39* 56* 49*   CREATININE 0.9 0.9 0.9    ALB:3,BILIDIR:3,BILITOT:3,ALKPHOS:3)@    PT/INR: No results for input(s): PROTIME, INR in the last 72 hours. ABG:   No results for input(s): PH, PO2, PCO2, HCO3, BE, O2SAT, METHB, O2HB, COHB, O2CON, HHB, THB in the last 72 hours. Radiology/Other tests reviewed: none    Assessment:     Active Problems:    Sepsis ([address]  Resolved Problems:    * No resolved hospital problems. *      Plan:       1. Taper steroids as tolerated, try prednisone tomorrow  2. Cont with nebs and EZPAP  3. Cont with oxygen  4. OOB to chair as tolerated        Thanks for letting us see this patient in consultation. Please contact us with any questions. Office [phone] or after hours through [employer], x [phone].

## 2019-04-14 NOTE — PROGRESS NOTES
Pt seen/ examined. ROS x 10 neg. Except: He fails a little bit wheezy  Chart reviewed. meds reviewed. D/w nursing + family as available. EXAM:  /66   Pulse 76   Temp 96.4 °F (35.8 °C) (Oral)   Resp 18   Ht 6' (1.829 m)   Wt 248 lb 12.8 oz (112.9 kg)   SpO2 99%   BMI 33.74 kg/m²   GEN: A+O NAD. HEENT:NCAT. EOMI. RAMÓN   NECK:  No JVD. No bruits. Trach midline. No thyromegaly. LUNGS: CTA w/o rales, rhonchi, wheezes. Good excursion. CV: rrr w/o mrg  ABD: soft, non tender. Nl BS. No organomegaly. EXT: no clubbing, cyanosis, edema.   NEURO:   Labs/data reviewed  LABS: CBC:   Lab Results   Component Value Date    WBC 9.7 04/14/2019    RBC 3.47 04/14/2019    HGB 9.1 04/14/2019    HCT 31.0 04/14/2019    MCV 89.3 04/14/2019    MCH 26.2 04/14/2019    MCHC 29.4 04/14/2019    RDW 18.6 04/14/2019     04/14/2019    MPV 9.2 04/14/2019     CMP:    Lab Results   Component Value Date     04/14/2019    K 4.5 04/14/2019    K 4.1 04/09/2019     04/14/2019    CO2 29 04/14/2019    BUN 49 04/14/2019    CREATININE 0.9 04/14/2019    GFRAA >60 04/14/2019    LABGLOM >60 04/14/2019    GLUCOSE 222 04/14/2019    GLUCOSE 138 03/26/2011    PROT 6.5 04/09/2019    LABALBU 3.6 04/09/2019    LABALBU 4.0 03/26/2011    CALCIUM 8.8 04/14/2019    BILITOT 0.6 04/09/2019    ALKPHOS 71 04/09/2019    AST 18 04/09/2019    ALT 8 04/09/2019       Medications:    Scheduled Meds:   [START ON 4/15/2019] predniSONE  30 mg Oral Daily    methylPREDNISolone  40 mg Intravenous Q12H    budesonide  1 mg Nebulization BID    ipratropium-albuterol  1 ampule Inhalation Q4H    insulin glargine  10 Units Subcutaneous Nightly    insulin lispro  0-12 Units Subcutaneous TID WC    insulin lispro  0-6 Units Subcutaneous Nightly    pantoprazole  40 mg Oral QAM AC    enoxaparin  40 mg Subcutaneous Daily    formoterol  20 mcg Nebulization BID       Continuous Infusions:   dextrose         PRN Meds:sodium chloride nebulizer, glucose,

## 2019-04-14 NOTE — PLAN OF CARE
Problem: Falls - Risk of:  Goal: Will remain free from falls  Description  Will remain free from falls  4/14/2019 0156 by Radha Ratliff RN  Outcome: Met This Shift  4/13/2019 2136 by Ade Whittington RN  Outcome: Met This Shift  4/13/2019 1812 by Gaurav Sosa RN  Outcome: Met This Shift  Goal: Absence of physical injury  Description  Absence of physical injury  4/14/2019 0156 by Radha Ratliff RN  Outcome: Met This Shift  4/13/2019 2136 by Ade Whittington RN  Outcome: Met This Shift  4/13/2019 1812 by Gaurav Sosa RN  Outcome: Met This Shift     Problem: Skin Integrity - Risk of, Impaired  Goal: Skin will be intact without erythema or breakdown  4/13/2019 2136 by Ade Whittington RN  Outcome: Met This Shift     Problem: Injury - Risk of, Physical Injury:  Goal: Will remain free from falls  Description  Will remain free from falls  4/14/2019 0156 by Radha Ratliff RN  Outcome: Met This Shift  4/13/2019 2136 by Ade Whittington RN  Outcome: Met This Shift  4/13/2019 1812 by Gaurav Sosa RN  Outcome: Met This Shift  Goal: Absence of physical injury  Description  Absence of physical injury  4/14/2019 0156 by Radha Ratliff RN  Outcome: Met This Shift  4/13/2019 2136 by Ade Whittington RN  Outcome: Met This Shift  4/13/2019 1812 by Gaurav Sosa RN  Outcome: Met This Shift     Problem: Sensory Perception - Impaired:  Goal: Demonstrations of improved sensory functioning will increase  Description  Demonstrations of improved sensory functioning will increase  4/13/2019 2136 by Ade Whittington RN  Outcome: Met This Shift  Goal: Decrease in sensory misperception frequency  Description  Decrease in sensory misperception frequency  4/13/2019 2136 by Ade Whittington RN  Outcome: Met This Shift  Goal: Able to refrain from responding to false sensory perceptions  Description  Able to refrain from responding to false sensory perceptions  4/13/2019 2136 by Ade Whittington RN  Outcome: Met This Shift  Goal: Demonstrates accurate environmental perceptions  Description  Demonstrates accurate environmental perceptions  4/13/2019 2136 by Joan Gama RN  Outcome: Met This Shift  Goal: Able to distinguish between reality-based and nonreality-based thinking  Description  Able to distinguish between reality-based and nonreality-based thinking  4/13/2019 2136 by Joan Gama RN  Outcome: Met This Shift  Goal: Able to interrupt nonreality-based thinking  Description  Able to interrupt nonreality-based thinking  4/13/2019 2136 by Joan Gama RN  Outcome: Met This Shift     Problem: Skin Integrity:  Goal: Will show no infection signs and symptoms  Description  Will show no infection signs and symptoms  4/13/2019 2136 by Joan Gama RN  Outcome: Met This Shift  Goal: Absence of new skin breakdown  Description  Absence of new skin breakdown  4/13/2019 2136 by Joan Gama RN  Outcome: Met This Shift

## 2019-04-14 NOTE — PLAN OF CARE
Problem: Falls - Risk of:  Goal: Will remain free from falls  Description  Will remain free from falls  4/13/2019 2136 by Salvatore King RN  Outcome: Met This Shift  4/13/2019 1812 by Jude Nava RN  Outcome: Met This Shift  Goal: Absence of physical injury  Description  Absence of physical injury  4/13/2019 2136 by Salvatore King RN  Outcome: Met This Shift  4/13/2019 1812 by Jude Nava RN  Outcome: Met This Shift     Problem: Risk for Impaired Skin Integrity  Goal: Tissue integrity - skin and mucous membranes  Description  Structural intactness and normal physiological function of skin and  mucous membranes.   Outcome: Met This Shift     Problem: Skin Integrity - Risk of, Impaired  Goal: Skin will be intact without erythema or breakdown  Outcome: Met This Shift     Problem: Injury - Risk of, Physical Injury:  Goal: Will remain free from falls  Description  Will remain free from falls  4/13/2019 2136 by Salvatore King RN  Outcome: Met This Shift  4/13/2019 1812 by Jude Nava RN  Outcome: Met This Shift  Goal: Absence of physical injury  Description  Absence of physical injury  4/13/2019 2136 by Salvatore King RN  Outcome: Met This Shift  4/13/2019 1812 by Jude Nava RN  Outcome: Met This Shift     Problem: Sensory Perception - Impaired:  Goal: Demonstrations of improved sensory functioning will increase  Description  Demonstrations of improved sensory functioning will increase  Outcome: Met This Shift  Goal: Decrease in sensory misperception frequency  Description  Decrease in sensory misperception frequency  Outcome: Met This Shift  Goal: Able to refrain from responding to false sensory perceptions  Description  Able to refrain from responding to false sensory perceptions  Outcome: Met This Shift  Goal: Demonstrates accurate environmental perceptions  Description  Demonstrates accurate environmental perceptions  Outcome: Met This Shift  Goal: Able to distinguish between

## 2019-04-14 NOTE — PLAN OF CARE
Problem: Falls - Risk of:  Goal: Will remain free from falls  Description  Will remain free from falls  Outcome: Met This Shift  Goal: Absence of physical injury  Description  Absence of physical injury  Outcome: Met This Shift     Problem: Risk for Impaired Skin Integrity  Goal: Tissue integrity - skin and mucous membranes  Description  Structural intactness and normal physiological function of skin and  mucous membranes. Outcome: Met This Shift     Problem: Pain:  Goal: Pain level will decrease  Description  Pain level will decrease     Outcome: Met This Shift     Problem:  Body Temperature -  Risk of, Imbalanced  Goal: Body temperature is within normal range  Outcome: Met This Shift     Problem: Mental Status - Impaired:  Goal: Mental status restored to baseline  Outcome: Met This Shift     Problem: Skin Integrity - Risk of, Impaired  Goal: Skin will be intact without erythema or breakdown  Outcome: Met This Shift     Problem: Confusion - Acute:  Goal: Absence of continued neurological deterioration signs and symptoms  Description  Absence of continued neurological deterioration signs and symptoms  Outcome: Met This Shift  Goal: Mental status will be restored to baseline  Description  Mental status will be restored to baseline  Outcome: Met This Shift     Problem: Injury - Risk of, Physical Injury:  Goal: Will remain free from falls  Description  Will remain free from falls  Outcome: Met This Shift  Goal: Absence of physical injury  Description  Absence of physical injury  Outcome: Met This Shift     Problem: Mood - Altered:  Goal: Mood stable  Description  Mood stable  Outcome: Met This Shift  Goal: Absence of abusive behavior  Description  Absence of abusive behavior  Outcome: Met This Shift  Goal: Verbalizations of feeling emotionally comfortable while being cared for will increase  Description  Verbalizations of feeling emotionally comfortable while being cared for will increase  Outcome: Met This Shift

## 2019-04-15 VITALS
RESPIRATION RATE: 18 BRPM | OXYGEN SATURATION: 99 % | DIASTOLIC BLOOD PRESSURE: 70 MMHG | TEMPERATURE: 97.3 F | SYSTOLIC BLOOD PRESSURE: 122 MMHG | HEART RATE: 66 BPM | HEIGHT: 72 IN | BODY MASS INDEX: 33.46 KG/M2 | WEIGHT: 247 LBS

## 2019-04-15 LAB
ANION GAP SERPL CALCULATED.3IONS-SCNC: 8 MMOL/L (ref 7–16)
BASOPHILS ABSOLUTE: 0.01 E9/L (ref 0–0.2)
BASOPHILS RELATIVE PERCENT: 0.1 % (ref 0–2)
BUN BLDV-MCNC: 42 MG/DL (ref 8–23)
CALCIUM SERPL-MCNC: 8.8 MG/DL (ref 8.6–10.2)
CHLORIDE BLD-SCNC: 104 MMOL/L (ref 98–107)
CO2: 31 MMOL/L (ref 22–29)
CREAT SERPL-MCNC: 0.8 MG/DL (ref 0.7–1.2)
EOSINOPHILS ABSOLUTE: 0 E9/L (ref 0.05–0.5)
EOSINOPHILS RELATIVE PERCENT: 0 % (ref 0–6)
GFR AFRICAN AMERICAN: >60
GFR NON-AFRICAN AMERICAN: >60 ML/MIN/1.73
GLUCOSE BLD-MCNC: 186 MG/DL (ref 74–99)
HCT VFR BLD CALC: 31.4 % (ref 37–54)
HEMOGLOBIN: 9.2 G/DL (ref 12.5–16.5)
IMMATURE GRANULOCYTES #: 0.17 E9/L
IMMATURE GRANULOCYTES %: 1.7 % (ref 0–5)
LYMPHOCYTES ABSOLUTE: 1.47 E9/L (ref 1.5–4)
LYMPHOCYTES RELATIVE PERCENT: 14.7 % (ref 20–42)
MCH RBC QN AUTO: 26.1 PG (ref 26–35)
MCHC RBC AUTO-ENTMCNC: 29.3 % (ref 32–34.5)
MCV RBC AUTO: 89.2 FL (ref 80–99.9)
METER GLUCOSE: 132 MG/DL (ref 74–99)
METER GLUCOSE: 227 MG/DL (ref 74–99)
MONOCYTES ABSOLUTE: 0.96 E9/L (ref 0.1–0.95)
MONOCYTES RELATIVE PERCENT: 9.6 % (ref 2–12)
NEUTROPHILS ABSOLUTE: 7.36 E9/L (ref 1.8–7.3)
NEUTROPHILS RELATIVE PERCENT: 73.9 % (ref 43–80)
PDW BLD-RTO: 18.7 FL (ref 11.5–15)
PLATELET # BLD: 276 E9/L (ref 130–450)
PMV BLD AUTO: 9.4 FL (ref 7–12)
POTASSIUM SERPL-SCNC: 4.5 MMOL/L (ref 3.5–5)
RBC # BLD: 3.52 E12/L (ref 3.8–5.8)
SODIUM BLD-SCNC: 143 MMOL/L (ref 132–146)
WBC # BLD: 10 E9/L (ref 4.5–11.5)

## 2019-04-15 PROCEDURE — 80048 BASIC METABOLIC PNL TOTAL CA: CPT

## 2019-04-15 PROCEDURE — 94640 AIRWAY INHALATION TREATMENT: CPT

## 2019-04-15 PROCEDURE — 85025 COMPLETE CBC W/AUTO DIFF WBC: CPT

## 2019-04-15 PROCEDURE — 6360000002 HC RX W HCPCS: Performed by: INTERNAL MEDICINE

## 2019-04-15 PROCEDURE — 6370000000 HC RX 637 (ALT 250 FOR IP): Performed by: INTERNAL MEDICINE

## 2019-04-15 PROCEDURE — 2700000000 HC OXYGEN THERAPY PER DAY

## 2019-04-15 PROCEDURE — 82962 GLUCOSE BLOOD TEST: CPT

## 2019-04-15 PROCEDURE — 36415 COLL VENOUS BLD VENIPUNCTURE: CPT

## 2019-04-15 RX ORDER — PREDNISONE 20 MG/1
40 TABLET ORAL DAILY
Status: DISCONTINUED | OUTPATIENT
Start: 2019-04-15 | End: 2019-04-15 | Stop reason: HOSPADM

## 2019-04-15 RX ORDER — FORMOTEROL FUMARATE 20 UG/2ML
20 SOLUTION RESPIRATORY (INHALATION) 2 TIMES DAILY
DISCHARGE
Start: 2019-04-15 | End: 2019-11-16

## 2019-04-15 RX ORDER — PREDNISONE 10 MG/1
30 TABLET ORAL DAILY
Qty: 30 TABLET | Refills: 0 | DISCHARGE
Start: 2019-04-15 | End: 2019-04-25

## 2019-04-15 RX ORDER — PANTOPRAZOLE SODIUM 40 MG/1
40 TABLET, DELAYED RELEASE ORAL
Qty: 30 TABLET | Refills: 3 | Status: ON HOLD | DISCHARGE
Start: 2019-04-16 | End: 2021-01-05 | Stop reason: ALTCHOICE

## 2019-04-15 RX ORDER — INSULIN GLARGINE 100 [IU]/ML
10 INJECTION, SOLUTION SUBCUTANEOUS NIGHTLY
Qty: 1 VIAL | Refills: 3 | DISCHARGE
Start: 2019-04-15 | End: 2019-11-16

## 2019-04-15 RX ADMIN — PREDNISONE 40 MG: 20 TABLET ORAL at 08:07

## 2019-04-15 RX ADMIN — IPRATROPIUM BROMIDE AND ALBUTEROL SULFATE 1 AMPULE: .5; 3 SOLUTION RESPIRATORY (INHALATION) at 09:02

## 2019-04-15 RX ADMIN — FORMOTEROL FUMARATE DIHYDRATE 20 MCG: 20 SOLUTION RESPIRATORY (INHALATION) at 09:02

## 2019-04-15 RX ADMIN — MUPIROCIN: 20 OINTMENT TOPICAL at 08:07

## 2019-04-15 RX ADMIN — BUDESONIDE 1000 MCG: 0.5 SUSPENSION RESPIRATORY (INHALATION) at 09:02

## 2019-04-15 RX ADMIN — INSULIN LISPRO 4 UNITS: 100 INJECTION, SOLUTION INTRAVENOUS; SUBCUTANEOUS at 12:27

## 2019-04-15 RX ADMIN — IPRATROPIUM BROMIDE AND ALBUTEROL SULFATE 1 AMPULE: .5; 3 SOLUTION RESPIRATORY (INHALATION) at 04:33

## 2019-04-15 RX ADMIN — PANTOPRAZOLE SODIUM 40 MG: 40 TABLET, DELAYED RELEASE ORAL at 06:46

## 2019-04-15 RX ADMIN — ENOXAPARIN SODIUM 40 MG: 40 INJECTION SUBCUTANEOUS at 08:07

## 2019-04-15 ASSESSMENT — PAIN SCALES - GENERAL: PAINLEVEL_OUTOF10: 0

## 2019-04-15 NOTE — PROGRESS NOTES
[address] [place] [address] [facility]  Progress Note    SUBJECTIVE:  Chief Complaint   Patient presents with    Fever     sent from [facility] for r/o sepsis. tylenol given 0845    Tachycardia    Altered Mental Status     pt has increased confusion A&Ox1      Patient has no new complaints today. Feeling better. He remains afebrile. Denies any pain in his legs. Review of systems:  As stated above in the chief complaint, otherwise negative. Medications:  Scheduled Meds:   predniSONE  40 mg Oral Daily    mupirocin   Nasal TID    budesonide  1 mg Nebulization BID    ipratropium-albuterol  1 ampule Inhalation Q4H    insulin glargine  10 Units Subcutaneous Nightly    insulin lispro  0-12 Units Subcutaneous TID WC    insulin lispro  0-6 Units Subcutaneous Nightly    pantoprazole  40 mg Oral QAM AC    enoxaparin  40 mg Subcutaneous Daily    formoterol  20 mcg Nebulization BID     Continuous Infusions:   dextrose       PRN Meds:sodium chloride nebulizer, glucose, dextrose, glucagon (rDNA), dextrose    OBJECTIVE:  /70   Pulse 66   Temp 97.3 °F (36.3 °C) (Oral)   Resp 18   Ht 6' (1.829 m)   Wt 247 lb (112 kg)   SpO2 99%   BMI 33.50 kg/m²   Temp  Av.6 °F (36.4 °C)  Min: 97.3 °F (36.3 °C)  Max: 97.8 °F (36.6 °C)  Constitutional: The patient is awake and alert. No distress. Lying in bed. Skin: Warm and dry. HEENT: Round pupils. No jaundice. No ulcerations or thrush. Chest: No respiratory distress. Good breath sounds bilaterally. No crackles. Cardiovascular: Heart sounds are rhythmic and regular. Abdomen: Soft and benign to palpation. Extremities: Both legs are covered with Kerlix. No cellulitis. Lines: peripheral  Ballesteros catheter with clear urine.     Laboratory and Tests Review:  Lab Results   Component Value Date    WBC 10.0 04/15/2019    WBC 9.7 2019    WBC 9.0 2019    HGB 9.2 (L) 04/15/2019    HCT 31.4 (L) 04/15/2019    MCV 89.2 04/15/2019     04/15/2019     Lab Results   Component Value Date    NEUTROABS 7.36 (H) 04/15/2019    NEUTROABS 8.25 (H) 04/14/2019    NEUTROABS 7.83 (H) 04/13/2019     Lab Results   Component Value Date    CRP 13.3 (H) 04/09/2019    CRP 14.7 (H) 01/09/2019    CRP 7.3 (H) 08/12/2018     No results found for: CRPHS  Lab Results   Component Value Date    SEDRATE 55 (H) 04/09/2019    SEDRATE 111 (H) 08/12/2018    SEDRATE 78 (H) 08/02/2018     Lab Results   Component Value Date    ALT 8 04/09/2019    AST 18 04/09/2019     (H) 07/18/2018    ALKPHOS 71 04/09/2019    BILITOT 0.6 04/09/2019     Lab Results   Component Value Date     04/15/2019    K 4.5 04/15/2019    K 4.1 04/09/2019     04/15/2019    CO2 31 04/15/2019    BUN 42 04/15/2019    CREATININE 0.8 04/15/2019    CREATININE 0.9 04/14/2019    CREATININE 0.9 04/13/2019    GFRAA >60 04/15/2019    LABGLOM >60 04/15/2019    GLUCOSE 186 04/15/2019    GLUCOSE 138 03/26/2011    PROT 6.5 04/09/2019    LABALBU 3.6 04/09/2019    LABALBU 4.0 03/26/2011    CALCIUM 8.8 04/15/2019    BILITOT 0.6 04/09/2019    ALKPHOS 71 04/09/2019    AST 18 04/09/2019    ALT 8 04/09/2019     Radiology:      Microbiology:   Respiratory panel: Human Metapneumovirus  Influenza by PCR: Negative  Wound cultures leg Pseudomonas aeruginosa (light growth)  Urine culture pending  Nares MRSA: Positive  Procalcitonin: 0.24    ASSESSMENT:  · Human Metapneumovirus infection. Improved clinically  · History of bullous pemphigoid and lower extremities. 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  No infection    PLAN:  · Observe off antibiotics  · Continue wound care  · Patient can be discharged from [place] [address]  9:32 AM  4/15/2019

## 2019-04-15 NOTE — PROGRESS NOTES
Subjective: The patient is awake and alert. No problems overnight. Denies chest pain, angina, and dyspnea. Denies abdominal pain. Tolerating diet. No nausea or vomiting. Objective:  Pt is resting in nad  BP (!) 103/55   Pulse 74   Temp 97.6 °F (36.4 °C) (Oral)   Resp 18   Ht 6' (1.829 m)   Wt 247 lb (112 kg)   SpO2 98%   BMI 33.50 kg/m²   HEENT no adenopathy no bruits  Heart:  RRR, no murmurs, gallops, or rubs. Lungs:  CTA bilaterally, no wheeze, rales or rhonchi  Abd: bowel sounds present, nontender, nondistended, no masses  Extrem:  No clubbing, cyanosis, +edema lower  WBC/Hgb/Hct/Plts:  10.0/9.2/31.4/276 (04/15 9658) basic metabolic panel     Assessment:    Patient Active Problem List   Diagnosis    Cellulitis    [facility]    Dementia    Metabolic encephalopathy    Diabetes mellitus type 2, uncontrolled ([facility]    Hyperlipidemia LDL goal <100    Essential hypertension    Venous ulcer of left leg (HCC)    Non-pressure chronic ulcer of left lower leg with fat layer exposed (HCC)    Non-pressure chronic ulcer left lower leg, limited to breakdown skin (HCC)    Hypotension    Severe protein-calorie malnutrition (HCC)    Non-pressure chronic ulcer right lower leg, limited to breakdown skin (HCC)    Pressure injury of contiguous region involving back, buttock, and hip, stage 2    Pressure injury of calf, stage 2    HCAP (healthcare-associated pneumonia)    Paroxysmal atrial fibrillation ([facility] [address] [facility]       Plan:   Will discharge back to [facility] beds [facility] the [facility] today        Salvatore Vallejo  7:19 AM  4/15/2019

## 2019-04-15 NOTE — PLAN OF CARE
Problem: Falls - Risk of:  Goal: Will remain free from falls  Description  Will remain free from falls  4/15/2019 0132 by Christopher Cannon RN  Outcome: Met This Shift  4/14/2019 1913 by Baldomero Morgan RN  Outcome: Met This Shift  Goal: Absence of physical injury  Description  Absence of physical injury  4/15/2019 0132 by Christopher Cannon RN  Outcome: Met This Shift  4/14/2019 1913 by Baldomero Moragn RN  Outcome: Met This Shift     Problem: Risk for Impaired Skin Integrity  Goal: Tissue integrity - skin and mucous membranes  Description  Structural intactness and normal physiological function of skin and  mucous membranes. 4/14/2019 1913 by Baldomero Morgan RN  Outcome: Met This Shift     Problem: Pain:  Goal: Pain level will decrease  Description  Pain level will decrease     4/14/2019 1913 by Baldomero Morgan RN  Outcome: Met This Shift     Problem:  Body Temperature -  Risk of, Imbalanced  Goal: Body temperature is within normal range  4/15/2019 0132 by Christopher Cannon RN  Outcome: Met This Shift  4/14/2019 1913 by Baldomero Morgan RN  Outcome: Met This Shift     Problem: Mental Status - Impaired:  Goal: Mental status restored to baseline  4/14/2019 1913 by Baldomero Morgan RN  Outcome: Met This Shift     Problem: Skin Integrity - Risk of, Impaired  Goal: Skin will be intact without erythema or breakdown  4/14/2019 1913 by Baldomero Morgan RN  Outcome: Met This Shift     Problem: Confusion - Acute:  Goal: Absence of continued neurological deterioration signs and symptoms  Description  Absence of continued neurological deterioration signs and symptoms  4/14/2019 1913 by Baldomero Morgan RN  Outcome: Met This Shift  Goal: Mental status will be restored to baseline  Description  Mental status will be restored to baseline  4/14/2019 1913 by Baldomero Morgan RN  Outcome: Met This Shift     Problem: Injury - Risk of, Physical Injury:  Goal: Will remain free from falls  Description  Will remain free from falls  4/15/2019 0132 by Christopher Cannon RN  Outcome: Met This Shift  4/14/2019 1913 by Conchita Zamorano RN  Outcome: Met This Shift  Goal: Absence of physical injury  Description  Absence of physical injury  4/15/2019 0132 by Antione White RN  Outcome: Met This Shift  4/14/2019 1913 by Conchita Zamorano RN  Outcome: Met This Shift     Problem: Mood - Altered:  Goal: Mood stable  Description  Mood stable  4/15/2019 0132 by Antione White RN  Outcome: Met This Shift  4/14/2019 1913 by Conchita Zamorano RN  Outcome: Met This Shift  Goal: Absence of abusive behavior  Description  Absence of abusive behavior  4/15/2019 0132 by Antione White RN  Outcome: Met This Shift  4/14/2019 1913 by Conchita Zamorano RN  Outcome: Met This Shift  Goal: Verbalizations of feeling emotionally comfortable while being cared for will increase  Description  Verbalizations of feeling emotionally comfortable while being cared for will increase  4/14/2019 1913 by Conchita Zamorano RN  Outcome: Met This Shift     Problem: Psychomotor Activity - Altered:  Goal: Absence of psychomotor disturbance signs and symptoms  Description  Absence of psychomotor disturbance signs and symptoms  4/14/2019 1913 by Conchita Zamorano RN  Outcome: Met This Shift     Problem: Sensory Perception - Impaired:  Goal: Demonstrations of improved sensory functioning will increase  Description  Demonstrations of improved sensory functioning will increase  4/14/2019 1913 by Conchita Zamorano RN  Outcome: Met This Shift  Goal: Decrease in sensory misperception frequency  Description  Decrease in sensory misperception frequency  4/14/2019 1913 by Conchita Zamorano RN  Outcome: Met This Shift  Goal: Able to refrain from responding to false sensory perceptions  Description  Able to refrain from responding to false sensory perceptions  4/14/2019 1913 by Conchita Zamorano RN  Outcome: Met This Shift  Goal: Demonstrates accurate environmental perceptions  Description  Demonstrates accurate environmental perceptions  4/14/2019 1913 by Conchita Zamorano RN  Outcome: Met This Shift  Goal: Able to distinguish between reality-based and nonreality-based thinking  Description  Able to distinguish between reality-based and nonreality-based thinking  4/14/2019 1913 by Marry Light RN  Outcome: Met This Shift  Goal: Able to interrupt nonreality-based thinking  Description  Able to interrupt nonreality-based thinking  4/14/2019 1913 by Marry Light RN  Outcome: Met This Shift     Problem: Sleep Pattern Disturbance:  Goal: Appears well-rested  Description  Appears well-rested  4/15/2019 0132 by Ankush Vanegas RN  Outcome: Met This Shift  4/14/2019 1913 by Marry Light RN  Outcome: Met This Shift     Problem: Skin Integrity:  Goal: Will show no infection signs and symptoms  Description  Will show no infection signs and symptoms  4/14/2019 1913 by Marry Light RN  Outcome: Met This Shift  Goal: Absence of new skin breakdown  Description  Absence of new skin breakdown  4/14/2019 1913 by Marry Light RN  Outcome: Met This Shift

## 2019-04-15 NOTE — PROGRESS NOTES
Pulmonary Progress Note    Admit Date: 2019  Hospital day                               PCP: No primary care provider on file. Chief Complaint (s):  Patient Active Problem List   Diagnosis    Cellulitis    [facility]    Dementia    Metabolic encephalopathy    Diabetes mellitus type 2, uncontrolled ([facility] [facility]    Hyperlipidemia LDL goal <100    Essential hypertension    Venous ulcer of left leg (HCC)    Non-pressure chronic ulcer of left lower leg with fat layer exposed ([facility]    Non-pressure chronic ulcer left lower leg, limited to breakdown skin ([facility] [facility]    Hypotension    Severe protein-calorie malnutrition (HCC)    Non-pressure chronic ulcer right lower leg, limited to breakdown skin (HCC)    Pressure injury of contiguous region involving back, buttock, and hip, stage 2    Pressure injury of calf, stage 2    HCAP (healthcare-associated pneumonia)    Paroxysmal atrial fibrillation (HCC)       Subjective:  · Sleeping soundly.       Vitals:  VITALS:  BP (!) 103/55   Pulse 74   Temp 97.6 °F (36.4 °C) (Oral)   Resp 18   Ht 6' (1.829 m)   Wt 247 lb (112 kg)   SpO2 98%   BMI 33.50 kg/m²     24HR INTAKE/OUTPUT:      Intake/Output Summary (Last 24 hours) at 4/15/2019 0730  Last data filed at 4/15/2019 0529  Gross per 24 hour   Intake 680 ml   Output 550 ml   Net 130 ml       24HR PULSE OXIMETRY RANGE:    SpO2  Av.7 %  Min: 97 %  Max: 98 %    Medications:  IV:   dextrose         Scheduled Meds:   predniSONE  30 mg Oral Daily    mupirocin   Nasal TID    budesonide  1 mg Nebulization BID    ipratropium-albuterol  1 ampule Inhalation Q4H    insulin glargine  10 Units Subcutaneous Nightly    insulin lispro  0-12 Units Subcutaneous TID WC    insulin lispro  0-6 Units Subcutaneous Nightly    pantoprazole  40 mg Oral QAM AC    enoxaparin  40 mg Subcutaneous Daily    formoterol  20 mcg Nebulization BID       Diet:   DIET CARB CONTROL; Carb Control: 4 carbs/meal (approximate

## 2019-04-15 NOTE — PROGRESS NOTES
N-N called to [facility] and copy of today's [facility] faxed and to be sent with patient to facility.

## 2019-04-15 NOTE — CARE COORDINATION
Social Work/Discharge Planning:  Called liaison Sveta Bhatia from [facility] and confirmed patient will need updated therapy notes. Sveta Bhatia states she will start pre-cert but no need to wait for pre-cert. Notified therapy of need for updated therapy notes. Electronically signed by TRUMAN Odell on 4/15/2019 at 8:03 AM    Addendum:  Received notification from therapy that patient is at his baseline at facility. Notified Sveta Bhatia from [facility] and states patient can return to facility.   Electronically signed by TRUMAN Odell on 4/15/2019 at 8:12 AM

## 2019-04-15 NOTE — DISCHARGE INSTR - COC
Continuity of Care Form    Patient Name: Elver Moreau   :  1940  MRN:  [id number]    Admit date:  2019  Discharge date:  4/15/19    Code Status Order: Full Code   Advance Directives:   [facility] Documentation     Date/Time Healthcare Directive Type of Healthcare Directive Copy in [address] [facility] [address] Agent's Name Healthcare Agent's Phone Number    19 1543  No, patient does not have an advance directive for healthcare treatment -- -- -- -- --          Admitting Physician:  Shanika Carpio MD  PCP: No primary care provider on file.     Discharging Nurse: Lm Edmond Unit/Room#: 4976/8321-Q  Discharging Unit Phone Number: [phone]    Emergency Contact:   Extended Emergency Contact Information  Primary Emergency Contact: [place] [address] Phone: [phone]  Mobile Phone: [phone]  Relation: Child  Secondary Emergency Contact: Willam Aragon  Address: [address] [place] [address], [address] [facility] Phone: [phone]  Relation: Child    Past Surgical History:  Past Surgical History:   Procedure Laterality Date    DOPPLER ECHOCARDIOGRAPHY N/A     FRACTURE SURGERY      RI EGD PERCUTANEOUS PLACEMENT GASTROSTOMY TUBE N/A 2018    EGD  PEG TUBE INSERTION,  (ENDO STAFF NEEDED) performed by Jero Sommer MD at [facility] N/A 2018    EGD DIAGNOSTIC ONLY performed by Percy Dowling MD at [address] [place] [address] History:   Immunization History   Administered Date(s) Administered    Pneumococcal 13-valent Conjugate (Avdtemw52) 2018    Tdap (Boostrix, Adacel) 2019       Active Problems:  Patient Active Problem List   Diagnosis Code    Cellulitis L03.90    Sepsis ([facility] A41.9    Dementia Y33.95    Metabolic encephalopathy E34.07    Diabetes mellitus type 2, uncontrolled ([facility] E11.65    Hyperlipidemia LDL goal <100 E78.5    Essential hypertension I10    Venous ulcer AM   Wound Width (cm) 1.5 cm 4/10/2019 12:00 AM   Wound Surface Area (cm^2) 3 cm^2 4/10/2019 12:00 AM   Change in Wound Size % (l*w) 80 4/10/2019 12:00 AM   Wound Assessment Red 4/14/2019  9:07 PM   Drainage Amount None 4/14/2019  9:07 PM   Odor None 4/14/2019  9:07 PM   Susy-wound Assessment Intact 4/14/2019  9:07 PM   Number of days: 96       Wound 01/08/19 Leg Left; Lower; Anterior (Active)   Dressing Status Clean;Dry; Intact 4/14/2019  9:07 PM   Dressing Changed Changed/New 4/14/2019  5:00 AM   Dressing/Treatment Other (comment) 4/14/2019  8:14 AM   Wound Cleansed Rinsed/Irrigated with saline 4/12/2019 11:53 PM   Dressing Change Due 04/15/19 4/14/2019  9:07 PM   Wound Length (cm) 1.8 cm 4/10/2019 12:00 AM   Wound Width (cm) 2 cm 4/10/2019 12:00 AM   Wound Surface Area (cm^2) 3.6 cm^2 4/10/2019 12:00 AM   Change in Wound Size % (l*w) 85 4/10/2019 12:00 AM   Wound Assessment KIP 4/14/2019  9:07 PM   Drainage Amount None 4/11/2019  8:45 AM   Drainage Description Serosanguinous 4/10/2019  7:00 AM   Odor None 4/11/2019  8:45 AM   Susy-wound Assessment KIP 4/14/2019  9:07 PM   Number of days: 96       Wound 01/09/19 Hip Right (Active)   Dressing Status Intact 4/11/2019  8:45 AM   Dressing/Treatment Vaseline gauze 4/10/2019  1:07 PM   Dressing Change Due 04/11/19 4/10/2019  1:07 PM   Wound Length (cm) 2 cm 4/10/2019 12:00 AM   Wound Width (cm) 1.5 cm 4/10/2019 12:00 AM   Wound Surface Area (cm^2) 3 cm^2 4/10/2019 12:00 AM   Change in Wound Size % (l*w) 84.38 4/10/2019 12:00 AM   Wound Assessment KIP 4/11/2019  8:45 AM   Drainage Amount None 4/10/2019  8:04 PM   Drainage Description Serosanguinous;Purulent 4/10/2019  7:00 AM   Odor None 4/11/2019  8:45 AM   Susy-wound Assessment KIP 4/11/2019  8:45 AM   Number of days: 95       Wound 01/11/19 Groin Inner;Left (Active)   Dressing/Treatment Open to air 4/11/2019  8:45 AM   Wound Cleansed Rinsed/Irrigated with saline 4/10/2019 12:00 AM   Wound Length (cm) 0.4 cm 4/10/2019 12:00 AM   Wound Width (cm) 2.5 cm 4/10/2019 12:00 AM   Wound Surface Area (cm^2) 1 cm^2 4/10/2019 12:00 AM   Wound Assessment Pink; White 4/11/2019  8:45 AM   Drainage Amount None 4/11/2019  8:45 AM   Odor None 4/11/2019  8:45 AM   Susy-wound Assessment Fragile 4/11/2019  8:45 AM   Number of days: 93       Wound 04/09/19 Ankle Left;Medial (Active)   Dressing Status Clean;Dry; Intact 4/14/2019  9:07 PM   Dressing Changed Changed/New 4/14/2019  5:00 AM   Dressing/Treatment Dry Dressing 4/14/2019  8:14 AM   Wound Cleansed Rinsed/Irrigated with saline 4/12/2019 11:53 PM   Dressing Change Due 04/15/19 4/14/2019  9:07 PM   Wound Length (cm) 7 cm 4/10/2019 12:00 AM   Wound Width (cm) 4 cm 4/10/2019 12:00 AM   Wound Surface Area (cm^2) 28 cm^2 4/10/2019 12:00 AM   Wound Assessment KIP 4/14/2019  9:07 PM   Drainage Amount Scant 4/12/2019  3:59 AM   Drainage Description Serous 4/12/2019  3:59 AM   Odor None 4/14/2019  8:14 AM   Susy-wound Assessment KIP 4/14/2019  9:07 PM   Number of days: 5       Wound 04/09/19 Ankle Left;Lateral;Anterior (Active)   Dressing Status Changed;Clean;Dry; Intact 4/15/2019  5:22 AM   Dressing Changed Changed/New 4/15/2019  5:22 AM   Dressing/Treatment Other (comment) 4/15/2019  5:22 AM   Wound Cleansed Rinsed/Irrigated with saline 4/15/2019  5:22 AM   Dressing Change Due 04/16/19 4/15/2019  5:22 AM   Wound Length (cm) 1.5 cm 4/10/2019 12:00 AM   Wound Width (cm) 1.5 cm 4/10/2019 12:00 AM   Wound Surface Area (cm^2) 2.25 cm^2 4/10/2019 12:00 AM   Wound Assessment Pink;Fragile 4/15/2019  5:22 AM   Drainage Amount None 4/15/2019  5:22 AM   Drainage Description Serous 4/12/2019  3:59 AM   Odor None 4/15/2019  5:22 AM   Susy-wound Assessment KIP 4/14/2019  9:07 PM   Number of days: 5       Wound 04/09/19 Ankle Left;Lateral;Posterior (Active)   Dressing Status Changed;Clean;Dry; Intact 4/15/2019  5:22 AM   Dressing Changed Changed/New 4/15/2019  5:22 AM   Dressing/Treatment Other (comment) 4/15/2019  5:22 AM   Wound Cleansed Rinsed/Irrigated with saline 4/15/2019  5:22 AM   Dressing Change Due 04/16/19 4/15/2019  5:22 AM   Wound Length (cm) 1.8 cm 4/10/2019 12:00 AM   Wound Width (cm) 2.7 cm 4/10/2019 12:00 AM   Wound Surface Area (cm^2) 4.86 cm^2 4/10/2019 12:00 AM   Wound Assessment Pink;Fragile 4/15/2019  5:22 AM   Drainage Amount None 4/15/2019  5:22 AM   Drainage Description Serous 4/12/2019  3:59 AM   Odor None 4/15/2019  5:22 AM   Susy-wound Assessment KIP 4/14/2019  9:07 PM   Number of days: 5       Wound 04/09/19 Foot Right;Dorsal (Active)   Dressing Status Changed;Clean;Dry; Intact 4/15/2019  5:22 AM   Dressing Changed Changed/New 4/15/2019  5:22 AM   Dressing/Treatment Other (comment) 4/15/2019  5:22 AM   Wound Cleansed Rinsed/Irrigated with saline 4/15/2019  5:22 AM   Dressing Change Due 04/16/19 4/15/2019  5:22 AM   Wound Length (cm) 1 cm 4/10/2019 12:00 AM   Wound Width (cm) 1 cm 4/10/2019 12:00 AM   Wound Surface Area (cm^2) 1 cm^2 4/10/2019 12:00 AM   Wound Assessment Pink;Fragile 4/15/2019  5:22 AM   Drainage Amount None 4/15/2019  5:22 AM   Drainage Description Serous 4/12/2019  3:59 AM   Odor None 4/15/2019  5:22 AM   Susy-wound Assessment KIP 4/14/2019  9:07 PM   Number of days: 5       Wound 04/09/19 Toe (Comment  which one) Right (Active)   Dressing Status Changed;Clean;Dry; Intact 4/15/2019  5:22 AM   Dressing Changed Changed/New 4/15/2019  5:22 AM   Dressing/Treatment Other (comment) 4/15/2019  5:22 AM   Wound Cleansed Rinsed/Irrigated with saline 4/15/2019  5:22 AM   Dressing Change Due 04/16/19 4/15/2019  5:22 AM   Wound Length (cm) 0.5 cm 4/10/2019 12:00 AM   Wound Width (cm) 1 cm 4/10/2019 12:00 AM   Wound Surface Area (cm^2) 0.5 cm^2 4/10/2019 12:00 AM   Wound Assessment Fragile 4/15/2019  5:22 AM   Drainage Amount None 4/15/2019  5:22 AM   Drainage Description Serosanguinous 4/10/2019  7:00 AM   Odor None 4/15/2019  5:22 AM   Susy-wound Assessment KIP 4/14/2019  9:07 PM   Number of days: 5 Wound 04/09/19 Pelvis Right (Active)   Dressing Status New drainage 4/10/2019 12:00 AM   Dressing Changed Changed/New 4/10/2019 12:00 AM   Dressing/Treatment Open to air 4/14/2019  9:07 PM   Wound Cleansed Rinsed/Irrigated with saline 4/10/2019 12:00 AM   Dressing Change Due 04/12/19 4/10/2019 12:00 AM   Wound Length (cm) 1.5 cm 4/10/2019 12:00 AM   Wound Width (cm) 4 cm 4/10/2019 12:00 AM   Wound Surface Area (cm^2) 6 cm^2 4/10/2019 12:00 AM   Wound Assessment Red 4/14/2019  9:07 PM   Drainage Amount None 4/14/2019  9:07 PM   Drainage Description Sanguinous 4/14/2019  8:14 AM   Odor None 4/14/2019  9:07 PM   Susy-wound Assessment Fragile 4/14/2019  9:07 PM   Number of days: 5       Wound 04/09/19 Scrotum Anterior (Active)   Dressing/Treatment Open to air 4/14/2019  9:07 PM   Wound Cleansed Rinsed/Irrigated with saline 4/10/2019 12:00 AM   Wound Length (cm) 0.4 cm 4/10/2019 12:00 AM   Wound Width (cm) 0.3 cm 4/10/2019 12:00 AM   Wound Surface Area (cm^2) 0.12 cm^2 4/10/2019 12:00 AM   Wound Assessment Red; White;Yellow 4/14/2019  9:07 PM   Drainage Amount Small 4/14/2019  9:07 PM   Drainage Description Serosanguinous 4/14/2019  9:07 PM   Odor None 4/14/2019  9:07 PM   Susy-wound Assessment Fragile; Red 4/14/2019  9:07 PM   Number of days: 5       Wound 04/10/19 Buttocks Right (Active)   Dressing/Treatment Open to air 4/14/2019  9:07 PM   Wound Cleansed Rinsed/Irrigated with saline 4/10/2019  7:00 AM   Wound Length (cm) 1 cm 4/10/2019  4:17 PM   Wound Width (cm) 1.5 cm 4/10/2019  4:17 PM   Wound Depth (cm) 0.1 cm 4/10/2019  4:17 PM   Wound Surface Area (cm^2) 1.5 cm^2 4/10/2019  4:17 PM   Wound Volume (cm^3) 0.15 cm^3 4/10/2019  4:17 PM   Wound Assessment Red; White 4/14/2019  9:07 PM   Drainage Amount None 4/14/2019  9:07 PM   Odor None 4/14/2019 12:18 AM   Susy-wound Assessment Fragile 4/14/2019  9:07 PM   Number of days: 4        Elimination:  Continence:   · Bowel: No  · Bladder: No  Urinary Catheter: None Colostomy/Ileostomy/Ileal Conduit: No       Date of Last BM: 4/15/2019    Intake/Output Summary (Last 24 hours) at 4/15/2019 0721  Last data filed at 4/15/2019 0529  Gross per 24 hour   Intake 680 ml   Output 550 ml   Net 130 ml     I/O last 3 completed shifts: In: 65 [P.O.:680]  Out: 550 [Urine:550]    Safety Concerns:     History of Falls (last 30 days)    Impairments/Disabilities:      None    Nutrition Therapy:  Current Nutrition Therapy:   - Oral Diet:  NPO    Routes of Feeding: Oral and has a peg tube that is clamped  Liquids: Thin Liquids  Daily Fluid Restriction: no  Last Modified Barium Swallow with Video (Video Swallowing Test): Not done    Treatments at the Time of Hospital Discharge:   Respiratory Treatments:  Refer to medication section on AVS  Oxygen Therapy:  is on oxygen at 4 L/min per nasal cannula.   Ventilator:    - No ventilator support    Rehab Therapies: Physical Therapy and Occupational Therapy  Weight Bearing Status/Restrictions: No weight bearing restirctions  Other Medical Equipment (for information only, NOT a DME order):  wheelchair and hospital bed  Other Treatments: Turn Q 2 hours, change dressings daily and PRN    Patient's personal belongings (please select all that are sent with patient):  Glasses, Dentures    RN SIGNATURE:  Electronically signed by Jeffrey Johnston RN on 4/15/19 at 12:43 PM    CASE MANAGEMENT/SOCIAL WORK SECTION    Inpatient Status Date: 4/9/2019    Readmission Risk Assessment Score:  Readmission Risk              Risk of Unplanned Readmission:        28           Discharging to Facility/ Agency   · Name: [address] [place] [address]  · Address: [address] [place] [address]  · Phone: [phone]  · Fax: [phone]    Dialysis Facility (if applicable)   · Name:  · Address:  · Dialysis Schedule:  · Phone:  · Fax:    / signature: Electronically signed by TRUMAN Walls on 4/15/19 at 8:16 AM    PHYSICIAN SECTION    Prognosis: Good    Condition at Discharge: Stable    Rehab Potential (if transferring to Rehab): {Prognosis:[id number]}    Recommended Labs or Other Treatments After Discharge: ***    Physician Certification: I certify the above information and transfer of Abe Lawson  is necessary for the continuing treatment of the diagnosis listed and that he requires [place] for greater 30 days.      Update Admission H&P: No change in H&P    PHYSICIAN SIGNATURE:  {Esignature:[id number]} Electronically signed by Rebekah Shanks DO on 4/15/2019 at 7:22 AM

## 2019-04-22 NOTE — DISCHARGE SUMMARY
Physician Discharge Summary     Patient ID:  Patricio Hernandez  [id number]  18 y.o.  1940    Admit date: 4/9/2019    Discharge date and time: 4/15/2019  1:40 PM     Admission Diagnoses: Sepsis ([facility] [A41.9]  Sepsis ([facility] [A41.9]    Discharge Diagnoses:   Patient Active Problem List   Diagnosis    Cellulitis    Sepsis ([facility]    Dementia    Metabolic encephalopathy    Diabetes mellitus type 2, uncontrolled ([facility]    Hyperlipidemia LDL goal <100    Essential hypertension    Venous ulcer of left leg (HCC)    Non-pressure chronic ulcer of left lower leg with fat layer exposed ([facility]    Non-pressure chronic ulcer left lower leg, limited to breakdown skin (HCC)    Hypotension    Severe protein-calorie malnutrition (HCC)    Non-pressure chronic ulcer right lower leg, limited to breakdown skin (HCC)    Pressure injury of contiguous region involving back, buttock, and hip, stage 2    Pressure injury of calf, stage 2    HCAP (healthcare-associated pneumonia)    Paroxysmal atrial fibrillation (HCC)     No current facility-administered medications on file prior to encounter.       Current Outpatient Medications on File Prior to Encounter   Medication Sig Dispense Refill    insulin detemir (LEVEMIR FLEXTOUCH) 100 UNIT/ML injection pen Inject 10 Units into the skin nightly      loperamide (IMODIUM) 2 MG capsule Take 2 mg by mouth every 6 hours as needed for Diarrhea      Sodium Phosphates (ENEMA DISPOSABLE) ENEM Place 1 enema rectally daily as needed      ipratropium-albuterol (DUONEB) 0.5-2.5 (3) MG/3ML SOLN nebulizer solution Inhale 3 mLs into the lungs every 4 hours as needed for Shortness of Breath 360 mL 1    methotrexate (RHEUMATREX) 2.5 MG chemo tablet Take 7.5 mg by mouth See Admin Instructions Give 2 times a day every Wednesday      folic acid (FOLVITE) 1 MG tablet Take 1 mg by mouth every morning       Nutritional Supplements (ARGINAID) PACK Take 1 Package by mouth 2 times daily       mirtazapine (REMERON) 15 MG tablet Take 7.5 mg by mouth nightly       atorvastatin (LIPITOR) 20 MG tablet Take 20 mg by mouth every morning       Cholecalciferol (VITAMIN D3) 5000 UNITS TABS Take 5,000 Units by mouth every morning       glucose (GLUTOSE) 40 % GEL Take 15 g by mouth as needed 45 g 1    tamsulosin (FLOMAX) 0.4 MG capsule Take 0.4 mg by mouth nightly       latanoprost (XALATAN) 0.005 % ophthalmic solution Place 1 drop into both eyes nightly       acetaminophen (TYLENOL) 325 MG tablet Take 650 mg by mouth every 4 hours as needed for Pain or Fever       magnesium hydroxide (MILK OF MAGNESIA) 400 MG/5ML suspension Take 30 mLs by mouth daily as needed.  bisacodyl (DULCOLAX) 5 MG EC tablet Take 5 mg by mouth daily as needed.  busPIRone (BUSPAR) 7.5 MG tablet Take 7.5 mg by mouth every morning                    Hospital Course: stable    Discharge Exam:  See progress note from today    Disposition:   To ECF in stable condition     Chun Jiménez  4/22/2019

## 2019-11-16 ENCOUNTER — APPOINTMENT (OUTPATIENT)
Dept: GENERAL RADIOLOGY | Age: 79
DRG: 481 | End: 2019-11-16
Payer: COMMERCIAL

## 2019-11-16 ENCOUNTER — ANESTHESIA EVENT (OUTPATIENT)
Dept: OPERATING ROOM | Age: 79
DRG: 481 | End: 2019-11-16
Payer: COMMERCIAL

## 2019-11-16 ENCOUNTER — APPOINTMENT (OUTPATIENT)
Dept: CT IMAGING | Age: 79
DRG: 481 | End: 2019-11-16
Payer: COMMERCIAL

## 2019-11-16 ENCOUNTER — HOSPITAL ENCOUNTER (INPATIENT)
Age: 79
LOS: 4 days | Discharge: NURSING, REHAB OR LONG-TERM CARE | DRG: 481 | End: 2019-11-20
Attending: EMERGENCY MEDICINE | Admitting: ORTHOPAEDIC SURGERY
Payer: COMMERCIAL

## 2019-11-16 ENCOUNTER — ANESTHESIA (OUTPATIENT)
Dept: OPERATING ROOM | Age: 79
DRG: 481 | End: 2019-11-16
Payer: COMMERCIAL

## 2019-11-16 DIAGNOSIS — S72.002A CLOSED FRACTURE OF LEFT HIP, INITIAL ENCOUNTER (HCC): Primary | ICD-10-CM

## 2019-11-16 DIAGNOSIS — S72.8X2A OTHER FRACTURE OF LEFT FEMUR, INITIAL ENCOUNTER FOR CLOSED FRACTURE (HCC): ICD-10-CM

## 2019-11-16 LAB
ABO/RH: NORMAL
ALBUMIN SERPL-MCNC: 3.2 G/DL (ref 3.5–5.2)
ALBUMIN SERPL-MCNC: 3.4 G/DL (ref 3.5–5.2)
ALP BLD-CCNC: 77 U/L (ref 40–129)
ALP BLD-CCNC: 81 U/L (ref 40–129)
ALT SERPL-CCNC: 7 U/L (ref 0–40)
ALT SERPL-CCNC: 8 U/L (ref 0–40)
ANION GAP SERPL CALCULATED.3IONS-SCNC: 11 MMOL/L (ref 7–16)
ANION GAP SERPL CALCULATED.3IONS-SCNC: 15 MMOL/L (ref 7–16)
ANISOCYTOSIS: ABNORMAL
ANTIBODY SCREEN: NORMAL
APTT: 28.9 SEC (ref 24.5–35.1)
AST SERPL-CCNC: 11 U/L (ref 0–39)
AST SERPL-CCNC: 15 U/L (ref 0–39)
BASOPHILS ABSOLUTE: 0.13 E9/L (ref 0–0.2)
BASOPHILS RELATIVE PERCENT: 0.9 % (ref 0–2)
BILIRUB SERPL-MCNC: 0.3 MG/DL (ref 0–1.2)
BILIRUB SERPL-MCNC: 0.3 MG/DL (ref 0–1.2)
BUN BLDV-MCNC: 33 MG/DL (ref 8–23)
BUN BLDV-MCNC: 35 MG/DL (ref 8–23)
BURR CELLS: ABNORMAL
CALCIUM SERPL-MCNC: 9.1 MG/DL (ref 8.6–10.2)
CALCIUM SERPL-MCNC: 9.2 MG/DL (ref 8.6–10.2)
CHLORIDE BLD-SCNC: 101 MMOL/L (ref 98–107)
CHLORIDE BLD-SCNC: 101 MMOL/L (ref 98–107)
CO2: 24 MMOL/L (ref 22–29)
CO2: 28 MMOL/L (ref 22–29)
CREAT SERPL-MCNC: 1 MG/DL (ref 0.7–1.2)
CREAT SERPL-MCNC: 1.1 MG/DL (ref 0.7–1.2)
EKG ATRIAL RATE: 92 BPM
EKG P AXIS: 38 DEGREES
EKG P-R INTERVAL: 188 MS
EKG Q-T INTERVAL: 382 MS
EKG QRS DURATION: 86 MS
EKG QTC CALCULATION (BAZETT): 472 MS
EKG R AXIS: -24 DEGREES
EKG T AXIS: 40 DEGREES
EKG VENTRICULAR RATE: 92 BPM
EOSINOPHILS ABSOLUTE: 0.13 E9/L (ref 0.05–0.5)
EOSINOPHILS RELATIVE PERCENT: 0.9 % (ref 0–6)
GFR AFRICAN AMERICAN: >60
GFR AFRICAN AMERICAN: >60
GFR NON-AFRICAN AMERICAN: >60 ML/MIN/1.73
GFR NON-AFRICAN AMERICAN: >60 ML/MIN/1.73
GLUCOSE BLD-MCNC: 222 MG/DL (ref 74–99)
GLUCOSE BLD-MCNC: 228 MG/DL (ref 74–99)
HCT VFR BLD CALC: 31.6 % (ref 37–54)
HEMOGLOBIN: 9 G/DL (ref 12.5–16.5)
INR BLD: 1.1
LYMPHOCYTES ABSOLUTE: 2.15 E9/L (ref 1.5–4)
LYMPHOCYTES RELATIVE PERCENT: 14.9 % (ref 20–42)
MCH RBC QN AUTO: 23.2 PG (ref 26–35)
MCHC RBC AUTO-ENTMCNC: 28.5 % (ref 32–34.5)
MCV RBC AUTO: 81.4 FL (ref 80–99.9)
METER GLUCOSE: 224 MG/DL (ref 74–99)
METER GLUCOSE: 268 MG/DL (ref 74–99)
MONOCYTES ABSOLUTE: 0.57 E9/L (ref 0.1–0.95)
MONOCYTES RELATIVE PERCENT: 3.5 % (ref 2–12)
NEUTROPHILS ABSOLUTE: 11.44 E9/L (ref 1.8–7.3)
NEUTROPHILS RELATIVE PERCENT: 79.8 % (ref 43–80)
OVALOCYTES: ABNORMAL
PDW BLD-RTO: 19.1 FL (ref 11.5–15)
PLATELET # BLD: 306 E9/L (ref 130–450)
PMV BLD AUTO: 9.3 FL (ref 7–12)
POIKILOCYTES: ABNORMAL
POLYCHROMASIA: ABNORMAL
POTASSIUM REFLEX MAGNESIUM: 4.1 MMOL/L (ref 3.5–5)
POTASSIUM SERPL-SCNC: 5.2 MMOL/L (ref 3.5–5)
PROTHROMBIN TIME: 11.9 SEC (ref 9.3–12.4)
RBC # BLD: 3.88 E12/L (ref 3.8–5.8)
SODIUM BLD-SCNC: 140 MMOL/L (ref 132–146)
SODIUM BLD-SCNC: 140 MMOL/L (ref 132–146)
TOTAL PROTEIN: 6.3 G/DL (ref 6.4–8.3)
TOTAL PROTEIN: 6.5 G/DL (ref 6.4–8.3)
WBC # BLD: 14.3 E9/L (ref 4.5–11.5)

## 2019-11-16 PROCEDURE — 80053 COMPREHEN METABOLIC PANEL: CPT

## 2019-11-16 PROCEDURE — 96374 THER/PROPH/DIAG INJ IV PUSH: CPT

## 2019-11-16 PROCEDURE — 70450 CT HEAD/BRAIN W/O DYE: CPT

## 2019-11-16 PROCEDURE — 73502 X-RAY EXAM HIP UNI 2-3 VIEWS: CPT

## 2019-11-16 PROCEDURE — 1200000000 HC SEMI PRIVATE

## 2019-11-16 PROCEDURE — P9016 RBC LEUKOCYTES REDUCED: HCPCS

## 2019-11-16 PROCEDURE — 85730 THROMBOPLASTIN TIME PARTIAL: CPT

## 2019-11-16 PROCEDURE — 86900 BLOOD TYPING SEROLOGIC ABO: CPT

## 2019-11-16 PROCEDURE — 86923 COMPATIBILITY TEST ELECTRIC: CPT

## 2019-11-16 PROCEDURE — 99285 EMERGENCY DEPT VISIT HI MDM: CPT

## 2019-11-16 PROCEDURE — 85025 COMPLETE CBC W/AUTO DIFF WBC: CPT

## 2019-11-16 PROCEDURE — 71045 X-RAY EXAM CHEST 1 VIEW: CPT

## 2019-11-16 PROCEDURE — 86850 RBC ANTIBODY SCREEN: CPT

## 2019-11-16 PROCEDURE — 96375 TX/PRO/DX INJ NEW DRUG ADDON: CPT

## 2019-11-16 PROCEDURE — 6370000000 HC RX 637 (ALT 250 FOR IP): Performed by: INTERNAL MEDICINE

## 2019-11-16 PROCEDURE — 36415 COLL VENOUS BLD VENIPUNCTURE: CPT

## 2019-11-16 PROCEDURE — 93005 ELECTROCARDIOGRAM TRACING: CPT | Performed by: EMERGENCY MEDICINE

## 2019-11-16 PROCEDURE — 2580000003 HC RX 258: Performed by: INTERNAL MEDICINE

## 2019-11-16 PROCEDURE — 85610 PROTHROMBIN TIME: CPT

## 2019-11-16 PROCEDURE — 82962 GLUCOSE BLOOD TEST: CPT

## 2019-11-16 PROCEDURE — 2700000000 HC OXYGEN THERAPY PER DAY

## 2019-11-16 PROCEDURE — 6360000002 HC RX W HCPCS: Performed by: EMERGENCY MEDICINE

## 2019-11-16 PROCEDURE — 6360000002 HC RX W HCPCS: Performed by: INTERNAL MEDICINE

## 2019-11-16 PROCEDURE — 86901 BLOOD TYPING SEROLOGIC RH(D): CPT

## 2019-11-16 PROCEDURE — 93010 ELECTROCARDIOGRAM REPORT: CPT | Performed by: INTERNAL MEDICINE

## 2019-11-16 RX ORDER — MORPHINE SULFATE 4 MG/ML
4 INJECTION, SOLUTION INTRAMUSCULAR; INTRAVENOUS ONCE
Status: COMPLETED | OUTPATIENT
Start: 2019-11-16 | End: 2019-11-16

## 2019-11-16 RX ORDER — BUSPIRONE HYDROCHLORIDE 5 MG/1
7.5 TABLET ORAL EVERY MORNING
Status: DISCONTINUED | OUTPATIENT
Start: 2019-11-17 | End: 2019-11-20 | Stop reason: HOSPADM

## 2019-11-16 RX ORDER — TAMSULOSIN HYDROCHLORIDE 0.4 MG/1
0.4 CAPSULE ORAL DAILY
Status: DISCONTINUED | OUTPATIENT
Start: 2019-11-16 | End: 2019-11-20 | Stop reason: HOSPADM

## 2019-11-16 RX ORDER — SODIUM CHLORIDE 0.9 % (FLUSH) 0.9 %
10 SYRINGE (ML) INJECTION PRN
Status: DISCONTINUED | OUTPATIENT
Start: 2019-11-16 | End: 2019-11-20 | Stop reason: HOSPADM

## 2019-11-16 RX ORDER — DEXTROSE MONOHYDRATE 25 G/50ML
12.5 INJECTION, SOLUTION INTRAVENOUS PRN
Status: DISCONTINUED | OUTPATIENT
Start: 2019-11-16 | End: 2019-11-20 | Stop reason: HOSPADM

## 2019-11-16 RX ORDER — PROMETHAZINE HYDROCHLORIDE 25 MG/ML
25 INJECTION, SOLUTION INTRAMUSCULAR; INTRAVENOUS PRN
Status: DISCONTINUED | OUTPATIENT
Start: 2019-11-16 | End: 2019-11-17 | Stop reason: HOSPADM

## 2019-11-16 RX ORDER — MIRTAZAPINE 15 MG/1
7.5 TABLET, FILM COATED ORAL NIGHTLY
Status: DISCONTINUED | OUTPATIENT
Start: 2019-11-16 | End: 2019-11-20 | Stop reason: HOSPADM

## 2019-11-16 RX ORDER — SODIUM CHLORIDE 0.9 % (FLUSH) 0.9 %
10 SYRINGE (ML) INJECTION EVERY 12 HOURS SCHEDULED
Status: DISCONTINUED | OUTPATIENT
Start: 2019-11-16 | End: 2019-11-20 | Stop reason: HOSPADM

## 2019-11-16 RX ORDER — LABETALOL HYDROCHLORIDE 5 MG/ML
5 INJECTION, SOLUTION INTRAVENOUS EVERY 10 MIN PRN
Status: DISCONTINUED | OUTPATIENT
Start: 2019-11-16 | End: 2019-11-17 | Stop reason: HOSPADM

## 2019-11-16 RX ORDER — NICOTINE POLACRILEX 4 MG
15 LOZENGE BUCCAL PRN
Status: DISCONTINUED | OUTPATIENT
Start: 2019-11-16 | End: 2019-11-20 | Stop reason: HOSPADM

## 2019-11-16 RX ORDER — CEFAZOLIN SODIUM 2 G/50ML
2 SOLUTION INTRAVENOUS
Status: COMPLETED | OUTPATIENT
Start: 2019-11-16 | End: 2019-11-17

## 2019-11-16 RX ORDER — DEXTROSE MONOHYDRATE 50 MG/ML
100 INJECTION, SOLUTION INTRAVENOUS PRN
Status: DISCONTINUED | OUTPATIENT
Start: 2019-11-16 | End: 2019-11-20 | Stop reason: HOSPADM

## 2019-11-16 RX ORDER — MEPERIDINE HYDROCHLORIDE 50 MG/ML
12.5 INJECTION INTRAMUSCULAR; INTRAVENOUS; SUBCUTANEOUS EVERY 5 MIN PRN
Status: DISCONTINUED | OUTPATIENT
Start: 2019-11-16 | End: 2019-11-17 | Stop reason: HOSPADM

## 2019-11-16 RX ORDER — IPRATROPIUM BROMIDE AND ALBUTEROL SULFATE 2.5; .5 MG/3ML; MG/3ML
3 SOLUTION RESPIRATORY (INHALATION) EVERY 4 HOURS PRN
Status: DISCONTINUED | OUTPATIENT
Start: 2019-11-16 | End: 2019-11-20 | Stop reason: HOSPADM

## 2019-11-16 RX ORDER — MORPHINE SULFATE 4 MG/ML
6 INJECTION, SOLUTION INTRAMUSCULAR; INTRAVENOUS ONCE
Status: COMPLETED | OUTPATIENT
Start: 2019-11-16 | End: 2019-11-16

## 2019-11-16 RX ORDER — LATANOPROST 50 UG/ML
1 SOLUTION/ DROPS OPHTHALMIC DAILY
Status: DISCONTINUED | OUTPATIENT
Start: 2019-11-16 | End: 2019-11-20 | Stop reason: HOSPADM

## 2019-11-16 RX ORDER — MORPHINE SULFATE 2 MG/ML
1 INJECTION, SOLUTION INTRAMUSCULAR; INTRAVENOUS EVERY 4 HOURS PRN
Status: DISCONTINUED | OUTPATIENT
Start: 2019-11-16 | End: 2019-11-20 | Stop reason: HOSPADM

## 2019-11-16 RX ORDER — PREDNISONE 10 MG/1
10 TABLET ORAL DAILY
Status: ON HOLD | COMMUNITY
Start: 2019-11-03 | End: 2021-01-05 | Stop reason: ALTCHOICE

## 2019-11-16 RX ORDER — ONDANSETRON 2 MG/ML
4 INJECTION INTRAMUSCULAR; INTRAVENOUS EVERY 6 HOURS PRN
Status: DISCONTINUED | OUTPATIENT
Start: 2019-11-16 | End: 2019-11-20 | Stop reason: HOSPADM

## 2019-11-16 RX ORDER — ATORVASTATIN CALCIUM 10 MG/1
20 TABLET, FILM COATED ORAL DAILY
Status: DISCONTINUED | OUTPATIENT
Start: 2019-11-16 | End: 2019-11-20 | Stop reason: HOSPADM

## 2019-11-16 RX ORDER — PANTOPRAZOLE SODIUM 40 MG/1
40 TABLET, DELAYED RELEASE ORAL
Status: DISCONTINUED | OUTPATIENT
Start: 2019-11-17 | End: 2019-11-20 | Stop reason: HOSPADM

## 2019-11-16 RX ORDER — PREDNISONE 10 MG/1
10 TABLET ORAL DAILY
Status: DISCONTINUED | OUTPATIENT
Start: 2019-11-16 | End: 2019-11-20 | Stop reason: HOSPADM

## 2019-11-16 RX ADMIN — Medication 10 ML: at 20:25

## 2019-11-16 RX ADMIN — LATANOPROST 1 DROP: 50 SOLUTION OPHTHALMIC at 20:25

## 2019-11-16 RX ADMIN — MORPHINE SULFATE 6 MG: 4 INJECTION, SOLUTION INTRAMUSCULAR; INTRAVENOUS at 13:25

## 2019-11-16 RX ADMIN — INSULIN LISPRO 1 UNITS: 100 INJECTION, SOLUTION INTRAVENOUS; SUBCUTANEOUS at 20:35

## 2019-11-16 RX ADMIN — MORPHINE SULFATE 1 MG: 2 INJECTION, SOLUTION INTRAMUSCULAR; INTRAVENOUS at 20:25

## 2019-11-16 RX ADMIN — MIRTAZAPINE 7.5 MG: 15 TABLET, FILM COATED ORAL at 20:25

## 2019-11-16 RX ADMIN — MORPHINE SULFATE 4 MG: 4 INJECTION, SOLUTION INTRAMUSCULAR; INTRAVENOUS at 12:47

## 2019-11-16 ASSESSMENT — PAIN DESCRIPTION - DESCRIPTORS
DESCRIPTORS: ACHING;CONSTANT;DISCOMFORT
DESCRIPTORS: SORE

## 2019-11-16 ASSESSMENT — PAIN DESCRIPTION - ONSET: ONSET: ON-GOING

## 2019-11-16 ASSESSMENT — PAIN SCALES - GENERAL
PAINLEVEL_OUTOF10: 6
PAINLEVEL_OUTOF10: 0
PAINLEVEL_OUTOF10: 6
PAINLEVEL_OUTOF10: 0

## 2019-11-16 ASSESSMENT — PAIN DESCRIPTION - ORIENTATION
ORIENTATION: LEFT

## 2019-11-16 ASSESSMENT — PAIN DESCRIPTION - PAIN TYPE
TYPE: ACUTE PAIN

## 2019-11-16 ASSESSMENT — PAIN DESCRIPTION - LOCATION
LOCATION: HIP

## 2019-11-16 ASSESSMENT — PAIN DESCRIPTION - FREQUENCY: FREQUENCY: CONTINUOUS

## 2019-11-17 ENCOUNTER — APPOINTMENT (OUTPATIENT)
Dept: GENERAL RADIOLOGY | Age: 79
DRG: 481 | End: 2019-11-17
Payer: COMMERCIAL

## 2019-11-17 VITALS — TEMPERATURE: 99 F | DIASTOLIC BLOOD PRESSURE: 82 MMHG | OXYGEN SATURATION: 81 % | SYSTOLIC BLOOD PRESSURE: 126 MMHG

## 2019-11-17 LAB
ANISOCYTOSIS: ABNORMAL
BASOPHILIC STIPPLING: ABNORMAL
BASOPHILS ABSOLUTE: 0.03 E9/L (ref 0–0.2)
BASOPHILS RELATIVE PERCENT: 0.2 % (ref 0–2)
EOSINOPHILS ABSOLUTE: 0.04 E9/L (ref 0.05–0.5)
EOSINOPHILS RELATIVE PERCENT: 0.3 % (ref 0–6)
HCT VFR BLD CALC: 32 % (ref 37–54)
HEMOGLOBIN: 9 G/DL (ref 12.5–16.5)
HYPOCHROMIA: ABNORMAL
IMMATURE GRANULOCYTES #: 0.07 E9/L
IMMATURE GRANULOCYTES %: 0.6 % (ref 0–5)
LYMPHOCYTES ABSOLUTE: 1.17 E9/L (ref 1.5–4)
LYMPHOCYTES RELATIVE PERCENT: 9.7 % (ref 20–42)
MCH RBC QN AUTO: 23 PG (ref 26–35)
MCHC RBC AUTO-ENTMCNC: 28.1 % (ref 32–34.5)
MCV RBC AUTO: 81.6 FL (ref 80–99.9)
METER GLUCOSE: 233 MG/DL (ref 74–99)
METER GLUCOSE: 304 MG/DL (ref 74–99)
METER GLUCOSE: 314 MG/DL (ref 74–99)
METER GLUCOSE: 353 MG/DL (ref 74–99)
MONOCYTES ABSOLUTE: 0.97 E9/L (ref 0.1–0.95)
MONOCYTES RELATIVE PERCENT: 8 % (ref 2–12)
NEUTROPHILS ABSOLUTE: 9.79 E9/L (ref 1.8–7.3)
NEUTROPHILS RELATIVE PERCENT: 81.2 % (ref 43–80)
OVALOCYTES: ABNORMAL
PDW BLD-RTO: 19 FL (ref 11.5–15)
PLATELET # BLD: 315 E9/L (ref 130–450)
PMV BLD AUTO: 9.2 FL (ref 7–12)
POIKILOCYTES: ABNORMAL
POLYCHROMASIA: ABNORMAL
RBC # BLD: 3.92 E12/L (ref 3.8–5.8)
WBC # BLD: 12.1 E9/L (ref 4.5–11.5)

## 2019-11-17 PROCEDURE — 99222 1ST HOSP IP/OBS MODERATE 55: CPT | Performed by: ORTHOPAEDIC SURGERY

## 2019-11-17 PROCEDURE — 2500000003 HC RX 250 WO HCPCS

## 2019-11-17 PROCEDURE — 3700000000 HC ANESTHESIA ATTENDED CARE: Performed by: ORTHOPAEDIC SURGERY

## 2019-11-17 PROCEDURE — C1769 GUIDE WIRE: HCPCS | Performed by: ORTHOPAEDIC SURGERY

## 2019-11-17 PROCEDURE — 2580000003 HC RX 258: Performed by: STUDENT IN AN ORGANIZED HEALTH CARE EDUCATION/TRAINING PROGRAM

## 2019-11-17 PROCEDURE — 3209999900 FLUORO FOR SURGICAL PROCEDURES

## 2019-11-17 PROCEDURE — 3700000001 HC ADD 15 MINUTES (ANESTHESIA): Performed by: ORTHOPAEDIC SURGERY

## 2019-11-17 PROCEDURE — 27245 TREAT THIGH FRACTURE: CPT | Performed by: ORTHOPAEDIC SURGERY

## 2019-11-17 PROCEDURE — 82962 GLUCOSE BLOOD TEST: CPT

## 2019-11-17 PROCEDURE — 6370000000 HC RX 637 (ALT 250 FOR IP): Performed by: STUDENT IN AN ORGANIZED HEALTH CARE EDUCATION/TRAINING PROGRAM

## 2019-11-17 PROCEDURE — 94760 N-INVAS EAR/PLS OXIMETRY 1: CPT

## 2019-11-17 PROCEDURE — 2709999900 HC NON-CHARGEABLE SUPPLY: Performed by: ORTHOPAEDIC SURGERY

## 2019-11-17 PROCEDURE — 6360000002 HC RX W HCPCS: Performed by: STUDENT IN AN ORGANIZED HEALTH CARE EDUCATION/TRAINING PROGRAM

## 2019-11-17 PROCEDURE — 36415 COLL VENOUS BLD VENIPUNCTURE: CPT

## 2019-11-17 PROCEDURE — 2720000010 HC SURG SUPPLY STERILE: Performed by: ORTHOPAEDIC SURGERY

## 2019-11-17 PROCEDURE — 1200000000 HC SEMI PRIVATE

## 2019-11-17 PROCEDURE — 94761 N-INVAS EAR/PLS OXIMETRY MLT: CPT

## 2019-11-17 PROCEDURE — 3600000015 HC SURGERY LEVEL 5 ADDTL 15MIN: Performed by: ORTHOPAEDIC SURGERY

## 2019-11-17 PROCEDURE — C1713 ANCHOR/SCREW BN/BN,TIS/BN: HCPCS | Performed by: ORTHOPAEDIC SURGERY

## 2019-11-17 PROCEDURE — 6360000002 HC RX W HCPCS

## 2019-11-17 PROCEDURE — 3600000005 HC SURGERY LEVEL 5 BASE: Performed by: ORTHOPAEDIC SURGERY

## 2019-11-17 PROCEDURE — 94640 AIRWAY INHALATION TREATMENT: CPT

## 2019-11-17 PROCEDURE — 85025 COMPLETE CBC W/AUTO DIFF WBC: CPT

## 2019-11-17 PROCEDURE — 2700000000 HC OXYGEN THERAPY PER DAY

## 2019-11-17 PROCEDURE — 0QS704Z REPOSITION LEFT UPPER FEMUR WITH INTERNAL FIXATION DEVICE, OPEN APPROACH: ICD-10-PCS | Performed by: ORTHOPAEDIC SURGERY

## 2019-11-17 PROCEDURE — 7100000000 HC PACU RECOVERY - FIRST 15 MIN: Performed by: ORTHOPAEDIC SURGERY

## 2019-11-17 PROCEDURE — 2580000003 HC RX 258

## 2019-11-17 PROCEDURE — 6360000002 HC RX W HCPCS: Performed by: INTERNAL MEDICINE

## 2019-11-17 PROCEDURE — 73502 X-RAY EXAM HIP UNI 2-3 VIEWS: CPT

## 2019-11-17 PROCEDURE — 7100000001 HC PACU RECOVERY - ADDTL 15 MIN: Performed by: ORTHOPAEDIC SURGERY

## 2019-11-17 DEVICE — SCREW BNE L58MM DIA5MM ST TIB LT GRN TI ST CANN LOK FULL: Type: IMPLANTABLE DEVICE | Site: HIP | Status: FUNCTIONAL

## 2019-11-17 DEVICE — SCREW BNE L110MM DIA10.35MM G TI CANN PERF FOR PROX FEM: Type: IMPLANTABLE DEVICE | Site: HIP | Status: FUNCTIONAL

## 2019-11-17 RX ORDER — SODIUM CHLORIDE 9 MG/ML
INJECTION, SOLUTION INTRAVENOUS CONTINUOUS PRN
Status: DISCONTINUED | OUTPATIENT
Start: 2019-11-17 | End: 2019-11-17 | Stop reason: SDUPTHER

## 2019-11-17 RX ORDER — DEXAMETHASONE SODIUM PHOSPHATE 10 MG/ML
INJECTION INTRAMUSCULAR; INTRAVENOUS PRN
Status: DISCONTINUED | OUTPATIENT
Start: 2019-11-17 | End: 2019-11-17 | Stop reason: SDUPTHER

## 2019-11-17 RX ORDER — ROCURONIUM BROMIDE 10 MG/ML
INJECTION, SOLUTION INTRAVENOUS PRN
Status: DISCONTINUED | OUTPATIENT
Start: 2019-11-17 | End: 2019-11-17 | Stop reason: SDUPTHER

## 2019-11-17 RX ORDER — ASPIRIN 325 MG
325 TABLET, DELAYED RELEASE (ENTERIC COATED) ORAL 2 TIMES DAILY WITH MEALS
Qty: 30 TABLET | Refills: 3 | Status: ON HOLD | OUTPATIENT
Start: 2019-11-17 | End: 2020-12-23 | Stop reason: HOSPADM

## 2019-11-17 RX ORDER — LIDOCAINE HYDROCHLORIDE 20 MG/ML
INJECTION, SOLUTION INTRAVENOUS PRN
Status: DISCONTINUED | OUTPATIENT
Start: 2019-11-17 | End: 2019-11-17 | Stop reason: SDUPTHER

## 2019-11-17 RX ORDER — GLYCOPYRROLATE 1 MG/5 ML
SYRINGE (ML) INTRAVENOUS PRN
Status: DISCONTINUED | OUTPATIENT
Start: 2019-11-17 | End: 2019-11-17 | Stop reason: SDUPTHER

## 2019-11-17 RX ORDER — HYDROCODONE BITARTRATE AND ACETAMINOPHEN 5; 325 MG/1; MG/1
1 TABLET ORAL EVERY 4 HOURS PRN
Qty: 42 TABLET | Refills: 0 | Status: SHIPPED | OUTPATIENT
Start: 2019-11-17 | End: 2019-11-24

## 2019-11-17 RX ORDER — ONDANSETRON 2 MG/ML
INJECTION INTRAMUSCULAR; INTRAVENOUS PRN
Status: DISCONTINUED | OUTPATIENT
Start: 2019-11-17 | End: 2019-11-17 | Stop reason: SDUPTHER

## 2019-11-17 RX ORDER — IPRATROPIUM BROMIDE AND ALBUTEROL SULFATE 2.5; .5 MG/3ML; MG/3ML
1 SOLUTION RESPIRATORY (INHALATION)
Status: DISCONTINUED | OUTPATIENT
Start: 2019-11-17 | End: 2019-11-20 | Stop reason: HOSPADM

## 2019-11-17 RX ORDER — FENTANYL CITRATE 50 UG/ML
INJECTION, SOLUTION INTRAMUSCULAR; INTRAVENOUS PRN
Status: DISCONTINUED | OUTPATIENT
Start: 2019-11-17 | End: 2019-11-17 | Stop reason: SDUPTHER

## 2019-11-17 RX ORDER — PROPOFOL 10 MG/ML
INJECTION, EMULSION INTRAVENOUS PRN
Status: DISCONTINUED | OUTPATIENT
Start: 2019-11-17 | End: 2019-11-17 | Stop reason: SDUPTHER

## 2019-11-17 RX ORDER — PHENYLEPHRINE HYDROCHLORIDE 10 MG/ML
INJECTION INTRAVENOUS PRN
Status: DISCONTINUED | OUTPATIENT
Start: 2019-11-17 | End: 2019-11-17 | Stop reason: SDUPTHER

## 2019-11-17 RX ORDER — NEOSTIGMINE METHYLSULFATE 1 MG/ML
INJECTION, SOLUTION INTRAVENOUS PRN
Status: DISCONTINUED | OUTPATIENT
Start: 2019-11-17 | End: 2019-11-17 | Stop reason: SDUPTHER

## 2019-11-17 RX ADMIN — FENTANYL CITRATE 50 MCG: 50 INJECTION, SOLUTION INTRAMUSCULAR; INTRAVENOUS at 07:56

## 2019-11-17 RX ADMIN — Medication 10 ML: at 21:33

## 2019-11-17 RX ADMIN — LATANOPROST 1 DROP: 50 SOLUTION OPHTHALMIC at 21:24

## 2019-11-17 RX ADMIN — IPRATROPIUM BROMIDE AND ALBUTEROL SULFATE 1 AMPULE: .5; 3 SOLUTION RESPIRATORY (INHALATION) at 22:01

## 2019-11-17 RX ADMIN — Medication 3 MG: at 09:33

## 2019-11-17 RX ADMIN — INSULIN LISPRO 4 UNITS: 100 INJECTION, SOLUTION INTRAVENOUS; SUBCUTANEOUS at 17:06

## 2019-11-17 RX ADMIN — LIDOCAINE HYDROCHLORIDE 60 MG: 20 INJECTION, SOLUTION INTRAVENOUS at 07:56

## 2019-11-17 RX ADMIN — CEFAZOLIN 1 G: 1 INJECTION, POWDER, FOR SOLUTION INTRAMUSCULAR; INTRAVENOUS at 16:48

## 2019-11-17 RX ADMIN — IPRATROPIUM BROMIDE AND ALBUTEROL SULFATE 1 AMPULE: .5; 3 SOLUTION RESPIRATORY (INHALATION) at 12:45

## 2019-11-17 RX ADMIN — ENOXAPARIN SODIUM 40 MG: 40 INJECTION SUBCUTANEOUS at 20:55

## 2019-11-17 RX ADMIN — INSULIN LISPRO 3 UNITS: 100 INJECTION, SOLUTION INTRAVENOUS; SUBCUTANEOUS at 21:00

## 2019-11-17 RX ADMIN — INSULIN LISPRO 4 UNITS: 100 INJECTION, SOLUTION INTRAVENOUS; SUBCUTANEOUS at 12:45

## 2019-11-17 RX ADMIN — ROCURONIUM BROMIDE 40 MG: 10 INJECTION, SOLUTION INTRAVENOUS at 07:56

## 2019-11-17 RX ADMIN — MORPHINE SULFATE 1 MG: 2 INJECTION, SOLUTION INTRAMUSCULAR; INTRAVENOUS at 06:31

## 2019-11-17 RX ADMIN — PHENYLEPHRINE HYDROCHLORIDE 100 MCG: 10 INJECTION INTRAVENOUS at 08:20

## 2019-11-17 RX ADMIN — IPRATROPIUM BROMIDE AND ALBUTEROL SULFATE 1 AMPULE: .5; 3 SOLUTION RESPIRATORY (INHALATION) at 17:02

## 2019-11-17 RX ADMIN — SODIUM CHLORIDE: 9 INJECTION, SOLUTION INTRAVENOUS at 09:29

## 2019-11-17 RX ADMIN — CEFAZOLIN SODIUM 2 G: 2 SOLUTION INTRAVENOUS at 08:03

## 2019-11-17 RX ADMIN — PROPOFOL 150 MG: 10 INJECTION, EMULSION INTRAVENOUS at 07:56

## 2019-11-17 RX ADMIN — Medication 0.6 MG: at 09:33

## 2019-11-17 RX ADMIN — MIRTAZAPINE 7.5 MG: 15 TABLET, FILM COATED ORAL at 20:56

## 2019-11-17 RX ADMIN — ONDANSETRON HYDROCHLORIDE 4 MG: 2 INJECTION, SOLUTION INTRAMUSCULAR; INTRAVENOUS at 09:22

## 2019-11-17 RX ADMIN — ROCURONIUM BROMIDE 10 MG: 10 INJECTION, SOLUTION INTRAVENOUS at 08:29

## 2019-11-17 RX ADMIN — SODIUM CHLORIDE, PRESERVATIVE FREE 10 ML: 5 INJECTION INTRAVENOUS at 16:48

## 2019-11-17 RX ADMIN — PHENYLEPHRINE HYDROCHLORIDE 100 MCG: 10 INJECTION INTRAVENOUS at 08:38

## 2019-11-17 RX ADMIN — SODIUM CHLORIDE: 9 INJECTION, SOLUTION INTRAVENOUS at 07:53

## 2019-11-17 RX ADMIN — DEXAMETHASONE SODIUM PHOSPHATE 10 MG: 10 INJECTION INTRAMUSCULAR; INTRAVENOUS at 07:56

## 2019-11-17 ASSESSMENT — PULMONARY FUNCTION TESTS
PIF_VALUE: 26
PIF_VALUE: 27
PIF_VALUE: 26
PIF_VALUE: 1
PIF_VALUE: 4
PIF_VALUE: 28
PIF_VALUE: 27
PIF_VALUE: 26
PIF_VALUE: 24
PIF_VALUE: 26
PIF_VALUE: 26
PIF_VALUE: 1
PIF_VALUE: 27
PIF_VALUE: 26
PIF_VALUE: 26
PIF_VALUE: 27
PIF_VALUE: 27
PIF_VALUE: 20
PIF_VALUE: 29
PIF_VALUE: 26
PIF_VALUE: 26
PIF_VALUE: 27
PIF_VALUE: 27
PIF_VALUE: 26
PIF_VALUE: 14
PIF_VALUE: 24
PIF_VALUE: 1
PIF_VALUE: 2
PIF_VALUE: 27
PIF_VALUE: 26
PIF_VALUE: 26
PIF_VALUE: 2
PIF_VALUE: 27
PIF_VALUE: 27
PIF_VALUE: 26
PIF_VALUE: 28
PIF_VALUE: 30
PIF_VALUE: 22
PIF_VALUE: 26
PIF_VALUE: 27
PIF_VALUE: 26
PIF_VALUE: 28
PIF_VALUE: 25
PIF_VALUE: 27
PIF_VALUE: 27
PIF_VALUE: 13
PIF_VALUE: 27
PIF_VALUE: 21
PIF_VALUE: 26
PIF_VALUE: 31
PIF_VALUE: 0
PIF_VALUE: 27
PIF_VALUE: 24
PIF_VALUE: 27
PIF_VALUE: 27
PIF_VALUE: 1
PIF_VALUE: 26
PIF_VALUE: 27
PIF_VALUE: 26
PIF_VALUE: 27
PIF_VALUE: 27
PIF_VALUE: 26
PIF_VALUE: 25
PIF_VALUE: 27
PIF_VALUE: 26
PIF_VALUE: 26
PIF_VALUE: 27
PIF_VALUE: 22
PIF_VALUE: 27
PIF_VALUE: 27
PIF_VALUE: 2
PIF_VALUE: 26
PIF_VALUE: 25
PIF_VALUE: 27
PIF_VALUE: 24
PIF_VALUE: 28
PIF_VALUE: 30
PIF_VALUE: 24
PIF_VALUE: 27
PIF_VALUE: 27
PIF_VALUE: 10
PIF_VALUE: 26
PIF_VALUE: 13
PIF_VALUE: 27
PIF_VALUE: 26
PIF_VALUE: 26
PIF_VALUE: 22
PIF_VALUE: 27
PIF_VALUE: 10
PIF_VALUE: 27
PIF_VALUE: 27
PIF_VALUE: 26
PIF_VALUE: 28
PIF_VALUE: 23
PIF_VALUE: 7
PIF_VALUE: 22
PIF_VALUE: 27

## 2019-11-17 ASSESSMENT — PAIN DESCRIPTION - ONSET: ONSET: ON-GOING

## 2019-11-17 ASSESSMENT — PAIN SCALES - GENERAL
PAINLEVEL_OUTOF10: 0
PAINLEVEL_OUTOF10: 10

## 2019-11-17 ASSESSMENT — PAIN DESCRIPTION - DESCRIPTORS: DESCRIPTORS: ACHING;CONSTANT;DISCOMFORT

## 2019-11-17 ASSESSMENT — PAIN DESCRIPTION - PAIN TYPE
TYPE: ACUTE PAIN
TYPE: SURGICAL PAIN

## 2019-11-17 ASSESSMENT — PAIN DESCRIPTION - FREQUENCY: FREQUENCY: CONTINUOUS

## 2019-11-17 ASSESSMENT — PAIN DESCRIPTION - LOCATION: LOCATION: HIP

## 2019-11-17 ASSESSMENT — PAIN DESCRIPTION - ORIENTATION: ORIENTATION: LEFT

## 2019-11-18 LAB
HCT VFR BLD CALC: 27.1 % (ref 37–54)
HEMOGLOBIN: 7.7 G/DL (ref 12.5–16.5)
MCH RBC QN AUTO: 23.3 PG (ref 26–35)
MCHC RBC AUTO-ENTMCNC: 28.4 % (ref 32–34.5)
MCV RBC AUTO: 81.9 FL (ref 80–99.9)
METER GLUCOSE: 282 MG/DL (ref 74–99)
METER GLUCOSE: 322 MG/DL (ref 74–99)
METER GLUCOSE: 362 MG/DL (ref 74–99)
METER GLUCOSE: 449 MG/DL (ref 74–99)
PDW BLD-RTO: 18.8 FL (ref 11.5–15)
PLATELET # BLD: 332 E9/L (ref 130–450)
PMV BLD AUTO: 9.6 FL (ref 7–12)
RBC # BLD: 3.31 E12/L (ref 3.8–5.8)
WBC # BLD: 13.9 E9/L (ref 4.5–11.5)

## 2019-11-18 PROCEDURE — 6360000002 HC RX W HCPCS: Performed by: STUDENT IN AN ORGANIZED HEALTH CARE EDUCATION/TRAINING PROGRAM

## 2019-11-18 PROCEDURE — 6370000000 HC RX 637 (ALT 250 FOR IP): Performed by: STUDENT IN AN ORGANIZED HEALTH CARE EDUCATION/TRAINING PROGRAM

## 2019-11-18 PROCEDURE — 2700000000 HC OXYGEN THERAPY PER DAY

## 2019-11-18 PROCEDURE — 36415 COLL VENOUS BLD VENIPUNCTURE: CPT

## 2019-11-18 PROCEDURE — 97162 PT EVAL MOD COMPLEX 30 MIN: CPT

## 2019-11-18 PROCEDURE — 97530 THERAPEUTIC ACTIVITIES: CPT

## 2019-11-18 PROCEDURE — 6370000000 HC RX 637 (ALT 250 FOR IP): Performed by: INTERNAL MEDICINE

## 2019-11-18 PROCEDURE — 94640 AIRWAY INHALATION TREATMENT: CPT

## 2019-11-18 PROCEDURE — 82962 GLUCOSE BLOOD TEST: CPT

## 2019-11-18 PROCEDURE — 2580000003 HC RX 258: Performed by: INTERNAL MEDICINE

## 2019-11-18 PROCEDURE — 2580000003 HC RX 258: Performed by: STUDENT IN AN ORGANIZED HEALTH CARE EDUCATION/TRAINING PROGRAM

## 2019-11-18 PROCEDURE — 1200000000 HC SEMI PRIVATE

## 2019-11-18 PROCEDURE — 97166 OT EVAL MOD COMPLEX 45 MIN: CPT

## 2019-11-18 PROCEDURE — 85027 COMPLETE CBC AUTOMATED: CPT

## 2019-11-18 RX ORDER — SODIUM CHLORIDE 450 MG/100ML
INJECTION, SOLUTION INTRAVENOUS CONTINUOUS
Status: DISCONTINUED | OUTPATIENT
Start: 2019-11-18 | End: 2019-11-20 | Stop reason: HOSPADM

## 2019-11-18 RX ADMIN — IPRATROPIUM BROMIDE AND ALBUTEROL SULFATE 1 AMPULE: .5; 3 SOLUTION RESPIRATORY (INHALATION) at 09:29

## 2019-11-18 RX ADMIN — MIRTAZAPINE 7.5 MG: 15 TABLET, FILM COATED ORAL at 22:26

## 2019-11-18 RX ADMIN — TAMSULOSIN HYDROCHLORIDE 0.4 MG: 0.4 CAPSULE ORAL at 08:43

## 2019-11-18 RX ADMIN — INSULIN LISPRO 3 UNITS: 100 INJECTION, SOLUTION INTRAVENOUS; SUBCUTANEOUS at 17:46

## 2019-11-18 RX ADMIN — INSULIN LISPRO 4 UNITS: 100 INJECTION, SOLUTION INTRAVENOUS; SUBCUTANEOUS at 08:43

## 2019-11-18 RX ADMIN — IPRATROPIUM BROMIDE AND ALBUTEROL SULFATE 1 AMPULE: .5; 3 SOLUTION RESPIRATORY (INHALATION) at 20:13

## 2019-11-18 RX ADMIN — INSULIN LISPRO 6 UNITS: 100 INJECTION, SOLUTION INTRAVENOUS; SUBCUTANEOUS at 17:46

## 2019-11-18 RX ADMIN — ATORVASTATIN CALCIUM 20 MG: 10 TABLET, FILM COATED ORAL at 08:42

## 2019-11-18 RX ADMIN — LATANOPROST 1 DROP: 50 SOLUTION OPHTHALMIC at 22:26

## 2019-11-18 RX ADMIN — INSULIN LISPRO 2 UNITS: 100 INJECTION, SOLUTION INTRAVENOUS; SUBCUTANEOUS at 22:28

## 2019-11-18 RX ADMIN — BUSPIRONE HYDROCHLORIDE 7.5 MG: 5 TABLET ORAL at 08:42

## 2019-11-18 RX ADMIN — CEFAZOLIN 1 G: 1 INJECTION, POWDER, FOR SOLUTION INTRAMUSCULAR; INTRAVENOUS at 08:50

## 2019-11-18 RX ADMIN — ENOXAPARIN SODIUM 40 MG: 40 INJECTION SUBCUTANEOUS at 08:43

## 2019-11-18 RX ADMIN — SODIUM CHLORIDE: 4.5 INJECTION, SOLUTION INTRAVENOUS at 07:55

## 2019-11-18 RX ADMIN — INSULIN LISPRO 5 UNITS: 100 INJECTION, SOLUTION INTRAVENOUS; SUBCUTANEOUS at 12:40

## 2019-11-18 RX ADMIN — PANTOPRAZOLE SODIUM 40 MG: 40 TABLET, DELAYED RELEASE ORAL at 06:40

## 2019-11-18 RX ADMIN — CEFAZOLIN 1 G: 1 INJECTION, POWDER, FOR SOLUTION INTRAMUSCULAR; INTRAVENOUS at 00:01

## 2019-11-18 RX ADMIN — IPRATROPIUM BROMIDE AND ALBUTEROL SULFATE 1 AMPULE: .5; 3 SOLUTION RESPIRATORY (INHALATION) at 16:30

## 2019-11-18 RX ADMIN — PREDNISONE 10 MG: 10 TABLET ORAL at 08:43

## 2019-11-18 ASSESSMENT — PAIN SCALES - GENERAL
PAINLEVEL_OUTOF10: 0
PAINLEVEL_OUTOF10: 0

## 2019-11-19 LAB
BLOOD BANK DISPENSE STATUS: NORMAL
BLOOD BANK PRODUCT CODE: NORMAL
BPU ID: NORMAL
DESCRIPTION BLOOD BANK: NORMAL
HCT VFR BLD CALC: 23.5 % (ref 37–54)
HCT VFR BLD CALC: 27.7 % (ref 37–54)
HEMOGLOBIN: 6.7 G/DL (ref 12.5–16.5)
HEMOGLOBIN: 7.9 G/DL (ref 12.5–16.5)
MCH RBC QN AUTO: 23 PG (ref 26–35)
MCH RBC QN AUTO: 23.2 PG (ref 26–35)
MCHC RBC AUTO-ENTMCNC: 28.5 % (ref 32–34.5)
MCHC RBC AUTO-ENTMCNC: 28.5 % (ref 32–34.5)
MCV RBC AUTO: 80.8 FL (ref 80–99.9)
MCV RBC AUTO: 81.3 FL (ref 80–99.9)
METER GLUCOSE: 252 MG/DL (ref 74–99)
METER GLUCOSE: 277 MG/DL (ref 74–99)
METER GLUCOSE: 282 MG/DL (ref 74–99)
METER GLUCOSE: 358 MG/DL (ref 74–99)
PDW BLD-RTO: 18.1 FL (ref 11.5–15)
PDW BLD-RTO: 18.8 FL (ref 11.5–15)
PLATELET # BLD: 322 E9/L (ref 130–450)
PLATELET # BLD: 347 E9/L (ref 130–450)
PMV BLD AUTO: 9.9 FL (ref 7–12)
PMV BLD AUTO: 9.9 FL (ref 7–12)
RBC # BLD: 2.89 E12/L (ref 3.8–5.8)
RBC # BLD: 3.43 E12/L (ref 3.8–5.8)
WBC # BLD: 13.3 E9/L (ref 4.5–11.5)
WBC # BLD: 13.6 E9/L (ref 4.5–11.5)

## 2019-11-19 PROCEDURE — 2700000000 HC OXYGEN THERAPY PER DAY

## 2019-11-19 PROCEDURE — 94640 AIRWAY INHALATION TREATMENT: CPT

## 2019-11-19 PROCEDURE — 99024 POSTOP FOLLOW-UP VISIT: CPT | Performed by: ORTHOPAEDIC SURGERY

## 2019-11-19 PROCEDURE — 6360000002 HC RX W HCPCS: Performed by: STUDENT IN AN ORGANIZED HEALTH CARE EDUCATION/TRAINING PROGRAM

## 2019-11-19 PROCEDURE — 6370000000 HC RX 637 (ALT 250 FOR IP): Performed by: STUDENT IN AN ORGANIZED HEALTH CARE EDUCATION/TRAINING PROGRAM

## 2019-11-19 PROCEDURE — 36415 COLL VENOUS BLD VENIPUNCTURE: CPT

## 2019-11-19 PROCEDURE — 1200000000 HC SEMI PRIVATE

## 2019-11-19 PROCEDURE — 36430 TRANSFUSION BLD/BLD COMPNT: CPT

## 2019-11-19 PROCEDURE — 85027 COMPLETE CBC AUTOMATED: CPT

## 2019-11-19 PROCEDURE — 82962 GLUCOSE BLOOD TEST: CPT

## 2019-11-19 RX ORDER — 0.9 % SODIUM CHLORIDE 0.9 %
250 INTRAVENOUS SOLUTION INTRAVENOUS ONCE
Status: DISCONTINUED | OUTPATIENT
Start: 2019-11-19 | End: 2019-11-20 | Stop reason: HOSPADM

## 2019-11-19 RX ORDER — HYDROCODONE BITARTRATE AND ACETAMINOPHEN 5; 325 MG/1; MG/1
1 TABLET ORAL EVERY 4 HOURS PRN
Status: DISCONTINUED | OUTPATIENT
Start: 2019-11-19 | End: 2019-11-20 | Stop reason: HOSPADM

## 2019-11-19 RX ADMIN — MORPHINE SULFATE 1 MG: 2 INJECTION, SOLUTION INTRAMUSCULAR; INTRAVENOUS at 13:09

## 2019-11-19 RX ADMIN — INSULIN LISPRO 3 UNITS: 100 INJECTION, SOLUTION INTRAVENOUS; SUBCUTANEOUS at 17:56

## 2019-11-19 RX ADMIN — MIRTAZAPINE 7.5 MG: 15 TABLET, FILM COATED ORAL at 21:47

## 2019-11-19 RX ADMIN — ATORVASTATIN CALCIUM 20 MG: 10 TABLET, FILM COATED ORAL at 08:54

## 2019-11-19 RX ADMIN — PANTOPRAZOLE SODIUM 40 MG: 40 TABLET, DELAYED RELEASE ORAL at 06:23

## 2019-11-19 RX ADMIN — BUSPIRONE HYDROCHLORIDE 7.5 MG: 5 TABLET ORAL at 08:53

## 2019-11-19 RX ADMIN — IPRATROPIUM BROMIDE AND ALBUTEROL SULFATE 1 AMPULE: .5; 3 SOLUTION RESPIRATORY (INHALATION) at 09:59

## 2019-11-19 RX ADMIN — METHOTREXATE 7.5 MG: 2.5 TABLET ORAL at 08:54

## 2019-11-19 RX ADMIN — LATANOPROST 1 DROP: 50 SOLUTION OPHTHALMIC at 21:46

## 2019-11-19 RX ADMIN — IPRATROPIUM BROMIDE AND ALBUTEROL SULFATE 1 AMPULE: .5; 3 SOLUTION RESPIRATORY (INHALATION) at 17:03

## 2019-11-19 RX ADMIN — INSULIN LISPRO 3 UNITS: 100 INJECTION, SOLUTION INTRAVENOUS; SUBCUTANEOUS at 21:47

## 2019-11-19 RX ADMIN — INSULIN LISPRO 3 UNITS: 100 INJECTION, SOLUTION INTRAVENOUS; SUBCUTANEOUS at 12:47

## 2019-11-19 RX ADMIN — ENOXAPARIN SODIUM 40 MG: 40 INJECTION SUBCUTANEOUS at 08:54

## 2019-11-19 RX ADMIN — PREDNISONE 10 MG: 10 TABLET ORAL at 08:53

## 2019-11-19 RX ADMIN — IPRATROPIUM BROMIDE AND ALBUTEROL SULFATE 1 AMPULE: .5; 3 SOLUTION RESPIRATORY (INHALATION) at 21:05

## 2019-11-19 RX ADMIN — TAMSULOSIN HYDROCHLORIDE 0.4 MG: 0.4 CAPSULE ORAL at 08:54

## 2019-11-19 RX ADMIN — HYDROCODONE BITARTRATE AND ACETAMINOPHEN 1 TABLET: 5; 325 TABLET ORAL at 21:57

## 2019-11-19 RX ADMIN — INSULIN LISPRO 3 UNITS: 100 INJECTION, SOLUTION INTRAVENOUS; SUBCUTANEOUS at 08:54

## 2019-11-19 ASSESSMENT — PAIN DESCRIPTION - ORIENTATION: ORIENTATION: LEFT

## 2019-11-19 ASSESSMENT — PAIN DESCRIPTION - LOCATION: LOCATION: HIP

## 2019-11-19 ASSESSMENT — PAIN SCALES - GENERAL
PAINLEVEL_OUTOF10: 7
PAINLEVEL_OUTOF10: 0
PAINLEVEL_OUTOF10: 0

## 2019-11-19 ASSESSMENT — PAIN DESCRIPTION - PAIN TYPE: TYPE: SURGICAL PAIN

## 2019-11-20 VITALS
RESPIRATION RATE: 17 BRPM | SYSTOLIC BLOOD PRESSURE: 129 MMHG | OXYGEN SATURATION: 96 % | DIASTOLIC BLOOD PRESSURE: 64 MMHG | HEART RATE: 76 BPM | HEIGHT: 72 IN | WEIGHT: 250 LBS | BODY MASS INDEX: 33.86 KG/M2 | TEMPERATURE: 98.1 F

## 2019-11-20 LAB
ABO/RH: NORMAL
ANION GAP SERPL CALCULATED.3IONS-SCNC: 11 MMOL/L (ref 7–16)
ANTIBODY SCREEN: NORMAL
BLOOD BANK DISPENSE STATUS: NORMAL
BLOOD BANK PRODUCT CODE: NORMAL
BPU ID: NORMAL
BUN BLDV-MCNC: 32 MG/DL (ref 8–23)
CALCIUM SERPL-MCNC: 7.9 MG/DL (ref 8.6–10.2)
CHLORIDE BLD-SCNC: 101 MMOL/L (ref 98–107)
CO2: 25 MMOL/L (ref 22–29)
CREAT SERPL-MCNC: 0.9 MG/DL (ref 0.7–1.2)
DESCRIPTION BLOOD BANK: NORMAL
GFR AFRICAN AMERICAN: >60
GFR NON-AFRICAN AMERICAN: >60 ML/MIN/1.73
GLUCOSE BLD-MCNC: 189 MG/DL (ref 74–99)
HCT VFR BLD CALC: 24.3 % (ref 37–54)
HCT VFR BLD CALC: 29.4 % (ref 37–54)
HEMOGLOBIN: 6.9 G/DL (ref 12.5–16.5)
HEMOGLOBIN: 8.7 G/DL (ref 12.5–16.5)
MCH RBC QN AUTO: 22.7 PG (ref 26–35)
MCHC RBC AUTO-ENTMCNC: 28.4 % (ref 32–34.5)
MCV RBC AUTO: 79.9 FL (ref 80–99.9)
METER GLUCOSE: 195 MG/DL (ref 74–99)
METER GLUCOSE: 228 MG/DL (ref 74–99)
METER GLUCOSE: 287 MG/DL (ref 74–99)
PDW BLD-RTO: 18.2 FL (ref 11.5–15)
PLATELET # BLD: 315 E9/L (ref 130–450)
PMV BLD AUTO: 9.7 FL (ref 7–12)
POTASSIUM SERPL-SCNC: 4.3 MMOL/L (ref 3.5–5)
RBC # BLD: 3.04 E12/L (ref 3.8–5.8)
SODIUM BLD-SCNC: 137 MMOL/L (ref 132–146)
WBC # BLD: 10.2 E9/L (ref 4.5–11.5)

## 2019-11-20 PROCEDURE — 6360000002 HC RX W HCPCS: Performed by: STUDENT IN AN ORGANIZED HEALTH CARE EDUCATION/TRAINING PROGRAM

## 2019-11-20 PROCEDURE — 80048 BASIC METABOLIC PNL TOTAL CA: CPT

## 2019-11-20 PROCEDURE — 86923 COMPATIBILITY TEST ELECTRIC: CPT

## 2019-11-20 PROCEDURE — 2700000000 HC OXYGEN THERAPY PER DAY

## 2019-11-20 PROCEDURE — 85014 HEMATOCRIT: CPT

## 2019-11-20 PROCEDURE — 6370000000 HC RX 637 (ALT 250 FOR IP): Performed by: STUDENT IN AN ORGANIZED HEALTH CARE EDUCATION/TRAINING PROGRAM

## 2019-11-20 PROCEDURE — 36415 COLL VENOUS BLD VENIPUNCTURE: CPT

## 2019-11-20 PROCEDURE — 82962 GLUCOSE BLOOD TEST: CPT

## 2019-11-20 PROCEDURE — 36430 TRANSFUSION BLD/BLD COMPNT: CPT

## 2019-11-20 PROCEDURE — P9016 RBC LEUKOCYTES REDUCED: HCPCS

## 2019-11-20 PROCEDURE — 86900 BLOOD TYPING SEROLOGIC ABO: CPT

## 2019-11-20 PROCEDURE — 2580000003 HC RX 258: Performed by: INTERNAL MEDICINE

## 2019-11-20 PROCEDURE — 86850 RBC ANTIBODY SCREEN: CPT

## 2019-11-20 PROCEDURE — 94640 AIRWAY INHALATION TREATMENT: CPT

## 2019-11-20 PROCEDURE — 85027 COMPLETE CBC AUTOMATED: CPT

## 2019-11-20 PROCEDURE — 85018 HEMOGLOBIN: CPT

## 2019-11-20 PROCEDURE — 97530 THERAPEUTIC ACTIVITIES: CPT

## 2019-11-20 PROCEDURE — 86901 BLOOD TYPING SEROLOGIC RH(D): CPT

## 2019-11-20 RX ORDER — 0.9 % SODIUM CHLORIDE 0.9 %
250 INTRAVENOUS SOLUTION INTRAVENOUS ONCE
Status: DISCONTINUED | OUTPATIENT
Start: 2019-11-20 | End: 2019-11-20 | Stop reason: HOSPADM

## 2019-11-20 RX ADMIN — IPRATROPIUM BROMIDE AND ALBUTEROL SULFATE 1 AMPULE: .5; 3 SOLUTION RESPIRATORY (INHALATION) at 14:17

## 2019-11-20 RX ADMIN — HYDROCODONE BITARTRATE AND ACETAMINOPHEN 1 TABLET: 5; 325 TABLET ORAL at 09:11

## 2019-11-20 RX ADMIN — PANTOPRAZOLE SODIUM 40 MG: 40 TABLET, DELAYED RELEASE ORAL at 06:28

## 2019-11-20 RX ADMIN — SODIUM CHLORIDE: 4.5 INJECTION, SOLUTION INTRAVENOUS at 12:39

## 2019-11-20 RX ADMIN — INSULIN LISPRO 1 UNITS: 100 INJECTION, SOLUTION INTRAVENOUS; SUBCUTANEOUS at 09:11

## 2019-11-20 RX ADMIN — TAMSULOSIN HYDROCHLORIDE 0.4 MG: 0.4 CAPSULE ORAL at 09:11

## 2019-11-20 RX ADMIN — ENOXAPARIN SODIUM 40 MG: 40 INJECTION SUBCUTANEOUS at 09:15

## 2019-11-20 RX ADMIN — PREDNISONE 10 MG: 10 TABLET ORAL at 09:11

## 2019-11-20 RX ADMIN — INSULIN LISPRO 2 UNITS: 100 INJECTION, SOLUTION INTRAVENOUS; SUBCUTANEOUS at 12:39

## 2019-11-20 RX ADMIN — ATORVASTATIN CALCIUM 20 MG: 10 TABLET, FILM COATED ORAL at 09:11

## 2019-11-20 RX ADMIN — INSULIN LISPRO 3 UNITS: 100 INJECTION, SOLUTION INTRAVENOUS; SUBCUTANEOUS at 16:54

## 2019-11-20 RX ADMIN — BUSPIRONE HYDROCHLORIDE 7.5 MG: 5 TABLET ORAL at 09:11

## 2019-11-20 ASSESSMENT — PAIN SCALES - GENERAL
PAINLEVEL_OUTOF10: 6
PAINLEVEL_OUTOF10: 0

## 2019-11-27 ENCOUNTER — TELEPHONE (OUTPATIENT)
Dept: ORTHOPEDIC SURGERY | Age: 79
End: 2019-11-27

## 2019-11-27 DIAGNOSIS — S72.002A HIP FRACTURE, LEFT, CLOSED, INITIAL ENCOUNTER (HCC): Primary | ICD-10-CM

## 2019-12-02 ENCOUNTER — OFFICE VISIT (OUTPATIENT)
Dept: ORTHOPEDIC SURGERY | Age: 79
End: 2019-12-02

## 2019-12-02 VITALS
SYSTOLIC BLOOD PRESSURE: 123 MMHG | BODY MASS INDEX: 33.86 KG/M2 | RESPIRATION RATE: 18 BRPM | TEMPERATURE: 98.3 F | HEART RATE: 99 BPM | WEIGHT: 250 LBS | DIASTOLIC BLOOD PRESSURE: 81 MMHG | HEIGHT: 72 IN

## 2019-12-02 DIAGNOSIS — S72.002A HIP FRACTURE, LEFT, CLOSED, INITIAL ENCOUNTER (HCC): Primary | ICD-10-CM

## 2019-12-02 PROCEDURE — 99024 POSTOP FOLLOW-UP VISIT: CPT | Performed by: ORTHOPAEDIC SURGERY

## 2019-12-30 ENCOUNTER — TELEPHONE (OUTPATIENT)
Dept: ORTHOPEDIC SURGERY | Age: 79
End: 2019-12-30

## 2020-01-08 ENCOUNTER — TELEPHONE (OUTPATIENT)
Dept: ORTHOPEDIC SURGERY | Age: 80
End: 2020-01-08

## 2020-01-09 ENCOUNTER — OFFICE VISIT (OUTPATIENT)
Dept: ORTHOPEDIC SURGERY | Age: 80
End: 2020-01-09

## 2020-01-09 VITALS — SYSTOLIC BLOOD PRESSURE: 110 MMHG | RESPIRATION RATE: 18 BRPM | HEART RATE: 74 BPM | DIASTOLIC BLOOD PRESSURE: 70 MMHG

## 2020-01-09 PROCEDURE — 99024 POSTOP FOLLOW-UP VISIT: CPT | Performed by: ORTHOPAEDIC SURGERY

## 2020-01-09 NOTE — PROGRESS NOTES
HPI: Patient is 6 weeks post op from IMN for an intertrochanteric femur fracture. He is doing well since the surgery. Patient is nonambulatory. He does still complain of some mild pain. Physical exam: incisions over left hip well healed no signs of infection. No erythema or drainage from the hip. Minimal TTP about the left hip. Sensation intact to light touch in the sural, saphenous, tibial, deep and superficial peroneal nerve distributions to the foot. +1/4 DP and PT pulses. Foot warm and perfused. Minimal pain with motion of the hip. Able to flex and extend the toes and ankle. Radiology review 5 views left hip including femur obtained and reviewed in office today demonstrating stable fixation of the intertrochanteric femur fracture with interval callus formation and some shortening of the fracture compared to previous films. No other     Assessment: 6 weeks S/P IMN for intertrochanteric femur fracture        Plan:  Patient may be WBAT to the left lower extremity  He should start working with PT for strengthening. Follow up as needed   All questions answered. I have seen and evaluated the patient and agree with the above assessment and plan on today's visit. I have performed the key components of the history and physical examination with significant findings of doing well postop. I concur with the findings and plan as documented.     Samm Reyes MD  1/9/2020

## 2020-12-13 ENCOUNTER — APPOINTMENT (OUTPATIENT)
Dept: GENERAL RADIOLOGY | Age: 80
DRG: 871 | End: 2020-12-13
Payer: COMMERCIAL

## 2020-12-13 ENCOUNTER — HOSPITAL ENCOUNTER (INPATIENT)
Age: 80
LOS: 10 days | Discharge: NURSING, REHAB OR LONG-TERM CARE | DRG: 871 | End: 2020-12-23
Attending: EMERGENCY MEDICINE | Admitting: INTERNAL MEDICINE
Payer: COMMERCIAL

## 2020-12-13 ENCOUNTER — APPOINTMENT (OUTPATIENT)
Dept: CT IMAGING | Age: 80
DRG: 871 | End: 2020-12-13
Payer: COMMERCIAL

## 2020-12-13 PROBLEM — J96.00 ACUTE RESPIRATORY FAILURE (HCC): Status: ACTIVE | Noted: 2020-12-13

## 2020-12-13 LAB
AADO2: 212.4 MMHG
AADO2: 497.2 MMHG
AADO2: 523.8 MMHG
ADENOVIRUS BY PCR: NOT DETECTED
ALBUMIN SERPL-MCNC: 2.7 G/DL (ref 3.5–5.2)
ALP BLD-CCNC: 75 U/L (ref 40–129)
ALT SERPL-CCNC: 10 U/L (ref 0–40)
ANION GAP SERPL CALCULATED.3IONS-SCNC: 10 MMOL/L (ref 7–16)
ANION GAP SERPL CALCULATED.3IONS-SCNC: 5 MMOL/L (ref 7–16)
APTT: 26.8 SEC (ref 24.5–35.1)
AST SERPL-CCNC: 16 U/L (ref 0–39)
B.E.: -1.5 MMOL/L (ref -3–3)
B.E.: -1.7 MMOL/L (ref -3–3)
B.E.: -2.7 MMOL/L (ref -3–3)
BACTERIA: ABNORMAL /HPF
BASOPHILIC STIPPLING: ABNORMAL
BASOPHILS ABSOLUTE: 0.06 E9/L (ref 0–0.2)
BASOPHILS RELATIVE PERCENT: 0.2 % (ref 0–2)
BILIRUB SERPL-MCNC: 0.4 MG/DL (ref 0–1.2)
BILIRUBIN URINE: ABNORMAL
BLOOD, URINE: NEGATIVE
BORDETELLA PARAPERTUSSIS BY PCR: NOT DETECTED
BORDETELLA PERTUSSIS BY PCR: NOT DETECTED
BUN BLDV-MCNC: 41 MG/DL (ref 8–23)
BUN BLDV-MCNC: 44 MG/DL (ref 8–23)
C-REACTIVE PROTEIN: 18.4 MG/DL (ref 0–0.4)
CALCIUM SERPL-MCNC: 7.5 MG/DL (ref 8.6–10.2)
CALCIUM SERPL-MCNC: 8.1 MG/DL (ref 8.6–10.2)
CHLAMYDOPHILIA PNEUMONIAE BY PCR: NOT DETECTED
CHLORIDE BLD-SCNC: 100 MMOL/L (ref 98–107)
CHLORIDE BLD-SCNC: 103 MMOL/L (ref 98–107)
CLARITY: CLEAR
CO2: 28 MMOL/L (ref 22–29)
CO2: 28 MMOL/L (ref 22–29)
COHB: 0.8 % (ref 0–1.5)
COHB: 0.9 % (ref 0–1.5)
COHB: 0.9 % (ref 0–1.5)
COLOR: YELLOW
CORONAVIRUS 229E BY PCR: NOT DETECTED
CORONAVIRUS HKU1 BY PCR: NOT DETECTED
CORONAVIRUS NL63 BY PCR: NOT DETECTED
CORONAVIRUS OC43 BY PCR: NOT DETECTED
CREAT SERPL-MCNC: 1.1 MG/DL (ref 0.7–1.2)
CREAT SERPL-MCNC: 1.2 MG/DL (ref 0.7–1.2)
CRITICAL: ABNORMAL
DATE ANALYZED: ABNORMAL
DATE OF COLLECTION: ABNORMAL
EKG ATRIAL RATE: 118 BPM
EKG P AXIS: 59 DEGREES
EKG P-R INTERVAL: 180 MS
EKG Q-T INTERVAL: 328 MS
EKG QRS DURATION: 90 MS
EKG QTC CALCULATION (BAZETT): 459 MS
EKG R AXIS: -57 DEGREES
EKG T AXIS: 71 DEGREES
EKG VENTRICULAR RATE: 118 BPM
EOSINOPHILS ABSOLUTE: 0.13 E9/L (ref 0.05–0.5)
EOSINOPHILS RELATIVE PERCENT: 0.5 % (ref 0–6)
EPITHELIAL CELLS, UA: ABNORMAL /HPF
FIO2: 100 %
FIO2: 100 %
FIO2: 50 %
GFR AFRICAN AMERICAN: >60
GFR AFRICAN AMERICAN: >60
GFR NON-AFRICAN AMERICAN: 58 ML/MIN/1.73
GFR NON-AFRICAN AMERICAN: >60 ML/MIN/1.73
GLUCOSE BLD-MCNC: 198 MG/DL (ref 74–99)
GLUCOSE BLD-MCNC: 242 MG/DL (ref 74–99)
GLUCOSE URINE: NEGATIVE MG/DL
HCO3: 23.7 MMOL/L (ref 22–26)
HCO3: 25.4 MMOL/L (ref 22–26)
HCO3: 27.3 MMOL/L (ref 22–26)
HCT VFR BLD CALC: 40.9 % (ref 37–54)
HEMOGLOBIN: 11.8 G/DL (ref 12.5–16.5)
HHB: 1.2 % (ref 0–5)
HHB: 3.5 % (ref 0–5)
HHB: 6.3 % (ref 0–5)
HUMAN METAPNEUMOVIRUS BY PCR: NOT DETECTED
HUMAN RHINOVIRUS/ENTEROVIRUS BY PCR: NOT DETECTED
HYALINE CASTS: ABNORMAL /LPF (ref 0–2)
IMMATURE GRANULOCYTES #: 0.18 E9/L
IMMATURE GRANULOCYTES %: 0.6 % (ref 0–5)
INFLUENZA A BY PCR: NOT DETECTED
INFLUENZA B BY PCR: NOT DETECTED
INR BLD: 1.3
KETONES, URINE: 15 MG/DL
LAB: ABNORMAL
LACTIC ACID, SEPSIS: 3.6 MMOL/L (ref 0.5–1.9)
LACTIC ACID, SEPSIS: 4.8 MMOL/L (ref 0.5–1.9)
LACTIC ACID: 3 MMOL/L (ref 0.5–2.2)
LEUKOCYTE ESTERASE, URINE: NEGATIVE
LIPASE: 9 U/L (ref 13–60)
LYMPHOCYTES ABSOLUTE: 1.39 E9/L (ref 1.5–4)
LYMPHOCYTES RELATIVE PERCENT: 4.8 % (ref 20–42)
Lab: ABNORMAL
MCH RBC QN AUTO: 27.2 PG (ref 26–35)
MCHC RBC AUTO-ENTMCNC: 28.9 % (ref 32–34.5)
MCV RBC AUTO: 94.2 FL (ref 80–99.9)
METER GLUCOSE: 172 MG/DL (ref 74–99)
METHB: 0 % (ref 0–1.5)
METHB: 0 % (ref 0–1.5)
METHB: 0.3 % (ref 0–1.5)
MODE: AC
MONOCYTES ABSOLUTE: 0.98 E9/L (ref 0.1–0.95)
MONOCYTES RELATIVE PERCENT: 3.4 % (ref 2–12)
MYCOPLASMA PNEUMONIAE BY PCR: NOT DETECTED
NEUTROPHILS ABSOLUTE: 26.06 E9/L (ref 1.8–7.3)
NEUTROPHILS RELATIVE PERCENT: 90.5 % (ref 43–80)
NITRITE, URINE: NEGATIVE
O2 CONTENT: 14.5 ML/DL
O2 CONTENT: 16.3 ML/DL
O2 CONTENT: 16.8 ML/DL
O2 SATURATION: 93.6 % (ref 92–98.5)
O2 SATURATION: 96.5 % (ref 92–98.5)
O2 SATURATION: 98.8 % (ref 92–98.5)
O2HB: 92.8 % (ref 94–97)
O2HB: 95.4 % (ref 94–97)
O2HB: 97.9 % (ref 94–97)
OPERATOR ID: 1687
OPERATOR ID: 3342
OPERATOR ID: ABNORMAL
OVALOCYTES: ABNORMAL
PARAINFLUENZA VIRUS 1 BY PCR: NOT DETECTED
PARAINFLUENZA VIRUS 2 BY PCR: NOT DETECTED
PARAINFLUENZA VIRUS 3 BY PCR: NOT DETECTED
PARAINFLUENZA VIRUS 4 BY PCR: NOT DETECTED
PATIENT TEMP: 37 C
PCO2: 42.8 MMHG (ref 35–45)
PCO2: 51.9 MMHG (ref 35–45)
PCO2: 76.5 MMHG (ref 35–45)
PDW BLD-RTO: 16.7 FL (ref 11.5–15)
PEEP/CPAP: 5 CMH2O
PFO2: 0.88 MMHG/%
PFO2: 1.39 MMHG/%
PFO2: 1.67 MMHG/%
PH BLOOD GAS: 7.17 (ref 7.35–7.45)
PH BLOOD GAS: 7.31 (ref 7.35–7.45)
PH BLOOD GAS: 7.36 (ref 7.35–7.45)
PH UA: 5 (ref 5–9)
PLATELET # BLD: 324 E9/L (ref 130–450)
PMV BLD AUTO: 9.5 FL (ref 7–12)
PO2: 138.9 MMHG (ref 75–100)
PO2: 83.5 MMHG (ref 75–100)
PO2: 87.7 MMHG (ref 75–100)
POIKILOCYTES: ABNORMAL
POLYCHROMASIA: ABNORMAL
POTASSIUM REFLEX MAGNESIUM: 4 MMOL/L (ref 3.5–5)
POTASSIUM SERPL-SCNC: 4.5 MMOL/L (ref 3.5–5)
PRO-BNP: 2118 PG/ML (ref 0–450)
PROCALCITONIN: 5.57 NG/ML (ref 0–0.08)
PROTEIN UA: ABNORMAL MG/DL
PROTHROMBIN TIME: 15.1 SEC (ref 9.3–12.4)
RBC # BLD: 4.34 E12/L (ref 3.8–5.8)
RBC UA: ABNORMAL /HPF (ref 0–2)
RESPIRATORY SYNCYTIAL VIRUS BY PCR: NOT DETECTED
RI(T): 254 %
RI(T): 358 %
RI(T): 597 %
RR MECHANICAL: 18 B/MIN
RR MECHANICAL: 22 B/MIN
RR MECHANICAL: 22 B/MIN
SARS-COV-2, PCR: NOT DETECTED
SEDIMENTATION RATE, ERYTHROCYTE: 23 MM/HR (ref 0–15)
SODIUM BLD-SCNC: 136 MMOL/L (ref 132–146)
SODIUM BLD-SCNC: 138 MMOL/L (ref 132–146)
SOURCE, BLOOD GAS: ABNORMAL
SPECIFIC GRAVITY UA: >=1.03 (ref 1–1.03)
THB: 10.7 G/DL (ref 11.5–16.5)
THB: 12 G/DL (ref 11.5–16.5)
THB: 12.4 G/DL (ref 11.5–16.5)
TIME ANALYZED: 1206
TIME ANALYZED: 334
TIME ANALYZED: 635
TOTAL PROTEIN: 5.6 G/DL (ref 6.4–8.3)
TROPONIN: 0.03 NG/ML (ref 0–0.03)
UROBILINOGEN, URINE: 0.2 E.U./DL
VT MECHANICAL: 500 ML
WBC # BLD: 28.8 E9/L (ref 4.5–11.5)
WBC UA: ABNORMAL /HPF (ref 0–5)

## 2020-12-13 PROCEDURE — 99285 EMERGENCY DEPT VISIT HI MDM: CPT

## 2020-12-13 PROCEDURE — 71045 X-RAY EXAM CHEST 1 VIEW: CPT

## 2020-12-13 PROCEDURE — 70450 CT HEAD/BRAIN W/O DYE: CPT

## 2020-12-13 PROCEDURE — 86140 C-REACTIVE PROTEIN: CPT

## 2020-12-13 PROCEDURE — 84484 ASSAY OF TROPONIN QUANT: CPT

## 2020-12-13 PROCEDURE — 83690 ASSAY OF LIPASE: CPT

## 2020-12-13 PROCEDURE — 0202U NFCT DS 22 TRGT SARS-COV-2: CPT

## 2020-12-13 PROCEDURE — 2500000003 HC RX 250 WO HCPCS: Performed by: STUDENT IN AN ORGANIZED HEALTH CARE EDUCATION/TRAINING PROGRAM

## 2020-12-13 PROCEDURE — 2580000003 HC RX 258

## 2020-12-13 PROCEDURE — 87088 URINE BACTERIA CULTURE: CPT

## 2020-12-13 PROCEDURE — 96366 THER/PROPH/DIAG IV INF ADDON: CPT

## 2020-12-13 PROCEDURE — 2580000003 HC RX 258: Performed by: EMERGENCY MEDICINE

## 2020-12-13 PROCEDURE — 84145 PROCALCITONIN (PCT): CPT

## 2020-12-13 PROCEDURE — 93005 ELECTROCARDIOGRAM TRACING: CPT | Performed by: EMERGENCY MEDICINE

## 2020-12-13 PROCEDURE — 85651 RBC SED RATE NONAUTOMATED: CPT

## 2020-12-13 PROCEDURE — 87040 BLOOD CULTURE FOR BACTERIA: CPT

## 2020-12-13 PROCEDURE — 82805 BLOOD GASES W/O2 SATURATION: CPT

## 2020-12-13 PROCEDURE — 2500000003 HC RX 250 WO HCPCS: Performed by: EMERGENCY MEDICINE

## 2020-12-13 PROCEDURE — 36556 INSERT NON-TUNNEL CV CATH: CPT

## 2020-12-13 PROCEDURE — 96368 THER/DIAG CONCURRENT INF: CPT

## 2020-12-13 PROCEDURE — 96365 THER/PROPH/DIAG IV INF INIT: CPT

## 2020-12-13 PROCEDURE — 6370000000 HC RX 637 (ALT 250 FOR IP): Performed by: INTERNAL MEDICINE

## 2020-12-13 PROCEDURE — 6360000002 HC RX W HCPCS: Performed by: INTERNAL MEDICINE

## 2020-12-13 PROCEDURE — 36415 COLL VENOUS BLD VENIPUNCTURE: CPT

## 2020-12-13 PROCEDURE — C9113 INJ PANTOPRAZOLE SODIUM, VIA: HCPCS | Performed by: INTERNAL MEDICINE

## 2020-12-13 PROCEDURE — 83605 ASSAY OF LACTIC ACID: CPT

## 2020-12-13 PROCEDURE — 31500 INSERT EMERGENCY AIRWAY: CPT

## 2020-12-13 PROCEDURE — 85610 PROTHROMBIN TIME: CPT

## 2020-12-13 PROCEDURE — 83880 ASSAY OF NATRIURETIC PEPTIDE: CPT

## 2020-12-13 PROCEDURE — 94640 AIRWAY INHALATION TREATMENT: CPT

## 2020-12-13 PROCEDURE — 96375 TX/PRO/DX INJ NEW DRUG ADDON: CPT

## 2020-12-13 PROCEDURE — 2580000003 HC RX 258: Performed by: INTERNAL MEDICINE

## 2020-12-13 PROCEDURE — 5A1945Z RESPIRATORY VENTILATION, 24-96 CONSECUTIVE HOURS: ICD-10-PCS | Performed by: EMERGENCY MEDICINE

## 2020-12-13 PROCEDURE — 85025 COMPLETE CBC W/AUTO DIFF WBC: CPT

## 2020-12-13 PROCEDURE — 87081 CULTURE SCREEN ONLY: CPT

## 2020-12-13 PROCEDURE — 6360000002 HC RX W HCPCS: Performed by: STUDENT IN AN ORGANIZED HEALTH CARE EDUCATION/TRAINING PROGRAM

## 2020-12-13 PROCEDURE — 2000000000 HC ICU R&B

## 2020-12-13 PROCEDURE — 6360000002 HC RX W HCPCS

## 2020-12-13 PROCEDURE — 51702 INSERT TEMP BLADDER CATH: CPT

## 2020-12-13 PROCEDURE — 80053 COMPREHEN METABOLIC PANEL: CPT

## 2020-12-13 PROCEDURE — 6360000002 HC RX W HCPCS: Performed by: EMERGENCY MEDICINE

## 2020-12-13 PROCEDURE — 94002 VENT MGMT INPAT INIT DAY: CPT

## 2020-12-13 PROCEDURE — 94664 DEMO&/EVAL PT USE INHALER: CPT

## 2020-12-13 PROCEDURE — 85730 THROMBOPLASTIN TIME PARTIAL: CPT

## 2020-12-13 PROCEDURE — 81001 URINALYSIS AUTO W/SCOPE: CPT

## 2020-12-13 PROCEDURE — 80048 BASIC METABOLIC PNL TOTAL CA: CPT

## 2020-12-13 PROCEDURE — 82962 GLUCOSE BLOOD TEST: CPT

## 2020-12-13 PROCEDURE — 0BH18EZ INSERTION OF ENDOTRACHEAL AIRWAY INTO TRACHEA, VIA NATURAL OR ARTIFICIAL OPENING ENDOSCOPIC: ICD-10-PCS | Performed by: EMERGENCY MEDICINE

## 2020-12-13 RX ORDER — ACETAMINOPHEN 325 MG/1
650 TABLET ORAL EVERY 6 HOURS PRN
Status: DISCONTINUED | OUTPATIENT
Start: 2020-12-13 | End: 2020-12-23 | Stop reason: HOSPADM

## 2020-12-13 RX ORDER — EPINEPHRINE 0.1 MG/ML
200 SYRINGE (ML) INJECTION ONCE
Status: COMPLETED | OUTPATIENT
Start: 2020-12-13 | End: 2020-12-13

## 2020-12-13 RX ORDER — SODIUM CHLORIDE 0.9 % (FLUSH) 0.9 %
10 SYRINGE (ML) INJECTION PRN
Status: DISCONTINUED | OUTPATIENT
Start: 2020-12-13 | End: 2020-12-23 | Stop reason: HOSPADM

## 2020-12-13 RX ORDER — PANTOPRAZOLE SODIUM 40 MG/10ML
40 INJECTION, POWDER, LYOPHILIZED, FOR SOLUTION INTRAVENOUS DAILY
Status: DISCONTINUED | OUTPATIENT
Start: 2020-12-13 | End: 2020-12-23 | Stop reason: HOSPADM

## 2020-12-13 RX ORDER — ROCURONIUM BROMIDE 10 MG/ML
100 INJECTION, SOLUTION INTRAVENOUS ONCE
Status: COMPLETED | OUTPATIENT
Start: 2020-12-13 | End: 2020-12-13

## 2020-12-13 RX ORDER — IPRATROPIUM BROMIDE AND ALBUTEROL SULFATE 2.5; .5 MG/3ML; MG/3ML
3 SOLUTION RESPIRATORY (INHALATION)
Status: DISCONTINUED | OUTPATIENT
Start: 2020-12-13 | End: 2020-12-23 | Stop reason: HOSPADM

## 2020-12-13 RX ORDER — PROMETHAZINE HYDROCHLORIDE 12.5 MG/1
12.5 SUPPOSITORY RECTAL EVERY 6 HOURS PRN
Status: DISCONTINUED | OUTPATIENT
Start: 2020-12-13 | End: 2020-12-23 | Stop reason: HOSPADM

## 2020-12-13 RX ORDER — EPINEPHRINE 0.1 MG/ML
1 SYRINGE (ML) INJECTION ONCE
Status: COMPLETED | OUTPATIENT
Start: 2020-12-13 | End: 2020-12-13

## 2020-12-13 RX ORDER — FOLIC ACID 1 MG/1
1 TABLET ORAL DAILY
Status: DISCONTINUED | OUTPATIENT
Start: 2020-12-13 | End: 2020-12-23 | Stop reason: HOSPADM

## 2020-12-13 RX ORDER — SODIUM CHLORIDE, SODIUM LACTATE, POTASSIUM CHLORIDE, AND CALCIUM CHLORIDE .6; .31; .03; .02 G/100ML; G/100ML; G/100ML; G/100ML
30 INJECTION, SOLUTION INTRAVENOUS ONCE
Status: DISCONTINUED | OUTPATIENT
Start: 2020-12-13 | End: 2020-12-13 | Stop reason: CLARIF

## 2020-12-13 RX ORDER — LATANOPROST 50 UG/ML
1 SOLUTION/ DROPS OPHTHALMIC DAILY
Status: DISCONTINUED | OUTPATIENT
Start: 2020-12-13 | End: 2020-12-23 | Stop reason: HOSPADM

## 2020-12-13 RX ORDER — SODIUM CHLORIDE, SODIUM LACTATE, POTASSIUM CHLORIDE, CALCIUM CHLORIDE 600; 310; 30; 20 MG/100ML; MG/100ML; MG/100ML; MG/100ML
30 INJECTION, SOLUTION INTRAVENOUS ONCE
Status: COMPLETED | OUTPATIENT
Start: 2020-12-13 | End: 2020-12-13

## 2020-12-13 RX ORDER — INSULIN ASPART 100 [IU]/ML
INJECTION, SOLUTION INTRAVENOUS; SUBCUTANEOUS
Status: ON HOLD | COMMUNITY
End: 2020-12-23 | Stop reason: HOSPADM

## 2020-12-13 RX ORDER — ACETAMINOPHEN 325 MG/1
650 TABLET ORAL EVERY 4 HOURS PRN
Status: DISCONTINUED | OUTPATIENT
Start: 2020-12-13 | End: 2020-12-13 | Stop reason: SDUPTHER

## 2020-12-13 RX ORDER — SODIUM CHLORIDE 9 MG/ML
INJECTION, SOLUTION INTRAVENOUS CONTINUOUS
Status: DISCONTINUED | OUTPATIENT
Start: 2020-12-13 | End: 2020-12-15

## 2020-12-13 RX ORDER — SODIUM CHLORIDE 0.9 % (FLUSH) 0.9 %
10 SYRINGE (ML) INJECTION EVERY 12 HOURS SCHEDULED
Status: DISCONTINUED | OUTPATIENT
Start: 2020-12-13 | End: 2020-12-23 | Stop reason: HOSPADM

## 2020-12-13 RX ORDER — PROMETHAZINE HYDROCHLORIDE 12.5 MG/1
12.5 SUPPOSITORY RECTAL EVERY 6 HOURS PRN
Status: ON HOLD | COMMUNITY
End: 2021-01-05 | Stop reason: ALTCHOICE

## 2020-12-13 RX ORDER — PROMETHAZINE HYDROCHLORIDE 25 MG/1
12.5 TABLET ORAL EVERY 6 HOURS PRN
Status: DISCONTINUED | OUTPATIENT
Start: 2020-12-13 | End: 2020-12-23 | Stop reason: HOSPADM

## 2020-12-13 RX ORDER — KETAMINE HYDROCHLORIDE 10 MG/ML
200 INJECTION, SOLUTION INTRAMUSCULAR; INTRAVENOUS ONCE
Status: COMPLETED | OUTPATIENT
Start: 2020-12-13 | End: 2020-12-13

## 2020-12-13 RX ORDER — DOCUSATE SODIUM 100 MG/1
100 CAPSULE, LIQUID FILLED ORAL 2 TIMES DAILY
Status: ON HOLD | COMMUNITY
End: 2020-12-23 | Stop reason: HOSPADM

## 2020-12-13 RX ORDER — ONDANSETRON 2 MG/ML
4 INJECTION INTRAMUSCULAR; INTRAVENOUS EVERY 6 HOURS PRN
Status: DISCONTINUED | OUTPATIENT
Start: 2020-12-13 | End: 2020-12-23 | Stop reason: HOSPADM

## 2020-12-13 RX ORDER — PIPERACILLIN SODIUM, TAZOBACTAM SODIUM 3; .375 G/15ML; G/15ML
3.38 INJECTION, POWDER, LYOPHILIZED, FOR SOLUTION INTRAVENOUS EVERY 8 HOURS
Status: DISCONTINUED | OUTPATIENT
Start: 2020-12-13 | End: 2020-12-14

## 2020-12-13 RX ORDER — SODIUM CHLORIDE 9 MG/ML
10 INJECTION INTRAVENOUS DAILY
Status: DISCONTINUED | OUTPATIENT
Start: 2020-12-13 | End: 2020-12-23 | Stop reason: HOSPADM

## 2020-12-13 RX ORDER — POLYETHYLENE GLYCOL 3350 17 G/17G
17 POWDER, FOR SOLUTION ORAL DAILY PRN
Status: DISCONTINUED | OUTPATIENT
Start: 2020-12-13 | End: 2020-12-18

## 2020-12-13 RX ORDER — ACETAMINOPHEN 650 MG/1
650 SUPPOSITORY RECTAL EVERY 6 HOURS PRN
Status: DISCONTINUED | OUTPATIENT
Start: 2020-12-13 | End: 2020-12-23 | Stop reason: HOSPADM

## 2020-12-13 RX ORDER — SODIUM CHLORIDE, SODIUM LACTATE, POTASSIUM CHLORIDE, AND CALCIUM CHLORIDE .6; .31; .03; .02 G/100ML; G/100ML; G/100ML; G/100ML
500 INJECTION, SOLUTION INTRAVENOUS ONCE
Status: COMPLETED | OUTPATIENT
Start: 2020-12-13 | End: 2020-12-13

## 2020-12-13 RX ADMIN — ROCURONIUM BROMIDE 100 MG: 10 INJECTION, SOLUTION INTRAVENOUS at 00:47

## 2020-12-13 RX ADMIN — EPINEPHRINE 0.2 MG: 0.1 INJECTION, SOLUTION ENDOTRACHEAL; INTRACARDIAC; INTRAVENOUS at 00:50

## 2020-12-13 RX ADMIN — PANTOPRAZOLE SODIUM 40 MG: 40 INJECTION, POWDER, FOR SOLUTION INTRAVENOUS at 18:07

## 2020-12-13 RX ADMIN — SODIUM CHLORIDE: 9 INJECTION, SOLUTION INTRAVENOUS at 14:17

## 2020-12-13 RX ADMIN — VANCOMYCIN HYDROCHLORIDE 1750 MG: 5 INJECTION, POWDER, LYOPHILIZED, FOR SOLUTION INTRAVENOUS at 20:29

## 2020-12-13 RX ADMIN — HYDROCORTISONE SODIUM SUCCINATE 50 MG: 100 INJECTION, POWDER, FOR SOLUTION INTRAMUSCULAR; INTRAVENOUS at 18:08

## 2020-12-13 RX ADMIN — PIPERACILLIN SODIUM AND TAZOBACTAM SODIUM 4.5 G: 4; .5 INJECTION, POWDER, LYOPHILIZED, FOR SOLUTION INTRAVENOUS at 02:38

## 2020-12-13 RX ADMIN — IPRATROPIUM BROMIDE AND ALBUTEROL SULFATE 3 ML: .5; 3 SOLUTION RESPIRATORY (INHALATION) at 13:53

## 2020-12-13 RX ADMIN — Medication 25 MCG/HR: at 01:32

## 2020-12-13 RX ADMIN — Medication 75 MCG/HR: at 10:57

## 2020-12-13 RX ADMIN — SODIUM CHLORIDE, POTASSIUM CHLORIDE, SODIUM LACTATE AND CALCIUM CHLORIDE 500 ML: 600; 310; 30; 20 INJECTION, SOLUTION INTRAVENOUS at 02:06

## 2020-12-13 RX ADMIN — ENOXAPARIN SODIUM 40 MG: 40 INJECTION SUBCUTANEOUS at 16:37

## 2020-12-13 RX ADMIN — KETAMINE HYDROCHLORIDE 200 MG: 10 INJECTION INTRAMUSCULAR; INTRAVENOUS at 00:47

## 2020-12-13 RX ADMIN — Medication 10 ML: at 23:35

## 2020-12-13 RX ADMIN — IPRATROPIUM BROMIDE AND ALBUTEROL SULFATE 3 ML: .5; 3 SOLUTION RESPIRATORY (INHALATION) at 22:25

## 2020-12-13 RX ADMIN — EPINEPHRINE 1 MG: 0.1 INJECTION, SOLUTION ENDOTRACHEAL; INTRACARDIAC; INTRAVENOUS at 00:45

## 2020-12-13 RX ADMIN — DOCUSATE SODIUM 100 MG: 50 LIQUID ORAL at 16:29

## 2020-12-13 RX ADMIN — NOREPINEPHRINE BITARTRATE 2 MCG/MIN: 1 INJECTION, SOLUTION, CONCENTRATE INTRAVENOUS at 22:02

## 2020-12-13 RX ADMIN — SODIUM CHLORIDE, POTASSIUM CHLORIDE, SODIUM LACTATE AND CALCIUM CHLORIDE 3312 ML: 600; 310; 30; 20 INJECTION, SOLUTION INTRAVENOUS at 07:10

## 2020-12-13 RX ADMIN — NOREPINEPHRINE BITARTRATE 10 MCG/MIN: 1 INJECTION, SOLUTION, CONCENTRATE INTRAVENOUS at 23:39

## 2020-12-13 RX ADMIN — Medication 125 MCG/HR: at 22:36

## 2020-12-13 RX ADMIN — FOLIC ACID 1 MG: 1 TABLET ORAL at 16:31

## 2020-12-13 RX ADMIN — LATANOPROST 1 DROP: 50 SOLUTION OPHTHALMIC at 16:31

## 2020-12-13 RX ADMIN — Medication 100 MCG/HR: at 17:43

## 2020-12-13 RX ADMIN — PIPERACILLIN AND TAZOBACTAM 3.38 G: 3; .375 INJECTION, POWDER, LYOPHILIZED, FOR SOLUTION INTRAVENOUS at 20:58

## 2020-12-13 ASSESSMENT — PULMONARY FUNCTION TESTS
PIF_VALUE: 32
PIF_VALUE: 28
PIF_VALUE: 22
PIF_VALUE: 27
PIF_VALUE: 31
PIF_VALUE: 26
PIF_VALUE: 29

## 2020-12-13 ASSESSMENT — PAIN SCALES - GENERAL
PAINLEVEL_OUTOF10: 0

## 2020-12-13 NOTE — PROGRESS NOTES
This note also relates to the following rows which could not be included:  SpO2 - Cannot attach notes to unvalidated device data  Pulse - Cannot attach notes to unvalidated device data       12/13/20 0346   Vent Information   Rate Set 22 bmp  (per md order)

## 2020-12-13 NOTE — ED NOTES
150 mL yellow colored urine emptied from hernandez bag. Valentina Howard.  Jarek Ho, [address] [place] [address]  12/13/20 0836

## 2020-12-13 NOTE — ED NOTES
RN spoke with ID. Physician would like a sputum aspiration. RT called to obtain this sample. Jamie Quintanilla.  Ashish Armstrong RN  12/13/20 7459

## 2020-12-13 NOTE — ED NOTES
RN spoke with Dr. Ignacio Ulloa from pulmonology to inform him of need for consult. No further orders at this time     Vester [place].  Ankita Jarrett RN  12/13/20 1760

## 2020-12-13 NOTE — ED NOTES
Another 100 mL of urine emptied from patient's hernandez bag. Amanda Mcgrath.  Bobby Black RN  12/13/20 3898

## 2020-12-13 NOTE — ED NOTES
Pt rotated onto left side at this time. No incontinence present. Pt restless and upped on fentanyl drip. Shelly Boothe.  Fran Montoya RN  12/13/20 6601

## 2020-12-13 NOTE — ED NOTES
Call returned to daughter upon her request for updates on pt. Questions and concerns addressed.       Delila Saint, RN  12/13/20 1197

## 2020-12-13 NOTE — H&P
Department of Internal Medicine  Dr. Evans Comment History and Physical      CHIEF COMPLAINT:  SOB  Reason for Admission: RESP FAILURE      HISTORY OF PRESENT ILLNESS:      The patient is a 78 y.o. male with  who presents with the above problems. HE IS A RESIDENT AT [facility] WITH DEMENTIA, NOW INTUBATED, IN ER. HE CANNOT PROVIDE A Hx. HE WAS LETHARGIC AND HYPOXIC AT [facility]. WAS INTUBATED. Past Medical History:        Diagnosis Date    Anxiety     Benign localized hyperplasia of prostate with urinary obstruction     Bullous pemphigoid     [facility] [address] [facility]     Diabetes mellitus ([facility] [facility]     Enlarged prostate     Hyperlipidemia     Hypertension     Muscle weakness     Paroxysmal atrial fibrillation ([facility] [address] 1/12/2019    Rheumatoid disease ([facility] [address]      Past Surgical History:        Procedure Laterality Date    DOPPLER ECHOCARDIOGRAPHY N/A     FRACTURE SURGERY      HIP FRACTURE SURGERY Left 11/17/2019    HIP OPEN REDUCTION INTERNAL FIXATION performed by Xuan Singh MD at [address] [facility] EGD [address] N/A 7/31/2018    EGD  PEG TUBE INSERTION,  (ENDO STAFF NEEDED) performed by Cornelia Fitzgerald MD at [address] N/A 7/29/2018    EGD DIAGNOSTIC ONLY performed by Isabella Gilmore MD at [facility] OR       Medications Prior to Admission:    Not in a hospital admission. Allergies:  Imuran [azathioprine], Levaquin [levofloxacin], and Doxycycline    Social History:   TOBACCO:   reports that he quit smoking about 14 years ago. His smoking use included cigarettes. He started smoking about 65 years ago. He has a 100.00 pack-year smoking history.  He has never used smokeless tobacco.    Family History:       Problem Relation Age of Onset    Diabetes Mother     Kidney Disease Mother     Cancer Father         esophagus    Cancer Sister      REVIEW OF SYSTEMS:  Review of systems not obtained due to patient factors - intubation and mental status  PHYSICAL EXAM:    Vitals:  BP 105/68   Pulse 76   Temp 97.3 °F (36.3 °C) (Axillary)   Resp 16   Ht 6' (1.829 m)   Wt 243 lb 4.8 oz (110.4 kg)   SpO2 97%   BMI 33.00 kg/m²     CONSTITUTIONAL:  LETHARGIC  EYES:  PUPILS EQUAL  ENT:  Normocephalic, without obvious abnormality, atraumatic, sinuses nontender on palpation, external ears without lesions, oral pharynx with moist mucus membranes, tonsils without erythema or exudates, gums normal and good dentition. NECK:  Supple, symmetrical, trachea midline, no adenopathy, thyroid symmetric, not enlarged and no tenderness, skin normal  HEMATOLOGIC/LYMPHATICS:  no cervical lymphadenopathy  BACK:  Symmetric, no curvature, spinous processes are non-tender on palpation, paraspinous muscles are non-tender on palpation, no costal vertebral tenderness  LUNGS:  No increased work of breathing, good air exchange, clear to auscultation bilaterally, no crackles or wheezing  CARDIOVASCULAR:  Normal apical impulse, regular rate and rhythm, normal S1 and S2, no S3 or S4, and no murmur noted  ABDOMEN:  SOFT, NT, GT  MUSCULOSKELETAL:  There is no redness, warmth, or swelling of the joints. Full range of motion noted. Motor strength is 5 out of 5 all extremities bilaterally.   Tone is normal.  NEUROLOGIC:  OBTUNDED  SKIN:  no bruising or bleeding    DATA:  CBC:   Lab Results   Component Value Date    WBC 28.8 12/13/2020    RBC 4.34 12/13/2020    HGB 11.8 12/13/2020    HCT 40.9 12/13/2020    MCV 94.2 12/13/2020    MCH 27.2 12/13/2020    MCHC 28.9 12/13/2020    RDW 16.7 12/13/2020     12/13/2020    MPV 9.5 12/13/2020     CMP:    Lab Results   Component Value Date     12/13/2020    K 4.0 12/13/2020     12/13/2020    CO2 28 12/13/2020    BUN 44 12/13/2020    CREATININE 1.2 12/13/2020    GFRAA >60 12/13/2020    LABGLOM 58 12/13/2020    GLUCOSE 242 12/13/2020    GLUCOSE 138 03/26/2011    PROT 5.6 12/13/2020    LABALBU 2.7 12/13/2020    LABALBU 4.0 03/26/2011    CALCIUM 8.1 12/13/2020    BILITOT 0.4 12/13/2020    ALKPHOS 75 12/13/2020    AST 16 12/13/2020    ALT 10 12/13/2020     LDH:  No results found for: LDH  Warfarin PT/INR:  No components found for: Eva Viramontes  Last 3 Troponin:    Lab Results   Component Value Date    TROPONINI 0.03 12/13/2020    TROPONINI 0.01 04/09/2019    TROPONINI <0.01 01/08/2019     ABG:    Lab Results   Component Value Date    PH 7.361 12/13/2020    PCO2 42.8 12/13/2020    PO2 83.5 12/13/2020    HCO3 23.7 12/13/2020    BE -1.7 12/13/2020    O2SAT 96.5 12/13/2020     HgBA1c:    Lab Results   Component Value Date    LABA1C 6.0 07/17/2018     TSH:  No results found for: TSH  VITAMIN B12: No components found for: B12  FOLATE:  No results found for: FOLATE  IRON:  No results found for: IRON  Iron Saturation:  No components found for: PERCENTFE  TIBC:  No results found for: TIBC  FERRITIN:  No results found for: FERRITIN  RPR:  No results found for: RPR  HASMUKH:    Lab Results   Component Value Date    HASMUKH NEGATIVE 08/14/2018     AMYLASE:    Lab Results   Component Value Date    AMYLASE 26 07/18/2018     LIPASE:    Lab Results   Component Value Date    LIPASE 9 12/13/2020       IMAGING    Ct Head Wo Contrast    Result Date: 12/13/2020  EXAMINATION: CT OF THE HEAD WITHOUT CONTRAST  12/13/2020 4:27 am TECHNIQUE: CT of the head was performed without the administration of intravenous contrast. Dose modulation, iterative reconstruction, and/or weight based adjustment of the mA/kV was utilized to reduce the radiation dose to as low as reasonably achievable. COMPARISON: 11/16/2019 HISTORY: ORDERING SYSTEM PROVIDED HISTORY: AMS TECHNOLOGIST PROVIDED HISTORY: Reason for exam:->AMS Has a \"code stroke\" or \"stroke alert\" been called? ->No FINDINGS: BRAIN/VENTRICLES: There is advanced cerebral atrophy and moderate chronic ischemic change in the white matter. There is no acute intracranial hemorrhage, mass effect or midline shift. No abnormal extra-axial fluid collection.   The gray-white differentiation is maintained without evidence of an acute infarct. Ventricular size is appropriate for brain volume. ORBITS: The visualized portion of the orbits demonstrate no acute abnormality. SINUSES: The visualized paranasal sinuses and mastoid air cells demonstrate no acute abnormality. SOFT TISSUES/SKULL:  No acute abnormality of the visualized skull or soft tissues. No acute intracranial abnormality. Unchanged advanced cerebral atrophy and moderate chronic ischemic change in the white matter. Xr Chest Portable    Result Date: 12/13/2020  EXAMINATION: ONE XRAY VIEW OF THE CHEST 12/13/2020 12:32 am COMPARISON: November 16, 2019 HISTORY: ORDERING SYSTEM PROVIDED HISTORY: sob, intubation TECHNOLOGIST PROVIDED HISTORY: Reason for exam:->sob, intubation FINDINGS: Status post intubation. ETT with the distal tip at approximately 1.3 cm from the level of the emmanuel. ET tube should be retracted approximately 2-3 cm for optimal positioning. Cardiac silhouette is enlarged. Decreased lung volumes. Pulmonary vasculature within normal limits. Questionable hazy airspace disease in the left lung. Distal end of the ET tube at approximately 1.3 cm from the level of the emmanuel. The ET tube should be retracted 2-3 cm for optimal positioning. Cardiomegaly. Question hazy airspace disease in the left lung. Radiographic follow-up recommended.     ASSESSMENT AND PLAN:    RESP FAILURE-HYPERCARBIC AND HYPOXIC  SEPSIS  COVID NEG  HYPOTENSION  LACTIC ACIDOSIS  CHF  R/O HCAP vs ASP PNA  PULM AND ID CONSULTS  COVER BS WITH SSI  IVFS UNTIL IN ICU THEN IV PRESSORS    I can be reached though [employer] or [facility] [place] at [phone]

## 2020-12-13 NOTE — ED PROVIDER NOTES
HPI:   Meryl Booth is a 78 y.o. male presenting to the ED for lethargy and respiratory distress. Patient lives in a nursing home was unresponsive prior to arrival.  Patient was hypoxic and he is being bagged by EMS. Patient does have spontaneous cardiac activity upon arrival.  HPI limited by patient's altered mental status. Review of Systems:   Unable to obtain review of systems secondary to acuity of condition and patient being unconscious      --------------------------------------------- PAST HISTORY ---------------------------------------------  Past Medical History:  has a past medical history of Anxiety, Benign localized hyperplasia of prostate with urinary obstruction, Bullous pemphigoid, Dementia ([facility] Diabetes mellitus ([facility] Enlarged prostate, Hyperlipidemia, Hypertension, Muscle weakness, Paroxysmal atrial fibrillation ([facility] and Rheumatoid disease ([facility] Past Surgical History:  has a past surgical history that includes fracture surgery; doppler echocardiography (N/A); Upper gastrointestinal endoscopy (N/A, 7/29/2018); pr egd percutaneous placement gastrostomy tube (N/A, 7/31/2018); and Hip fracture surgery (Left, 11/17/2019). Social History:  reports that he quit smoking about 14 years ago. His smoking use included cigarettes. He started smoking about 65 years ago. He has a 100.00 pack-year smoking history. He has never used smokeless tobacco. He reports that he does not drink alcohol or use drugs. Family History: family history includes Cancer in his father and sister; Diabetes in his mother; Kidney Disease in his mother. The patients home medications have been reviewed.     Allergies: Imuran [azathioprine], Levaquin [levofloxacin], and Doxycycline        ---------------------------------------------------PHYSICAL EXAM--------------------------------------    Constitutional/General: Patient unresponsive, intubated, being bagged  Head: Normocephalic and atraumatic  Eyes: Pupils are reactive  Mouth: Oropharynx clear  Neck: Supple  Respiratory: Rhonchi and crackles noted throughout, patient being bagged  Cardiovascular: Tachycardic, normal heart sounds   Chest: No chest wall tenderness  GI:  Abdomen Soft, Non distended. G tube in place  Musculoskeletal: No posturing noted  Integument: Skin cool. No rashes. Lymphatic: no lymphadenopathy noted  Neurologic: GCS 4, no focal deficits,  lower extremities bilaterally    PROCEDURES:  Intubation Procedure Note    Indication: Respiratory failure    Consent: Unable to be obtained due to the emergent nature of this procedure. Medications Used: ketamine intravenously and rocuronium intravenously    Procedure: The patient was placed in the appropriate position. Cricoid pressure was not required. Intubation was performed by direct laryngoscopy using a laryngoscope and an 8.0 cuffed endotracheal tube. The cuff was then inflated and the tube was secured appropriately at a distance of 23 cm to the dental ridge. Initial confirmation of placement included bilateral breath sounds, an end tidal CO2 detector, absence of sounds over the stomach, tube fogging, adequate chest rise, adequate pulse oximetry reading and improved skin color. A chest x-ray to verify correct placement of the tube needed to be retracted 2 cm which was done. .    The patient tolerated the procedure well. Complications: None      Central Line Placement Procedure Note    Indication: vascular access    Consent: Unable to be obtained due to the emergent nature of this procedure. Procedure: The patient was positioned appropriately and the skin over the left femoral vein was prepped with Chloraprep and draped in a sterile fashion. Local anesthesia was not performed due to the emergent nature of this procedure. A large bore needle was used to identify the vein. A guide wire was then inserted into the vein through the needle.  A triple lumen catheter was then inserted into the vessel over the guide wire using the Seldinger technique. All ports showed good, free flowing blood return and were flushed with saline solution. The catheter was then securely fastened to the skin with suture at 20 cm. Two sutures were placed into the proximal eyelets. An antibiotic disk was placed and the site was then covered with a sterile dressing. A post procedure X-ray was not indicated. The patient tolerated the procedure well. Complications: None          -------------------------------------------------- RESULTS -------------------------------------------------  I have personally reviewed all laboratory and imaging results for this patient. Results are listed below.      LABS:  Results for orders placed or performed during the hospital encounter of 12/13/20   Respiratory Panel, Molecular, with COVID-19 (Restricted: peds pts or suitable admitted adults)    Specimen: Nasopharyngeal   Result Value Ref Range    Adenovirus by PCR Not Detected Not Detected    Bordetella parapertussis by PCR Not Detected Not Detected    Bordetella pertussis by PCR Not Detected Not Detected    Chlamydophilia pneumoniae by PCR Not Detected Not Detected    Coronavirus 229E by PCR Not Detected Not Detected    Coronavirus HKU1 by PCR Not Detected Not Detected    Coronavirus NL63 by PCR Not Detected Not Detected    Coronavirus OC43 by PCR Not Detected Not Detected    SARS-CoV-2, PCR Not Detected Not Detected    Human Metapneumovirus by PCR Not Detected Not Detected    Human Rhinovirus/Enterovirus by PCR Not Detected Not Detected    Influenza A by PCR Not Detected Not Detected    Influenza B by PCR Not Detected Not Detected    Mycoplasma pneumoniae by PCR Not Detected Not Detected    Parainfluenza Virus 1 by PCR Not Detected Not Detected    Parainfluenza Virus 2 by PCR Not Detected Not Detected    Parainfluenza Virus 3 by PCR Not Detected Not Detected    Parainfluenza Virus 4 by PCR Not Detected Not Detected    Respiratory Syncytial Virus by PCR Not Detected Not Detected   CBC Auto Differential   Result Value Ref Range    WBC 28.8 (H) 4.5 - 11.5 E9/L    RBC 4.34 3.80 - 5.80 E12/L    Hemoglobin 11.8 (L) 12.5 - 16.5 g/dL    Hematocrit 40.9 37.0 - 54.0 %    MCV 94.2 80.0 - 99.9 fL    MCH 27.2 26.0 - 35.0 pg    MCHC 28.9 (L) 32.0 - 34.5 %    RDW 16.7 (H) 11.5 - 15.0 fL    Platelets 036 747 - 291 E9/L    MPV 9.5 7.0 - 12.0 fL    Neutrophils % 90.5 (H) 43.0 - 80.0 %    Immature Granulocytes % 0.6 0.0 - 5.0 %    Lymphocytes % 4.8 (L) 20.0 - 42.0 %    Monocytes % 3.4 2.0 - 12.0 %    Eosinophils % 0.5 0.0 - 6.0 %    Basophils % 0.2 0.0 - 2.0 %    Neutrophils Absolute 26.06 (H) 1.80 - 7.30 E9/L    Immature Granulocytes # 0.18 E9/L    Lymphocytes Absolute 1.39 (L) 1.50 - 4.00 E9/L    Monocytes Absolute 0.98 (H) 0.10 - 0.95 E9/L    Eosinophils Absolute 0.13 0.05 - 0.50 E9/L    Basophils Absolute 0.06 0.00 - 0.20 E9/L    Polychromasia 1+     Poikilocytes 1+     Ovalocytes 1+     Basophilic Stippling 1+    Comprehensive Metabolic Panel w/ Reflex to MG   Result Value Ref Range    Sodium 138 132 - 146 mmol/L    Potassium reflex Magnesium 4.0 3.5 - 5.0 mmol/L    Chloride 100 98 - 107 mmol/L    CO2 28 22 - 29 mmol/L    Anion Gap 10 7 - 16 mmol/L    Glucose 242 (H) 74 - 99 mg/dL    BUN 44 (H) 8 - 23 mg/dL    CREATININE 1.2 0.7 - 1.2 mg/dL    GFR Non-African American 58 >=60 mL/min/1.73    GFR African American >60     Calcium 8.1 (L) 8.6 - 10.2 mg/dL    Total Protein 5.6 (L) 6.4 - 8.3 g/dL    Alb 2.7 (L) 3.5 - 5.2 g/dL    Total Bilirubin 0.4 0.0 - 1.2 mg/dL    Alkaline Phosphatase 75 40 - 129 U/L    ALT 10 0 - 40 U/L    AST 16 0 - 39 U/L   Lipase   Result Value Ref Range    Lipase 9 (L) 13 - 60 U/L   Troponin   Result Value Ref Range    Troponin 0.03 0.00 - 0.03 ng/mL   Brain Natriuretic Peptide   Result Value Ref Range    Pro-BNP 2,118 (H) 0 - 450 pg/mL   Protime-INR   Result Value Ref Range    Protime 15.1 (H) 9.3 - 12.4 sec    INR 1.3    APTT   Result Value Ref Range    aPTT 26.8 24.5 - 35.1 sec   Urinalysis, reflex to microscopic   Result Value Ref Range    Color, UA Yellow Straw/Yellow    Clarity, UA Clear Clear    Glucose, Ur Negative Negative mg/dL    Bilirubin Urine SMALL (A) Negative    Ketones, Urine 15 (A) Negative mg/dL    Specific Gravity, UA >=1.030 1.005 - 1.030    Blood, Urine Negative Negative    pH, UA 5.0 5.0 - 9.0    Protein, UA TRACE Negative mg/dL    Urobilinogen, Urine 0.2 <2.0 E.U./dL    Nitrite, Urine Negative Negative    Leukocyte Esterase, Urine Negative Negative   Lactate, Sepsis   Result Value Ref Range    Lactic Acid, Sepsis 4.8 (HH) 0.5 - 1.9 mmol/L   Lactate, Sepsis   Result Value Ref Range    Lactic Acid, Sepsis 3.6 (H) 0.5 - 1.9 mmol/L   Microscopic Urinalysis   Result Value Ref Range    Hyaline Casts, UA 0-2 0 - 2 /LPF    WBC, UA 2-5 0 - 5 /HPF    RBC, UA 1-3 0 - 2 /HPF    Epithelial Cells, UA FEW /HPF    Bacteria, UA RARE (A) None Seen /HPF   Blood Gas, Arterial   Result Value Ref Range    Date Analyzed 20201213     Time Analyzed 0334     Source: Blood Arterial     pH, Blood Gas 7.171 (LL) 7.350 - 7.450    PCO2 76.5 (HH) 35.0 - 45.0 mmHg    PO2 87.7 75.0 - 100.0 mmHg    HCO3 27.3 (H) 22.0 - 26.0 mmol/L    B.E. -2.7 -3.0 - 3.0 mmol/L    O2 Sat 93.6 92.0 - 98.5 %    PO2/FIO2 0.88 mmHg/%    AaDO2 523.8 mmHg    RI(T) 597 %    O2Hb 92.8 (L) 94.0 - 97.0 %    COHb 0.9 0.0 - 1.5 %    MetHb 0.0 0.0 - 1.5 %    O2 Content 16.3 mL/dL    HHb 6.3 (H) 0.0 - 5.0 %    tHb (est) 12.4 11.5 - 16.5 g/dL    Mode AC     FIO2 100.0 %    Rr Mechanical 18.0 b/min    Vt Mechanical 500.0 mL    Peep/Cpap 5.0 cmH2O    Date Of Collection      Time Collected      Pt Temp 37.0 C     ID [id number]     Lab [id number]     Critical(s) Notified Handed report to Dr/RN    Blood Gas, Arterial   Result Value Ref Range    Date Analyzed 20201213     Time Analyzed 0635     Source: Blood Arterial     pH, Blood Gas 7.307 (L) 7.350 - 7.450    PCO2 51.9 (H) 35.0 - 45.0 mmHg PO2 138.9 (H) 75.0 - 100.0 mmHg    HCO3 25.4 22.0 - 26.0 mmol/L    B.E. -1.5 -3.0 - 3.0 mmol/L    O2 Sat 98.8 (H) 92.0 - 98.5 %    PO2/FIO2 1.39 mmHg/%    AaDO2 497.2 mmHg    RI(T) 358 %    O2Hb 97.9 (H) 94.0 - 97.0 %    COHb 0.9 0.0 - 1.5 %    MetHb 0.0 0.0 - 1.5 %    O2 Content 16.8 mL/dL    HHb 1.2 0.0 - 5.0 %    tHb (est) 12.0 11.5 - 16.5 g/dL    Mode AC     FIO2 100.0 %    Rr Mechanical 22.0 b/min    Vt Mechanical 500.0 mL    Peep/Cpap 5.0 cmH2O    Date Of Collection      Time Collected      Pt Temp 37.0 C     ID [id number]     Lab [id number]     Critical(s) Notified . No Critical Values    Lactic acid, plasma   Result Value Ref Range    Lactic Acid 3.0 (H) 0.5 - 2.2 mmol/L   Procalcitonin   Result Value Ref Range    Procalcitonin 5.57 (H) 0.00 - 0.08 ng/mL   Sedimentation Rate   Result Value Ref Range    Sed Rate 23 (H) 0 - 15 mm/Hr   C-reactive protein   Result Value Ref Range    CRP 18.4 (H) 0.0 - 0.4 mg/dL   Blood Gas, Arterial   Result Value Ref Range    Date Analyzed [id number]     Time Analyzed 1206     Source: Blood Arterial     pH, Blood Gas 7.361 7.350 - 7.450    PCO2 42.8 35.0 - 45.0 mmHg    PO2 83.5 75.0 - 100.0 mmHg    HCO3 23.7 22.0 - 26.0 mmol/L    B.E. -1.7 -3.0 - 3.0 mmol/L    O2 Sat 96.5 92.0 - 98.5 %    PO2/FIO2 1.67 mmHg/%    AaDO2 212.4 mmHg    RI(T) 254 %    O2Hb 95.4 94.0 - 97.0 %    COHb 0.8 0.0 - 1.5 %    MetHb 0.3 0.0 - 1.5 %    O2 Content 14.5 mL/dL    HHb 3.5 0.0 - 5.0 %    tHb (est) 10.7 (L) 11.5 - 16.5 g/dL    Mode AC     FIO2 50.0 %    Rr Mechanical 22.0 b/min    Vt Mechanical 500.0 mL    Peep/Cpap 5.0 cmH2O    Date Of Collection      Time Collected      Pt Temp 37.0 C     ID [id number]     Lab [id number]     Critical(s) Notified .  No Critical Values    Basic Metabolic Panel   Result Value Ref Range    Sodium 136 132 - 146 mmol/L    Potassium 4.5 3.5 - 5.0 mmol/L    Chloride 103 98 - 107 mmol/L    CO2 28 22 - 29 mmol/L    Anion Gap 5 (L) 7 - 16 mmol/L    Glucose 198 (H) 74 - 99 mg/dL (1 mg Per G Tube Given 12/13/20 1631)   insulin lispro (HUMALOG) injection vial 0-12 Units (0 Units Subcutaneous Not Given 12/13/20 1752)   ipratropium-albuterol (DUONEB) nebulizer solution 3 mL (3 mLs Inhalation Given 12/13/20 1353)   latanoprost (XALATAN) 0.005 % ophthalmic solution 1 drop (1 drop Both Eyes Given 12/13/20 1631)   pantoprazole (PROTONIX) injection 40 mg (40 mg Intravenous Given 12/13/20 1807)     And   sodium chloride (PF) 0.9 % injection 10 mL (has no administration in time range)   hydrocortisone sodium succinate PF (SOLU-CORTEF) injection 50 mg (50 mg Intravenous Given 12/13/20 1808)   promethazine (PHENERGAN) suppository 12.5 mg (has no administration in time range)   sodium chloride flush 0.9 % injection 10 mL (has no administration in time range)   sodium chloride flush 0.9 % injection 10 mL (has no administration in time range)   enoxaparin (LOVENOX) injection 40 mg (40 mg Subcutaneous Given 12/13/20 1637)   promethazine (PHENERGAN) tablet 12.5 mg (has no administration in time range)     Or   ondansetron (ZOFRAN) injection 4 mg (has no administration in time range)   polyethylene glycol (GLYCOLAX) packet 17 g (has no administration in time range)   acetaminophen (TYLENOL) tablet 650 mg (has no administration in time range)     Or   acetaminophen (TYLENOL) suppository 650 mg (has no administration in time range)   0.9 % sodium chloride infusion ( Intravenous New Bag 12/13/20 1417)   norepinephrine (LEVOPHED) 16 mg in dextrose 5 % 250 mL infusion (has no administration in time range)   piperacillin-tazobactam (ZOSYN) injection 3.375 g (has no administration in time range)   vancomycin (VANCOCIN) 1,750 mg in dextrose 5 % 500 mL IVPB (has no administration in time range)   lactated ringers bolus (0 mLs Intravenous Stopped 12/13/20 0417)   piperacillin-tazobactam (ZOSYN) 4.5 g in dextrose 5 % 100 mL IVPB (mini-bag) (0 g Intravenous Stopped 12/13/20 0308)   EPINEPHrine 1 MG/10ML injection 1 mg (1 mg Intravenous Given by Other 12/13/20 0045)   ketamine (KETALAR) injection 200 mg (200 mg Intravenous Given by Other 12/13/20 0047)   rocuronium (ZEMURON) injection 100 mg (100 mg Intravenous Given by Other 12/13/20 0047)   EPINEPHrine 1 MG/10ML injection 0.2 mg (0.2 mg Intravenous Given by Other 12/13/20 0050)   lactated ringers infusion 3,312 mL (0 mL/kg × 110.4 kg Intravenous Stopped 12/13/20 1336)         ED COURSE:       Medical Decision Making:    Patient presented to the ER today obtunded, respiratory distress, he was intubated in the department. Appears to me that patient likely had aspiration pneumonia. Patient was intubated and central line was placed. Patient receiving Zosyn for presumed aspiration pneumonia. Did speak to Dr. Reji Pinto, he will accept the patient for admission to the ICU. Spoke to Dr. Dereck Castrejon who will admit the patient. This patient's ED course included: a personal history and physicial examination    This patient has improved during their ED course. Re-Evaluations:             Re-evaluation. Patients symptoms are improving    Re-examination  12/13/20   6:36 AM EST          Vital Signs:   Vitals:    12/13/20 1615 12/13/20 1645 12/13/20 1704 12/13/20 1915   BP: 112/66  92/65 95/65   Pulse: 81  108 110   Resp: 18 23     Temp:       TempSrc:       SpO2: 100%  97% 94%   Weight:       Height:         Card/Pulm:  Rhythm: normal rate. Heart Sounds: no murmurs, gallops, or rubs. Intubated, coarse breath sounds throughout. Consultations:             Critical care: Dr. Dg Power: 62        Counseling: The emergency provider has spoken with the patient and discussed todays results, in addition to providing specific details for the plan of care and counseling regarding the diagnosis and prognosis.   Questions are answered at this time and they are agreeable with the plan.       --------------------------------- IMPRESSION AND DISPOSITION ---------------------------------    IMPRESSION  1. Acute respiratory failure with hypoxia and hypercapnia (HCC)    2. Aspiration pneumonia, unspecified aspiration pneumonia type, unspecified laterality, unspecified part of lung ([facility]    3. Altered mental status, unspecified altered mental status type    4. Atrial fibrillation with RVR ([facility]    5. Septic shock ([facility]        New Prescriptions    No medications on file       DISPOSITION  Disposition: Admit to CCU/ICU  Patient condition is critical    NOTE: This report was transcribed using voice recognition software.  Every effort was made to ensure accuracy; however, inadvertent computerized transcription errors may be present       Edie Martínez DO  Resident  12/13/20 1311 General Eric Jiménez DO  12/13/20 2008

## 2020-12-13 NOTE — ED NOTES
Nurse to nurse given to Charlotte Burris [id number] at [facility]. Questions and concerns addressed.      Richardson Myers RN  12/13/20 7464

## 2020-12-13 NOTE — ED NOTES
RN spoke with patient's daughter, Yuniel Silverman. Pablito Perezsavannah.  Félix Bo RN  12/13/20 3501

## 2020-12-13 NOTE — ED NOTES
RN called and spoke with Dr. Ivone Omalley. He is going to look at patient and place orders in Epic. He was made aware of patient's bp and lower urine output. Alejandra Bee. Harmony Henry, RN  12/13/20 Claire Deng 1357  Harmony Henry, RN  12/13/20 1111

## 2020-12-13 NOTE — CONSULTS
[facility] and [address]  [address], [address]  [facility], [address] [place] [address]    Pulmonary Consultation      Reason for Consult:  Sob    Requesting Physician:  No primary care provider on file. CHIEF COMPLAINT:  sob    History Obtained From:  electronic medical record    HISTORY OF PRESENT ILLNESS:                The patient is a 78 y.o. male who presents with sob and lethargy. Apparently arrived from NH unresponsive. Intubated in ER when found to be acidotic and placed on vent AC22, TT=166, Fio2=46%, PEEP=5, RRtot=22, sedated, not much reaction to stimulus. No other hx available at this time.     Past Medical History:        Diagnosis Date    Anxiety     Benign localized hyperplasia of prostate with urinary obstruction     Bullous pemphigoid     Dementia ([address]     Diabetes mellitus ([address]     Enlarged prostate     Hyperlipidemia     Hypertension     Muscle weakness     Paroxysmal atrial fibrillation ([address] 1/12/2019    Rheumatoid disease ([address]        Past Surgical History:        Procedure Laterality Date    DOPPLER ECHOCARDIOGRAPHY N/A     FRACTURE SURGERY      HIP FRACTURE SURGERY Left 11/17/2019    HIP OPEN REDUCTION INTERNAL FIXATION performed by Tara Laboy MD at [address] N/A 7/31/2018    EGD  PEG TUBE INSERTION,  (ENDO STAFF NEEDED) performed by Tanya Jones MD at [facility] N/A 7/29/2018    EGD DIAGNOSTIC ONLY performed by Irma Shine MD at [facility] OR       Current Medications:    Current Facility-Administered Medications: fentaNYL 5 mcg/ml in 0.9%  ml infusion, 25 mcg/hr, Intravenous, Continuous  docusate (COLACE) 50 MG/5ML liquid 100 mg, 100 mg, Per G Tube, BID  folic acid (FOLVITE) tablet 1 mg, 1 mg, Per G Tube, Daily  insulin lispro (HUMALOG) injection vial 0-12 Units, 0-12 Units, Subcutaneous, Q6H  ipratropium-albuterol (DUONEB) nebulizer solution 3 mL, 3 mL, Inhalation, Q4H While awake  latanoprost (XALATAN) 0.005 % ophthalmic solution 1 drop, 1 drop, Both Eyes, Daily  pantoprazole (PROTONIX) injection 40 mg, 40 mg, Intravenous, Daily **AND** sodium chloride (PF) 0.9 % injection 10 mL, 10 mL, Intravenous, Daily  hydrocortisone sodium succinate PF (SOLU-CORTEF) injection 50 mg, 50 mg, Intravenous, Daily  promethazine (PHENERGAN) suppository 12.5 mg, 12.5 mg, Rectal, Q6H PRN  sodium chloride flush 0.9 % injection 10 mL, 10 mL, Intravenous, 2 times per day  sodium chloride flush 0.9 % injection 10 mL, 10 mL, Intravenous, PRN  enoxaparin (LOVENOX) injection 40 mg, 40 mg, Subcutaneous, Daily  promethazine (PHENERGAN) tablet 12.5 mg, 12.5 mg, Oral, Q6H PRN **OR** ondansetron (ZOFRAN) injection 4 mg, 4 mg, Intravenous, Q6H PRN  polyethylene glycol (GLYCOLAX) packet 17 g, 17 g, Oral, Daily PRN  acetaminophen (TYLENOL) tablet 650 mg, 650 mg, Oral, Q6H PRN **OR** acetaminophen (TYLENOL) suppository 650 mg, 650 mg, Rectal, Q6H PRN  0.9 % sodium chloride infusion, , Intravenous, Continuous  norepinephrine (LEVOPHED) 16 mg in dextrose 5 % 250 mL infusion, 2 mcg/min, Intravenous, Continuous  piperacillin-tazobactam (ZOSYN) injection 3.375 g, 3.375 g, Intravenous, Q8H  vancomycin (VANCOCIN) 1,750 mg in dextrose 5 % 500 mL IVPB, 15 mg/kg, Intravenous, Q12H    Allergies:  Imuran [azathioprine], Levaquin [levofloxacin], and Doxycycline    Social History:    TOBACCO:   reports that he quit smoking about 14 years ago. His smoking use included cigarettes. He started smoking about 65 years ago. He has a 100.00 pack-year smoking history.  He has never used smokeless tobacco.    Family History:       Problem Relation Age of Onset    Diabetes Mother     Kidney Disease Mother     Cancer Father         esophagus    Cancer Sister        REVIEW OF SYSTEMS:    Review of systems not obtained due to patient factors - intubation and mental status    PHYSICAL EXAM:      Vitals: with any questions. Office [phone] or after hours through [employer], x [phone].

## 2020-12-13 NOTE — ED NOTES
RN spoke with Dr. Wanye Ferris about patient's bp being 86/60. He states our goal is to keep systolic pressure around 90 mmHg. Also physician is aware of all urine output. No interventions at this time. All signed and held orders released. Eugenio Tucker. Melany Castañeda RN  12/13/20 1119 [facility]  Melany Castañeda RN  12/13/20 4374

## 2020-12-13 NOTE — ED NOTES
Pt rolled and cleaned as he had bowel movement. Skin cream applied to buttocks and pt rolled onto right side. Tolerated well. Respiratory called as there is condensation in tube. RT changing tubing for patient after RN suctioned him. Jene Lundborg.  Christine Mendez RN  12/13/20 7733

## 2020-12-14 ENCOUNTER — APPOINTMENT (OUTPATIENT)
Dept: GENERAL RADIOLOGY | Age: 80
DRG: 871 | End: 2020-12-14
Payer: COMMERCIAL

## 2020-12-14 LAB
AADO2: 224.9 MMHG
ANION GAP SERPL CALCULATED.3IONS-SCNC: 6 MMOL/L (ref 7–16)
B.E.: -1.5 MMOL/L (ref -3–3)
BASOPHILS ABSOLUTE: 0.03 E9/L (ref 0–0.2)
BASOPHILS RELATIVE PERCENT: 0.2 % (ref 0–2)
BUN BLDV-MCNC: 38 MG/DL (ref 8–23)
C-REACTIVE PROTEIN: 25 MG/DL (ref 0–0.4)
CALCIUM SERPL-MCNC: 7.9 MG/DL (ref 8.6–10.2)
CHLORIDE BLD-SCNC: 103 MMOL/L (ref 98–107)
CO2: 27 MMOL/L (ref 22–29)
COHB: 1 % (ref 0–1.5)
CREAT SERPL-MCNC: 0.9 MG/DL (ref 0.7–1.2)
CRITICAL: ABNORMAL
DATE ANALYZED: ABNORMAL
DATE OF COLLECTION: ABNORMAL
EOSINOPHILS ABSOLUTE: 0.01 E9/L (ref 0.05–0.5)
EOSINOPHILS RELATIVE PERCENT: 0.1 % (ref 0–6)
FIO2: 50 %
GFR AFRICAN AMERICAN: >60
GFR NON-AFRICAN AMERICAN: >60 ML/MIN/1.73
GLUCOSE BLD-MCNC: 198 MG/DL (ref 74–99)
HCO3: 22.9 MMOL/L (ref 22–26)
HCT VFR BLD CALC: 34.2 % (ref 37–54)
HEMOGLOBIN: 10.2 G/DL (ref 12.5–16.5)
HHB: 4.4 % (ref 0–5)
IMMATURE GRANULOCYTES #: 0.09 E9/L
IMMATURE GRANULOCYTES %: 0.6 % (ref 0–5)
LAB: ABNORMAL
LACTIC ACID: 1.8 MMOL/L (ref 0.5–2.2)
LACTIC ACID: 2.5 MMOL/L (ref 0.5–2.2)
LACTIC ACID: 2.7 MMOL/L (ref 0.5–2.2)
LACTIC ACID: 3.7 MMOL/L (ref 0.5–2.2)
LYMPHOCYTES ABSOLUTE: 0.84 E9/L (ref 1.5–4)
LYMPHOCYTES RELATIVE PERCENT: 5.5 % (ref 20–42)
Lab: ABNORMAL
MCH RBC QN AUTO: 27.7 PG (ref 26–35)
MCHC RBC AUTO-ENTMCNC: 29.8 % (ref 32–34.5)
MCV RBC AUTO: 92.9 FL (ref 80–99.9)
METER GLUCOSE: 161 MG/DL (ref 74–99)
METER GLUCOSE: 167 MG/DL (ref 74–99)
METER GLUCOSE: 173 MG/DL (ref 74–99)
METER GLUCOSE: 248 MG/DL (ref 74–99)
METHB: 0.3 % (ref 0–1.5)
MODE: AC
MONOCYTES ABSOLUTE: 0.89 E9/L (ref 0.1–0.95)
MONOCYTES RELATIVE PERCENT: 5.8 % (ref 2–12)
NEUTROPHILS ABSOLUTE: 13.47 E9/L (ref 1.8–7.3)
NEUTROPHILS RELATIVE PERCENT: 87.8 % (ref 43–80)
O2 CONTENT: 15.1 ML/DL
O2 SATURATION: 95.5 % (ref 92–98.5)
O2HB: 94.3 % (ref 94–97)
OPERATOR ID: 1687
PATIENT TEMP: 37 C
PCO2: 37.6 MMHG (ref 35–45)
PDW BLD-RTO: 16.7 FL (ref 11.5–15)
PEEP/CPAP: 5 CMH2O
PFO2: 1.54 MMHG/%
PH BLOOD GAS: 7.4 (ref 7.35–7.45)
PLATELET # BLD: 342 E9/L (ref 130–450)
PMV BLD AUTO: 10.1 FL (ref 7–12)
PO2: 76.8 MMHG (ref 75–100)
POTASSIUM REFLEX MAGNESIUM: 3.9 MMOL/L (ref 3.5–5)
RBC # BLD: 3.68 E12/L (ref 3.8–5.8)
RI(T): 293 %
RR MECHANICAL: 22 B/MIN
SEDIMENTATION RATE, ERYTHROCYTE: 48 MM/HR (ref 0–15)
SODIUM BLD-SCNC: 136 MMOL/L (ref 132–146)
SOURCE, BLOOD GAS: ABNORMAL
THB: 11.3 G/DL (ref 11.5–16.5)
TIME ANALYZED: 538
VT MECHANICAL: 500 ML
WBC # BLD: 15.3 E9/L (ref 4.5–11.5)

## 2020-12-14 PROCEDURE — 6370000000 HC RX 637 (ALT 250 FOR IP): Performed by: INTERNAL MEDICINE

## 2020-12-14 PROCEDURE — 82962 GLUCOSE BLOOD TEST: CPT

## 2020-12-14 PROCEDURE — 87206 SMEAR FLUORESCENT/ACID STAI: CPT

## 2020-12-14 PROCEDURE — 2580000003 HC RX 258: Performed by: INTERNAL MEDICINE

## 2020-12-14 PROCEDURE — 2000000000 HC ICU R&B

## 2020-12-14 PROCEDURE — 80048 BASIC METABOLIC PNL TOTAL CA: CPT

## 2020-12-14 PROCEDURE — 87070 CULTURE OTHR SPECIMN AEROBIC: CPT

## 2020-12-14 PROCEDURE — 6360000002 HC RX W HCPCS: Performed by: INTERNAL MEDICINE

## 2020-12-14 PROCEDURE — 6360000002 HC RX W HCPCS: Performed by: SPECIALIST

## 2020-12-14 PROCEDURE — 71045 X-RAY EXAM CHEST 1 VIEW: CPT

## 2020-12-14 PROCEDURE — 85025 COMPLETE CBC W/AUTO DIFF WBC: CPT

## 2020-12-14 PROCEDURE — 87186 SC STD MICRODIL/AGAR DIL: CPT

## 2020-12-14 PROCEDURE — 85651 RBC SED RATE NONAUTOMATED: CPT

## 2020-12-14 PROCEDURE — 82805 BLOOD GASES W/O2 SATURATION: CPT

## 2020-12-14 PROCEDURE — 87449 NOS EACH ORGANISM AG IA: CPT

## 2020-12-14 PROCEDURE — 94003 VENT MGMT INPAT SUBQ DAY: CPT

## 2020-12-14 PROCEDURE — 87077 CULTURE AEROBIC IDENTIFY: CPT

## 2020-12-14 PROCEDURE — C9113 INJ PANTOPRAZOLE SODIUM, VIA: HCPCS | Performed by: INTERNAL MEDICINE

## 2020-12-14 PROCEDURE — 94640 AIRWAY INHALATION TREATMENT: CPT

## 2020-12-14 PROCEDURE — 83605 ASSAY OF LACTIC ACID: CPT

## 2020-12-14 PROCEDURE — 36592 COLLECT BLOOD FROM PICC: CPT

## 2020-12-14 PROCEDURE — 2500000003 HC RX 250 WO HCPCS: Performed by: EMERGENCY MEDICINE

## 2020-12-14 PROCEDURE — 2580000003 HC RX 258: Performed by: SPECIALIST

## 2020-12-14 PROCEDURE — 36415 COLL VENOUS BLD VENIPUNCTURE: CPT

## 2020-12-14 PROCEDURE — 86140 C-REACTIVE PROTEIN: CPT

## 2020-12-14 RX ORDER — SODIUM CHLORIDE 9 MG/ML
25 INJECTION, SOLUTION INTRAVENOUS EVERY 8 HOURS
Status: DISCONTINUED | OUTPATIENT
Start: 2020-12-14 | End: 2020-12-14

## 2020-12-14 RX ORDER — SODIUM CHLORIDE 9 MG/ML
INJECTION, SOLUTION INTRAVENOUS EVERY 12 HOURS
Status: DISCONTINUED | OUTPATIENT
Start: 2020-12-14 | End: 2020-12-21

## 2020-12-14 RX ORDER — MIDAZOLAM HYDROCHLORIDE 2 MG/2ML
2 INJECTION, SOLUTION INTRAMUSCULAR; INTRAVENOUS EVERY 4 HOURS PRN
Status: DISCONTINUED | OUTPATIENT
Start: 2020-12-14 | End: 2020-12-15

## 2020-12-14 RX ADMIN — VANCOMYCIN HYDROCHLORIDE 1750 MG: 5 INJECTION, POWDER, LYOPHILIZED, FOR SOLUTION INTRAVENOUS at 08:49

## 2020-12-14 RX ADMIN — IPRATROPIUM BROMIDE AND ALBUTEROL SULFATE 3 ML: .5; 3 SOLUTION RESPIRATORY (INHALATION) at 20:08

## 2020-12-14 RX ADMIN — ENOXAPARIN SODIUM 40 MG: 40 INJECTION SUBCUTANEOUS at 10:21

## 2020-12-14 RX ADMIN — Medication 125 MCG/HR: at 03:07

## 2020-12-14 RX ADMIN — PIPERACILLIN AND TAZOBACTAM 3.38 G: 3; .375 INJECTION, POWDER, LYOPHILIZED, FOR SOLUTION INTRAVENOUS at 05:30

## 2020-12-14 RX ADMIN — INSULIN LISPRO 2 UNITS: 100 INJECTION, SOLUTION INTRAVENOUS; SUBCUTANEOUS at 12:39

## 2020-12-14 RX ADMIN — MIDAZOLAM HYDROCHLORIDE 2 MG: 1 INJECTION, SOLUTION INTRAMUSCULAR; INTRAVENOUS at 23:29

## 2020-12-14 RX ADMIN — LATANOPROST 1 DROP: 50 SOLUTION OPHTHALMIC at 10:21

## 2020-12-14 RX ADMIN — INSULIN LISPRO 2 UNITS: 100 INJECTION, SOLUTION INTRAVENOUS; SUBCUTANEOUS at 18:46

## 2020-12-14 RX ADMIN — Medication 125 MCG/HR: at 06:56

## 2020-12-14 RX ADMIN — DOCUSATE SODIUM 100 MG: 50 LIQUID ORAL at 10:21

## 2020-12-14 RX ADMIN — SODIUM CHLORIDE, PRESERVATIVE FREE 10 ML: 5 INJECTION INTRAVENOUS at 10:23

## 2020-12-14 RX ADMIN — IPRATROPIUM BROMIDE AND ALBUTEROL SULFATE 3 ML: .5; 3 SOLUTION RESPIRATORY (INHALATION) at 11:59

## 2020-12-14 RX ADMIN — PANTOPRAZOLE SODIUM 40 MG: 40 INJECTION, POWDER, FOR SOLUTION INTRAVENOUS at 10:21

## 2020-12-14 RX ADMIN — INSULIN LISPRO 2 UNITS: 100 INJECTION, SOLUTION INTRAVENOUS; SUBCUTANEOUS at 23:40

## 2020-12-14 RX ADMIN — Medication 10 ML: at 09:00

## 2020-12-14 RX ADMIN — CEFEPIME 2 G: 2 INJECTION, POWDER, FOR SOLUTION INTRAMUSCULAR; INTRAVENOUS at 12:34

## 2020-12-14 RX ADMIN — SODIUM CHLORIDE, PRESERVATIVE FREE 10 ML: 5 INJECTION INTRAVENOUS at 01:25

## 2020-12-14 RX ADMIN — Medication 10 ML: at 20:25

## 2020-12-14 RX ADMIN — HYDROCORTISONE SODIUM SUCCINATE 50 MG: 100 INJECTION, POWDER, FOR SOLUTION INTRAMUSCULAR; INTRAVENOUS at 10:22

## 2020-12-14 RX ADMIN — SODIUM CHLORIDE, PRESERVATIVE FREE 10 ML: 5 INJECTION INTRAVENOUS at 05:53

## 2020-12-14 RX ADMIN — SODIUM CHLORIDE 25 ML: 9 INJECTION, SOLUTION INTRAVENOUS at 09:00

## 2020-12-14 RX ADMIN — MIDAZOLAM HYDROCHLORIDE 2 MG: 1 INJECTION, SOLUTION INTRAMUSCULAR; INTRAVENOUS at 12:26

## 2020-12-14 RX ADMIN — IPRATROPIUM BROMIDE AND ALBUTEROL SULFATE 3 ML: .5; 3 SOLUTION RESPIRATORY (INHALATION) at 23:25

## 2020-12-14 RX ADMIN — FOLIC ACID 1 MG: 1 TABLET ORAL at 10:21

## 2020-12-14 RX ADMIN — Medication 75 MCG/HR: at 12:44

## 2020-12-14 RX ADMIN — IPRATROPIUM BROMIDE AND ALBUTEROL SULFATE 3 ML: .5; 3 SOLUTION RESPIRATORY (INHALATION) at 15:52

## 2020-12-14 RX ADMIN — DOCUSATE SODIUM 100 MG: 50 LIQUID ORAL at 20:28

## 2020-12-14 RX ADMIN — INSULIN LISPRO 4 UNITS: 100 INJECTION, SOLUTION INTRAVENOUS; SUBCUTANEOUS at 01:37

## 2020-12-14 RX ADMIN — IPRATROPIUM BROMIDE AND ALBUTEROL SULFATE 3 ML: .5; 3 SOLUTION RESPIRATORY (INHALATION) at 08:19

## 2020-12-14 RX ADMIN — Medication 75 MCG/HR: at 20:25

## 2020-12-14 RX ADMIN — SODIUM CHLORIDE: 9 INJECTION, SOLUTION INTRAVENOUS at 20:00

## 2020-12-14 RX ADMIN — POLYETHYLENE GLYCOL 3350 17 G: 17 POWDER, FOR SOLUTION ORAL at 10:22

## 2020-12-14 RX ADMIN — VANCOMYCIN HYDROCHLORIDE 1750 MG: 5 INJECTION, POWDER, LYOPHILIZED, FOR SOLUTION INTRAVENOUS at 20:24

## 2020-12-14 ASSESSMENT — PULMONARY FUNCTION TESTS
PIF_VALUE: 35
PIF_VALUE: 46
PIF_VALUE: 16
PIF_VALUE: 17
PIF_VALUE: 42
PIF_VALUE: 38
PIF_VALUE: 31
PIF_VALUE: 16

## 2020-12-14 ASSESSMENT — PAIN SCALES - GENERAL
PAINLEVEL_OUTOF10: 0

## 2020-12-14 NOTE — FLOWSHEET NOTE
CI HR MAP TPRI SVI TFC   Baseline 2.2 78 86 3124 28 40.7   Test 2.7 94 92 2754 29 41.6   % Change 21.4% 20.2% 7% -11.9% 3.8% 2.4%     NICOM w/ 250mL IV NS bolus challenge

## 2020-12-14 NOTE — CONSULTS
bullous pemphigoid. Patient's leg wounds were colonized with MRSA. Cefepime was changed over to Cefazolin. Eventually antibiotics were stopped after a few days and the patient was discharged with Cephalexin and Bactrim.     November 2018. Admitted to [facility] with fever, chills and changes in mentation. Seen by Dr. Mulu Yang and treated for UTI with sepsis with Cefepime. Urine cultures grew Providencia stuartii. This was changed to Ceftriaxone and then to Cefdinir.     August 2018. Admitted to [facility] with nausea, vomiting, abdominal pain and diarrhea. He was noted to have an elevated white count. He was treated with Vancomycin. Seen by ID for C. difficile infection. Treated with oral Vancomycin. C. difficile was negative. Antibiotics were stopped altogether and the patient was discharged after a few days. It was felt that he had volume contraction.     July 2018. Admitted to [facility] with failure to thrive and azotemia. Seen by Dr. Nadine Soto. Wound cultures were done which turned positive with Staphylococcus capitis. Chronic cultures on legs for culture and grew Staphylococcus aureus. He received 1 dose of Vancomycin. Discharged on oral Doxycycline.     August 2016. Admitted to [facility] after a fall. Seen by Dr. Pasha Cash for redness in the left leg. Treated for cellulitis and lymphangitis to the left lower extremity. Cultures grew group D Streptococcus and MSSA. Treated with Cefazolin, followed by Cephalexin and Bactrim. Thereafter, instructed to resume minocyclineprescribed by dermatology.     April 2016. Admitted to [facility] for multiple nonhealing ulcers in lower extremities. Patient had been seen by dermatology and had a biopsy that showed bullous pemphigoid. Seen by Dr. Nadine Soto. Blood cultures showed Streptococcus gordonii, which was felt to be a contaminant. He was treated with Nystatin and Fluconazole for intertrigo.     Past Surgical History:        Procedure Laterality Date  DOPPLER ECHOCARDIOGRAPHY N/A     FRACTURE SURGERY      HIP FRACTURE SURGERY Left 11/17/2019    HIP OPEN REDUCTION INTERNAL FIXATION performed by Mia Laboy MD at [address] N/A 7/31/2018    EGD  PEG TUBE INSERTION,  (ENDO STAFF NEEDED) performed by Fernando Camacho MD at [address] N/A 7/29/2018    EGD DIAGNOSTIC ONLY performed by Liseth Kennedy MD at [address]     Current Medications:   Scheduled Meds:   insulin lispro  0-12 Units Subcutaneous Q6H    docusate  100 mg Per G Tube BID    folic acid  1 mg Per G Tube Daily    ipratropium-albuterol  3 mL Inhalation Q4H While awake    latanoprost  1 drop Both Eyes Daily    pantoprazole  40 mg Intravenous Daily    And    sodium chloride (PF)  10 mL Intravenous Daily    hydrocortisone sodium succinate PF  50 mg Intravenous Daily    sodium chloride flush  10 mL Intravenous 2 times per day    enoxaparin  40 mg Subcutaneous Daily    piperacillin-tazobactam  3.375 g Intravenous Q8H    vancomycin  15 mg/kg Intravenous Q12H     Continuous Infusions:   sodium chloride 25 mL (12/14/20 0900)    fentaNYL 5 mcg/ml in 0.9%  ml infusion 75 mcg/hr (12/14/20 0800)    sodium chloride 100 mL/hr at 12/13/20 2020    norepinephrine 4 mcg/min (12/14/20 1040)     PRN Meds:midazolam, promethazine, sodium chloride flush, promethazine **OR** ondansetron, polyethylene glycol, acetaminophen **OR** acetaminophen    Allergies:  Imuran [azathioprine], Levaquin [levofloxacin], and Doxycycline    Social History:   Social History     Socioeconomic History    Marital status:       Spouse name: None    Number of children: None    Years of education: None    Highest education level: None   Occupational History    None   Social Needs    Financial resource strain: None    Food insecurity     Worry: None     Inability: None    Transportation needs     Medical: None     Non-medical: None   Tobacco Use    Smoking status: Former Smoker     Packs/day: 2.00     Years: 50.00     Pack years: 100.00     Types: Cigarettes     Start date: 3/30/1955     Quit date: 3/30/2006     Years since quittin.7    Smokeless tobacco: Never Used   Substance and Sexual Activity    Alcohol use: No     Alcohol/week: 0.0 standard drinks    Drug use: No    Sexual activity: Never   Lifestyle    Physical activity     Days per week: None     Minutes per session: None    Stress: None   Relationships    Social connections     Talks on phone: None     Gets together: None     Attends [employer] service: None     Active member of club or organization: None     Attends meetings of clubs or organizations: None     Relationship status: None    Intimate partner violence     Fear of current or ex partner: None     Emotionally abused: None     Physically abused: None     Forced sexual activity: None   Other Topics Concern    None   Social History Narrative    None      Nursing home resident Gabriela Doe (R)    Family History:       Problem Relation Age of Onset    Diabetes Mother     Kidney Disease Mother     Cancer Father         esophagus    Cancer Sister    . Otherwise non-pertinent to the chief complaint. REVIEW OF SYSTEMS:    As in the HPI. A 10 point review of systems could not be obtained from the patient given his status of intubation and sedation. Nursing reports minimal secretions from the ET tube. No open wounds. He has a PEG but he was being fed at the nursing home. PHYSICAL EXAM:    Vitals:   BP (!) 96/52   Pulse 99   Temp 98.7 °F (37.1 °C) (Axillary)   Resp 19   Ht 6' (1.829 m)   Wt 160 lb 15 oz (73 kg)   SpO2 94%   BMI 21.83 kg/m²   Constitutional: The patient is intubated and sedated in the ICU. Skin: Warm and dry. No rashes were noted. HEENT: Small pupils. No jaundice. Moist mucous membranes, no ulcerations, no thrush. ET tube. OG tube. Neck: Supple to movements. No lymphadenopathy. Chest: No use of accessory muscles to breathe. Symmetrical expansion. Auscultation reveals no wheezing, crackles, or rhonchi. Cardiovascular: S1 and S2 are rhythmic and regular. No murmurs appreciated. Abdomen: Positive bowel sounds to auscultation. Benign to palpation. No masses felt. No hepatosplenomegaly. PEG. Extremities: A few superficial wounds noted on both legs, left greater than right. No cellulitis. Lines: Left femoral TLC 12/13/2020. Ballesteros catheter with clear urine.     CBC+dif:  Recent Labs     12/13/20  0134 12/14/20  0553   WBC 28.8* 15.3*   HGB 11.8* 10.2*   HCT 40.9 34.2*   MCV 94.2 92.9    342   NEUTROABS 26.06* 13.47*     Lab Results   Component Value Date    CRP 18.4 (H) 12/13/2020    CRP 13.3 (H) 04/09/2019    CRP 14.7 (H) 01/09/2019      No results found for: CRPHS  Lab Results   Component Value Date    SEDRATE 23 (H) 12/13/2020    SEDRATE 55 (H) 04/09/2019    SEDRATE 111 (H) 08/12/2018     Lab Results   Component Value Date    ALT 10 12/13/2020    AST 16 12/13/2020     (H) 07/18/2018    ALKPHOS 75 12/13/2020    BILITOT 0.4 12/13/2020     Lab Results   Component Value Date     12/14/2020    K 3.9 12/14/2020     12/14/2020    CO2 27 12/14/2020    BUN 38 12/14/2020    CREATININE 0.9 12/14/2020    GFRAA >60 12/14/2020    LABGLOM >60 12/14/2020    GLUCOSE 198 12/14/2020    GLUCOSE 138 03/26/2011    PROT 5.6 12/13/2020    LABALBU 2.7 12/13/2020    LABALBU 4.0 03/26/2011    CALCIUM 7.9 12/14/2020    BILITOT 0.4 12/13/2020    ALKPHOS 75 12/13/2020    AST 16 12/13/2020    ALT 10 12/13/2020       Lab Results   Component Value Date    PROTIME 15.1 12/13/2020    INR 1.3 12/13/2020       No results found for: TSH    Lab Results   Component Value Date    COLORU Yellow 12/13/2020    PHUR 5.0 12/13/2020    LABCAST FEW 04/09/2019    WBCUA 2-5 12/13/2020    RBCUA 1-3 12/13/2020    MUCUS Present 04/09/2019    BACTERIA RARE 12/13/2020    CLARITYU Clear 12/13/2020    SPECGRAV

## 2020-12-14 NOTE — CONSULTS
Critical Care Admit/Consult Note         Patient - Sherman Cardenas   MRN -  [id number]   Rolanda # - [de-identified]   - 1940      Date of Admission -  2020 12:44 AM  Date of evaluation -  2020  0205/0205-A   Hospital Day - 1            ADMIT/CONSULT DETAILS     Reason for Admit/Consult   Acute respiratory failure with hypoxia, septic shock    Consulting Service/Physician   Consulting - Deepa Oswald MD  Primary Care Physician - No primary care provider on file. HPI   The patient is a 78 y.o. male with significant past medical history of hypertension, dementia, type 2 diabetes, atrial fibrillation who presented to the emergency department  with hypoxia and unresponsiveness. He was intubated in the emergency department after he presented hypoxic. Respiratory panel negative. Blood cultures negative so far. Initial procalcitonin was elevated at 5. 5. Leukocytosis initially of 28.8. He was started on vancomycin and Zosyn to treat for likely pneumonia. Became hypotensive and required Levophed. A left femoral central line was placed in the ER. History otherwise limited secondary to patient intubated.          Past Medical History         Diagnosis Date    Anxiety     Benign localized hyperplasia of prostate with urinary obstruction     Bullous pemphigoid     Dementia (HCC)     Diabetes mellitus ([address]     Enlarged prostate     Hyperlipidemia     Hypertension     Muscle weakness     Paroxysmal atrial fibrillation ([address] 2019    Rheumatoid disease ([address]         Past Surgical History           Procedure Laterality Date    DOPPLER ECHOCARDIOGRAPHY N/A     FRACTURE SURGERY      HIP FRACTURE SURGERY Left 2019    HIP OPEN REDUCTION INTERNAL FIXATION performed by Rosa Palmer MD at [facility] EGD PERCUTANEOUS PLACEMENT GASTROSTOMY TUBE N/A 2018    EGD  PEG TUBE INSERTION,  (ENDO STAFF NEEDED) performed by Ernst Stroud MD at [facility] GASTROINTESTINAL ENDOSCOPY N/A 7/29/2018    EGD DIAGNOSTIC ONLY performed by Irwin Jung MD at [facility]: Patient has 100 pack years cigarettes, quitting in 2006. Does not drink alcohol. Concern the nursing home.         Influenza:  unknown  Pneumococcal Polysaccharide:  unknown                Current Medications   Current Medications    insulin lispro  0-12 Units Subcutaneous Q6H    cefepime  2 g Intravenous Q12H    docusate  100 mg Per G Tube BID    folic acid  1 mg Per G Tube Daily    ipratropium-albuterol  3 mL Inhalation Q4H While awake    latanoprost  1 drop Both Eyes Daily    pantoprazole  40 mg Intravenous Daily    And    sodium chloride (PF)  10 mL Intravenous Daily    hydrocortisone sodium succinate PF  50 mg Intravenous Daily    sodium chloride flush  10 mL Intravenous 2 times per day    enoxaparin  40 mg Subcutaneous Daily    vancomycin  15 mg/kg Intravenous Q12H     midazolam, promethazine, sodium chloride flush, promethazine **OR** ondansetron, polyethylene glycol, acetaminophen **OR** acetaminophen  IV Drips/Infusions   sodium chloride 25 mL (12/14/20 0900)    sodium chloride      fentaNYL 5 mcg/ml in 0.9%  ml infusion 75 mcg/hr (12/14/20 1244)    sodium chloride 100 mL/hr at 12/13/20 2020    norepinephrine 4 mcg/min (12/14/20 1040)     Home Medications  Medications Prior to Admission: sodium chloride 0.9 % solution, Infuse 600 mLs intravenously every 8 hours Run at 75 ml/hr  docusate sodium (COLACE) 100 MG capsule, Take 100 mg by mouth 2 times daily  docusate (COLACE) 50 MG/5ML liquid, 100 mg by Per G Tube route 2 times daily  promethazine (PHENERGAN) 12.5 MG suppository, Place 12.5 mg rectally every 6 hours as needed for Nausea (or vomiting)  insulin aspart (NOVOLOG FLEXPEN) 100 UNIT/ML injection pen, Inject into the skin 4 times daily (before meals and nightly)  - 100 give 1 unit  - 249 give 2 units  - 299 give 3 units  - 349  Give 4 units  - 399 give 5 units  aspirin 325 MG EC tablet, Take 1 tablet by mouth 2 times daily (with meals)  glucagon (GLUCAGON EMERGENCY) 1 MG injection, Inject 1 kit into the muscle as needed (for blood sugar <60 and not alert)  predniSONE (DELTASONE) 10 MG tablet, Take 10 mg by mouth daily for skin care  insulin lispro (HUMALOG) 100 UNIT/ML injection vial, Inject 0-12 Units into the skin 3 times daily (with meals)  pantoprazole (PROTONIX) 40 MG tablet, Take 1 tablet by mouth every morning (before breakfast)  insulin lispro (HUMALOG) 100 UNIT/ML injection vial, Inject 0-6 Units into the skin nightly  insulin detemir (LEVEMIR FLEXTOUCH) 100 UNIT/ML injection pen, Inject 30 Units into the skin daily   loperamide (IMODIUM) 2 MG capsule, Take 2 mg by mouth every 6 hours as needed for Diarrhea  Sodium Phosphates (ENEMA DISPOSABLE) ENEM, Place 1 enema rectally daily as needed  ipratropium-albuterol (DUONEB) 0.5-2.5 (3) MG/3ML SOLN nebulizer solution, Inhale 3 mLs into the lungs every 4 hours as needed for Shortness of Breath  methotrexate (RHEUMATREX) 2.5 MG chemo tablet, Take 7.5 mg by mouth once a week on Tuesday  folic acid (FOLVITE) 1 MG tablet, Take 1 mg by mouth Six times weekly (Rto-Hnp-Mvobd-Fri-Sat-Sun)  Nutritional Supplements (ARGINAID) PACK, Take 1 Package by mouth 2 times daily   mirtazapine (REMERON) 15 MG tablet, Take 7.5 mg by mouth nightly   atorvastatin (LIPITOR) 20 MG tablet, Take 20 mg by mouth daily   Cholecalciferol (VITAMIN D3) 5000 UNITS TABS, Take 5,000 Units by mouth every morning   glucose (GLUTOSE) 40 % GEL, Take 15 g by mouth as needed  tamsulosin (FLOMAX) 0.4 MG capsule, Take 0.4 mg by mouth daily   latanoprost (XALATAN) 0.005 % ophthalmic solution, Place 1 drop into both eyes daily   acetaminophen (TYLENOL) 325 MG tablet, Take 650 mg by mouth every 4 hours as needed for Pain or Fever (max = 3000 mg/24 hours)  magnesium hydroxide (MILK OF MAGNESIA) 400 MG/5ML suspension, Take 30 mLs by mouth daily as needed for Constipation   bisacodyl (DULCOLAX) 5 MG EC tablet, Take 5 mg by mouth daily as needed for Constipation   busPIRone (BUSPAR) 7.5 MG tablet, Take 7.5 mg by mouth every morning     Diet/Nutrition   Diet NPO Effective Now    Allergies   Imuran [azathioprine], Levaquin [levofloxacin], and Doxycycline    Social History   Tobacco   reports that he quit smoking about 14 years ago. His smoking use included cigarettes. He started smoking about 65 years ago. He has a 100.00 pack-year smoking history. He has never used smokeless tobacco.    Alcohol     reports no history of alcohol use.     Occupational history :    Family History         Problem Relation Age of Onset    Diabetes Mother     Kidney Disease Mother     Cancer Father         esophagus    Cancer Sister        Sleep History   n/a    ROS     REVIEW OF SYSTEMS:  CONSTITUTIONAL:  negative for  fevers, chills and weight loss  EYES:  negative for  double vision and blurred vision  HEENT:  negative for  nasal congestion and sore throat  RESPIRATORY:  negative for  cough with sputum and dyspnea  CARDIOVASCULAR:  negative for  chest pain, palpitations  GASTROINTESTINAL:  negative for nausea, vomiting and diarrhea  GENITOURINARY:  negative for frequency and dysuria  INTEGUMENT/BREAST:  negative for rash and skin lesion(s)  HEMATOLOGIC/LYMPHATIC:  negative for easy bruising and bleeding  ALLERGIC/IMMUNOLOGIC:  negative for recurrent infections and urticaria  ENDOCRINE:  negative for weight changes and diabetic symptoms including neither polyuria nor polydipsia  MUSCULOSKELETAL:  negative for  myalgias and arthralgias  NEUROLOGICAL:  negative for headaches, dizziness and numbness  BEHAVIOR/PSYCH:  negative for poor appetite and anxiety    Lines and Devices   Left femoral cvc placed 12/13    Mechanical Ventilation Data   VENT SETTINGS (Comprehensive)  Vent Information  $Ventilation: $Subsequent Day  Skin Assessment: Clean, dry, & intact  Equipment Changed: HME  Vent Type: 980  Vent Mode: PS  Vt Ordered: 0 mL  Rate Set: 0 bmp  Peak Flow: 0 L/min  Pressure Support: 8 cmH20  FiO2 : 40 %  SpO2: 94 %  SpO2/FiO2 ratio: 235  Sensitivity: 2  PEEP/CPAP: 8  I Time/ I Time %: 0 s  Humidification Source: HME  Additional Respiratory  Assessments  Pulse: 99  Resp: 19  SpO2: 94 %  Position: Semi-Akhtar's  Humidification Source: HME  Oral Care: Mouth suctioned, Mouth moisturizer, Mouth swabbed, Suction toothette  Subglottic Suction Done?: Yes  Airway Type: ET  Airway Size: 8  Cuff Pressure (cm H2O): 29 cm H2O    ABG  Lab Results   Component Value Date    PH 7.403 2020    PCO2 37.6 2020    PO2 76.8 2020    HCO3 22.9 2020    O2SAT 95.5 2020     Lab Results   Component Value Date    MODE AC 2020           Vitals    height is 6' (1.829 m) and weight is 160 lb 15 oz (73 kg). His axillary temperature is 98.7 °F (37.1 °C). His blood pressure is 96/52 (abnormal) and his pulse is 99. His respiration is 19 and oxygen saturation is 94%.        Temperature Range: Temp: 98.7 °F (37.1 °C) Temp  Av °F (36.7 °C)  Min: 97.2 °F (36.2 °C)  Max: 98.7 °F (37.1 °C)  BP Range:  Systolic (83SEL), LHO:441 , Min:87 , CBX:402     Diastolic (67EYC), UWR:27, Min:51, Max:77    Pulse Range: Pulse  Av  Min: 62  Max: 116  Respiration Range: Resp  Av.7  Min: 13  Max: 24  Current Pulse Ox[de-identified]  SpO2: 94 %  24HR Pulse Ox Range:  SpO2  Av.3 %  Min: 93 %  Max: 100 %  Oxygen Amount and Delivery:        I/O (24 Hours)    Patient Vitals for the past 8 hrs:   BP Pulse Resp SpO2 Weight   20 1159 -- 99 19 94 % --   20 1024 -- 78 13 97 % --   2019 -- -- 21 97 % --   20 0818 -- 80 22 97 % --   20 0700 (!) 96/52 65 21 98 % --   20 0630 128/63 -- -- -- --   2015 114/65 69 22 97 % --   20 0600 (!) 92/51 81 24 97 % --   20 0530 -- -- -- -- 160 lb 15 oz (73 kg)       Intake/Output Summary (Last 24 hours) at 12/14/2020 1326  Last data filed at 12/14/2020 0600  Gross per 24 hour   Intake 2214 ml   Output 560 ml   Net 1654 ml     I/O last 3 completed shifts: In: 2214 [I.V.:1714; IV Piggyback:500]  Out: 80 [Urine:760; Emesis/NG output:50]   Date 12/14/20 0000 - 12/14/20 2359   Shift 5592-3832 2920-2101 3064-4723 24 Hour Total   INTAKE   P.O.(mL/kg/hr) 0(0)   0   I. V.(mL/kg) 1714(23.5)   1714(23.5)   IV Piggyback(mL/kg) 500(6.8)   500(6.8)   Shift Total(mL/kg) 1170(09.0)   5202(34.7)   OUTPUT   Urine(mL/kg/hr) 210(0.4)   210   Emesis/NG output(mL/kg) 50(0.7)   50(0.7)   Shift Total(mL/kg) 260(3.6)   260(3.6)   Weight (kg) 73 73 73 73     Patient Vitals for the past 96 hrs (Last 3 readings):   Weight   12/14/20 0530 160 lb 15 oz (73 kg)   12/13/20 2234 160 lb 11.5 oz (72.9 kg)   12/13/20 0157 243 lb 4.8 oz (110.4 kg)         Drains/Tubes Outputs  Ballesteros catheter placed 12/13    Exam         PHYSICAL EXAM:    General appearance -ill appearing, sedated, intubated   mental status -ill-appearing, sedated, intubated, occasionally moves extremities  Eyes - pupils equal and reactive, extraocular eye movements intact, sclera anicteric  Ears - external ear normal  Nose - normal and patent, no erythema, discharge or polyps  Mouth - mucous membranes moist, pharynx normal without lesions. ET tube in place  Neck - supple, no significant adenopathy  Chest - clear to auscultation, no wheezes, rales or rhonchi, symmetric air entry  Heart - normal rate, regular rhythm, normal S1, S2, no murmurs, rubs, clicks or gallops  Abdomen - soft, nontender, nondistended, no masses or organomegaly  Neurological -intubated, sedated, nonverbal, occasionally moves extremities. Extremities - peripheral pulses normal, no clubbing or cyanosis. No edema.   Skin - normal coloration and turgor, no rashes, no suspicious skin lesions noted     Data   Old records and images have been reviewed    Lab Results   CBC     Lab Results   Component Value Date    WBC 15.3 12/14/2020    RBC 3.68 12/14/2020    HGB 10.2 12/14/2020    HCT 34.2 12/14/2020     12/14/2020    MCV 92.9 12/14/2020    MCH 27.7 12/14/2020    MCHC 29.8 12/14/2020    RDW 16.7 12/14/2020    NRBC 0.0 04/14/2019    SEGSPCT 64 09/27/2013    LYMPHOPCT 5.5 12/14/2020    PROMYELOPCT 0.9 04/14/2019    MONOPCT 5.8 12/14/2020    MYELOPCT 0.9 04/14/2019    BASOPCT 0.2 12/14/2020    MONOSABS 0.89 12/14/2020    LYMPHSABS 0.84 12/14/2020    EOSABS 0.01 12/14/2020    BASOSABS 0.03 12/14/2020       BMP   Lab Results   Component Value Date     12/14/2020    K 3.9 12/14/2020     12/14/2020    CO2 27 12/14/2020    BUN 38 12/14/2020    CREATININE 0.9 12/14/2020    GLUCOSE 198 12/14/2020    GLUCOSE 138 03/26/2011    CALCIUM 7.9 12/14/2020       LFTS  Lab Results   Component Value Date    ALKPHOS 75 12/13/2020    ALT 10 12/13/2020    AST 16 12/13/2020    PROT 5.6 12/13/2020    BILITOT 0.4 12/13/2020    BILIDIR 0.2 07/19/2018    IBILI 0.5 07/19/2018    LABALBU 2.7 12/13/2020    LABALBU 4.0 03/26/2011       INR  Recent Labs     12/13/20 0134   PROTIME 15.1*   INR 1.3       APTT  Recent Labs     12/13/20 0134   APTT 26.8       Lactic Acid  Lab Results   Component Value Date    LACTA 2.7 12/14/2020    LACTA 3.7 12/14/2020    LACTA 3.0 12/13/2020        BNP   No results for input(s): BNP in the last 72 hours.      Cultures     Recent Labs     12/13/20  0134   BC 24 Hours no growth     Recent Labs     12/13/20 0134   BLOODCULT2 24 Hours no growth       Recent Labs     12/13/20 0134   LABURIN Growth not present, incubation continues             Radiology   Ct Head Wo Contrast    Result Date: 12/13/2020  EXAMINATION: CT OF THE HEAD WITHOUT CONTRAST  12/13/2020 4:27 am TECHNIQUE: CT of the head was performed without the administration of intravenous contrast. Dose modulation, iterative reconstruction, and/or weight based adjustment of the mA/kV was utilized to reduce the radiation dose to as low as reasonably achievable. COMPARISON: 11/16/2019 HISTORY: ORDERING SYSTEM PROVIDED HISTORY: AMS TECHNOLOGIST PROVIDED HISTORY: Reason for exam:->AMS Has a \"code stroke\" or \"stroke alert\" been called? ->No FINDINGS: BRAIN/VENTRICLES: There is advanced cerebral atrophy and moderate chronic ischemic change in the white matter. There is no acute intracranial hemorrhage, mass effect or midline shift. No abnormal extra-axial fluid collection. The gray-white differentiation is maintained without evidence of an acute infarct. Ventricular size is appropriate for brain volume. ORBITS: The visualized portion of the orbits demonstrate no acute abnormality. SINUSES: The visualized paranasal sinuses and mastoid air cells demonstrate no acute abnormality. SOFT TISSUES/SKULL:  No acute abnormality of the visualized skull or soft tissues. No acute intracranial abnormality. Unchanged advanced cerebral atrophy and moderate chronic ischemic change in the white matter. Xr Chest Portable    Result Date: 12/14/2020  EXAMINATION: ONE XRAY VIEW OF THE CHEST 12/14/2020 6:53 am COMPARISON: 12/13/2020 HISTORY: ORDERING SYSTEM PROVIDED HISTORY: intubated TECHNOLOGIST PROVIDED HISTORY: Reason for exam:->intubated FINDINGS: The heart is enlarged. The endotracheal tube is in satisfactory position. There are findings of CHF. There are trace bilateral pleural effusions. 1. Cardiomegaly with findings of CHF. 2. Trace bilateral pleural effusions. Xr Chest Portable    Result Date: 12/13/2020  EXAMINATION: ONE XRAY VIEW OF THE CHEST 12/13/2020 12:32 am COMPARISON: November 16, 2019 HISTORY: ORDERING SYSTEM PROVIDED HISTORY: sob, intubation TECHNOLOGIST PROVIDED HISTORY: Reason for exam:->sob, intubation FINDINGS: Status post intubation. ETT with the distal tip at approximately 1.3 cm from the level of the emmanuel. ET tube should be retracted approximately 2-3 cm for optimal positioning. Cardiac silhouette is enlarged.  Decreased lung volumes. Pulmonary vasculature within normal limits. Questionable hazy airspace disease in the left lung. Distal end of the ET tube at approximately 1.3 cm from the level of the emmanuel. The ET tube should be retracted 2-3 cm for optimal positioning. Cardiomegaly. Question hazy airspace disease in the left lung. Radiographic follow-up recommended. Active Problems:  1. Acute respiratory failure with hypoxia  2. Septic shock   3. pneumonia  4. DMII      SYSTEMS ASSESSMENT    Neuro   Sedated, intubated  History of dementia unclear what his baseline is  CT without contrast on admission showed no acute changes    Respiratory   Acute respiratory failure with hypoxia  Possible pneumonia treating with antibiotics  Continue on duo nebs  Start on Solu-Cortef  Wean oxygen as tolerated. Keep O2 sat 90-92%    Cardiovascular   Septic shock requiring vasopressors   -Wean Levophed as tolerated      Gastrointestinal   Colace for constipation  Dietary consulted    Renal   Daily metabolic panel   monitor renal function     Infectious Disease   Respiratory panel is negative  Blood cultures pending  Treating for pneumonia   -Infectious disease following   -Patient on cefepime and vancomycin  Leukocytosis  Added urine antigens today    Hematology/Oncology   Trend CBC   leukocytosis  Trend H&H for anemia    Endocrine   Type II diabetic  Humalog sliding scale    Social/Spiritual/DNR/Other   Full code. ASSESSMENT/ PLAN   1. Acute respiratory failure with hypoxia, wean vent as tolerated  2. Septic shock, wean Levophed as tolerated  3. Infectious disease following for pneumonia  4. Dietary consult  5. Continue ICU level care    Renuka Montague DO, PGY2  1:26 PM 12/14/20      I personally saw, examined and provided care for the patient. Radiographs, labs and medication list were reviewed by me independently. I spoke with bedside nursing, therapists and consultants.  Critical care services and times documented are independent of procedures and multidisciplinary rounds with Residents. Additionally comprehensive, multidisciplinary rounds were conducted with the MICU team. The case was discussed in detail and plans for care were established. Review of Residents documentation was conducted and revisions were made as appropriate. I agree with the above documented exam, problem list and plan of care with the following additions:    Admitted to the ICU for acute hypoxic respiratory failure and septic shock presumably d/t pneumonia  He is responsive on the vent  Settings adjusted  Wean vasopressors  ID consulted and Abx adjusted to vanco/cefepime  Bronchopulmonary hygiene  Start tube feeding  Check cortisol  Supportive care    37 minutes of CCT spent with the patient, reviewing the chart including imaging studies, and discussing the case with other health care professionals. This time excludes procedures.      Rui Manuel MD

## 2020-12-14 NOTE — PROGRESS NOTES
Event Note            · Updated by Dr. Fernando Hong. We will continue to follow peripherally. Ade Farnsworth. Isaak Adam M.D., F.C.C.P.     [facility], [address] [facility], [address] [place] [address]

## 2020-12-14 NOTE — PATIENT CARE CONFERENCE
Intensive Care Daily Quality Rounding Checklist      ICU Team Members: Dr. Chantelle Hoang, residents, RT, bedside RN, TL    ICU Day #: NUMBER: 2    Intubation Date: December 13    Ventilator Day #: NUMBER: 2    Central Line Insertion Date: December 13        Day #: NUMBER: 2     Arterial Line Insertion Date:  N/A      Day #:    DVT Prophylaxis: Lovenox    GI Prophylaxis: Protonix    Ballesteros Catheter Insertion Date: December 13       Day #: 2      Continued need (if yes, reason documented and discussed with physician): yes, Critical Care    Skin Issues/ Wounds and ordered treatment discussed on rounds: Y PTA    Goals/ Plans for the Day: wean leveo, wean vent as able, start TF as at nursing home, restraints, continue critical care management.

## 2020-12-14 NOTE — ED NOTES
Levophed infusion started at this time per request of ICU RN, discussed with Dr. Yamel Lundberg. Verbal order received to start at lowest rate.       Welby Scheuermann, RN  12/13/20 9224

## 2020-12-14 NOTE — ED NOTES
Daughter, Chandrakant Puente, updated on pts status and room number.       Ling Hu RN  12/13/20 0244

## 2020-12-14 NOTE — PROGRESS NOTES
PT SEEN AND EXAMINED. Chart reviewed. meds reviewed. D/w nursing + family as available. EXAM: IN GENERAL, NAD.  ROS NEGx10 EXCEPT: IN ICU, ON VENT, ON PRESSORS.   BP (!) 96/52   Pulse 99   Temp 98.7 °F (37.1 °C) (Axillary)   Resp 19   Ht 6' (1.829 m)   Wt 160 lb 15 oz (73 kg)   SpO2 94%   BMI 21.83 kg/m²   GEN: A+O NAD. HEENT: NCAT. EOMI. VIRGIE  NECK: NO JVD. TRACH MIDLINE. NO BRUITS. NO THYROMEGALY. LUNGS: CTA BL NO RALES, RHONCHI OR WHEEZES. GOOD EXCURSION. CV: Regular rate and rhythm, NO Murmurs, Rubs, Or gallops  ABD: Soft. Nontender. Normal bowel sounds. No organomegaly  EXT:No clubbing cyanosis or edema  Neuro: Alert and oriented x 3. No focal motor deficits. No sensory deficits. Reflexes appear intact.   Labs/data reviewedLABS: CBC with Differential:    Lab Results   Component Value Date    WBC 15.3 12/14/2020    RBC 3.68 12/14/2020    HGB 10.2 12/14/2020    HCT 34.2 12/14/2020     12/14/2020    MCV 92.9 12/14/2020    MCH 27.7 12/14/2020    MCHC 29.8 12/14/2020    RDW 16.7 12/14/2020    NRBC 0.0 04/14/2019    SEGSPCT 64 09/27/2013    LYMPHOPCT 5.5 12/14/2020    PROMYELOPCT 0.9 04/14/2019    MONOPCT 5.8 12/14/2020    MYELOPCT 0.9 04/14/2019    BASOPCT 0.2 12/14/2020    MONOSABS 0.89 12/14/2020    LYMPHSABS 0.84 12/14/2020    EOSABS 0.01 12/14/2020    BASOSABS 0.03 12/14/2020     Platelets:    Lab Results   Component Value Date     12/14/2020     CMP:    Lab Results   Component Value Date     12/14/2020    K 3.9 12/14/2020     12/14/2020    CO2 27 12/14/2020    BUN 38 12/14/2020    CREATININE 0.9 12/14/2020    GFRAA >60 12/14/2020    LABGLOM >60 12/14/2020    GLUCOSE 198 12/14/2020    GLUCOSE 138 03/26/2011    PROT 5.6 12/13/2020    LABALBU 2.7 12/13/2020    LABALBU 4.0 03/26/2011    CALCIUM 7.9 12/14/2020    BILITOT 0.4 12/13/2020    ALKPHOS 75 12/13/2020    AST 16 12/13/2020    ALT 10 12/13/2020     Magnesium:    Lab Results   Component Value Date    MG 1.7 01/08/2019 12/13/2020 HISTORY: ORDERING SYSTEM PROVIDED HISTORY: intubated TECHNOLOGIST PROVIDED HISTORY: Reason for exam:->intubated FINDINGS: The heart is enlarged. The endotracheal tube is in satisfactory position. There are findings of CHF. There are trace bilateral pleural effusions. 1. Cardiomegaly with findings of CHF. 2. Trace bilateral pleural effusions. Xr Chest Portable    Result Date: 12/13/2020  EXAMINATION: ONE XRAY VIEW OF THE CHEST 12/13/2020 12:32 am COMPARISON: November 16, 2019 HISTORY: ORDERING SYSTEM PROVIDED HISTORY: sob, intubation TECHNOLOGIST PROVIDED HISTORY: Reason for exam:->sob, intubation FINDINGS: Status post intubation. ETT with the distal tip at approximately 1.3 cm from the level of the emmanuel. ET tube should be retracted approximately 2-3 cm for optimal positioning. Cardiac silhouette is enlarged. Decreased lung volumes. Pulmonary vasculature within normal limits. Questionable hazy airspace disease in the left lung. Distal end of the ET tube at approximately 1.3 cm from the level of the emmanuel. The ET tube should be retracted 2-3 cm for optimal positioning. Cardiomegaly. Question hazy airspace disease in the left lung. Radiographic follow-up recommended.       DIET:  Diet NPO Effective Now    Medications:    Scheduled Meds:   insulin lispro  0-12 Units Subcutaneous Q6H    cefepime  2 g Intravenous Q12H    docusate  100 mg Per G Tube BID    folic acid  1 mg Per G Tube Daily    ipratropium-albuterol  3 mL Inhalation Q4H While awake    latanoprost  1 drop Both Eyes Daily    pantoprazole  40 mg Intravenous Daily    And    sodium chloride (PF)  10 mL Intravenous Daily    hydrocortisone sodium succinate PF  50 mg Intravenous Daily    sodium chloride flush  10 mL Intravenous 2 times per day    enoxaparin  40 mg Subcutaneous Daily    vancomycin  15 mg/kg Intravenous Q12H       Continuous Infusions:   sodium chloride 25 mL (12/14/20 0900)    sodium chloride      fentaNYL 5 mcg/ml in 0.9%  ml infusion 75 mcg/hr (12/14/20 1244)    sodium chloride 100 mL/hr at 12/13/20 2020    norepinephrine 4 mcg/min (12/14/20 1040)       PRN Meds:midazolam, promethazine, sodium chloride flush, promethazine **OR** ondansetron, polyethylene glycol, acetaminophen **OR** acetaminophen    A/P:      Patient Active Problem List   Diagnosis    Cellulitis    [facility]epsis [facility]    Dementia ([facility]    Metabolic encephalopathy    Diabetes mellitus type 2, uncontrolled ([facility]    Hyperlipidemia LDL goal <100    Essential hypertension    Venous ulcer of left leg (HCC)    Non-pressure chronic ulcer of left lower leg with fat layer exposed ([facility]    Non-pressure chronic ulcer left lower leg, limited to breakdown skin (HCC)    Hypotension    Severe protein-calorie malnutrition (HCC)    Non-pressure chronic ulcer right lower leg, limited to breakdown skin (HCC)    Pressure injury of contiguous region involving back, buttock, and hip, stage 2 (HCC)    Pressure injury of calf, stage 2 ([facility]    HCAP (healthcare-associated pneumonia)    Paroxysmal atrial fibrillation ([facility]    [facility]    Acute respiratory failure ([facility]    RESP FAILURE  HCAP  SEPSIS  SEPTIC SHOCK      PLAN:   ATBX PER ID  CONT STEROIDS- STRESS DOSE(NORMALLY ON PREDNISONE FOR PEMPHIGOID)   WEAN VENT PER CCM/PULM    I can be reached though [facility] or [facility] at [phone]

## 2020-12-14 NOTE — PROGRESS NOTES
Pharmacy Consultation Note  (Antibiotic Dosing and Monitoring)    Initial consult date: 12/13/2020  Consulting physician: Dr. Seven Reyez  Drug: Vancomycin  Indication: Pneumonia    Age/Gender Height Weight IBW  Allergy Information   80 y.o./male 6' 73 kg --------------  Imuran [azathioprine], Levaquin [levofloxacin], and Doxycycline           Date  WBC BUN/SCr Drug/Dose Time   Given Level(s)   (Time) Comments   12/13 28.8 41/1.1 Vancomycin 1750 mg IV Q12H 2029 12/14 15.3 38/0.9 Vancomycin 1750 mg IV Q12H 0849                             Intake/Output Summary (Last 24 hours) at 12/14/2020 1032  Last data filed at 12/14/2020 0600  Gross per 24 hour   Intake 2214 ml   Output 660 ml   Net 1554 ml       Historical Cultures:  Organism   Date Value Ref Range Status   04/09/2019 FILM ARR Human Metapneumovirus Detected (A)  Final     No results for input(s): BC in the last 72 hours.     Cultures:  available culture and sensitivity results were reviewed in [facility]  12/13 respiratory panel - negative  12/13 blood cx - pending  12/13 MRSA nares - pending  12/14 respiratory cx - pending    Assessment:  · Patient is an 78year old male currently ordered vancomycin and piperacillin/tazobactam empirically for pneumonia  · SCr today 0.9 mg/dL  · Goal trough level = 15-20 mcg/mL; goal AUC/SNEHA = 400-600    Plan:  · Will continue vancomycin 1750 mg every 12 hours  · Will check trough level at steady state if vancomycin continues  · Monitor renal function   · Clinical pharmacy to follow    Sid Banerjee PharmD, BCCCP  12/14/2020  10:38 AM

## 2020-12-14 NOTE — DISCHARGE INSTR - COC
breakdown skin (HCC) L97.921    Hypotension I95.9    Severe protein-calorie malnutrition (HCC) E43    Non-pressure chronic ulcer right lower leg, limited to breakdown skin ([facility]) L97.911    Pressure injury of contiguous region involving back, buttock, and hip, stage 2 (HCC) L89.42    Pressure injury of calf, stage 2 ([facility] L89.892    HCAP (healthcare-associated pneumonia) J18.9    Paroxysmal atrial fibrillation ([facility]) I48.0    Hip fracture, left, closed, initial encounter ([facility] S72.002A    Acute respiratory failure [facility] J96.00       Isolation/Infection:   Isolation          No Isolation        Patient Infection Status     Infection Onset Added Last Indicated Last Indicated By Review Planned Expiration Resolved Resolved By    None active    Resolved    COVID-19 Rule Out 12/13/20 12/13/20 12/13/20 Respiratory Panel, Molecular, with COVID-19 (Restricted: peds pts or suitable admitted adults) (Ordered)   12/13/20 Rule-Out Test Resulted    MRSA 04/09/19 04/11/19 04/09/19 MRSA SCREENING CULTURE ONLY   04/12/19 Jeni Bose RN    MRSA 01/11/19 01/13/19 01/11/19 WOUND CULTURE   01/16/19 Umesh Stovall, RN          Nurse Assessment:  Last Vital Signs: BP (!) 96/52   Pulse 99   Temp 98.7 °F (37.1 °C) (Axillary)   Resp 19   Ht 6' (1.829 m)   Wt 160 lb 15 oz (73 kg)   SpO2 94%   BMI 21.83 kg/m²     Last documented pain score (0-10 scale): Pain Level: 0  Last Weight:   Wt Readings from Last 1 Encounters:   12/14/20 160 lb 15 oz (73 kg)     Mental Status:  disoriented and alert    IV Access:  - PICC - site  L Basilic, insertion date: 12/18/2020    Nursing Mobility/ADLs:  Walking   Dependent  Transfer  Dependent  Bathing  Dependent  Dressing  Dependent  Toileting  Dependent  Feeding  Dependent  Med Admin  Dependent  Med Delivery   crushed    Wound Care Documentation and Therapy:  Wound 01/08/19 Abdomen Right;Upper (Active)   Number of days: 705       Wound 01/08/19 Leg Left; Lower; Anterior (Active)   Number of days: 705       Wound 01/09/19 Hip Right (Active)   Number of days: 705       Wound 01/11/19 Groin Inner;Left (Active)   Number of days: 702       Wound 04/09/19 Ankle Left;Medial (Active)   Number of days: 614       Wound 04/09/19 Ankle Left;Lateral;Anterior (Active)   Number of days: 614       Wound 04/09/19 Ankle Left;Lateral;Posterior (Active)   Number of days: 614       Wound 04/09/19 Foot Right;Dorsal (Active)   Number of days: 614       Wound 04/09/19 Toe (Comment  which one) Right (Active)   Number of days: 614       Wound 04/09/19 Pelvis Right (Active)   Number of days: 614       Wound 04/09/19 Scrotum Anterior (Active)   Number of days: 614       Wound 04/10/19 Buttocks Right (Active)   Number of days: 614       Wound 11/16/19 Ankle Left; Outer (Active)   Number of days: 393       Wound 11/16/19 Elbow Left; Outer (Active)   Number of days: 393       Wound 11/17/19 Coccyx Posterior SKIN PURPLISH-RED, THIN, POSSIBLE FOR SKIN TEAR TO OCCUR (Active)   Number of days: 393       Wound 12/14/20 Coccyx Mid;Posterior red-purplish (Active)   Dressing/Treatment Open to air 12/14/20 0000   Wound Length (cm) 6 cm 12/14/20 0000   Wound Width (cm) 4 cm 12/14/20 0000   Wound Depth (cm) 0 cm 12/14/20 0000   Wound Surface Area (cm^2) 24 cm^2 12/14/20 0000   Wound Volume (cm^3) 0 cm^3 12/14/20 0000   Wound Assessment Purple/maroon;Erythema 12/14/20 0000   Drainage Amount None 12/14/20 0000   Odor None 12/14/20 0000   Susy-wound Assessment Non-blanchable erythema 12/14/20 0000   Number of days: 0        Elimination:  Continence:   · Bowel: No  · Bladder: No  Urinary Catheter: Insertion Date: 12/13/2020   Colostomy/Ileostomy/Ileal Conduit: No       Date of Last BM: 12/23/2020      Intake/Output Summary (Last 24 hours) at 12/14/2020 1536  Last data filed at 12/14/2020 0600  Gross per 24 hour   Intake 2214 ml   Output 560 ml   Net 1654 ml     I/O last 3 completed shifts:   In: 2214 [I.V.:1714; IV Piggyback:500]  Out: 560 [Urine:510; Emesis/NG output:50]    Safety Concerns: At Risk for Falls    Impairments/Disabilities:      Vision and Hearing    Nutrition Therapy:  Current Nutrition Therapy:   - Tube Feedings:  Diabetic    Routes of Feeding: Gastrostomy Tube  Liquids: NPO  Daily Fluid Restriction: no  Last Modified Barium Swallow with Video (Video Swallowing Test): not done    Treatments at the Time of Hospital Discharge:   Respiratory Treatments: ***  Oxygen Therapy:  is not on home oxygen therapy.   Ventilator:    - Ventilator Settings:    Vt Ordered: 0 mL  Rate Set: 0 bmp  FiO2 : 40 %    PEEP/CPAP: 8  Pressure Support: 8 cmH20    Rehab Therapies: Physical Therapy and Occupational Therapy  Weight Bearing Status/Restrictions: No weight bearing restirctions  Other Medical Equipment (for information only, NOT a DME order):  hospital bed  Other Treatments: ***    Patient's personal belongings (please select all that are sent with patient):  None    RN SIGNATURE:  Electronically signed by Stephanie Miles RN on 12/23/20 at 1:56 PM EST    CASE MANAGEMENT/SOCIAL WORK SECTION    Inpatient Status Date: 12/13/2020    Readmission Risk Assessment Score:  Readmission Risk              Risk of Unplanned Readmission:        24           Discharging to Facility/ Agency   · Name: [facility]  · Address: [facility]. 7th floor, [facility]  · Phone: [phone]  · Fax:    Dialysis Facility (if applicable)   · Name:  · Address:  · Dialysis Schedule:  · Phone:  · Fax:    / signature: Electronically signed by TRUMAN Saldaña on 12/14/20 at 3:37 PM EST    PHYSICIAN SECTION    Prognosis: {Prognosis:8461303885}    Condition at Discharge: [address] Patient Condition:[id number]}    Rehab Potential (if transferring to Rehab): {Prognosis:7604003245}    Recommended Labs or Other Treatments After Discharge: ***    Physician Certification: I certify the above information and transfer of [place] [employer]  is necessary for the continuing treatment of the diagnosis listed and that he requires LTAC for less 30 days.      Update Admission H&P: {CHP DME Changes in COMWO:049948298}    PHYSICIAN SIGNATURE:  {Esignature:[id number]}

## 2020-12-14 NOTE — CARE COORDINATION
Social Work/Discharge Planning:  Patient admitted from [address] [place] [address] at [facility] LOC. COVID negative 12/13. Patient was intubated on 12/13. Called patient daughter Isauro Ledesma (ph: [phone]) and completed initial assessment. Explained Social Work role and discussed transition of care/discharge planning. Isauro Ledesma states plan is for her father to return to [address] [place] [address] if he is weaned off of the vent. [facility] (ph: [phone][facility] and left a message for Director of Nursing [facility] to confirm if patient can return to facility when medically stable and off of ventilator. Electronic N-17 in Epic. Will continue to follow and assist with discharge planning.   Electronically signed by TRUMAN Saldaña on 12/14/2020 at 3:34 PM

## 2020-12-15 ENCOUNTER — APPOINTMENT (OUTPATIENT)
Dept: GENERAL RADIOLOGY | Age: 80
DRG: 871 | End: 2020-12-15
Payer: COMMERCIAL

## 2020-12-15 PROBLEM — S72.002A HIP FRACTURE, LEFT, CLOSED, INITIAL ENCOUNTER (HCC): Status: RESOLVED | Noted: 2019-11-16 | Resolved: 2020-12-15

## 2020-12-15 PROBLEM — I95.9 HYPOTENSION: Status: RESOLVED | Noted: 2018-07-26 | Resolved: 2020-12-15

## 2020-12-15 PROBLEM — J96.01 ACUTE RESPIRATORY FAILURE WITH HYPOXIA (HCC): Status: ACTIVE | Noted: 2020-12-13

## 2020-12-15 LAB
AADO2: 150.3 MMHG
AADO2: 155 MMHG
AADO2: 90.3 MMHG
ALBUMIN SERPL-MCNC: 2.4 G/DL (ref 3.5–5.2)
ALP BLD-CCNC: 82 U/L (ref 40–129)
ALT SERPL-CCNC: 12 U/L (ref 0–40)
ANION GAP SERPL CALCULATED.3IONS-SCNC: 7 MMOL/L (ref 7–16)
ANISOCYTOSIS: ABNORMAL
AST SERPL-CCNC: 15 U/L (ref 0–39)
B.E.: -2.5 MMOL/L (ref -3–3)
B.E.: -2.7 MMOL/L (ref -3–3)
B.E.: -2.9 MMOL/L (ref -3–3)
BASOPHILS ABSOLUTE: 0.02 E9/L (ref 0–0.2)
BASOPHILS RELATIVE PERCENT: 0.2 % (ref 0–2)
BILIRUB SERPL-MCNC: 0.4 MG/DL (ref 0–1.2)
BUN BLDV-MCNC: 33 MG/DL (ref 8–23)
BURR CELLS: ABNORMAL
CALCIUM SERPL-MCNC: 8.2 MG/DL (ref 8.6–10.2)
CHLORIDE BLD-SCNC: 106 MMOL/L (ref 98–107)
CO2: 25 MMOL/L (ref 22–29)
COHB: 0.6 % (ref 0–1.5)
COHB: 0.8 % (ref 0–1.5)
COHB: 1 % (ref 0–1.5)
CREAT SERPL-MCNC: 0.9 MG/DL (ref 0.7–1.2)
CRITICAL: ABNORMAL
DATE ANALYZED: ABNORMAL
DATE OF COLLECTION: ABNORMAL
EOSINOPHILS ABSOLUTE: 0.02 E9/L (ref 0.05–0.5)
EOSINOPHILS RELATIVE PERCENT: 0.2 % (ref 0–6)
FIO2: 30 %
FIO2: 40 %
FIO2: 40 %
GFR AFRICAN AMERICAN: >60
GFR NON-AFRICAN AMERICAN: >60 ML/MIN/1.73
GLUCOSE BLD-MCNC: 118 MG/DL (ref 74–99)
HCO3: 22 MMOL/L (ref 22–26)
HCO3: 23 MMOL/L (ref 22–26)
HCO3: 23.7 MMOL/L (ref 22–26)
HCT VFR BLD CALC: 33.3 % (ref 37–54)
HEMOGLOBIN: 9.5 G/DL (ref 12.5–16.5)
HHB: 4 % (ref 0–5)
HHB: 7.6 % (ref 0–5)
HHB: 8.9 % (ref 0–5)
IMMATURE GRANULOCYTES #: 0.06 E9/L
IMMATURE GRANULOCYTES %: 0.6 % (ref 0–5)
L. PNEUMOPHILA SEROGP 1 UR AG: NORMAL
LAB: ABNORMAL
LACTIC ACID: 1.7 MMOL/L (ref 0.5–2.2)
LYMPHOCYTES ABSOLUTE: 1.07 E9/L (ref 1.5–4)
LYMPHOCYTES RELATIVE PERCENT: 11.5 % (ref 20–42)
Lab: ABNORMAL
MAGNESIUM: 2.4 MG/DL (ref 1.6–2.6)
MCH RBC QN AUTO: 27.3 PG (ref 26–35)
MCHC RBC AUTO-ENTMCNC: 28.5 % (ref 32–34.5)
MCV RBC AUTO: 95.7 FL (ref 80–99.9)
METER GLUCOSE: 159 MG/DL (ref 74–99)
METER GLUCOSE: 180 MG/DL (ref 74–99)
METER GLUCOSE: 99 MG/DL (ref 74–99)
METHB: 0.3 % (ref 0–1.5)
MODE: ABNORMAL
MONOCYTES ABSOLUTE: 0.88 E9/L (ref 0.1–0.95)
MONOCYTES RELATIVE PERCENT: 9.5 % (ref 2–12)
NEUTROPHILS ABSOLUTE: 7.26 E9/L (ref 1.8–7.3)
NEUTROPHILS RELATIVE PERCENT: 78 % (ref 43–80)
O2 CONTENT: 13.5 ML/DL
O2 CONTENT: 13.5 ML/DL
O2 CONTENT: 14.3 ML/DL
O2 SATURATION: 91 % (ref 92–98.5)
O2 SATURATION: 92.3 % (ref 92–98.5)
O2 SATURATION: 96 % (ref 92–98.5)
O2HB: 90 % (ref 94–97)
O2HB: 91.1 % (ref 94–97)
O2HB: 95.1 % (ref 94–97)
OPERATOR ID: 2485
OPERATOR ID: 913
OPERATOR ID: ABNORMAL
ORGANISM: ABNORMAL
OVALOCYTES: ABNORMAL
PATIENT TEMP: 37 C
PCO2: 37.9 MMHG (ref 35–45)
PCO2: 43 MMHG (ref 35–45)
PCO2: 49.2 MMHG (ref 35–45)
PDW BLD-RTO: 16.3 FL (ref 11.5–15)
PEEP/CPAP: 8 CMH2O
PFO2: 1.59 MMHG/%
PFO2: 2.03 MMHG/%
PFO2: 2.19 MMHG/%
PH BLOOD GAS: 7.3 (ref 7.35–7.45)
PH BLOOD GAS: 7.35 (ref 7.35–7.45)
PH BLOOD GAS: 7.38 (ref 7.35–7.45)
PHOSPHORUS: 2.4 MG/DL (ref 2.5–4.5)
PLATELET # BLD: 254 E9/L (ref 130–450)
PMV BLD AUTO: 9.9 FL (ref 7–12)
PO2: 63.6 MMHG (ref 75–100)
PO2: 65.6 MMHG (ref 75–100)
PO2: 81.3 MMHG (ref 75–100)
POIKILOCYTES: ABNORMAL
POLYCHROMASIA: ABNORMAL
POTASSIUM SERPL-SCNC: 3.7 MMOL/L (ref 3.5–5)
PS: 8 CMH20
RBC # BLD: 3.48 E12/L (ref 3.8–5.8)
RI(T): 138 %
RI(T): 185 %
RI(T): 244 %
SODIUM BLD-SCNC: 138 MMOL/L (ref 132–146)
SOURCE, BLOOD GAS: ABNORMAL
STREP PNEUMONIAE ANTIGEN, URINE: NORMAL
THB: 10.5 G/DL (ref 11.5–16.5)
THB: 10.6 G/DL (ref 11.5–16.5)
THB: 10.6 G/DL (ref 11.5–16.5)
TIME ANALYZED: 1109
TIME ANALYZED: 1851
TIME ANALYZED: 542
TOTAL PROTEIN: 5.3 G/DL (ref 6.4–8.3)
URINE CULTURE, ROUTINE: NORMAL
VANCOMYCIN TROUGH: 26.2 MCG/ML (ref 5–16)
WBC # BLD: 9.3 E9/L (ref 4.5–11.5)

## 2020-12-15 PROCEDURE — 94003 VENT MGMT INPAT SUBQ DAY: CPT

## 2020-12-15 PROCEDURE — 99223 1ST HOSP IP/OBS HIGH 75: CPT | Performed by: INTERNAL MEDICINE

## 2020-12-15 PROCEDURE — 36592 COLLECT BLOOD FROM PICC: CPT

## 2020-12-15 PROCEDURE — 6360000002 HC RX W HCPCS: Performed by: STUDENT IN AN ORGANIZED HEALTH CARE EDUCATION/TRAINING PROGRAM

## 2020-12-15 PROCEDURE — 93005 ELECTROCARDIOGRAM TRACING: CPT | Performed by: INTERNAL MEDICINE

## 2020-12-15 PROCEDURE — 2580000003 HC RX 258: Performed by: INTERNAL MEDICINE

## 2020-12-15 PROCEDURE — 84100 ASSAY OF PHOSPHORUS: CPT

## 2020-12-15 PROCEDURE — 2500000003 HC RX 250 WO HCPCS: Performed by: EMERGENCY MEDICINE

## 2020-12-15 PROCEDURE — 6360000002 HC RX W HCPCS: Performed by: INTERNAL MEDICINE

## 2020-12-15 PROCEDURE — 85025 COMPLETE CBC W/AUTO DIFF WBC: CPT

## 2020-12-15 PROCEDURE — 80053 COMPREHEN METABOLIC PANEL: CPT

## 2020-12-15 PROCEDURE — 2580000003 HC RX 258: Performed by: STUDENT IN AN ORGANIZED HEALTH CARE EDUCATION/TRAINING PROGRAM

## 2020-12-15 PROCEDURE — 6370000000 HC RX 637 (ALT 250 FOR IP): Performed by: INTERNAL MEDICINE

## 2020-12-15 PROCEDURE — 82962 GLUCOSE BLOOD TEST: CPT

## 2020-12-15 PROCEDURE — 6360000002 HC RX W HCPCS: Performed by: SPECIALIST

## 2020-12-15 PROCEDURE — 36415 COLL VENOUS BLD VENIPUNCTURE: CPT

## 2020-12-15 PROCEDURE — C9113 INJ PANTOPRAZOLE SODIUM, VIA: HCPCS | Performed by: INTERNAL MEDICINE

## 2020-12-15 PROCEDURE — 83735 ASSAY OF MAGNESIUM: CPT

## 2020-12-15 PROCEDURE — 2060000000 HC ICU INTERMEDIATE R&B

## 2020-12-15 PROCEDURE — 80202 ASSAY OF VANCOMYCIN: CPT

## 2020-12-15 PROCEDURE — 2500000003 HC RX 250 WO HCPCS: Performed by: INTERNAL MEDICINE

## 2020-12-15 PROCEDURE — 2580000003 HC RX 258: Performed by: SPECIALIST

## 2020-12-15 PROCEDURE — 71045 X-RAY EXAM CHEST 1 VIEW: CPT

## 2020-12-15 PROCEDURE — 82805 BLOOD GASES W/O2 SATURATION: CPT

## 2020-12-15 PROCEDURE — 94640 AIRWAY INHALATION TREATMENT: CPT

## 2020-12-15 PROCEDURE — 83605 ASSAY OF LACTIC ACID: CPT

## 2020-12-15 RX ORDER — DIGOXIN 0.25 MG/ML
250 INJECTION INTRAMUSCULAR; INTRAVENOUS ONCE
Status: COMPLETED | OUTPATIENT
Start: 2020-12-15 | End: 2020-12-15

## 2020-12-15 RX ORDER — SENNA PLUS 8.6 MG/1
1 TABLET ORAL 2 TIMES DAILY
Status: DISCONTINUED | OUTPATIENT
Start: 2020-12-15 | End: 2020-12-23 | Stop reason: HOSPADM

## 2020-12-15 RX ADMIN — ENOXAPARIN SODIUM 40 MG: 40 INJECTION SUBCUTANEOUS at 08:05

## 2020-12-15 RX ADMIN — AMIODARONE HYDROCHLORIDE 1 MG/MIN: 50 INJECTION, SOLUTION INTRAVENOUS at 07:37

## 2020-12-15 RX ADMIN — POTASSIUM PHOSPHATE, MONOBASIC AND POTASSIUM PHOSPHATE, DIBASIC 15 MMOL: 224; 236 INJECTION, SOLUTION, CONCENTRATE INTRAVENOUS at 12:55

## 2020-12-15 RX ADMIN — CEFEPIME 2 G: 2 INJECTION, POWDER, FOR SOLUTION INTRAMUSCULAR; INTRAVENOUS at 00:55

## 2020-12-15 RX ADMIN — SODIUM CHLORIDE, PRESERVATIVE FREE 10 ML: 5 INJECTION INTRAVENOUS at 05:24

## 2020-12-15 RX ADMIN — Medication 100 MCG/HR: at 04:11

## 2020-12-15 RX ADMIN — IPRATROPIUM BROMIDE AND ALBUTEROL SULFATE 3 ML: .5; 3 SOLUTION RESPIRATORY (INHALATION) at 19:29

## 2020-12-15 RX ADMIN — SENNOSIDES 8.6 MG: 8.6 TABLET, FILM COATED ORAL at 08:05

## 2020-12-15 RX ADMIN — IPRATROPIUM BROMIDE AND ALBUTEROL SULFATE 3 ML: .5; 3 SOLUTION RESPIRATORY (INHALATION) at 08:38

## 2020-12-15 RX ADMIN — DIGOXIN 250 MCG: 0.25 INJECTION INTRAMUSCULAR; INTRAVENOUS at 18:28

## 2020-12-15 RX ADMIN — Medication 10 ML: at 20:30

## 2020-12-15 RX ADMIN — FOLIC ACID 1 MG: 1 TABLET ORAL at 08:05

## 2020-12-15 RX ADMIN — Medication 100 MCG/HR: at 00:57

## 2020-12-15 RX ADMIN — SENNOSIDES 8.6 MG: 8.6 TABLET, FILM COATED ORAL at 20:30

## 2020-12-15 RX ADMIN — SODIUM CHLORIDE, PRESERVATIVE FREE 10 ML: 5 INJECTION INTRAVENOUS at 01:15

## 2020-12-15 RX ADMIN — SODIUM CHLORIDE, PRESERVATIVE FREE 10 ML: 5 INJECTION INTRAVENOUS at 08:09

## 2020-12-15 RX ADMIN — IPRATROPIUM BROMIDE AND ALBUTEROL SULFATE 3 ML: .5; 3 SOLUTION RESPIRATORY (INHALATION) at 04:02

## 2020-12-15 RX ADMIN — INSULIN LISPRO 2 UNITS: 100 INJECTION, SOLUTION INTRAVENOUS; SUBCUTANEOUS at 18:28

## 2020-12-15 RX ADMIN — INSULIN LISPRO 2 UNITS: 100 INJECTION, SOLUTION INTRAVENOUS; SUBCUTANEOUS at 12:46

## 2020-12-15 RX ADMIN — Medication 100 MCG/HR: at 08:56

## 2020-12-15 RX ADMIN — IPRATROPIUM BROMIDE AND ALBUTEROL SULFATE 3 ML: .5; 3 SOLUTION RESPIRATORY (INHALATION) at 13:13

## 2020-12-15 RX ADMIN — MIDAZOLAM HYDROCHLORIDE 2 MG: 1 INJECTION, SOLUTION INTRAMUSCULAR; INTRAVENOUS at 03:32

## 2020-12-15 RX ADMIN — IPRATROPIUM BROMIDE AND ALBUTEROL SULFATE 3 ML: .5; 3 SOLUTION RESPIRATORY (INHALATION) at 17:00

## 2020-12-15 RX ADMIN — SODIUM CHLORIDE: 9 INJECTION, SOLUTION INTRAVENOUS at 04:55

## 2020-12-15 RX ADMIN — PANTOPRAZOLE SODIUM 40 MG: 40 INJECTION, POWDER, FOR SOLUTION INTRAVENOUS at 08:15

## 2020-12-15 RX ADMIN — CEFEPIME 2 G: 2 INJECTION, POWDER, FOR SOLUTION INTRAMUSCULAR; INTRAVENOUS at 12:46

## 2020-12-15 RX ADMIN — SODIUM CHLORIDE, PRESERVATIVE FREE 10 ML: 5 INJECTION INTRAVENOUS at 03:32

## 2020-12-15 RX ADMIN — DOCUSATE SODIUM 100 MG: 50 LIQUID ORAL at 08:05

## 2020-12-15 RX ADMIN — LATANOPROST 1 DROP: 50 SOLUTION OPHTHALMIC at 08:31

## 2020-12-15 RX ADMIN — HYDROCORTISONE SODIUM SUCCINATE 50 MG: 100 INJECTION, POWDER, FOR SOLUTION INTRAMUSCULAR; INTRAVENOUS at 08:06

## 2020-12-15 RX ADMIN — SODIUM CHLORIDE: 9 INJECTION, SOLUTION INTRAVENOUS at 13:00

## 2020-12-15 RX ADMIN — VANCOMYCIN HYDROCHLORIDE 1750 MG: 5 INJECTION, POWDER, LYOPHILIZED, FOR SOLUTION INTRAVENOUS at 08:40

## 2020-12-15 RX ADMIN — SODIUM CHLORIDE: 9 INJECTION, SOLUTION INTRAVENOUS at 00:51

## 2020-12-15 RX ADMIN — Medication 10 ML: at 08:09

## 2020-12-15 RX ADMIN — DOCUSATE SODIUM 100 MG: 50 LIQUID ORAL at 20:30

## 2020-12-15 ASSESSMENT — PAIN SCALES - PAIN ASSESSMENT IN ADVANCED DEMENTIA (PAINAD)
BREATHING: 0
CONSOLABILITY: 0
FACIALEXPRESSION: 0
TOTALSCORE: 0
CONSOLABILITY: 0
NEGVOCALIZATION: 0
BREATHING: 0
TOTALSCORE: 0
BODYLANGUAGE: 0
FACIALEXPRESSION: 0
BODYLANGUAGE: 0
NEGVOCALIZATION: 0

## 2020-12-15 ASSESSMENT — PULMONARY FUNCTION TESTS
PIF_VALUE: 17
PIF_VALUE: 17
PIF_VALUE: 16
PIF_VALUE: 17

## 2020-12-15 ASSESSMENT — PAIN SCALES - GENERAL
PAINLEVEL_OUTOF10: 0

## 2020-12-15 NOTE — CARE COORDINATION
Social Work/Discharge Planning:  Received call from Rob at [facility].  Provided her with an update and possible extubation. Rob states patient is LTC and will require a pre-cert to return. Rob to confirm if patient will require a therapy evaluation when stable. Will continue to follow.    Electronically signed by Marlena Severs, LSW on 12/15/2020 at 11:49 AM

## 2020-12-15 NOTE — PROGRESS NOTES
Patient was extubated to BIPAP 12/6 50%. Breath Sounds post extubation were diminished. Stridor was not present post extubation. SPO2 was 96%.       Performed by  Landen Varma

## 2020-12-15 NOTE — PROGRESS NOTES
0.005 % ophthalmic solution 1 drop  1 drop Both Eyes Daily Trina Moore MD   1 drop at 12/14/20 1021    pantoprazole (PROTONIX) injection 40 mg  40 mg Intravenous Daily Trina Moore MD   40 mg at 12/14/20 1021    And    sodium chloride (PF) 0.9 % injection 10 mL  10 mL Intravenous Daily Trina Moore MD   10 mL at 12/14/20 1023    hydrocortisone sodium succinate PF (SOLU-CORTEF) injection 50 mg  50 mg Intravenous Daily Trina Moore MD   50 mg at 12/14/20 1022    promethazine (PHENERGAN) suppository 12.5 mg  12.5 mg Rectal Q6H PRN Trina Moore MD        sodium chloride flush 0.9 % injection 10 mL  10 mL Intravenous 2 times per day Trina Moore MD   10 mL at 12/14/20 2025    sodium chloride flush 0.9 % injection 10 mL  10 mL Intravenous PRN Trina Moore MD   10 mL at 12/15/20 0524    enoxaparin (LOVENOX) injection 40 mg  40 mg Subcutaneous Daily Trina Moore MD   40 mg at 12/14/20 1021    promethazine (PHENERGAN) tablet 12.5 mg  12.5 mg Oral Q6H PRN Trina Moore MD        Or    ondansetron (ZOFRAN) injection 4 mg  4 mg Intravenous Q6H PRN Trina Moore MD        polyethylene glycol (GLYCOLAX) packet 17 g  17 g Oral Daily PRN Trina Moore MD   17 g at 12/14/20 1022    acetaminophen (TYLENOL) tablet 650 mg  650 mg Oral Q6H PRN Trina Moore MD        Or    acetaminophen (TYLENOL) suppository 650 mg  650 mg Rectal Q6H PRN Trina Moore MD        0.9 % sodium chloride infusion   Intravenous Continuous Trina Moore  mL/hr at 12/15/20 0051 New Bag at 12/15/20 0051    norepinephrine (LEVOPHED) 16 mg in dextrose 5 % 250 mL infusion  2 mcg/min Intravenous Continuous Gilberto Janeen DO   Stopped at 12/14/20 2000    vancomycin (VANCOCIN) 1,750 mg in dextrose 5 % 500 mL IVPB  15 mg/kg Intravenous Q12H Shane Mahajan MD   Stopped at 12/14/20 2224       PRN Medications  midazolam, promethazine, sodium chloride flush, promethazine **OR** ondansetron, polyethylene glycol, acetaminophen **OR** acetaminophen    Objective  Most Recent Recorded Vitals  BP 80/62   Pulse 117   Temp 97.5 °F (36.4 °C) (Axillary)   Resp 13   Ht 6' (1.829 m)   Wt 160 lb 15 oz (73 kg)   SpO2 92%   BMI 21.83 kg/m²   I/O last 3 completed shifts: In: 4619 [I.V.:3704; NG/GT:60; IV Piggyback:1600]  Out: 5 [Urine:670; Emesis/NG output:50]  I/O this shift:  In: 30 [I.V.:30]  Out: 230 [Urine:230]    Physical Exam:  General:  NAD, intubated. Patient opening up her eyes to voice. Patient is on pressure support 40% FiO2. PEEP 8  Eyes: conjunctivae/corneas clear, sclera non icteric  Skin: color/texture/turgor normal, no rashes or lesions  Lungs: CTAB, no retractions/use of accessory muscles,  no rhonchi, diffuse crackle, no rales  Heart: Irregular rate and rhythm. No murmurs. Abdomen: soft, NT; bowel sounds normal; no masses,  no organomegaly  Extremities: atraumatic, no cyanosis, no edema      Most Recent Labs  Lab Results   Component Value Date    WBC 15.3 (H) 12/14/2020    HGB 10.2 (L) 12/14/2020    HCT 34.2 (L) 12/14/2020     12/14/2020     12/14/2020    K 3.9 12/14/2020     12/14/2020    CREATININE 0.9 12/14/2020    BUN 38 (H) 12/14/2020    CO2 27 12/14/2020    GLUCOSE 198 (H) 12/14/2020    ALT 10 12/13/2020    AST 16 12/13/2020    INR 1.3 12/13/2020    LABA1C 6.0 (H) 07/17/2018       XR CHEST PORTABLE   Final Result   1. Cardiomegaly with findings of CHF. 2. Trace bilateral pleural effusions. CT Head WO Contrast   Final Result   No acute intracranial abnormality. Unchanged advanced cerebral atrophy and moderate chronic ischemic change in   the white matter. XR CHEST PORTABLE   Final Result   Distal end of the ET tube at approximately 1.3 cm from the level of the   emmanuel. The ET tube should be retracted 2-3 cm for optimal positioning. Cardiomegaly. Question hazy airspace disease in the left lung. Radiographic follow-up   recommended.       XR CHEST PORTABLE    (Results Pending)

## 2020-12-15 NOTE — PLAN OF CARE
Problem: Inadequate oral food/beverage intake (NI-2.1)  Goal: Food and/or Nutrient Delivery  Description: Individualized approach for food/nutrient provision.   12/15/2020 0944 by Vernon Spann RD, CNSC, LD  Outcome: Met This Shift

## 2020-12-15 NOTE — PROGRESS NOTES
Called pt's daughter to update on pt's status. Notified that pt remains stable and anticipating extubation possibly tomorrow. Daughter states that pt switched from oral feedings to PEG tube feedings approximately one week prior to admission because pt wasn't feeling well and had complaints of back pain. Pt was found to be impacted and was given and enema. Pt was also found to have kidney stones. Pt had several episodes of emesis during this time which may be the source of his aspiration pneumonia, per the daughter.

## 2020-12-15 NOTE — CONSULTS
INPATIENT CARDIOLOGY CONSULT    Name: Jarret Monroy    Age: 78 y.o. Date of Admission: 12/13/2020 12:44 AM    Date of Service: 12/15/2020    Reason for Consultation: Paroxysmal atrial fibrillation, acute combined hypoxic and hypercapnic respiratory failure, hypertension, pneumonia with associated sepsis, moderate obesity, cognitive dysfunction    Referring Physician: Abbey Cha DO    History of Present Illness: The patient is an 78-year-old white male known to [facility] only from a hospital consultation with primary cardiovascular care provided by Lorre Rubinstein. He previously has had no evidence of significant structural heart disease with a previous history of paroxysmal atrial fibrillation and on the basis of significant cognitive dysfunction resides at an extended care facility. He can provide minimal additional history and the majority of history was obtained from review of medical records with a reported additional history of bullous pemphigoid, hypertension, diabetes, hyperlipidemia and rheumatoid disease maintained on immunosuppressive therapy. He apparently presented from his extended care facility with increasing dyspnea and lethargy and upon arrival in the emergency room was nonresponsive with evidence of acute combined hypoxic and hypercapnic respiratory failure respiratory failure requiring intubation. A resting electrocardiogram in the emergency room reviewed at the time of evaluation demonstrated evidence of sinus rhythm with left axis deviation and nonspecific ST changes and an initial chest x-ray again reviewed demonstrated cardiomegaly with diffuse bilateral infiltrates. He subsequently developed evidence of sepsis with hypotension requiring vasoactive support as well as that of developing atrial fibrillation initially with suboptimal rate control.   His subsequent course has improved with the ability to withdrawal vasoactive support as well as extubation with the initial usage of amiodarone for rate control. At the time of evaluation, the patient remains on BiPAP and can provide no additional significant history but relates no present significant dyspnea. Presently remains in atrial fibrillation with present heart rates of approximately 100 bpm jordy relatively hypotensive with systolic blood pressures of approximately 100 mmHg in the face of a persistent low-grade fever. .    Review of Systems: The remainder of a complete multisystem review including consitutional, central nervous, respiratory, circulatory, gastrointestinal, genitourinary, endocrinologic, hematologic, musculoskeletal and psychiatric are negative. Past Medical History:  Past Medical History:   Diagnosis Date    Anxiety     Benign localized hyperplasia of prostate with urinary obstruction     Bullous pemphigoid     Dementia (HCC)     Diabetes mellitus (HCC)     Enlarged prostate     Hyperlipidemia     Hypertension     Muscle weakness     Paroxysmal atrial fibrillation ([address] 1/12/2019    Rheumatoid disease ([facility] [address]        Past Surgical History:  Past Surgical History:   Procedure Laterality Date    DOPPLER ECHOCARDIOGRAPHY N/A     FRACTURE SURGERY      HIP FRACTURE SURGERY Left 11/17/2019    HIP OPEN REDUCTION INTERNAL FIXATION performed by Karene Lundborg, MD at [address] [place] [address] EGD PERCUTANEOUS PLACEMENT GASTROSTOMY TUBE N/A 7/31/2018    EGD  PEG TUBE INSERTION,  (ENDO STAFF NEEDED) performed by Mike Gan [facility] at [facility] N/A 7/29/2018    EGD DIAGNOSTIC ONLY performed by Ashley Arce MD at [facility] OR       Family History:  Family History   Problem Relation Age of Onset    Diabetes Mother     Kidney Disease Mother     Cancer Father         esophagus    Cancer Sister        Social History:  Social History     Socioeconomic History    Marital status:       Spouse name: Not on file    Number of children: Not on file    Years of education: Not on file    Highest education level: Not on file   Occupational History    Not on file   Social Needs    Financial resource strain: Not on file    Food insecurity     Worry: Not on file     Inability: Not on file    Transportation needs     Medical: Not on file     Non-medical: Not on file   Tobacco Use    Smoking status: Former Smoker     Packs/day: 2.00     Years: 50.00     Pack years: 100.00     Types: Cigarettes     Start date: 3/30/1955     Quit date: 3/30/2006     Years since quittin.7    Smokeless tobacco: Never Used   Substance and Sexual Activity    Alcohol use: No     Alcohol/week: 0.0 standard drinks    Drug use: No    Sexual activity: Never   Lifestyle    Physical activity     Days per week: Not on file     Minutes per session: Not on file    Stress: Not on file   Relationships    Social connections     Talks on phone: Not on file     Gets together: Not on file     Attends [employer] service: Not on file     Active member of club or organization: Not on file     Attends meetings of clubs or organizations: Not on file     Relationship status: Not on file    Intimate partner violence     Fear of current or ex partner: Not on file     Emotionally abused: Not on file     Physically abused: Not on file     Forced sexual activity: Not on file   Other Topics Concern    Not on file   Social History Narrative    Not on file       Allergies: Allergies   Allergen Reactions    Imuran [Azathioprine]     Levaquin [Levofloxacin] Other (See Comments)     Lethargy when taken with celebrex    Doxycycline Nausea And Vomiting       Home Medications:  Prior to Admission medications    Medication Sig Start Date End Date Taking?  Authorizing Provider   sodium chloride 0.9 % solution Infuse 600 mLs intravenously every 8 hours Run at 75 ml/hr 20  Yes Historical Provider, MD   docusate sodium (COLACE) 100 MG capsule Take 100 mg by mouth 2 times daily   Yes Historical Provider, MD   docusate (COLACE) 50 MG/5ML (FOLVITE) 1 MG tablet Take 1 mg by mouth Six times weekly ([id number])    Historical Provider, MD   Nutritional Supplements (ARGINAID) PACK Take 1 Package by mouth 2 times daily     Historical Provider, MD   mirtazapine (REMERON) 15 MG tablet Take 7.5 mg by mouth nightly     Historical Provider, MD   atorvastatin (LIPITOR) 20 MG tablet Take 20 mg by mouth daily     Historical Provider, MD   Cholecalciferol (VITAMIN D3) 5000 UNITS TABS Take 5,000 Units by mouth every morning     Historical Provider, MD   glucose (GLUTOSE) 40 % GEL Take 15 g by mouth as needed 4/6/16   Alfonso Boggs DO   tamsulosin (FLOMAX) 0.4 MG capsule Take 0.4 mg by mouth daily     Historical Provider, MD   latanoprost (XALATAN) 0.005 % ophthalmic solution Place 1 drop into both eyes daily     Historical Provider, MD   acetaminophen (TYLENOL) 325 MG tablet Take 650 mg by mouth every 4 hours as needed for Pain or Fever (max = 3000 mg/24 hours)    Historical Provider, MD   magnesium hydroxide (MILK OF MAGNESIA) 400 MG/5ML suspension Take 30 mLs by mouth daily as needed for Constipation     Historical Provider, MD   bisacodyl (DULCOLAX) 5 MG EC tablet Take 5 mg by mouth daily as needed for Constipation     Historical Provider, MD   busPIRone (BUSPAR) 7.5 MG tablet Take 7.5 mg by mouth every morning     Historical Provider, MD       Current Medications:  Current Facility-Administered Medications   Medication Dose Route Frequency Provider Last Rate Last Admin    senna (SENOKOT) tablet 8.6 mg  1 tablet PEG Tube BID Raul Mcclure, DO   8.6 mg at 12/15/20 0805    amiodarone (CORDARONE) 450 mg in dextrose 5 % 250 mL infusion  0.5 mg/min Intravenous Continuous Milton Nayakight, DO 16.7 mL/hr at 12/15/20 1033 0.5 mg/min at 12/15/20 1033    potassium phosphate 15 mmol in dextrose 5 % 250 mL IVPB  15 mmol Intravenous Once Ashely Morel MD 62.5 mL/hr at 12/15/20 1255 15 mmol at 12/15/20 1255    [START ON 12/16/2020] vancomycin 2 g in dextrose 5% 500 ml IVPB  2,000 mg Intravenous Q24H Randy Monge MD        insulin lispro (HUMALOG) injection vial 0-12 Units  0-12 Units Subcutaneous Q6H Sammie Marcial MD   2 Units at 12/15/20 1246    midazolam PF (VERSED) injection 2 mg  2 mg Intravenous Q4H PRN Sammie Marcial MD   2 mg at 12/15/20 0332    cefepime (MAXIPIME) 2 g IVPB extended (mini-bag)  2 g Intravenous Q12H Randy Monge MD 12.5 mL/hr at 12/15/20 1246 2 g at 12/15/20 1246    0.9 % sodium chloride infusion   Intravenous Q12H Randy Monge MD 12.5 mL/hr at 12/15/20 1300 New Bag at 12/15/20 1300    docusate (COLACE) 50 MG/5ML liquid 100 mg  100 mg Per G Tube BID Faheem Lutz MD   100 mg at 65/54/93 0046    folic acid (FOLVITE) tablet 1 mg  1 mg Per G Tube Daily Faheem Lutz MD   1 mg at 12/15/20 0805    ipratropium-albuterol (DUONEB) nebulizer solution 3 mL  3 mL Inhalation Q4H While awake Faheem Lutz MD   3 mL at 12/15/20 1313    latanoprost (XALATAN) 0.005 % ophthalmic solution 1 drop  1 drop Both Eyes Daily Faheem Lutz MD   1 drop at 12/15/20 0831    pantoprazole (PROTONIX) injection 40 mg  40 mg Intravenous Daily Faheem Lutz MD   40 mg at 12/15/20 0815    And    sodium chloride (PF) 0.9 % injection 10 mL  10 mL Intravenous Daily Faheem Lutz MD   10 mL at 12/15/20 0809    hydrocortisone sodium succinate PF (SOLU-CORTEF) injection 50 mg  50 mg Intravenous Daily Faheem Lutz MD   50 mg at 12/15/20 0806    promethazine (PHENERGAN) suppository 12.5 mg  12.5 mg Rectal Q6H PRN Faheem Lutz MD        sodium chloride flush 0.9 % injection 10 mL  10 mL Intravenous 2 times per day Faheem Lutz MD   10 mL at 12/15/20 0809    sodium chloride flush 0.9 % injection 10 mL  10 mL Intravenous PRN Faheem Lutz MD   10 mL at 12/15/20 0524    enoxaparin (LOVENOX) injection 40 mg  40 mg Subcutaneous Daily Faheem Lutz MD   40 mg at 12/15/20 0805    promethazine (PHENERGAN) tablet 12.5 mg  12.5 mg Oral Q6H PRN Pasha Arnett MD        Or    ondansetron (ZOFRAN) injection 4 mg  4 mg Intravenous Q6H PRN Pasha Arnett MD        polyethylene glycol (GLYCOLAX) packet 17 g  17 g Oral Daily PRN Pasha Arnett MD   17 g at 12/14/20 1022    acetaminophen (TYLENOL) tablet 650 mg  650 mg Oral Q6H PRN Pasha Arnett MD        Or    acetaminophen (TYLENOL) suppository 650 mg  650 mg Rectal Q6H PRN Pasha Arnett MD          amiodarone 450mg/250ml D5W infusion 0.5 mg/min (12/15/20 1033)    sodium chloride 12.5 mL/hr at 12/15/20 1300         Physical Exam:  BP 93/64   Pulse 95   Temp 99.1 °F (37.3 °C) (Axillary)   Resp 9   Ht 6' (1.829 m)   Wt 260 lb 2.3 oz (118 kg)   SpO2 98%   BMI 35.28 kg/m²   Weight change: 99 lb 6.8 oz (45.1 kg)  Wt Readings from Last 3 Encounters:   12/15/20 260 lb 2.3 oz (118 kg)   12/02/19 250 lb (113.4 kg)   11/16/19 250 lb (113.4 kg)     The patient is awake, alert but confused and in no discomfort or distress. He appears chronically ill no gross musculoskeletal deformity or lymphadenopathy are present. No significant skin or nail changes are present. Gross examination of head, eyes, nose and throat are negative. Jugular venous pressure is normal and no carotid bruits are present. No thyromegaly is noted. Normal respiratory effort is noted with no accessory muscle usage present. Lung fields are clear to ascultation face of BiPAP. Cardiac examination is notable for an irregular rhythm with no palpable thrill. No gallop rhythm or cardiac murmur are identified. A benign abdominal examination is present with exception of obesity and no masses or organomegaly. A normal abdominal aortic pulsation is present. Intact pulses are present throughout all extremities and mild anasarca  is present. No focal neurologic deficits are present.     Intake/Output:    Intake/Output Summary (Last 24 hours) at 12/15/2020 1553  Last data filed at 12/15/2020 1300  Gross per 24 hour   Intake 2610 ml   Output 720 ml patient initially presented with evidence of acute combined hypoxic and hypercapnic respiratory failure in the face of a pneumonia with associated sepsis and septic shock. While his condition has improved, he remains critically ill with the development of atrial fibrillation in the face of his acute illness. Presently, in the face of his acute illness, a strategy of rate control would appear advisable with the patient previously deemed at increased risk for anticoagulation although this may need revisited. Presently, his amiodarone will be discontinued with at least on a short-term basis the use of digitalis for rate control and as tolerated by his clinical condition and blood pressure the potential use of a beta-blocker. A repeat limited echocardiogram will be obtained to reevaluate left ventricular systolic function as well as the status of a previously noted pericardial effusion. Careful monitoring of his hemodynamics will be necessary as well as that of his volume status in the face of a present significant positive fluid balance with needs of gradual fluid mobilization once he is further hemodynamically stabilized as well as nutritional support to assist fluid mobilization and to reduce risk of progressive debilitation. Ongoing aggressive risk factor modification of blood pressure, diabetes and serum lipids will remain essential to reducing risk of future atherosclerotic development. At the time of evaluation, his care was discussed with the critical care attending in the duration of assessment and coordination of care exceeded 60 minutes    Thank you for allowing me to participate in your patient's care. Please feel free to contact me if you have any questions or concerns. Note: This report was completed using computerized voice recognition software. Every effort has been made to ensure accuracy, however; inadvertent computerized transcription errors may be present. Gita Samuels.  Gabe Funez, [id number] [facility]

## 2020-12-15 NOTE — PROGRESS NOTES
Critical Care Team - Daily Progress Note      Date and time: 12/15/2020 1:35 PM  Patient's name:  Lillie Cochran  Medical Record Number: [id number]  Patient's account/billing number: [de-identified]  Patient's YOB: 1940  Age: 78 y.o. Date of Admission: 12/13/2020 12:44 AM  Length of stay during current admission: 2      Primary Care Physician: No primary care provider on file. ICU Attending Physician: Dr. Linward Brunner    Code Status: Full Code    Reason for ICU admission: Acute respiratory failure with hypoxia, septic shock      SUBJECTIVE:   The patient is a 78 y.o. male with significant past medical history of hypertension, dementia, type 2 diabetes, atrial fibrillation who presented to the emergency department December 13 with hypoxia and unresponsiveness. He was intubated in the emergency department after he presented hypoxic. Respiratory panel negative. Blood cultures negative so far. Initial procalcitonin was elevated at 5. 5. Leukocytosis initially of 28.8. He was started on vancomycin and Zosyn to treat for likely pneumonia. Became hypotensive and required Levophed. A left femoral central line was placed in the ER. History otherwise limited secondary to patient intubated. OVERNIGHT EVENTS:         12/15: Patient did well on pressure support over yesterday. More awake today. Only requiring PEEP of 8. Continues on amiodarone for A. fib. Off pressors.     AWAKE & FOLLOWING COMMANDS:  [] No   [x] Yes    CURRENT VENTILATION STATUS:     [x] Ventilator  [] BIPAP  [] Nasal Cannula [] Room Air      IF INTUBATED, ET TUBE MARKING AT LOWER LIP:       cms    SECRETIONS Amount:  [] Small [] Moderate  [] Large  [x] None  Color:     [] White [] Colored  [] Bloody    SEDATION:  RAAS Score:  [] Propofol gtt  [] Versed gtt  [] Ativan gtt   [] No Sedation  Weaning fentanyl drip  PARALYZED:  [x] No    [] Yes    DIARRHEA:                [x] No                [] Yes  (C. Difficile status: [] positive [] negative                                                                                                                     [] pending)    VASOPRESSORS:  [] No    [] Yes    If yes -   [] Levophed       [] Dopamine     [] Vasopressin       [] Dobutamine  [] Phenylephrine         [] Epinephrine    CENTRAL LINES:     [] No   [x] Yes   (Date of Insertion: 2020)           If yes -     [] Right IJ     [] Left IJ [] Right Femoral [x] Left Femoral                   [] Right Subclavian [] Left Subclavian       GREEN'S CATHETER:   [] No   [x] Yes  (Date of Insertion: 2020)     URINE OUTPUT:            [x] Good   [] Low              [] Anuric      OBJECTIVE:     VITAL SIGNS:  /70   Pulse 115   Temp 99.1 °F (37.3 °C) (Axillary)   Resp 20   Ht 6' (1.829 m)   Wt 260 lb 2.3 oz (118 kg)   SpO2 93%   BMI 35.28 kg/m²   Tmax over 24 hours:  Temp (24hrs), Av.3 °F (36.8 °C), Min:97.5 °F (36.4 °C), Max:99.3 °F (37.4 °C)      Patient Vitals for the past 6 hrs:   BP Temp Temp src Pulse Resp SpO2 Height   12/15/20 1200 -- 99.1 °F (37.3 °C) Axillary -- -- -- --   12/15/20 0931 -- -- -- -- -- -- 6' (1.829 m)   12/15/20 0900 108/70 -- -- 115 20 93 % --   12/15/20 0843 -- -- -- 131 22 93 % --   12/15/20 0838 -- -- -- -- 25 91 % --   12/15/20 0800 127/89 -- -- 130 20 91 % --   12/15/20 0744 105/71 99.3 °F (37.4 °C) Axillary 120 20 95 % --         Intake/Output Summary (Last 24 hours) at 12/15/2020 1335  Last data filed at 12/15/2020 0800  Gross per 24 hour   Intake 3677 ml   Output 870 ml   Net 2807 ml     Wt Readings from Last 2 Encounters:   12/15/20 260 lb 2.3 oz (118 kg)   19 250 lb (113.4 kg)     Body mass index is 35.28 kg/m².         PHYSICAL EXAMINATION:    General appearance - alert, following commands, moving all extremities, awake  Mental status - alert, awake following commands moving all extremities  Eyes - pupils equal and reactive, extraocular eye movements intact, sclera anicteric  Ears - external ear normal  Nose - normal and patent, no erythema, discharge or polyps  Mouth - mucous membranes moist, pharynx normal without lesions. ET tube in place. Neck - supple, no significant adenopathy  Chest - clear to auscultation, no wheezes, rales or rhonchi, symmetric air entry  Heart - normal rate, regular rhythm, normal S1, S2, no murmurs, rubs, clicks or gallops  Abdomen - soft, nontender, nondistended, no masses or organomegaly  Neurological -awake, alert, following commands. Extremities - peripheral pulses normal, no clubbing or cyanosis. No edema.   Skin - normal coloration and turgor, no rashes, no suspicious skin lesions noted      Any additional physical findings:    MEDICATIONS:    Scheduled Meds:   senna  1 tablet PEG Tube BID    potassium phosphate IVPB  15 mmol Intravenous Once    [START ON 12/16/2020] vancomycin  2,000 mg Intravenous Q24H    insulin lispro  0-12 Units Subcutaneous Q6H    cefepime  2 g Intravenous Q12H    docusate  100 mg Per G Tube BID    folic acid  1 mg Per G Tube Daily    ipratropium-albuterol  3 mL Inhalation Q4H While awake    latanoprost  1 drop Both Eyes Daily    pantoprazole  40 mg Intravenous Daily    And    sodium chloride (PF)  10 mL Intravenous Daily    hydrocortisone sodium succinate PF  50 mg Intravenous Daily    sodium chloride flush  10 mL Intravenous 2 times per day    enoxaparin  40 mg Subcutaneous Daily     Continuous Infusions:   amiodarone 450mg/250ml D5W infusion 0.5 mg/min (12/15/20 1033)    sodium chloride Stopped (12/15/20 0733)    fentaNYL 5 mcg/ml in 0.9%  ml infusion Stopped (12/15/20 1035)    norepinephrine Stopped (12/14/20 2000)     PRN Meds:       midazolam, 2 mg, Q4H PRN      promethazine, 12.5 mg, Q6H PRN      sodium chloride flush, 10 mL, PRN      promethazine, 12.5 mg, Q6H PRN    Or     ondansetron, 4 mg, Q6H PRN      polyethylene glycol, 17 g, Daily PRN      acetaminophen, 650 mg, Q6H PRN    Or      acetaminophen, 650 mg, Q6H PRN          VENT SETTINGS (Comprehensive) (if applicable):  Vent Information  $Ventilation: $Subsequent Day  Skin Assessment: Clean, dry, & intact  Equipment ID: [id number]  Equipment Changed: HME  Vent Type: [phone]  Vent Mode: PS  Vt Ordered: 0 mL  Rate Set: 0 bmp  Peak Flow: 0 L/min  Pressure Support: 8 cmH20  FiO2 : 40 %  SpO2: 93 %  SpO2/FiO2 ratio: 232.5  Sensitivity: 2  PEEP/CPAP: 8  I Time/ I Time %: 0 s  Humidification Source: HME  Additional Respiratory  Assessments  Pulse: 115  Resp: 20  SpO2: 93 %  Position: Semi-Akhtar's  Humidification Source: HME  Oral Care: Mouth suctioned  Subglottic Suction Done?: Yes  Airway Type: ET  Airway Size: 8  Cuff Pressure (cm H2O): 29 cm H2O    ABGs:   ABG from December 15, 2020 at 11:09 AM showed a pH of 7.300 PCO2 of 49.2 PO2 of 63.6 bicarb 23.7 on pressure support.       Laboratory findings:    Complete Blood Count:   Recent Labs     12/13/20  0134 12/14/20  0553 12/15/20  0524   WBC 28.8* 15.3* 9.3   HGB 11.8* 10.2* 9.5*   HCT 40.9 34.2* 33.3*    342 254        Last 3 Blood Glucose:   Recent Labs     12/13/20  1107 12/14/20  0553 12/15/20  0524   GLUCOSE 198* 198* 118*        PT/INR:    Lab Results   Component Value Date    PROTIME 15.1 12/13/2020    INR 1.3 12/13/2020     PTT:    Lab Results   Component Value Date    APTT 26.8 12/13/2020       Comprehensive Metabolic Profile:   Recent Labs     12/13/20  1107 12/14/20  0553 12/15/20  0524    136 138   K 4.5 3.9 3.7    103 106   CO2 28 27 25   BUN 41* 38* 33*   CREATININE 1.1 0.9 0.9   GLUCOSE 198* 198* 118*   CALCIUM 7.5* 7.9* 8.2*   PROT  --   --  5.3*   LABALBU  --   --  2.4*   BILITOT  --   --  0.4   ALKPHOS  --   --  82   AST  --   --  15   ALT  --   --  12      Magnesium:   Lab Results   Component Value Date    MG 2.4 12/15/2020     Phosphorus:   Lab Results Component Value Date    PHOS 2.4 12/15/2020     Ionized Calcium: No results found for: CAION     Urinalysis:     Troponin:   Recent Labs     12/13/20  0134   TROPONINI 0.03       Microbiology:    Cultures during this admission:     Blood cultures:                 [] None drawn      [x] Negative             []  Positive (Details:  )  Urine Culture:                   [] None drawn      [x] Negative             []  Positive (Details:  )  Sputum Culture:               [] None drawn       [x] Negative             []  Positive (Details:  )   Endotracheal aspirate:     [] None drawn       [] Negative             []  Positive (Details:  )     Other pertinent Labs:   MRSA culture positive  Strep antigen testing negative  Respiratory culture negative    Radiology/Imaging:   Ct Head Wo Contrast    Result Date: 12/13/2020  EXAMINATION: CT OF THE HEAD WITHOUT CONTRAST  12/13/2020 4:27 am TECHNIQUE: CT of the head was performed without the administration of intravenous contrast. Dose modulation, iterative reconstruction, and/or weight based adjustment of the mA/kV was utilized to reduce the radiation dose to as low as reasonably achievable. COMPARISON: 11/16/2019 HISTORY: ORDERING SYSTEM PROVIDED HISTORY: AMS TECHNOLOGIST PROVIDED HISTORY: Reason for exam:->AMS Has a \"code stroke\" or \"stroke alert\" been called? ->No FINDINGS: BRAIN/VENTRICLES: There is advanced cerebral atrophy and moderate chronic ischemic change in the white matter. There is no acute intracranial hemorrhage, mass effect or midline shift. No abnormal extra-axial fluid collection. The gray-white differentiation is maintained without evidence of an acute infarct. Ventricular size is appropriate for brain volume. ORBITS: The visualized portion of the orbits demonstrate no acute abnormality. SINUSES: The visualized paranasal sinuses and mastoid air cells demonstrate no acute abnormality.  SOFT TISSUES/SKULL:  No acute abnormality of the visualized skull or soft tissues. No acute intracranial abnormality. Unchanged advanced cerebral atrophy and moderate chronic ischemic change in the white matter. Xr Chest Portable    Result Date: 12/15/2020  EXAMINATION: ONE XRAY VIEW OF THE CHEST 12/15/2020 7:48 am COMPARISON: 12/14/2020 HISTORY: ORDERING SYSTEM PROVIDED HISTORY: resp failure TECHNOLOGIST PROVIDED HISTORY: Reason for exam:->resp failure FINDINGS: The heart is enlarged. There is satisfactory position of the endotracheal tube. Persistent bilateral patchy airspace disease is noted unchanged compared to the prior study. No gross pleural effusion is noted. 1. Stable cardiomegaly. 2. Persistent bilateral airspace disease unchanged when compared to the prior study. 3.    Xr Chest Portable    Result Date: 12/14/2020  EXAMINATION: ONE XRAY VIEW OF THE CHEST 12/14/2020 6:53 am COMPARISON: 12/13/2020 HISTORY: ORDERING SYSTEM PROVIDED HISTORY: intubated TECHNOLOGIST PROVIDED HISTORY: Reason for exam:->intubated FINDINGS: The heart is enlarged. The endotracheal tube is in satisfactory position. There are findings of CHF. There are trace bilateral pleural effusions. 1. Cardiomegaly with findings of CHF. 2. Trace bilateral pleural effusions. Xr Chest Portable    Result Date: 12/13/2020  EXAMINATION: ONE XRAY VIEW OF THE CHEST 12/13/2020 12:32 am COMPARISON: November 16, 2019 HISTORY: ORDERING SYSTEM PROVIDED HISTORY: sob, intubation TECHNOLOGIST PROVIDED HISTORY: Reason for exam:->sob, intubation FINDINGS: Status post intubation. ETT with the distal tip at approximately 1.3 cm from the level of the emmanuel. ET tube should be retracted approximately 2-3 cm for optimal positioning. Cardiac silhouette is enlarged. Decreased lung volumes. Pulmonary vasculature within normal limits. Questionable hazy airspace disease in the left lung. Distal end of the ET tube at approximately 1.3 cm from the level of the emmanuel.   The ET tube should be retracted 2-3 cm for optimal positioning. Cardiomegaly. Question hazy airspace disease in the left lung. Radiographic follow-up recommended. Chest Xray (12/15/2020): Impression   1. Stable cardiomegaly. 2. Persistent bilateral airspace disease unchanged when compared to the prior   study. 3.         ASSESSMENT:     Patient Active Problem List    Diagnosis Date Noted    Acute respiratory failure with hypoxia ([address] 12/13/2020    Paroxysmal atrial fibrillation ([address] 01/12/2019    HCAP (healthcare-associated pneumonia) 01/08/2019    Pressure injury of contiguous region involving back, buttock, and hip, stage 2 ([address]     Pressure injury of calf, stage 2 ([address]     Non-pressure chronic ulcer right lower leg, limited to breakdown skin ([address] 08/22/2018    Severe protein-calorie malnutrition ([address] 07/27/2018    Non-pressure chronic ulcer left lower leg, limited to breakdown skin ([address] 05/25/2018    Venous ulcer of left leg ([address] 01/12/2018    Non-pressure chronic ulcer of left lower leg with fat layer exposed ([address] 57/70/1765    Metabolic encephalopathy 48/51/8898    Diabetes mellitus type 2, uncontrolled ([address] 10/22/2016    Hyperlipidemia LDL goal <100 10/22/2016    Essential hypertension 10/22/2016    Severe sepsis with septic shock ([address] 10/21/2016    Dementia ([address] 10/21/2016       Additional assessment:  Active problems:  1. Acute respiratory failure with hypoxia  2. HCAP  3. Septic shock  4. atrial fibrillation  5. Type 2 diabetes        PLAN:     WEAN PER PROTOCOL:  [] No   [x] Yes  [] N/A    DISCONTINUE ANY LABS:   [x] No   [] Yes    ICU PROPHYLAXIS:  Stress ulcer:  [x] PPI Agent  [] Q6Bvguh [] Sucralfate  [] Other:  VTE:   [x] Enoxaparin  [] Unfract.  Heparin Subcut  [] EPC Cuffs    NUTRITION:  [] NPO [] Tube Feeding (Specify: ) [] TPN  [x] PO (Diet: DIET TUBE FEED CONTINUOUS/CYCLIC NPO; Diabetic 1.5; Gastrostomy; Continuous; 20; 60; 24)    HOME MEDICATIONS RECONCILED: [] No  [x] Yes    INSULIN DRIP:   [x] No   [] Yes    CONSULTATION NEEDED:  [] No   [x] Yes    FAMILY UPDATED:    [] No   [x] Yes    TRANSFER OUT OF ICU:   [x] No   [] Yes    ADDITIONAL PLAN:    SYSTEMS ASSESSMENT     Neuro   History of dementia unclear what his baseline is  CT without contrast on admission showed no acute changes  Of sedation following commands awake alert     Respiratory   Acute respiratory failure with hypoxia  Treated for hospital-acquired pneumonia with bank cefepime  Continue on duo nebs  Likely underlying COPD with 100-pack-year history  Start on Solu-Cortef 50 mg daily  Wean oxygen as tolerated. Keep O2 sat 90-92%   Extubated today     Cardiovascular   Septic shock   -Now off of vasopressors  A. fib RVR   -On amiodarone given tenuous blood pressure with improvement in rate   -Hopefully transition to diltiazem with improvement in blood pressure        Gastrointestinal   Colace for constipation  Dietary consulted  Protonix prophylaxis     Renal   Daily metabolic panel   monitor renal function     Infectious Disease   Respiratory panel is negative  Blood cultures pending, negative thus far  Treating for pneumonia              -Infectious disease following              -Patient on cefepime and vancomycin  Leukocytosis improving today  Urine antigens negative     Hematology/Oncology   Trend CBC   leukocytosis, improving  Trend H&H for anemia     Endocrine   Type II diabetic  Humalog sliding scale     Social/Spiritual/DNR/Other   Full code.     ASSESSMENT/ PLAN   1. Acute respiratory failure with hypoxia, extubated, intermittent bipap  2. Off pressors  3. Infectious disease following for pneumonia -continue cefepime vancomycin  4. Dietary consult  5. A. fib with RVR on amiodarone  6. Continue ICU level care     Tripp Yarbrough DO, PGY2  1:35 PM 12/15/20      I personally saw, examined and provided care for the patient. Radiographs, labs and medication list were reviewed by me independently.  I spoke with bedside nursing, therapists and consultants. Critical care services and times documented are independent of procedures and multidisciplinary rounds with Residents. Additionally comprehensive, multidisciplinary rounds were conducted with the MICU team. The case was discussed in detail and plans for care were established. Review of Residents documentation was conducted and revisions were made as appropriate. I agree with the above documented exam, problem list and plan of care with the following additions:    Improved today and off of vasopressors  Tolerated SBT and he was extubated to BiPAP  Utilize bipap at night and prn  Continue vancomycin and Cefepime per ID  Swallow assessment   Monitor in ICU and if stable may transfer later tonight or in the AM    34 minutes of CCT spent with the patient, reviewing the chart including imaging studies, and discussing the case with other health care professionals. This time excludes procedures.      Nancy Truong MD

## 2020-12-15 NOTE — PROGRESS NOTES
Pharmacy Consultation Note  (Antibiotic Dosing and Monitoring)    Initial consult date: 12/13/2020  Consulting physician: Dr. Alexander Dunlap  Drug: Vancomycin  Indication: Pneumonia    Note pharmacy consult discontinued. Clinical pharmacy will sign-off. Please re-consult if we can be of further assistance.     Julio C Sanchez, PharmD, [facility]  12/15/2020  12:40 PM

## 2020-12-15 NOTE — PROGRESS NOTES
[address] [place] [address] [facility]  Progress Note    SUBJECTIVE:  Chief Complaint   Patient presents with    Altered Mental Status     nonresponsive    Respiratory Distress     arrived on BVM with EMS     The patient is in the ICU. He is intubated and sedated. Tolerating medications. Review of systems:  A 10 point review of systems was done. As stated above, otherwise negative. Medications:   senna  1 tablet PEG Tube BID    insulin lispro  0-12 Units Subcutaneous Q6H    cefepime  2 g Intravenous Q12H    docusate  100 mg Per G Tube BID    folic acid  1 mg Per G Tube Daily    ipratropium-albuterol  3 mL Inhalation Q4H While awake    latanoprost  1 drop Both Eyes Daily    pantoprazole  40 mg Intravenous Daily    And    sodium chloride (PF)  10 mL Intravenous Daily    hydrocortisone sodium succinate PF  50 mg Intravenous Daily    sodium chloride flush  10 mL Intravenous 2 times per day    enoxaparin  40 mg Subcutaneous Daily    vancomycin  15 mg/kg Intravenous Q12H      amiodarone 450mg/250ml D5W infusion 1 mg/min (12/15/20 0737)    sodium chloride Stopped (12/15/20 0733)    fentaNYL 5 mcg/ml in 0.9%  ml infusion 100 mcg/hr (12/15/20 0856)    sodium chloride 100 mL/hr at 12/15/20 0835    norepinephrine Stopped (20)     midazolam, promethazine, sodium chloride flush, promethazine **OR** ondansetron, polyethylene glycol, acetaminophen **OR** acetaminophen    OBJECTIVE:  /70   Pulse 115   Temp 99.3 °F (37.4 °C) (Axillary)   Resp 20   Ht 6' (1.829 m)   Wt 260 lb 2.3 oz (118 kg)   SpO2 93%   BMI 35.28 kg/m²   Temp  Av.1 °F (36.7 °C)  Min: 97.5 °F (36.4 °C)  Max: 99.3 °F (37.4 °C)  Constitutional: The patient is lying in bed in the ICU. He is sedated, on ventilator. Skin: Warm and dry. No rashes were noted. HEENT: Eyes show round, and reactive pupils. No jaundice. Moist mucous membranes, no ulcerations, no thrush. ETT.  OGT  Neck: Supple to movements. No lymphadenopathy. Chest: No use of accessory muscles to breathe. Symmetrical expansion. Auscultation reveals coarse breath sounds, no wheezing, crackles, or rhonchi. Cardiovascular: S1 and S2 are rhythmic and regular. No murmurs appreciated. Abdomen: Positive bowel sounds to auscultation. Benign to palpation. No masses felt. No hepatosplenomegaly. PEG. Extremities: No clubbing, no cyanosis, no edema. Lines: Left femoral TLC 12/13/2020. Ballesteros catheter.     Laboratory and Tests Review:  Lab Results   Component Value Date    WBC 9.3 12/15/2020    WBC 15.3 (H) 12/14/2020    WBC 28.8 (H) 12/13/2020    HGB 9.5 (L) 12/15/2020    HCT 33.3 (L) 12/15/2020    MCV 95.7 12/15/2020     12/15/2020     Lab Results   Component Value Date    NEUTROABS 7.26 12/15/2020    NEUTROABS 13.47 (H) 12/14/2020    NEUTROABS 26.06 (H) 12/13/2020     Lab Results   Component Value Date    CRP 25.0 (H) 12/14/2020    CRP 18.4 (H) 12/13/2020    CRP 13.3 (H) 04/09/2019     No results found for: CRPHS  Lab Results   Component Value Date    SEDRATE 48 (H) 12/14/2020    SEDRATE 23 (H) 12/13/2020    SEDRATE 55 (H) 04/09/2019     Lab Results   Component Value Date    ALT 12 12/15/2020    AST 15 12/15/2020     (H) 07/18/2018    ALKPHOS 82 12/15/2020    BILITOT 0.4 12/15/2020     Lab Results   Component Value Date     12/15/2020    K 3.7 12/15/2020    K 3.9 12/14/2020     12/15/2020    CO2 25 12/15/2020    BUN 33 12/15/2020    CREATININE 0.9 12/15/2020    CREATININE 0.9 12/14/2020    CREATININE 1.1 12/13/2020    GFRAA >60 12/15/2020    LABGLOM >60 12/15/2020    GLUCOSE 118 12/15/2020    GLUCOSE 138 03/26/2011    PROT 5.3 12/15/2020    LABALBU 2.4 12/15/2020    LABALBU 4.0 03/26/2011    CALCIUM 8.2 12/15/2020    BILITOT 0.4 12/15/2020    ALKPHOS 82 12/15/2020    AST 15 12/15/2020    ALT 12 12/15/2020     Radiology:  Chest x-ray reviewed    Microbiology:   Nares screen MRSA: Positive  Streptococcus

## 2020-12-15 NOTE — CONSULTS
12/15/2020  9:46 AM      Comprehensive Nutrition Assessment    Type and Reason for Visit:  Initial, Consult(Consult re: TF Ordering and Management)    Nutrition Recommendations/Plan: Continue current TF order, as cher    TF ORDER= Diabetic 1.5 leeanna TF (Glucerna 1.5) to goal rate 60 ml/hr (flushes per Critical care)  This will provide: 1440 ml/d, 2160 leeanna, 119 g pro, 1093 ml/d free water    Nutrition Assessment:  Pt w/DM, dementia from NH 2/2 HCAP-s/p several episodes emesis w/poss aspiration at NH per pt's dtr. Pt recently started on PEG vs. PO at NH, as pt was not feeling well/poor PO. Currently intubated/sedated. Note pt had impaction, kidney stones at [facility] PTA. If pt on fiber-containing formula PTA without adequate flushing, could contribute to those complications. TF Ordered and will monitor status    Malnutrition Assessment:  Malnutrition Status: At risk for malnutrition (Comment)    Context:  Chronic Illness     Findings of the 6 clinical characteristics of malnutrition:  Energy Intake:  Mild decrease in energy intake (Comment)(NPO at admit until TF order)  Weight Loss:  Unable to assess     Body Fat Loss:  Unable to assess     Muscle Mass Loss:  Unable to assess    Fluid Accumulation:  No significant fluid accumulation     Strength:  Not Performed    Estimated Daily Nutrient Needs:  Energy (kcal):  2554-5634; Weight Used for Energy Requirements:  Admission     Protein (g):  105-125 (1.3-1.5 g/kg);  Weight Used for Protein Requirements:  Ideal        Fluid (ml/day):  Per Critical care; Method Used for Fluid Requirements:  Other (Comment)      Nutrition Related Findings:  Intubated/sedated, G-tube clamped, pressor (MAP 86), +I/O 4L, no edema, +BS      Wounds:  Wound Consult Pending(wound care consulted re: scarring at old coccyx wound)       Current Nutrition Therapies:    DIET TUBE FEED CONTINUOUS/CYCLIC NPO; Diabetic 1.5; Gastrostomy; Continuous; 20; 60; 24  Additional Calorie Sources: none    Anthropometric Measures:  · Height: 6' (182.9 cm)  · Current Body Weight: 260 lb (117.9 kg)(12/15)   · Admission Body Weight: 243 lb (110.2 kg)(12/13 [facility])    · Usual Body Weight: (Unable to confirm w/actual wts- stated 250# on past admit)     · Ideal Body Weight: 178 lbs; % Ideal Body Weight 146.1 %   · BMI: 35.3  · BMI Categories: Obese Class 2 (BMI 35.0 -39.9)       Nutrition Diagnosis:   · Inadequate oral intake related to cognitive or neurological impairment(hx dementia and PEG placed for TF PTA 2/2 poor PO) as evidenced by intubation, NPO or clear liquid status due to medical condition, nutrition support - enteral nutrition      Nutrition Interventions:   Food and/or Nutrient Delivery:  Continue Current Tube Feeding, Continue NPO  Nutrition Education/Counseling:  Education not indicated   Coordination of Nutrition Care:  Continue to monitor while inpatient    Goals:  Pt will cher EN at goal rate       Nutrition Monitoring and Evaluation:   Behavioral-Environmental Outcomes:  None Identified   Food/Nutrient Intake Outcomes:  Enteral Nutrition Intake/Tolerance  Physical Signs/Symptoms Outcomes:  Biochemical Data, GI Status, Hemodynamic Status, Weight, Skin, Nutrition Focused Physical Findings, Fluid Status or Edema     Discharge Planning:    Enteral Nutrition     Electronically signed by Brian Dailey RD, CNSC, LD on 12/15/20 at 9:46 AM EST    Contact: [phone]

## 2020-12-16 ENCOUNTER — APPOINTMENT (OUTPATIENT)
Dept: GENERAL RADIOLOGY | Age: 80
DRG: 871 | End: 2020-12-16
Payer: COMMERCIAL

## 2020-12-16 LAB
AADO2: 166.9 MMHG
ALBUMIN SERPL-MCNC: 2.4 G/DL (ref 3.5–5.2)
ALP BLD-CCNC: 76 U/L (ref 40–129)
ALT SERPL-CCNC: 10 U/L (ref 0–40)
ANION GAP SERPL CALCULATED.3IONS-SCNC: 9 MMOL/L (ref 7–16)
AST SERPL-CCNC: 19 U/L (ref 0–39)
B.E.: -2.7 MMOL/L (ref -3–3)
BASOPHILS ABSOLUTE: 0.02 E9/L (ref 0–0.2)
BASOPHILS RELATIVE PERCENT: 0.3 % (ref 0–2)
BILIRUB SERPL-MCNC: 0.6 MG/DL (ref 0–1.2)
BUN BLDV-MCNC: 30 MG/DL (ref 8–23)
CALCIUM SERPL-MCNC: 8.2 MG/DL (ref 8.6–10.2)
CHLORIDE BLD-SCNC: 105 MMOL/L (ref 98–107)
CO2: 22 MMOL/L (ref 22–29)
COHB: 0.8 % (ref 0–1.5)
CREAT SERPL-MCNC: 1.2 MG/DL (ref 0.7–1.2)
CRITICAL: ABNORMAL
DATE ANALYZED: ABNORMAL
DATE OF COLLECTION: ABNORMAL
EKG ATRIAL RATE: 87 BPM
EKG ATRIAL RATE: 96 BPM
EKG P AXIS: 19 DEGREES
EKG P-R INTERVAL: 164 MS
EKG Q-T INTERVAL: 380 MS
EKG Q-T INTERVAL: 390 MS
EKG QRS DURATION: 88 MS
EKG QRS DURATION: 90 MS
EKG QTC CALCULATION (BAZETT): 457 MS
EKG QTC CALCULATION (BAZETT): 482 MS
EKG R AXIS: -34 DEGREES
EKG R AXIS: -45 DEGREES
EKG T AXIS: 15 DEGREES
EKG T AXIS: 33 DEGREES
EKG VENTRICULAR RATE: 87 BPM
EKG VENTRICULAR RATE: 92 BPM
EOSINOPHILS ABSOLUTE: 0.06 E9/L (ref 0.05–0.5)
EOSINOPHILS RELATIVE PERCENT: 0.8 % (ref 0–6)
FIO2: 40 %
GFR AFRICAN AMERICAN: >60
GFR NON-AFRICAN AMERICAN: 58 ML/MIN/1.73
GLUCOSE BLD-MCNC: 125 MG/DL (ref 74–99)
HCO3: 21.5 MMOL/L (ref 22–26)
HCT VFR BLD CALC: 32.3 % (ref 37–54)
HEMOGLOBIN: 9.8 G/DL (ref 12.5–16.5)
HHB: 6.1 % (ref 0–5)
IMMATURE GRANULOCYTES #: 0.05 E9/L
IMMATURE GRANULOCYTES %: 0.7 % (ref 0–5)
LAB: ABNORMAL
LYMPHOCYTES ABSOLUTE: 1.19 E9/L (ref 1.5–4)
LYMPHOCYTES RELATIVE PERCENT: 16.7 % (ref 20–42)
Lab: ABNORMAL
MAGNESIUM: 2.3 MG/DL (ref 1.6–2.6)
MCH RBC QN AUTO: 27.7 PG (ref 26–35)
MCHC RBC AUTO-ENTMCNC: 30.3 % (ref 32–34.5)
MCV RBC AUTO: 91.2 FL (ref 80–99.9)
METER GLUCOSE: 120 MG/DL (ref 74–99)
METER GLUCOSE: 121 MG/DL (ref 74–99)
METER GLUCOSE: 168 MG/DL (ref 74–99)
METER GLUCOSE: 251 MG/DL (ref 74–99)
METHB: 0.3 % (ref 0–1.5)
MODE: ABNORMAL
MONOCYTES ABSOLUTE: 0.87 E9/L (ref 0.1–0.95)
MONOCYTES RELATIVE PERCENT: 12.2 % (ref 2–12)
NEUTROPHILS ABSOLUTE: 4.92 E9/L (ref 1.8–7.3)
NEUTROPHILS RELATIVE PERCENT: 69.3 % (ref 43–80)
O2 CONTENT: 14.5 ML/DL
O2 SATURATION: 93.8 % (ref 92–98.5)
O2HB: 92.8 % (ref 94–97)
OPERATOR ID: 1687
PATIENT TEMP: 37 C
PCO2: 34.9 MMHG (ref 35–45)
PDW BLD-RTO: 16.1 FL (ref 11.5–15)
PEEP/CPAP: 6 CMH2O
PFO2: 1.7 MMHG/%
PH BLOOD GAS: 7.41 (ref 7.35–7.45)
PHOSPHORUS: 2.3 MG/DL (ref 2.5–4.5)
PLATELET # BLD: 278 E9/L (ref 130–450)
PMV BLD AUTO: 10 FL (ref 7–12)
PO2: 68.2 MMHG (ref 75–100)
POTASSIUM SERPL-SCNC: 3.9 MMOL/L (ref 3.5–5)
RBC # BLD: 3.54 E12/L (ref 3.8–5.8)
RI(T): 245 %
SODIUM BLD-SCNC: 136 MMOL/L (ref 132–146)
SOURCE, BLOOD GAS: ABNORMAL
THB: 11.1 G/DL (ref 11.5–16.5)
TIME ANALYZED: 516
TOTAL PROTEIN: 5.5 G/DL (ref 6.4–8.3)
WBC # BLD: 7.1 E9/L (ref 4.5–11.5)

## 2020-12-16 PROCEDURE — 94640 AIRWAY INHALATION TREATMENT: CPT

## 2020-12-16 PROCEDURE — 93005 ELECTROCARDIOGRAM TRACING: CPT | Performed by: INTERNAL MEDICINE

## 2020-12-16 PROCEDURE — 6370000000 HC RX 637 (ALT 250 FOR IP): Performed by: INTERNAL MEDICINE

## 2020-12-16 PROCEDURE — 93010 ELECTROCARDIOGRAM REPORT: CPT | Performed by: INTERNAL MEDICINE

## 2020-12-16 PROCEDURE — 2580000003 HC RX 258: Performed by: INTERNAL MEDICINE

## 2020-12-16 PROCEDURE — 82962 GLUCOSE BLOOD TEST: CPT

## 2020-12-16 PROCEDURE — 6360000002 HC RX W HCPCS: Performed by: INTERNAL MEDICINE

## 2020-12-16 PROCEDURE — 93308 TTE F-UP OR LMTD: CPT

## 2020-12-16 PROCEDURE — 80053 COMPREHEN METABOLIC PANEL: CPT

## 2020-12-16 PROCEDURE — 85025 COMPLETE CBC W/AUTO DIFF WBC: CPT

## 2020-12-16 PROCEDURE — 83735 ASSAY OF MAGNESIUM: CPT

## 2020-12-16 PROCEDURE — 71045 X-RAY EXAM CHEST 1 VIEW: CPT

## 2020-12-16 PROCEDURE — C9113 INJ PANTOPRAZOLE SODIUM, VIA: HCPCS | Performed by: INTERNAL MEDICINE

## 2020-12-16 PROCEDURE — 84100 ASSAY OF PHOSPHORUS: CPT

## 2020-12-16 PROCEDURE — 36415 COLL VENOUS BLD VENIPUNCTURE: CPT

## 2020-12-16 PROCEDURE — 94660 CPAP INITIATION&MGMT: CPT

## 2020-12-16 PROCEDURE — 2060000000 HC ICU INTERMEDIATE R&B

## 2020-12-16 PROCEDURE — 6360000004 HC RX CONTRAST MEDICATION: Performed by: INTERNAL MEDICINE

## 2020-12-16 PROCEDURE — 6370000000 HC RX 637 (ALT 250 FOR IP): Performed by: SPECIALIST

## 2020-12-16 PROCEDURE — 82805 BLOOD GASES W/O2 SATURATION: CPT

## 2020-12-16 PROCEDURE — 2700000000 HC OXYGEN THERAPY PER DAY

## 2020-12-16 PROCEDURE — 99233 SBSQ HOSP IP/OBS HIGH 50: CPT | Performed by: INTERNAL MEDICINE

## 2020-12-16 RX ORDER — DIGOXIN 0.25 MG/ML
250 INJECTION INTRAMUSCULAR; INTRAVENOUS ONCE
Status: COMPLETED | OUTPATIENT
Start: 2020-12-16 | End: 2020-12-16

## 2020-12-16 RX ORDER — LINEZOLID 100 MG/5ML
600 SUSPENSION ORAL EVERY 12 HOURS
Status: DISCONTINUED | OUTPATIENT
Start: 2020-12-16 | End: 2020-12-23 | Stop reason: HOSPADM

## 2020-12-16 RX ORDER — FUROSEMIDE 10 MG/ML
20 INJECTION INTRAMUSCULAR; INTRAVENOUS ONCE
Status: COMPLETED | OUTPATIENT
Start: 2020-12-16 | End: 2020-12-16

## 2020-12-16 RX ADMIN — DIGOXIN 250 MCG: 0.25 INJECTION INTRAMUSCULAR; INTRAVENOUS at 12:33

## 2020-12-16 RX ADMIN — DOCUSATE SODIUM 100 MG: 50 LIQUID ORAL at 11:17

## 2020-12-16 RX ADMIN — IPRATROPIUM BROMIDE AND ALBUTEROL SULFATE 3 ML: .5; 3 SOLUTION RESPIRATORY (INHALATION) at 08:35

## 2020-12-16 RX ADMIN — INSULIN LISPRO 6 UNITS: 100 INJECTION, SOLUTION INTRAVENOUS; SUBCUTANEOUS at 17:03

## 2020-12-16 RX ADMIN — PERFLUTREN 1.65 MG: 6.52 INJECTION, SUSPENSION INTRAVENOUS at 11:18

## 2020-12-16 RX ADMIN — PANTOPRAZOLE SODIUM 40 MG: 40 INJECTION, POWDER, FOR SOLUTION INTRAVENOUS at 11:18

## 2020-12-16 RX ADMIN — FOLIC ACID 1 MG: 1 TABLET ORAL at 11:19

## 2020-12-16 RX ADMIN — CEFEPIME 2 G: 2 INJECTION, POWDER, FOR SOLUTION INTRAMUSCULAR; INTRAVENOUS at 11:17

## 2020-12-16 RX ADMIN — SENNOSIDES 8.6 MG: 8.6 TABLET, FILM COATED ORAL at 11:19

## 2020-12-16 RX ADMIN — ENOXAPARIN SODIUM 40 MG: 40 INJECTION SUBCUTANEOUS at 11:18

## 2020-12-16 RX ADMIN — IPRATROPIUM BROMIDE AND ALBUTEROL SULFATE 3 ML: .5; 3 SOLUTION RESPIRATORY (INHALATION) at 12:13

## 2020-12-16 RX ADMIN — IPRATROPIUM BROMIDE AND ALBUTEROL SULFATE 3 ML: .5; 3 SOLUTION RESPIRATORY (INHALATION) at 21:08

## 2020-12-16 RX ADMIN — SENNOSIDES 8.6 MG: 8.6 TABLET, FILM COATED ORAL at 20:57

## 2020-12-16 RX ADMIN — FUROSEMIDE 20 MG: 10 INJECTION, SOLUTION INTRAMUSCULAR; INTRAVENOUS at 11:18

## 2020-12-16 RX ADMIN — IPRATROPIUM BROMIDE AND ALBUTEROL SULFATE 3 ML: .5; 3 SOLUTION RESPIRATORY (INHALATION) at 16:12

## 2020-12-16 RX ADMIN — LINEZOLID 600 MG: 100 SUSPENSION ORAL at 15:26

## 2020-12-16 RX ADMIN — Medication 10 ML: at 20:58

## 2020-12-16 RX ADMIN — SODIUM CHLORIDE, PRESERVATIVE FREE 10 ML: 5 INJECTION INTRAVENOUS at 11:20

## 2020-12-16 RX ADMIN — CEFEPIME 2 G: 2 INJECTION, POWDER, FOR SOLUTION INTRAMUSCULAR; INTRAVENOUS at 00:59

## 2020-12-16 RX ADMIN — INSULIN LISPRO 2 UNITS: 100 INJECTION, SOLUTION INTRAVENOUS; SUBCUTANEOUS at 11:23

## 2020-12-16 RX ADMIN — DOCUSATE SODIUM 100 MG: 50 LIQUID ORAL at 20:57

## 2020-12-16 RX ADMIN — Medication 10 ML: at 11:19

## 2020-12-16 RX ADMIN — HYDROCORTISONE SODIUM SUCCINATE 50 MG: 100 INJECTION, POWDER, FOR SOLUTION INTRAMUSCULAR; INTRAVENOUS at 11:18

## 2020-12-16 RX ADMIN — LATANOPROST 1 DROP: 50 SOLUTION OPHTHALMIC at 11:19

## 2020-12-16 ASSESSMENT — PAIN SCALES - PAIN ASSESSMENT IN ADVANCED DEMENTIA (PAINAD)
BODYLANGUAGE: 0
BODYLANGUAGE: 0
BREATHING: 0
TOTALSCORE: 0
NEGVOCALIZATION: 0
FACIALEXPRESSION: 0
CONSOLABILITY: 0
CONSOLABILITY: 0
BREATHING: 0
TOTALSCORE: 0
FACIALEXPRESSION: 0
NEGVOCALIZATION: 0

## 2020-12-16 ASSESSMENT — PAIN SCALES - GENERAL: PAINLEVEL_OUTOF10: 0

## 2020-12-16 NOTE — PATIENT CARE CONFERENCE
Intensive Care Daily Quality Rounding Checklist      ICU Team Members: Dr. Chantelle Hoang, residents, RT, bedside RN, TL, clinical pharmacist    ICU Day #: NUMBER: 3    Intubation Date: December 13    Ventilator Day #: NUMBER: 3    Central Line Insertion Date: December 13        Day #: NUMBER: 3     Arterial Line Insertion Date:  N/A      Day #:    DVT Prophylaxis: Lovenox    GI Prophylaxis: Protonix    Ballesteros Catheter Insertion Date: December 13       Day #: 2      Continued need (if yes, reason documented and discussed with physician): yes, Critical Care    Skin Issues/ Wounds and ordered treatment discussed on rounds:  Y PTA    Goals/ Plans for the Day: wean vent for extubation

## 2020-12-16 NOTE — PROGRESS NOTES
INPATIENT CARDIOLOGY FOLLOW-UP    Name: Vale Dominguez    Age: 78 y.o. Date of Admission: 12/13/2020 12:44 AM    Date of Service: 12/16/2020    Chief Complaint: Follow-up for paroxysmal atrial fibrillation, acute hypoxic respiratory failure with a resolved hypercapnic component, pneumonia with associated sepsis, hypertension, cognitive dysfunction    Interim History: The patient remains confused with requirements of BiPAP to maintain adequate oxygen saturation. He presently has spontaneously converted to sinus rhythm and remains relatively hypotensive withoutongoing needs of vasoactive support. Persistent fever in the absence of a present leukocytosis are noted. Persistent mild prerenal azotemia is noted with an increase of serum creatinine levels over the past 24 hours. Review of Systems:   Based on the patient's condition, a review of systems is not possible    Problem List:  Patient Active Problem List   Diagnosis    Severe sepsis with septic shock ([address]    Dementia ([address]    Metabolic encephalopathy    Diabetes mellitus type 2, uncontrolled ([address]    Hyperlipidemia LDL goal <100    Essential hypertension    Venous ulcer of left leg (HCC)    Non-pressure chronic ulcer of left lower leg with fat layer exposed ([address]    Non-pressure chronic ulcer left lower leg, limited to breakdown skin ([address]    Severe protein-calorie malnutrition ([address]    Non-pressure chronic ulcer right lower leg, limited to breakdown skin ([address]    Pressure injury of contiguous region involving back, buttock, and hip, stage 2 ([address]    Pressure injury of calf, stage 2 ([address]    HCAP (healthcare-associated pneumonia)    Paroxysmal atrial fibrillation ([address]    Acute respiratory failure with hypoxia ([address]       Allergies:   Allergies   Allergen Reactions    Imuran [Azathioprine]     Levaquin [Levofloxacin] Other (See Comments)     Lethargy when taken with celebrex    Doxycycline Nausea And Vomiting       Current Medications:  Current Facility-Administered Medications   Medication Dose Route Frequency Provider Last Rate Last Admin    vancomycin 2 g in dextrose 5% 500 ml IVPB  2,000 mg Intravenous Q24H Raul Mcclure DO        senna (SENOKOT) tablet 8.6 mg  1 tablet PEG Tube BID Sammie Marcial MD   8.6 mg at 12/15/20 2030    perflutren lipid microspheres (DEFINITY) injection 1.65 mg  1.5 mL Intravenous ONCE PRN Sammie Marcial MD        insulin lispro (HUMALOG) injection vial 0-12 Units  0-12 Units Subcutaneous Q6H Sammie Marcial MD   2 Units at 12/15/20 1828    cefepime (MAXIPIME) 2 g IVPB extended (mini-bag)  2 g Intravenous Q12H Sammie Marcial MD   Stopped at 12/16/20 0459    0.9 % sodium chloride infusion   Intravenous Q12H Sammie Marcial MD   Stopped at 12/15/20 1632    docusate (COLACE) 50 MG/5ML liquid 100 mg  100 mg Per G Tube BID Sammie Marcial MD   100 mg at 80/26/67 2085    folic acid (FOLVITE) tablet 1 mg  1 mg Per G Tube Daily Sammie Marcial MD   1 mg at 12/15/20 0805    ipratropium-albuterol (DUONEB) nebulizer solution 3 mL  3 mL Inhalation Q4H While awake Sammie Marcial MD   3 mL at 12/15/20 1929    latanoprost (XALATAN) 0.005 % ophthalmic solution 1 drop  1 drop Both Eyes Daily Sammie Marcial MD   1 drop at 12/15/20 0831    pantoprazole (PROTONIX) injection 40 mg  40 mg Intravenous Daily Sammie Marcial MD   40 mg at 12/15/20 0815    And    sodium chloride (PF) 0.9 % injection 10 mL  10 mL Intravenous Daily Sammie Marcial MD   10 mL at 12/15/20 0809    hydrocortisone sodium succinate PF (SOLU-CORTEF) injection 50 mg  50 mg Intravenous Daily Sammie Marcial MD   50 mg at 12/15/20 0806    promethazine (PHENERGAN) suppository 12.5 mg  12.5 mg Rectal Q6H PRN Sammie Marcial MD        sodium chloride flush 0.9 % injection 10 mL  10 mL Intravenous 2 times per day Sammie Marcial MD   10 mL at 12/15/20 2030    sodium chloride flush 0.9 % injection 10 mL  10 mL Intravenous PRN Sammie Marcial, MD   10 mL at 12/15/20 0524    enoxaparin (LOVENOX) injection 40 mg  40 mg Subcutaneous Daily Gabriela Lopez MD   40 mg at 12/15/20 0805    promethazine (PHENERGAN) tablet 12.5 mg  12.5 mg Oral Q6H PRN Gabriela Lopez MD        Or    [facility]ron [facility]) injection 4 mg  4 mg Intravenous Q6H PRN Gabriela Lopez MD        polyethylene glycol (GLYCOLAX) packet 17 g  17 g Oral Daily PRN Gabriela Lopez MD   17 g at 12/14/20 1022    acetaminophen (TYLENOL) tablet 650 mg  650 mg Oral Q6H PRN Gabriela Lopez MD        Or    acetaminophen (TYLENOL) suppository 650 mg  650 mg Rectal Q6H PRN Gabriela Lopez MD          sodium chloride Stopped (12/15/20 1632)       Physical Exam:  BP 96/64   Pulse 101   Temp 99.1 °F (37.3 °C) (Axillary)   Resp 23   Ht 6' (1.829 m)   Wt 267 lb 7 oz (121.3 kg)   SpO2 97%   BMI 36.27 kg/m²   Weight change: 7 lb 4.7 oz (3.309 kg)  Wt Readings from Last 3 Encounters:   12/16/20 267 lb 7 oz (121.3 kg)   12/02/19 250 lb (113.4 kg)   11/16/19 250 lb (113.4 kg)     The patient is awake, alert but confused and in no discomfort or distress. He presently remains on BiPAP and appears chronically ill. No gross musculoskeletal deformity is present. No significant skin or nail changes are present. Gross examination of head, eyes, nose and throat are negative. Jugular venous pressure is normal and no carotid bruits are present. Normal respiratory effort is noted with no accessory muscle usage present. Lung fields are clear to ascultation while on BiPAP. Cardiac examination is notable for a regular rate and rhythm with no palpable thrill. No gallop rhythm a soft systolic murmur at the left sternal border are identified. A benign abdominal examination is present with the exception of obesity and no masses or organomegaly. Intact pulses are present throughout all extremities and mild anasarca is present. No focal neurologic deficits are present.     Intake/Output:    Intake/Output Summary (Last 24 hours) at 12/16/2020 0701  Last data filed at 12/16/2020 0650  Gross per 24 hour   Intake 973 ml   Output 1450 ml   Net -477 ml     No intake/output data recorded. Laboratory Tests:  Lab Results   Component Value Date    CREATININE 1.2 12/16/2020    BUN 30 (H) 12/16/2020     12/16/2020    K 3.9 12/16/2020     12/16/2020    CO2 22 12/16/2020     No results for input(s): CKTOTAL, CKMB in the last 72 hours. Invalid input(s): TROPONONI  Lab Results   Component Value Date     (H) 09/27/2013     Lab Results   Component Value Date    WBC 7.1 12/16/2020    RBC 3.54 12/16/2020    HGB 9.8 12/16/2020    HCT 32.3 12/16/2020    MCV 91.2 12/16/2020    MCH 27.7 12/16/2020    MCHC 30.3 12/16/2020    RDW 16.1 12/16/2020     12/16/2020    MPV 10.0 12/16/2020     Recent Labs     12/15/20  0524 12/16/20  0357   ALKPHOS 82 76   ALT 12 10   AST 15 19   PROT 5.3* 5.5*   BILITOT 0.4 0.6   LABALBU 2.4* 2.4*     Lab Results   Component Value Date    MG 2.3 12/16/2020     Lab Results   Component Value Date    PROTIME 15.1 12/13/2020    INR 1.3 12/13/2020     No results found for: TSH  No components found for: CHLPL  No results found for: TRIG  No results found for: HDL  No results found for: [address]    Cardiac Tests:  ECG: A resting electrocardiogram reviewed at the time of evaluation demonstrated evidence of atrial fibrillation with a mean ventricular response of approximately 100 bpm with left axis deviation nonspecific ST changes  Telemetry findings reviewed: sinus tachycardia with a spontaneous conversion from atrial fibrillation, no new tachy/bradyarrhythmias overnight  Chest X-ray: pending      ASSESSMENT / PLAN: On a clinical basis, the patient presently appears compensated in the face of his ongoing pneumonia and acute hypoxic respiratory failure with persistent needs of BiPAP.   A persistent positive fluid balance is noted with needs of gradual fluid mobilization aided by nutritional support which will additionally reduce risk of debilitation. Based on the conversion to sinus rhythm, and atrial arrhythmias present in the face of his acute illness as well as that of previous concerns regarding anticoagulation, unless recurrent atrial arrhythmias are noted, antiplatelet therapy will be maintained for embolic protection. Continue careful monitoring of his renal function and electrolytes will be necessary with further optimization of his medical regimen as well as that of needs of careful monitoring of his blood pressure in conjunction with needs of diuresis. A limited echocardiogram remains pending for further assessment of left ventricular systolic function guided additional management needs. Additional management of his hypoxic respiratory failure will be deferred to the pulmonary/critical care service as well as infectious disease and on a long-term basis aggressive risk factor modification of blood pressure, diabetes and serum lipids will be essential to reducing risk of future atherosclerotic development. Note: This report was completed utilizing computer voice recognition software. Every effort has been made to ensure accuracy, however; inadvertent computerized transcription errors may be present. Jacque Riggs.  Ervin Berg, [facility]

## 2020-12-16 NOTE — PROGRESS NOTES
[address] [place] [address] [facility]  YUAN  Progress Note    SUBJECTIVE:  Chief Complaint   Patient presents with    Altered Mental Status     nonresponsive    Respiratory Distress     arrived on BVM with EMS     The patient is still out of the ICU. He was extubated. He is somewhat confused. Denies any pain. Tolerating medications. Review of systems:  A 10 point review of systems was done. As stated above, otherwise negative. Medications:   furosemide  20 mg Intravenous Once    senna  1 tablet PEG Tube BID    insulin lispro  0-12 Units Subcutaneous Q6H    cefepime  2 g Intravenous Q12H    docusate  100 mg Per G Tube BID    folic acid  1 mg Per G Tube Daily    ipratropium-albuterol  3 mL Inhalation Q4H While awake    latanoprost  1 drop Both Eyes Daily    pantoprazole  40 mg Intravenous Daily    And    sodium chloride (PF)  10 mL Intravenous Daily    hydrocortisone sodium succinate PF  50 mg Intravenous Daily    sodium chloride flush  10 mL Intravenous 2 times per day    enoxaparin  40 mg Subcutaneous Daily      sodium chloride Stopped (12/15/20 1632)     perflutren lipid microspheres, promethazine, sodium chloride flush, promethazine **OR** ondansetron, polyethylene glycol, acetaminophen **OR** acetaminophen    OBJECTIVE:  /68   Pulse 101   Temp 98.4 °F (36.9 °C) (Oral)   Resp 18   Ht 6' (1.829 m)   Wt 267 lb 7 oz (121.3 kg)   SpO2 97%   BMI 36.27 kg/m²   Temp  Av.8 °F (37.1 °C)  Min: 98.2 °F (36.8 °C)  Max: 99.1 °F (37.3 °C)  Constitutional: The patient is lying in bed. He is awake and confused. No distress. O2 by nasal cannula. Skin: Warm and dry. No rashes were noted. HEENT: Eyes show round, and reactive pupils. No jaundice. Moist mucous membranes, no ulcerations, no thrush. Neck: Supple to movements. No lymphadenopathy. Chest: No use of accessory muscles to breathe. Symmetrical expansion.  Auscultation reveals coarse breath sounds, no wheezing, crackles, or rhonchi. Cardiovascular: S1 and S2 are rhythmic and regular. No murmurs appreciated. Abdomen: Positive bowel sounds to auscultation. Benign to palpation. PEG. Extremities:  Minimal edema. Lines: Left femoral TLC 12/13/2020. Ballesteros catheter. Laboratory and Tests Review:  Lab Results   Component Value Date    WBC 7.1 12/16/2020    WBC 9.3 12/15/2020    WBC 15.3 (H) 12/14/2020    HGB 9.8 (L) 12/16/2020    HCT 32.3 (L) 12/16/2020    MCV 91.2 12/16/2020     12/16/2020     Lab Results   Component Value Date    NEUTROABS 4.92 12/16/2020    NEUTROABS 7.26 12/15/2020    NEUTROABS 13.47 (H) 12/14/2020     Lab Results   Component Value Date    CRP 25.0 (H) 12/14/2020    CRP 18.4 (H) 12/13/2020    CRP 13.3 (H) 04/09/2019     No results found for: CRP  Lab Results   Component Value Date    SEDRATE 48 (H) 12/14/2020    SEDRATE 23 (H) 12/13/2020    SEDRATE 55 (H) 04/09/2019     Lab Results   Component Value Date    ALT 10 12/16/2020    AST 19 12/16/2020     (H) 07/18/2018    ALKPHOS 76 12/16/2020    BILITOT 0.6 12/16/2020     Lab Results   Component Value Date     12/16/2020    K 3.9 12/16/2020    K 3.9 12/14/2020     12/16/2020    CO2 22 12/16/2020    BUN 30 12/16/2020    CREATININE 1.2 12/16/2020    CREATININE 0.9 12/15/2020    CREATININE 0.9 12/14/2020    GFRAA >60 12/16/2020    LABGLOM 58 12/16/2020    GLUCOSE 125 12/16/2020    GLUCOSE 138 03/26/2011    PROT 5.5 12/16/2020    LABALBU 2.4 12/16/2020    LABALBU 4.0 03/26/2011    CALCIUM 8.2 12/16/2020    BILITOT 0.6 12/16/2020    ALKPHOS 76 12/16/2020    AST 19 12/16/2020    ALT 10 12/16/2020     Radiology:  Chest x-ray reviewed    Microbiology:   Nares screen MRSA: Positive  Streptococcus pneumoniae/Legionella urine Ag: negative  Blood cultures 12/13/2020: Negative so far  Respiratory culture 12/14/2020: Mixed Staphylococcus aureus and GNR  Urine culture 12/13/2020: Negative so far respiratory panel (including SARS-CoV-2):  Not detected      Recent Labs     12/13/20  1107   PROCAL 5.57*       ASSESSMENT:  · HCAP.   Culture growing mixed organisms  · Respiratory failure, improved  · Leukocytosis secondary to pneumonia, resolving    PLAN:  · Stop Vancomycin  · Start oral Linezolid  · Continue Cefepime for now  · Check final respiratory cultures  · Remove TLC soon    Spoke with nursing    Gaviota Garza  11:02 AM  12/16/2020

## 2020-12-16 NOTE — PROGRESS NOTES
Internal Medicine Progress Note    Patient's name: Pam Mathis  : 1940  Admission date: 2020  Date of service: 2020   Room: [address]  Primary care physician: Khris  Reason for visit: Follow-up for HCAP    Subjective  Arti Madrigal was seen and examined at bedside. Patient is currently on BiPAP. Exam is difficult secondary to patient unable to speak loudly and BiPAP machine. Patient expressed frustration with not being able to speak and having a sore throat. Patient states that otherwise he feels well. Patient denies any chest pain at this time. Patient was educated on how intubation could be causing his throat soreness. Review of Systems  He denies shortness of breath, chest pain, nausea, vomiting, diarrhea. He still has shortness of breath.     Hospital Medications  Current Facility-Administered Medications   Medication Dose Route Frequency Provider Last Rate Last Admin    vancomycin 2 g in dextrose 5% 500 ml IVPB  2,000 mg Intravenous Q24H Raul Mcclure DO        senna (SENOKOT) tablet 8.6 mg  1 tablet PEG Tube BID Topher Rodriguez MD   8.6 mg at 12/15/20 2030    perflutren lipid microspheres (DEFINITY) injection 1.65 mg  1.5 mL Intravenous ONCE PRN Topher Rodriguez MD        insulin lispro (HUMALOG) injection vial 0-12 Units  0-12 Units Subcutaneous Q6H Topher Rdoriguez MD   2 Units at 12/15/20 1828    cefepime (MAXIPIME) 2 g IVPB extended (mini-bag)  2 g Intravenous Q12H Topher Rodriguez MD   Stopped at 20 0459    0.9 % sodium chloride infusion   Intravenous Q12H Topher Rodriguez MD   Stopped at 12/15/20 1632    docusate (COLACE) 50 MG/5ML liquid 100 mg  100 mg Per G Tube BID Topher Rodriguez MD   100 mg at  8719    folic acid (FOLVITE) tablet 1 mg  1 mg Per G Tube Daily Topher Rodriguez MD   1 mg at 12/15/20 0805    ipratropium-albuterol (DUONEB) nebulizer solution 3 mL  3 mL Inhalation Q4H While awake Topher Rodriguez MD   3 mL at 12/15/20 192    recorded. Physical Exam:  General: Patient on BiPAP. Patient will open up his eyes to verbal conversation. Appears to be alert and oriented. Eyes: conjunctivae/corneas clear, sclera non icteric  Skin: color/texture/turgor normal, no rashes or lesions  Lungs: CTAB, no retractions/use of accessory muscles,  no rhonchi, diffuse crackle, no rales, limited secondary to BiPAP  Heart: Regular rate and rhythm. No murmurs. Abdomen: soft, NT; bowel sounds normal; no masses,  no organomegaly  Extremities: atraumatic, no cyanosis, no edema      Most Recent Labs  Lab Results   Component Value Date    WBC 7.1 12/16/2020    HGB 9.8 (L) 12/16/2020    HCT 32.3 (L) 12/16/2020     12/16/2020     12/16/2020    K 3.9 12/16/2020     12/16/2020    CREATININE 1.2 12/16/2020    BUN 30 (H) 12/16/2020    CO2 22 12/16/2020    GLUCOSE 125 (H) 12/16/2020    ALT 10 12/16/2020    AST 19 12/16/2020    INR 1.3 12/13/2020    LABA1C 6.0 (H) 07/17/2018       XR CHEST PORTABLE   Final Result   1. Stable cardiomegaly. 2. Persistent bilateral airspace disease unchanged when compared to the prior   study. 3.      XR CHEST PORTABLE   Final Result   1. Cardiomegaly with findings of CHF. 2. Trace bilateral pleural effusions. CT Head WO Contrast   Final Result   No acute intracranial abnormality. Unchanged advanced cerebral atrophy and moderate chronic ischemic change in   the white matter. XR CHEST PORTABLE   Final Result   Distal end of the ET tube at approximately 1.3 cm from the level of the   emmanuel. The ET tube should be retracted 2-3 cm for optimal positioning. Cardiomegaly. Question hazy airspace disease in the left lung. Radiographic follow-up   recommended. XR CHEST PORTABLE    (Results Pending)   XR CHEST PORTABLE    (Results Pending)     Echo 12/16/2020   Technically suboptimal and limited study.    Left ventricle is normal in size .    Moderate left ventricular concentric hypertrophy noted.  No regional wall motion abnormalities seen.    Normal left ventricular ejection fraction.    The left atrium is moderately dilated.    Mild mitral annular calcification.    The aortic valve appears mildly sclerotic.    Mild aortic stenosis.   Leeds Dodd is a small circumferential pericardial effusion noted.      Assessment   Active Hospital Problems    Diagnosis    HCAP (healthcare-associated pneumonia) [J18.9]     Priority: High    Acute respiratory failure with hypoxia ([address] [J96.01]     Priority: Medium    Metabolic encephalopathy [P44.40]     Priority: Medium    Severe sepsis with septic shock (HCC) [A41.9, R65.21]     Priority: Medium    Paroxysmal atrial fibrillation (HCC) [I48.0]    Pressure injury of calf, stage 2 (HCC) [L89.892]    Pressure injury of contiguous region involving back, buttock, and hip, stage 2 (HCC) [L89.42]    Non-pressure chronic ulcer right lower leg, limited to breakdown skin ([address] [L97.911]    Severe protein-calorie malnutrition (HCC) [E43]    Non-pressure chronic ulcer left lower leg, limited to breakdown skin ([address] [L97.921]    Venous ulcer of left leg ([address] [I83.029, L97.929]    Non-pressure chronic ulcer of left lower leg with fat layer exposed ([address] [L97.922]    Diabetes mellitus type 2, uncontrolled ([address] [E11.65]    Hyperlipidemia LDL goal <100 [E78.5]    Essential hypertension [I10]    Dementia ([facility]) [F03.90]     [id number] Score for Atrial Fibrillation Stroke Risk   Risk   Factors  Component Value   C CHF No 0   H HTN Yes 1   A2 Age >= 76 Yes,  (78 y.o.) 2   D DM Yes 1   S2 Prior Stroke/TIA No 0   V Vascular Disease No 0   A Age 74-69 No,  (78 y.o.) 0   Sc Sex male 0    [id number]  Score  4   Score last updated 12/16/20 2:98 AM EST    Click here for a link to the UpToDate guideline \"Atrial Fibrillation: Anticoagulation therapy to prevent embolization    Disclaimer: Risk Score calculation is dependent on accuracy of patient problem list and past encounter diagnosis. Plan  HCAP, severe sepsis, septic shock, hypoxia: Transferred out of ICU and extubated on 12/15. Pressors weaned off. ATB's per ID. Cx pending. COVID-19 neg 12/13. Stress dose steroids (on steroids st baseline for bullous pemphigoid). Afib w/ RVR: Cardiology following-- defer rate/rhythm control meds to cardiology. Patient on digitalis patient currently regular rate and rhythm. Echo. CE's. TSH. [id number] score of 4-- cardio deemed him high risk for anticoagulation. , but may re-visit this  · Dysphagia: TF's via PEG  · Fecal impaction: Bowel regimen will be adjusted. · Dementia: Consider palliative care consult. · PT/OT when able   · Follow labs   · DVT prophylaxis  · Please see orders for further management and care. · Discharge plan: TBD    Patient was seen and evaluated by Resident Dr. Mary Louie  Patient was seen in conjunction with Dr. Jesus Zazueta. Patient's note was done using dictation software and there may be some dictation errors secondary to this. Mary Louie signed 12/16/2020 at 9:12 AM  EM PGY1    Addendum: I have personally participated in a face-to-face history and physical exam on the date of service with the patient. I have discussed the case with the resident. I also participated in medical decision making with the resident on the date of service and I agree with all of the pertinent clinical information unless indicated in my editing of the note. I have reviewed and edited the note above based on my findings during my history, exam, and decision making on the same day of service. My additional thoughts:    Rate control meds per cardiology   Wean O2/BiPAP   Antibiotics per ID    Electronically signed by Cornelia Claudio DO on 12/16/2020 at 1:35 PM    I can be reached through [employer].

## 2020-12-16 NOTE — CARE COORDINATION
Social Work/Discharge Planning:  Chart reviewed. Patient was extubated on 12/15. He is on 5 liters of oxygen. Called Eryn at [facility] and confirmed patient will need a pre-cert to return. Patient will need a therapy order. Will continue to follow.   Electronically signed by TRUMAN Castro on 12/16/2020 at 2:33 PM

## 2020-12-17 ENCOUNTER — APPOINTMENT (OUTPATIENT)
Dept: CT IMAGING | Age: 80
DRG: 871 | End: 2020-12-17
Payer: COMMERCIAL

## 2020-12-17 ENCOUNTER — APPOINTMENT (OUTPATIENT)
Dept: GENERAL RADIOLOGY | Age: 80
DRG: 871 | End: 2020-12-17
Payer: COMMERCIAL

## 2020-12-17 LAB
ALBUMIN SERPL-MCNC: 2.6 G/DL (ref 3.5–5.2)
ALP BLD-CCNC: 92 U/L (ref 40–129)
ALT SERPL-CCNC: 12 U/L (ref 0–40)
ANION GAP SERPL CALCULATED.3IONS-SCNC: 10 MMOL/L (ref 7–16)
AST SERPL-CCNC: 19 U/L (ref 0–39)
BASOPHILS ABSOLUTE: 0.02 E9/L (ref 0–0.2)
BASOPHILS RELATIVE PERCENT: 0.2 % (ref 0–2)
BILIRUB SERPL-MCNC: 0.5 MG/DL (ref 0–1.2)
BUN BLDV-MCNC: 36 MG/DL (ref 8–23)
CALCIUM SERPL-MCNC: 8.6 MG/DL (ref 8.6–10.2)
CHLORIDE BLD-SCNC: 108 MMOL/L (ref 98–107)
CO2: 23 MMOL/L (ref 22–29)
CREAT SERPL-MCNC: 1.3 MG/DL (ref 0.7–1.2)
EOSINOPHILS ABSOLUTE: 0.06 E9/L (ref 0.05–0.5)
EOSINOPHILS RELATIVE PERCENT: 0.7 % (ref 0–6)
GFR AFRICAN AMERICAN: >60
GFR NON-AFRICAN AMERICAN: 53 ML/MIN/1.73
GLUCOSE BLD-MCNC: 163 MG/DL (ref 74–99)
HCT VFR BLD CALC: 36.4 % (ref 37–54)
HEMOGLOBIN: 10.9 G/DL (ref 12.5–16.5)
IMMATURE GRANULOCYTES #: 0.08 E9/L
IMMATURE GRANULOCYTES %: 0.9 % (ref 0–5)
LYMPHOCYTES ABSOLUTE: 1.4 E9/L (ref 1.5–4)
LYMPHOCYTES RELATIVE PERCENT: 16.4 % (ref 20–42)
MAGNESIUM: 2.3 MG/DL (ref 1.6–2.6)
MCH RBC QN AUTO: 27 PG (ref 26–35)
MCHC RBC AUTO-ENTMCNC: 29.9 % (ref 32–34.5)
MCV RBC AUTO: 90.1 FL (ref 80–99.9)
METER GLUCOSE: 130 MG/DL (ref 74–99)
METER GLUCOSE: 131 MG/DL (ref 74–99)
METER GLUCOSE: 198 MG/DL (ref 74–99)
METER GLUCOSE: 209 MG/DL (ref 74–99)
METER GLUCOSE: 231 MG/DL (ref 74–99)
MONOCYTES ABSOLUTE: 1.14 E9/L (ref 0.1–0.95)
MONOCYTES RELATIVE PERCENT: 13.4 % (ref 2–12)
NEUTROPHILS ABSOLUTE: 5.82 E9/L (ref 1.8–7.3)
NEUTROPHILS RELATIVE PERCENT: 68.4 % (ref 43–80)
PDW BLD-RTO: 16.5 FL (ref 11.5–15)
PHOSPHORUS: 3.2 MG/DL (ref 2.5–4.5)
PLATELET # BLD: 344 E9/L (ref 130–450)
PMV BLD AUTO: 9.5 FL (ref 7–12)
POTASSIUM SERPL-SCNC: 4.1 MMOL/L (ref 3.5–5)
RBC # BLD: 4.04 E12/L (ref 3.8–5.8)
SODIUM BLD-SCNC: 141 MMOL/L (ref 132–146)
TOTAL PROTEIN: 5.7 G/DL (ref 6.4–8.3)
WBC # BLD: 8.5 E9/L (ref 4.5–11.5)

## 2020-12-17 PROCEDURE — 97161 PT EVAL LOW COMPLEX 20 MIN: CPT

## 2020-12-17 PROCEDURE — 74176 CT ABD & PELVIS W/O CONTRAST: CPT

## 2020-12-17 PROCEDURE — 85025 COMPLETE CBC W/AUTO DIFF WBC: CPT

## 2020-12-17 PROCEDURE — 6370000000 HC RX 637 (ALT 250 FOR IP): Performed by: INTERNAL MEDICINE

## 2020-12-17 PROCEDURE — C9113 INJ PANTOPRAZOLE SODIUM, VIA: HCPCS | Performed by: INTERNAL MEDICINE

## 2020-12-17 PROCEDURE — 2060000000 HC ICU INTERMEDIATE R&B

## 2020-12-17 PROCEDURE — 2580000003 HC RX 258: Performed by: INTERNAL MEDICINE

## 2020-12-17 PROCEDURE — 84100 ASSAY OF PHOSPHORUS: CPT

## 2020-12-17 PROCEDURE — 6360000002 HC RX W HCPCS: Performed by: INTERNAL MEDICINE

## 2020-12-17 PROCEDURE — 83735 ASSAY OF MAGNESIUM: CPT

## 2020-12-17 PROCEDURE — 6360000002 HC RX W HCPCS: Performed by: SPECIALIST

## 2020-12-17 PROCEDURE — 94640 AIRWAY INHALATION TREATMENT: CPT

## 2020-12-17 PROCEDURE — 36415 COLL VENOUS BLD VENIPUNCTURE: CPT

## 2020-12-17 PROCEDURE — 74018 RADEX ABDOMEN 1 VIEW: CPT

## 2020-12-17 PROCEDURE — 2700000000 HC OXYGEN THERAPY PER DAY

## 2020-12-17 PROCEDURE — 71045 X-RAY EXAM CHEST 1 VIEW: CPT

## 2020-12-17 PROCEDURE — 82962 GLUCOSE BLOOD TEST: CPT

## 2020-12-17 PROCEDURE — 99233 SBSQ HOSP IP/OBS HIGH 50: CPT | Performed by: INTERNAL MEDICINE

## 2020-12-17 PROCEDURE — 2500000003 HC RX 250 WO HCPCS: Performed by: INTERNAL MEDICINE

## 2020-12-17 PROCEDURE — 80053 COMPREHEN METABOLIC PANEL: CPT

## 2020-12-17 PROCEDURE — 2580000003 HC RX 258: Performed by: SPECIALIST

## 2020-12-17 PROCEDURE — 94660 CPAP INITIATION&MGMT: CPT

## 2020-12-17 RX ORDER — HEPARIN SODIUM (PORCINE) LOCK FLUSH IV SOLN 100 UNIT/ML 100 UNIT/ML
3 SOLUTION INTRAVENOUS PRN
Status: DISCONTINUED | OUTPATIENT
Start: 2020-12-17 | End: 2020-12-23 | Stop reason: HOSPADM

## 2020-12-17 RX ORDER — LIDOCAINE HYDROCHLORIDE 10 MG/ML
5 INJECTION, SOLUTION EPIDURAL; INFILTRATION; INTRACAUDAL; PERINEURAL ONCE
Status: COMPLETED | OUTPATIENT
Start: 2020-12-17 | End: 2020-12-18

## 2020-12-17 RX ORDER — METOPROLOL TARTRATE 5 MG/5ML
2.5 INJECTION INTRAVENOUS EVERY 6 HOURS
Status: DISCONTINUED | OUTPATIENT
Start: 2020-12-17 | End: 2020-12-23 | Stop reason: HOSPADM

## 2020-12-17 RX ORDER — FUROSEMIDE 10 MG/ML
20 INJECTION INTRAMUSCULAR; INTRAVENOUS ONCE
Status: COMPLETED | OUTPATIENT
Start: 2020-12-17 | End: 2020-12-17

## 2020-12-17 RX ORDER — ATORVASTATIN CALCIUM 20 MG/1
20 TABLET, FILM COATED ORAL NIGHTLY
Status: DISCONTINUED | OUTPATIENT
Start: 2020-12-17 | End: 2020-12-23 | Stop reason: HOSPADM

## 2020-12-17 RX ORDER — SODIUM CHLORIDE 0.9 % (FLUSH) 0.9 %
10 SYRINGE (ML) INJECTION PRN
Status: DISCONTINUED | OUTPATIENT
Start: 2020-12-17 | End: 2020-12-23 | Stop reason: HOSPADM

## 2020-12-17 RX ORDER — HEPARIN SODIUM (PORCINE) LOCK FLUSH IV SOLN 100 UNIT/ML 100 UNIT/ML
3 SOLUTION INTRAVENOUS EVERY 12 HOURS SCHEDULED
Status: DISCONTINUED | OUTPATIENT
Start: 2020-12-17 | End: 2020-12-23 | Stop reason: HOSPADM

## 2020-12-17 RX ADMIN — IPRATROPIUM BROMIDE AND ALBUTEROL SULFATE 3 ML: .5; 3 SOLUTION RESPIRATORY (INHALATION) at 12:24

## 2020-12-17 RX ADMIN — SODIUM CHLORIDE, PRESERVATIVE FREE 10 ML: 5 INJECTION INTRAVENOUS at 10:38

## 2020-12-17 RX ADMIN — FUROSEMIDE 20 MG: 10 INJECTION, SOLUTION INTRAMUSCULAR; INTRAVENOUS at 10:20

## 2020-12-17 RX ADMIN — INSULIN LISPRO 4 UNITS: 100 INJECTION, SOLUTION INTRAVENOUS; SUBCUTANEOUS at 01:33

## 2020-12-17 RX ADMIN — Medication 10 ML: at 20:30

## 2020-12-17 RX ADMIN — Medication 10 ML: at 10:24

## 2020-12-17 RX ADMIN — Medication 300 UNITS: at 20:30

## 2020-12-17 RX ADMIN — LINEZOLID 600 MG: 100 SUSPENSION ORAL at 01:40

## 2020-12-17 RX ADMIN — SODIUM CHLORIDE: 9 INJECTION, SOLUTION INTRAVENOUS at 01:40

## 2020-12-17 RX ADMIN — CEFEPIME 2 G: 2 INJECTION, POWDER, FOR SOLUTION INTRAMUSCULAR; INTRAVENOUS at 01:35

## 2020-12-17 RX ADMIN — INSULIN LISPRO 2 UNITS: 100 INJECTION, SOLUTION INTRAVENOUS; SUBCUTANEOUS at 12:54

## 2020-12-17 RX ADMIN — HYDROCORTISONE SODIUM SUCCINATE 50 MG: 100 INJECTION, POWDER, FOR SOLUTION INTRAMUSCULAR; INTRAVENOUS at 10:20

## 2020-12-17 RX ADMIN — ENOXAPARIN SODIUM 40 MG: 40 INJECTION SUBCUTANEOUS at 10:20

## 2020-12-17 RX ADMIN — SODIUM CHLORIDE, PRESERVATIVE FREE 10 ML: 5 INJECTION INTRAVENOUS at 10:20

## 2020-12-17 RX ADMIN — IPRATROPIUM BROMIDE AND ALBUTEROL SULFATE 3 ML: .5; 3 SOLUTION RESPIRATORY (INHALATION) at 09:22

## 2020-12-17 RX ADMIN — ERTAPENEM 1000 MG: 1 INJECTION INTRAMUSCULAR; INTRAVENOUS at 12:48

## 2020-12-17 RX ADMIN — ONDANSETRON 4 MG: 2 INJECTION INTRAMUSCULAR; INTRAVENOUS at 10:38

## 2020-12-17 RX ADMIN — METOPROLOL TARTRATE 2.5 MG: 5 INJECTION INTRAVENOUS at 18:10

## 2020-12-17 RX ADMIN — METOPROLOL TARTRATE 2.5 MG: 5 INJECTION INTRAVENOUS at 23:14

## 2020-12-17 RX ADMIN — PANTOPRAZOLE SODIUM 40 MG: 40 INJECTION, POWDER, FOR SOLUTION INTRAVENOUS at 10:20

## 2020-12-17 RX ADMIN — METOPROLOL TARTRATE 2.5 MG: 5 INJECTION INTRAVENOUS at 12:48

## 2020-12-17 RX ADMIN — LATANOPROST 1 DROP: 50 SOLUTION OPHTHALMIC at 10:23

## 2020-12-17 RX ADMIN — IPRATROPIUM BROMIDE AND ALBUTEROL SULFATE 3 ML: .5; 3 SOLUTION RESPIRATORY (INHALATION) at 21:03

## 2020-12-17 ASSESSMENT — PAIN SCALES - PAIN ASSESSMENT IN ADVANCED DEMENTIA (PAINAD)
BREATHING: 0
FACIALEXPRESSION: 0
BREATHING: 0
CONSOLABILITY: 0
BODYLANGUAGE: 0
TOTALSCORE: 0
NEGVOCALIZATION: 0
BODYLANGUAGE: 0
NEGVOCALIZATION: 0
TOTALSCORE: 0
CONSOLABILITY: 0
FACIALEXPRESSION: 0

## 2020-12-17 ASSESSMENT — PAIN SCALES - GENERAL
PAINLEVEL_OUTOF10: 0
PAINLEVEL_OUTOF10: 0

## 2020-12-17 NOTE — PROGRESS NOTES
INPATIENT CARDIOLOGY FOLLOW-UP    Name: Billie Hameed    Age: 78 y.o. Date of Admission: 12/13/2020 12:44 AM    Date of Service: 12/17/2020    Chief Complaint: Follow-up for paroxysmal atrial fibrillation, acute hypoxic respiratory failure, pneumonia, hypertension, cognitive dysfunction    Interim History: The patient presently remains confused and refused to utilize BiPAP the previous evening. Recurrent episodes of atrial fibrillation have been noted presently with acceptable rate control. A repeat chest x-ray is limited by poor inspiratory effort but suggest evidence of interstitial infiltrates and a likely left pleural effusion. Review of Systems:   Based on the patient's medical condition, a review of systems is not possible    Problem List:  Patient Active Problem List   Diagnosis    Severe sepsis with septic shock ([address]    Dementia ([address]    Metabolic encephalopathy    Diabetes mellitus type 2, uncontrolled ([address]    Hyperlipidemia LDL goal <100    Essential hypertension    Venous ulcer of left leg (HCC)    Non-pressure chronic ulcer of left lower leg with fat layer exposed ([address]    Non-pressure chronic ulcer left lower leg, limited to breakdown skin ([address]    Severe protein-calorie malnutrition ([address]    Non-pressure chronic ulcer right lower leg, limited to breakdown skin ([address]    Pressure injury of contiguous region involving back, buttock, and hip, stage 2 ([address]    Pressure injury of calf, stage 2 ([address]    HCAP (healthcare-associated pneumonia)    Paroxysmal atrial fibrillation ([address]    Acute respiratory failure with hypoxia ([address]       Allergies:   Allergies   Allergen Reactions    Imuran [Azathioprine]     Levaquin [Levofloxacin] Other (See Comments)     Lethargy when taken with celebrex    Doxycycline Nausea And Vomiting       Current Medications:  Current Facility-Administered Medications   Medication Dose Route Frequency Provider Last Rate Last Admin    linezolid (ZYVOX) 100 MG/5ML suspension 600 mg  600 mg Per NG tube Q12H Heather Murry MD   600 mg at 12/17/20 0140    senna (SENOKOT) tablet 8.6 mg  1 tablet PEG Tube BID Johnny Perez MD   8.6 mg at 12/16/20 2057    insulin lispro (HUMALOG) injection vial 0-12 Units  0-12 Units Subcutaneous Q6H Johnny Perez MD   4 Units at 12/17/20 0133    cefepime (MAXIPIME) 2 g IVPB extended (mini-bag)  2 g Intravenous Q12H Johnny Perez MD   Stopped at 12/17/20 8157    0.9 % sodium chloride infusion   Intravenous Q12H Johnny Perez MD   Stopped at 12/17/20 0340    docusate (COLACE) 50 MG/5ML liquid 100 mg  100 mg Per G Tube BID Johnny Perez MD   100 mg at 13/05/12 5948    folic acid (FOLVITE) tablet 1 mg  1 mg Per G Tube Daily Johnny Perez MD   1 mg at 12/16/20 1119    ipratropium-albuterol (DUONEB) nebulizer solution 3 mL  3 mL Inhalation Q4H While awake Johnny Perez MD   3 mL at 12/16/20 2108    latanoprost (XALATAN) 0.005 % ophthalmic solution 1 drop  1 drop Both Eyes Daily Johnny Perez MD   1 drop at 12/16/20 1119    pantoprazole (PROTONIX) injection 40 mg  40 mg Intravenous Daily Johnny Perez MD   40 mg at 12/16/20 1118    And    sodium chloride (PF) 0.9 % injection 10 mL  10 mL Intravenous Daily Johnny Perez MD   10 mL at 12/16/20 1120    hydrocortisone sodium succinate PF (SOLU-CORTEF) injection 50 mg  50 mg Intravenous Daily Johnny Perez MD   50 mg at 12/16/20 1118    promethazine (PHENERGAN) suppository 12.5 mg  12.5 mg Rectal Q6H PRN Johnny Perez MD        sodium chloride flush 0.9 % injection 10 mL  10 mL Intravenous 2 times per day Johnny Perez MD   10 mL at 12/16/20 2058    sodium chloride flush 0.9 % injection 10 mL  10 mL Intravenous PRN Johnny Perez MD   10 mL at 12/15/20 0524    enoxaparin (LOVENOX) injection 40 mg  40 mg Subcutaneous Daily Johnny Perez MD   40 mg at 12/16/20 1118    promethazine (PHENERGAN) tablet 12.5 mg  12.5 mg Oral Q6H PRN Johnny Perez MD        Or    ondansetron (ZOFRAN) injection 4 mg  4 mg Intravenous Q6H PRN Amy Wayne MD        polyethylene glycol [facility]) packet 17 g  17 g Oral Daily PRN Amy Wayne MD   17 g at 12/14/20 1022    acetaminophen (TYLENOL) tablet 650 mg  650 mg Oral Q6H PRN Amy Wayne MD        Or    acetaminophen (TYLENOL) suppository 650 mg  650 mg Rectal Q6H PRN Amy Wayne MD          sodium chloride Stopped (12/17/20 0340)       Physical Exam:  BP (!) 143/72   Pulse 105   Temp 98.1 °F (36.7 °C) (Axillary)   Resp 20   Ht 6' (1.829 m)   Wt 268 lb 2 oz (121.6 kg)   SpO2 94%   BMI 36.36 kg/m²   Weight change: 11 oz (0.312 kg)  Wt Readings from Last 3 Encounters:   12/17/20 268 lb 2 oz (121.6 kg)   12/02/19 250 lb (113.4 kg)   11/16/19 250 lb (113.4 kg)     The patient is awake, alert but confused and in no discomfort or distress. He appears chronically ill. No gross musculoskeletal deformity is present. No significant skin or nail changes are present. Gross examination of head, eyes, nose and throat are negative. Jugular venous pressure is normal and no carotid bruits are present. Normal respiratory effort is noted with no accessory muscle usage present. Lung fields demonstrate scattered rhonchi particularly with in left upper and midlung fields to ascultation. Cardiac examination is notable for an irregular rhythm with no palpable thrill. No gallop rhythm or cardiac murmur are identified. A benign abdominal examination is present with the exception of obesity and no masses or organomegaly. Intact pulses are present throughout all extremities and mild anasarca is present. No focal neurologic deficits are present. Intake/Output:    Intake/Output Summary (Last 24 hours) at 12/17/2020 0917  Last data filed at 12/16/2020 2121  Gross per 24 hour   Intake 1717 ml   Output 875 ml   Net 842 ml     No intake/output data recorded.     Laboratory Tests:  Lab Results   Component Value Date    CREATININE 1.2 12/16/2020    BUN 30 (H) 12/16/2020     12/16/2020    K 3.9 12/16/2020     12/16/2020    CO2 22 12/16/2020     No results for input(s): CKTOTAL, CKMB in the last 72 hours.     Invalid input(s): TROPONONI  Lab Results   Component Value Date     (H) 09/27/2013     Lab Results   Component Value Date    WBC 7.1 12/16/2020    RBC 3.54 12/16/2020    HGB 9.8 12/16/2020    HCT 32.3 12/16/2020    MCV 91.2 12/16/2020    MCH 27.7 12/16/2020    MCHC 30.3 12/16/2020    RDW 16.1 12/16/2020     12/16/2020    MPV 10.0 12/16/2020     Recent Labs     12/15/20  0524 12/16/20  0357   ALKPHOS 82 76   ALT 12 10   AST 15 19   PROT 5.3* 5.5*   BILITOT 0.4 0.6   LABALBU 2.4* 2.4*     Lab Results   Component Value Date    MG 2.3 12/16/2020     Lab Results   Component Value Date    PROTIME 15.1 12/13/2020    INR 1.3 12/13/2020     No results found for: TSH  No components found for: CHLPL  No results found for: TRIG  No results found for: HDL  No results found for: [address]    Cardiac Tests:  Telemetry findings reviewed: atrial fibrillation with a mean ventricular response of approximately 90 bpm with intermittent spontaneous conversion to sinus rhythm with supraventricular ectopy, no new tachy/bradyarrhythmias overnight  Chest X-ray: A repeat chest x-ray reviewed at the time of evaluation demonstrates poor inspiratory effort associated with cardiomegaly and mild interstitial infiltrates and a probable small left pleural effusion  Last Echocardiogram: An echocardiogram while technically limited suggested evidence of a normal-sized left ventricular chamber with moderate concentric left ventricular perjury and normal left ventricular systolic function with moderate left atrial enlargement and mild aortic stenosis with a small, hemodynamically insignificant pericardial effusion      ASSESSMENT / PLAN: On a clinical basis, the patient continues to manifest evidence of hypoxic respiratory failure with recurrent paroxysmal atrial fibrillation presently with acceptable rate control as well as that of resolution of hypotension. Persistent normal left ventricular systolic function has been maintained echocardiographically and with present plans of gradual fluid mobilization and resumption of optimal medical therapy inclusive of gradual fluid mobilization necessitating appropriate nutritional support and the initiation of a beta-blocker both for blood pressure control and rate stabilization of his atrial arrhythmias. Based on the persistent nature of arrhythmias in spite of his acute illness, the consideration of anticoagulation would be advisable preferably with the use of a direct oral anticoagulant unless significant contraindications exist.  Additional management of his pneumonia and respiratory failure will be deferred to the pulmonary service. Continued appropriate risk factor modification of blood pressure, diabetes and serum lipids will remain essential to reducing risk of future atherosclerotic development. Note: This report was completed utilizing computer voice recognition software. Every effort has been made to ensure accuracy, however; inadvertent computerized transcription errors may be present. Nancy Jasmine.  [facility] [place], [address] [place] [address] Cardiology

## 2020-12-17 NOTE — CARE COORDINATION
Social Work/Discharge Planning:  Chart reviewed. Patient will require IV antibiotic 12/25. Called Eryn at [facility] (ph: [phone]) and informed her of need for IV antibiotic at discharge. Informed Eryn patient will require a bipap at facility and provided her with settings. [facility] can accommodate IV antibiotic and provide a bipap for patient. [facility] will have facility start pre-cert this evening. Will continue to follow.  Electronically signed by TRUMAN Hernández on 12/17/2020 at 1:54 PM

## 2020-12-17 NOTE — PROGRESS NOTES
Notified CT scan that the peg cannot be used yet until after gastrograffin, therefore the contrast cannot be given at this time  Will update CT dept when able to use the PEG

## 2020-12-17 NOTE — CONSULTS
GENERAL SURGERY  CONSULT NOTE  12/17/2020    Physician Consulted: Dr. Sudheer Iniguez  Reason for Consult: PEG malfunction  Referring Physician: Dr. Fiorella CASTRO  Mesha Knox is a 78 y.o. male with PMH as below presented on 12/13 with respiratory failure and was intubated in the ED. He is currently being treated for pneumonia. Earlier today he began to have some abdominal discomfort and his PEG tube was no longer functioning. There were concerns he dislodged it himself as he is confused at baseline. He received his PEG in 2018 to assist with nutritional support and has never had it replaced. Past Medical History:   Diagnosis Date    Anxiety     Benign localized hyperplasia of prostate with urinary obstruction     Bullous pemphigoid     D[facility]     Diabetes mellitus ([address]     Enlarged prostate     Hyperlipidemia     Hypertension     Muscle weakness     Paroxysmal atrial fibrillation ([facility] 1/12/2019    Rheumatoid disease ([facility]        Past Surgical History:   Procedure Laterality Date    DOPPLER ECHOCARDIOGRAPHY N/A     FRACTURE SURGERY      HIP FRACTURE SURGERY Left 11/17/2019    HIP OPEN REDUCTION INTERNAL FIXATION performed by To Rogers MD at [facility] [address] EGD [address] N/A 7/31/2018    EGD  PEG TUBE INSERTION,  (ENDO STAFF NEEDED) performed by Jamarcus Thibodeaux MD at [facility] N/A 7/29/2018    EGD DIAGNOSTIC ONLY performed by Guilherme Hernandez MD at [facility] OR       Medications Prior to Admission:    Prior to Admission medications    Medication Sig Start Date End Date Taking?  Authorizing Provider   sodium chloride 0.9 % solution Infuse 600 mLs intravenously every 8 hours Run at 75 ml/hr 12/12/20  Yes Historical Provider, MD   docusate sodium (COLACE) 100 MG capsule Take 100 mg by mouth 2 times daily   Yes Historical Provider, MD   docusate (COLACE) 50 MG/5ML liquid 100 mg by Per G Tube route 2 times daily   Yes Historical Provider, MD   promethazine (PHENERGAN) 12.5 MG suppository Place 12.5 mg rectally every 6 hours as needed for Nausea (or vomiting)   Yes Historical Provider, MD   insulin aspart (NOVOLOG FLEXPEN) 100 UNIT/ML injection pen Inject into the skin 4 times daily (before meals and nightly)  - 100 give 1 unit   - 249 give 2 units   - 299 give 3 units   - 349  Give 4 units   - 399 give 5 units   Yes Historical Provider, MD   aspirin 325 MG EC tablet Take 1 tablet by mouth 2 times daily (with meals) 11/17/19 1/16/20  Scott Barbour,    glucagon (GLUCAGON EMERGENCY) 1 MG injection Inject 1 kit into the muscle as needed (for blood sugar <60 and not alert)    Historical Provider, MD   predniSONE (DELTASONE) 10 MG tablet Take 10 mg by mouth daily for skin care 11/3/19   Historical Provider, MD   insulin lispro (HUMALOG) 100 UNIT/ML injection vial Inject 0-12 Units into the skin 3 times daily (with meals) 4/15/19   Pattricia Fillers, DO   pantoprazole (PROTONIX) 40 MG tablet Take 1 tablet by mouth every morning (before breakfast) 4/16/19   Pattricia Fillers, DO   insulin lispro (HUMALOG) 100 UNIT/ML injection vial Inject 0-6 Units into the skin nightly 4/15/19   Pattricia Fillers, DO   insulin detemir (LEVEMIR FLEXTOUCH) 100 UNIT/ML injection pen Inject 30 Units into the skin daily     Historical Provider, MD   loperamide (IMODIUM) 2 MG capsule Take 2 mg by mouth every 6 hours as needed for Diarrhea    Historical Provider, MD   Sodium Phosphates (ENEMA DISPOSABLE) ENEM Place 1 enema rectally daily as needed    Historical Provider, MD   ipratropium-albuterol (DUONEB) 0.5-2.5 (3) MG/3ML SOLN nebulizer solution Inhale 3 mLs into the lungs every 4 hours as needed for Shortness of Breath 1/11/19   Catrachito Collins MD   methotrexate (RHEUMATREX) 2.5 MG chemo tablet Take 7.5 mg by mouth once a week on Tuesday    Historical Provider, MD   folic acid (FOLVITE) 1 MG tablet Take 1 mg by mouth Six times weekly (Sat-Sun)    Historical Provider, MD   Nutritional Supplements (ARGINAID) PACK Take 1 Package by mouth 2 times daily     Historical Provider, MD   mirtazapine (REMERON) 15 MG tablet Take 7.5 mg by mouth nightly     Historical Provider, MD   atorvastatin (LIPITOR) 20 MG tablet Take 20 mg by mouth daily     Historical Provider, MD   Cholecalciferol (VITAMIN D3) 5000 UNITS TABS Take 5,000 Units by mouth every morning     Historical Provider, MD   glucose (GLUTOSE) 40 % GEL Take 15 g by mouth as needed 16   Rakan Wood DO   tamsulosin (FLOMAX) 0.4 MG capsule Take 0.4 mg by mouth daily     Historical Provider, MD   latanoprost (XALATAN) 0.005 % ophthalmic solution Place 1 drop into both eyes daily     Historical Provider, MD   acetaminophen (TYLENOL) 325 MG tablet Take 650 mg by mouth every 4 hours as needed for Pain or Fever (max = 3000 mg/24 hours)    Historical Provider, MD   magnesium hydroxide (MILK OF MAGNESIA) 400 MG/5ML suspension Take 30 mLs by mouth daily as needed for Constipation     Historical Provider, MD   bisacodyl (DULCOLAX) 5 MG EC tablet Take 5 mg by mouth daily as needed for Constipation     Historical Provider, MD   busPIRone (BUSPAR) 7.5 MG tablet Take 7.5 mg by mouth every morning     Historical Provider, MD       Allergies   Allergen Reactions    Imuran [Azathioprine]     Levaquin [Levofloxacin] Other (See Comments)     Lethargy when taken with celebrex    Doxycycline Nausea And Vomiting       Family History   Problem Relation Age of Onset    Diabetes Mother     Kidney Disease Mother     Cancer Father         esophagus    Cancer Sister        Social History     Tobacco Use    Smoking status: Former Smoker     Packs/day: 2.00     Years: 50.00     Pack years: 100.00     Types: Cigarettes     Start date: 3/30/1955     Quit date: 3/30/2006     Years since quittin.7    Smokeless tobacco: Never Used   Substance Use Topics    Alcohol use: No     Alcohol/week: 0.0 standard drinks    Drug use: No         Review of Systems   General ROS: negative  Hematological and Lymphatic ROS: negative  Respiratory ROS: negative  Cardiovascular ROS: negative  Gastrointestinal ROS: negative  Genito-Urinary ROS: negative  Musculoskeletal ROS: negative      PHYSICAL EXAM:    Vitals:    12/17/20 1245   BP: 120/71   Pulse: 102   Resp:    Temp:    SpO2:        General Appearance:  awake,  in no acute distress  Skin:  Skin color, texture, turgor normal. No rashes or lesions. Head/face:  NCAT  Eyes:  No gross abnormalities. Lungs:  Normal expansion. Clear to auscultation. Heart:  Heart regular rate   Abdomen:  Soft, non-tender, non-distended. PEG in place but appears old and is not flushing  Extremities: Extremities warm to touch, pink, with no edema. LABS:    CBC  Recent Labs     12/17/20  0903   WBC 8.5   HGB 10.9*   HCT 36.4*        BMP  Recent Labs     12/17/20  0903      K 4.1   *   CO2 23   BUN 36*   CREATININE 1.3*   CALCIUM 8.6     Liver Function  Recent Labs     12/17/20  0903   BILITOT 0.5   AST 19   ALT 12   ALKPHOS 92   PROT 5.7*   LABALBU 2.6*     No results for input(s): LACTATE in the last 72 hours. No results for input(s): INR, PTT in the last 72 hours. Invalid input(s): PT    RADIOLOGY    Ct Head Wo Contrast    Result Date: 12/13/2020  EXAMINATION: CT OF THE HEAD WITHOUT CONTRAST  12/13/2020 4:27 am TECHNIQUE: CT of the head was performed without the administration of intravenous contrast. Dose modulation, iterative reconstruction, and/or weight based adjustment of the mA/kV was utilized to reduce the radiation dose to as low as reasonably achievable. COMPARISON: 11/16/2019 HISTORY: ORDERING SYSTEM PROVIDED HISTORY: AMS TECHNOLOGIST PROVIDED HISTORY: Reason for exam:->AMS Has a \"code stroke\" or \"stroke alert\" been called? ->No FINDINGS: BRAIN/VENTRICLES: There is advanced cerebral atrophy and moderate chronic ischemic change in the white matter. Radiographic follow-up recommended.         ASSESSMENT:  78 y.o. male with clogged PEG tube    PLAN:  - Replacement G-tube placed at bedside  - Await G-tube study to confirm placement  - continue with CT abd/pelvis, if no further issues can continue tube feeds    Discussed with Dr. Maegan Monterroso    Electronically signed by Clare Quintero MD on 12/17/20 at 1:42 PM EST

## 2020-12-17 NOTE — PROGRESS NOTES
Here to assess for PICC. Merary Mclain RN, primary nurse, states patient needs to go to CT and can be assessed for PICC placement tomorrow. 12/17/2020 4:29 PM JACK Hollingsworth RN [facility]

## 2020-12-17 NOTE — PROGRESS NOTES
Physical Therapy    Facility/Department: [address] INTERMEDIATE 1  Initial Assessment    NAME: Mesha Knox  : 1940  MRN: [id number]    Date of Service: 2020       REQUIRES PT FOLLOW UP: Yes       Patient Diagnosis(es): The primary encounter diagnosis was Acute respiratory failure with hypoxia and hypercapnia ([address] Diagnoses of Aspiration pneumonia, unspecified aspiration pneumonia type, unspecified laterality, unspecified part of lung ([address] Altered mental status, unspecified altered mental status type, Atrial fibrillation with RVR ([address] and Septic shock ([address] were also pertinent to this visit. has a past medical history of Anxiety, Benign localized hyperplasia of prostate with urinary obstruction, Bullous pemphigoid, Dementia ([address] Diabetes mellitus ([address] Enlarged prostate, Hyperlipidemia, Hypertension, Muscle weakness, Paroxysmal atrial fibrillation ([address] and Rheumatoid disease ([address] has a past surgical history that includes fracture surgery; doppler echocardiography (N/A); Upper gastrointestinal endoscopy (N/A, 2018); pr egd percutaneous placement gastrostomy tube (N/A, 2018); and Hip fracture surgery (Left, 2019). Evaluating Therapist: Keyur Rosales, PT     Referring Provider:  Dr. Pizano Her #:  [id number]   DIAGNOSIS: acute resp failure   PRECAUTIONS: falls, PEG , O2     Social:  Pt lives at [facility]   Prior to admission: per chart , lift for transfers . Does not walk      Initial Evaluation  Date: 2020 Treatment      Short Term/ Long Term   Goals   Was pt agreeable to Eval/treatment? Does pt have pain?  Pain behavior with L LE ROM and mobility      Bed Mobility  Rolling:  Max/dep assist   Supine to sit: dep assist x 2  long sit    Sit to supine:  dep assist x 2   Scooting:  Dep assist x 2   max assist x 2    Transfers NT    N/A   Ambulation    NT   N/A        Sitting balance  Dep assist   Max assist    LE ROM  Decreased AROM throughout      LE strength  2-/ 5 3-/ 5    AM- PAC RAW score  6/ 24            Pt is alert and Oriented x 1      Edema: B LEs   Endurance:  Poor   Bed/Chair alarm: yes      ASSESSMENT  Pt displays functional ability as noted in the objective portion of this evaluation. Treatment/Education:     Mobility as above. Limited due to extensive assist needed with all mobility and poor pt effort/participation     Pt educated on fall risk, PT POC        Patient response to education:   Pt verbalized understanding Pt demonstrated skill Pt requires further education in this area    no no  yes        Comments:  Pt left  In bed after session, with call light in reach. Rehab potential is Good for reaching above PT goals. Pts/ family goals   1. None stated     Patient and or family understand(s) diagnosis, prognosis, and plan of care. -  no    PLAN  PT care will be provided in accordance with the objectives noted above. Whenever appropriate, clear delegation orders will be provided for nursing staff. Exercises and functional mobility practice will be used as well as appropriate assistive devices or modalities to obtain goals. Patient and family education will also be administered as needed. PLAN OF CARE:    Current Treatment Recommendations     [x] Strengthening     [] ROM   [x] Balance Training   [] Endurance Training   [] Transfer Training   [] Gait Training   [] Stair Training   [x] Positioning   [x] Safety and Education Training   [x] Patient/Caregiver Education   [] HEP  [] Other       Frequency of treatments will be 1-2x/week x  5-7 day trial     Time in: 0750   Time out: 0803       Evaluation Time includes thorough review of current medical information, gathering information on past medical history/social history and prior level of function, completion of standardized testing/informal observation of tasks, assessment of data and education on plan of care and goals.     CPT codes:  [x] Low Complexity PT evaluation 80874  [] Moderate Complexity PT evaluation [id number]  [] High Complexity PT evaluation [id number]  [] PT Re-evaluation [id number]  [] Gait training 23638  minutes  [] Therapeutic activities 75497  minutes  [] Therapeutic exercises 43875  minutes  [] Neuromuscular reeducation 74168  minutes       [address] number:  PT 9469

## 2020-12-17 NOTE — CONSULTS
Pulmonary Consultation    Admit Date: 12/13/2020  Requesting Physician: No primary care provider on file. Reason for consultation:  · Respiratory failure  HPI:  · Kyle Gonsales is an 70-year-old white male known to me from taking care of his wife, Jessie Luna, as well as a hospitalization a few years ago. He presented to the hospital a few days ago with increasing shortness of breath ended up intubated and ventilated for pneumonia. Admitted to the ICU, the patient is wean from mechanical ventilation was relatively uneventful. Seen by cardiology, he was noted to have developed atrial fibrillation through his respiratory crisis. · Over the ICU admission, the patient was weaned from mechanical ventilation and successfully extubated. This p.m., he is awake and alert with BiPAP at his bedside. He appears in good spirits and is talking without difficulty. PMH:    Past Medical History:   Diagnosis Date    Anxiety     Benign localized hyperplasia of prostate with urinary obstruction     Bullous pemphigoid     Dementia ([address]     Diabetes mellitus ([address]     Enlarged prostate     Hyperlipidemia     Hypertension     Muscle weakness     Paroxysmal atrial fibrillation ([address] 1/12/2019    Rheumatoid disease ([address]      PSH:   Past Surgical History:   Procedure Laterality Date    DOPPLER ECHOCARDIOGRAPHY N/A     FRACTURE SURGERY      HIP FRACTURE SURGERY Left 11/17/2019    HIP OPEN REDUCTION INTERNAL FIXATION performed by Linda Blair MD at [facility] [address] EGD [address] N/A 7/31/2018    EGD  PEG TUBE INSERTION,  (ENDO STAFF NEEDED) performed by Rosamond Scheuermann, MD at [address] [facility] [address] N/A 7/29/2018    EGD DIAGNOSTIC ONLY performed by Irwin Jung MD at [address] [place] [address] of Systems:     · Constitutional: As noted in the HPI. · Eyes: No visual changes or diplopia. No scleral icterus. · ENT: No headaches, hearing loss or vertigo.  No nasal congestion, or sore throat. · Cardiovascular: No chest pain, dyspnea on exertion, or palpitations. · Respiratory: See above  · Gastrointestinal: No abdominal pain, nausea or emesis. No diarrhea or rectal bleeding or melena. No change in bowel habits. · Genitourinary: No dysuria, urinary frequency, or incontinence. No hematuria. · Musculoskeletal: No gait disturbance, weakness or joint complaints. · Integumentary: No rash or pruritis. No abnormal pigmentation, hair or nail changes. · Neurological: No headache, diplopia, dizziness, tremor, change in muscle strength, numbness or tingling. No change in gait, balance, coordination, mood, affect, memory, mentation, behavior. · Psychiatric: No anxiety or depression. · Endocrine: No temperature intolerance, excessive thirst, fluid intake, urinary frequency, excessive appetite, or recent weight change. · Hematologic/Lymphatic: No abnormal bruising or bleeding, blood clots or swollen lymph nodes. No anemia, fever, chills, night sweats, or swollen glands. · Allergic/Immunologic: No seasonal or perenial allergies. No history of hives or atopic dermatitis.     Social History:  · Alcohol: No  · Tobacco:   Quit 2006, 100 pack years  · Employment:  no silica or asbestos exposure  · Family:  No family history of lung disease    Medications:   sodium chloride Stopped (12/15/20 1632)      linezolid  600 mg Per NG tube Q12H    senna  1 tablet PEG Tube BID    insulin lispro  0-12 Units Subcutaneous Q6H    cefepime  2 g Intravenous Q12H    docusate  100 mg Per G Tube BID    folic acid  1 mg Per G Tube Daily    ipratropium-albuterol  3 mL Inhalation Q4H While awake    latanoprost  1 drop Both Eyes Daily    pantoprazole  40 mg Intravenous Daily    And    sodium chloride (PF)  10 mL Intravenous Daily    hydrocortisone sodium succinate PF  50 mg Intravenous Daily    sodium chloride flush  10 mL Intravenous 2 times per day    enoxaparin  40 mg Subcutaneous Daily Vitals:  Tmax:  VITALS:  /80   Pulse 97   Temp 98.5 °F (36.9 °C) (Axillary)   Resp 20   Ht 6' (1.829 m)   Wt 267 lb 7 oz (121.3 kg)   SpO2 96%   BMI 36.27 kg/m²   24HR INTAKE/OUTPUT:      Intake/Output Summary (Last 24 hours) at 2020 1906  Last data filed at 2020 1638  Gross per 24 hour   Intake 1228 ml   Output 1575 ml   Net -347 ml     CURRENT PULSE OXIMETRY:  SpO2: 96 %  24HR PULSE OXIMETRY RANGE:  SpO2  Av.4 %  Min: 96 %  Max: 100 %    EXAM:  General: No distress. Alert. Eyes: PERRL. No sclera icterus. No conjunctival injection. ENT: No discharge. Pharynx clear. Neck: Trachea midline. Normal thyroid. No jvd, no hjr. Resp: No wheezing. No accessory muscle use. No rales. No rhonchi. CV: Regular rate. Regular rhythm. No murmur No rub. Abd: Non-tender. Non-distended. No masses. No organmegaly. Normal bowel sounds. Skin: Warm and dry. No nodule on exposed extremities. No rash on exposed extremities. Lymph: No cervical LAD. No supraclavicular LAD. Ext: No joint deformity. No clubbing. No cyanosis. No edema  Neuro: Awake. Follows commands. Positive pupils/gag/corneals. Normal pain response. Lab Results:  CBC:   Recent Labs     20  0553 12/15/20  0524 20   WBC 15.3* 9.3 7.1   HGB 10.2* 9.5* 9.8*   HCT 34.2* 33.3* 32.3*   MCV 92.9 95.7 91.2    254 278       BMP:  Recent Labs     20  0553 12/15/20  0524 20  035    138 136   K 3.9 3.7 3.9    106 105   CO2 27 25 22   PHOS  --  2.4* 2.3*   BUN 38* 33* 30*   CREATININE 0.9 0.9 1.2    ALB:3,BILIDIR:3,BILITOT:3,ALKPHOS:3)@    PT/INR: No results for input(s): PROTIME, INR in the last 72 hours.     Cultures:  Sputum: not available  Blood: not available    ABG:   Recent Labs     20  0516   PH 7.407   PO2 68.2*   PCO2 34.9*   HCO3 21.5*   BE -2.7   O2SAT 93.8   METHB 0.3   O2HB 92.8*   COHB 0.8   O2CON 14.5   HHB 6.1*   THB 11.1*     FiO2 : 40 %  I:E Ratio: 1:1.40    Films:     XR CHEST PORTABLE   Final Result   Improved aeration of the right lung, with persistent infiltrate and/or   atelectasis in the left lower lung and possible left pleural effusion      XR CHEST PORTABLE   Final Result   1. Stable cardiomegaly. 2. Persistent bilateral airspace disease unchanged when compared to the prior   study. 3.      XR CHEST PORTABLE   Final Result   1. Cardiomegaly with findings of CHF. 2. Trace bilateral pleural effusions. CT Head WO Contrast   Final Result   No acute intracranial abnormality. Unchanged advanced cerebral atrophy and moderate chronic ischemic change in   the white matter. XR CHEST PORTABLE   Final Result   Distal end of the ET tube at approximately 1.3 cm from the level of the   emmanuel. The ET tube should be retracted 2-3 cm for optimal positioning. Cardiomegaly. Question hazy airspace disease in the left lung. Radiographic follow-up   recommended. XR CHEST PORTABLE    (Results Pending)   . Assessment:  1. Healthcare acquired pneumonia. Sputum with mixed yang. 2. Hypoxic and hypercapnic respiratory failure: Improved  3. Underlying COPD  4. Mild dementia      Plan:  1. Antibiotics per infectious disease  2. Continue aerosol treatments  3. Nocturnal ventilation/BiPAP      Thanks for letting us see this patient in consultation. Total time in reviewing the previous admissions and records, reviewing the current x-rays, labs, and discussing with clinical staff including nursing and physicians, exceeded 50 minutes. Please contact us with any questions. Office [phone] or after hours through [employer], x [phone]. Please note that voice recognition technology was used (while wearing a Covid mandated mask) in the preparation of this note and make therefore it may contain inadvertent transcription errors.   If the patient is a COVID 19 isolation patient, the above physical exam reflects that of the examining physician for the day. Mckenna Ordaz M.D., RAFAELA     [facility] [address] [facility], [address] [facility], [address] [place] [address]

## 2020-12-17 NOTE — PROGRESS NOTES
Internal Medicine Progress Note    Patient's name: Fredo Beltre  : 1940  Admission date: 2020  Date of service: 2020   Room: [address]  Primary care physician: Khris  Reason for visit: Follow-up for HCAP    Subjective  Hersfernando Cota was seen and examined at bedside. No family or friends present. He was resting comfortably in his room. When I saw him he had no complaints. He was fatigued. I was later notified by nursing that he had vomiting and had abdominal distention that was worse than earlier today. PEG tube might have came out as well.     Review of Systems  Unable to be obtained today    Hospital Medications  Current Facility-Administered Medications   Medication Dose Route Frequency Provider Last Rate Last Admin    [Held by provider] metoprolol tartrate (LOPRESSOR) tablet 25 mg  25 mg Per G Tube BID Angy Roblero MD        atorvastatin (LIPITOR) tablet 20 mg  20 mg Per G Tube Nightly Angy Roblero MD        metoprolol (LOPRESSOR) injection 2.5 mg  2.5 mg Intravenous Q6H Angy Roblero MD        linezolid (ZYVOX) 100 MG/5ML suspension 600 mg  600 mg Per NG tube Q12H Junie Em MD   600 mg at 20 0140    senna (SENOKOT) tablet 8.6 mg  1 tablet PEG Tube BID Kelvin Francisco MD   8.6 mg at 20 2057    insulin lispro (HUMALOG) injection vial 0-12 Units  0-12 Units Subcutaneous Q6H Kelvin Francisco MD   4 Units at 20 0133    cefepime (MAXIPIME) 2 g IVPB extended (mini-bag)  2 g Intravenous Q12H Kelvin Francisco MD   Stopped at 20 2665    0.9 % sodium chloride infusion   Intravenous Q12H Kelvin Francisco MD   Stopped at 20 0340    docusate (COLACE) 50 MG/5ML liquid 100 mg  100 mg Per G Tube BID Kelvin Francisco MD   100 mg at 35/49/33 8336    folic acid (FOLVITE) tablet 1 mg  1 mg Per G Tube Daily Kelvin Francisco MD   1 mg at 20 1119    ipratropium-albuterol (DUONEB) nebulizer solution 3 mL  3 mL Inhalation Q4H While awake Jose Juan Lim MD   3 mL at 12/17/20 0922    latanoprost (XALATAN) 0.005 % ophthalmic solution 1 drop  1 drop Both Eyes Daily Jose Juan Lim MD   1 drop at 12/17/20 1023    pantoprazole (PROTONIX) injection 40 mg  40 mg Intravenous Daily Jose Juan Lim MD   40 mg at 12/17/20 1020    And    sodium chloride (PF) 0.9 % injection 10 mL  10 mL Intravenous Daily Jose Juan Lim MD   10 mL at 12/17/20 1020    hydrocortisone sodium succinate PF (SOLU-CORTEF) injection 50 mg  50 mg Intravenous Daily Jose Juan Lim MD   50 mg at 12/17/20 1020    promethazine (PHENERGAN) suppository 12.5 mg  12.5 mg Rectal Q6H PRN Jose Juan Lim MD        sodium chloride flush 0.9 % injection 10 mL  10 mL Intravenous 2 times per day Jose Juan Lim MD   10 mL at 12/17/20 1024    sodium chloride flush 0.9 % injection 10 mL  10 mL Intravenous PRN Jose Juan Lim MD   10 mL at 12/17/20 1038    enoxaparin (LOVENOX) injection 40 mg  40 mg Subcutaneous Daily Jose Juan Lim MD   40 mg at 12/17/20 1020    promethazine (PHENERGAN) tablet 12.5 mg  12.5 mg Oral Q6H PRN Jose Juan Lim MD        Or    ondansetron [facility]) injection 4 mg  4 mg Intravenous Q6H PRN Jose Juan Lim MD   4 mg at 12/17/20 1038    polyethylene glycol (GLYCOLAX) packet 17 g  17 g Oral Daily PRN Jose Juan Lim MD   17 g at 12/14/20 1022    acetaminophen (TYLENOL) tablet 650 mg  650 mg Oral Q6H PRN Jose Juan Lim MD        Or   [facility] acetaminophen (TYLENOL) suppository 650 mg  650 mg Rectal Q6H PRN Jose Juan Lim MD           PRN Medications  promethazine, sodium chloride flush, promethazine **OR** ondansetron, polyethylene glycol, acetaminophen **OR** acetaminophen    Objective  Most Recent Recorded Vitals  BP (!) 143/72   Pulse 105   Temp 98.1 °F (36.7 °C) (Axillary)   Resp 20   Ht 6' (1.829 m)   Wt 268 lb 2 oz (121.6 kg)   SpO2 94%   BMI 36.36 kg/m²   I/O last 3 completed shifts:   In: 2198 [NG/GT:1717]  Out: 875 [Urine:875]  I/O this shift:  In: 30 [I.V.:30]  Out: -     Physical Exam:  General: Awake, alert, answers some questions but not every question, no acute distress, no labored breathing, nasal cannula oxygen   eyes: conjunctivae/corneas clear, sclera non icteric  Skin: color/texture/turgor normal, no rashes or lesions  Lungs: Diminished but CTAB, no retractions/use of accessory muscles,  no rhonchi, diffuse crackle, no rales, limited secondary to BiPAP  Heart: Tachycardic rate and rhythm. No murmurs. Abdomen: soft, some mild pain on palpation of the lower abdomen; bowel sounds difficult to auscultate; no masses,  no organomegaly  Extremities: atraumatic, no cyanosis, no edema      Most Recent Labs  Lab Results   Component Value Date    WBC 8.5 12/17/2020    HGB 10.9 (L) 12/17/2020    HCT 36.4 (L) 12/17/2020     12/17/2020     12/17/2020    K 4.1 12/17/2020     (H) 12/17/2020    CREATININE 1.3 (H) 12/17/2020    BUN 36 (H) 12/17/2020    CO2 23 12/17/2020    GLUCOSE 163 (H) 12/17/2020    ALT 12 12/17/2020    AST 19 12/17/2020    INR 1.3 12/13/2020    LABA1C 6.0 (H) 07/17/2018       XR CHEST PORTABLE   Final Result   1. Left perihilar and left lower lobe pneumonia. 2. Cardiomegaly. 3. Prominence of the interstitial markings. XR CHEST PORTABLE   Final Result   Improved aeration of the right lung, with persistent infiltrate and/or   atelectasis in the left lower lung and possible left pleural effusion      XR CHEST PORTABLE   Final Result   1. Stable cardiomegaly. 2. Persistent bilateral airspace disease unchanged when compared to the prior   study. 3.      XR CHEST PORTABLE   Final Result   1. Cardiomegaly with findings of CHF. 2. Trace bilateral pleural effusions. CT Head WO Contrast   Final Result   No acute intracranial abnormality. Unchanged advanced cerebral atrophy and moderate chronic ischemic change in   the white matter.       XR CHEST PORTABLE   Final Result   Distal end of the ET tube at approximately 1.3 cm from the level of the   emmanuel. The ET tube should be retracted 2-3 cm for optimal positioning. Cardiomegaly. Question hazy airspace disease in the left lung. Radiographic follow-up   recommended. XR CHEST PORTABLE    (Results Pending)   CT ABDOMEN PELVIS WO CONTRAST Additional Contrast? Oral    (Results Pending)     Echo 12/16/2020   Technically suboptimal and limited study.    Left ventricle is normal in size .    Moderate left ventricular concentric hypertrophy noted.    No regional wall motion abnormalities seen.    Normal left ventricular ejection fraction.    The left atrium is moderately dilated.    Mild mitral annular calcification.    The aortic valve appears mildly sclerotic.    Mild aortic stenosis.   Leatha Nadia is a small circumferential pericardial effusion noted.      Assessment   Active Hospital Problems    Diagnosis    HCAP (healthcare-associated pneumonia) [J18.9]     Priority: High    Acute respiratory failure with hypoxia ([address] [J96.01]     Priority: Medium    Metabolic encephalopathy [U24.01]     Priority: Medium    Severe sepsis with septic shock (HCC) [A41.9, R65.21]     Priority: Medium    Paroxysmal atrial fibrillation (HCC) [I48.0]    Pressure injury of calf, stage 2 (HCC) [L89.892]    Pressure injury of contiguous region involving back, buttock, and hip, stage 2 (HCC) [L89.42]    Non-pressure chronic ulcer right lower leg, limited to breakdown skin ([address] [L97.911]    Severe protein-calorie malnutrition (HCC) [E43]    Non-pressure chronic ulcer left lower leg, limited to breakdown skin ([address] [L97.921]    Venous ulcer of left leg ([address] [I83.029, L97.929]    Non-pressure chronic ulcer of left lower leg with fat layer exposed ([address] [L97.922]    Diabetes mellitus type 2, uncontrolled ([address] [E11.65]    Hyperlipidemia LDL goal <100 [E78.5]    Essential hypertension [I10]    Dementia ([address] [F03.90]     [id number] Score for Atrial Fibrillation Stroke Risk   Risk   Factors Component Value   C CHF No 0   H HTN Yes 1   A2 Age >= 76 Yes,  (78 y.o.) 2   D DM Yes 1   S2 Prior Stroke/TIA No 0   V Vascular Disease No 0   A Age 74-69 No,  (78 y.o.) 0   Sc Sex male 0    [id number]  Score  4   Score last updated 12/16/20 9:55 AM EST    Click here for a link to the UpToDate guideline \"Atrial Fibrillation: Anticoagulation therapy to prevent embolization    Disclaimer: Risk Score calculation is dependent on accuracy of patient problem list and past encounter diagnosis. Plan  HCAP, severe sepsis, septic shock, hypoxia: Transferred out of ICU and extubated on 12/15. Pressors weaned off. ATB's per ID. Cx pending. COVID-19 neg 12/13. Stress dose steroids (on steroids st baseline for bullous pemphigoid). Afib w/ RVR: Cardiology following-- defer rate/rhythm control meds to cardiology. Patient on digitalis patient currently regular rate and rhythm. Echo. CE's. TSH. [id number] score of 4-- cardio deemed him high risk for anticoagulation. , but may re-visit this  · Abdominal distention/vomiting: Tube feeds on hold. General surgery consultation. CT abdomen and pelvis. · Dysphagia as well as PEG tube malfunction: TF's via PEG when able to tolerate diet and PEG tube fix  · Fecal impaction: Bowel regimen will be adjusted. General surgery consultation  · Dementia: Consider palliative care consult. · PT/OT when able   · Follow labs   · DVT prophylaxis  · Please see orders for further management and care. · Discharge plan: TBD pending clinical progress that she might be a good LTAC candidate    Electronically signed by Jerilyn Jenkins DO on 12/17/2020 at 11:10 AM    I can be reached through [employer].

## 2020-12-17 NOTE — PROGRESS NOTES
Messaged surgical resident re: new consult for possible displaced PEG tube. [place] RN notifed Dr. Alpa Denton of correct patient room number and name.   Sophia Hagen  12:21 PM

## 2020-12-17 NOTE — PROGRESS NOTES
care tasks. Hand Dominance: Right []  Left []   Strength ROM Additional Info:    RUE  Proximal: 3-/5  Distal: 3+/5 WFL, limited end range shoulder motions fair  and FMC/dexterity noted during ADL tasks     LUE Proximal: 3-/5  Distal: 3+/5 WFL elbow to digits.  L shoulder flexion approx 60 degrees fair  and FMC/dexterity noted during ADL tasks         Hearing: [place]  Vision: appears functional  Sensation:  No c/o numbness or tingling   Tone: WFL   Edema: B LEs                            Long Term Goal (1-3 wks): Pt will maximize functional performance in all self care tasks/functional transfers with good follow through of all trained techniques for safe transition to next level of care    Assessment of current deficits   Functional mobility [x]  ADLs [x] Strength [x]  Cognition []  Functional transfers  [x] IADLs [x] Safety Awareness [x]  Endurance [x]  Fine Motor Coordination [x] Balance [x] Vision/perception [] Sensation []   Gross Motor Coordination [x] ROM [x] Delirium []                  Motor Control []    Plan of Care: 1-3 days/week for 1-2 weeks PRN   [x]ADL retraining/adaptive techniques and AE recommendations to increase functional independence within precautions                    [x]Energy conservation techniques to improve tolerance for ADL/IADLs  [x]Functional transfer/mobility training/DME recommendations for increased independence, safety and fall prevention         [x]Patient/family education to increase safety and functional independence during daily routine          [x]Environmental modifications for safe mobility and completion of ADLs                             []Cognitive retraining to improve problem solving skills & safe participation in ADLs/IADLs     []Sensory re-education techniques to improve extremity awareness, maintain skin integrity and improve hand function                             []Visual/Perceptual retraining to improve body awareness and safety during transfers and ADLs  []Splinting/positioning needs to maintain joint/skin integrity and contracture prevention  [x]Therapeutic activity to improve functional performance during ADLs                                        [x]Therapeutic exercise to improve tolerance and functional strength for ADLs   [x]Balance retraining/tolerance tasks for facilitation of postural control with dynamic challenges during ADLs  []Neuromuscular re-education to facilitate righting/equilibrium reactions, midline orientation, scapular stability/mobility, normalize muscle tone and facilitate active functional movement                        []Delirium prevention/treatment    [x]Positioning to improve functional independence and decrease risk of skin breakdown  []Other:     Rehab Potential: Guarded for established goals     Patient/Family Goal: Pt does not state goal.      Patient and/or family were instructed on functional diagnosis, prognosis/goals and OT plan of care. Pt verbalized poor understanding. Upon arrival, patient supine in bed. At end of session, patient supine in bed with call light and phone within reach, all lines and tubes intact. Pt would benefit from continued skilled OT to increase safety and independence with completion of ADL/IADL tasks for functional independence and quality of life.  Bed/chair alarm: ON    Low Evaluation [id number]  Time In: 755   Time Out: 803      Evaluation time includes thorough review of current medical information, gathering information on past medical history/social history and prior level of function, completion of standardized testing/informal observation of tasks, assessment of data, and development of POC/Goals    Norton Hazard OTR/L  [id number]

## 2020-12-17 NOTE — PROGRESS NOTES
[address] [place] [address] [facility]  Progress Note    SUBJECTIVE:  Chief Complaint   Patient presents with    Altered Mental Status     nonresponsive    Respiratory Distress     arrived on BVM with EMS     No new complaints. Denies dyspnea. Tolerating medications. Review of systems:  A 10 point review of systems was done. As stated above, otherwise negative. Medications:   [Held by provider] metoprolol tartrate  25 mg Per G Tube BID    atorvastatin  20 mg Per G Tube Nightly    metoprolol  2.5 mg Intravenous Q6H    linezolid  600 mg Per NG tube Q12H    senna  1 tablet PEG Tube BID    insulin lispro  0-12 Units Subcutaneous Q6H    cefepime  2 g Intravenous Q12H    docusate  100 mg Per G Tube BID    folic acid  1 mg Per G Tube Daily    ipratropium-albuterol  3 mL Inhalation Q4H While awake    latanoprost  1 drop Both Eyes Daily    pantoprazole  40 mg Intravenous Daily    And    sodium chloride (PF)  10 mL Intravenous Daily    hydrocortisone sodium succinate PF  50 mg Intravenous Daily    sodium chloride flush  10 mL Intravenous 2 times per day    enoxaparin  40 mg Subcutaneous Daily      sodium chloride Stopped (20 0340)     promethazine, sodium chloride flush, promethazine **OR** ondansetron, polyethylene glycol, acetaminophen **OR** acetaminophen    OBJECTIVE:  BP (!) 143/72   Pulse 105   Temp 98.1 °F (36.7 °C) (Axillary)   Resp 20   Ht 6' (1.829 m)   Wt 268 lb 2 oz (121.6 kg)   SpO2 94%   BMI 36.36 kg/m²   Temp  Av.4 °F (36.9 °C)  Min: 98.1 °F (36.7 °C)  Max: 98.5 °F (36.9 °C)  Constitutional: The patient is lying in bed. He is in no distress. He seems to be less confused. Skin: Warm and dry. No rashes were noted. HEENT: Eyes show round, and reactive pupils. No jaundice. Moist mucous membranes, no ulcerations, no thrush. Neck: Supple to movements. No lymphadenopathy. Chest: No respiratory distress. No crackles heard.   Cardiovascular: Heart sounds rhythmic and regular. Abdomen: Positive bowel sounds to auscultation. Benign to palpation. PEG. Extremities:  Minimal edema. Lines: Left femoral TLC 12/13/2020. Ballesteros catheter. Laboratory and Tests Review:  Lab Results   Component Value Date    WBC 8.5 12/17/2020    WBC 7.1 12/16/2020    WBC 9.3 12/15/2020    HGB 10.9 (L) 12/17/2020    HCT 36.4 (L) 12/17/2020    MCV 90.1 12/17/2020     12/17/2020     Lab Results   Component Value Date    NEUTROABS 5.82 12/17/2020    NEUTROABS 4.92 12/16/2020    NEUTROABS 7.26 12/15/2020     Lab Results   Component Value Date    CRP 25.0 (H) 12/14/2020    CRP 18.4 (H) 12/13/2020    CRP 13.3 (H) 04/09/2019     No results found for: CRPHS  Lab Results   Component Value Date    SEDRATE 48 (H) 12/14/2020    SEDRATE 23 (H) 12/13/2020    SEDRATE 55 (H) 04/09/2019     Lab Results   Component Value Date    ALT 12 12/17/2020    AST 19 12/17/2020     (H) 07/18/2018    ALKPHOS 92 12/17/2020    BILITOT 0.5 12/17/2020     Lab Results   Component Value Date     12/17/2020    K 4.1 12/17/2020    K 3.9 12/14/2020     12/17/2020    CO2 23 12/17/2020    BUN 36 12/17/2020    CREATININE 1.3 12/17/2020    CREATININE 1.2 12/16/2020    CREATININE 0.9 12/15/2020    GFRAA >60 12/17/2020    LABGLOM 53 12/17/2020    GLUCOSE 163 12/17/2020    GLUCOSE 138 03/26/2011    PROT 5.7 12/17/2020    LABALBU 2.6 12/17/2020    LABALBU 4.0 03/26/2011    CALCIUM 8.6 12/17/2020    BILITOT 0.5 12/17/2020    ALKPHOS 92 12/17/2020    AST 19 12/17/2020    ALT 12 12/17/2020     Radiology:  Chest x-ray reviewed    Microbiology:   Nares screen MRSA: Positive  Streptococcus pneumoniae/Legionella urine Ag: negative  Blood cultures 12/13/2020: Negative so far  Respiratory culture 12/14/2020: MRSA, ESBL + Klebsiella pneumoniae  Urine culture 12/13/2020: Negative so far respiratory panel (including SARS-CoV-2): Not detected      No results for input(s): PROCAL in the last 72 hours. ASSESSMENT:  · HCAP. Culture growing MRSA and ESBL + Klebsiella  · Respiratory failure, improved  · Leukocytosis secondary to pneumonia, resolving    PLAN:  · Continue oral Linezolid until 12/30/2020  · Change Cefepime to Ertapenem until 12/25/2020  · Check final respiratory cultures  · PICC for IV antibiotic  · The patient can be discharged back to the nursing facility from [address] [place] [address] with [address]  11:32 AM  12/17/2020

## 2020-12-18 ENCOUNTER — APPOINTMENT (OUTPATIENT)
Dept: GENERAL RADIOLOGY | Age: 80
DRG: 871 | End: 2020-12-18
Payer: COMMERCIAL

## 2020-12-18 LAB
ALBUMIN SERPL-MCNC: 2.4 G/DL (ref 3.5–5.2)
ALP BLD-CCNC: 79 U/L (ref 40–129)
ALT SERPL-CCNC: 12 U/L (ref 0–40)
ANION GAP SERPL CALCULATED.3IONS-SCNC: 8 MMOL/L (ref 7–16)
AST SERPL-CCNC: 13 U/L (ref 0–39)
BASOPHILS ABSOLUTE: 0.03 E9/L (ref 0–0.2)
BASOPHILS RELATIVE PERCENT: 0.4 % (ref 0–2)
BILIRUB SERPL-MCNC: 0.5 MG/DL (ref 0–1.2)
BLOOD CULTURE, ROUTINE: NORMAL
BUN BLDV-MCNC: 39 MG/DL (ref 8–23)
CALCIUM SERPL-MCNC: 8.5 MG/DL (ref 8.6–10.2)
CHLORIDE BLD-SCNC: 109 MMOL/L (ref 98–107)
CO2: 28 MMOL/L (ref 22–29)
CREAT SERPL-MCNC: 1.5 MG/DL (ref 0.7–1.2)
CULTURE, BLOOD 2: NORMAL
CULTURE, RESPIRATORY: ABNORMAL
EOSINOPHILS ABSOLUTE: 0.34 E9/L (ref 0.05–0.5)
EOSINOPHILS RELATIVE PERCENT: 4.9 % (ref 0–6)
GFR AFRICAN AMERICAN: 54
GFR NON-AFRICAN AMERICAN: 45 ML/MIN/1.73
GLUCOSE BLD-MCNC: 137 MG/DL (ref 74–99)
HCT VFR BLD CALC: 33.1 % (ref 37–54)
HEMOGLOBIN: 10.1 G/DL (ref 12.5–16.5)
IMMATURE GRANULOCYTES #: 0.07 E9/L
IMMATURE GRANULOCYTES %: 1 % (ref 0–5)
LYMPHOCYTES ABSOLUTE: 1.17 E9/L (ref 1.5–4)
LYMPHOCYTES RELATIVE PERCENT: 16.8 % (ref 20–42)
MAGNESIUM: 2.3 MG/DL (ref 1.6–2.6)
MCH RBC QN AUTO: 27.4 PG (ref 26–35)
MCHC RBC AUTO-ENTMCNC: 30.5 % (ref 32–34.5)
MCV RBC AUTO: 89.9 FL (ref 80–99.9)
METER GLUCOSE: 129 MG/DL (ref 74–99)
METER GLUCOSE: 142 MG/DL (ref 74–99)
METER GLUCOSE: 160 MG/DL (ref 74–99)
MONOCYTES ABSOLUTE: 0.88 E9/L (ref 0.1–0.95)
MONOCYTES RELATIVE PERCENT: 12.7 % (ref 2–12)
NEUTROPHILS ABSOLUTE: 4.46 E9/L (ref 1.8–7.3)
NEUTROPHILS RELATIVE PERCENT: 64.2 % (ref 43–80)
ORGANISM: ABNORMAL
PDW BLD-RTO: 17.1 FL (ref 11.5–15)
PHOSPHORUS: 3 MG/DL (ref 2.5–4.5)
PLATELET # BLD: 327 E9/L (ref 130–450)
PMV BLD AUTO: 9.6 FL (ref 7–12)
POTASSIUM SERPL-SCNC: 3.6 MMOL/L (ref 3.5–5)
RBC # BLD: 3.68 E12/L (ref 3.8–5.8)
SMEAR, RESPIRATORY: ABNORMAL
SODIUM BLD-SCNC: 145 MMOL/L (ref 132–146)
TOTAL PROTEIN: 5.3 G/DL (ref 6.4–8.3)
VANCOMYCIN TROUGH: 19.7 MCG/ML (ref 5–16)
WBC # BLD: 7 E9/L (ref 4.5–11.5)

## 2020-12-18 PROCEDURE — 80202 ASSAY OF VANCOMYCIN: CPT

## 2020-12-18 PROCEDURE — 6370000000 HC RX 637 (ALT 250 FOR IP): Performed by: INTERNAL MEDICINE

## 2020-12-18 PROCEDURE — 71045 X-RAY EXAM CHEST 1 VIEW: CPT

## 2020-12-18 PROCEDURE — 2580000003 HC RX 258: Performed by: SPECIALIST

## 2020-12-18 PROCEDURE — 36415 COLL VENOUS BLD VENIPUNCTURE: CPT

## 2020-12-18 PROCEDURE — 94640 AIRWAY INHALATION TREATMENT: CPT

## 2020-12-18 PROCEDURE — 85025 COMPLETE CBC W/AUTO DIFF WBC: CPT

## 2020-12-18 PROCEDURE — C9113 INJ PANTOPRAZOLE SODIUM, VIA: HCPCS | Performed by: INTERNAL MEDICINE

## 2020-12-18 PROCEDURE — 6360000002 HC RX W HCPCS: Performed by: SPECIALIST

## 2020-12-18 PROCEDURE — 99233 SBSQ HOSP IP/OBS HIGH 50: CPT | Performed by: INTERNAL MEDICINE

## 2020-12-18 PROCEDURE — 2700000000 HC OXYGEN THERAPY PER DAY

## 2020-12-18 PROCEDURE — 82962 GLUCOSE BLOOD TEST: CPT

## 2020-12-18 PROCEDURE — 6360000002 HC RX W HCPCS: Performed by: INTERNAL MEDICINE

## 2020-12-18 PROCEDURE — 76937 US GUIDE VASCULAR ACCESS: CPT

## 2020-12-18 PROCEDURE — 2500000003 HC RX 250 WO HCPCS: Performed by: INTERNAL MEDICINE

## 2020-12-18 PROCEDURE — 2060000000 HC ICU INTERMEDIATE R&B

## 2020-12-18 PROCEDURE — 94660 CPAP INITIATION&MGMT: CPT

## 2020-12-18 PROCEDURE — C1751 CATH, INF, PER/CENT/MIDLINE: HCPCS

## 2020-12-18 PROCEDURE — 36569 INSJ PICC 5 YR+ W/O IMAGING: CPT

## 2020-12-18 PROCEDURE — 80053 COMPREHEN METABOLIC PANEL: CPT

## 2020-12-18 PROCEDURE — 84100 ASSAY OF PHOSPHORUS: CPT

## 2020-12-18 PROCEDURE — 2580000003 HC RX 258: Performed by: INTERNAL MEDICINE

## 2020-12-18 PROCEDURE — 83735 ASSAY OF MAGNESIUM: CPT

## 2020-12-18 PROCEDURE — 02HV33Z INSERTION OF INFUSION DEVICE INTO SUPERIOR VENA CAVA, PERCUTANEOUS APPROACH: ICD-10-PCS | Performed by: INTERNAL MEDICINE

## 2020-12-18 PROCEDURE — 2500000003 HC RX 250 WO HCPCS: Performed by: SPECIALIST

## 2020-12-18 PROCEDURE — 6370000000 HC RX 637 (ALT 250 FOR IP): Performed by: STUDENT IN AN ORGANIZED HEALTH CARE EDUCATION/TRAINING PROGRAM

## 2020-12-18 RX ORDER — POLYETHYLENE GLYCOL 3350 17 G/17G
17 POWDER, FOR SOLUTION ORAL DAILY
Status: DISCONTINUED | OUTPATIENT
Start: 2020-12-18 | End: 2020-12-23 | Stop reason: HOSPADM

## 2020-12-18 RX ADMIN — DOCUSATE SODIUM 100 MG: 50 LIQUID ORAL at 21:45

## 2020-12-18 RX ADMIN — Medication 300 UNITS: at 09:17

## 2020-12-18 RX ADMIN — Medication 10 ML: at 21:45

## 2020-12-18 RX ADMIN — SENNOSIDES 8.6 MG: 8.6 TABLET, FILM COATED ORAL at 09:17

## 2020-12-18 RX ADMIN — DOCUSATE SODIUM 100 MG: 50 LIQUID ORAL at 09:16

## 2020-12-18 RX ADMIN — ENOXAPARIN SODIUM 40 MG: 40 INJECTION SUBCUTANEOUS at 09:16

## 2020-12-18 RX ADMIN — DOCUSATE SODIUM 283 MG: 283 LIQUID RECTAL at 08:14

## 2020-12-18 RX ADMIN — ATORVASTATIN CALCIUM 20 MG: 20 TABLET, FILM COATED ORAL at 21:45

## 2020-12-18 RX ADMIN — METOPROLOL TARTRATE 2.5 MG: 5 INJECTION INTRAVENOUS at 11:19

## 2020-12-18 RX ADMIN — MAGNESIUM HYDROXIDE 30 ML: 400 SUSPENSION ORAL at 09:16

## 2020-12-18 RX ADMIN — POLYETHYLENE GLYCOL 3350 17 G: 17 POWDER, FOR SOLUTION ORAL at 09:16

## 2020-12-18 RX ADMIN — IPRATROPIUM BROMIDE AND ALBUTEROL SULFATE 3 ML: .5; 3 SOLUTION RESPIRATORY (INHALATION) at 13:02

## 2020-12-18 RX ADMIN — LATANOPROST 1 DROP: 50 SOLUTION OPHTHALMIC at 11:20

## 2020-12-18 RX ADMIN — Medication 300 UNITS: at 21:45

## 2020-12-18 RX ADMIN — LINEZOLID 600 MG: 100 SUSPENSION ORAL at 14:57

## 2020-12-18 RX ADMIN — LIDOCAINE HYDROCHLORIDE 0.5 ML: 10 INJECTION, SOLUTION EPIDURAL; INFILTRATION; INTRACAUDAL; PERINEURAL at 10:39

## 2020-12-18 RX ADMIN — IPRATROPIUM BROMIDE AND ALBUTEROL SULFATE 3 ML: .5; 3 SOLUTION RESPIRATORY (INHALATION) at 21:35

## 2020-12-18 RX ADMIN — METOPROLOL TARTRATE 2.5 MG: 5 INJECTION INTRAVENOUS at 18:07

## 2020-12-18 RX ADMIN — FOLIC ACID 1 MG: 1 TABLET ORAL at 11:21

## 2020-12-18 RX ADMIN — PANTOPRAZOLE SODIUM 40 MG: 40 INJECTION, POWDER, FOR SOLUTION INTRAVENOUS at 09:16

## 2020-12-18 RX ADMIN — IPRATROPIUM BROMIDE AND ALBUTEROL SULFATE 3 ML: .5; 3 SOLUTION RESPIRATORY (INHALATION) at 17:36

## 2020-12-18 RX ADMIN — SENNOSIDES 8.6 MG: 8.6 TABLET, FILM COATED ORAL at 21:45

## 2020-12-18 RX ADMIN — ERTAPENEM 1000 MG: 1 INJECTION INTRAMUSCULAR; INTRAVENOUS at 15:00

## 2020-12-18 RX ADMIN — HYDROCORTISONE SODIUM SUCCINATE 50 MG: 100 INJECTION, POWDER, FOR SOLUTION INTRAMUSCULAR; INTRAVENOUS at 09:16

## 2020-12-18 RX ADMIN — METOPROLOL TARTRATE 2.5 MG: 5 INJECTION INTRAVENOUS at 05:52

## 2020-12-18 RX ADMIN — SODIUM CHLORIDE, PRESERVATIVE FREE 10 ML: 5 INJECTION INTRAVENOUS at 11:21

## 2020-12-18 ASSESSMENT — PAIN SCALES - PAIN ASSESSMENT IN ADVANCED DEMENTIA (PAINAD)
NEGVOCALIZATION: 0
TOTALSCORE: 0
CONSOLABILITY: 0
FACIALEXPRESSION: 0
BODYLANGUAGE: 0
BREATHING: 0

## 2020-12-18 ASSESSMENT — PAIN SCALES - GENERAL: PAINLEVEL_OUTOF10: 0

## 2020-12-18 NOTE — PROGRESS NOTES
GENERAL SURGERY  DAILY PROGRESS NOTE  12/18/2020    Chief Complaint   Patient presents with    Altered Mental Status     nonresponsive    Respiratory Distress     arrived on BVM with EMS       Subjective:  NAEON, PEG to gravity overnight w/ 1000 mL, no vomiting per nursing    Objective:  /67   Pulse 80   Temp 98 °F (36.7 °C) (Axillary)   Resp 21   Ht 6' (1.829 m)   Wt 261 lb 9.6 oz (118.7 kg)   SpO2 100%   BMI 35.48 kg/m²     General Appearance:  awake,  in no acute distress  Skin:  Skin color, texture, turgor normal. No rashes or lesions. Head/face:  NCAT  Eyes:  No gross abnormalities. Lungs:  Normal expansion. Clear to auscultation. Heart:  Heart regular rate   Abdomen:  Soft, non-tender, non-distended. PEG in place but appears old and is not flushing  Extremities: Extremities warm to touch, pink, with no edema.      Assessment/Plan:  78 y.o. male s/p replaced PEG    MOM/Enemas   Continue PEG to gravity after clamping for medications  CT with dialate loops of bowel  Monitor abdominal exam    Electronically signed by Misti Stanley MD on 12/18/2020 at 6:52 AM

## 2020-12-18 NOTE — PROGRESS NOTES
[address] [place] [address] [facility]  [facility]  Progress Note    SUBJECTIVE:  Chief Complaint   Patient presents with    Altered Mental Status     nonresponsive    Respiratory Distress     arrived on BVM with EMS     Nursing reports that the patient was pulling off his oxygen throughout the night. He has a sitter. He has no complaints, however. He pulled out his PEG. It was replaced by surgery. Review of systems:  A 10 point review of systems was done. As stated above, otherwise negative. Medications:   docusate sodium  1 enema Rectal Daily    polyethylene glycol  17 g Oral Daily    [Held by provider] metoprolol tartrate  25 mg Per G Tube BID    atorvastatin  20 mg Per G Tube Nightly    metoprolol  2.5 mg Intravenous Q6H    ertapenem ([employer]) IVPB  1 g Intravenous Q24H    heparin flush  3 mL Intravenous 2 times per day    linezolid  600 mg Per NG tube Q12H    senna  1 tablet PEG Tube BID    insulin lispro  0-12 Units Subcutaneous Q6H    docusate  100 mg Per G Tube BID    folic acid  1 mg Per G Tube Daily    ipratropium-albuterol  3 mL Inhalation Q4H While awake    latanoprost  1 drop Both Eyes Daily    pantoprazole  40 mg Intravenous Daily    And    sodium chloride (PF)  10 mL Intravenous Daily    hydrocortisone sodium succinate PF  50 mg Intravenous Daily    sodium chloride flush  10 mL Intravenous 2 times per day    enoxaparin  40 mg Subcutaneous Daily      sodium chloride Stopped (20 0340)     sodium chloride flush, heparin flush, promethazine, sodium chloride flush, promethazine **OR** ondansetron, acetaminophen **OR** acetaminophen    OBJECTIVE:  BP (!) 118/90   Pulse 85   Temp 97.7 °F (36.5 °C) (Axillary)   Resp 19   Ht 6' (1.829 m)   Wt 261 lb 9.6 oz (118.7 kg)   SpO2 97%   BMI 35.48 kg/m²   Temp  Av.2 °F (36.8 °C)  Min: 97.7 °F (36.5 °C)  Max: 98.7 °F (37.1 °C)  Constitutional: The patient is lying in bed. No distress. Confused.   Sitter present. Skin: Warm and dry. No rashes were noted. HEENT: Eyes show round, and reactive pupils. No jaundice. Moist mucous membranes, no ulcerations, no thrush. Neck: Supple to movements. No lymphadenopathy. Chest: No respiratory distress. No crackles heard. Cardiovascular: Heart sounds rhythmic and regular. Abdomen: Positive bowel sounds to auscultation. Benign to palpation. PEG. Extremities:  Minimal edema. Lines: Left femoral TLC 12/13/2020. Left PICC 12/18/2020. Ballesteros catheter.     Laboratory and Tests Review:  Lab Results   Component Value Date    WBC 7.0 12/18/2020    WBC 8.5 12/17/2020    WBC 7.1 12/16/2020    HGB 10.1 (L) 12/18/2020    HCT 33.1 (L) 12/18/2020    MCV 89.9 12/18/2020     12/18/2020     Lab Results   Component Value Date    NEUTROABS 4.46 12/18/2020    NEUTROABS 5.82 12/17/2020    NEUTROABS 4.92 12/16/2020     Lab Results   Component Value Date    CRP 25.0 (H) 12/14/2020    CRP 18.4 (H) 12/13/2020    CRP 13.3 (H) 04/09/2019     No results found for: CRPHS  Lab Results   Component Value Date    SEDRATE 48 (H) 12/14/2020    SEDRATE 23 (H) 12/13/2020    SEDRATE 55 (H) 04/09/2019     Lab Results   Component Value Date    ALT 12 12/18/2020    AST 13 12/18/2020     (H) 07/18/2018    ALKPHOS 79 12/18/2020    BILITOT 0.5 12/18/2020     Lab Results   Component Value Date     12/18/2020    K 3.6 12/18/2020    K 3.9 12/14/2020     12/18/2020    CO2 28 12/18/2020    BUN 39 12/18/2020    CREATININE 1.5 12/18/2020    CREATININE 1.3 12/17/2020    CREATININE 1.2 12/16/2020    GFRAA 54 12/18/2020    LABGLOM 45 12/18/2020    GLUCOSE 137 12/18/2020    GLUCOSE 138 03/26/2011    PROT 5.3 12/18/2020    LABALBU 2.4 12/18/2020    LABALBU 4.0 03/26/2011    CALCIUM 8.5 12/18/2020    BILITOT 0.5 12/18/2020    ALKPHOS 79 12/18/2020    AST 13 12/18/2020    ALT 12 12/18/2020     Radiology:  Chest x-ray reviewed    Microbiology:   Nares screen MRSA: Positive  Streptococcus pneumoniae/Legionella urine Ag: negative  Blood cultures 12/13/2020: Negative x2  Respiratory culture 12/14/2020: MRSA, ESBL + Klebsiella pneumoniae  Urine culture 12/13/2020: Negative so far respiratory panel (including SARS-CoV-2): Not detected      No results for input(s): PROCAL in the last 72 hours. ASSESSMENT:  · HCAP. Culture growing MRSA and ESBL + Klebsiella  · Respiratory failure, improved  · Leukocytosis secondary to pneumonia, resolving  · Confusion.   Requiring sitter  · Ileus    PLAN:  · Continue oral Linezolid until 12/30/2020  · Continue Ertapenem until 12/25/2020  · Check final respiratory cultures  · Remove TLC    Spoke with nursing    Mary Webb  10:40 AM  12/18/2020

## 2020-12-18 NOTE — PROGRESS NOTES
Initial Inpatient Wound Care    Admit Date: 12/13/2020 12:44 AM    Reason for consult:  Old scarring on coccyx, low sam    Significant history:  Adm with respiratory failure  Resides ECF, dementia, DM    Wound history:  POA  Present last hospitalization at [facility]  Findings: incontinent large liquid dark brown/black stool    Area as above, 4x3 dark purple, hard, thickened area. No drainage  periwound and perianal area reddened  Heels intact    Impression:  ? DTI vs unstageable pressure injury  Unable to tell is any changes since then    Interventions in place: lo air loss, comfort glide  Plan: calmoseptine to perianal area  Lo air loss bed in place    Karlene Griffith 12/18/2020 11:36 AM

## 2020-12-18 NOTE — PROGRESS NOTES
12/18/2020  9:53 AM      Comprehensive Nutrition Assessment    Type and Reason for Visit:  Reassess    Nutrition Recommendations/Plan: If pt does not cher TF or not medically possible to resume EN, recommend consider PN support    Nutrition Assessment:  Pt now NPO. PEG was not functioning and now s/p replacement 12/17 at bedside and 1000 ml out w/PEG to gravity. CT w/dilated bowel. Will monitor GI status and may recommend PN support, if altered GI function prevents EN. Malnutrition Assessment:  Malnutrition Status: At risk for malnutrition (Comment)    Context:  Chronic Illness     Findings of the 6 clinical characteristics of malnutrition:  Energy Intake:  Mild decrease in energy intake (Comment)(NPO at admit until TF order)  Weight Loss:  Unable to assess     Body Fat Loss:  Unable to assess     Muscle Mass Loss:  Unable to assess    Fluid Accumulation:  No significant fluid accumulation     Strength:  Not Performed    Estimated Daily Nutrient Needs:  Energy (kcal):  ; Weight Used for Energy Requirements:  Admission     Protein (g):  105-125 (1.3-1.5 g/kg);  Weight Used for Protein Requirements:  Ideal        Fluid (ml/day):  ; Method Used for Fluid Requirements:  1 ml/kcal      Nutrition Related Findings:  PEG to gravity, BiPap, +1-+2 edema, +I/O 3L,      Wounds:  Wound Consult Pending(non-blanchable purple/pink at coccyx, redness on nose (pt ripping off mask))       Current Nutrition Therapies:    No diet orders on file    Anthropometric Measures:  · Height: 6' (182.9 cm)  · Current Body Weight: 261 lb (118.4 kg)(12/18)   · Admission Body Weight: 243 lb (110.2 kg)(12/13 [facility])    · Usual Body Weight: (Unable to confirm w/actual wts- stated 250# on past admit)     · Ideal Body Weight: 178 lbs; % Ideal Body Weight 146.1 %   · BMI: 35.4  · BMI Categories: Obese Class 2 (BMI 35.0 -39.9)       Nutrition Diagnosis:   · Inadequate oral intake related to cognitive or neurological impairment(hx dementia w/PEG because poor PO) as evidenced by NPO or clear liquid status due to medical condition, nutrition support - enteral nutrition      Nutrition Interventions:   Food and/or Nutrient Delivery:  Continue NPO  Nutrition Education/Counseling:  Education not indicated   Coordination of Nutrition Care:  Continue to monitor while inpatient    Goals:  Pt will cher EN at goal rate       Nutrition Monitoring and Evaluation:   Behavioral-Environmental Outcomes:  None Identified   Food/Nutrient Intake Outcomes:  Enteral Nutrition Intake/Tolerance  Physical Signs/Symptoms Outcomes:  Biochemical Data, GI Status, Fluid Status or Edema, Weight, Skin, Nutrition Focused Physical Findings     Discharge Planning:    Enteral Nutrition     Electronically signed by Tlyer Alvarez RD, CNSC, LD on 12/18/20 at 9:53 AM EST    Contact: [phone]

## 2020-12-18 NOTE — PROGRESS NOTES
Pulmonary Progress Note    Admit Date: 2020  Hospital day                               PCP: No primary care provider on file. Chief Complaint (s):  Patient Active Problem List   Diagnosis    Severe sepsis with septic shock [facility]    Dementia ([facility]    Metabolic encephalopathy    Diabetes mellitus type 2, uncontrolled ([facility]    Hyperlipidemia LDL goal <100    Essential hypertension    Venous ulcer of left leg (HCC)    Non-pressure chronic ulcer of left lower leg with fat layer exposed ([facility]    Non-pressure chronic ulcer left lower leg, limited to breakdown skin (HCC)    Severe protein-calorie malnutrition (HCC)    Non-pressure chronic ulcer right lower leg, limited to breakdown skin (HCC)    Pressure injury of contiguous region involving back, buttock, and hip, stage 2 ([facility]    Pressure injury of calf, stage 2 ([facility]    HCAP (healthcare-associated pneumonia)    Paroxysmal atrial fibrillation (HCC)    Acute respiratory failure with hypoxia (HCC)       Subjective:  · Resting quietly this p.m.       Vitals:  VITALS:  /67   Pulse 82   Temp 98.3 °F (36.8 °C) (Oral)   Resp 20   Ht 6' (1.829 m)   Wt 268 lb 2 oz (121.6 kg)   SpO2 94%   BMI 36.36 kg/m²     24HR INTAKE/OUTPUT:      Intake/Output Summary (Last 24 hours) at 2020  Last data filed at 2020 1416  Gross per 24 hour   Intake 572 ml   Output 700 ml   Net -128 ml       24HR PULSE OXIMETRY RANGE:    SpO2  Av %  Min: 94 %  Max: 98 %    Medications:  IV:   sodium chloride Stopped (20 0340)       Scheduled Meds:   [Held by provider] metoprolol tartrate  25 mg Per G Tube BID    atorvastatin  20 mg Per G Tube Nightly    metoprolol  2.5 mg Intravenous Q6H    ertapenem (INVanz) IVPB  1 g Intravenous Q24H    lidocaine PF  5 mL Intradermal Once    heparin flush  3 mL Intravenous 2 times per day    linezolid  600 mg Per NG tube Q12H    senna  1 tablet PEG Tube BID    insulin lispro  0-12 Units Subcutaneous Q6H    docusate  100 mg Per G Tube BID    folic acid  1 mg Per G Tube Daily    ipratropium-albuterol  3 mL Inhalation Q4H While awake    latanoprost  1 drop Both Eyes Daily    pantoprazole  40 mg Intravenous Daily    And    sodium chloride (PF)  10 mL Intravenous Daily    hydrocortisone sodium succinate PF  50 mg Intravenous Daily    sodium chloride flush  10 mL Intravenous 2 times per day    enoxaparin  40 mg Subcutaneous Daily       Diet:   No diet orders on file     EXAM:  General: No distress. Sleeping  Eyes: PERRL. No sclera icterus. No conjunctival injection. ENT: No discharge. Pharynx clear. Neck: Trachea midline. Normal thyroid. Resp: No accessory muscle use. Few bibasilar rales. No wheezing. No rhonchi. CV: Regular rate. Regular rhythm. No murmur or rub. Abd: Non-tender. Non-distended. No masses. No organomegaly. Normal bowel sounds. Skin: Warm and dry. No nodule on exposed extremities. No rash on exposed extremities. Ext: No cyanosis, clubbing, edema  Lymph: No cervical LAD. No supraclavicular LAD. M/S: No cyanosis. No joint deformity. No clubbing. Neuro:  Positive pupils/gag/corneals. Normal pain response. Results:  CBC:   Recent Labs     12/15/20  0524 12/16/20  0357 12/17/20  0903   WBC 9.3 7.1 8.5   HGB 9.5* 9.8* 10.9*   HCT 33.3* 32.3* 36.4*   MCV 95.7 91.2 90.1    278 344     BMP:   Recent Labs     12/15/20  0524 12/16/20 0357 12/17/20  0903    136 141   K 3.7 3.9 4.1    105 108*   CO2 25 22 23   PHOS 2.4* 2.3* 3.2   BUN 33* 30* 36*   CREATININE 0.9 1.2 1.3*     LIVER PROFILE:   Recent Labs     12/15/20  0524 12/16/20  0357 12/17/20  0903   AST 15 19 19   ALT 12 10 12   BILITOT 0.4 0.6 0.5   ALKPHOS 82 76 92     PT/INR: No results for input(s): PROTIME, INR in the last 72 hours. APTT: No results for input(s): APTT in the last 72 hours. Pathology:  1. N/A      Microbiology:  1.  None    Recent ABG:   Recent Labs     12/16/20  0516 PH 7.407   PO2 68.2*   PCO2 34.9*   HCO3 21.5*   BE -2.7   O2SAT 93.8   METHB 0.3   O2HB 92.8*   COHB 0.8   O2CON 14.5   HHB 6.1*   THB 11.1*     FiO2 : 40 %  I:E Ratio: 1:1.40    Recent Films:  CT ABDOMEN PELVIS WO CONTRAST Additional Contrast? None   Final Result   Multiple loops of mildly dilated small bowel possibly ileus versus early   small bowel obstruction. Follow-up study recommended. Sigmoid diverticulosis. Reticulonodular infiltrates at the lung bases and small left pleural effusion. Nonobstructing right renal stone. The findings were sent to the Radiology Results [address] at 5:20   pm on 12/17/2020to be communicated to a licensed caregiver. XR ABDOMEN FOR NG/OG/NE TUBE PLACEMENT   Final Result   Satisfactory placement of gastric PEG tube. XR CHEST PORTABLE   Final Result   1. Left perihilar and left lower lobe pneumonia. 2. Cardiomegaly. 3. Prominence of the interstitial markings. XR CHEST PORTABLE   Final Result   Improved aeration of the right lung, with persistent infiltrate and/or   atelectasis in the left lower lung and possible left pleural effusion      XR CHEST PORTABLE   Final Result   1. Stable cardiomegaly. 2. Persistent bilateral airspace disease unchanged when compared to the prior   study. 3.      XR CHEST PORTABLE   Final Result   1. Cardiomegaly with findings of CHF. 2. Trace bilateral pleural effusions. CT Head WO Contrast   Final Result   No acute intracranial abnormality. Unchanged advanced cerebral atrophy and moderate chronic ischemic change in   the white matter. XR CHEST PORTABLE   Final Result   Distal end of the ET tube at approximately 1.3 cm from the level of the   emmanuel. The ET tube should be retracted 2-3 cm for optimal positioning. Cardiomegaly. Question hazy airspace disease in the left lung. Radiographic follow-up   recommended. XR CHEST PORTABLE    (Results Pending)     Assessment:  1.  Healthcare

## 2020-12-18 NOTE — PROGRESS NOTES
Internal Medicine Progress Note    Patient's name: Verónica Ernandez  : 1940  Admission date: 2020  Date of service: 2020   Room: [address]  Primary care physician: Khris  Reason for visit: Follow-up for HCAP    Subjective  Ni Castillo was seen and examined at bedside. Exam limited secondary to patient's respiratory status. Patient currently on BiPAP. Patient will open eyes to verbal stimulation. Patient will shake his head up or down. Patient is denying any new or worsening symptoms. Patient is denying any abdominal pain. Patient struck his head now when asked if he has had a bowel movement. Review of Systems  Deferred secondary to patient's respiratory status.     Hospital Medications  Current Facility-Administered Medications   Medication Dose Route Frequency Provider Last Rate Last Admin    [Held by provider] metoprolol tartrate (LOPRESSOR) tablet 25 mg  25 mg Per G Tube BID Gracie Kerr MD        atorvastatin (LIPITOR) tablet 20 mg  20 mg Per G Tube Nightly Gracie Kerr MD        metoprolol (LOPRESSOR) injection 2.5 mg  2.5 mg Intravenous Q6H Gracie Kerr MD   2.5 mg at 20 2314    ertapenem (INVANZ) 1 g IVPB minibag  1 g Intravenous Q24H Elsy Tolliver MD   Stopped at 20 1346    lidocaine PF 1 % injection 5 mL  5 mL Intradermal Once Elsy Tolliver MD        sodium chloride flush 0.9 % injection 10 mL  10 mL Intravenous PRN Elsy Tolliver MD        heparin flush 100 UNIT/ML injection 300 Units  3 mL Intravenous 2 times per day Elsy Tolliver MD   300 Units at 20 2030    heparin flush 100 UNIT/ML injection 300 Units  3 mL Intercatheter PRN Elsy Tolliver MD        linezolid (ZYVOX) 100 MG/5ML suspension 600 mg  600 mg Per NG tube Q12H Elsy Tolliver MD   600 mg at 20 0140    senna (SENOKOT) tablet 8.6 mg  1 tablet PEG Tube BID Kyle Douglass MD   8.6 mg at 20    insulin lispro (HUMALOG) injection vial 0-12 Units  0-12 Units Subcutaneous Q6H Bonnie Lewis MD   2 Units at 12/17/20 1254    0.9 % sodium chloride infusion   Intravenous Q12H Bonnie Lewis MD   Stopped at 12/17/20 0340    docusate (COLACE) 50 MG/5ML liquid 100 mg  100 mg Per G Tube BID Bonnie Lewis MD   100 mg at 88/72/47 1341    folic acid (FOLVITE) tablet 1 mg  1 mg Per G Tube Daily Bonnie Lewis MD   1 mg at 12/16/20 1119    ipratropium-albuterol (DUONEB) nebulizer solution 3 mL  3 mL Inhalation Q4H While awake Bonnie Lewis MD   3 mL at 12/17/20 2103    latanoprost (XALATAN) 0.005 % ophthalmic solution 1 drop  1 drop Both Eyes Daily Bonnie Lewis MD   1 drop at 12/17/20 1023    pantoprazole (PROTONIX) injection 40 mg  40 mg Intravenous Daily Bonnie Lewis MD   40 mg at 12/17/20 1020    And    sodium chloride (PF) 0.9 % injection 10 mL  10 mL Intravenous Daily Bonnie Lewis MD   10 mL at 12/17/20 1020    hydrocortisone sodium succinate PF (SOLU-CORTEF) injection 50 mg  50 mg Intravenous Daily Bonnie Lewis MD   50 mg at 12/17/20 1020    promethazine (PHENERGAN) suppository 12.5 mg  12.5 mg Rectal Q6H PRN Bonnie Lewis MD        sodium chloride flush 0.9 % injection 10 mL  10 mL Intravenous 2 times per day Bonnie Lewis MD   10 mL at 12/17/20 2030    sodium chloride flush 0.9 % injection 10 mL  10 mL Intravenous PRN Bonnie Lewis MD   10 mL at 12/17/20 1038    enoxaparin (LOVENOX) injection 40 mg  40 mg Subcutaneous Daily Bonnie Lewis MD   40 mg at 12/17/20 1020    promethazine (PHENERGAN) tablet 12.5 mg  12.5 mg Oral Q6H PRN Bonnie Lewis MD        Or    [facility] injection 4 mg  4 mg Intravenous Q6H PRN Bonnie Lewis MD   4 mg at 12/17/20 1038    polyethylene glycol (GLYCOLAX) packet 17 g  17 g Oral Daily PRN Bonnie Lewis MD   17 g at 12/14/20 1022    acetaminophen (TYLENOL) tablet 650 mg  650 mg Oral Q6H PRN Bonnie Lewis MD        Or    acetaminophen (TYLENOL) suppository 650 mg  650 mg Rectal Q6H PRN Frandy Lima MD           PRN Medications  sodium chloride flush, heparin flush, promethazine, sodium chloride flush, promethazine **OR** ondansetron, polyethylene glycol, acetaminophen **OR** acetaminophen    Objective  Most Recent Recorded Vitals  BP (!) 105/52   Pulse 74   Temp 98 °F (36.7 °C) (Axillary)   Resp 21   Ht 6' (1.829 m)   Wt 268 lb 2 oz (121.6 kg)   SpO2 100%   BMI 36.36 kg/m²   I/O last 3 completed shifts: In: 30 [I.V.:30]  Out: 450 [Urine:450]  I/O this shift:  In: -   Out: 600 [Emesis/NG output:600]    Physical Exam:  General: Patient will open eyes to verbal stimulus. Will shake his head yes or no. Will quickly closes eyes. , no acute distress, no labored breathing, patient currently on BiPAP  eyes: conjunctivae/corneas clear, sclera non icteric  Skin: color/texture/turgor normal, no rashes or lesions  Lungs: Diminished but CTAB, no retractions/use of accessory muscles,  no rhonchi, diffuse crackle, no rales, limited secondary to BiPAP  Heart: regular rate and rhythm. No murmurs. Abdomen: PEG, soft, some mild pain on palpation of the lower abdomen; bowel sounds difficult to auscultate; no masses,  no organomegaly  Extremities: atraumatic, no cyanosis, no edema      Most Recent Labs  Lab Results   Component Value Date    WBC 8.5 12/17/2020    HGB 10.9 (L) 12/17/2020    HCT 36.4 (L) 12/17/2020     12/17/2020     12/17/2020    K 4.1 12/17/2020     (H) 12/17/2020    CREATININE 1.3 (H) 12/17/2020    BUN 36 (H) 12/17/2020    CO2 23 12/17/2020    GLUCOSE 163 (H) 12/17/2020    ALT 12 12/17/2020    AST 19 12/17/2020    INR 1.3 12/13/2020    LABA1C 6.0 (H) 07/17/2018       CT ABDOMEN PELVIS WO CONTRAST Additional Contrast? None   Final Result   Multiple loops of mildly dilated small bowel possibly ileus versus early   small bowel obstruction. Follow-up study recommended. Sigmoid diverticulosis.    Reticulonodular infiltrates at the lung bases and small left pleural effusion. Nonobstructing right renal stone. The findings were sent to the Radiology Results [address] at 5:20   pm on 12/17/2020to be communicated to a licensed caregiver. XR ABDOMEN FOR NG/OG/NE TUBE PLACEMENT   Final Result   Satisfactory placement of gastric PEG tube. XR CHEST PORTABLE   Final Result   1. Left perihilar and left lower lobe pneumonia. 2. Cardiomegaly. 3. Prominence of the interstitial markings. XR CHEST PORTABLE   Final Result   Improved aeration of the right lung, with persistent infiltrate and/or   atelectasis in the left lower lung and possible left pleural effusion      XR CHEST PORTABLE   Final Result   1. Stable cardiomegaly. 2. Persistent bilateral airspace disease unchanged when compared to the prior   study. 3.      XR CHEST PORTABLE   Final Result   1. Cardiomegaly with findings of CHF. 2. Trace bilateral pleural effusions. CT Head WO Contrast   Final Result   No acute intracranial abnormality. Unchanged advanced cerebral atrophy and moderate chronic ischemic change in   the white matter. XR CHEST PORTABLE   Final Result   Distal end of the ET tube at approximately 1.3 cm from the level of the   emmanuel. The ET tube should be retracted 2-3 cm for optimal positioning. Cardiomegaly. Question hazy airspace disease in the left lung. Radiographic follow-up   recommended.       XR CHEST PORTABLE    (Results Pending)   XR CHEST PORTABLE    (Results Pending)     Echo 12/16/2020   Technically suboptimal and limited study.    Left ventricle is normal in size .    Moderate left ventricular concentric hypertrophy noted.    No regional wall motion abnormalities seen.    Normal left ventricular ejection fraction.    The left atrium is moderately dilated.    Mild mitral annular calcification.    The aortic valve appears mildly sclerotic.    Mild aortic stenosis.   Lucetta Record is a small circumferential pericardial effusion noted. Assessment   Active Hospital Problems    Diagnosis    HCAP (healthcare-associated pneumonia) [J18.9]     Priority: High    Acute respiratory failure with hypoxia ([address] [J96.01]     Priority: Medium    Metabolic encephalopathy [D08.35]     Priority: Medium    Severe sepsis with septic shock (HCC) [A41.9, R65.21]     Priority: Medium    Paroxysmal atrial fibrillation (HCC) [I48.0]    Pressure injury of calf, stage 2 (HCC) [L89.892]    Pressure injury of contiguous region involving back, buttock, and hip, stage 2 (HCC) [L89.42]    Non-pressure chronic ulcer right lower leg, limited to breakdown skin ([address] [L97.911]    Severe protein-calorie malnutrition (HCC) [E43]    Non-pressure chronic ulcer left lower leg, limited to breakdown skin ([address] [L97.921]    Venous ulcer of left leg ([address] [I83.029, L97.929]    Non-pressure chronic ulcer of left lower leg with fat layer exposed ([address] [L97.922]    Diabetes mellitus type 2, uncontrolled ([address] [E11.65]    Hyperlipidemia LDL goal <100 [E78.5]    Essential hypertension [I10]    Dementia ([address] [F03.90]       Plan  HCAP, severe sepsis, septic shock, hypoxia: Transferred out of ICU and extubated on 12/15. Pressors weaned off. ATB's per ID--signed off. Cx pending. COVID-19 neg 12/13. IV steroids (on steroids st baseline for bullous pemphigoid). Afib w/ RVR: Cardiology following-- defer rate/rhythm control meds to cardiology. Echo noted. [id number] score of 4-- cardio deemed him high risk for anticoagulation. , but may re-visit this  · Ileus vs SBO: Tube feeds on hold. General surgery following. CT abdomen and pelvis noted. · Dysphagia as well as PEG tube malfunction: TF's via PEG when able to tolerate diet and PEG tube replaced 12/17. · Dementia: Consider palliative care consult. · PT/OT--6/24  · Follow labs   · DVT prophylaxis  · Please see orders for further management and care.   · Discharge plan: SNF versus LTAC soon    Patient was seen and evaluated by Resident Dr. Escobar Medina  Patient was seen in conjunction with Dr. Rambo Arellano. Patient's note was done using dictation software and there may be some dictation errors secondary to this. Escobar Medina signed 12/18/2020 at 8:23 AM  EM PGY1    Addendum: I have personally participated in a face-to-face history and physical exam on the date of service with the patient. I have discussed the case with the resident. I also participated in medical decision making with the resident on the date of service and I agree with all of the pertinent clinical information unless indicated in my editing of the note. I have reviewed and edited the note above based on my findings during my history, exam, and decision making on the same day of service. My additional thoughts:    Restart tube feeds per general surgery   PICC line being placed   Consider LTAC    Electronically signed by Diogenes Rebolledo DO on 12/18/2020 at 10:19 AM    I can be reached through [employer].

## 2020-12-18 NOTE — PROCEDURES
PICC    Catheter insertion date 12/18/2020     Product Number:  Ref [facility] [id number]   Lot No: [id number]   Gauge: 18   Lumen: single, 4 FR   Lt brachial    Vein Diameter : 0.50cm   Upper arm circumference (10CM ABOVE AC): 32cm   Catheter Length : 51cm   Internal Length: 51cm   External Catheter Length: 0cm   Ultrasound Used:yes  [facility] [employer] [place] confirms PICC tip is placed in the lower 1/3 of the SVC or at the Cavoatrial junction. Floor nurse notified PICC is okay to use.    : TRINA Driscoll-BC

## 2020-12-18 NOTE — PROGRESS NOTES
Acute respiratory failure with hypoxia (HCC)       Allergies:   Allergies   Allergen Reactions    Imuran [Azathioprine]     Levaquin [Levofloxacin] Other (See Comments)     Lethargy when taken with celebrex    Doxycycline Nausea And Vomiting       Current Medications:  Current Facility-Administered Medications   Medication Dose Route Frequency Provider Last Rate Last Admin    magnesium hydroxide (MILK OF MAGNESIA) 400 MG/5ML suspension 30 mL  30 mL Oral Once Angelica Hwang MD        docusate sodium (ENEMEEZ) enema 283 mg  1 enema Rectal Daily Angelica Hwang MD        polyethylene glycol (GLYCOLAX) packet 17 g  17 g Oral Daily Angelica Hwang MD        [Held by provider] metoprolol tartrate (LOPRESSOR) tablet 25 mg  25 mg Per G Tube BID Shayna Jefferson MD        atorvastatin (LIPITOR) tablet 20 mg  20 mg Per G Tube Nightly Shayna Jefferson MD        metoprolol (LOPRESSOR) injection 2.5 mg  2.5 mg Intravenous Q6H Shayna Jefferson MD   2.5 mg at 12/18/20 0552    ertapenem (INVANZ) 1 g IVPB minibag  1 g Intravenous Q24H Tiffanie Mendez MD   Stopped at 12/17/20 1346    lidocaine PF 1 % injection 5 mL  5 mL Intradermal Once Tiffanie Mendez MD        sodium chloride flush 0.9 % injection 10 mL  10 mL Intravenous PRN Tiffanie Mendez MD        heparin flush 100 UNIT/ML injection 300 Units  3 mL Intravenous 2 times per day Tiffanie Mendez MD   300 Units at 12/17/20 2030    heparin flush 100 UNIT/ML injection 300 Units  3 mL Intercatheter PRN Tiffanie Mendez MD        linezolid (ZYVOX) 100 MG/5ML suspension 600 mg  600 mg Per NG tube Q12H Tiffanie Mendez MD   600 mg at 12/17/20 0140    senna (SENOKOT) tablet 8.6 mg  1 tablet PEG Tube BID Cortes Tate MD   8.6 mg at 12/16/20 2057    insulin lispro (HUMALOG) injection vial 0-12 Units  0-12 Units Subcutaneous Q6H Cortes Tate MD   2 Units at 12/17/20 1254    0.9 % sodium chloride infusion   Intravenous Q12H Ynes ANDERSON Bao Colunga MD   Stopped at 12/17/20 0340    docusate (COLACE) 50 MG/5ML liquid 100 mg  100 mg Per G Tube BID Brandee Olivarez MD   100 mg at 50/94/66 3548    folic acid (FOLVITE) tablet 1 mg  1 mg Per G Tube Daily Brandee Olivarez MD   1 mg at 12/16/20 1119    ipratropium-albuterol (DUONEB) nebulizer solution 3 mL  3 mL Inhalation Q4H While awake Brandee Olivarez MD   3 mL at 12/17/20 2103    latanoprost (XALATAN) 0.005 % ophthalmic solution 1 drop  1 drop Both Eyes Daily Brandee Olivarez MD   1 drop at 12/17/20 1023    pantoprazole (PROTONIX) injection 40 mg  40 mg Intravenous Daily Brandee Olivarez MD   40 mg at 12/17/20 1020    And    sodium chloride (PF) 0.9 % injection 10 mL  10 mL Intravenous Daily Brandee Olivarez MD   10 mL at 12/17/20 1020    hydrocortisone sodium succinate PF (SOLU-CORTEF) injection 50 mg  50 mg Intravenous Daily Brandee Olivarez MD   50 mg at 12/17/20 1020    promethazine (PHENERGAN) suppository 12.5 mg  12.5 mg Rectal Q6H PRN Brandee Olivarez MD        sodium chloride flush 0.9 % injection 10 mL  10 mL Intravenous 2 times per day Brandee Olivarez MD   10 mL at 12/17/20 2030    sodium chloride flush 0.9 % injection 10 mL  10 mL Intravenous PRN Brandee Olivarez MD   10 mL at 12/17/20 1038    enoxaparin (LOVENOX) injection 40 mg  40 mg Subcutaneous Daily Brandee Olivarez MD   40 mg at 12/17/20 1020    promethazine (PHENERGAN) tablet 12.5 mg  12.5 mg Oral Q6H PRN Brandee Olivarez MD        Or    ondansetron [facility]) injection 4 mg  4 mg Intravenous Q6H PRN Brandee Olivarez MD   4 mg at 12/17/20 1038    acetaminophen (TYLENOL) tablet 650 mg  650 mg Oral Q6H PRN Brandee Olivarez MD        Or    acetaminophen (TYLENOL) suppository 650 mg  650 mg Rectal Q6H PRN Brandee Olivarez MD          sodium chloride Stopped (12/17/20 0340)       Physical Exam:  BP (!) 118/90   Pulse 85   Temp 97.7 °F (36.5 °C) (Axillary)   Resp 19   Ht 6' (1.829 m)   Wt 261 lb 9.6 oz (118.7 kg)   SpO2 97%   BMI 35.48 kg/m²   Weight change: -6 lb 8.4 oz (-2.96 kg)  Wt Readings from Last 3 Encounters:   12/18/20 261 lb 9.6 oz (118.7 kg)   12/02/19 250 lb (113.4 kg)   11/16/19 250 lb (113.4 kg)     The patient is awake, alert but confused and in no discomfort or distress. No gross musculoskeletal deformity is present. No significant skin or nail changes are present. Gross examination of head, eyes, nose and throat are negative. Jugular venous pressure is normal and no carotid bruits are present. Normal respiratory effort is noted with no accessory muscle usage present. Lung fields are clear to ascultation while presently maintained on BiPAP. Cardiac examination is notable for an irregular rhythm with no palpable thrill. No gallop rhythm or cardiac murmur are identified. A benign abdominal examination is present with with mild abdominal distention and no masses or organomegaly. Intact pulses are present throughout all extremities and mild pretibial edema is present. No focal neurologic deficits are present. Intake/Output:    Intake/Output Summary (Last 24 hours) at 12/18/2020 0813  Last data filed at 12/18/2020 0617  Gross per 24 hour   Intake 30 ml   Output 2150 ml   Net -2120 ml     No intake/output data recorded. Laboratory Tests:  Lab Results   Component Value Date    CREATININE 1.5 (H) 12/18/2020    BUN 39 (H) 12/18/2020     12/18/2020    K 3.6 12/18/2020     (H) 12/18/2020    CO2 28 12/18/2020     No results for input(s): CKTOTAL, CKMB in the last 72 hours.     Invalid input(s): TROPONONI  Lab Results   Component Value Date     (H) 09/27/2013     Lab Results   Component Value Date    WBC 7.0 12/18/2020    RBC 3.68 12/18/2020    HGB 10.1 12/18/2020    HCT 33.1 12/18/2020    MCV 89.9 12/18/2020    MCH 27.4 12/18/2020    MCHC 30.5 12/18/2020    RDW 17.1 12/18/2020     12/18/2020    MPV 9.6 12/18/2020     Recent Labs     12/16/20  0357 12/17/20  0903 12/18/20  0555   ALKPHOS 76 92 79   ALT 10 12 12 AST 19 19 13   PROT 5.5* 5.7* 5.3*   BILITOT 0.6 0.5 0.5   LABALBU 2.4* 2.6* 2.4*     Lab Results   Component Value Date    MG 2.3 12/18/2020     Lab Results   Component Value Date    PROTIME 15.1 12/13/2020    INR 1.3 12/13/2020     No results found for: TSH  No components found for: CHLPL  No results found for: TRIG  No results found for: HDL  No results found for: [address]    Cardiac Tests:  Telemetry findings reviewed: atrial fibrillation with a mean ventricular response of approximately 80 bpm, no new tachy/bradyarrhythmias overnight  Chest X-ray: A repeat chest x-ray reviewed at the time of evaluation continues to demonstrate poor inspiratory effort with cardiomegaly and borderline interstitial infiltrates with volume loss and/or a left pleural effusion      ASSESSMENT / PLAN: On a clinical basis, the patient remains compensated from a cardiovascular standpoint with acceptable rate control of atrial fibrillation presently with intravenous beta-blocker administration on the basis of his developed abdominal issues with that of an ileus/partial small bowel obstruction. His respiratory status additionally remained stable in the face of his pneumonia with persistent stable hypoxic respiratory failure. Continued careful monitoring of his volume status will be necessary with most recent laboratory assessment additionally suggestive of a component of acute kidney injury. As previously outlined, a rate control strategy will be maintained in the face of his present atrial fibrillation in view of his existing gastrointestinal issues anticoagulation will be withheld pending further stabilization of this component of his management. Nutritional support will remain essential to ongoing fluid mobilization to reduce his risk of debilitation and if the absence of enteral intake is not only a short-term issue, the consideration of parenteral nutrition may become necessary.   If surgical intervention of his bowel obstruction

## 2020-12-18 NOTE — PROGRESS NOTES
Pulmonary Progress Note    Admit Date: 2020  Hospital day                               PCP: No primary care provider on file. Chief Complaint (s):  Patient Active Problem List   Diagnosis    [facility] with [facility]    Dementia ([facility]    Metabolic encephalopathy    Diabetes mellitus type 2, uncontrolled ([facility]    Hyperlipidemia LDL goal <100    Essential hypertension    Venous ulcer of left leg (HCC)    Non-pressure chronic ulcer of left lower leg with fat layer exposed ([facility]    Non-pressure chronic ulcer left lower leg, limited to breakdown skin (HCC)    Severe protein-calorie malnutrition (HCC)    Non-pressure chronic ulcer right lower leg, limited to breakdown skin (HCC)    Pressure injury of contiguous region involving back, buttock, and hip, stage 2 ([facility]    Pressure injury of calf, stage 2 ([facility]    HCAP (healthcare-associated pneumonia)    Paroxysmal atrial fibrillation (HCC)    Acute respiratory failure with hypoxia ([facility]       Subjective:  · Resting comfortably this p.m. Cultures are noted. Infectious disease input reviewed.   For possible LTAC transfer      Vitals:  VITALS:  BP (!) 119/59   Pulse 80   Temp 97.9 °F (36.6 °C) (Oral)   Resp 18   Ht 6' (1.829 m)   Wt 261 lb 9.6 oz (118.7 kg)   SpO2 97%   BMI 35.48 kg/m²     24HR INTAKE/OUTPUT:      Intake/Output Summary (Last 24 hours) at 2020 1535  Last data filed at 2020 1443  Gross per 24 hour   Intake --   Output 1950 ml   Net -1950 ml       24HR PULSE OXIMETRY RANGE:    SpO2  Av %  Min: 97 %  Max: 100 %    Medications:  IV:   sodium chloride Stopped (20 0340)       Scheduled Meds:   docusate sodium  1 enema Rectal Daily    polyethylene glycol  17 g Oral Daily    menthol-zinc oxide   Topical BID    [Held by provider] metoprolol tartrate  25 mg Per G Tube BID    atorvastatin  20 mg Per G Tube Nightly    metoprolol  2.5 mg Intravenous Q6H    ertapenem (INVanz) IVPB  1 g Intravenous Q24H    heparin flush  3 mL Intravenous 2 times per day    linezolid  600 mg Per NG tube Q12H    senna  1 tablet PEG Tube BID    insulin lispro  0-12 Units Subcutaneous Q6H    docusate  100 mg Per G Tube BID    folic acid  1 mg Per G Tube Daily    ipratropium-albuterol  3 mL Inhalation Q4H While awake    latanoprost  1 drop Both Eyes Daily    pantoprazole  40 mg Intravenous Daily    And    sodium chloride (PF)  10 mL Intravenous Daily    hydrocortisone sodium succinate PF  50 mg Intravenous Daily    sodium chloride flush  10 mL Intravenous 2 times per day    enoxaparin  40 mg Subcutaneous Daily       Diet:   No diet orders on file     EXAM:  General: No distress. Sleeping  Eyes: PERRL. No sclera icterus. No conjunctival injection. ENT: No discharge. Pharynx clear. Neck: Trachea midline. Normal thyroid. Resp: No accessory muscle use. Few bibasilar rales. No wheezing. No rhonchi. CV: Regular rate. Regular rhythm. No murmur or rub. Abd: Non-tender. Non-distended. No masses. No organomegaly. Normal bowel sounds. Skin: Warm and dry. No nodule on exposed extremities. No rash on exposed extremities. Ext: No cyanosis, clubbing, edema  Lymph: No cervical LAD. No supraclavicular LAD. M/S: No cyanosis. No joint deformity. No clubbing. Neuro:  Positive pupils/gag/corneals. Normal pain response.        Results:  CBC:   Recent Labs     12/16/20 0357 12/17/20  0903 12/18/20  0555   WBC 7.1 8.5 7.0   HGB 9.8* 10.9* 10.1*   HCT 32.3* 36.4* 33.1*   MCV 91.2 90.1 89.9    344 327     BMP:   Recent Labs     12/16/20 0357 12/17/20  0903 12/18/20  0555    141 145   K 3.9 4.1 3.6    108* 109*   CO2 22 23 28   PHOS 2.3* 3.2 3.0   BUN 30* 36* 39*   CREATININE 1.2 1.3* 1.5*     LIVER PROFILE:   Recent Labs     12/16/20 0357 12/17/20  0903 12/18/20  0555   AST 19 19 13   ALT 10 12 12   BILITOT 0.6 0.5 0.5   ALKPHOS 76 92 79     PT/INR: No results for input(s): PROTIME, INR in the last 72 hours. APTT: No results for input(s): APTT in the last 72 hours. Pathology:  1. N/A      Microbiology:  1. None    Recent ABG:   Recent Labs     12/16/20  0516   PH 7.407   PO2 68.2*   PCO2 34.9*   HCO3 21.5*   BE -2.7   O2SAT 93.8   METHB 0.3   O2HB 92.8*   COHB 0.8   O2CON 14.5   HHB 6.1*   THB 11.1*     FiO2 : 40 %  I:E Ratio: 1:1.40    Recent Films:  XR CHEST PORTABLE   Final Result   Somewhat worsening aeration at the left base compatible with infiltrate   and/or atelectasis. Cardiomegaly. Borderline congestion of pulmonary   vascularity. CT ABDOMEN PELVIS WO CONTRAST Additional Contrast? None   Final Result   Multiple loops of mildly dilated small bowel possibly ileus versus early   small bowel obstruction. Follow-up study recommended. Sigmoid diverticulosis. Reticulonodular infiltrates at the lung bases and small left pleural effusion. Nonobstructing right renal stone. The findings were sent to the Radiology Results [address] at 5:20   pm on 12/17/2020to be communicated to a licensed caregiver. XR ABDOMEN FOR NG/OG/NE TUBE PLACEMENT   Final Result   Satisfactory placement of gastric PEG tube. XR CHEST PORTABLE   Final Result   1. Left perihilar and left lower lobe pneumonia. 2. Cardiomegaly. 3. Prominence of the interstitial markings. XR CHEST PORTABLE   Final Result   Improved aeration of the right lung, with persistent infiltrate and/or   atelectasis in the left lower lung and possible left pleural effusion      XR CHEST PORTABLE   Final Result   1. Stable cardiomegaly. 2. Persistent bilateral airspace disease unchanged when compared to the prior   study. 3.      XR CHEST PORTABLE   Final Result   1. Cardiomegaly with findings of CHF. 2. Trace bilateral pleural effusions. CT Head WO Contrast   Final Result   No acute intracranial abnormality.    Unchanged advanced cerebral atrophy and moderate chronic ischemic change in   the white matter. XR CHEST PORTABLE   Final Result   Distal end of the ET tube at approximately 1.3 cm from the level of the   emmanuel. The ET tube should be retracted 2-3 cm for optimal positioning. Cardiomegaly. Question hazy airspace disease in the left lung. Radiographic follow-up   recommended. XR CHEST PORTABLE    (Results Pending)     Assessment:  1. Healthcare acquired pneumonia. Sputum with mixed yang. Klebsiella per ID, infiltrates persist on CT scan of the abdomen. 2. Hypoxic and hypercapnic respiratory failure: Improved  3. Underlying COPD  4. Mild dementia        Plan:  1. Antibiotics per infectious disease  2. Continue aerosol treatments  3. Nocturnal ventilation/BiPAP  4. Okay to LTAC once bed is available.     Time at the bedside, reviewing labs and radiographs, reviewing updated notes and consultations, discussing with staff and family was more than 35 minutes. Please note that voice recognition technology was used in the preparation of this note and make therefore it may contain inadvertent transcription errors. If the patient is a COVID 19 isolation patient, the above physical exam reflects that of the examining physician for the day. Nasim Lopez M.D., F.C.C.P.     [facility], [address] [place], [place] [address], [address], [facility]

## 2020-12-18 NOTE — PROGRESS NOTES
Date: 12/18/2020    Time: 3:03 AM    Patient Placed On BIPAP/CPAP/ Non-Invasive Ventilation? No    If no must comment. Facial area red/color change? Yes           If YES are Blister/Lesion present? No   If yes must notify nursing staff  BIPAP/CPAP skin barrier? Yes    Skin barrier type:mepilexlite       Comments: Mask adjusted due to leak of 150. Nasal bridge noted to be red, new mepilex placed. Will monitor nose as pt continues to rip off mask.         Brisa Torrez      12/18/20 0302   NIV Type   Skin Assessment Redness (see comment/note)   Skin Protection for O2 Device Yes   Mode Bilevel   Mask Type Full face mask   Settings/Measurements   IPAP 12 cmH20   CPAP/EPAP 6 cmH2O   Rate Ordered 12   Resp 21   FiO2  40 %   Vt Exhaled 579 mL   Minute Volume 12.8 Liters   Mask Leak (lpm) 43 lpm   Comfort Level Good   Using Accessory Muscles No

## 2020-12-18 NOTE — PROGRESS NOTES
Date: 12/17/2020    Time: 11:21 PM    Patient Placed On BIPAP/CPAP/ Non-Invasive Ventilation? Yes    If no must comment. Facial area red/color change? no           If YES are Blister/Lesion present? No   If yes must notify nursing staff  BIPAP/CPAP skin barrier?   Yes    Skin barrier type:mepilexlite       Comments:        Stephanie Byers

## 2020-12-19 ENCOUNTER — APPOINTMENT (OUTPATIENT)
Dept: GENERAL RADIOLOGY | Age: 80
DRG: 871 | End: 2020-12-19
Payer: COMMERCIAL

## 2020-12-19 LAB
ALBUMIN SERPL-MCNC: 2.5 G/DL (ref 3.5–5.2)
ALP BLD-CCNC: 74 U/L (ref 40–129)
ALT SERPL-CCNC: 10 U/L (ref 0–40)
ANION GAP SERPL CALCULATED.3IONS-SCNC: 6 MMOL/L (ref 7–16)
AST SERPL-CCNC: 12 U/L (ref 0–39)
BASOPHILS ABSOLUTE: 0.03 E9/L (ref 0–0.2)
BASOPHILS RELATIVE PERCENT: 0.5 % (ref 0–2)
BILIRUB SERPL-MCNC: 0.3 MG/DL (ref 0–1.2)
BUN BLDV-MCNC: 41 MG/DL (ref 8–23)
CALCIUM SERPL-MCNC: 8.4 MG/DL (ref 8.6–10.2)
CHLORIDE BLD-SCNC: 113 MMOL/L (ref 98–107)
CO2: 31 MMOL/L (ref 22–29)
CREAT SERPL-MCNC: 1.3 MG/DL (ref 0.7–1.2)
EOSINOPHILS ABSOLUTE: 0.22 E9/L (ref 0.05–0.5)
EOSINOPHILS RELATIVE PERCENT: 3.6 % (ref 0–6)
GFR AFRICAN AMERICAN: >60
GFR NON-AFRICAN AMERICAN: 53 ML/MIN/1.73
GLUCOSE BLD-MCNC: 115 MG/DL (ref 74–99)
HCT VFR BLD CALC: 33.6 % (ref 37–54)
HEMOGLOBIN: 10 G/DL (ref 12.5–16.5)
IMMATURE GRANULOCYTES #: 0.12 E9/L
IMMATURE GRANULOCYTES %: 2 % (ref 0–5)
LYMPHOCYTES ABSOLUTE: 1.24 E9/L (ref 1.5–4)
LYMPHOCYTES RELATIVE PERCENT: 20.6 % (ref 20–42)
MAGNESIUM: 2.5 MG/DL (ref 1.6–2.6)
MCH RBC QN AUTO: 27.2 PG (ref 26–35)
MCHC RBC AUTO-ENTMCNC: 29.8 % (ref 32–34.5)
MCV RBC AUTO: 91.6 FL (ref 80–99.9)
METER GLUCOSE: 114 MG/DL (ref 74–99)
METER GLUCOSE: 146 MG/DL (ref 74–99)
METER GLUCOSE: 92 MG/DL (ref 74–99)
METER GLUCOSE: 97 MG/DL (ref 74–99)
MONOCYTES ABSOLUTE: 0.71 E9/L (ref 0.1–0.95)
MONOCYTES RELATIVE PERCENT: 11.8 % (ref 2–12)
NEUTROPHILS ABSOLUTE: 3.71 E9/L (ref 1.8–7.3)
NEUTROPHILS RELATIVE PERCENT: 61.5 % (ref 43–80)
PDW BLD-RTO: 17.2 FL (ref 11.5–15)
PHOSPHORUS: 3.3 MG/DL (ref 2.5–4.5)
PLATELET # BLD: 343 E9/L (ref 130–450)
PMV BLD AUTO: 9.6 FL (ref 7–12)
POTASSIUM SERPL-SCNC: 3.7 MMOL/L (ref 3.5–5)
RBC # BLD: 3.67 E12/L (ref 3.8–5.8)
SODIUM BLD-SCNC: 150 MMOL/L (ref 132–146)
TOTAL PROTEIN: 5.4 G/DL (ref 6.4–8.3)
WBC # BLD: 6 E9/L (ref 4.5–11.5)

## 2020-12-19 PROCEDURE — 94660 CPAP INITIATION&MGMT: CPT

## 2020-12-19 PROCEDURE — 2060000000 HC ICU INTERMEDIATE R&B

## 2020-12-19 PROCEDURE — 6370000000 HC RX 637 (ALT 250 FOR IP): Performed by: INTERNAL MEDICINE

## 2020-12-19 PROCEDURE — 2700000000 HC OXYGEN THERAPY PER DAY

## 2020-12-19 PROCEDURE — 82962 GLUCOSE BLOOD TEST: CPT

## 2020-12-19 PROCEDURE — 6360000002 HC RX W HCPCS: Performed by: INTERNAL MEDICINE

## 2020-12-19 PROCEDURE — 36415 COLL VENOUS BLD VENIPUNCTURE: CPT

## 2020-12-19 PROCEDURE — 85025 COMPLETE CBC W/AUTO DIFF WBC: CPT

## 2020-12-19 PROCEDURE — C9113 INJ PANTOPRAZOLE SODIUM, VIA: HCPCS | Performed by: INTERNAL MEDICINE

## 2020-12-19 PROCEDURE — 71045 X-RAY EXAM CHEST 1 VIEW: CPT

## 2020-12-19 PROCEDURE — 2580000003 HC RX 258: Performed by: SPECIALIST

## 2020-12-19 PROCEDURE — 36592 COLLECT BLOOD FROM PICC: CPT

## 2020-12-19 PROCEDURE — 6360000002 HC RX W HCPCS: Performed by: SPECIALIST

## 2020-12-19 PROCEDURE — 2580000003 HC RX 258: Performed by: INTERNAL MEDICINE

## 2020-12-19 PROCEDURE — 2500000003 HC RX 250 WO HCPCS: Performed by: INTERNAL MEDICINE

## 2020-12-19 PROCEDURE — 84100 ASSAY OF PHOSPHORUS: CPT

## 2020-12-19 PROCEDURE — 6370000000 HC RX 637 (ALT 250 FOR IP): Performed by: STUDENT IN AN ORGANIZED HEALTH CARE EDUCATION/TRAINING PROGRAM

## 2020-12-19 PROCEDURE — 94640 AIRWAY INHALATION TREATMENT: CPT

## 2020-12-19 PROCEDURE — 80053 COMPREHEN METABOLIC PANEL: CPT

## 2020-12-19 PROCEDURE — 83735 ASSAY OF MAGNESIUM: CPT

## 2020-12-19 RX ORDER — NICOTINE POLACRILEX 4 MG
15 LOZENGE BUCCAL PRN
Status: DISCONTINUED | OUTPATIENT
Start: 2020-12-19 | End: 2020-12-23 | Stop reason: HOSPADM

## 2020-12-19 RX ORDER — DEXTROSE MONOHYDRATE 25 G/50ML
12.5 INJECTION, SOLUTION INTRAVENOUS PRN
Status: DISCONTINUED | OUTPATIENT
Start: 2020-12-19 | End: 2020-12-23 | Stop reason: HOSPADM

## 2020-12-19 RX ORDER — LINEZOLID 100 MG/5ML
600 SUSPENSION ORAL EVERY 12 HOURS
Qty: 660 ML | Refills: 0 | DISCHARGE
Start: 2020-12-19 | End: 2020-12-30

## 2020-12-19 RX ORDER — DEXTROSE MONOHYDRATE 50 MG/ML
100 INJECTION, SOLUTION INTRAVENOUS PRN
Status: DISCONTINUED | OUTPATIENT
Start: 2020-12-19 | End: 2020-12-23 | Stop reason: HOSPADM

## 2020-12-19 RX ADMIN — IPRATROPIUM BROMIDE AND ALBUTEROL SULFATE 3 ML: .5; 3 SOLUTION RESPIRATORY (INHALATION) at 12:46

## 2020-12-19 RX ADMIN — LINEZOLID 600 MG: 100 SUSPENSION ORAL at 13:00

## 2020-12-19 RX ADMIN — IPRATROPIUM BROMIDE AND ALBUTEROL SULFATE 3 ML: .5; 3 SOLUTION RESPIRATORY (INHALATION) at 17:16

## 2020-12-19 RX ADMIN — Medication 300 UNITS: at 09:50

## 2020-12-19 RX ADMIN — METOPROLOL TARTRATE 2.5 MG: 5 INJECTION INTRAVENOUS at 16:01

## 2020-12-19 RX ADMIN — Medication 300 UNITS: at 21:45

## 2020-12-19 RX ADMIN — ANORECTAL OINTMENT: 15.7; .44; 24; 20.6 OINTMENT TOPICAL at 21:45

## 2020-12-19 RX ADMIN — ATORVASTATIN CALCIUM 20 MG: 20 TABLET, FILM COATED ORAL at 21:45

## 2020-12-19 RX ADMIN — ENOXAPARIN SODIUM 40 MG: 40 INJECTION SUBCUTANEOUS at 09:50

## 2020-12-19 RX ADMIN — FOLIC ACID 1 MG: 1 TABLET ORAL at 09:50

## 2020-12-19 RX ADMIN — ANORECTAL OINTMENT: 15.7; .44; 24; 20.6 OINTMENT TOPICAL at 09:47

## 2020-12-19 RX ADMIN — Medication 10 ML: at 21:45

## 2020-12-19 RX ADMIN — PANTOPRAZOLE SODIUM 40 MG: 40 INJECTION, POWDER, FOR SOLUTION INTRAVENOUS at 09:50

## 2020-12-19 RX ADMIN — SENNOSIDES 8.6 MG: 8.6 TABLET, FILM COATED ORAL at 21:45

## 2020-12-19 RX ADMIN — DOCUSATE SODIUM 100 MG: 50 LIQUID ORAL at 09:49

## 2020-12-19 RX ADMIN — POLYETHYLENE GLYCOL 3350 17 G: 17 POWDER, FOR SOLUTION ORAL at 09:49

## 2020-12-19 RX ADMIN — LINEZOLID 600 MG: 100 SUSPENSION ORAL at 00:30

## 2020-12-19 RX ADMIN — DOCUSATE SODIUM 100 MG: 50 LIQUID ORAL at 21:45

## 2020-12-19 RX ADMIN — SENNOSIDES 8.6 MG: 8.6 TABLET, FILM COATED ORAL at 09:50

## 2020-12-19 RX ADMIN — IPRATROPIUM BROMIDE AND ALBUTEROL SULFATE 3 ML: .5; 3 SOLUTION RESPIRATORY (INHALATION) at 21:34

## 2020-12-19 RX ADMIN — INSULIN LISPRO 2 UNITS: 100 INJECTION, SOLUTION INTRAVENOUS; SUBCUTANEOUS at 16:01

## 2020-12-19 RX ADMIN — HYDROCORTISONE SODIUM SUCCINATE 50 MG: 100 INJECTION, POWDER, FOR SOLUTION INTRAMUSCULAR; INTRAVENOUS at 09:51

## 2020-12-19 RX ADMIN — METOPROLOL TARTRATE 2.5 MG: 5 INJECTION INTRAVENOUS at 11:13

## 2020-12-19 RX ADMIN — SODIUM CHLORIDE, PRESERVATIVE FREE 10 ML: 5 INJECTION INTRAVENOUS at 09:51

## 2020-12-19 RX ADMIN — ERTAPENEM 1000 MG: 1 INJECTION INTRAMUSCULAR; INTRAVENOUS at 11:32

## 2020-12-19 RX ADMIN — Medication 10 ML: at 09:50

## 2020-12-19 RX ADMIN — DOCUSATE SODIUM 283 MG: 283 LIQUID RECTAL at 09:47

## 2020-12-19 RX ADMIN — IPRATROPIUM BROMIDE AND ALBUTEROL SULFATE 3 ML: .5; 3 SOLUTION RESPIRATORY (INHALATION) at 09:29

## 2020-12-19 RX ADMIN — LATANOPROST 1 DROP: 50 SOLUTION OPHTHALMIC at 09:48

## 2020-12-19 ASSESSMENT — PAIN SCALES - PAIN ASSESSMENT IN ADVANCED DEMENTIA (PAINAD)
NEGVOCALIZATION: 0
CONSOLABILITY: 0
TOTALSCORE: 0
BODYLANGUAGE: 0
FACIALEXPRESSION: 0
BREATHING: 0

## 2020-12-19 ASSESSMENT — PAIN SCALES - GENERAL: PAINLEVEL_OUTOF10: 0

## 2020-12-19 NOTE — PROGRESS NOTES
[address] [place] [address] [facility]  [facility]  Progress Note  Face to face encounter   SUBJECTIVE:  Chief Complaint   Patient presents with    Altered Mental Status     nonresponsive    Respiratory Distress     arrived on BVM with EMS   he is in bed   On 4 liters nasal canula  Tolerating tube feeds  No fevers. Review of systems:  A 10 point review of systems was done. As stated above, otherwise negative. Medications:   docusate sodium  1 enema Rectal Daily    polyethylene glycol  17 g Oral Daily    menthol-zinc oxide   Topical BID    [Held by provider] metoprolol tartrate  25 mg Per G Tube BID    atorvastatin  20 mg Per G Tube Nightly    metoprolol  2.5 mg Intravenous Q6H    ertapenem (INVanz) IVPB  1 g Intravenous Q24H    heparin flush  3 mL Intravenous 2 times per day    linezolid  600 mg Per NG tube Q12H    senna  1 tablet PEG Tube BID    insulin lispro  0-12 Units Subcutaneous Q6H    docusate  100 mg Per G Tube BID    folic acid  1 mg Per G Tube Daily    ipratropium-albuterol  3 mL Inhalation Q4H While awake    latanoprost  1 drop Both Eyes Daily    pantoprazole  40 mg Intravenous Daily    And    sodium chloride (PF)  10 mL Intravenous Daily    hydrocortisone sodium succinate PF  50 mg Intravenous Daily    sodium chloride flush  10 mL Intravenous 2 times per day    enoxaparin  40 mg Subcutaneous Daily      dextrose      sodium chloride Stopped (20 0340)     glucose, dextrose, glucagon (rDNA), dextrose, sodium chloride flush, heparin flush, promethazine, sodium chloride flush, promethazine **OR** ondansetron, acetaminophen **OR** acetaminophen    OBJECTIVE:  /60   Pulse 97   Temp 96.3 °F (35.7 °C) (Temporal)   Resp 20   Ht 6' (1.829 m)   Wt 255 lb 8 oz (115.9 kg)   SpO2 100%   BMI 34.65 kg/m²   Temp  Av.8 °F (36 °C)  Min: 96.3 °F (35.7 °C)  Max: 97.9 °F (36.6 °C)  Constitutional: The patient is lying in bed. No distress. Confused.   Tired today Skin: Warm and dry. No rashes were noted. HEENT: Eyes show round, and reactive pupils. No jaundice. Moist mucous membranes, no ulcerations, no thrush. Neck: Supple to movements. No lymphadenopathy. Chest: No respiratory distress. No crackles heard. On oxygen- diminished. Cardiovascular: Heart sounds rhythmic and regular. Abdomen: Positive bowel sounds to auscultation. Benign to palpation. PEG. Extremities:  Minimal edema. Lines:  Left PICC 12/18/2020. Ballesteros catheter.     Laboratory and Tests Review:  Lab Results   Component Value Date    WBC 6.0 12/19/2020    WBC 7.0 12/18/2020    WBC 8.5 12/17/2020    HGB 10.0 (L) 12/19/2020    HCT 33.6 (L) 12/19/2020    MCV 91.6 12/19/2020     12/19/2020     Lab Results   Component Value Date    NEUTROABS 3.71 12/19/2020    NEUTROABS 4.46 12/18/2020    NEUTROABS 5.82 12/17/2020     Lab Results   Component Value Date    CRP 25.0 (H) 12/14/2020    CRP 18.4 (H) 12/13/2020    CRP 13.3 (H) 04/09/2019     No results found for: CRPHS  Lab Results   Component Value Date    SEDRATE 48 (H) 12/14/2020    SEDRATE 23 (H) 12/13/2020    SEDRATE 55 (H) 04/09/2019     Lab Results   Component Value Date    ALT 10 12/19/2020    AST 12 12/19/2020     (H) 07/18/2018    ALKPHOS 74 12/19/2020    BILITOT 0.3 12/19/2020     Lab Results   Component Value Date     12/19/2020    K 3.7 12/19/2020    K 3.9 12/14/2020     12/19/2020    CO2 31 12/19/2020    BUN 41 12/19/2020    CREATININE 1.3 12/19/2020    CREATININE 1.5 12/18/2020    CREATININE 1.3 12/17/2020    GFRAA >60 12/19/2020    LABGLOM 53 12/19/2020    GLUCOSE 115 12/19/2020    GLUCOSE 138 03/26/2011    PROT 5.4 12/19/2020    LABALBU 2.5 12/19/2020    LABALBU 4.0 03/26/2011    CALCIUM 8.4 12/19/2020    BILITOT 0.3 12/19/2020    ALKPHOS 74 12/19/2020    AST 12 12/19/2020    ALT 10 12/19/2020     Radiology:  Chest x-ray reviewed    Microbiology:   Nares screen MRSA: Positive  Streptococcus pneumoniae/Legionella urine Ag: negative  Blood cultures 12/13/2020: Negative x2  Respiratory culture 12/14/2020: MRSA, ESBL + Klebsiella pneumoniae  Urine culture 12/13/2020: Negative so far respiratory panel (including SARS-CoV-2): Not detected    ASSESSMENT:  · HCAP. Culture growing MRSA and ESBL + Klebsiella  · Respiratory failure, improved  · Leukocytosis - improved   · Confusion. · Ileus    PLAN:  · Continue oral Linezolid until 12/30/2020  · Continue Ertapenem until 12/25/2020  · Labs and cultures reviewed   · Surgery following- tube feeds restarted   · Med rec done from  [facility]  12:12 PM  12/19/2020     Patient seen and examined. I had a face to face encounter with the patient. Agree with exam.  Assessment and plan as outlined above and directed by me. Addition and corrections were done as deemed appropriate. My exam and plan include: The patient seems to be tolerating antibiotics well. Minimal cough. Nonproductive. Tube feedings have been restarted. He can be discharged back to [facility] from ID standpoint.   Antibiotics have been reconciled    Vicki Louise  12/19/2020  1:27 PM

## 2020-12-19 NOTE — PROGRESS NOTES
Subjective: The patient is awake and alert. No problems overnight. Denies chest pain, angina, and dyspnea. Denies abdominal pain. Tolerating diet. No nausea or vomiting. He is somewhat confused. Objective:  Pt is resting in nad   BP 93/65   Pulse 76   Temp 96.3 °F (35.7 °C) (Temporal)   Resp 14   Ht 6' (1.829 m)   Wt 255 lb 8 oz (115.9 kg)   SpO2 99%   BMI 34.65 kg/m²   HEENT no adenopathy no bruits  Heart:  RRR, no murmurs, gallops, or rubs.   Lungs:  Diminished bs in all fields   Abd: bowel sounds present, nontender, nondistended, no masses  Extrem:  No clubbing, cyanosis, or edema  WBC/Hgb/Hct/Plts:  6.0/10.0/33.6/343 (12/19 8603) basic metabolic panel     Assessment:    Patient Active Problem List   Diagnosis    Severe sepsis with septic shock ([address]    Dementia ([address]    Metabolic encephalopathy    Diabetes mellitus type 2, uncontrolled ([address]    Hyperlipidemia LDL goal <100    Essential hypertension    Venous ulcer of left leg (HCC)    Non-pressure chronic ulcer of left lower leg with fat layer exposed ([address]    Non-pressure chronic ulcer left lower leg, limited to breakdown skin (HCC)    Severe protein-calorie malnutrition (HCC)    Non-pressure chronic ulcer right lower leg, limited to breakdown skin (HCC)    Pressure injury of contiguous region involving back, buttock, and hip, stage 2 (HCC)    Pressure injury of calf, stage 2 ([address]    HCAP (healthcare-associated pneumonia)    Paroxysmal atrial fibrillation ([address]    Acute respiratory failure with hypoxia ([address]       Plan:  Cont Pulmonary toilet  Will need PT  Await decision on LTAC         Oscar Gongora  6:08 AM  12/19/2020

## 2020-12-19 NOTE — PROGRESS NOTES
Date: 12/19/2020    Time: 0035      Patient Placed On BIPAP/CPAP/ Non-Invasive Ventilation? No    If no must comment. Facial area red/color change? If YES are Blister/Lesion present? If yes must notify nursing staff  BIPAP/CPAP skin barrier?   Yes    Skin barrier type:mepilexlite       Comments:  Red outlet already on  Unable to see under mepilex      Pam Tamayo

## 2020-12-19 NOTE — PROGRESS NOTES
Pulmonary Progress Note    Admit Date: 2020  Hospital day                               PCP: No primary care provider on file. Chief Complaint (s):  Patient Active Problem List   Diagnosis    Severe sepsis with septic shock ([address]    D[facility]    Metabolic encephalopathy    Diabetes mellitus type 2, uncontrolled ([facility]    Hyperlipidemia LDL goal <100    Essential hypertension    Venous ulcer of left leg (HCC)    Non-pressure chronic ulcer of left lower leg with fat layer exposed ([address]    Non-pressure chronic ulcer left lower leg, limited to breakdown skin (HCC)    Severe protein-calorie malnutrition (HCC)    Non-pressure chronic ulcer right lower leg, limited to breakdown skin (HCC)    Pressure injury of contiguous region involving back, buttock, and hip, stage 2 ([address]    Pressure injury of calf, stage 2 ([address]    HCAP (healthcare-associated pneumonia)    Paroxysmal atrial fibrillation (HCC)    Acute respiratory failure with hypoxia ([facility]       Subjective:  · Chest radiograph shows improved aeration of the left base. He is in good spirits tonight, awake and alert. Tube feedings to restart.       Vitals:  VITALS:  /60   Pulse 94   Temp 96.5 °F (35.8 °C) (Oral)   Resp 18   Ht 6' (1.829 m)   Wt 255 lb 8 oz (115.9 kg)   SpO2 100%   BMI 34.65 kg/m²     24HR INTAKE/OUTPUT:      Intake/Output Summary (Last 24 hours) at 2020 1845  Last data filed at 2020 1604  Gross per 24 hour   Intake 93 ml   Output 760 ml   Net -667 ml       24HR PULSE OXIMETRY RANGE:    SpO2  Av.4 %  Min: 93 %  Max: 100 %    Medications:  IV:   dextrose      sodium chloride Stopped (20 0340)       Scheduled Meds:   docusate sodium  1 enema Rectal Daily    polyethylene glycol  17 g Oral Daily    menthol-zinc oxide   Topical BID    [Held by provider] metoprolol tartrate  25 mg Per G Tube BID    atorvastatin  20 mg Per G Tube Nightly    metoprolol  2.5 mg Intravenous Q6H    ertapenem ([employer]Vanz) IVPB  1 g Intravenous Q24H    heparin flush  3 mL Intravenous 2 times per day    linezolid  600 mg Per NG tube Q12H    senna  1 tablet PEG Tube BID    insulin lispro  0-12 Units Subcutaneous Q6H    docusate  100 mg Per G Tube BID    folic acid  1 mg Per G Tube Daily    ipratropium-albuterol  3 mL Inhalation Q4H While awake    latanoprost  1 drop Both Eyes Daily    pantoprazole  40 mg Intravenous Daily    And    sodium chloride (PF)  10 mL Intravenous Daily    hydrocortisone sodium succinate PF  50 mg Intravenous Daily    sodium chloride flush  10 mL Intravenous 2 times per day    enoxaparin  40 mg Subcutaneous Daily       Diet:   DIET TUBE FEED CONTINUOUS/CYCLIC NPO; Diabetic 1.5; Gastrostomy; Continuous; 20; 24     EXAM:  General: No distress. Sleeping  Eyes: PERRL. No sclera icterus. No conjunctival injection. ENT: No discharge. Pharynx clear. Neck: Trachea midline. Normal thyroid. Resp: No accessory muscle use. Few bibasilar rales. No wheezing. No rhonchi. CV: Regular rate. Regular rhythm. No murmur or rub. Abd: Non-tender. Non-distended. No masses. No organomegaly. Normal bowel sounds. Skin: Warm and dry. No nodule on exposed extremities. No rash on exposed extremities. Ext: No cyanosis, clubbing, edema  Lymph: No cervical LAD. No supraclavicular LAD. M/S: No cyanosis. No joint deformity. No clubbing. Neuro:  Positive pupils/gag/corneals. Normal pain response.        Results:  CBC:   Recent Labs     12/17/20  0903 12/18/20  0555 12/19/20  0440   WBC 8.5 7.0 6.0   HGB 10.9* 10.1* 10.0*   HCT 36.4* 33.1* 33.6*   MCV 90.1 89.9 91.6    327 343     BMP:   Recent Labs     12/17/20  0903 12/18/20  0555 12/19/20  0440    145 150*   K 4.1 3.6 3.7   * 109* 113*   CO2 23 28 31*   PHOS 3.2 3.0 3.3   BUN 36* 39* 41*   CREATININE 1.3* 1.5* 1.3*     LIVER PROFILE:   Recent Labs     12/17/20  0903 12/18/20  0555 12/19/20  0440   AST 19 13 12   ALT 12 12 10   BILITOT 0.5 0.5 0.3   ALKPHOS 92 79 74     PT/INR: No results for input(s): PROTIME, INR in the last 72 hours. APTT: No results for input(s): APTT in the last 72 hours. Pathology:  1. N/A      Microbiology:  1. None    Recent ABG:   No results for input(s): PH, PO2, PCO2, HCO3, BE, O2SAT, METHB, O2HB, COHB, O2CON, HHB, THB in the last 72 hours. FiO2 : 100 %  I:E Ratio: 1:1.40    Recent Films:  XR CHEST PORTABLE   Final Result   Slightly improving aeration at the left base. Pulmonary vascular congestion. XR CHEST PORTABLE   Final Result   Somewhat worsening aeration at the left base compatible with infiltrate   and/or atelectasis. Cardiomegaly. Borderline congestion of pulmonary   vascularity. CT ABDOMEN PELVIS WO CONTRAST Additional Contrast? None   Final Result   Multiple loops of mildly dilated small bowel possibly ileus versus early   small bowel obstruction. Follow-up study recommended. Sigmoid diverticulosis. Reticulonodular infiltrates at the lung bases and small left pleural effusion. Nonobstructing right renal stone. The findings were sent to the Radiology Results [address] at 5:20   pm on 12/17/2020to be communicated to a licensed caregiver. XR ABDOMEN FOR NG/OG/NE TUBE PLACEMENT   Final Result   Satisfactory placement of gastric PEG tube. XR CHEST PORTABLE   Final Result   1. Left perihilar and left lower lobe pneumonia. 2. Cardiomegaly. 3. Prominence of the interstitial markings. XR CHEST PORTABLE   Final Result   Improved aeration of the right lung, with persistent infiltrate and/or   atelectasis in the left lower lung and possible left pleural effusion      XR CHEST PORTABLE   Final Result   1. Stable cardiomegaly. 2. Persistent bilateral airspace disease unchanged when compared to the prior   study. 3.      XR CHEST PORTABLE   Final Result   1. Cardiomegaly with findings of CHF. 2. Trace bilateral pleural effusions.       CT Head WO Contrast   Final Result   No acute intracranial abnormality. Unchanged advanced cerebral atrophy and moderate chronic ischemic change in   the white matter. XR CHEST PORTABLE   Final Result   Distal end of the ET tube at approximately 1.3 cm from the level of the   emmanuel. The ET tube should be retracted 2-3 cm for optimal positioning. Cardiomegaly. Question hazy airspace disease in the left lung. Radiographic follow-up   recommended. XR CHEST PORTABLE    (Results Pending)     Assessment:  1. Healthcare acquired pneumonia. Sputum with mixed yang. Klebsiella per ID, infiltrates persist on CT scan of the abdomen. 2. Hypoxic and hypercapnic respiratory failure: Improved  3. Underlying COPD  4. Mild dementia        Plan:  1. Antibiotics per infectious disease  2. Continue aerosol treatments  3. Nocturnal ventilation/BiPAP  4. Okay to LTAC once bed is available.     Time at the bedside, reviewing labs and radiographs, reviewing updated notes and consultations, discussing with staff and family was more than 35 minutes. Please note that voice recognition technology was used in the preparation of this note and make therefore it may contain inadvertent transcription errors. If the patient is a COVID 19 isolation patient, the above physical exam reflects that of the examining physician for the day. Nasim Lopez M.D., F.C.C.P.     [facility], [address] [place] [address], [address] [place] [address]

## 2020-12-19 NOTE — PROGRESS NOTES
Seen/examined  Multiple bowel movements  Abdomen soft and nontender  With resolving ileus  Should be able to restart tube feeds  Brenda Ivan MD

## 2020-12-20 ENCOUNTER — APPOINTMENT (OUTPATIENT)
Dept: GENERAL RADIOLOGY | Age: 80
DRG: 871 | End: 2020-12-20
Payer: COMMERCIAL

## 2020-12-20 LAB
ALBUMIN SERPL-MCNC: 2.5 G/DL (ref 3.5–5.2)
ALP BLD-CCNC: 79 U/L (ref 40–129)
ALT SERPL-CCNC: 9 U/L (ref 0–40)
ANION GAP SERPL CALCULATED.3IONS-SCNC: 4 MMOL/L (ref 7–16)
AST SERPL-CCNC: 11 U/L (ref 0–39)
BASOPHILS ABSOLUTE: 0.02 E9/L (ref 0–0.2)
BASOPHILS RELATIVE PERCENT: 0.3 % (ref 0–2)
BILIRUB SERPL-MCNC: 0.2 MG/DL (ref 0–1.2)
BUN BLDV-MCNC: 39 MG/DL (ref 8–23)
CALCIUM SERPL-MCNC: 8.2 MG/DL (ref 8.6–10.2)
CHLORIDE BLD-SCNC: 115 MMOL/L (ref 98–107)
CO2: 31 MMOL/L (ref 22–29)
CREAT SERPL-MCNC: 1.3 MG/DL (ref 0.7–1.2)
EOSINOPHILS ABSOLUTE: 0.09 E9/L (ref 0.05–0.5)
EOSINOPHILS RELATIVE PERCENT: 1.5 % (ref 0–6)
GFR AFRICAN AMERICAN: >60
GFR NON-AFRICAN AMERICAN: 53 ML/MIN/1.73
GLUCOSE BLD-MCNC: 119 MG/DL (ref 74–99)
HCT VFR BLD CALC: 31.1 % (ref 37–54)
HEMOGLOBIN: 9.1 G/DL (ref 12.5–16.5)
HYPOCHROMIA: ABNORMAL
IMMATURE GRANULOCYTES #: 0.34 E9/L
IMMATURE GRANULOCYTES %: 5.6 % (ref 0–5)
LYMPHOCYTES ABSOLUTE: 1.19 E9/L (ref 1.5–4)
LYMPHOCYTES RELATIVE PERCENT: 19.5 % (ref 20–42)
MAGNESIUM: 2.6 MG/DL (ref 1.6–2.6)
MCH RBC QN AUTO: 27.2 PG (ref 26–35)
MCHC RBC AUTO-ENTMCNC: 29.3 % (ref 32–34.5)
MCV RBC AUTO: 92.8 FL (ref 80–99.9)
METER GLUCOSE: 118 MG/DL (ref 74–99)
METER GLUCOSE: 126 MG/DL (ref 74–99)
METER GLUCOSE: 164 MG/DL (ref 74–99)
METER GLUCOSE: 221 MG/DL (ref 74–99)
MONOCYTES ABSOLUTE: 0.69 E9/L (ref 0.1–0.95)
MONOCYTES RELATIVE PERCENT: 11.3 % (ref 2–12)
NEUTROPHILS ABSOLUTE: 3.76 E9/L (ref 1.8–7.3)
NEUTROPHILS RELATIVE PERCENT: 61.8 % (ref 43–80)
OVALOCYTES: ABNORMAL
PDW BLD-RTO: 17.4 FL (ref 11.5–15)
PHOSPHORUS: 2.2 MG/DL (ref 2.5–4.5)
PLATELET # BLD: 334 E9/L (ref 130–450)
PMV BLD AUTO: 9.5 FL (ref 7–12)
POIKILOCYTES: ABNORMAL
POLYCHROMASIA: ABNORMAL
POTASSIUM SERPL-SCNC: 3.4 MMOL/L (ref 3.5–5)
RBC # BLD: 3.35 E12/L (ref 3.8–5.8)
SODIUM BLD-SCNC: 150 MMOL/L (ref 132–146)
TOTAL PROTEIN: 5.2 G/DL (ref 6.4–8.3)
WBC # BLD: 6.1 E9/L (ref 4.5–11.5)

## 2020-12-20 PROCEDURE — 94640 AIRWAY INHALATION TREATMENT: CPT

## 2020-12-20 PROCEDURE — 2580000003 HC RX 258: Performed by: INTERNAL MEDICINE

## 2020-12-20 PROCEDURE — 2700000000 HC OXYGEN THERAPY PER DAY

## 2020-12-20 PROCEDURE — 84100 ASSAY OF PHOSPHORUS: CPT

## 2020-12-20 PROCEDURE — 83735 ASSAY OF MAGNESIUM: CPT

## 2020-12-20 PROCEDURE — 2060000000 HC ICU INTERMEDIATE R&B

## 2020-12-20 PROCEDURE — 36592 COLLECT BLOOD FROM PICC: CPT

## 2020-12-20 PROCEDURE — 6370000000 HC RX 637 (ALT 250 FOR IP): Performed by: INTERNAL MEDICINE

## 2020-12-20 PROCEDURE — 80053 COMPREHEN METABOLIC PANEL: CPT

## 2020-12-20 PROCEDURE — 82962 GLUCOSE BLOOD TEST: CPT

## 2020-12-20 PROCEDURE — 71045 X-RAY EXAM CHEST 1 VIEW: CPT

## 2020-12-20 PROCEDURE — 36415 COLL VENOUS BLD VENIPUNCTURE: CPT

## 2020-12-20 PROCEDURE — 6360000002 HC RX W HCPCS: Performed by: SPECIALIST

## 2020-12-20 PROCEDURE — 2580000003 HC RX 258: Performed by: SPECIALIST

## 2020-12-20 PROCEDURE — C9113 INJ PANTOPRAZOLE SODIUM, VIA: HCPCS | Performed by: INTERNAL MEDICINE

## 2020-12-20 PROCEDURE — 2500000003 HC RX 250 WO HCPCS: Performed by: INTERNAL MEDICINE

## 2020-12-20 PROCEDURE — 85025 COMPLETE CBC W/AUTO DIFF WBC: CPT

## 2020-12-20 PROCEDURE — 6370000000 HC RX 637 (ALT 250 FOR IP): Performed by: STUDENT IN AN ORGANIZED HEALTH CARE EDUCATION/TRAINING PROGRAM

## 2020-12-20 PROCEDURE — 94660 CPAP INITIATION&MGMT: CPT

## 2020-12-20 PROCEDURE — 6360000002 HC RX W HCPCS: Performed by: INTERNAL MEDICINE

## 2020-12-20 RX ORDER — DEXTROSE MONOHYDRATE 50 MG/ML
INJECTION, SOLUTION INTRAVENOUS CONTINUOUS
Status: DISCONTINUED | OUTPATIENT
Start: 2020-12-20 | End: 2020-12-23 | Stop reason: HOSPADM

## 2020-12-20 RX ADMIN — DOCUSATE SODIUM 283 MG: 283 LIQUID RECTAL at 09:40

## 2020-12-20 RX ADMIN — LATANOPROST 1 DROP: 50 SOLUTION OPHTHALMIC at 09:40

## 2020-12-20 RX ADMIN — IPRATROPIUM BROMIDE AND ALBUTEROL SULFATE 3 ML: .5; 3 SOLUTION RESPIRATORY (INHALATION) at 20:26

## 2020-12-20 RX ADMIN — SENNOSIDES 8.6 MG: 8.6 TABLET, FILM COATED ORAL at 21:00

## 2020-12-20 RX ADMIN — ERTAPENEM 1000 MG: 1 INJECTION INTRAMUSCULAR; INTRAVENOUS at 11:37

## 2020-12-20 RX ADMIN — FOLIC ACID 1 MG: 1 TABLET ORAL at 09:38

## 2020-12-20 RX ADMIN — INSULIN LISPRO 2 UNITS: 100 INJECTION, SOLUTION INTRAVENOUS; SUBCUTANEOUS at 11:29

## 2020-12-20 RX ADMIN — HYDROCORTISONE SODIUM SUCCINATE 50 MG: 100 INJECTION, POWDER, FOR SOLUTION INTRAMUSCULAR; INTRAVENOUS at 09:40

## 2020-12-20 RX ADMIN — ENOXAPARIN SODIUM 40 MG: 40 INJECTION SUBCUTANEOUS at 09:39

## 2020-12-20 RX ADMIN — LINEZOLID 600 MG: 100 SUSPENSION ORAL at 12:08

## 2020-12-20 RX ADMIN — DOCUSATE SODIUM 100 MG: 50 LIQUID ORAL at 09:38

## 2020-12-20 RX ADMIN — Medication 10 ML: at 09:39

## 2020-12-20 RX ADMIN — DOCUSATE SODIUM 100 MG: 50 LIQUID ORAL at 21:00

## 2020-12-20 RX ADMIN — ANORECTAL OINTMENT: 15.7; .44; 24; 20.6 OINTMENT TOPICAL at 21:00

## 2020-12-20 RX ADMIN — Medication 300 UNITS: at 09:39

## 2020-12-20 RX ADMIN — POLYETHYLENE GLYCOL 3350 17 G: 17 POWDER, FOR SOLUTION ORAL at 09:38

## 2020-12-20 RX ADMIN — Medication 300 UNITS: at 21:00

## 2020-12-20 RX ADMIN — METOPROLOL TARTRATE 2.5 MG: 5 INJECTION INTRAVENOUS at 11:22

## 2020-12-20 RX ADMIN — LINEZOLID 600 MG: 100 SUSPENSION ORAL at 00:30

## 2020-12-20 RX ADMIN — ANORECTAL OINTMENT: 15.7; .44; 24; 20.6 OINTMENT TOPICAL at 09:40

## 2020-12-20 RX ADMIN — INSULIN LISPRO 4 UNITS: 100 INJECTION, SOLUTION INTRAVENOUS; SUBCUTANEOUS at 16:16

## 2020-12-20 RX ADMIN — SODIUM CHLORIDE, PRESERVATIVE FREE 10 ML: 5 INJECTION INTRAVENOUS at 09:39

## 2020-12-20 RX ADMIN — SENNOSIDES 8.6 MG: 8.6 TABLET, FILM COATED ORAL at 09:38

## 2020-12-20 RX ADMIN — IPRATROPIUM BROMIDE AND ALBUTEROL SULFATE 3 ML: .5; 3 SOLUTION RESPIRATORY (INHALATION) at 16:26

## 2020-12-20 RX ADMIN — IPRATROPIUM BROMIDE AND ALBUTEROL SULFATE 3 ML: .5; 3 SOLUTION RESPIRATORY (INHALATION) at 11:49

## 2020-12-20 RX ADMIN — IPRATROPIUM BROMIDE AND ALBUTEROL SULFATE 3 ML: .5; 3 SOLUTION RESPIRATORY (INHALATION) at 08:36

## 2020-12-20 RX ADMIN — DEXTROSE MONOHYDRATE: 50 INJECTION, SOLUTION INTRAVENOUS at 18:07

## 2020-12-20 RX ADMIN — Medication 10 ML: at 21:00

## 2020-12-20 RX ADMIN — ATORVASTATIN CALCIUM 20 MG: 20 TABLET, FILM COATED ORAL at 21:00

## 2020-12-20 RX ADMIN — PANTOPRAZOLE SODIUM 40 MG: 40 INJECTION, POWDER, FOR SOLUTION INTRAVENOUS at 09:39

## 2020-12-20 ASSESSMENT — PAIN SCALES - GENERAL: PAINLEVEL_OUTOF10: 0

## 2020-12-20 ASSESSMENT — PAIN SCALES - PAIN ASSESSMENT IN ADVANCED DEMENTIA (PAINAD)
CONSOLABILITY: 0
NEGVOCALIZATION: 0
FACIALEXPRESSION: 0
TOTALSCORE: 0
BREATHING: 0
BODYLANGUAGE: 0

## 2020-12-20 NOTE — PROGRESS NOTES
Pulmonary Progress Note    Admit Date: 2020  Hospital day                               PCP: No primary care provider on file. Chief Complaint (s):  Patient Active Problem List   Diagnosis    [facility]    Dementia ([facility]    Metabolic encephalopathy    Diabetes mellitus type 2, uncontrolled ([facility]    Hyperlipidemia LDL goal <100    Essential hypertension    Venous ulcer of left leg (HCC)    Non-pressure chronic ulcer of left lower leg with fat layer exposed ([address]    Non-pressure chronic ulcer left lower leg, limited to breakdown skin (HCC)    Severe protein-calorie malnutrition (HCC)    Non-pressure chronic ulcer right lower leg, limited to breakdown skin (HCC)    Pressure injury of contiguous region involving back, buttock, and hip, stage 2 ([address]    Pressure injury of calf, stage 2 ([facility]    HCAP (healthcare-associated pneumonia)    Paroxysmal atrial fibrillation (HCC)    Acute respiratory failure with hypoxia ([facility]       Subjective:  · Hanna Smalls is resting in bed, respirations appear unlabored. Is refusing BiPAP but I am not sure he needs it any longer. .      Vitals:  VITALS:  BP 92/60   Pulse 83   Temp 96.4 °F (35.8 °C) (Oral)   Resp 20   Ht 6' (1.829 m)   Wt 256 lb 1.6 oz (116.2 kg)   SpO2 96%   BMI 34.73 kg/m²     24HR INTAKE/OUTPUT:      Intake/Output Summary (Last 24 hours) at 2020 1727  Last data filed at 2020 1149  Gross per 24 hour   Intake 2508 ml   Output 250 ml   Net 2258 ml       24HR PULSE OXIMETRY RANGE:    SpO2  Av %  Min: 94 %  Max: 96 %    Medications:  IV:   dextrose      sodium chloride Stopped (20 0340)       Scheduled Meds:   docusate sodium  1 enema Rectal Daily    polyethylene glycol  17 g Oral Daily    menthol-zinc oxide   Topical BID    [Held by provider] metoprolol tartrate  25 mg Per G Tube BID    atorvastatin  20 mg Per G Tube Nightly    metoprolol  2.5 mg Intravenous Q6H    ertapenem Valentin ShahRamy IVPB  1 g Intravenous Q24H    heparin flush  3 mL Intravenous 2 times per day    linezolid  600 mg Per NG tube Q12H    senna  1 tablet PEG Tube BID    insulin lispro  0-12 Units Subcutaneous Q6H    docusate  100 mg Per G Tube BID    folic acid  1 mg Per G Tube Daily    ipratropium-albuterol  3 mL Inhalation Q4H While awake    latanoprost  1 drop Both Eyes Daily    pantoprazole  40 mg Intravenous Daily    And    sodium chloride (PF)  10 mL Intravenous Daily    hydrocortisone sodium succinate PF  50 mg Intravenous Daily    sodium chloride flush  10 mL Intravenous 2 times per day    enoxaparin  40 mg Subcutaneous Daily       Diet:   DIET TUBE FEED CONTINUOUS/CYCLIC NPO; Diabetic 1.5; Gastrostomy; Continuous; 20; 24     EXAM:  General: No distress. Sleeping  Eyes: PERRL. No sclera icterus. No conjunctival injection. ENT: No discharge. Pharynx clear. Neck: Trachea midline. Normal thyroid. Resp: No accessory muscle use. Few bibasilar rales. No wheezing. No rhonchi. CV: Regular rate. Regular rhythm. No murmur or rub. Abd: Non-tender. Non-distended. No masses. No organomegaly. Normal bowel sounds. Skin: Warm and dry. No nodule on exposed extremities. No rash on exposed extremities. Ext: No cyanosis, clubbing, edema  Lymph: No cervical LAD. No supraclavicular LAD. M/S: No cyanosis. No joint deformity. No clubbing. Neuro:  Positive pupils/gag/corneals. Normal pain response.        Results:  CBC:   Recent Labs     12/18/20  0555 12/19/20  0440 12/20/20  0430   WBC 7.0 6.0 6.1   HGB 10.1* 10.0* 9.1*   HCT 33.1* 33.6* 31.1*   MCV 89.9 91.6 92.8    343 334     BMP:   Recent Labs     12/18/20  0555 12/19/20  0440 12/20/20  0430    150* 150*   K 3.6 3.7 3.4*   * 113* 115*   CO2 28 31* 31*   PHOS 3.0 3.3 2.2*   BUN 39* 41* 39*   CREATININE 1.5* 1.3* 1.3*     LIVER PROFILE:   Recent Labs     12/18/20  0555 12/19/20  0440 12/20/20  0430   AST 13 12 11   ALT 12 10 9   BILITOT 0.5 0.3 0.2   ALKPHOS 79 74 79     PT/INR: No results for input(s): PROTIME, INR in the last 72 hours. APTT: No results for input(s): APTT in the last 72 hours. Pathology:  1. N/A      Microbiology:  1. None    Recent ABG:   No results for input(s): PH, PO2, PCO2, HCO3, BE, O2SAT, METHB, O2HB, COHB, O2CON, HHB, THB in the last 72 hours. FiO2 : 100 %  I:E Ratio: 1:1.40    Recent Films:  XR CHEST PORTABLE   Final Result   Slightly improving pulmonary vascular congestion. Stable atelectasis and/or   infiltrate at the left base. XR CHEST PORTABLE   Final Result   Slightly improving aeration at the left base. Pulmonary vascular congestion. XR CHEST PORTABLE   Final Result   Somewhat worsening aeration at the left base compatible with infiltrate   and/or atelectasis. Cardiomegaly. Borderline congestion of pulmonary   vascularity. CT ABDOMEN PELVIS WO CONTRAST Additional Contrast? None   Final Result   Multiple loops of mildly dilated small bowel possibly ileus versus early   small bowel obstruction. Follow-up study recommended. Sigmoid diverticulosis. Reticulonodular infiltrates at the lung bases and small left pleural effusion. Nonobstructing right renal stone. The findings were sent to the Radiology Results [address] at 5:20   pm on 12/17/2020to be communicated to a licensed caregiver. XR ABDOMEN FOR NG/OG/NE TUBE PLACEMENT   Final Result   Satisfactory placement of gastric PEG tube. XR CHEST PORTABLE   Final Result   1. Left perihilar and left lower lobe pneumonia. 2. Cardiomegaly. 3. Prominence of the interstitial markings. XR CHEST PORTABLE   Final Result   Improved aeration of the right lung, with persistent infiltrate and/or   atelectasis in the left lower lung and possible left pleural effusion      XR CHEST PORTABLE   Final Result   1. Stable cardiomegaly. 2. Persistent bilateral airspace disease unchanged when compared to the prior   study.    3. XR CHEST PORTABLE   Final Result   1. Cardiomegaly with findings of CHF. 2. Trace bilateral pleural effusions. CT Head WO Contrast   Final Result   No acute intracranial abnormality. Unchanged advanced cerebral atrophy and moderate chronic ischemic change in   the white matter. XR CHEST PORTABLE   Final Result   Distal end of the ET tube at approximately 1.3 cm from the level of the   emmanuel. The ET tube should be retracted 2-3 cm for optimal positioning. Cardiomegaly. Question hazy airspace disease in the left lung. Radiographic follow-up   recommended. XR CHEST PORTABLE    (Results Pending)     Assessment:  1. Healthcare acquired pneumonia. Sputum with mixed yang. Klebsiella per ID, infiltrates persist on CT scan of the abdomen. 2. Hypoxic and hypercapnic respiratory failure: Improved  3. Underlying COPD  4. Mild dementia  5. Free water deficit        Plan:  1. Antibiotics per infectious disease  2. Continue aerosol treatments  3. Nocturnal ventilation/BiPAP  4. Okay to LTAC once bed is available. 5. Start D5W     Time at the bedside, reviewing labs and radiographs, reviewing updated notes and consultations, discussing with staff and family was more than 35 minutes. Please note that voice recognition technology was used in the preparation of this note and make therefore it may contain inadvertent transcription errors. If the patient is a COVID 19 isolation patient, the above physical exam reflects that of the examining physician for the day. Jonelle Mart M.D., F.C.C.P.     [facility], [address], [address], [facility]

## 2020-12-20 NOTE — PROGRESS NOTES
Date: 12/19/2020    Time: 11:28 PM    Patient Placed On BIPAP/CPAP/ Non-Invasive Ventilation?   No    If no must comment    Comments: refused to wear BIPAP at this time        Barbara Jo

## 2020-12-20 NOTE — PROGRESS NOTES
Subjective:    Pt is resting in nad, no acute issues overnight. Still somewhat confused. Objective:    BP (!) 92/53   Pulse 77   Temp 98.1 °F (36.7 °C) (Oral)   Resp 18   Ht 6' (1.829 m)   Wt 256 lb 1.6 oz (116.2 kg)   SpO2 94%   BMI 34.73 kg/m²   HEENT no adenopathy no bruits  Heart:  RRR, no murmurs, gallops, or rubs.   Lungs:  Diminished in all fields   Abd: bowel sounds present, nontender, nondistended, no masses  Extrem:  No clubbing, cyanosis, or edema  WBC/Hgb/Hct/Plts:  6.1/9.1/31.1/334 (12/20 0333) basic metabolic panel     Assessment:    Patient Active Problem List   Diagnosis    Severe sepsis with septic shock ([address]    Dementia ([address]    Metabolic encephalopathy    Diabetes mellitus type 2, uncontrolled ([address]    Hyperlipidemia LDL goal <100    Essential hypertension    Venous ulcer of left leg (HCC)    Non-pressure chronic ulcer of left lower leg with fat layer exposed ([address]    Non-pressure chronic ulcer left lower leg, limited to breakdown skin (HCC)    Severe protein-calorie malnutrition (HCC)    Non-pressure chronic ulcer right lower leg, limited to breakdown skin (HCC)    Pressure injury of contiguous region involving back, buttock, and hip, stage 2 (HCC)    Pressure injury of calf, stage 2 ([address]    HCAP (healthcare-associated pneumonia)    Paroxysmal atrial fibrillation ([address]    Acute respiratory failure with hypoxia [facility])       Plan:  May need LTAC  Cont Pulmonary toilet         Jeffrey Moreno  6:21 AM  12/20/2020

## 2020-12-20 NOTE — PROGRESS NOTES
[address] [place] [address] [facility]  Progress Note  Face to face encounter   SUBJECTIVE:  Chief Complaint   Patient presents with    Altered Mental Status     nonresponsive    Respiratory Distress     arrived on BVM with EMS   No complaints. Denies dyspnea. No pain. No nausea or vomiting. Tolerating antibiotics. Review of systems:  A 10 point review of systems was done. As stated above, otherwise negative. Medications:   docusate sodium  1 enema Rectal Daily    polyethylene glycol  17 g Oral Daily    menthol-zinc oxide   Topical BID    [Held by provider] metoprolol tartrate  25 mg Per G Tube BID    atorvastatin  20 mg Per G Tube Nightly    metoprolol  2.5 mg Intravenous Q6H    ertapenem ([employer]Vanz) IVPB  1 g Intravenous Q24H    heparin flush  3 mL Intravenous 2 times per day    linezolid  600 mg Per NG tube Q12H    senna  1 tablet PEG Tube BID    insulin lispro  0-12 Units Subcutaneous Q6H    docusate  100 mg Per G Tube BID    folic acid  1 mg Per G Tube Daily    ipratropium-albuterol  3 mL Inhalation Q4H While awake    latanoprost  1 drop Both Eyes Daily    pantoprazole  40 mg Intravenous Daily    And    sodium chloride (PF)  10 mL Intravenous Daily    hydrocortisone sodium succinate PF  50 mg Intravenous Daily    sodium chloride flush  10 mL Intravenous 2 times per day    enoxaparin  40 mg Subcutaneous Daily      dextrose      sodium chloride Stopped (20 0340)     glucose, dextrose, glucagon (rDNA), dextrose, sodium chloride flush, heparin flush, promethazine, sodium chloride flush, promethazine **OR** ondansetron, acetaminophen **OR** acetaminophen    OBJECTIVE:  /66   Pulse 83   Temp 96.4 °F (35.8 °C) (Oral)   Resp 20   Ht 6' (1.829 m)   Wt 256 lb 1.6 oz (116.2 kg)   SpO2 96%   BMI 34.73 kg/m²   Temp  Av °F (36.1 °C)  Min: 96.4 °F (35.8 °C)  Max: 98.1 °F (36.7 °C)  Constitutional: The patient is lying in bed. No distress.     Skin: Warm and dry. No rashes were noted. HEENT: Eyes show round, and reactive pupils. No jaundice. Moist mucous membranes, no ulcerations, no thrush. Neck: Supple to movements. Chest: No respiratory distress. No crackles heard. Cardiovascular: Heart sounds rhythmic and regular. Abdomen: Positive bowel sounds to auscultation. Benign to palpation. PEG. Extremities:  Minimal edema. Lines:  Left PICC 12/18/2020. Ballesteros catheter.     Laboratory and Tests Review:  Lab Results   Component Value Date    WBC 6.1 12/20/2020    WBC 6.0 12/19/2020    WBC 7.0 12/18/2020    HGB 9.1 (L) 12/20/2020    HCT 31.1 (L) 12/20/2020    MCV 92.8 12/20/2020     12/20/2020     Lab Results   Component Value Date    NEUTROABS 3.76 12/20/2020    NEUTROABS 3.71 12/19/2020    NEUTROABS 4.46 12/18/2020     Lab Results   Component Value Date    CRP 25.0 (H) 12/14/2020    CRP 18.4 (H) 12/13/2020    CRP 13.3 (H) 04/09/2019     No results found for: CRPHS  Lab Results   Component Value Date    SEDRATE 48 (H) 12/14/2020    SEDRATE 23 (H) 12/13/2020    SEDRATE 55 (H) 04/09/2019     Lab Results   Component Value Date    ALT 9 12/20/2020    AST 11 12/20/2020     (H) 07/18/2018    ALKPHOS 79 12/20/2020    BILITOT 0.2 12/20/2020     Lab Results   Component Value Date     12/20/2020    K 3.4 12/20/2020    K 3.9 12/14/2020     12/20/2020    CO2 31 12/20/2020    BUN 39 12/20/2020    CREATININE 1.3 12/20/2020    CREATININE 1.3 12/19/2020    CREATININE 1.5 12/18/2020    GFRAA >60 12/20/2020    LABGLOM 53 12/20/2020    GLUCOSE 119 12/20/2020    GLUCOSE 138 03/26/2011    PROT 5.2 12/20/2020    LABALBU 2.5 12/20/2020    LABALBU 4.0 03/26/2011    CALCIUM 8.2 12/20/2020    BILITOT 0.2 12/20/2020    ALKPHOS 79 12/20/2020    AST 11 12/20/2020    ALT 9 12/20/2020     Radiology:  Chest x-ray reviewed    Microbiology:   Nares screen MRSA: Positive  Streptococcus pneumoniae/Legionella urine Ag: negative  Blood cultures 12/13/2020: Negative x2  Respiratory culture 12/14/2020: MRSA, ESBL + Klebsiella pneumoniae  Urine culture 12/13/2020: Negative so far respiratory panel (including SARS-CoV-2): Not detected    ASSESSMENT:  · HCAP. Culture growing MRSA and ESBL + Klebsiella  · Respiratory failure, improved  · Leukocytosis - improved   · Confusion.     · Ileus    PLAN:  · Continue oral Linezolid until 12/30/2020  · Continue Ertapenem until 12/25/2020  · [facility] discharge to [facility]  10:33 AM  12/20/2020

## 2020-12-21 ENCOUNTER — APPOINTMENT (OUTPATIENT)
Dept: GENERAL RADIOLOGY | Age: 80
DRG: 871 | End: 2020-12-21
Payer: COMMERCIAL

## 2020-12-21 LAB
ALBUMIN SERPL-MCNC: 2.5 G/DL (ref 3.5–5.2)
ALP BLD-CCNC: 75 U/L (ref 40–129)
ALT SERPL-CCNC: 9 U/L (ref 0–40)
ANION GAP SERPL CALCULATED.3IONS-SCNC: 6 MMOL/L (ref 7–16)
AST SERPL-CCNC: 17 U/L (ref 0–39)
BASOPHILS ABSOLUTE: 0 E9/L (ref 0–0.2)
BASOPHILS RELATIVE PERCENT: 0 % (ref 0–2)
BILIRUB SERPL-MCNC: 0.2 MG/DL (ref 0–1.2)
BUN BLDV-MCNC: 37 MG/DL (ref 8–23)
CALCIUM SERPL-MCNC: 8.1 MG/DL (ref 8.6–10.2)
CHLORIDE BLD-SCNC: 114 MMOL/L (ref 98–107)
CO2: 31 MMOL/L (ref 22–29)
CREAT SERPL-MCNC: 1.2 MG/DL (ref 0.7–1.2)
EOSINOPHILS ABSOLUTE: 0.11 E9/L (ref 0.05–0.5)
EOSINOPHILS RELATIVE PERCENT: 1.7 % (ref 0–6)
GFR AFRICAN AMERICAN: >60
GFR NON-AFRICAN AMERICAN: 58 ML/MIN/1.73
GLUCOSE BLD-MCNC: 137 MG/DL (ref 74–99)
HCT VFR BLD CALC: 29.5 % (ref 37–54)
HEMOGLOBIN: 8.6 G/DL (ref 12.5–16.5)
HYPOCHROMIA: ABNORMAL
LYMPHOCYTES ABSOLUTE: 1.25 E9/L (ref 1.5–4)
LYMPHOCYTES RELATIVE PERCENT: 19.1 % (ref 20–42)
MAGNESIUM: 2.5 MG/DL (ref 1.6–2.6)
MCH RBC QN AUTO: 27.1 PG (ref 26–35)
MCHC RBC AUTO-ENTMCNC: 29.2 % (ref 32–34.5)
MCV RBC AUTO: 93.1 FL (ref 80–99.9)
METAMYELOCYTES RELATIVE PERCENT: 0.9 % (ref 0–1)
METER GLUCOSE: 123 MG/DL (ref 74–99)
METER GLUCOSE: 135 MG/DL (ref 74–99)
METER GLUCOSE: 189 MG/DL (ref 74–99)
METER GLUCOSE: 197 MG/DL (ref 74–99)
MONOCYTES ABSOLUTE: 0.66 E9/L (ref 0.1–0.95)
MONOCYTES RELATIVE PERCENT: 9.6 % (ref 2–12)
MYELOCYTE PERCENT: 1.7 % (ref 0–0)
NEUTROPHILS ABSOLUTE: 4.55 E9/L (ref 1.8–7.3)
NEUTROPHILS RELATIVE PERCENT: 66.1 % (ref 43–80)
NUCLEATED RED BLOOD CELLS: 0 /100 WBC
OVALOCYTES: ABNORMAL
PDW BLD-RTO: 17.6 FL (ref 11.5–15)
PHOSPHORUS: 2 MG/DL (ref 2.5–4.5)
PLATELET # BLD: 324 E9/L (ref 130–450)
PMV BLD AUTO: 9.6 FL (ref 7–12)
POIKILOCYTES: ABNORMAL
POLYCHROMASIA: ABNORMAL
POTASSIUM SERPL-SCNC: 3.1 MMOL/L (ref 3.5–5)
PROMYELOCYTES PERCENT: 0.9 % (ref 0–0)
RBC # BLD: 3.17 E12/L (ref 3.8–5.8)
SODIUM BLD-SCNC: 151 MMOL/L (ref 132–146)
TOTAL PROTEIN: 5.1 G/DL (ref 6.4–8.3)
WBC # BLD: 6.6 E9/L (ref 4.5–11.5)

## 2020-12-21 PROCEDURE — 71045 X-RAY EXAM CHEST 1 VIEW: CPT

## 2020-12-21 PROCEDURE — 83735 ASSAY OF MAGNESIUM: CPT

## 2020-12-21 PROCEDURE — 85025 COMPLETE CBC W/AUTO DIFF WBC: CPT

## 2020-12-21 PROCEDURE — 6370000000 HC RX 637 (ALT 250 FOR IP): Performed by: INTERNAL MEDICINE

## 2020-12-21 PROCEDURE — 2580000003 HC RX 258: Performed by: INTERNAL MEDICINE

## 2020-12-21 PROCEDURE — 36415 COLL VENOUS BLD VENIPUNCTURE: CPT

## 2020-12-21 PROCEDURE — 2580000003 HC RX 258: Performed by: SPECIALIST

## 2020-12-21 PROCEDURE — 6370000000 HC RX 637 (ALT 250 FOR IP): Performed by: SPECIALIST

## 2020-12-21 PROCEDURE — 6360000002 HC RX W HCPCS: Performed by: INTERNAL MEDICINE

## 2020-12-21 PROCEDURE — 6360000002 HC RX W HCPCS: Performed by: SPECIALIST

## 2020-12-21 PROCEDURE — 94660 CPAP INITIATION&MGMT: CPT

## 2020-12-21 PROCEDURE — C9113 INJ PANTOPRAZOLE SODIUM, VIA: HCPCS | Performed by: INTERNAL MEDICINE

## 2020-12-21 PROCEDURE — 80053 COMPREHEN METABOLIC PANEL: CPT

## 2020-12-21 PROCEDURE — 2060000000 HC ICU INTERMEDIATE R&B

## 2020-12-21 PROCEDURE — 82962 GLUCOSE BLOOD TEST: CPT

## 2020-12-21 PROCEDURE — 2500000003 HC RX 250 WO HCPCS: Performed by: INTERNAL MEDICINE

## 2020-12-21 PROCEDURE — 6370000000 HC RX 637 (ALT 250 FOR IP): Performed by: STUDENT IN AN ORGANIZED HEALTH CARE EDUCATION/TRAINING PROGRAM

## 2020-12-21 PROCEDURE — 84100 ASSAY OF PHOSPHORUS: CPT

## 2020-12-21 PROCEDURE — 94640 AIRWAY INHALATION TREATMENT: CPT

## 2020-12-21 PROCEDURE — 2700000000 HC OXYGEN THERAPY PER DAY

## 2020-12-21 PROCEDURE — 36592 COLLECT BLOOD FROM PICC: CPT

## 2020-12-21 RX ADMIN — LINEZOLID 600 MG: 100 SUSPENSION ORAL at 12:19

## 2020-12-21 RX ADMIN — DOCUSATE SODIUM 100 MG: 50 LIQUID ORAL at 20:46

## 2020-12-21 RX ADMIN — FOLIC ACID 1 MG: 1 TABLET ORAL at 08:51

## 2020-12-21 RX ADMIN — LINEZOLID 600 MG: 100 SUSPENSION ORAL at 00:30

## 2020-12-21 RX ADMIN — IPRATROPIUM BROMIDE AND ALBUTEROL SULFATE 3 ML: .5; 3 SOLUTION RESPIRATORY (INHALATION) at 20:59

## 2020-12-21 RX ADMIN — METOPROLOL TARTRATE 2.5 MG: 5 INJECTION INTRAVENOUS at 12:19

## 2020-12-21 RX ADMIN — SODIUM CHLORIDE, PRESERVATIVE FREE 10 ML: 5 INJECTION INTRAVENOUS at 08:52

## 2020-12-21 RX ADMIN — INSULIN LISPRO 2 UNITS: 100 INJECTION, SOLUTION INTRAVENOUS; SUBCUTANEOUS at 17:31

## 2020-12-21 RX ADMIN — METOPROLOL TARTRATE 2.5 MG: 5 INJECTION INTRAVENOUS at 06:13

## 2020-12-21 RX ADMIN — IPRATROPIUM BROMIDE AND ALBUTEROL SULFATE 3 ML: .5; 3 SOLUTION RESPIRATORY (INHALATION) at 12:33

## 2020-12-21 RX ADMIN — INSULIN LISPRO 2 UNITS: 100 INJECTION, SOLUTION INTRAVENOUS; SUBCUTANEOUS at 12:24

## 2020-12-21 RX ADMIN — ERTAPENEM 1000 MG: 1 INJECTION INTRAMUSCULAR; INTRAVENOUS at 12:19

## 2020-12-21 RX ADMIN — ANORECTAL OINTMENT: 15.7; .44; 24; 20.6 OINTMENT TOPICAL at 08:52

## 2020-12-21 RX ADMIN — SENNOSIDES 8.6 MG: 8.6 TABLET, FILM COATED ORAL at 08:52

## 2020-12-21 RX ADMIN — POLYETHYLENE GLYCOL 3350 17 G: 17 POWDER, FOR SOLUTION ORAL at 08:51

## 2020-12-21 RX ADMIN — IPRATROPIUM BROMIDE AND ALBUTEROL SULFATE 3 ML: .5; 3 SOLUTION RESPIRATORY (INHALATION) at 16:16

## 2020-12-21 RX ADMIN — PANTOPRAZOLE SODIUM 40 MG: 40 INJECTION, POWDER, FOR SOLUTION INTRAVENOUS at 08:51

## 2020-12-21 RX ADMIN — LATANOPROST 1 DROP: 50 SOLUTION OPHTHALMIC at 08:51

## 2020-12-21 RX ADMIN — Medication 300 UNITS: at 08:51

## 2020-12-21 RX ADMIN — ENOXAPARIN SODIUM 40 MG: 40 INJECTION SUBCUTANEOUS at 08:51

## 2020-12-21 RX ADMIN — ATORVASTATIN CALCIUM 20 MG: 20 TABLET, FILM COATED ORAL at 20:46

## 2020-12-21 RX ADMIN — IPRATROPIUM BROMIDE AND ALBUTEROL SULFATE 3 ML: .5; 3 SOLUTION RESPIRATORY (INHALATION) at 09:24

## 2020-12-21 RX ADMIN — Medication 300 UNITS: at 20:47

## 2020-12-21 RX ADMIN — SENNOSIDES 8.6 MG: 8.6 TABLET, FILM COATED ORAL at 20:46

## 2020-12-21 RX ADMIN — DOCUSATE SODIUM 100 MG: 50 LIQUID ORAL at 08:51

## 2020-12-21 RX ADMIN — HYDROCORTISONE SODIUM SUCCINATE 50 MG: 100 INJECTION, POWDER, FOR SOLUTION INTRAMUSCULAR; INTRAVENOUS at 08:51

## 2020-12-21 RX ADMIN — METOPROLOL TARTRATE 2.5 MG: 5 INJECTION INTRAVENOUS at 17:28

## 2020-12-21 RX ADMIN — ANORECTAL OINTMENT: 15.7; .44; 24; 20.6 OINTMENT TOPICAL at 20:47

## 2020-12-21 ASSESSMENT — PAIN SCALES - PAIN ASSESSMENT IN ADVANCED DEMENTIA (PAINAD)
NEGVOCALIZATION: 0
BODYLANGUAGE: 1
FACIALEXPRESSION: 0
CONSOLABILITY: 0
BREATHING: 0
TOTALSCORE: 1

## 2020-12-21 ASSESSMENT — PAIN SCALES - GENERAL: PAINLEVEL_OUTOF10: 0

## 2020-12-21 NOTE — PROGRESS NOTES
Date: 12/20/2020    Time: 9:33 PM    Patient Placed On BIPAP/CPAP/ Non-Invasive Ventilation?   No    If no must comment      Comments: is refusing to wear BIPAP at this time        Nydia Valencia

## 2020-12-21 NOTE — CARE COORDINATION
Social Work/Discharge Planning:  Amauri with [facility] to start pre-cert today for LTAC. Will continue to follow and wait for pre-cert.   Electronically signed by TRUMAN Cuellar on 12/21/2020 at 9:23 AM

## 2020-12-21 NOTE — PROGRESS NOTES
Pulmonary Progress Note    Admit Date: 2020  Hospital day                               PCP: No primary care provider on file. Chief Complaint (s):  Patient Active Problem List   Diagnosis    Severe sepsis with septic shock ([address]    Dementia ([facility]    Metabolic encephalopathy    Diabetes mellitus type 2, uncontrolled ([address]    Hyperlipidemia LDL goal <100    Essential hypertension    Venous ulcer of left leg (HCC)    Non-pressure chronic ulcer of left lower leg with fat layer exposed ([address]    Non-pressure chronic ulcer left lower leg, limited to breakdown skin (HCC)    Severe protein-calorie malnutrition (HCC)    Non-pressure chronic ulcer right lower leg, limited to breakdown skin (HCC)    Pressure injury of contiguous region involving back, buttock, and hip, stage 2 ([address]    Pressure injury of calf, stage 2 ([address]    HCAP (healthcare-associated pneumonia)    Paroxysmal atrial fibrillation (HCC)    Acute respiratory failure with hypoxia ([facility]       Subjective:  · Pravin is a BiPAP this p.m. He looks pretty quiet.   Chest radiograph continues to show some modest edema      Vitals:  VITALS:  BP (!) 114/58   Pulse 72   Temp 98.8 °F (37.1 °C) (Axillary)   Resp 20   Ht 6' (1.829 m)   Wt 258 lb 8 oz (117.3 kg)   SpO2 97%   BMI 35.06 kg/m²     24HR INTAKE/OUTPUT:      Intake/Output Summary (Last 24 hours) at 2020 1724  Last data filed at 2020 1459  Gross per 24 hour   Intake 2177 ml   Output 1850 ml   Net 327 ml       24HR PULSE OXIMETRY RANGE:    SpO2  Av.3 %  Min: 90 %  Max: 97 %    Medications:  IV:   dextrose 75 mL/hr at 20 1807    dextrose         Scheduled Meds:   docusate sodium  1 enema Rectal Daily    polyethylene glycol  17 g Oral Daily    menthol-zinc oxide   Topical BID    [Held by provider] metoprolol tartrate  25 mg Per G Tube BID    atorvastatin  20 mg Per G Tube Nightly    metoprolol  2.5 mg Intravenous Q6H    ertapesangita [employer] IVPB  1 g Intravenous Q24H    heparin flush  3 mL Intravenous 2 times per day    linezolid  600 mg Per NG tube Q12H    senna  1 tablet PEG Tube BID    insulin lispro  0-12 Units Subcutaneous Q6H    docusate  100 mg Per G Tube BID    folic acid  1 mg Per G Tube Daily    ipratropium-albuterol  3 mL Inhalation Q4H While awake    latanoprost  1 drop Both Eyes Daily    pantoprazole  40 mg Intravenous Daily    And    sodium chloride (PF)  10 mL Intravenous Daily    hydrocortisone sodium succinate PF  50 mg Intravenous Daily    sodium chloride flush  10 mL Intravenous 2 times per day    enoxaparin  40 mg Subcutaneous Daily       Diet:   DIET TUBE FEED CONTINUOUS/CYCLIC NPO; Diabetic 1.5; Gastrostomy; Continuous; 20; 24     EXAM:  General: No distress. Sleeping  Eyes: PERRL. No sclera icterus. No conjunctival injection. ENT: No discharge. Pharynx clear. Neck: Trachea midline. Normal thyroid. Resp: No accessory muscle use. Few bibasilar rales. No wheezing. No rhonchi. CV: Regular rate. Regular rhythm. No murmur or rub. Abd: Non-tender. Non-distended. No masses. No organomegaly. Normal bowel sounds. Skin: Warm and dry. No nodule on exposed extremities. No rash on exposed extremities. Ext: No cyanosis, clubbing, edema  Lymph: No cervical LAD. No supraclavicular LAD. M/S: No cyanosis. No joint deformity. No clubbing. Neuro:  Positive pupils/gag/corneals. Normal pain response.        Results:  CBC:   Recent Labs     12/19/20 0440 12/20/20 0430 12/21/20 0430   WBC 6.0 6.1 6.6   HGB 10.0* 9.1* 8.6*   HCT 33.6* 31.1* 29.5*   MCV 91.6 92.8 93.1    334 324     BMP:   Recent Labs     12/19/20 0440 12/20/20 0430 12/21/20 0430   * 150* 151*   K 3.7 3.4* 3.1*   * 115* 114*   CO2 31* 31* 31*   PHOS 3.3 2.2* 2.0*   BUN 41* 39* 37*   CREATININE 1.3* 1.3* 1.2     LIVER PROFILE:   Recent Labs     12/19/20  0440 12/20/20  0430 12/21/20  0430   AST 12 11 17   ALT 10 9 9   BILITOT 0.3 0.2 0. 2   ALKPHOS 74 79 75     PT/INR: No results for input(s): PROTIME, INR in the last 72 hours. APTT: No results for input(s): APTT in the last 72 hours. Pathology:  1. N/A      Microbiology:  1. None    Recent ABG:   No results for input(s): PH, PO2, PCO2, HCO3, BE, O2SAT, METHB, O2HB, COHB, O2CON, HHB, THB in the last 72 hours. FiO2 : 40 %  I:E Ratio: 1:1.40    Recent Films:  XR CHEST PORTABLE   Final Result   Low lung volumes   Continued bilateral pulmonary interstitial infiltrates may reflect edema or   other etiologies. XR CHEST PORTABLE   Final Result   Slightly improving pulmonary vascular congestion. Stable atelectasis and/or   infiltrate at the left base. XR CHEST PORTABLE   Final Result   Slightly improving aeration at the left base. Pulmonary vascular congestion. XR CHEST PORTABLE   Final Result   Somewhat worsening aeration at the left base compatible with infiltrate   and/or atelectasis. Cardiomegaly. Borderline congestion of pulmonary   vascularity. CT ABDOMEN PELVIS WO CONTRAST Additional Contrast? None   Final Result   Multiple loops of mildly dilated small bowel possibly ileus versus early   small bowel obstruction. Follow-up study recommended. Sigmoid diverticulosis. Reticulonodular infiltrates at the lung bases and small left pleural effusion. Nonobstructing right renal stone. The findings were sent to the Radiology Results [address] at 5:20   pm on 12/17/2020to be communicated to a licensed caregiver. XR ABDOMEN FOR NG/OG/NE TUBE PLACEMENT   Final Result   Satisfactory placement of gastric PEG tube. XR CHEST PORTABLE   Final Result   1. Left perihilar and left lower lobe pneumonia. 2. Cardiomegaly. 3. Prominence of the interstitial markings.       XR CHEST PORTABLE   Final Result   Improved aeration of the right lung, with persistent infiltrate and/or   atelectasis in the left lower lung and possible left pleural effusion      XR CHEST PORTABLE   Final Result   1. Stable cardiomegaly. 2. Persistent bilateral airspace disease unchanged when compared to the prior   study. 3.      XR CHEST PORTABLE   Final Result   1. Cardiomegaly with findings of CHF. 2. Trace bilateral pleural effusions. CT Head WO Contrast   Final Result   No acute intracranial abnormality. Unchanged advanced cerebral atrophy and moderate chronic ischemic change in   the white matter. XR CHEST PORTABLE   Final Result   Distal end of the ET tube at approximately 1.3 cm from the level of the   emmanuel. The ET tube should be retracted 2-3 cm for optimal positioning. Cardiomegaly. Question hazy airspace disease in the left lung. Radiographic follow-up   recommended. Assessment:  1. Healthcare acquired pneumonia. Sputum with mixed yang. Klebsiella per ID, infiltrates persist on CT scan of the abdomen. 2. Hypoxic and hypercapnic respiratory failure: Improved  3. Underlying COPD  4. Mild dementia  5. Free water deficit        Plan:  1. Antibiotics per infectious disease  2. Continue aerosol treatments  3. Nocturnal ventilation/BiPAP  4. Okay to LTAC once bed is available. 5. Continue D5W     Time at the bedside, reviewing labs and radiographs, reviewing updated notes and consultations, discussing with staff and family was more than 35 minutes. Please note that voice recognition technology was used in the preparation of this note and make therefore it may contain inadvertent transcription errors. If the patient is a COVID 19 isolation patient, the above physical exam reflects that of the examining physician for the day. Figueroa Rubio M.D., F.C.C.P.     [facility] [address], [address] [place] [address]

## 2020-12-21 NOTE — PROGRESS NOTES
Date: 12/21/2020    Time: 4:27 PM    Patient Placed On BIPAP/CPAP/ Non-Invasive Ventilation? Yes    If no must comment. Facial area red/color change? No           If YES are Blister/Lesion present? No   If yes must notify nursing staff  BIPAP/CPAP skin barrier?   Yes    Skin barrier type:mepilexlite       Comments:        Candelario Diaz

## 2020-12-21 NOTE — PROGRESS NOTES
were noted. HEENT: Eyes show round, and reactive pupils. No jaundice. Moist mucous membranes, no ulcerations, no thrush. Neck: Supple to movements. Chest: No respiratory distress. No crackles heard. Cardiovascular: Heart sounds rhythmic and regular. Abdomen: Positive bowel sounds to auscultation. Benign to palpation. PEG. Extremities:  Minimal edema. Lines:  Left PICC 12/18/2020. [place] catheter.     Laboratory and Tests Review:  Lab Results   Component Value Date    WBC 6.6 12/21/2020    WBC 6.1 12/20/2020    WBC 6.0 12/19/2020    HGB 8.6 (L) 12/21/2020    HCT 29.5 (L) 12/21/2020    MCV 93.1 12/21/2020     12/21/2020     Lab Results   Component Value Date    NEUTROABS 4.55 12/21/2020    NEUTROABS 3.76 12/20/2020    NEUTROABS 3.71 12/19/2020     Lab Results   Component Value Date    CRP 25.0 (H) 12/14/2020    CRP 18.4 (H) 12/13/2020    CRP 13.3 (H) 04/09/2019     No results found for: [facility]  Lab Results   Component Value Date    SEDRATE 48 (H) 12/14/2020    SEDRATE 23 (H) 12/13/2020    SEDRATE 55 (H) 04/09/2019     Lab Results   Component Value Date    ALT 9 12/21/2020    AST 17 12/21/2020     (H) 07/18/2018    ALKPHOS 75 12/21/2020    BILITOT 0.2 12/21/2020     Lab Results   Component Value Date     12/21/2020    K 3.1 12/21/2020    K 3.9 12/14/2020     12/21/2020    CO2 31 12/21/2020    BUN 37 12/21/2020    CREATININE 1.2 12/21/2020    CREATININE 1.3 12/20/2020    CREATININE 1.3 12/19/2020    GFRAA >60 12/21/2020    LABGLOM 58 12/21/2020    GLUCOSE 137 12/21/2020    GLUCOSE 138 03/26/2011    PROT 5.1 12/21/2020    LABALBU 2.5 12/21/2020    LABALBU 4.0 03/26/2011    CALCIUM 8.1 12/21/2020    BILITOT 0.2 12/21/2020    ALKPHOS 75 12/21/2020    AST 17 12/21/2020    ALT 9 12/21/2020     Radiology:  Chest x-ray reviewed    Microbiology:   Nares screen MRSA: Positive  Streptococcus pneumoniae/Legionella urine Ag: negative  Blood cultures 12/13/2020: Negative x2  Respiratory culture 12/14/2020: MRSA, ESBL + Klebsiella pneumoniae  Urine culture 12/13/2020: Negative so far respiratory panel (including SARS-CoV-2): Not detected    ASSESSMENT:  · HCAP.   Culture growing MRSA and ESBL + Klebsiella  · Respiratory failure, resolved  · Leukocytosis associated to the above, resolved    PLAN:  · Continue oral Linezolid until 12/30/2020  · Continue Ertapenem until 12/25/2020  · Starting precertification to go to [facility]  8:50 AM  12/21/2020

## 2020-12-21 NOTE — PROGRESS NOTES
12/21/2020  12:58 PM      Comprehensive Nutrition Assessment    Type and Reason for Visit:  Reassess    Nutrition Recommendations/Plan: Continue current TF, as tolerated    Nutrition Assessment:  Ileus has resolved and TF resumed to goal rate via PEG. Recommend continue same and will monitor tolerance    Malnutrition Assessment:  Malnutrition Status:   At risk for malnutrition (Comment)    Context:  Chronic Illness     Findings of the 6 clinical characteristics of malnutrition:  Energy Intake:  Mild decrease in energy intake (Comment)(NPO at admit until TF order)  Weight Loss:  Unable to assess     Body Fat Loss:  Unable to assess     Muscle Mass Loss:  Unable to assess    Fluid Accumulation:  No significant fluid accumulation     Strength:  Not Performed    Estimated Daily Nutrient Needs:  Energy (kcal):  ; Weight Used for Energy Requirements:  Admission     Protein (g):  105-125; Weight Used for Protein Requirements:  Ideal        Fluid (ml/day):  ; Method Used for Fluid Requirements:  1 ml/kcal      Nutrition Related Findings:  Pt refusing BiPap, PEG to TF, distended abd w/+BS and +BM, trace edema, A&Ox1      Wounds:  Wound Consult Pending(non-blanchable purple/pink at coccyx, redness on nose (pt ripping off mask))       Current Nutrition Therapies:    Current Tube Feeding (TF) Orders:  · Feeding Route: Gastrostomy  · Formula: 1.5 Diabetic  · Schedule: Continuous  · Additives/Modulars: (none)  · Water Flushes: per protocol = 180 ml/d  · Current TF & Flush Orders Provides: at goal  · Goal TF & Flush Orders Provides: 1440 ml/d, 2160 leeanna, 119 g pro, 1273 ml total free water      Anthropometric Measures:  · Height: 6' (182.9 cm)  · Current Body Weight: 258 lb (117 kg)(12/21 bedscale)   · Admission Body Weight: 243 lb (110.2 kg)(12/13 bedscale)    · Usual Body Weight: (Unable to confirm w/actual wts- stated 250# on past admit)     · Ideal Body Weight: 178 lbs; % Ideal Body Weight 144.9 %   · BMI: 35  · BMI Categories: Obese Class 1 (BMI 30.0-34. 9)       Nutrition Diagnosis:   · Inadequate oral intake related to cognitive or neurological impairment(hx dementia w/PEG because poor PO) as evidenced by NPO or clear liquid status due to medical condition, nutrition support - enteral nutrition      Nutrition Interventions:   Food and/or Nutrient Delivery:  Continue Current Tube Feeding  Nutrition Education/Counseling:  Education not indicated   Coordination of Nutrition Care:  Continue to monitor while inpatient    Goals:  Pt will cher EN at goal rate       Nutrition Monitoring and Evaluation:   Behavioral-Environmental Outcomes:  None Identified   Food/Nutrient Intake Outcomes:  Enteral Nutrition Intake/Tolerance  Physical Signs/Symptoms Outcomes:  Biochemical Data, GI Status, Fluid Status or Edema, Weight, Skin, Nutrition Focused Physical Findings     Discharge Planning:    Enteral Nutrition     Electronically signed by Vernon Spann RD, CNSC, LD on 12/21/20 at 12:58 PM EST    Contact: [phone]

## 2020-12-21 NOTE — PROGRESS NOTES
PT SEEN AND EXAMINED. Chart reviewed. meds reviewed. D/w nursing + family as available. EXAM: IN GENERAL, NAD. AWAKE AND ALERT. ROS NEGx10 EXCEPT:   /62   Pulse 72   Temp 97.8 °F (36.6 °C) (Oral)   Resp 16   Ht 6' (1.829 m)   Wt 258 lb 8 oz (117.3 kg)   SpO2 97%   BMI 35.06 kg/m²   GEN: A+O NAD. HEENT: NCAT. EOMI. VIRGIE  NECK: NO JVD. TRACH MIDLINE. NO BRUITS. NO THYROMEGALY. LUNGS: CTA BL NO RALES, RHONCHI OR WHEEZES. GOOD EXCURSION. CV: Regular rate and rhythm, NO Murmurs, Rubs, Or gallops  ABD: Soft. Nontender. Normal bowel sounds. No organomegaly  EXT:No clubbing cyanosis or edema  Neuro: Alert and oriented x 3. No focal motor deficits. No sensory deficits. Reflexes appear intact.   Labs/data reviewedLABS: CBC with Differential:    Lab Results   Component Value Date    WBC 6.6 12/21/2020    RBC 3.17 12/21/2020    HGB 8.6 12/21/2020    HCT 29.5 12/21/2020     12/21/2020    MCV 93.1 12/21/2020    MCH 27.1 12/21/2020    MCHC 29.2 12/21/2020    RDW 17.6 12/21/2020    NRBC 0.0 12/21/2020    SEGSPCT 64 09/27/2013    METASPCT 0.9 12/21/2020    LYMPHOPCT 19.1 12/21/2020    PROMYELOPCT 0.9 12/21/2020    MONOPCT 9.6 12/21/2020    MYELOPCT 1.7 12/21/2020    BASOPCT 0.0 12/21/2020    MONOSABS 0.66 12/21/2020    LYMPHSABS 1.25 12/21/2020    EOSABS 0.11 12/21/2020    BASOSABS 0.00 12/21/2020     Platelets:    Lab Results   Component Value Date     12/21/2020     CMP:    Lab Results   Component Value Date     12/21/2020    K 3.1 12/21/2020    K 3.9 12/14/2020     12/21/2020    CO2 31 12/21/2020    BUN 37 12/21/2020    CREATININE 1.2 12/21/2020    GFRAA >60 12/21/2020    LABGLOM 58 12/21/2020    GLUCOSE 137 12/21/2020    GLUCOSE 138 03/26/2011    PROT 5.1 12/21/2020    LABALBU 2.5 12/21/2020    LABALBU 4.0 03/26/2011    CALCIUM 8.1 12/21/2020    BILITOT 0.2 12/21/2020    ALKPHOS 75 12/21/2020    AST 17 12/21/2020    ALT 9 12/21/2020     Magnesium:    Lab Results   Component Value Date MG 2.5 12/21/2020     LDH:  No results found for: LDH  PT/INR:    Lab Results   Component Value Date    PROTIME 15.1 12/13/2020    INR 1.3 12/13/2020     Last 3 Troponin:    Lab Results   Component Value Date    TROPONINI 0.03 12/13/2020    TROPONINI 0.01 04/09/2019    TROPONINI <0.01 01/08/2019     ABG:    Lab Results   Component Value Date    PH 7.407 12/16/2020    PCO2 34.9 12/16/2020    PO2 68.2 12/16/2020    HCO3 21.5 12/16/2020    BE -2.7 12/16/2020    O2SAT 93.8 12/16/2020     IRON:  No results found for: IRON  IMAGING    Echo Limited    Result Date: 12/16/2020  Transthoracic Echocardiography Report (TTE)  Demographics   Patient Name    [facility]       Gender            Male                  [address]  [id number]      Room Number       9196  Number   Account #       [de-identified]     Procedure Date    12/16/2020   Corporate ID                  Ordering          Manisha Hernandez MD                                Physician   Accession       [id number]    Referring         [address] Volino DO  Number                        Physician   Date of Birth   1940    Sonographer       Marlo Koyanagi CEZAR   Age             78 year(s)    Interpreting      [address] [place] [address]                                Physician         Physician Cardiology                                                  Manisha Hernandez MD                                 Any Other  Procedure Type of Study   TTE procedure:Echo Limited Study. Procedure Date Date: 12/16/2020 Start: 10:47 AM Study Location: Portable Technical Quality: Poor visualization due to body habitus. Indications: Aortic stenosis, Pericardial effusion and Atrial fibrillation. Patient Status: Routine Contrast Medium: Definity. Height: 72 inches Weight: 260 pounds BSA: 2.38 m^2 BMI: 35.26 kg/m^2 HR: 100 bpm  Findings   Left Ventricle  Left ventricle is normal in size . Moderate left ventricular concentric hypertrophy noted.   Micro-bubble contrast injected to enhance left ventricular visualization. No regional wall motion abnormalities seen. Normal left ventricular ejection fraction. Ejection fraction is visually estimated at 55-60%. Indeterminate diastolic function. Right Ventricle  The right ventricle was not clearly visualized. Moderately dilated right ventricle. Right ventricle global systolic function is reduced . Left Atrium  The left atrium is moderately dilated. Interatrial septum was suboptimally visualized   Right Atrium  Right atrium is not clearly visualized. Mitral Valve  Structurally normal mitral valve. Mild mitral annular calcification. Tricuspid Valve  The tricuspid valve was not well visualized. Aortic Valve  The aortic valve appears mildly sclerotic. Mean transaortic gradient (Doppler) 14 mm Hg . Mild aortic stenosis. Pulmonic Valve  Pulmonic valve is structurally normal.   Pericardial Effusion  There is a small circumferential pericardial effusion noted. There is no evidence of right atrial or right ventricular collapse with  borderline respiratory variation. Aorta  Aortic root dimension within normal limits. Conclusions   Summary  Technically suboptimal and limited study. Left ventricle is normal in size . Moderate left ventricular concentric hypertrophy noted. No regional wall motion abnormalities seen. Normal left ventricular ejection fraction. The left atrium is moderately dilated. Mild mitral annular calcification. The aortic valve appears mildly sclerotic. Mild aortic stenosis. There is a small circumferential pericardial effusion noted.    Signature   ----------------------------------------------------------------  Electronically signed by Roldan Castro MD(Interpreting  physician) on 12/16/2020 01:32 PM  ----------------------------------------------------------------  M-Mode/2D Measurements & Calculations   LV Diastolic    LV Systolic Dimension: 2.4  AV Cusp Separation: 0.9 cmLA  Dimension: 3.5  cm Dimension: 4.1 cmAO Root  cm              LV Volume Diastolic: 50 ml  Dimension: 3.4 cm  LV FS:31.4 %    LV Volume Systolic: 39.0 ml  LV PW           LV EDV/LV EDV Index: 50  Diastolic: 0.9  FO/85 XR/Z^5BD ESV/LV ESV  cm              Index: 21.1 ml/9ml/ m^2     RV Diastolic Dimension: 3.4 cm  LV PW Systolic: EF Calculated: 97.3 %  1.3 cm          LV Mass Index: 40 l/min*m^2  Septum                                      LA volume/Index: 478 ml  Diastolic: 1 cm  Septum          LVOT: 2.1 cm  Systolic: 1.4  cm  CO: 6.55 l/min  CI: 2.14  l/m*m^2  LV Mass: 95.94  g  Doppler Measurements & Calculations    AV Peak Velocity: 2.55 m/s LVOT Peak Velocity: 0.78 m/s   AV Peak Gradient: 26.05    LVOT Mean Velocity: 0.5 m/s   mmHg                       LVOT Peak Gradient: 2.5 mmHgLVOT Mean   AV Mean Velocity: 1.74 m/s Gradient: 1.2 mmHg   AV Mean Gradient: 14.1   mmHg   AV VTI: 41 cm   AV Area (Continuity):1.24   cm^2    LVOT VTI: 14.7 cm  [email or web address] [id number]%3d    Ct Abdomen Pelvis Wo Contrast Additional Contrast? None    Result Date: 12/17/2020  EXAMINATION: CT OF THE ABDOMEN AND PELVIS WITHOUT CONTRAST 12/17/2020 4:47 pm TECHNIQUE: CT of the abdomen and pelvis was performed without the administration of intravenous contrast. Multiplanar reformatted images are provided for review. Dose modulation, iterative reconstruction, and/or weight based adjustment of the mA/kV was utilized to reduce the radiation dose to as low as reasonably achievable. COMPARISON: None. HISTORY: ORDERING SYSTEM PROVIDED HISTORY: abdominal distention TECHNOLOGIST PROVIDED HISTORY: Reason for exam:->abdominal distention Additional Contrast?->None FINDINGS: Lower Chest: Reticular infiltrates. Mild left pleural effusion. Organs: Bilateral renal cysts. Punctate calcification in the interpolar region of the right kidney.  GI/Bowel: Mildly dilated loops small bowel. Gastrostomy tube in place. Pelvis: Ballesteros catheter in collapsed bladder. Sigmoid diverticulosis with no evidence of diverticulitis. The rectum is impacted with stool. Peritoneum/Retroperitoneum: No ascites or adenopathy. Bones/Soft Tissues: Demineralized bones. Degenerative changes in the lower lumbar spine. Left hip fixation screw. Multiple loops of mildly dilated small bowel possibly ileus versus early small bowel obstruction. Follow-up study recommended. Sigmoid diverticulosis. Reticulonodular infiltrates at the lung bases and small left pleural effusion. Nonobstructing right renal stone. The findings were sent to the Radiology Results [address] at 5:20 pm on 12/17/2020to be communicated to a licensed caregiver. Ct Head Wo Contrast    Result Date: 12/13/2020  EXAMINATION: CT OF THE HEAD WITHOUT CONTRAST  12/13/2020 4:27 am TECHNIQUE: CT of the head was performed without the administration of intravenous contrast. Dose modulation, iterative reconstruction, and/or weight based adjustment of the mA/kV was utilized to reduce the radiation dose to as low as reasonably achievable. COMPARISON: 11/16/2019 HISTORY: ORDERING SYSTEM PROVIDED HISTORY: AMS TECHNOLOGIST PROVIDED HISTORY: Reason for exam:->AMS Has a \"code stroke\" or \"stroke alert\" been called? ->No FINDINGS: BRAIN/VENTRICLES: There is advanced cerebral atrophy and moderate chronic ischemic change in the white matter. There is no acute intracranial hemorrhage, mass effect or midline shift. No abnormal extra-axial fluid collection. The gray-white differentiation is maintained without evidence of an acute infarct. Ventricular size is appropriate for brain volume. ORBITS: The visualized portion of the orbits demonstrate no acute abnormality. SINUSES: The visualized paranasal sinuses and mastoid air cells demonstrate no acute abnormality.  SOFT TISSUES/SKULL:  No acute abnormality of the visualized skull or soft tissues. No acute intracranial abnormality. Unchanged advanced cerebral atrophy and moderate chronic ischemic change in the white matter. Xr Chest Portable    Result Date: 12/20/2020  EXAMINATION: ONE XRAY VIEW OF THE CHEST 12/20/2020 6:25 am COMPARISON: 19 December 2020 HISTORY: ORDERING SYSTEM PROVIDED HISTORY: resp failure TECHNOLOGIST PROVIDED HISTORY: Reason for exam:->resp failure FINDINGS: Heart is enlarged. Pulmonary vascularity is congested but appears slightly improved. Atelectasis and/or infiltrate at the left base is stable. Left-sided PICC line is unchanged. Slightly improving pulmonary vascular congestion. Stable atelectasis and/or infiltrate at the left base. Xr Chest Portable    Result Date: 12/19/2020  EXAMINATION: ONE XRAY VIEW OF THE CHEST 12/19/2020 6:38 am COMPARISON: December 18, 2020 HISTORY: ORDERING SYSTEM PROVIDED HISTORY: resp failure TECHNOLOGIST PROVIDED HISTORY: Reason for exam:->resp failure FINDINGS: Somewhat improving aeration at the left base may represent re-expansion of atelectasis and/or waning infiltrate. There is pulmonary vascular congestion as before. Slightly improving aeration at the left base. Pulmonary vascular congestion. Xr Chest Portable    Result Date: 12/18/2020  EXAMINATION: ONE XRAY VIEW OF THE CHEST 12/18/2020 5:42 am COMPARISON: 17 December 2020 HISTORY: ORDERING SYSTEM PROVIDED HISTORY: resp failure TECHNOLOGIST PROVIDED HISTORY: Reason for exam:->resp failure FINDINGS: Worsening aeration at the left base which may relate to infiltrate and/or atelectasis. Heart is enlarged. Pulmonary vascularity appears borderline congested. Somewhat worsening aeration at the left base compatible with infiltrate and/or atelectasis. Cardiomegaly. Borderline congestion of pulmonary vascularity.     Xr Chest Portable    Result Date: 12/17/2020  EXAMINATION: ONE XRAY VIEW OF THE CHEST 12/17/2020 7:17 am COMPARISON: 12/16/2020 HISTORY: [address] PROVIDED HISTORY: resp failure TECHNOLOGIST PROVIDED HISTORY: Reason for exam:->resp failure FINDINGS: The heart is enlarged. Suboptimal inspiration was obtained for the chest x-ray. There is a left perihilar and left lower lobe infiltrate. There is a small left pleural effusion. The interstitial markings are prominent. 1. Left perihilar and left lower lobe pneumonia. 2. Cardiomegaly. 3. Prominence of the interstitial markings. Xr Chest Portable    Result Date: 12/16/2020  EXAMINATION: ONE XRAY VIEW OF THE CHEST 12/16/2020 6:17 am COMPARISON: 12/15/2020 HISTORY: ORDERING SYSTEM PROVIDED HISTORY: resp failure TECHNOLOGIST PROVIDED HISTORY: Reason for exam:->resp failure FINDINGS: Endotracheal tube has been removed. Stable prominent cardiac silhouette. There is improved aeration of the right lung, with persistent airspace infiltrate and/or atelectasis in the left lower lung. Possible small left pleural effusion. No pneumothorax is seen. No acute osseous abnormality. Improved aeration of the right lung, with persistent infiltrate and/or atelectasis in the left lower lung and possible left pleural effusion    Xr Chest Portable    Result Date: 12/15/2020  EXAMINATION: ONE XRAY VIEW OF THE CHEST 12/15/2020 7:48 am COMPARISON: 12/14/2020 HISTORY: ORDERING SYSTEM PROVIDED HISTORY: resp failure TECHNOLOGIST PROVIDED HISTORY: Reason for exam:->resp failure FINDINGS: The heart is enlarged. There is satisfactory position of the endotracheal tube. Persistent bilateral patchy airspace disease is noted unchanged compared to the prior study. No gross pleural effusion is noted. 1. Stable cardiomegaly. 2. Persistent bilateral airspace disease unchanged when compared to the prior study.  3.    Xr Chest Portable    Result Date: 12/14/2020  EXAMINATION: ONE XRAY VIEW OF THE CHEST 12/14/2020 6:53 am COMPARISON: 12/13/2020 HISTORY: ORDERING SYSTEM PROVIDED HISTORY: intubated TECHNOLOGIST PROVIDED HISTORY: Reason for exam:->intubated FINDINGS: The heart is enlarged. The endotracheal tube is in satisfactory position. There are findings of CHF. There are trace bilateral pleural effusions. 1. Cardiomegaly with findings of CHF. 2. Trace bilateral pleural effusions. Xr Chest Portable    Result Date: 12/13/2020  EXAMINATION: ONE XRAY VIEW OF THE CHEST 12/13/2020 12:32 am COMPARISON: November 16, 2019 HISTORY: ORDERING SYSTEM PROVIDED HISTORY: sob, intubation TECHNOLOGIST PROVIDED HISTORY: Reason for exam:->sob, intubation FINDINGS: Status post intubation. ETT with the distal tip at approximately 1.3 cm from the level of the emmanuel. ET tube should be retracted approximately 2-3 cm for optimal positioning. Cardiac silhouette is enlarged. Decreased lung volumes. Pulmonary vasculature within normal limits. Questionable hazy airspace disease in the left lung. Distal end of the ET tube at approximately 1.3 cm from the level of the emmanuel. The ET tube should be retracted 2-3 cm for optimal positioning. Cardiomegaly. Question hazy airspace disease in the left lung. Radiographic follow-up recommended. Xr Abdomen For Ng/og/ne Tube Placement    Result Date: 12/17/2020  EXAMINATION: ONE SUPINE XRAY VIEW(S) OF THE ABDOMEN 12/17/2020 2:11 pm COMPARISON: January 9, 2020 HISTORY: ORDERING SYSTEM PROVIDED HISTORY: Confirmation of course of G tube and location of tip of tube TECHNOLOGIST PROVIDED HISTORY: Please administer GASTROGRAFIN through G tube to confirm proper location within the stomach Reason for exam:->Confirmation of course of G tube and location of tip of tube Portable? ->Yes FINDINGS: Following injection of Gastrografin through gastric PEG tube, there is opacification of the stomach and small bowel mucosal folds. Satisfactory placement of gastric PEG tube.       DIET:  DIET TUBE FEED CONTINUOUS/CYCLIC NPO; Diabetic 1.5; Gastrostomy; Continuous; 20; 24    Medications:    Scheduled Meds:   docusate sodium  1 enema Rectal Daily    polyethylene glycol  17 g Oral Daily    menthol-zinc oxide   Topical BID    [Held by provider] metoprolol tartrate  25 mg Per G Tube BID    atorvastatin  20 mg Per G Tube Nightly    metoprolol  2.5 mg Intravenous Q6H    ertapenem (INVanz) IVPB  1 g Intravenous Q24H    heparin flush  3 mL Intravenous 2 times per day    linezolid  600 mg Per NG tube Q12H    senna  1 tablet PEG Tube BID    insulin lispro  0-12 Units Subcutaneous Q6H    docusate  100 mg Per G Tube BID    folic acid  1 mg Per G Tube Daily    ipratropium-albuterol  3 mL Inhalation Q4H While awake    latanoprost  1 drop Both Eyes Daily    pantoprazole  40 mg Intravenous Daily    And    sodium chloride (PF)  10 mL Intravenous Daily    hydrocortisone sodium succinate PF  50 mg Intravenous Daily    sodium chloride flush  10 mL Intravenous 2 times per day    enoxaparin  40 mg Subcutaneous Daily       Continuous Infusions:   dextrose 75 mL/hr at 12/20/20 1807    dextrose         PRN Meds:glucose, dextrose, glucagon (rDNA), dextrose, sodium chloride flush, heparin flush, promethazine, sodium chloride flush, promethazine **OR** ondansetron, acetaminophen **OR** acetaminophen    A/P:      Patient Active Problem List   Diagnosis    Severe sepsis with septic shock (HCC)    Dementia (HCC)    Metabolic encephalopathy    Diabetes mellitus type 2, uncontrolled ([address]    Hyperlipidemia LDL goal <100    Essential hypertension    Venous ulcer of left leg (HCC)    Non-pressure chronic ulcer of left lower leg with fat layer exposed ([address]    Non-pressure chronic ulcer left lower leg, limited to breakdown skin (HCC)    Severe protein-calorie malnutrition (HCC)    Non-pressure chronic ulcer right lower leg, limited to breakdown skin (HCC)    Pressure injury of contiguous region involving back, buttock, and hip, stage 2 (HCC)    Pressure injury of calf, stage 2 ([address]    HCAP (healthcare-associated pneumonia)    Paroxysmal atrial fibrillation ([address]    Acute respiratory failure with hypoxia ([address]      PLAN:  dc when bed avail at [facility]   incr free h2o  I can be reached though [employer] [facility] or [facility] at [phone]

## 2020-12-22 LAB
ALBUMIN SERPL-MCNC: 2.5 G/DL (ref 3.5–5.2)
ALP BLD-CCNC: 76 U/L (ref 40–129)
ALT SERPL-CCNC: 11 U/L (ref 0–40)
ANION GAP SERPL CALCULATED.3IONS-SCNC: 9 MMOL/L (ref 7–16)
AST SERPL-CCNC: 18 U/L (ref 0–39)
BASOPHILS ABSOLUTE: 0.02 E9/L (ref 0–0.2)
BASOPHILS RELATIVE PERCENT: 0.2 % (ref 0–2)
BILIRUB SERPL-MCNC: 0.2 MG/DL (ref 0–1.2)
BUN BLDV-MCNC: 35 MG/DL (ref 8–23)
CALCIUM SERPL-MCNC: 8 MG/DL (ref 8.6–10.2)
CHLORIDE BLD-SCNC: 117 MMOL/L (ref 98–107)
CO2: 29 MMOL/L (ref 22–29)
CREAT SERPL-MCNC: 1.1 MG/DL (ref 0.7–1.2)
EOSINOPHILS ABSOLUTE: 0.32 E9/L (ref 0.05–0.5)
EOSINOPHILS RELATIVE PERCENT: 3.1 % (ref 0–6)
GFR AFRICAN AMERICAN: >60
GFR NON-AFRICAN AMERICAN: >60 ML/MIN/1.73
GLUCOSE BLD-MCNC: 143 MG/DL (ref 74–99)
HCT VFR BLD CALC: 32.2 % (ref 37–54)
HEMOGLOBIN: 9.4 G/DL (ref 12.5–16.5)
IMMATURE GRANULOCYTES #: 0.28 E9/L
IMMATURE GRANULOCYTES %: 2.7 % (ref 0–5)
LYMPHOCYTES ABSOLUTE: 2.02 E9/L (ref 1.5–4)
LYMPHOCYTES RELATIVE PERCENT: 19.7 % (ref 20–42)
MAGNESIUM: 2.2 MG/DL (ref 1.6–2.6)
MCH RBC QN AUTO: 27.3 PG (ref 26–35)
MCHC RBC AUTO-ENTMCNC: 29.2 % (ref 32–34.5)
MCV RBC AUTO: 93.6 FL (ref 80–99.9)
METER GLUCOSE: 106 MG/DL (ref 74–99)
METER GLUCOSE: 133 MG/DL (ref 74–99)
METER GLUCOSE: 170 MG/DL (ref 74–99)
METER GLUCOSE: 221 MG/DL (ref 74–99)
MONOCYTES ABSOLUTE: 0.64 E9/L (ref 0.1–0.95)
MONOCYTES RELATIVE PERCENT: 6.2 % (ref 2–12)
NEUTROPHILS ABSOLUTE: 6.97 E9/L (ref 1.8–7.3)
NEUTROPHILS RELATIVE PERCENT: 68.1 % (ref 43–80)
PDW BLD-RTO: 17.9 FL (ref 11.5–15)
PHOSPHORUS: 2 MG/DL (ref 2.5–4.5)
PLATELET # BLD: 313 E9/L (ref 130–450)
PMV BLD AUTO: 9.7 FL (ref 7–12)
POTASSIUM SERPL-SCNC: 3.5 MMOL/L (ref 3.5–5)
RBC # BLD: 3.44 E12/L (ref 3.8–5.8)
SODIUM BLD-SCNC: 155 MMOL/L (ref 132–146)
TOTAL PROTEIN: 5.1 G/DL (ref 6.4–8.3)
WBC # BLD: 10.3 E9/L (ref 4.5–11.5)

## 2020-12-22 PROCEDURE — 2060000000 HC ICU INTERMEDIATE R&B

## 2020-12-22 PROCEDURE — 85025 COMPLETE CBC W/AUTO DIFF WBC: CPT

## 2020-12-22 PROCEDURE — 6370000000 HC RX 637 (ALT 250 FOR IP): Performed by: INTERNAL MEDICINE

## 2020-12-22 PROCEDURE — 36415 COLL VENOUS BLD VENIPUNCTURE: CPT

## 2020-12-22 PROCEDURE — 2500000003 HC RX 250 WO HCPCS: Performed by: INTERNAL MEDICINE

## 2020-12-22 PROCEDURE — 80053 COMPREHEN METABOLIC PANEL: CPT

## 2020-12-22 PROCEDURE — 6360000002 HC RX W HCPCS: Performed by: INTERNAL MEDICINE

## 2020-12-22 PROCEDURE — 6370000000 HC RX 637 (ALT 250 FOR IP): Performed by: STUDENT IN AN ORGANIZED HEALTH CARE EDUCATION/TRAINING PROGRAM

## 2020-12-22 PROCEDURE — 83735 ASSAY OF MAGNESIUM: CPT

## 2020-12-22 PROCEDURE — 6360000002 HC RX W HCPCS: Performed by: SPECIALIST

## 2020-12-22 PROCEDURE — C9113 INJ PANTOPRAZOLE SODIUM, VIA: HCPCS | Performed by: INTERNAL MEDICINE

## 2020-12-22 PROCEDURE — 2580000003 HC RX 258: Performed by: INTERNAL MEDICINE

## 2020-12-22 PROCEDURE — 82962 GLUCOSE BLOOD TEST: CPT

## 2020-12-22 PROCEDURE — 84100 ASSAY OF PHOSPHORUS: CPT

## 2020-12-22 PROCEDURE — 94640 AIRWAY INHALATION TREATMENT: CPT

## 2020-12-22 PROCEDURE — 2580000003 HC RX 258: Performed by: SPECIALIST

## 2020-12-22 PROCEDURE — 94660 CPAP INITIATION&MGMT: CPT

## 2020-12-22 PROCEDURE — 2700000000 HC OXYGEN THERAPY PER DAY

## 2020-12-22 RX ADMIN — ENOXAPARIN SODIUM 40 MG: 40 INJECTION SUBCUTANEOUS at 09:26

## 2020-12-22 RX ADMIN — INSULIN LISPRO 4 UNITS: 100 INJECTION, SOLUTION INTRAVENOUS; SUBCUTANEOUS at 16:57

## 2020-12-22 RX ADMIN — SENNOSIDES 8.6 MG: 8.6 TABLET, FILM COATED ORAL at 21:14

## 2020-12-22 RX ADMIN — PANTOPRAZOLE SODIUM 40 MG: 40 INJECTION, POWDER, FOR SOLUTION INTRAVENOUS at 09:26

## 2020-12-22 RX ADMIN — LINEZOLID 600 MG: 100 SUSPENSION ORAL at 00:24

## 2020-12-22 RX ADMIN — DOCUSATE SODIUM 283 MG: 283 LIQUID RECTAL at 09:26

## 2020-12-22 RX ADMIN — ANORECTAL OINTMENT: 15.7; .44; 24; 20.6 OINTMENT TOPICAL at 21:15

## 2020-12-22 RX ADMIN — ERTAPENEM 1000 MG: 1 INJECTION INTRAMUSCULAR; INTRAVENOUS at 14:32

## 2020-12-22 RX ADMIN — SENNOSIDES 8.6 MG: 8.6 TABLET, FILM COATED ORAL at 09:26

## 2020-12-22 RX ADMIN — METOPROLOL TARTRATE 2.5 MG: 5 INJECTION INTRAVENOUS at 06:11

## 2020-12-22 RX ADMIN — DOCUSATE SODIUM 100 MG: 50 LIQUID ORAL at 21:15

## 2020-12-22 RX ADMIN — LINEZOLID 600 MG: 100 SUSPENSION ORAL at 13:20

## 2020-12-22 RX ADMIN — IPRATROPIUM BROMIDE AND ALBUTEROL SULFATE 3 ML: .5; 3 SOLUTION RESPIRATORY (INHALATION) at 08:52

## 2020-12-22 RX ADMIN — ATORVASTATIN CALCIUM 20 MG: 20 TABLET, FILM COATED ORAL at 21:14

## 2020-12-22 RX ADMIN — METOPROLOL TARTRATE 2.5 MG: 5 INJECTION INTRAVENOUS at 16:17

## 2020-12-22 RX ADMIN — IPRATROPIUM BROMIDE AND ALBUTEROL SULFATE 3 ML: .5; 3 SOLUTION RESPIRATORY (INHALATION) at 20:43

## 2020-12-22 RX ADMIN — DOCUSATE SODIUM 100 MG: 50 LIQUID ORAL at 09:26

## 2020-12-22 RX ADMIN — ANORECTAL OINTMENT: 15.7; .44; 24; 20.6 OINTMENT TOPICAL at 09:27

## 2020-12-22 RX ADMIN — Medication 300 UNITS: at 21:15

## 2020-12-22 RX ADMIN — SODIUM CHLORIDE, PRESERVATIVE FREE 10 ML: 5 INJECTION INTRAVENOUS at 16:18

## 2020-12-22 RX ADMIN — IPRATROPIUM BROMIDE AND ALBUTEROL SULFATE 3 ML: .5; 3 SOLUTION RESPIRATORY (INHALATION) at 12:57

## 2020-12-22 RX ADMIN — LATANOPROST 1 DROP: 50 SOLUTION OPHTHALMIC at 09:27

## 2020-12-22 RX ADMIN — POLYETHYLENE GLYCOL 3350 17 G: 17 POWDER, FOR SOLUTION ORAL at 09:28

## 2020-12-22 RX ADMIN — HYDROCORTISONE SODIUM SUCCINATE 50 MG: 100 INJECTION, POWDER, FOR SOLUTION INTRAMUSCULAR; INTRAVENOUS at 09:26

## 2020-12-22 RX ADMIN — Medication 10 ML: at 09:27

## 2020-12-22 RX ADMIN — METOPROLOL TARTRATE 2.5 MG: 5 INJECTION INTRAVENOUS at 10:52

## 2020-12-22 RX ADMIN — INSULIN LISPRO 2 UNITS: 100 INJECTION, SOLUTION INTRAVENOUS; SUBCUTANEOUS at 13:19

## 2020-12-22 RX ADMIN — IPRATROPIUM BROMIDE AND ALBUTEROL SULFATE 3 ML: .5; 3 SOLUTION RESPIRATORY (INHALATION) at 17:08

## 2020-12-22 RX ADMIN — FOLIC ACID 1 MG: 1 TABLET ORAL at 09:26

## 2020-12-22 RX ADMIN — METOPROLOL TARTRATE 2.5 MG: 5 INJECTION INTRAVENOUS at 00:22

## 2020-12-22 RX ADMIN — SODIUM CHLORIDE, PRESERVATIVE FREE 10 ML: 5 INJECTION INTRAVENOUS at 09:28

## 2020-12-22 ASSESSMENT — PAIN SCALES - GENERAL
PAINLEVEL_OUTOF10: 0

## 2020-12-22 NOTE — CARE COORDINATION
Social Work/Discharge Planning:  Roderick Davalos with [facility] confirmed patient pre-cert is pending. Will continue to follow and wait for pre-cert.   Electronically signed by Marlena Severs, LSW on 12/22/2020 at 10:55 AM

## 2020-12-22 NOTE — PROGRESS NOTES
Pulmonary Progress Note    Admit Date: 2020  Hospital day                               PCP: No primary care provider on file. Chief Complaint (s):  Patient Active Problem List   Diagnosis    Severe sepsis with septic shock ([facility]    Dementia ([facility]    Metabolic encephalopathy    Diabetes mellitus type 2, uncontrolled ([facility]    Hyperlipidemia LDL goal <100    Essential hypertension    Venous ulcer of left leg (HCC)    Non-pressure chronic ulcer of left lower leg with fat layer exposed ([facility]    Non-pressure chronic ulcer left lower leg, limited to breakdown skin (HCC)    Severe protein-calorie malnutrition (HCC)    Non-pressure chronic ulcer right lower leg, limited to breakdown skin (HCC)    Pressure injury of contiguous region involving back, buttock, and hip, stage 2 ([facility]    Pressure injury of calf, stage 2 ([facility]    HCAP (healthcare-associated pneumonia)    Paroxysmal atrial fibrillation (HCC)    Acute respiratory failure with hypoxia ([facility]       Subjective:  · He can alert this p.m. Time is without oxygen and in no distress. Sodium has increased significantly.       Vitals:  VITALS:  BP (!) 99/57   Pulse 78   Temp 98.2 °F (36.8 °C) (Oral)   Resp 19   Ht 6' (1.829 m)   Wt 259 lb 9 oz (117.7 kg)   SpO2 99%   BMI 35.20 kg/m²     24HR INTAKE/OUTPUT:      Intake/Output Summary (Last 24 hours) at 2020 1737  Last data filed at 2020 1622  Gross per 24 hour   Intake 1819 ml   Output 865 ml   Net 954 ml       24HR PULSE OXIMETRY RANGE:    SpO2  Av.8 %  Min: 94 %  Max: 99 %    Medications:  IV:   dextrose 125 mL/hr at 20 0919    dextrose         Scheduled Meds:   docusate sodium  1 enema Rectal Daily    polyethylene glycol  17 g Oral Daily    menthol-zinc oxide   Topical BID    [Held by provider] metoprolol tartrate  25 mg Per G Tube BID    atorvastatin  20 mg Per G Tube Nightly    metoprolol  2.5 mg Intravenous Q6H    ertapenem ([employer]Vanz) IVPB  1 g Intravenous Q24H    heparin flush  3 mL Intravenous 2 times per day    linezolid  600 mg Per NG tube Q12H    senna  1 tablet PEG Tube BID    insulin lispro  0-12 Units Subcutaneous Q6H    docusate  100 mg Per G Tube BID    folic acid  1 mg Per G Tube Daily    ipratropium-albuterol  3 mL Inhalation Q4H While awake    latanoprost  1 drop Both Eyes Daily    pantoprazole  40 mg Intravenous Daily    And    sodium chloride (PF)  10 mL Intravenous Daily    hydrocortisone sodium succinate PF  50 mg Intravenous Daily    sodium chloride flush  10 mL Intravenous 2 times per day    enoxaparin  40 mg Subcutaneous Daily       Diet:   DIET TUBE FEED CONTINUOUS/CYCLIC NPO; Diabetic 1.5; Gastrostomy; Continuous; 20; 24     EXAM:  General: No distress. Sleeping  Eyes: PERRL. No sclera icterus. No conjunctival injection. ENT: No discharge. Pharynx clear. Neck: Trachea midline. Normal thyroid. Resp: No accessory muscle use. Few bibasilar rales. No wheezing. No rhonchi. CV: Regular rate. Regular rhythm. No murmur or rub. Abd: Non-tender. Non-distended. No masses. No organomegaly. Normal bowel sounds. Skin: Warm and dry. No nodule on exposed extremities. No rash on exposed extremities. Ext: No cyanosis, clubbing, edema  Lymph: No cervical LAD. No supraclavicular LAD. M/S: No cyanosis. No joint deformity. No clubbing. Neuro:  Positive pupils/gag/corneals. Normal pain response.        Results:  CBC:   Recent Labs     12/20/20 0430 12/21/20 0430 12/22/20  0616   WBC 6.1 6.6 10.3   HGB 9.1* 8.6* 9.4*   HCT 31.1* 29.5* 32.2*   MCV 92.8 93.1 93.6    324 313     BMP:   Recent Labs     12/20/20 0430 12/21/20 0430 12/22/20  0616   * 151* 155*   K 3.4* 3.1* 3.5   * 114* 117*   CO2 31* 31* 29   PHOS 2.2* 2.0* 2.0*   BUN 39* 37* 35*   CREATININE 1.3* 1.2 1.1     LIVER PROFILE:   Recent Labs     12/20/20 0430 12/21/20  0430 12/22/20  0616   AST 11 17 18   ALT 9 9 11   BILITOT 0.2 0.2 0.2 ALKPHOS 79 75 76     PT/INR: No results for input(s): PROTIME, INR in the last 72 hours. APTT: No results for input(s): APTT in the last 72 hours. Pathology:  1. N/A      Microbiology:  1. None    Recent ABG:   No results for input(s): PH, PO2, PCO2, HCO3, BE, O2SAT, METHB, O2HB, COHB, O2CON, HHB, THB in the last 72 hours. FiO2 : 40 %  I:E Ratio: 1:1.40    Recent Films:  XR CHEST PORTABLE   Final Result   Low lung volumes   Continued bilateral pulmonary interstitial infiltrates may reflect edema or   other etiologies. XR CHEST PORTABLE   Final Result   Slightly improving pulmonary vascular congestion. Stable atelectasis and/or   infiltrate at the left base. XR CHEST PORTABLE   Final Result   Slightly improving aeration at the left base. Pulmonary vascular congestion. XR CHEST PORTABLE   Final Result   Somewhat worsening aeration at the left base compatible with infiltrate   and/or atelectasis. Cardiomegaly. Borderline congestion of pulmonary   vascularity. CT ABDOMEN PELVIS WO CONTRAST Additional Contrast? None   Final Result   Multiple loops of mildly dilated small bowel possibly ileus versus early   small bowel obstruction. Follow-up study recommended. Sigmoid diverticulosis. Reticulonodular infiltrates at the lung bases and small left pleural effusion. Nonobstructing right renal stone. The findings were sent to the Radiology Results [address] at 5:20   pm on 12/17/2020to be communicated to a licensed caregiver. XR ABDOMEN FOR NG/OG/NE TUBE PLACEMENT   Final Result   Satisfactory placement of gastric PEG tube. XR CHEST PORTABLE   Final Result   1. Left perihilar and left lower lobe pneumonia. 2. Cardiomegaly. 3. Prominence of the interstitial markings.       XR CHEST PORTABLE   Final Result   Improved aeration of the right lung, with persistent infiltrate and/or   atelectasis in the left lower lung and possible left pleural effusion      XR CHEST PORTABLE   Final Result   1. Stable cardiomegaly. 2. Persistent bilateral airspace disease unchanged when compared to the prior   study. 3.      XR CHEST PORTABLE   Final Result   1. Cardiomegaly with findings of CHF. 2. Trace bilateral pleural effusions. CT Head WO Contrast   Final Result   No acute intracranial abnormality. Unchanged advanced cerebral atrophy and moderate chronic ischemic change in   the white matter. XR CHEST PORTABLE   Final Result   Distal end of the ET tube at approximately 1.3 cm from the level of the   emmanuel. The ET tube should be retracted 2-3 cm for optimal positioning. Cardiomegaly. Question hazy airspace disease in the left lung. Radiographic follow-up   recommended. Assessment:  1. Healthcare acquired pneumonia. Sputum with mixed yang. Klebsiella per ID, infiltrates persist on CT scan of the abdomen. 2. Hypoxic and hypercapnic respiratory failure: Improved  3. Underlying COPD  4. Mild dementia  5. Free water deficit        Plan:  1. Antibiotics per infectious disease  2. Continue aerosol treatments  3. Nocturnal ventilation/BiPAP  4. Okay to LTAC once bed is available. 5. Continue D5W  6. Stop hydrocortisone     Time at the bedside, reviewing labs and radiographs, reviewing updated notes and consultations, discussing with staff and family was more than 35 minutes. Please note that voice recognition technology was used in the preparation of this note and make therefore it may contain inadvertent transcription errors. If the patient is a COVID 19 isolation patient, the above physical exam reflects that of the examining physician for the day. Leopold Lindsay, M.D., F.C.C.P.     [facility], [address] [place] [address], [address] [place] [address]

## 2020-12-22 NOTE — PROGRESS NOTES
Date: 12/21/2020    Time: 9:09 PM    Patient Placed On BIPAP/CPAP/ Non-Invasive Ventilation? Yes    If no must comment. Facial area red/color change? No           If YES are Blister/Lesion present? No   If yes must notify nursing staff  BIPAP/CPAP skin barrier?   Yes    Skin barrier type:mepilexlite       Comments:        Candelario Diaz

## 2020-12-22 NOTE — PROGRESS NOTES
PT SEEN AND EXAMINED. Chart reviewed. meds reviewed. D/w nursing + family as available. EXAM: IN GENERAL, NAD. AWAKE AND ALERT. ROS NEGx10 EXCEPT:   BP (!) 103/55   Pulse 74   Temp 98.3 °F (36.8 °C) (Axillary)   Resp 17   Ht 6' (1.829 m)   Wt 259 lb 9 oz (117.7 kg)   SpO2 98%   BMI 35.20 kg/m²   GEN: A+O NAD. HEENT: NCAT. EOMI. VIRGIE  NECK: NO JVD. TRACH MIDLINE. NO BRUITS. NO THYROMEGALY. LUNGS: CTA BL NO RALES, RHONCHI OR WHEEZES. GOOD EXCURSION. CV: Regular rate and rhythm, NO Murmurs, Rubs, Or gallops  ABD: Soft. Nontender. Normal bowel sounds. No organomegaly  EXT:No clubbing cyanosis or edema  Neuro: Alert and oriented x 3. No focal motor deficits. No sensory deficits. Reflexes appear intact.   Labs/data reviewedLABS: CBC with Differential:    Lab Results   Component Value Date    WBC 10.3 12/22/2020    RBC 3.44 12/22/2020    HGB 9.4 12/22/2020    HCT 32.2 12/22/2020     12/22/2020    MCV 93.6 12/22/2020    MCH 27.3 12/22/2020    MCHC 29.2 12/22/2020    RDW 17.9 12/22/2020    NRBC 0.0 12/21/2020    SEGSPCT 64 09/27/2013    METASPCT 0.9 12/21/2020    LYMPHOPCT 19.7 12/22/2020    PROMYELOPCT 0.9 12/21/2020    MONOPCT 6.2 12/22/2020    MYELOPCT 1.7 12/21/2020    BASOPCT 0.2 12/22/2020    MONOSABS 0.64 12/22/2020    LYMPHSABS 2.02 12/22/2020    EOSABS 0.32 12/22/2020    BASOSABS 0.02 12/22/2020     Platelets:    Lab Results   Component Value Date     12/22/2020     CMP:    Lab Results   Component Value Date     12/22/2020    K 3.5 12/22/2020    K 3.9 12/14/2020     12/22/2020    CO2 29 12/22/2020    BUN 35 12/22/2020    CREATININE 1.1 12/22/2020    GFRAA >60 12/22/2020    LABGLOM >60 12/22/2020    GLUCOSE 143 12/22/2020    GLUCOSE 138 03/26/2011    PROT 5.1 12/22/2020    LABALBU 2.5 12/22/2020    LABALBU 4.0 03/26/2011    CALCIUM 8.0 12/22/2020    BILITOT 0.2 12/22/2020    ALKPHOS 76 12/22/2020    AST 18 12/22/2020    ALT 11 12/22/2020     Magnesium:    Lab Results   Component Value Date    MG 2.2 12/22/2020     LDH:  No results found for: LDH  PT/INR:    Lab Results   Component Value Date    PROTIME 15.1 12/13/2020    INR 1.3 12/13/2020     Last 3 Troponin:    Lab Results   Component Value Date    TROPONINI 0.03 12/13/2020    TROPONINI 0.01 04/09/2019    TROPONINI <0.01 01/08/2019     ABG:    Lab Results   Component Value Date    PH 7.407 12/16/2020    PCO2 34.9 12/16/2020    PO2 68.2 12/16/2020    HCO3 21.5 12/16/2020    BE -2.7 12/16/2020    O2SAT 93.8 12/16/2020     IRON:  No results found for: IRON  IMAGING    Echo Limited    Result Date: 12/16/2020  Transthoracic Echocardiography Report (TTE)  Demographics   Patient Name    [facility]       Gender            Male                  [email or web address]  [id number]      Room Number       4524  Number   Account #       [de-identified]     Procedure Date    12/16/2020   Corporate ID                  Ordering          Rebeca Bailey MD                                Physician   Accession       [id number]    Referring         [address] Volino DO  Number                        Physician   Date of Birth   1940    Sonographer       Jhony Brown [facility]   Age             78 year(s)    Interpreting      [address] [place] [address]                                Physician         Physician Cardiology                                                  Rebeca Bailey MD                                 Any Other  Procedure Type of Study   TTE procedure:Echo Limited Study. Procedure Date Date: 12/16/2020 Start: 10:47 AM Study Location: Portable Technical Quality: Poor visualization due to body habitus. Indications: Aortic stenosis, Pericardial effusion and Atrial fibrillation. Patient Status: Routine Contrast Medium: Definity. Height: 72 inches Weight: 260 pounds BSA: 2.38 m^2 BMI: 35.26 kg/m^2 HR: 100 bpm  Findings   Left Ventricle  Left ventricle is normal in size . Moderate left ventricular concentric hypertrophy noted.   Micro-bubble contrast injected to enhance left ventricular visualization. No regional wall motion abnormalities seen. Normal left ventricular ejection fraction. Ejection fraction is visually estimated at 55-60%. Indeterminate diastolic function. Right Ventricle  The right ventricle was not clearly visualized. Moderately dilated right ventricle. Right ventricle global systolic function is reduced . Left Atrium  The left atrium is moderately dilated. Interatrial septum was suboptimally visualized   Right Atrium  Right atrium is not clearly visualized. Mitral Valve  Structurally normal mitral valve. Mild mitral annular calcification. Tricuspid Valve  The tricuspid valve was not well visualized. Aortic Valve  The aortic valve appears mildly sclerotic. Mean transaortic gradient (Doppler) 14 mm Hg . Mild aortic stenosis. Pulmonic Valve  Pulmonic valve is structurally normal.   Pericardial Effusion  There is a small circumferential pericardial effusion noted. There is no evidence of right atrial or right ventricular collapse with  borderline respiratory variation. Aorta  Aortic root dimension within normal limits. Conclusions   Summary  Technically suboptimal and limited study. Left ventricle is normal in size . Moderate left ventricular concentric hypertrophy noted. No regional wall motion abnormalities seen. Normal left ventricular ejection fraction. The left atrium is moderately dilated. Mild mitral annular calcification. The aortic valve appears mildly sclerotic. Mild aortic stenosis. There is a small circumferential pericardial effusion noted.    Signature   ----------------------------------------------------------------  Electronically signed by Robert Wilder MD(Interpreting  physician) on 12/16/2020 01:32 PM  ----------------------------------------------------------------  M-Mode/2D Measurements & Calculations   LV Diastolic    LV Systolic Dimension: 2.4  AV Cusp Separation: 0.9 cmLA  Dimension: 3.5  cm Dimension: 4.1 cmAO Root  cm              LV Volume Diastolic: 50 ml  Dimension: 3.4 cm  LV FS:31.4 %    LV Volume Systolic: 47.9 ml  LV PW           LV EDV/LV EDV Index: 50  Diastolic: 0.9  AZ/91 YL/V^3VX ESV/LV ESV  cm              Index: 21.1 ml/9ml/ m^2     RV Diastolic Dimension: 3.4 cm  LV PW Systolic: EF Calculated: 92.3 %  1.3 cm          LV Mass Index: 40 l/min*m^2  Septum                                      LA volume/Index: 988 ml  Diastolic: 1 cm  Septum          LVOT: 2.1 cm  Systolic: 1.4  cm  CO: 1.75 l/min  CI: 2.14  l/m*m^2  LV Mass: 95.94  g  Doppler Measurements & Calculations    AV Peak Velocity: 2.55 m/s LVOT Peak Velocity: 0.78 m/s   AV Peak Gradient: 26.05    LVOT Mean Velocity: 0.5 m/s   mmHg                       LVOT Peak Gradient: 2.5 mmHgLVOT Mean   AV Mean Velocity: 1.74 m/s Gradient: 1.2 mmHg   AV Mean Gradient: 14.1   mmHg   AV VTI: 41 cm   AV Area (Continuity):1.24   cm^2    LVOT VTI: 14.7 cm  [email or web address] [id number]    Ct Abdomen Pelvis Wo Contrast Additional Contrast? None    Result Date: 12/17/2020  EXAMINATION: CT OF THE ABDOMEN AND PELVIS WITHOUT CONTRAST 12/17/2020 4:47 pm TECHNIQUE: CT of the abdomen and pelvis was performed without the administration of intravenous contrast. Multiplanar reformatted images are provided for review. Dose modulation, iterative reconstruction, and/or weight based adjustment of the mA/kV was utilized to reduce the radiation dose to as low as reasonably achievable. COMPARISON: None. HISTORY: ORDERING SYSTEM PROVIDED HISTORY: abdominal distention TECHNOLOGIST PROVIDED HISTORY: Reason for exam:->abdominal distention Additional Contrast?->None FINDINGS: Lower Chest: Reticular infiltrates. Mild left pleural effusion. Organs: Bilateral renal cysts. Punctate calcification in the interpolar region of the right kidney.  GI/Bowel: Mildly dilated loops small bowel. Gastrostomy tube in place. Pelvis: Ballesteros catheter in collapsed bladder. Sigmoid diverticulosis with no evidence of diverticulitis. The rectum is impacted with stool. Peritoneum/Retroperitoneum: No ascites or adenopathy. Bones/Soft Tissues: Demineralized bones. Degenerative changes in the lower lumbar spine. Left hip fixation screw. Multiple loops of mildly dilated small bowel possibly ileus versus early small bowel obstruction. Follow-up study recommended. Sigmoid diverticulosis. Reticulonodular infiltrates at the lung bases and small left pleural effusion. Nonobstructing right renal stone. The findings were sent to the Radiology Results [address] at 5:20 pm on 12/17/2020to be communicated to a licensed caregiver. Ct Head Wo Contrast    Result Date: 12/13/2020  EXAMINATION: CT OF THE HEAD WITHOUT CONTRAST  12/13/2020 4:27 am TECHNIQUE: CT of the head was performed without the administration of intravenous contrast. Dose modulation, iterative reconstruction, and/or weight based adjustment of the mA/kV was utilized to reduce the radiation dose to as low as reasonably achievable. COMPARISON: 11/16/2019 HISTORY: ORDERING SYSTEM PROVIDED HISTORY: AMS TECHNOLOGIST PROVIDED HISTORY: Reason for exam:->AMS Has a \"code stroke\" or \"stroke alert\" been called? ->No FINDINGS: BRAIN/VENTRICLES: There is advanced cerebral atrophy and moderate chronic ischemic change in the white matter. There is no acute intracranial hemorrhage, mass effect or midline shift. No abnormal extra-axial fluid collection. The gray-white differentiation is maintained without evidence of an acute infarct. Ventricular size is appropriate for brain volume. ORBITS: The visualized portion of the orbits demonstrate no acute abnormality. SINUSES: The visualized paranasal sinuses and mastoid air cells demonstrate no acute abnormality.  SOFT TISSUES/SKULL:  No acute abnormality of the visualized skull or soft tissues. No acute intracranial abnormality. Unchanged advanced cerebral atrophy and moderate chronic ischemic change in the white matter. Xr Chest Portable    Result Date: 12/20/2020  EXAMINATION: ONE XRAY VIEW OF THE CHEST 12/20/2020 6:25 am COMPARISON: 19 December 2020 HISTORY: ORDERING SYSTEM PROVIDED HISTORY: resp failure TECHNOLOGIST PROVIDED HISTORY: Reason for exam:->resp failure FINDINGS: Heart is enlarged. Pulmonary vascularity is congested but appears slightly improved. Atelectasis and/or infiltrate at the left base is stable. Left-sided PICC line is unchanged. Slightly improving pulmonary vascular congestion. Stable atelectasis and/or infiltrate at the left base. Xr Chest Portable    Result Date: 12/19/2020  EXAMINATION: ONE XRAY VIEW OF THE CHEST 12/19/2020 6:38 am COMPARISON: December 18, 2020 HISTORY: ORDERING SYSTEM PROVIDED HISTORY: resp failure TECHNOLOGIST PROVIDED HISTORY: Reason for exam:->resp failure FINDINGS: Somewhat improving aeration at the left base may represent re-expansion of atelectasis and/or waning infiltrate. There is pulmonary vascular congestion as before. Slightly improving aeration at the left base. Pulmonary vascular congestion. Xr Chest Portable    Result Date: 12/18/2020  EXAMINATION: ONE XRAY VIEW OF THE CHEST 12/18/2020 5:42 am COMPARISON: 17 December 2020 HISTORY: ORDERING SYSTEM PROVIDED HISTORY: resp failure TECHNOLOGIST PROVIDED HISTORY: Reason for exam:->resp failure FINDINGS: Worsening aeration at the left base which may relate to infiltrate and/or atelectasis. Heart is enlarged. Pulmonary vascularity appears borderline congested. Somewhat worsening aeration at the left base compatible with infiltrate and/or atelectasis. Cardiomegaly. Borderline congestion of pulmonary vascularity.     Xr Chest Portable    Result Date: 12/17/2020  EXAMINATION: ONE XRAY VIEW OF THE CHEST 12/17/2020 7:17 am COMPARISON: 12/16/2020 HISTORY: ORDERING SYSTEM PROVIDED HISTORY: resp failure TECHNOLOGIST PROVIDED HISTORY: Reason for exam:->resp failure FINDINGS: The heart is enlarged. Suboptimal inspiration was obtained for the chest x-ray. There is a left perihilar and left lower lobe infiltrate. There is a small left pleural effusion. The interstitial markings are prominent. 1. Left perihilar and left lower lobe pneumonia. 2. Cardiomegaly. 3. Prominence of the interstitial markings. Xr Chest Portable    Result Date: 12/16/2020  EXAMINATION: ONE XRAY VIEW OF THE CHEST 12/16/2020 6:17 am COMPARISON: 12/15/2020 HISTORY: ORDERING SYSTEM PROVIDED HISTORY: resp failure TECHNOLOGIST PROVIDED HISTORY: Reason for exam:->resp failure FINDINGS: Endotracheal tube has been removed. Stable prominent cardiac silhouette. There is improved aeration of the right lung, with persistent airspace infiltrate and/or atelectasis in the left lower lung. Possible small left pleural effusion. No pneumothorax is seen. No acute osseous abnormality. Improved aeration of the right lung, with persistent infiltrate and/or atelectasis in the left lower lung and possible left pleural effusion    Xr Chest Portable    Result Date: 12/15/2020  EXAMINATION: ONE XRAY VIEW OF THE CHEST 12/15/2020 7:48 am COMPARISON: 12/14/2020 HISTORY: ORDERING SYSTEM PROVIDED HISTORY: resp failure TECHNOLOGIST PROVIDED HISTORY: Reason for exam:->resp failure FINDINGS: The heart is enlarged. There is satisfactory position of the endotracheal tube. Persistent bilateral patchy airspace disease is noted unchanged compared to the prior study. No gross pleural effusion is noted. 1. Stable cardiomegaly. 2. Persistent bilateral airspace disease unchanged when compared to the prior study.  3.    Xr Chest Portable    Result Date: 12/14/2020  EXAMINATION: ONE XRAY VIEW OF THE CHEST 12/14/2020 6:53 am COMPARISON: 12/13/2020 HISTORY: ORDERING SYSTEM PROVIDED HISTORY: intubated TECHNOLOGIST PROVIDED HISTORY: Reason for exam:->intubated FINDINGS: The heart is enlarged. The endotracheal tube is in satisfactory position. There are findings of CHF. There are trace bilateral pleural effusions. 1. Cardiomegaly with findings of CHF. 2. Trace bilateral pleural effusions. Xr Chest Portable    Result Date: 12/13/2020  EXAMINATION: ONE XRAY VIEW OF THE CHEST 12/13/2020 12:32 am COMPARISON: November 16, 2019 HISTORY: ORDERING SYSTEM PROVIDED HISTORY: sob, intubation TECHNOLOGIST PROVIDED HISTORY: Reason for exam:->sob, intubation FINDINGS: Status post intubation. ETT with the distal tip at approximately 1.3 cm from the level of the emmanuel. ET tube should be retracted approximately 2-3 cm for optimal positioning. Cardiac silhouette is enlarged. Decreased lung volumes. Pulmonary vasculature within normal limits. Questionable hazy airspace disease in the left lung. Distal end of the ET tube at approximately 1.3 cm from the level of the emmanuel. The ET tube should be retracted 2-3 cm for optimal positioning. Cardiomegaly. Question hazy airspace disease in the left lung. Radiographic follow-up recommended. Xr Abdomen For Ng/og/ne Tube Placement    Result Date: 12/17/2020  EXAMINATION: ONE SUPINE XRAY VIEW(S) OF THE ABDOMEN 12/17/2020 2:11 pm COMPARISON: January 9, 2020 HISTORY: ORDERING SYSTEM PROVIDED HISTORY: Confirmation of course of G tube and location of tip of tube TECHNOLOGIST PROVIDED HISTORY: Please administer GASTROGRAFIN through G tube to confirm proper location within the stomach Reason for exam:->Confirmation of course of G tube and location of tip of tube Portable? ->Yes FINDINGS: Following injection of Gastrografin through gastric PEG tube, there is opacification of the stomach and small bowel mucosal folds. Satisfactory placement of gastric PEG tube.       DIET:  DIET TUBE FEED CONTINUOUS/CYCLIC NPO; Diabetic 1.5; Gastrostomy; Continuous; 20; 24    Medications:    Scheduled Meds:   docusate sodium  1 enema Rectal Daily    polyethylene glycol  17 g Oral Daily    menthol-zinc oxide   Topical BID    [Held by provider] metoprolol tartrate  25 mg Per G Tube BID    atorvastatin  20 mg Per G Tube Nightly    metoprolol  2.5 mg Intravenous Q6H    ertapenem (INVanz) IVPB  1 g Intravenous Q24H    heparin flush  3 mL Intravenous 2 times per day    linezolid  600 mg Per NG tube Q12H    senna  1 tablet PEG Tube BID    insulin lispro  0-12 Units Subcutaneous Q6H    docusate  100 mg Per G Tube BID    folic acid  1 mg Per G Tube Daily    ipratropium-albuterol  3 mL Inhalation Q4H While awake    latanoprost  1 drop Both Eyes Daily    pantoprazole  40 mg Intravenous Daily    And    sodium chloride (PF)  10 mL Intravenous Daily    hydrocortisone sodium succinate PF  50 mg Intravenous Daily    sodium chloride flush  10 mL Intravenous 2 times per day    enoxaparin  40 mg Subcutaneous Daily       Continuous Infusions:   dextrose 75 mL/hr at 12/20/20 1807    dextrose         PRN Meds:glucose, dextrose, glucagon (rDNA), dextrose, sodium chloride flush, heparin flush, promethazine, sodium chloride flush, promethazine **OR** ondansetron, acetaminophen **OR** acetaminophen    A/P:      Patient Active Problem List   Diagnosis    Severe sepsis with septic shock (HCC)    Dementia (HCC)    Metabolic encephalopathy    Diabetes mellitus type 2, uncontrolled ([address]    Hyperlipidemia LDL goal <100    Essential hypertension    Venous ulcer of left leg (HCC)    Non-pressure chronic ulcer of left lower leg with fat layer exposed ([address]    Non-pressure chronic ulcer left lower leg, limited to breakdown skin (HCC)    Severe protein-calorie malnutrition (HCC)    Non-pressure chronic ulcer right lower leg, limited to breakdown skin (HCC)    Pressure injury of contiguous region involving back, buttock, and hip, stage 2 (HCC)    Pressure injury of calf, stage 2 ([address]    HCAP (healthcare-associated pneumonia)    Paroxysmal atrial fibrillation ([address]    Acute respiratory failure with hypoxia ([address]      PLAN:  dc when bed avail at [facility]   incr free h2o thru peg and d5w thru iv    I can be reached though [employer] or [facility] at [phone]

## 2020-12-22 NOTE — PROGRESS NOTES
Date: 12/22/2020    Time: 1:17 AM    Patient Placed On BIPAP/CPAP/ Non-Invasive Ventilation? No    If no must comment. Facial area red/color change? No           If YES are Blister/Lesion present? No   If yes must notify nursing staff  BIPAP/CPAP skin barrier? Yes    Skin barrier type:mepilexlite       Comments: Pt remains on BiPAP at this time, mepilex in place.         Padma White     12/22/20 0116   NIV Type   Skin Protection for O2 Device Yes   Mode Bilevel   Mask Type Full face mask   Mask Size Large   Settings/Measurements   IPAP 12 cmH20   CPAP/EPAP 6 cmH2O   Rate Ordered 12   Resp 17   FiO2  40 %   Vt Exhaled 702 mL   Minute Volume 17 Liters   Mask Leak (lpm) 61 lpm   Comfort Level Good   Using Accessory Muscles No

## 2020-12-23 ENCOUNTER — APPOINTMENT (OUTPATIENT)
Dept: GENERAL RADIOLOGY | Age: 80
DRG: 871 | End: 2020-12-23
Payer: COMMERCIAL

## 2020-12-23 VITALS
BODY MASS INDEX: 35.15 KG/M2 | TEMPERATURE: 97.8 F | RESPIRATION RATE: 18 BRPM | WEIGHT: 259.56 LBS | OXYGEN SATURATION: 92 % | HEIGHT: 72 IN | HEART RATE: 83 BPM | SYSTOLIC BLOOD PRESSURE: 116 MMHG | DIASTOLIC BLOOD PRESSURE: 60 MMHG

## 2020-12-23 LAB
ALBUMIN SERPL-MCNC: 2.6 G/DL (ref 3.5–5.2)
ALP BLD-CCNC: 82 U/L (ref 40–129)
ALT SERPL-CCNC: 9 U/L (ref 0–40)
ANION GAP SERPL CALCULATED.3IONS-SCNC: 8 MMOL/L (ref 7–16)
AST SERPL-CCNC: 15 U/L (ref 0–39)
BASOPHILS ABSOLUTE: 0.02 E9/L (ref 0–0.2)
BASOPHILS RELATIVE PERCENT: 0.2 % (ref 0–2)
BILIRUB SERPL-MCNC: 0.2 MG/DL (ref 0–1.2)
BUN BLDV-MCNC: 34 MG/DL (ref 8–23)
CALCIUM SERPL-MCNC: 8.1 MG/DL (ref 8.6–10.2)
CHLORIDE BLD-SCNC: 115 MMOL/L (ref 98–107)
CO2: 30 MMOL/L (ref 22–29)
CREAT SERPL-MCNC: 1.1 MG/DL (ref 0.7–1.2)
EOSINOPHILS ABSOLUTE: 0.4 E9/L (ref 0.05–0.5)
EOSINOPHILS RELATIVE PERCENT: 3.3 % (ref 0–6)
GFR AFRICAN AMERICAN: >60
GFR NON-AFRICAN AMERICAN: >60 ML/MIN/1.73
GLUCOSE BLD-MCNC: 127 MG/DL (ref 74–99)
HCT VFR BLD CALC: 30.5 % (ref 37–54)
HEMOGLOBIN: 8.9 G/DL (ref 12.5–16.5)
IMMATURE GRANULOCYTES #: 0.24 E9/L
IMMATURE GRANULOCYTES %: 2 % (ref 0–5)
LYMPHOCYTES ABSOLUTE: 1.89 E9/L (ref 1.5–4)
LYMPHOCYTES RELATIVE PERCENT: 15.8 % (ref 20–42)
MAGNESIUM: 2.2 MG/DL (ref 1.6–2.6)
MCH RBC QN AUTO: 27.2 PG (ref 26–35)
MCHC RBC AUTO-ENTMCNC: 29.2 % (ref 32–34.5)
MCV RBC AUTO: 93.3 FL (ref 80–99.9)
METER GLUCOSE: 131 MG/DL (ref 74–99)
METER GLUCOSE: 134 MG/DL (ref 74–99)
METER GLUCOSE: 141 MG/DL (ref 74–99)
MONOCYTES ABSOLUTE: 0.64 E9/L (ref 0.1–0.95)
MONOCYTES RELATIVE PERCENT: 5.4 % (ref 2–12)
NEUTROPHILS ABSOLUTE: 8.77 E9/L (ref 1.8–7.3)
NEUTROPHILS RELATIVE PERCENT: 73.3 % (ref 43–80)
PDW BLD-RTO: 17.9 FL (ref 11.5–15)
PHOSPHORUS: 1.9 MG/DL (ref 2.5–4.5)
PLATELET # BLD: 261 E9/L (ref 130–450)
PMV BLD AUTO: 9.2 FL (ref 7–12)
POTASSIUM SERPL-SCNC: 3.4 MMOL/L (ref 3.5–5)
RBC # BLD: 3.27 E12/L (ref 3.8–5.8)
SODIUM BLD-SCNC: 153 MMOL/L (ref 132–146)
TOTAL PROTEIN: 5.1 G/DL (ref 6.4–8.3)
WBC # BLD: 12 E9/L (ref 4.5–11.5)

## 2020-12-23 PROCEDURE — 6370000000 HC RX 637 (ALT 250 FOR IP): Performed by: INTERNAL MEDICINE

## 2020-12-23 PROCEDURE — C9113 INJ PANTOPRAZOLE SODIUM, VIA: HCPCS | Performed by: INTERNAL MEDICINE

## 2020-12-23 PROCEDURE — 83735 ASSAY OF MAGNESIUM: CPT

## 2020-12-23 PROCEDURE — 85025 COMPLETE CBC W/AUTO DIFF WBC: CPT

## 2020-12-23 PROCEDURE — 2580000003 HC RX 258: Performed by: INTERNAL MEDICINE

## 2020-12-23 PROCEDURE — 84100 ASSAY OF PHOSPHORUS: CPT

## 2020-12-23 PROCEDURE — 71045 X-RAY EXAM CHEST 1 VIEW: CPT

## 2020-12-23 PROCEDURE — 6370000000 HC RX 637 (ALT 250 FOR IP): Performed by: STUDENT IN AN ORGANIZED HEALTH CARE EDUCATION/TRAINING PROGRAM

## 2020-12-23 PROCEDURE — 94640 AIRWAY INHALATION TREATMENT: CPT

## 2020-12-23 PROCEDURE — 94660 CPAP INITIATION&MGMT: CPT

## 2020-12-23 PROCEDURE — 82962 GLUCOSE BLOOD TEST: CPT

## 2020-12-23 PROCEDURE — 2700000000 HC OXYGEN THERAPY PER DAY

## 2020-12-23 PROCEDURE — 6360000002 HC RX W HCPCS: Performed by: SPECIALIST

## 2020-12-23 PROCEDURE — 2580000003 HC RX 258: Performed by: SPECIALIST

## 2020-12-23 PROCEDURE — 80053 COMPREHEN METABOLIC PANEL: CPT

## 2020-12-23 PROCEDURE — 2500000003 HC RX 250 WO HCPCS: Performed by: INTERNAL MEDICINE

## 2020-12-23 PROCEDURE — 36415 COLL VENOUS BLD VENIPUNCTURE: CPT

## 2020-12-23 PROCEDURE — 6360000002 HC RX W HCPCS: Performed by: INTERNAL MEDICINE

## 2020-12-23 RX ORDER — FOLIC ACID 1 MG/1
1 TABLET ORAL DAILY
Qty: 30 TABLET | Refills: 3 | Status: SHIPPED | OUTPATIENT
Start: 2020-12-24

## 2020-12-23 RX ORDER — POLYETHYLENE GLYCOL 3350 17 G/17G
17 POWDER, FOR SOLUTION ORAL DAILY
Qty: 527 G | Refills: 1 | Status: SHIPPED | OUTPATIENT
Start: 2020-12-24 | End: 2021-01-14

## 2020-12-23 RX ORDER — SENNA PLUS 8.6 MG/1
1 TABLET ORAL 2 TIMES DAILY
Qty: 60 TABLET | Refills: 0 | Status: SHIPPED | OUTPATIENT
Start: 2020-12-23 | End: 2021-02-12

## 2020-12-23 RX ORDER — PROMETHAZINE HYDROCHLORIDE 12.5 MG/1
12.5 TABLET ORAL EVERY 6 HOURS PRN
COMMUNITY
Start: 2020-12-23 | End: 2020-12-30

## 2020-12-23 RX ORDER — IPRATROPIUM BROMIDE AND ALBUTEROL SULFATE 2.5; .5 MG/3ML; MG/3ML
3 SOLUTION RESPIRATORY (INHALATION)
Qty: 360 ML | Refills: 0 | COMMUNITY
Start: 2020-12-23 | End: 2021-01-14

## 2020-12-23 RX ORDER — METOPROLOL TARTRATE 5 MG/5ML
2.5 INJECTION INTRAVENOUS EVERY 6 HOURS
Qty: 15 ML | Refills: 0 | Status: ON HOLD
Start: 2020-12-23 | End: 2021-01-05 | Stop reason: ALTCHOICE

## 2020-12-23 RX ORDER — POTASSIUM BICARBONATE 25 MEQ/1
25 TABLET, EFFERVESCENT ORAL 2 TIMES DAILY
Status: DISCONTINUED | OUTPATIENT
Start: 2020-12-23 | End: 2020-12-23

## 2020-12-23 RX ADMIN — LINEZOLID 600 MG: 100 SUSPENSION ORAL at 00:31

## 2020-12-23 RX ADMIN — IPRATROPIUM BROMIDE AND ALBUTEROL SULFATE 3 ML: .5; 3 SOLUTION RESPIRATORY (INHALATION) at 13:08

## 2020-12-23 RX ADMIN — DOCUSATE SODIUM 283 MG: 283 LIQUID RECTAL at 10:01

## 2020-12-23 RX ADMIN — POTASSIUM & SODIUM PHOSPHATES POWDER PACK 280-160-250 MG 250 MG: 280-160-250 PACK at 12:54

## 2020-12-23 RX ADMIN — METOPROLOL TARTRATE 2.5 MG: 5 INJECTION INTRAVENOUS at 00:27

## 2020-12-23 RX ADMIN — LATANOPROST 1 DROP: 50 SOLUTION OPHTHALMIC at 10:02

## 2020-12-23 RX ADMIN — ENOXAPARIN SODIUM 40 MG: 40 INJECTION SUBCUTANEOUS at 10:01

## 2020-12-23 RX ADMIN — SENNOSIDES 8.6 MG: 8.6 TABLET, FILM COATED ORAL at 10:00

## 2020-12-23 RX ADMIN — LINEZOLID 600 MG: 100 SUSPENSION ORAL at 12:21

## 2020-12-23 RX ADMIN — SODIUM CHLORIDE, PRESERVATIVE FREE 10 ML: 5 INJECTION INTRAVENOUS at 10:02

## 2020-12-23 RX ADMIN — INSULIN LISPRO 2 UNITS: 100 INJECTION, SOLUTION INTRAVENOUS; SUBCUTANEOUS at 05:16

## 2020-12-23 RX ADMIN — ANORECTAL OINTMENT: 15.7; .44; 24; 20.6 OINTMENT TOPICAL at 10:02

## 2020-12-23 RX ADMIN — DEXTROSE MONOHYDRATE: 50 INJECTION, SOLUTION INTRAVENOUS at 05:16

## 2020-12-23 RX ADMIN — PANTOPRAZOLE SODIUM 40 MG: 40 INJECTION, POWDER, FOR SOLUTION INTRAVENOUS at 10:00

## 2020-12-23 RX ADMIN — POLYETHYLENE GLYCOL 3350 17 G: 17 POWDER, FOR SOLUTION ORAL at 10:01

## 2020-12-23 RX ADMIN — ERTAPENEM 1000 MG: 1 INJECTION INTRAMUSCULAR; INTRAVENOUS at 12:21

## 2020-12-23 RX ADMIN — METOPROLOL TARTRATE 2.5 MG: 5 INJECTION INTRAVENOUS at 05:16

## 2020-12-23 RX ADMIN — DOCUSATE SODIUM 100 MG: 50 LIQUID ORAL at 10:00

## 2020-12-23 RX ADMIN — FOLIC ACID 1 MG: 1 TABLET ORAL at 10:02

## 2020-12-23 RX ADMIN — METOPROLOL TARTRATE 2.5 MG: 5 INJECTION INTRAVENOUS at 12:23

## 2020-12-23 RX ADMIN — Medication 10 ML: at 10:03

## 2020-12-23 ASSESSMENT — PAIN SCALES - PAIN ASSESSMENT IN ADVANCED DEMENTIA (PAINAD)
BREATHING: 0
CONSOLABILITY: 0
NEGVOCALIZATION: 0
BODYLANGUAGE: 0
TOTALSCORE: 0
FACIALEXPRESSION: 0

## 2020-12-23 ASSESSMENT — PAIN SCALES - GENERAL
PAINLEVEL_OUTOF10: 0
PAINLEVEL_OUTOF10: 0

## 2020-12-23 NOTE — CARE COORDINATION
Social Work/Discharge Planning:  Called liaison Kervin Diego from [employer] regarding status of pre-cert. Kervin Diego states pre-cert is still pending for [facility]. Will continue to follow.   Electronically signed by TRUMAN Gaspar on 12/23/2020 at 9:37 AM

## 2020-12-23 NOTE — CARE COORDINATION
Social Work/Discharge Planning:  Amauri vega [facility] called with [employer] for [place] and states pre-cert is good for today only. Notified RN and provided update to patient daughter Kaerl Rule (ph: [phone]). Will continue to follow. Electronically signed by TRUMAN Chaidez on 12/23/2020 at 12:42 PM    Addendum:  [facility]vina Gosselin states [facility] can accept today to room 742 at 2:30pm.  Notified patient daughter Karel Pate and RN.   Electronically signed by TRUMAN Chaidez on 12/23/2020 at 1:26 PM

## 2020-12-23 NOTE — PROGRESS NOTES
Component Value Date    MG 2.2 12/23/2020     LDH:  No results found for: LDH  PT/INR:    Lab Results   Component Value Date    PROTIME 15.1 12/13/2020    INR 1.3 12/13/2020     Last 3 Troponin:    Lab Results   Component Value Date    TROPONINI 0.03 12/13/2020    TROPONINI 0.01 04/09/2019    TROPONINI <0.01 01/08/2019     ABG:    Lab Results   Component Value Date    PH 7.407 12/16/2020    PCO2 34.9 12/16/2020    PO2 68.2 12/16/2020    HCO3 21.5 12/16/2020    BE -2.7 12/16/2020    O2SAT 93.8 12/16/2020     IRON:  No results found for: IRON  IMAGING    Echo Limited    Result Date: 12/16/2020  Transthoracic Echocardiography Report (TTE)  Demographics   Patient Name    [facility]       Gender            Male                  [address]  [phone]      Room Number       8454  Number   Account #       [de-identified]     Procedure Date    12/16/2020   Corporate ID                  Ordering          Radha Pearson MD                                Physician   Accession       [id number]    Referring         [address] DO  Number                        Physician   Date of Birth   1940    Sonographer       Farhat Polanco CEZAR   Age             78 year(s)    Interpreting      [address] [place] [address]                                Physician         Physician Cardiology                                                  Radha Pearson MD                                 Any Other  Procedure Type of Study   TTE procedure:Echo Limited Study. Procedure Date Date: 12/16/2020 Start: 10:47 AM Study Location: Portable Technical Quality: Poor visualization due to body habitus. Indications: Aortic stenosis, Pericardial effusion and Atrial fibrillation. Patient Status: Routine Contrast Medium: Definity. Height: 72 inches Weight: 260 pounds BSA: 2.38 m^2 BMI: 35.26 kg/m^2 HR: 100 bpm  Findings   Left Ventricle  Left ventricle is normal in size . Moderate left ventricular concentric hypertrophy noted.   Micro-bubble contrast injected to enhance left ventricular visualization. No regional wall motion abnormalities seen. Normal left ventricular ejection fraction. Ejection fraction is visually estimated at 55-60%. Indeterminate diastolic function. Right Ventricle  The right ventricle was not clearly visualized. Moderately dilated right ventricle. Right ventricle global systolic function is reduced . Left Atrium  The left atrium is moderately dilated. Interatrial septum was suboptimally visualized   Right Atrium  Right atrium is not clearly visualized. Mitral Valve  Structurally normal mitral valve. Mild mitral annular calcification. Tricuspid Valve  The tricuspid valve was not well visualized. Aortic Valve  The aortic valve appears mildly sclerotic. Mean transaortic gradient (Doppler) 14 mm Hg . Mild aortic stenosis. Pulmonic Valve  Pulmonic valve is structurally normal.   Pericardial Effusion  There is a small circumferential pericardial effusion noted. There is no evidence of right atrial or right ventricular collapse with  borderline respiratory variation. Aorta  Aortic root dimension within normal limits. Conclusions   Summary  Technically suboptimal and limited study. Left ventricle is normal in size . Moderate left ventricular concentric hypertrophy noted. No regional wall motion abnormalities seen. Normal left ventricular ejection fraction. The left atrium is moderately dilated. Mild mitral annular calcification. The aortic valve appears mildly sclerotic. Mild aortic stenosis. There is a small circumferential pericardial effusion noted.    Signature   ----------------------------------------------------------------  Electronically signed by Colonel Cassy PATTERSON(Interpreting  physician) on 12/16/2020 01:32 PM  ----------------------------------------------------------------  M-Mode/2D Measurements & Calculations   LV Diastolic    LV Systolic Dimension: 2.4  AV Cusp Separation: 0.9 cmLA  Dimension: 3.5 GI/Bowel: Mildly dilated loops small bowel. Gastrostomy tube in place. Pelvis: Ballesteros catheter in collapsed bladder. Sigmoid diverticulosis with no evidence of diverticulitis. The rectum is impacted with stool. Peritoneum/Retroperitoneum: No ascites or adenopathy. Bones/Soft Tissues: Demineralized bones. Degenerative changes in the lower lumbar spine. Left hip fixation screw. Multiple loops of mildly dilated small bowel possibly ileus versus early small bowel obstruction. Follow-up study recommended. Sigmoid diverticulosis. Reticulonodular infiltrates at the lung bases and small left pleural effusion. Nonobstructing right renal stone. The findings were sent to the Radiology Results [address] at 5:20 pm on 12/17/2020to be communicated to a licensed caregiver. Ct Head Wo Contrast    Result Date: 12/13/2020  EXAMINATION: CT OF THE HEAD WITHOUT CONTRAST  12/13/2020 4:27 am TECHNIQUE: CT of the head was performed without the administration of intravenous contrast. Dose modulation, iterative reconstruction, and/or weight based adjustment of the mA/kV was utilized to reduce the radiation dose to as low as reasonably achievable. COMPARISON: 11/16/2019 HISTORY: ORDERING SYSTEM PROVIDED HISTORY: AMS TECHNOLOGIST PROVIDED HISTORY: Reason for exam:->AMS Has a \"code stroke\" or \"stroke alert\" been called? ->No FINDINGS: BRAIN/VENTRICLES: There is advanced cerebral atrophy and moderate chronic ischemic change in the white matter. There is no acute intracranial hemorrhage, mass effect or midline shift. No abnormal extra-axial fluid collection. The gray-white differentiation is maintained without evidence of an acute infarct. Ventricular size is appropriate for brain volume. ORBITS: The visualized portion of the orbits demonstrate no acute abnormality. SINUSES: The visualized paranasal sinuses and mastoid air cells demonstrate no acute abnormality.  SOFT TISSUES/SKULL:  No acute abnormality of the visualized skull or soft tissues. No acute intracranial abnormality. Unchanged advanced cerebral atrophy and moderate chronic ischemic change in the white matter. Xr Chest Portable    Result Date: 12/20/2020  EXAMINATION: ONE XRAY VIEW OF THE CHEST 12/20/2020 6:25 am COMPARISON: 19 December 2020 HISTORY: ORDERING SYSTEM PROVIDED HISTORY: resp failure TECHNOLOGIST PROVIDED HISTORY: Reason for exam:->resp failure FINDINGS: Heart is enlarged. Pulmonary vascularity is congested but appears slightly improved. Atelectasis and/or infiltrate at the left base is stable. Left-sided PICC line is unchanged. Slightly improving pulmonary vascular congestion. Stable atelectasis and/or infiltrate at the left base. Xr Chest Portable    Result Date: 12/19/2020  EXAMINATION: ONE XRAY VIEW OF THE CHEST 12/19/2020 6:38 am COMPARISON: December 18, 2020 HISTORY: ORDERING SYSTEM PROVIDED HISTORY: resp failure TECHNOLOGIST PROVIDED HISTORY: Reason for exam:->resp failure FINDINGS: Somewhat improving aeration at the left base may represent re-expansion of atelectasis and/or waning infiltrate. There is pulmonary vascular congestion as before. Slightly improving aeration at the left base. Pulmonary vascular congestion. Xr Chest Portable    Result Date: 12/18/2020  EXAMINATION: ONE XRAY VIEW OF THE CHEST 12/18/2020 5:42 am COMPARISON: 17 December 2020 HISTORY: ORDERING SYSTEM PROVIDED HISTORY: resp failure TECHNOLOGIST PROVIDED HISTORY: Reason for exam:->resp failure FINDINGS: Worsening aeration at the left base which may relate to infiltrate and/or atelectasis. Heart is enlarged. Pulmonary vascularity appears borderline congested. Somewhat worsening aeration at the left base compatible with infiltrate and/or atelectasis. Cardiomegaly. Borderline congestion of pulmonary vascularity.     Xr Chest Portable    Result Date: 12/17/2020  EXAMINATION: ONE XRAY VIEW OF THE CHEST 12/17/2020 7:17 am COMPARISON: 12/16/2020 HISTORY: ORDERING SYSTEM PROVIDED HISTORY: resp failure TECHNOLOGIST PROVIDED HISTORY: Reason for exam:->resp failure FINDINGS: The heart is enlarged. Suboptimal inspiration was obtained for the chest x-ray. There is a left perihilar and left lower lobe infiltrate. There is a small left pleural effusion. The interstitial markings are prominent. 1. Left perihilar and left lower lobe pneumonia. 2. Cardiomegaly. 3. Prominence of the interstitial markings. Xr Chest Portable    Result Date: 12/16/2020  EXAMINATION: ONE XRAY VIEW OF THE CHEST 12/16/2020 6:17 am COMPARISON: 12/15/2020 HISTORY: ORDERING SYSTEM PROVIDED HISTORY: resp failure TECHNOLOGIST PROVIDED HISTORY: Reason for exam:->resp failure FINDINGS: Endotracheal tube has been removed. Stable prominent cardiac silhouette. There is improved aeration of the right lung, with persistent airspace infiltrate and/or atelectasis in the left lower lung. Possible small left pleural effusion. No pneumothorax is seen. No acute osseous abnormality. Improved aeration of the right lung, with persistent infiltrate and/or atelectasis in the left lower lung and possible left pleural effusion    Xr Chest Portable    Result Date: 12/15/2020  EXAMINATION: ONE XRAY VIEW OF THE CHEST 12/15/2020 7:48 am COMPARISON: 12/14/2020 HISTORY: ORDERING SYSTEM PROVIDED HISTORY: resp failure TECHNOLOGIST PROVIDED HISTORY: Reason for exam:->resp failure FINDINGS: The heart is enlarged. There is satisfactory position of the endotracheal tube. Persistent bilateral patchy airspace disease is noted unchanged compared to the prior study. No gross pleural effusion is noted. 1. Stable cardiomegaly. 2. Persistent bilateral airspace disease unchanged when compared to the prior study.  3.    Xr Chest Portable    Result Date: 12/14/2020  EXAMINATION: ONE XRAY VIEW OF THE CHEST 12/14/2020 6:53 am COMPARISON: 12/13/2020 HISTORY: ORDERING SYSTEM PROVIDED HISTORY: intubated TECHNOLOGIST PROVIDED HISTORY: phosphates  1 packet Oral 4x Daily    docusate sodium  1 enema Rectal Daily    polyethylene glycol  17 g Oral Daily    menthol-zinc oxide   Topical BID    [Held by provider] metoprolol tartrate  25 mg Per G Tube BID    atorvastatin  20 mg Per G Tube Nightly    metoprolol  2.5 mg Intravenous Q6H    ertapenem ([employer]) IVPB  1 g Intravenous Q24H    heparin flush  3 mL Intravenous 2 times per day    linezolid  600 mg Per NG tube Q12H    senna  1 tablet PEG Tube BID    insulin lispro  0-12 Units Subcutaneous Q6H    docusate  100 mg Per G Tube BID    folic acid  1 mg Per G Tube Daily    ipratropium-albuterol  3 mL Inhalation Q4H While awake    latanoprost  1 drop Both Eyes Daily    pantoprazole  40 mg Intravenous Daily    And    sodium chloride (PF)  10 mL Intravenous Daily    sodium chloride flush  10 mL Intravenous 2 times per day    enoxaparin  40 mg Subcutaneous Daily       Continuous Infusions:   dextrose Stopped (12/23/20 1222)    dextrose         PRN Meds:glucose, dextrose, glucagon (rDNA), dextrose, sodium chloride flush, heparin flush, promethazine, sodium chloride flush, promethazine **OR** ondansetron, acetaminophen **OR** acetaminophen    A/P:      Patient Active Problem List   Diagnosis    Severe sepsis with septic shock (HCC)    Dementia (HCC)    Metabolic encephalopathy    Diabetes mellitus type 2, uncontrolled ([address]    Hyperlipidemia LDL goal <100    Essential hypertension    Venous ulcer of left leg (HCC)    Non-pressure chronic ulcer of left lower leg with fat layer exposed ([address]    Non-pressure chronic ulcer left lower leg, limited to breakdown skin (HCC)    Severe protein-calorie malnutrition (HCC)    Non-pressure chronic ulcer right lower leg, limited to breakdown skin (HCC)    Pressure injury of contiguous region involving back, buttock, and hip, stage 2 (HCC)    Pressure injury of calf, stage 2 ([address]    HCAP (healthcare-associated pneumonia)    Paroxysmal atrial fibrillation ([facility]    Acute respiratory failure with hypoxia (HCC)      PLAN:  TX HYPOKALEMIA  TX HYPOPHOSPHATEMIA   incr free h2o thru peg   Q4HR     and CONT d5w thru iv  dc when bed avail at [facility]    I can be reached though [facility] or [facility] at [phone]

## 2020-12-23 NOTE — PROGRESS NOTES
pupils. No jaundice. Moist mucous membranes, no ulcerations, no thrush. Neck: Supple to movements. Chest: No respiratory distress. No crackles heard. Cardiovascular: Heart sounds rhythmic and regular. Abdomen: Positive bowel sounds to auscultation. Benign to palpation. PEG. Loose stools. Extremities:  Minimal edema. Lines:  Left PICC 12/18/2020. Ballesteros catheter.     Laboratory and Tests Review:  Lab Results   Component Value Date    WBC 12.0 (H) 12/23/2020    WBC 10.3 12/22/2020    WBC 6.6 12/21/2020    HGB 8.9 (L) 12/23/2020    HCT 30.5 (L) 12/23/2020    MCV 93.3 12/23/2020     12/23/2020     Lab Results   Component Value Date    NEUTROABS 8.77 (H) 12/23/2020    NEUTROABS 6.97 12/22/2020    NEUTROABS 4.55 12/21/2020     Lab Results   Component Value Date    CRP 25.0 (H) 12/14/2020    CRP 18.4 (H) 12/13/2020    CRP 13.3 (H) 04/09/2019     No results found for: CRP  Lab Results   Component Value Date    SEDRATE 48 (H) 12/14/2020    SEDRATE 23 (H) 12/13/2020    SEDRATE 55 (H) 04/09/2019     Lab Results   Component Value Date    ALT 9 12/23/2020    AST 15 12/23/2020     (H) 07/18/2018    ALKPHOS 82 12/23/2020    BILITOT 0.2 12/23/2020     Lab Results   Component Value Date     12/23/2020    K 3.4 12/23/2020    K 3.9 12/14/2020     12/23/2020    CO2 30 12/23/2020    BUN 34 12/23/2020    CREATININE 1.1 12/23/2020    CREATININE 1.1 12/22/2020    CREATININE 1.2 12/21/2020    GFRAA >60 12/23/2020    LABGLOM >60 12/23/2020    GLUCOSE 127 12/23/2020    GLUCOSE 138 03/26/2011    PROT 5.1 12/23/2020    LABALBU 2.6 12/23/2020    LABALBU 4.0 03/26/2011    CALCIUM 8.1 12/23/2020    BILITOT 0.2 12/23/2020    ALKPHOS 82 12/23/2020    AST 15 12/23/2020    ALT 9 12/23/2020     Radiology:  Chest x-ray reviewed    Microbiology:   Nares screen MRSA: Positive  Streptococcus pneumoniae/Legionella urine Ag: negative  Blood cultures 12/13/2020: Negative x2  Respiratory culture 12/14/2020: MRSA, ESBL + Klebsiella pneumoniae  Urine culture 12/13/2020: Negative so far respiratory panel (including SARS-CoV-2): Not detected    ASSESSMENT:  · HCAP.   Culture growing MRSA and ESBL + Klebsiella  · Respiratory failure, resolved  · Leukocytosis associated to the above, resolved    PLAN:  · Continue oral Linezolid until 12/30/2020  · Continue Ertapenem until 12/25/2020  · Patient has been accepted to [facility].  He will be followed by NIRMAL Mary  10:11 AM  12/23/2020

## 2020-12-23 NOTE — DISCHARGE SUMMARY
Physician Discharge Summary     Patient ID:  Lillie Cochran  [id number]  41 y.o.  1940    Admit date: 12/13/2020    Discharge date and time: 12/23/2020  Admitting Physician: Ana George DO     Discharge Physician: Reese Fisher      Admission Diagnoses: Acute respiratory failure [facility]) [J96.00]    Discharge Diagnoses:   · HCAP.   Culture growing MRSA and ESBL + Klebsiella  · Respiratory failure, resolved  · Leukocytosis associated to the above, resolved   Metabolic encephalopathy    Severe sepsis with septic shock         Admission Condition: poor    Discharged Condition: stable    Indication for Admission: Acute respiratory failure [facility]) [J96.00]    Hospital Course: pt given resp support and atbx per id    Consults: pulmonary/intensive care, ID and general surgery    Significant Diagnostic Studies: microbiology: as above and below    Lab Results   Component Value Date     (H) 12/23/2020    K 3.4 (L) 12/23/2020     (H) 12/23/2020    CO2 30 (H) 12/23/2020    BUN 34 (H) 12/23/2020    CREATININE 1.1 12/23/2020    GLUCOSE 127 (H) 12/23/2020    CALCIUM 8.1 (L) 12/23/2020    PROT 5.1 (L) 12/23/2020    LABALBU 2.6 (L) 12/23/2020    BILITOT 0.2 12/23/2020    ALKPHOS 82 12/23/2020    AST 15 12/23/2020    ALT 9 12/23/2020    LABGLOM >60 12/23/2020    GFRAA >60 12/23/2020          Lab Results   Component Value Date    WBC 12.0 (H) 12/23/2020    HGB 8.9 (L) 12/23/2020    HCT 30.5 (L) 12/23/2020    MCV 93.3 12/23/2020     12/23/2020        Echo Limited    Result Date: 12/16/2020  Transthoracic Echocardiography Report (TTE)  Demographics   Patient Name    [facility]       Gender            Male                  [address] Record  [id number]      Room Number       2599  Number   Account #       [de-identified]     Procedure Date    12/16/2020   Corporate ID                  Ordering          [place] MD                                Physician   Accession       [id number]    Referring         Donna Rivera. Volino DO  Number                        Physician   Date of Birth   1940    Sonographer       Santiago Schumacher RDCS   Age             78 year(s)    Interpreting      [address] [place] [address]                                Physician         Physician Cardiology                                                  Robert Wilder MD                                 Any Other  Procedure Type of Study   TTE procedure:Echo Limited Study. Procedure Date Date: 12/16/2020 Start: 10:47 AM Study Location: Portable Technical Quality: Poor visualization due to body habitus. Indications: Aortic stenosis, Pericardial effusion and Atrial fibrillation. Patient Status: Routine Contrast Medium: Definity. Height: 72 inches Weight: 260 pounds BSA: 2.38 m^2 BMI: 35.26 kg/m^2 HR: 100 bpm  Findings   Left Ventricle  Left ventricle is normal in size . Moderate left ventricular concentric hypertrophy noted. Micro-bubble contrast injected to enhance left ventricular visualization. No regional wall motion abnormalities seen. Normal left ventricular ejection fraction. Ejection fraction is visually estimated at 55-60%. Indeterminate diastolic function. Right Ventricle  The right ventricle was not clearly visualized. Moderately dilated right ventricle. Right ventricle global systolic function is reduced . Left Atrium  The left atrium is moderately dilated. Interatrial septum was suboptimally visualized   Right Atrium  Right atrium is not clearly visualized. Mitral Valve  Structurally normal mitral valve. Mild mitral annular calcification. Tricuspid Valve  The tricuspid valve was not well visualized. Aortic Valve  The aortic valve appears mildly sclerotic. Mean transaortic gradient (Doppler) 14 mm Hg . Mild aortic stenosis. Pulmonic Valve  Pulmonic valve is structurally normal.   Pericardial Effusion  There is a small circumferential pericardial effusion noted.   There is no evidence of right atrial or right ventricular collapse with  borderline respiratory variation. Aorta  Aortic root dimension within normal limits. Conclusions   Summary  Technically suboptimal and limited study. Left ventricle is normal in size . Moderate left ventricular concentric hypertrophy noted. No regional wall motion abnormalities seen. Normal left ventricular ejection fraction. The left atrium is moderately dilated. Mild mitral annular calcification. The aortic valve appears mildly sclerotic. Mild aortic stenosis. There is a small circumferential pericardial effusion noted.    Signature   ----------------------------------------------------------------  Electronically signed by Corrie Holland MD(Interpreting  physician) on 12/16/2020 01:32 PM  ----------------------------------------------------------------  M-Mode/2D Measurements & Calculations   LV Diastolic    LV Systolic Dimension: 2.4  AV Cusp Separation: 0.9 cmLA  Dimension: 3.5  cm                          Dimension: 4.1 cmAO Root  cm              LV Volume Diastolic: 50 ml  Dimension: 3.4 cm  LV FS:31.4 %    LV Volume Systolic: 32.0 ml  LV PW           LV EDV/LV EDV Index: 50  Diastolic: 0.9  ZS/92 CK/H^8NU ESV/LV ESV  cm              Index: 21.1 ml/9ml/ m^2     RV Diastolic Dimension: 3.4 cm  LV PW Systolic: EF Calculated: 33.6 %  1.3 cm          LV Mass Index: 40 l/min*m^2  Septum                                      LA volume/Index: 637 ml  Diastolic: 1 cm  Septum          LVOT: 2.1 cm  Systolic: 1.4  cm  CO: 9.45 l/min  CI: 2.14  l/m*m^2  LV Mass: 95.94  g  Doppler Measurements & Calculations    AV Peak Velocity: 2.55 m/s LVOT Peak Velocity: 0.78 m/s   AV Peak Gradient: 26.05    LVOT Mean Velocity: 0.5 m/s   mmHg                       LVOT Peak Gradient: 2.5 mmHgLVOT Mean   AV Mean Velocity: 1.74 m/s Gradient: 1.2 mmHg   AV Mean Gradient: 14.1   mmHg   AV VTI: 41 cm   AV Area (Continuity):1.24   cm^2    LVOT VTI: 14.7 cm [email or web address] [id number]    Ct Abdomen Pelvis Wo Contrast Additional Contrast? None    Result Date: 12/17/2020  EXAMINATION: CT OF THE ABDOMEN AND PELVIS WITHOUT CONTRAST 12/17/2020 4:47 pm TECHNIQUE: CT of the abdomen and pelvis was performed without the administration of intravenous contrast. Multiplanar reformatted images are provided for review. Dose modulation, iterative reconstruction, and/or weight based adjustment of the mA/kV was utilized to reduce the radiation dose to as low as reasonably achievable. COMPARISON: None. HISTORY: ORDERING SYSTEM PROVIDED HISTORY: abdominal distention TECHNOLOGIST PROVIDED HISTORY: Reason for exam:->abdominal distention Additional Contrast?->None FINDINGS: Lower Chest: Reticular infiltrates. Mild left pleural effusion. Organs: Bilateral renal cysts. Punctate calcification in the interpolar region of the right kidney. GI/Bowel: Mildly dilated loops small bowel. Gastrostomy tube in place. Pelvis: Ballesteros catheter in collapsed bladder. Sigmoid diverticulosis with no evidence of diverticulitis. The rectum is impacted with stool. Peritoneum/Retroperitoneum: No ascites or adenopathy. Bones/Soft Tissues: Demineralized bones. Degenerative changes in the lower lumbar spine. Left hip fixation screw. Multiple loops of mildly dilated small bowel possibly ileus versus early small bowel obstruction. Follow-up study recommended. Sigmoid diverticulosis. Reticulonodular infiltrates at the lung bases and small left pleural effusion. Nonobstructing right renal stone. The findings were sent to the Radiology Results [address] at 5:20 pm on 12/17/2020to be communicated to a licensed caregiver.     Ct Head Wo Contrast    Result Date: 12/13/2020  EXAMINATION: CT OF THE HEAD WITHOUT CONTRAST  12/13/2020 4:27 am TECHNIQUE: CT of the head was performed without the administration of intravenous contrast. Dose modulation, iterative reconstruction, and/or weight based adjustment of the mA/kV was utilized to reduce the radiation dose to as low as reasonably achievable. COMPARISON: 11/16/2019 HISTORY: ORDERING SYSTEM PROVIDED HISTORY: AMS TECHNOLOGIST PROVIDED HISTORY: Reason for exam:->AMS Has a \"code stroke\" or \"stroke alert\" been called? ->No FINDINGS: BRAIN/VENTRICLES: There is advanced cerebral atrophy and moderate chronic ischemic change in the white matter. There is no acute intracranial hemorrhage, mass effect or midline shift. No abnormal extra-axial fluid collection. The gray-white differentiation is maintained without evidence of an acute infarct. Ventricular size is appropriate for brain volume. ORBITS: The visualized portion of the orbits demonstrate no acute abnormality. SINUSES: The visualized paranasal sinuses and mastoid air cells demonstrate no acute abnormality. SOFT TISSUES/SKULL:  No acute abnormality of the visualized skull or soft tissues. No acute intracranial abnormality. Unchanged advanced cerebral atrophy and moderate chronic ischemic change in the white matter. Xr Chest Portable    Result Date: 12/23/2020  EXAMINATION: ONE XRAY VIEW OF THE CHEST 12/23/2020 12:42 pm COMPARISON: 12/21/2020 HISTORY: ORDERING SYSTEM PROVIDED HISTORY: CHF TECHNOLOGIST PROVIDED HISTORY: Reason for exam:->CHF FINDINGS: The heart is enlarged. There is hazy bilateral airspace disease which could represent CHF or pneumonia. Gross pleural effusions are not appreciated. Cardiomegaly with findings of hazy bilateral airspace disease. Differential includes CHF versus pneumonia. I am concerned for pneumonia given the appearance of the left perihilar region.     Xr Chest Portable    Result Date: 12/21/2020  EXAMINATION: ONE XRAY VIEW OF THE CHEST 12/21/2020 11:21 am COMPARISON: Comparison is dated December 20, 2020 HISTORY: ORDERING SYSTEM PROVIDED HISTORY: resp failure TECHNOLOGIST PROVIDED HISTORY: Reason for exam:->resp failure FINDINGS: Poor depth of inspiration. Cardiac and mediastinal contours appear stable. again identified are diffuse bilateral pulmonary interstitial infiltrates not significantly changed from prior study. No pleural effusion identified. Low lung volumes Continued bilateral pulmonary interstitial infiltrates may reflect edema or other etiologies. Xr Chest Portable    Result Date: 12/20/2020  EXAMINATION: ONE XRAY VIEW OF THE CHEST 12/20/2020 6:25 am COMPARISON: 19 December 2020 HISTORY: ORDERING SYSTEM PROVIDED HISTORY: resp failure TECHNOLOGIST PROVIDED HISTORY: Reason for exam:->resp failure FINDINGS: Heart is enlarged. Pulmonary vascularity is congested but appears slightly improved. Atelectasis and/or infiltrate at the left base is stable. Left-sided PICC line is unchanged. Slightly improving pulmonary vascular congestion. Stable atelectasis and/or infiltrate at the left base. Xr Chest Portable    Result Date: 12/19/2020  EXAMINATION: ONE XRAY VIEW OF THE CHEST 12/19/2020 6:38 am COMPARISON: December 18, 2020 HISTORY: ORDERING SYSTEM PROVIDED HISTORY: resp failure TECHNOLOGIST PROVIDED HISTORY: Reason for exam:->resp failure FINDINGS: Somewhat improving aeration at the left base may represent re-expansion of atelectasis and/or waning infiltrate. There is pulmonary vascular congestion as before. Slightly improving aeration at the left base. Pulmonary vascular congestion. Xr Chest Portable    Result Date: 12/18/2020  EXAMINATION: ONE XRAY VIEW OF THE CHEST 12/18/2020 5:42 am COMPARISON: 17 December 2020 HISTORY: ORDERING SYSTEM PROVIDED HISTORY: resp failure TECHNOLOGIST PROVIDED HISTORY: Reason for exam:->resp failure FINDINGS: Worsening aeration at the left base which may relate to infiltrate and/or atelectasis. Heart is enlarged. Pulmonary vascularity appears borderline congested.     Somewhat worsening aeration at the left base compatible with infiltrate and/or atelectasis. Cardiomegaly. Borderline congestion of pulmonary vascularity. Xr Chest Portable    Result Date: 12/17/2020  EXAMINATION: ONE XRAY VIEW OF THE CHEST 12/17/2020 7:17 am COMPARISON: 12/16/2020 HISTORY: ORDERING SYSTEM PROVIDED HISTORY: resp failure TECHNOLOGIST PROVIDED HISTORY: Reason for exam:->resp failure FINDINGS: The heart is enlarged. Suboptimal inspiration was obtained for the chest x-ray. There is a left perihilar and left lower lobe infiltrate. There is a small left pleural effusion. The interstitial markings are prominent. 1. Left perihilar and left lower lobe pneumonia. 2. Cardiomegaly. 3. Prominence of the interstitial markings. Xr Chest Portable    Result Date: 12/16/2020  EXAMINATION: ONE XRAY VIEW OF THE CHEST 12/16/2020 6:17 am COMPARISON: 12/15/2020 HISTORY: ORDERING SYSTEM PROVIDED HISTORY: resp failure TECHNOLOGIST PROVIDED HISTORY: Reason for exam:->resp failure FINDINGS: Endotracheal tube has been removed. Stable prominent cardiac silhouette. There is improved aeration of the right lung, with persistent airspace infiltrate and/or atelectasis in the left lower lung. Possible small left pleural effusion. No pneumothorax is seen. No acute osseous abnormality. Improved aeration of the right lung, with persistent infiltrate and/or atelectasis in the left lower lung and possible left pleural effusion    Xr Chest Portable    Result Date: 12/15/2020  EXAMINATION: ONE XRAY VIEW OF THE CHEST 12/15/2020 7:48 am COMPARISON: 12/14/2020 HISTORY: ORDERING SYSTEM PROVIDED HISTORY: resp failure TECHNOLOGIST PROVIDED HISTORY: Reason for exam:->resp failure FINDINGS: The heart is enlarged. There is satisfactory position of the endotracheal tube. Persistent bilateral patchy airspace disease is noted unchanged compared to the prior study. No gross pleural effusion is noted. 1. Stable cardiomegaly.  2. Persistent bilateral airspace disease unchanged when compared to the prior study. 3.    Xr Chest Portable    Result Date: 12/14/2020  EXAMINATION: ONE XRAY VIEW OF THE CHEST 12/14/2020 6:53 am COMPARISON: 12/13/2020 HISTORY: ORDERING SYSTEM PROVIDED HISTORY: intubated TECHNOLOGIST PROVIDED HISTORY: Reason for exam:->intubated FINDINGS: The heart is enlarged. The endotracheal tube is in satisfactory position. There are findings of CHF. There are trace bilateral pleural effusions. 1. Cardiomegaly with findings of CHF. 2. Trace bilateral pleural effusions. Xr Chest Portable    Result Date: 12/13/2020  EXAMINATION: ONE XRAY VIEW OF THE CHEST 12/13/2020 12:32 am COMPARISON: November 16, 2019 HISTORY: ORDERING SYSTEM PROVIDED HISTORY: sob, intubation TECHNOLOGIST PROVIDED HISTORY: Reason for exam:->sob, intubation FINDINGS: Status post intubation. ETT with the distal tip at approximately 1.3 cm from the level of the emmanuel. ET tube should be retracted approximately 2-3 cm for optimal positioning. Cardiac silhouette is enlarged. Decreased lung volumes. Pulmonary vasculature within normal limits. Questionable hazy airspace disease in the left lung. Distal end of the ET tube at approximately 1.3 cm from the level of the emmanuel. The ET tube should be retracted 2-3 cm for optimal positioning. Cardiomegaly. Question hazy airspace disease in the left lung. Radiographic follow-up recommended. Xr Abdomen For Ng/og/ne Tube Placement    Result Date: 12/17/2020  EXAMINATION: ONE SUPINE XRAY VIEW(S) OF THE ABDOMEN 12/17/2020 2:11 pm COMPARISON: January 9, 2020 HISTORY: ORDERING SYSTEM PROVIDED HISTORY: Confirmation of course of G tube and location of tip of tube TECHNOLOGIST PROVIDED HISTORY: Please administer GASTROGRAFIN through G tube to confirm proper location within the stomach Reason for exam:->Confirmation of course of G tube and location of tip of tube Portable? ->Yes FINDINGS: Following injection of Gastrografin through gastric PEG tube, there is opacification of the stomach and small bowel mucosal folds. Satisfactory placement of gastric PEG tube. Outstanding Order Results     No orders found from 11/14/2020 to 12/14/2020. Treatments: antibiotics: per id    Discharge Exam:  stable    Disposition: long term care facility    Patient Instructions:      Medication List      START taking these medications    enoxaparin 40 MG/0.4ML injection  Commonly known as: LOVENOX  Inject 0.4 mLs into the skin daily  Start taking on: December 24, 2020     ertapenem  infusion  Commonly known as: INVANZ  Infuse 1,000 mg intravenously every 24 hours for 6 days Compound per protocol.     glucagon (rDNA) 1 MG injection  Inject 1 mg into the muscle as needed for Low blood sugar (Blood glucose less than 70 mg/dL and patient NOT ALERT or NPO and does not have IV access.)     linezolid 100 MG/5ML suspension  Commonly known as: ZYVOX  30 mLs by Per NG tube route every 12 hours for 11 days     menthol-zinc oxide 0.44-20.6 % Oint ointment  Commonly known as: CALMOSEPTINE  Apply topically 2 times daily for 7 days Max 30 ml per day. metoprolol 5 MG/5ML Soln injection  Commonly known as: LOPRESSOR  Infuse 2.5 mLs intravenously every 6 hours     polyethylene glycol 17 g packet  Commonly known as: GLYCOLAX  Take 17 g by mouth daily  Start taking on: December 24, 2020     potassium & sodium phosphates 280-160-250 MG Pack  Commonly known as: PHOS-NAK  Take 1 packet by mouth 4 times daily     senna 8.6 MG tablet  Commonly known as: SENOKOT  1 tablet by PEG Tube route 2 times daily        CHANGE how you take these medications    * docusate 50 MG/5ML liquid  Commonly known as: COLACE  What changed:   · Another medication with the same name was added. Make sure you understand how and when to take each. · Another medication with the same name was removed. Continue taking this medication, and follow the directions you see here.      * docusate sodium 283 MG enema  Commonly known Moris Morrissey  Place 5 mLs rectally daily  Start taking on: December 24, 2020  What changed: You were already taking a medication with the same name, and this prescription was added. Make sure you understand how and when to take each. Replaces: docusate sodium 100 MG capsule     folic acid 1 MG tablet  Commonly known as: FOLVITE  1 tablet by Per G Tube route daily  Start taking on: December 24, 2020  What changed:   · how to take this  · when to take this  · additional instructions     insulin lispro 100 UNIT/ML injection vial  Commonly known as: HUMALOG  Inject 0-12 Units into the skin every 6 hours  What changed:   · when to take this  · Another medication with the same name was removed. Continue taking this medication, and follow the directions you see here. ipratropium-albuterol 0.5-2.5 (3) MG/3ML Soln nebulizer solution  Commonly known as: DUONEB  Inhale 3 mLs into the lungs every 4 hours (while awake)  What changed:   · when to take this  · reasons to take this     * promethazine 12.5 MG suppository  Commonly known as: PHENERGAN  What changed: Another medication with the same name was added. Make sure you understand how and when to take each. * promethazine 12.5 MG tablet  Commonly known as: PHENERGAN  Take 1 tablet by mouth every 6 hours as needed for Nausea  What changed: You were already taking a medication with the same name, and this prescription was added. Make sure you understand how and when to take each. * This list has 4 medication(s) that are the same as other medications prescribed for you. Read the directions carefully, and ask your doctor or other care provider to review them with you.             CONTINUE taking these medications    acetaminophen 325 MG tablet  Commonly known as: TYLENOL     atorvastatin 20 MG tablet  Commonly known as: LIPITOR     glucose 40 % Gel  Commonly known as: GLUTOSE  Take 15 g by mouth as needed     latanoprost 0.005 % ophthalmic solution  Commonly known as: XALATAN     pantoprazole 40 MG tablet  Commonly known as: PROTONIX  Take 1 tablet by mouth every morning (before breakfast)     predniSONE 10 MG tablet  Commonly known as: DELTASONE     tamsulosin 0.4 MG capsule  Commonly known as: FLOMAX     Vitamin D3 125 MCG (5000 UT) Tabs        STOP taking these medications    Arginaid Pack     aspirin 325 MG EC tablet     bisacodyl 5 MG EC tablet  Commonly known as: DULCOLAX     busPIRone 7.5 MG tablet  Commonly known as: BUSPAR     docusate sodium 100 MG capsule  Commonly known as: COLACE  Replaced by: docusate sodium 283 MG enema  You also have another medication with the same name that you need to continue taking as instructed.      Enema Disposable Enem     glucagon 1 MG injection     Levemir FlexTouch 100 UNIT/ML injection pen  Generic drug: insulin detemir     loperamide 2 MG capsule  Commonly known as: IMODIUM     magnesium hydroxide 400 MG/5ML suspension  Commonly known as: MILK OF MAGNESIA     methotrexate 2.5 MG chemo tablet  Commonly known as: RHEUMATREX     mirtazapine 15 MG tablet  Commonly known as: REMERON     NovoLOG FlexPen 100 UNIT/ML injection pen  Generic drug: insulin aspart     sodium chloride 0.9 % solution           Where to Get Your Medications      These medications were sent to [facility], [address] [address]    Phone: [phone]   · folic acid 1 MG tablet  · polyethylene glycol 17 g packet  · potassium & sodium phosphates 280-160-250 MG Pack  · senna 8.6 MG tablet     You can get these medications from any pharmacy    You don't need a prescription for these medications  · docusate sodium 283 MG enema  · enoxaparin 40 MG/0.4ML injection  · glucagon (rDNA) 1 MG injection  · ipratropium-albuterol 0.5-2.5 (3) MG/3ML Soln nebulizer solution  · menthol-zinc oxide 0.44-20.6 % Oint ointment  · promethazine 12.5 MG tablet     Information about where to get these medications is not yet available    Ask your nurse or doctor about these medications  · ertapenem  infusion  · insulin lispro 100 UNIT/ML injection vial  · linezolid 100 MG/5ML suspension  · metoprolol 5 MG/5ML Soln injection        Activity: activity as tolerated  Diet: {diet: DIET TUBE FEED CONTINUOUS/CYCLIC NPO; Diabetic 1.5; Gastrostomy; Continuous; 20; 24  Wound Care: keep wound clean and dry    Follow-up with DR in 1 day.   Greater than 35 minutes spent on discharge preparations, examining patient, discussing with patient and coordinating with nurses and staff    Signed:  Nataliia Ahumada  12/23/2020  1:06 PM

## 2021-01-05 ENCOUNTER — CLINICAL DOCUMENTATION (OUTPATIENT)
Dept: VASCULAR SURGERY | Age: 81
End: 2021-01-05

## 2021-01-05 ENCOUNTER — ANESTHESIA EVENT (OUTPATIENT)
Dept: OPERATING ROOM | Age: 81
End: 2021-01-05

## 2021-01-05 PROBLEM — I82.412 ACUTE DEEP VEIN THROMBOSIS (DVT) OF FEMORAL VEIN OF LEFT LOWER EXTREMITY (HCC): Status: ACTIVE | Noted: 2021-01-05

## 2021-01-05 PROBLEM — M79.89 LEG SWELLING: Status: ACTIVE | Noted: 2021-01-05

## 2021-01-06 ENCOUNTER — ANESTHESIA (OUTPATIENT)
Dept: OPERATING ROOM | Age: 81
End: 2021-01-06

## 2021-01-06 VITALS
OXYGEN SATURATION: 100 % | TEMPERATURE: 97.7 F | SYSTOLIC BLOOD PRESSURE: 116 MMHG | RESPIRATION RATE: 18 BRPM | DIASTOLIC BLOOD PRESSURE: 74 MMHG

## 2021-01-06 PROCEDURE — 2500000003 HC RX 250 WO HCPCS: Performed by: NURSE ANESTHETIST, CERTIFIED REGISTERED

## 2021-01-06 PROCEDURE — 2580000003 HC RX 258: Performed by: NURSE ANESTHETIST, CERTIFIED REGISTERED

## 2021-01-06 PROCEDURE — 6360000002 HC RX W HCPCS: Performed by: NURSE ANESTHETIST, CERTIFIED REGISTERED

## 2021-01-06 RX ORDER — FENTANYL CITRATE 50 UG/ML
INJECTION, SOLUTION INTRAMUSCULAR; INTRAVENOUS PRN
Status: DISCONTINUED | OUTPATIENT
Start: 2021-01-06 | End: 2021-01-06 | Stop reason: SDUPTHER

## 2021-01-06 RX ORDER — NOREPINEPHRINE BITARTRATE 1 MG/ML
INJECTION, SOLUTION INTRAVENOUS PRN
Status: DISCONTINUED | OUTPATIENT
Start: 2021-01-06 | End: 2021-01-06 | Stop reason: SDUPTHER

## 2021-01-06 RX ORDER — MIDAZOLAM HYDROCHLORIDE 1 MG/ML
INJECTION INTRAMUSCULAR; INTRAVENOUS PRN
Status: DISCONTINUED | OUTPATIENT
Start: 2021-01-06 | End: 2021-01-06 | Stop reason: SDUPTHER

## 2021-01-06 RX ORDER — SODIUM CHLORIDE 9 MG/ML
INJECTION, SOLUTION INTRAVENOUS CONTINUOUS PRN
Status: DISCONTINUED | OUTPATIENT
Start: 2021-01-06 | End: 2021-01-06 | Stop reason: SDUPTHER

## 2021-01-06 RX ADMIN — FENTANYL CITRATE 100 MCG: 50 INJECTION, SOLUTION INTRAMUSCULAR; INTRAVENOUS at 13:15

## 2021-01-06 RX ADMIN — MIDAZOLAM 2 MG: 1 INJECTION INTRAMUSCULAR; INTRAVENOUS at 13:25

## 2021-01-06 RX ADMIN — SODIUM CHLORIDE: 9 INJECTION, SOLUTION INTRAVENOUS at 12:30

## 2021-01-06 RX ADMIN — NOREPINEPHRINE BITARTRATE 1 MCG: 1 INJECTION INTRAVENOUS at 13:05

## 2021-01-06 RX ADMIN — NOREPINEPHRINE BITARTRATE 1 MCG: 1 INJECTION INTRAVENOUS at 13:44

## 2021-01-06 NOTE — ANESTHESIA PRE PROCEDURE
Department of Anesthesiology  Preprocedure Note       Name:  Maryellen Mccarthy   Age:  78 y.o.  :  1940                                          MRN:  [id number]         Date:  2021      Surgeon: Alessio Cisneros     Procedure: VENA CAVA FILTER INSERTION     Medications prior to admission:   Prior to Admission medications    Medication Sig Start Date End Date Taking? Authorizing Provider   atorvastatin (LIPITOR) 20 MG tablet 20 mg daily Peg tube    Historical Provider, MD   amiodarone (PACERONE) 100 MG tablet 100 mg by PEG Tube route daily    Historical Provider, MD   promethazine (PHENERGAN) 12.5 MG tablet 12.5 mg by PEG Tube route every 6 hours as needed for Nausea    Historical Provider, MD   NONFORMULARY Dextrose 50% IV prn    Historical Provider, MD   Benzocaine-Docusate Sodium (ENEMEEZ PLUS)  MG ENEM Place rectally as needed    Historical Provider, MD   hydrocortisone sodium succinate PF (SOLU-CORTEF) 100 MG injection Infuse 25 mg intravenously every 8 hours    Historical Provider, MD   insulin lispro (HUMALOG) 100 UNIT/ML injection vial Inject into the skin every 6 hours 0-16 units sliding scale    Historical Provider, MD   lactulose (CHRONULAC) 10 GM/15ML solution Take 20 g by mouth daily    Historical Provider, MD   metoclopramide (REGLAN) 5 MG/ML injection Infuse 10 mg intravenously every 6 hours    Historical Provider, MD   midodrine (PROAMATINE) 5 MG tablet 5 mg by PEG Tube route 3 times daily    Historical Provider, MD   midazolam HCl (VERSED) 50 MG/10ML SOLN Infuse 100 mg intravenously once. Drip titrated 0-10 mg/hr rate 0-10 mL/hr    Historical Provider, MD   norepinephrine (LEVOPHED) 1 MG/ML injection Infuse intravenously Titrated rate 0-56.3 mL/hr , 0-30 mcg/min    Historical Provider, MD   nystatin-triamcinolone (MYCOLOG II) 169679-0.0 UNIT/GM-% cream Apply topically 4 times daily Apply topically 4 times daily.     Historical Provider, MD silver sulfADIAZINE (SILVADENE) 1 % cream Apply topically 2 times daily Apply topically daily. Historical Provider, MD   pantoprazole sodium (PROTONIX) 40 MG PACK packet 40 mg by Per G Tube route 2 times daily (before meals) Pantoprazole/ sodium bicarb liquid 2mg/ml oral liquid 40 mg peg tube BID    Historical Provider, MD   ipratropium-albuterol (DUONEB) 0.5-2.5 (3) MG/3ML SOLN nebulizer solution Inhale 3 mLs into the lungs every 4 hours (while awake) 12/23/20   Enzo Hope MD   glucagon, rDNA, 1 MG injection Inject 1 mg into the muscle as needed for Low blood sugar (Blood glucose less than 70 mg/dL and patient NOT ALERT or NPO and does not have IV access.) 12/23/20 12/18/21  Enzo Hope MD   folic acid (FOLVITE) 1 MG tablet 1 tablet by Per G Tube route daily 12/24/20   Enzo Hope MD   docusate sodium (ENEMEEZ) 283 MG enema Place 5 mLs rectally daily 12/24/20   Enzo Hope MD   polyethylene glycol (GLYCOLAX) 17 g packet Take 17 g by mouth daily 12/24/20 1/23/21  Enzo Hope MD   senna (SENOKOT) 8.6 MG tablet 1 tablet by PEG Tube route 2 times daily 12/23/20 1/22/21  Enzo Hope MD   docusate (COLACE) 50 MG/5ML liquid 100 mg by Per G Tube route 2 times daily    Historical Provider, MD   Cholecalciferol (VITAMIN D3) 5000 UNITS TABS Take 5,000 Units by mouth every morning     Historical Provider, MD   glucose (GLUTOSE) 40 % GEL Take 15 g by mouth as needed 4/6/16   Ck Ramirez DO   latanoprost (XALATAN) 0.005 % ophthalmic solution Place 1 drop into both eyes daily     Historical Provider, MD   acetaminophen (TYLENOL) 325 MG tablet Take 650 mg by mouth every 4 hours as needed for Pain or Fever (max = 3000 mg/24 hours)    Historical Provider, MD       Current medications:    No current outpatient medications on file. No current facility-administered medications for this visit. Allergies:     Allergies   Allergen Reactions    Imuran [Azathioprine]  FRACTURE SURGERY      HIP FRACTURE SURGERY Left 2019    HIP OPEN REDUCTION INTERNAL FIXATION performed [facility]  2020         LA EGD PERCUTANEOUS PLACEMENT GASTROSTOMY TUBE N/A 2018    EGD  PEG TUBE INSERTION,  (ENDO STAFF NEEDED) performed by Cecilia Matrinez MD at [place] [address] N/A 2018    EGD DIAGNOSTIC ONLY performed by Wayne Krishna MD at [address] [place] [address] History:    Social History     Tobacco Use    Smoking status: Former Smoker     Packs/day: 2.00     Years: 50.00     Pack years: 100.00     Types: Cigarettes     Start date: 3/30/1955     Quit date: 3/30/2006     Years since quittin.7    Smokeless tobacco: Never Used   Substance Use Topics    Alcohol use: No     Alcohol/week: 0.0 standard drinks                                Counseling given: Not Answered      Vital Signs (Current): There were no vitals filed for this visit.                                            BP Readings from Last 3 Encounters:   20 116/60   20 110/70   19 123/81       NPO Status:  instructed NPO @ midnight                          greater than 8 hours                                                      BMI:   Wt Readings from Last 3 Encounters:   21 268 lb (121.6 kg)   20 259 lb 9 oz (117.7 kg)   19 250 lb (113.4 kg)     There is no height or weight on file to calculate BMI.    CBC:   Lab Results   Component Value Date    WBC 11.0 2021    RBC 3.43 2021    HGB 9.6 2021    HCT 31.3 2021    MCV 91.3 2021    RDW 17.8 2021     2021       CMP:   Lab Results   Component Value Date     2021    K 4.4 2021    K 3.9 2020     2021    CO2 29 2021    BUN 41 2021    CREATININE 1.5 2021    GFRAA 54 2021    LABGLOM 45 2021    GLUCOSE 152 2021    GLUCOSE 138 2011 PROT 5.0 2021    CALCIUM 8.3 2021    BILITOT 0.4 2021    ALKPHOS 92 2021    AST 29 2021    ALT 15 2021       POC Tests: No results for input(s): POCGLU, POCNA, POCK, POCCL, POCBUN, POCHEMO, POCHCT in the last 72 hours.     Coags:   Lab Results   Component Value Date    PROTIME 11.8 2021    INR 1.0 2021    APTT 21.0 2021       HCG (If Applicable): No results found for: PREGTESTUR, PREGSERUM, HCG, HCGQUANT     ABGs: No results found for: PHART, PO2ART, JNV0YKN, FVX1QCQ, BEART, X1CYRCBW     Type & Screen (If Applicable):  No results found for: LABABO, LABRH     ECHO  Conclusions      Summary   Left ventricular size is grossly normal.   Normal LV segmental wall motion.   Ejection fraction is visually estimated at 70%.   Moderate left ventricular concentric hypertrophy noted.   Mild aortic stenosis is present.      Signature      ----------------------------------------------------------------   Electronically signed by Bill Calvo MD(Interpreting physician)  Wilian Ascencio 2017 03:48 PM   ----------------------------------------------------------------    EKG  Narrative     Sinus tachycardia  Left axis deviation  Anterolateral infarct , age undetermined  Abnormal ECG  When compared with ECG of 17-OCT-2016 20:34,  Anterior infarct is now present  Anterolateral infarct is now present  Non-specific change in ST segment in Inferior leads         Anesthesia Evaluation  Patient summary reviewed and Nursing notes reviewed no history of anesthetic complications:   Airway: Mallampati: III  TM distance: >3 FB   Neck ROM: limited  Comment: Vent dependent   Mouth opening: < 3 FB Dental:    (+) lower dentures and upper dentures      Pulmonary: breath sounds clear to auscultation      (-) pneumonia                          ROS comment: Smoking Status: Former Smoker - 100 pack years  Quit Smokin06  Vented    Cardiovascular:  Exercise tolerance: poor (<4 METS), (+) hypertension:, dysrhythmias (Paroxysmal atrial fibrillation ):, hyperlipidemia      ECG reviewed  Rhythm: regular  Rate: normal  Echocardiogram reviewed         Beta Blocker:  Not on Beta Blocker      ROS comment: Echo Nov 2017    Summary   Left ventricular size is grossly normal.   Normal LV segmental wall motion.   Ejection fraction is visually estimated at 70%.   Moderate left ventricular concentric hypertrophy noted.   Mild aortic stenosis is present    Levophed drip     Neuro/Psych:   (+) psychiatric history (Dementia):depression/anxiety              ROS comment: dementia GI/Hepatic/Renal:            ROS comment: PEG Tube  Severe protein-calorie malnutrition. Endo/Other:    (+) DiabetesType II DM, using insulin, blood dyscrasia (HGB 9.0): anemia, arthritis: rheumatoid. , .                  ROS comment: Bullous pemphigoid Abdominal:           Vascular:   + DVT, .        ROS comment: Venous ulcer of left leg. Anesthesia Plan      MAC     ASA 3       Induction: intravenous. MIPS: Postoperative opioids intended and Prophylactic antiemetics administered. Anesthetic plan and risks discussed with patient. Plan discussed with CRNA.             [address],    1/6/2021

## 2021-01-07 NOTE — ANESTHESIA POSTPROCEDURE EVALUATION
Department of Anesthesiology  Postprocedure Note    Patient: Phylicia Noland  MRN: [id number]  YOB: 1940  Date of evaluation: 1/7/2021  Time:  8:34 AM     Procedure Summary     Date: 01/06/21 Room / Location: [address]    Anesthesia Start: 1243 Anesthesia Stop: 0210    Procedure: VENA CAVA FILTER INSERTION, RIGHT FEMORAL APPROACH (N/A ) Diagnosis: (DVT)    Surgeons: Maya Almeida MD Responsible Provider: Ольга Hartmann DO    Anesthesia Type: MAC ASA Status: 3          Anesthesia Type: MAC    Navarro Phase I: Navarro Score: 8    Navarro Phase II: Navarro Score: 4    Last vitals: Reviewed and per EMR flowsheets.        Anesthesia Post Evaluation    Patient location during evaluation: PACU  Patient participation: complete - patient cannot participate  Level of consciousness: sedated and ventilated  Airway patency: patent (intubated )  Nausea & Vomiting: no nausea and no vomiting  Complications: no  Cardiovascular status: hemodynamically stable  Respiratory status: acceptable and ventilator  Hydration status: euvolemic

## 2021-01-13 ENCOUNTER — HOSPITAL ENCOUNTER (OUTPATIENT)
Dept: GENERAL RADIOLOGY | Age: 81
Discharge: HOME | End: 2021-01-15
Payer: COMMERCIAL

## 2021-01-13 DIAGNOSIS — T17.908A ASPIRATION INTO AIRWAY, INITIAL ENCOUNTER: ICD-10-CM

## 2021-01-13 DIAGNOSIS — K56.7 ILEUS (HCC): ICD-10-CM

## 2021-01-13 PROCEDURE — 74018 RADEX ABDOMEN 1 VIEW: CPT

## 2021-01-13 PROCEDURE — 71045 X-RAY EXAM CHEST 1 VIEW: CPT

## 2021-01-15 ENCOUNTER — ANESTHESIA (OUTPATIENT)
Dept: ENDOSCOPY | Age: 81
End: 2021-01-15

## 2021-01-15 ENCOUNTER — ANESTHESIA EVENT (OUTPATIENT)
Dept: ENDOSCOPY | Age: 81
End: 2021-01-15

## 2021-01-15 VITALS
SYSTOLIC BLOOD PRESSURE: 141 MMHG | OXYGEN SATURATION: 99 % | RESPIRATION RATE: 6 BRPM | DIASTOLIC BLOOD PRESSURE: 55 MMHG

## 2021-01-15 PROCEDURE — 6360000002 HC RX W HCPCS: Performed by: NURSE ANESTHETIST, CERTIFIED REGISTERED

## 2021-01-15 PROCEDURE — 2500000003 HC RX 250 WO HCPCS: Performed by: NURSE ANESTHETIST, CERTIFIED REGISTERED

## 2021-01-15 PROCEDURE — 2580000003 HC RX 258: Performed by: NURSE ANESTHETIST, CERTIFIED REGISTERED

## 2021-01-15 RX ORDER — LIDOCAINE HYDROCHLORIDE 20 MG/ML
INJECTION, SOLUTION INTRAVENOUS PRN
Status: DISCONTINUED | OUTPATIENT
Start: 2021-01-15 | End: 2021-01-15 | Stop reason: SDUPTHER

## 2021-01-15 RX ORDER — KETAMINE HYDROCHLORIDE 10 MG/ML
INJECTION, SOLUTION INTRAMUSCULAR; INTRAVENOUS PRN
Status: DISCONTINUED | OUTPATIENT
Start: 2021-01-15 | End: 2021-01-15 | Stop reason: SDUPTHER

## 2021-01-15 RX ORDER — SODIUM CHLORIDE 9 MG/ML
INJECTION, SOLUTION INTRAVENOUS CONTINUOUS PRN
Status: DISCONTINUED | OUTPATIENT
Start: 2021-01-15 | End: 2021-01-15 | Stop reason: SDUPTHER

## 2021-01-15 RX ADMIN — SODIUM CHLORIDE: 9 INJECTION, SOLUTION INTRAVENOUS at 10:58

## 2021-01-15 RX ADMIN — KETAMINE HYDROCHLORIDE 20 MG: 10 INJECTION INTRAMUSCULAR; INTRAVENOUS at 11:35

## 2021-01-15 RX ADMIN — LIDOCAINE HYDROCHLORIDE 50 MG: 20 INJECTION, SOLUTION INTRAVENOUS at 11:26

## 2021-01-15 RX ADMIN — KETAMINE HYDROCHLORIDE 20 MG: 10 INJECTION INTRAMUSCULAR; INTRAVENOUS at 11:26

## 2021-01-15 NOTE — ANESTHESIA PRE PROCEDURE
midodrine (PROAMATINE) 5 MG tablet 5 mg by PEG Tube route 3 times daily    Historical Provider, MD   nystatin-triamcinolone (MYCOLOG II) 486604-1.2 UNIT/GM-% cream Apply topically 4 times daily Apply topically 4 times daily. Historical Provider, MD   pantoprazole sodium (PROTONIX) 40 MG PACK packet 40 mg by Per G Tube route 2 times daily (before meals) Pantoprazole/ sodium bicarb liquid 2mg/ml oral liquid 40 mg peg tube BID    Historical Provider, MD   glucagon, rDNA, 1 MG injection Inject 1 mg into the muscle as needed for Low blood sugar (Blood glucose less than 70 mg/dL and patient NOT ALERT or NPO and does not have IV access.) 12/23/20 12/18/21  Michelle Pompa MD   folic acid (FOLVITE) 1 MG tablet 1 tablet by Per G Tube route daily 12/24/20   Michelle Pompa MD   docusate sodium (ENEMEEZ) 283 MG enema Place 5 mLs rectally daily 12/24/20   Michelle Pompa MD   senna (SENOKOT) 8.6 MG tablet 1 tablet by PEG Tube route 2 times daily 12/23/20 1/22/21  Michelle Pompa MD   glucose (GLUTOSE) 40 % GEL Take 15 g by mouth as needed 4/6/16   Caleb Shoemaker DO   latanoprost (XALATAN) 0.005 % ophthalmic solution Place 1 drop into both eyes daily     Historical Provider, MD   acetaminophen (TYLENOL) 325 MG tablet Take 650 mg by mouth every 4 hours as needed for Pain or Fever (max = 3000 mg/24 hours)    Historical Provider, MD       Current medications:    No current outpatient medications on file. No current facility-administered medications for this visit. Allergies:     Allergies   Allergen Reactions    Imuran [Azathioprine]     Doxycycline Nausea And Vomiting    Levaquin [Levofloxacin] Other (See Comments)     Lethargy when taken with celebrex       Problem List:    Patient Active Problem List   Diagnosis Code    Severe sepsis with septic shock ([facility] A41.9, R65.21    Dementia ([facility] F31.63    Metabolic encephalopathy A42.53    Diabetes mellitus type 2, uncontrolled ([facility] E11.65  Hyperlipidemia LDL goal <100 E78.5    Essential hypertension I10    Venous ulcer of left leg ([facility]) I83.029, L97.929    Non-pressure chronic ulcer of left lower leg with fat layer exposed ([address] L97.922    Non-pressure chronic ulcer left lower leg, limited to breakdown skin (HCC) L97.921    Severe protein-calorie malnutrition (HCC) E43    Non-pressure chronic ulcer right lower leg, limited to breakdown skin (HCC) L97.911    Pressure injury of contiguous region involving back, buttock, and hip, stage 2 (HCC) L89.42    Pressure injury of calf, stage 2 (HCC) L89.892    HCAP (healthcare-associated pneumonia) J18.9    Paroxysmal atrial fibrillation ([facility]) I48.0    Acute respiratory failure with hypoxia ([facility]) J96.01    Acute deep vein thrombosis (DVT) of femoral vein of left lower extremity ([facility]) I82.412    Leg swelling M79.89       Past Medical History:        Diagnosis Date    Acute deep vein thrombosis (DVT) of femoral vein of left lower extremity ([address] 1/5/2021    Anxiety     Benign localized hyperplasia of prostate with urinary obstruction     Bullous pemphigoid     Dementia ([address]     Diabetes mellitus ([address]     Enlarged prostate     Hx of blood clots     Hyperlipidemia     Hypertension     Paroxysmal atrial fibrillation ([address] 1/12/2019    Respiratory failure ([address] 12/2020    Rheumatoid disease ([address]        Past Surgical History:        Procedure Laterality Date    DOPPLER ECHOCARDIOGRAPHY N/A     FRACTURE SURGERY      HIP FRACTURE SURGERY Left 11/17/2019    HIP OPEN REDUCTION INTERNAL FIXATION performed by Alberto Piña [facility] at [facility]  12/18/2020         [place] EGD PERCUTANEOUS PLACEMENT GASTROSTOMY TUBE N/A 7/31/2018    EGD  PEG TUBE INSERTION,  (ENDO STAFF NEEDED) performed by Areli Perez MD at [place] [address] N/A 7/29/2018    EGD DIAGNOSTIC ONLY performed by Kevin Santiago MD at [address] [place] [address]  VENA CAVA FILTER PLACEMENT N/A 2021    VENA CAVA FILTER INSERTION, RIGHT FEMORAL APPROACH performed by Will Brown MD at [address] [place] [address] History:    Social History     Tobacco Use    Smoking status: Former Smoker     Packs/day: 2.00     Years: 50.00     Pack years: 100.00     Types: Cigarettes     Start date: 3/30/1955     Quit date: 3/30/2006     Years since quittin.8    Smokeless tobacco: Never Used   Substance Use Topics    Alcohol use: No     Alcohol/week: 0.0 standard drinks                                Counseling given: Not Answered      Vital Signs (Current): There were no vitals filed for this visit. BP Readings from Last 3 Encounters:   21 116/74   21 118/69   20 116/60       NPO Status:  instructed NPO @ midnight                          greater than 8 hours                                                      BMI:   Wt Readings from Last 3 Encounters:   21 255 lb (115.7 kg)   21 268 lb (121.6 kg)   20 259 lb 9 oz (117.7 kg)     There is no height or weight on file to calculate BMI.    CBC:   Lab Results   Component Value Date    WBC 9.6 01/15/2021    RBC 3.55 01/15/2021    HGB 9.5 01/15/2021    HCT 33.9 01/15/2021    MCV 95.5 01/15/2021    RDW 19.1 01/15/2021     01/15/2021       CMP:   Lab Results   Component Value Date     01/15/2021    K 3.4 01/15/2021    K 3.9 2020     01/15/2021    CO2 32 01/15/2021    BUN 25 01/15/2021    CREATININE 0.7 01/15/2021    GFRAA >60 01/15/2021    LABGLOM >60 01/15/2021    GLUCOSE 107 01/15/2021    GLUCOSE 138 2011    PROT 5.6 01/15/2021    CALCIUM 7.8 01/15/2021    BILITOT 0.7 01/15/2021    ALKPHOS 124 01/15/2021    AST 24 01/15/2021    ALT 12 01/15/2021       POC Tests: No results for input(s): POCGLU, POCNA, POCK, POCCL, POCBUN, POCHEMO, POCHCT in the last 72 hours.     Coags:   Lab Results   Component Value Date PROTIME 14.1 01/15/2021    INR 1.2 01/15/2021    APTT 21.0 2021       HCG (If Applicable): No results found for: PREGTESTUR, PREGSERUM, HCG, HCGQUANT     ABGs: No results found for: PHART, PO2ART, XJR0JBF, WHR4OCM, BEART, Y3CRNFKL     Type & Screen (If Applicable):  No results found for: LABABO, LABRH     ECHO  Conclusions      Summary   Left ventricular size is grossly normal.   Normal LV segmental wall motion.   Ejection fraction is visually estimated at 70%.   Moderate left ventricular concentric hypertrophy noted.   Mild aortic stenosis is present.      Signature      ----------------------------------------------------------------   Electronically signed by Kelsy Daniels MD(Interpreting physician)  [facility] 2017 03:48 PM   ----------------------------------------------------------------    EKG  Narrative     Sinus tachycardia  Left axis deviation  Anterolateral infarct , age undetermined  Abnormal ECG  When compared with ECG of 17-OCT-2016 20:34,  Anterior infarct is now present  Anterolateral infarct is now present  Non-specific change in ST segment in Inferior leads         Anesthesia Evaluation  Patient summary reviewed and Nursing notes reviewed no history of anesthetic complications:   Airway: Mallampati: III  TM distance: >3 FB   Neck ROM: limited  Comment: Vent dependent   Mouth opening: < 3 FB Dental:    (+) lower dentures and upper dentures      Pulmonary: breath sounds clear to auscultation      (-) pneumonia                          ROS comment: Smoking Status: Former Smoker - 100 pack years  Quit Smokin06     Cardiovascular:  Exercise tolerance: poor (<4 METS),   (+) hypertension:, dysrhythmias (Paroxysmal atrial fibrillation ): atrial fibrillation, hyperlipidemia      ECG reviewed  Rhythm: regular  Rate: normal  Echocardiogram reviewed         Beta Blocker:  Not on Beta Blocker      ROS comment: Echo 2017    Summary   Left ventricular size is grossly normal.  Normal LV segmental wall motion.   Ejection fraction is visually estimated at 70%.   Moderate left ventricular concentric hypertrophy noted.   Mild aortic stenosis is present    Levophed drip     Neuro/Psych:   (+) psychiatric history (Dementia):depression/anxiety              ROS comment: dementia GI/Hepatic/Renal:            ROS comment: PEG Tube  Severe protein-calorie malnutrition. Endo/Other:    (+) DiabetesType II DM, using insulin, blood dyscrasia (HGB 9.0): anemia, arthritis: rheumatoid. , .                  ROS comment: obese Abdominal:           Vascular:   + DVT, .        ROS comment: Venous ulcer of left leg. Anesthesia Plan      MAC     ASA 3       Induction: intravenous. Anesthetic plan and risks discussed with patient. Plan discussed with CRNA.             Elton Cunningham MD   1/15/2021

## 2021-01-15 NOTE — ANESTHESIA POSTPROCEDURE EVALUATION
Department of Anesthesiology  Postprocedure Note    Patient: Carmela Florence  MRN: [id number]  YOB: 1940  Date of evaluation: 1/15/2021  Time:  12:18 PM     Procedure Summary     Date: 01/15/21 Room / Location: [address] [place] [address]    Anesthesia Start: 1024 Anesthesia Stop: 9059    Procedure: EGD WITH CONVERSION TO J TUBE    ++CONTACT ISOLATION++ (N/A ) Diagnosis: (GASTROSTOMY MALFUNCTION)    Surgeons: Mj Mantilla MD Responsible Provider: Gloria Oconnor MD    Anesthesia Type: MAC ASA Status: 3          Anesthesia Type: MAC    Navarro Phase I:      Navarro Phase II: Navarro Score: 8    Last vitals: Reviewed and per EMR flowsheets.        Anesthesia Post Evaluation    Patient location during evaluation: PACU  Patient participation: complete - patient participated  Level of consciousness: awake and alert  Airway patency: patent  Nausea & Vomiting: no nausea and no vomiting  Complications: no  Cardiovascular status: hemodynamically stable  Respiratory status: acceptable  Hydration status: euvolemic

## 2021-01-22 ENCOUNTER — APPOINTMENT (OUTPATIENT)
Dept: CT IMAGING | Age: 81
DRG: 870 | End: 2021-01-22
Payer: COMMERCIAL

## 2021-01-22 ENCOUNTER — APPOINTMENT (OUTPATIENT)
Dept: GENERAL RADIOLOGY | Age: 81
DRG: 870 | End: 2021-01-22
Payer: COMMERCIAL

## 2021-01-22 ENCOUNTER — HOSPITAL ENCOUNTER (INPATIENT)
Age: 81
LOS: 21 days | Discharge: HOME | DRG: 870 | End: 2021-02-12
Attending: EMERGENCY MEDICINE | Admitting: INTERNAL MEDICINE
Payer: COMMERCIAL

## 2021-01-22 DIAGNOSIS — J96.01 ACUTE RESPIRATORY FAILURE WITH HYPOXIA (HCC): Primary | ICD-10-CM

## 2021-01-22 DIAGNOSIS — Z71.89 GOALS OF CARE, COUNSELING/DISCUSSION: ICD-10-CM

## 2021-01-22 DIAGNOSIS — N39.0 URINARY TRACT INFECTION WITHOUT HEMATURIA, SITE UNSPECIFIED: ICD-10-CM

## 2021-01-22 DIAGNOSIS — K92.2 GASTROINTESTINAL HEMORRHAGE, UNSPECIFIED GASTROINTESTINAL HEMORRHAGE TYPE: ICD-10-CM

## 2021-01-22 DIAGNOSIS — T17.908A ASPIRATION INTO RESPIRATORY TRACT, INITIAL ENCOUNTER: ICD-10-CM

## 2021-01-22 DIAGNOSIS — R65.21 SEPTIC SHOCK (HCC): ICD-10-CM

## 2021-01-22 DIAGNOSIS — D64.9 ANEMIA, UNSPECIFIED TYPE: ICD-10-CM

## 2021-01-22 DIAGNOSIS — R77.8 ELEVATED TROPONIN: ICD-10-CM

## 2021-01-22 DIAGNOSIS — A41.9 SEPTIC SHOCK (HCC): ICD-10-CM

## 2021-01-22 PROBLEM — J96.90 RESPIRATORY FAILURE (HCC): Status: ACTIVE | Noted: 2021-01-22

## 2021-01-22 LAB
AADO2: 151.9 MMHG
ALBUMIN SERPL-MCNC: 2 G/DL (ref 3.5–5.2)
ALP BLD-CCNC: 123 U/L (ref 40–129)
ALT SERPL-CCNC: 8 U/L (ref 0–40)
ANION GAP SERPL CALCULATED.3IONS-SCNC: 8 MMOL/L (ref 7–16)
AST SERPL-CCNC: 18 U/L (ref 0–39)
B.E.: 2.8 MMOL/L (ref -3–3)
BACTERIA: ABNORMAL /HPF
BASOPHILS ABSOLUTE: 0.02 E9/L (ref 0–0.2)
BASOPHILS RELATIVE PERCENT: 0.3 % (ref 0–2)
BILIRUB SERPL-MCNC: 0.3 MG/DL (ref 0–1.2)
BILIRUBIN URINE: NEGATIVE
BLOOD, URINE: ABNORMAL
BUN BLDV-MCNC: 29 MG/DL (ref 8–23)
CALCIUM SERPL-MCNC: 6.8 MG/DL (ref 8.6–10.2)
CHLORIDE BLD-SCNC: 99 MMOL/L (ref 98–107)
CLARITY: ABNORMAL
CO2: 33 MMOL/L (ref 22–29)
COHB: 1.9 % (ref 0–1.5)
COLOR: YELLOW
CREAT SERPL-MCNC: 0.9 MG/DL (ref 0.7–1.2)
CRITICAL: ABNORMAL
DATE ANALYZED: ABNORMAL
DATE OF COLLECTION: ABNORMAL
EOSINOPHILS ABSOLUTE: 0.23 E9/L (ref 0.05–0.5)
EOSINOPHILS RELATIVE PERCENT: 3.1 % (ref 0–6)
FIO2: 40 %
GFR AFRICAN AMERICAN: >60
GFR NON-AFRICAN AMERICAN: >60 ML/MIN/1.73
GLUCOSE BLD-MCNC: 124 MG/DL (ref 74–99)
GLUCOSE URINE: NEGATIVE MG/DL
HCO3: 28 MMOL/L (ref 22–26)
HCT VFR BLD CALC: 25.8 % (ref 37–54)
HEMOGLOBIN: 7.6 G/DL (ref 12.5–16.5)
HHB: 7.1 % (ref 0–5)
IMMATURE GRANULOCYTES #: 0.08 E9/L
IMMATURE GRANULOCYTES %: 1.1 % (ref 0–5)
KETONES, URINE: NEGATIVE MG/DL
LAB: ABNORMAL
LACTIC ACID: 2.2 MMOL/L (ref 0.5–2.2)
LEUKOCYTE ESTERASE, URINE: ABNORMAL
LYMPHOCYTES ABSOLUTE: 1.96 E9/L (ref 1.5–4)
LYMPHOCYTES RELATIVE PERCENT: 26.8 % (ref 20–42)
Lab: ABNORMAL
MCH RBC QN AUTO: 27.3 PG (ref 26–35)
MCHC RBC AUTO-ENTMCNC: 29.5 % (ref 32–34.5)
MCV RBC AUTO: 92.8 FL (ref 80–99.9)
METHB: 0.3 % (ref 0–1.5)
MODE: AC
MONOCYTES ABSOLUTE: 0.58 E9/L (ref 0.1–0.95)
MONOCYTES RELATIVE PERCENT: 7.9 % (ref 2–12)
NEUTROPHILS ABSOLUTE: 4.44 E9/L (ref 1.8–7.3)
NEUTROPHILS RELATIVE PERCENT: 60.8 % (ref 43–80)
NITRITE, URINE: NEGATIVE
O2 CONTENT: 10.7 ML/DL
O2 SATURATION: 92.7 % (ref 92–98.5)
O2HB: 90.7 % (ref 94–97)
OPERATOR ID: 786
PATIENT TEMP: 37 C
PCO2: 46.7 MMHG (ref 35–45)
PDW BLD-RTO: 19 FL (ref 11.5–15)
PEEP/CPAP: 5 CMH2O
PFO2: 1.74 MMHG/%
PH BLOOD GAS: 7.4 (ref 7.35–7.45)
PH UA: 6 (ref 5–9)
PLATELET # BLD: 187 E9/L (ref 130–450)
PMV BLD AUTO: 9.7 FL (ref 7–12)
PO2: 69.6 MMHG (ref 75–100)
POTASSIUM SERPL-SCNC: 3.4 MMOL/L (ref 3.5–5)
PRO-BNP: 2189 PG/ML (ref 0–450)
PROTEIN UA: NEGATIVE MG/DL
RBC # BLD: 2.78 E12/L (ref 3.8–5.8)
RBC UA: ABNORMAL /HPF (ref 0–2)
RI(T): 218 %
RR MECHANICAL: 18 B/MIN
SARS-COV-2, NAAT: NOT DETECTED
SODIUM BLD-SCNC: 140 MMOL/L (ref 132–146)
SOURCE, BLOOD GAS: ABNORMAL
SPECIFIC GRAVITY UA: 1.01 (ref 1–1.03)
THB: 8.3 G/DL (ref 11.5–16.5)
TIME ANALYZED: 2116
TOTAL PROTEIN: 4.3 G/DL (ref 6.4–8.3)
TROPONIN: 0.11 NG/ML (ref 0–0.03)
UROBILINOGEN, URINE: 0.2 E.U./DL
VT MECHANICAL: 500 ML
WBC # BLD: 7.3 E9/L (ref 4.5–11.5)
WBC UA: ABNORMAL /HPF (ref 0–5)

## 2021-01-22 PROCEDURE — 96375 TX/PRO/DX INJ NEW DRUG ADDON: CPT

## 2021-01-22 PROCEDURE — 84484 ASSAY OF TROPONIN QUANT: CPT

## 2021-01-22 PROCEDURE — 96361 HYDRATE IV INFUSION ADD-ON: CPT

## 2021-01-22 PROCEDURE — 31500 INSERT EMERGENCY AIRWAY: CPT

## 2021-01-22 PROCEDURE — 87040 BLOOD CULTURE FOR BACTERIA: CPT

## 2021-01-22 PROCEDURE — 70450 CT HEAD/BRAIN W/O DYE: CPT

## 2021-01-22 PROCEDURE — 80053 COMPREHEN METABOLIC PANEL: CPT

## 2021-01-22 PROCEDURE — 74018 RADEX ABDOMEN 1 VIEW: CPT

## 2021-01-22 PROCEDURE — 85025 COMPLETE CBC W/AUTO DIFF WBC: CPT

## 2021-01-22 PROCEDURE — 71045 X-RAY EXAM CHEST 1 VIEW: CPT

## 2021-01-22 PROCEDURE — 87077 CULTURE AEROBIC IDENTIFY: CPT

## 2021-01-22 PROCEDURE — 83605 ASSAY OF LACTIC ACID: CPT

## 2021-01-22 PROCEDURE — 96374 THER/PROPH/DIAG INJ IV PUSH: CPT

## 2021-01-22 PROCEDURE — 71250 CT THORAX DX C-: CPT

## 2021-01-22 PROCEDURE — 96376 TX/PRO/DX INJ SAME DRUG ADON: CPT

## 2021-01-22 PROCEDURE — 87186 SC STD MICRODIL/AGAR DIL: CPT

## 2021-01-22 PROCEDURE — 2500000003 HC RX 250 WO HCPCS: Performed by: EMERGENCY MEDICINE

## 2021-01-22 PROCEDURE — 6360000002 HC RX W HCPCS: Performed by: EMERGENCY MEDICINE

## 2021-01-22 PROCEDURE — 99284 EMERGENCY DEPT VISIT MOD MDM: CPT

## 2021-01-22 PROCEDURE — 81001 URINALYSIS AUTO W/SCOPE: CPT

## 2021-01-22 PROCEDURE — 87088 URINE BACTERIA CULTURE: CPT

## 2021-01-22 PROCEDURE — 2500000003 HC RX 250 WO HCPCS

## 2021-01-22 PROCEDURE — 2000000000 HC ICU R&B

## 2021-01-22 PROCEDURE — 82805 BLOOD GASES W/O2 SATURATION: CPT

## 2021-01-22 PROCEDURE — U0002 COVID-19 LAB TEST NON-CDC: HCPCS

## 2021-01-22 PROCEDURE — 2580000003 HC RX 258: Performed by: EMERGENCY MEDICINE

## 2021-01-22 PROCEDURE — 94002 VENT MGMT INPAT INIT DAY: CPT

## 2021-01-22 PROCEDURE — 83880 ASSAY OF NATRIURETIC PEPTIDE: CPT

## 2021-01-22 PROCEDURE — 93005 ELECTROCARDIOGRAM TRACING: CPT | Performed by: EMERGENCY MEDICINE

## 2021-01-22 PROCEDURE — 02HV33Z INSERTION OF INFUSION DEVICE INTO SUPERIOR VENA CAVA, PERCUTANEOUS APPROACH: ICD-10-PCS | Performed by: INTERNAL MEDICINE

## 2021-01-22 RX ORDER — ETOMIDATE 2 MG/ML
30 INJECTION INTRAVENOUS ONCE
Status: COMPLETED | OUTPATIENT
Start: 2021-01-22 | End: 2021-01-22

## 2021-01-22 RX ORDER — FENTANYL CITRATE 50 UG/ML
100 INJECTION, SOLUTION INTRAMUSCULAR; INTRAVENOUS ONCE
Status: COMPLETED | OUTPATIENT
Start: 2021-01-22 | End: 2021-01-22

## 2021-01-22 RX ORDER — ROCURONIUM BROMIDE 10 MG/ML
100 INJECTION, SOLUTION INTRAVENOUS ONCE
Status: COMPLETED | OUTPATIENT
Start: 2021-01-22 | End: 2021-01-22

## 2021-01-22 RX ORDER — POTASSIUM CHLORIDE 7.45 MG/ML
10 INJECTION INTRAVENOUS ONCE
Status: COMPLETED | OUTPATIENT
Start: 2021-01-22 | End: 2021-01-22

## 2021-01-22 RX ORDER — 0.9 % SODIUM CHLORIDE 0.9 %
1000 INTRAVENOUS SOLUTION INTRAVENOUS ONCE
Status: COMPLETED | OUTPATIENT
Start: 2021-01-22 | End: 2021-01-22

## 2021-01-22 RX ORDER — FENTANYL CITRATE 50 UG/ML
50 INJECTION, SOLUTION INTRAMUSCULAR; INTRAVENOUS
Status: DISCONTINUED | OUTPATIENT
Start: 2021-01-22 | End: 2021-02-12 | Stop reason: HOSPADM

## 2021-01-22 RX ORDER — KETAMINE HYDROCHLORIDE 10 MG/ML
100 INJECTION, SOLUTION INTRAMUSCULAR; INTRAVENOUS ONCE
Status: COMPLETED | OUTPATIENT
Start: 2021-01-22 | End: 2021-01-22

## 2021-01-22 RX ORDER — SODIUM CHLORIDE 9 MG/ML
INJECTION, SOLUTION INTRAVENOUS CONTINUOUS
Status: DISCONTINUED | OUTPATIENT
Start: 2021-01-22 | End: 2021-01-24

## 2021-01-22 RX ORDER — KETAMINE HYDROCHLORIDE 10 MG/ML
INJECTION, SOLUTION INTRAMUSCULAR; INTRAVENOUS
Status: COMPLETED
Start: 2021-01-22 | End: 2021-01-22

## 2021-01-22 RX ADMIN — Medication 50 MCG/HR: at 21:46

## 2021-01-22 RX ADMIN — Medication 10 MCG/MIN: at 21:00

## 2021-01-22 RX ADMIN — KETAMINE HYDROCHLORIDE 100 MG: 10 INJECTION INTRAMUSCULAR; INTRAVENOUS at 22:22

## 2021-01-22 RX ADMIN — ETOMIDATE 30 MG: 2 INJECTION INTRAVENOUS at 20:35

## 2021-01-22 RX ADMIN — FENTANYL CITRATE 100 MCG: 50 INJECTION, SOLUTION INTRAMUSCULAR; INTRAVENOUS at 20:54

## 2021-01-22 RX ADMIN — FENTANYL CITRATE 50 MCG: 50 INJECTION, SOLUTION INTRAMUSCULAR; INTRAVENOUS at 21:22

## 2021-01-22 RX ADMIN — SODIUM CHLORIDE 1000 ML: 9 INJECTION, SOLUTION INTRAVENOUS at 20:35

## 2021-01-22 RX ADMIN — SODIUM CHLORIDE: 9 INJECTION, SOLUTION INTRAVENOUS at 23:02

## 2021-01-22 RX ADMIN — FENTANYL CITRATE 50 MCG: 50 INJECTION, SOLUTION INTRAMUSCULAR; INTRAVENOUS at 22:22

## 2021-01-22 RX ADMIN — VANCOMYCIN HYDROCHLORIDE 2000 MG: 10 INJECTION, POWDER, LYOPHILIZED, FOR SOLUTION INTRAVENOUS at 21:53

## 2021-01-22 RX ADMIN — KETAMINE HYDROCHLORIDE 100 MG: 10 INJECTION, SOLUTION INTRAMUSCULAR; INTRAVENOUS at 22:22

## 2021-01-22 RX ADMIN — FENTANYL CITRATE 100 MCG: 50 INJECTION, SOLUTION INTRAMUSCULAR; INTRAVENOUS at 21:35

## 2021-01-22 RX ADMIN — ROCURONIUM BROMIDE 100 MG: 10 INJECTION INTRAVENOUS at 20:36

## 2021-01-22 RX ADMIN — MEROPENEM 1000 MG: 1 INJECTION, POWDER, FOR SOLUTION INTRAVENOUS at 21:45

## 2021-01-22 RX ADMIN — POTASSIUM CHLORIDE 10 MEQ: 10 INJECTION, SOLUTION INTRAVENOUS at 23:28

## 2021-01-22 ASSESSMENT — PAIN SCALES - GENERAL
PAINLEVEL_OUTOF10: 10

## 2021-01-23 LAB
ANION GAP SERPL CALCULATED.3IONS-SCNC: 10 MMOL/L (ref 7–16)
B.E.: 7.9 MMOL/L (ref -3–0)
BUN BLDV-MCNC: 28 MG/DL (ref 8–23)
CALCIUM SERPL-MCNC: 7.4 MG/DL (ref 8.6–10.2)
CHLORIDE BLD-SCNC: 95 MMOL/L (ref 98–107)
CO2: 30 MMOL/L (ref 22–29)
CORTISOL TOTAL: 3.77 MCG/DL (ref 2.68–18.4)
CREAT SERPL-MCNC: 0.9 MG/DL (ref 0.7–1.2)
DEVICE: ABNORMAL
EKG ATRIAL RATE: 92 BPM
EKG P AXIS: 49 DEGREES
EKG P-R INTERVAL: 174 MS
EKG Q-T INTERVAL: 364 MS
EKG QRS DURATION: 84 MS
EKG QTC CALCULATION (BAZETT): 450 MS
EKG R AXIS: -48 DEGREES
EKG T AXIS: 36 DEGREES
EKG VENTRICULAR RATE: 92 BPM
FIO2 ARTERIAL: 50
GFR AFRICAN AMERICAN: >60
GFR NON-AFRICAN AMERICAN: >60 ML/MIN/1.73
GLUCOSE BLD-MCNC: 222 MG/DL (ref 74–99)
HCO3 ARTERIAL: 31.6 MMOL/L (ref 22–26)
HCT (EST): 27 % (ref 37–54)
HGB, (EST): 9.1 G/DL (ref 12.5–15.5)
LACTIC ACID: 2 MMOL/L (ref 0.5–2.2)
MAGNESIUM: 1.1 MG/DL (ref 1.6–2.6)
METER GLUCOSE: 218 MG/DL (ref 74–99)
METER GLUCOSE: 238 MG/DL (ref 74–99)
METER GLUCOSE: 264 MG/DL (ref 74–99)
METER GLUCOSE: 266 MG/DL (ref 74–99)
METER GLUCOSE: 267 MG/DL (ref 74–99)
MODE: AC
O2 SATURATION: 97.9 % (ref 92–98.5)
OPERATOR ID: 1874
PATIENT TEMP: 37
PCO2 (TEMP CORRECTED): 39.5 MMHG (ref 35–45)
PH (TEMPERATURE CORRECTED): 7.51 (ref 7.35–7.45)
PO2 (TEMP CORRECTED): 92.2 MMHG (ref 60–80)
POSITIVE END EXP PRESS: 5 CMH2O
POTASSIUM REFLEX MAGNESIUM: 3.9 MMOL/L (ref 3.5–5)
PROCALCITONIN: 1.23 NG/ML (ref 0–0.08)
RESPIRATORY RATE: 18 B/MIN
SODIUM BLD-SCNC: 135 MMOL/L (ref 132–146)
SOURCE, BLOOD GAS: ABNORMAL
TIDAL VOLUME: 500 ML
TROPONIN: 0.09 NG/ML (ref 0–0.03)
TROPONIN: 0.11 NG/ML (ref 0–0.03)
TROPONIN: 0.12 NG/ML (ref 0–0.03)

## 2021-01-23 PROCEDURE — 5A1955Z RESPIRATORY VENTILATION, GREATER THAN 96 CONSECUTIVE HOURS: ICD-10-PCS | Performed by: EMERGENCY MEDICINE

## 2021-01-23 PROCEDURE — 83605 ASSAY OF LACTIC ACID: CPT

## 2021-01-23 PROCEDURE — 2580000003 HC RX 258: Performed by: INTERNAL MEDICINE

## 2021-01-23 PROCEDURE — 94002 VENT MGMT INPAT INIT DAY: CPT

## 2021-01-23 PROCEDURE — 94640 AIRWAY INHALATION TREATMENT: CPT

## 2021-01-23 PROCEDURE — 2500000003 HC RX 250 WO HCPCS: Performed by: EMERGENCY MEDICINE

## 2021-01-23 PROCEDURE — 6360000002 HC RX W HCPCS: Performed by: INTERNAL MEDICINE

## 2021-01-23 PROCEDURE — 6360000002 HC RX W HCPCS: Performed by: STUDENT IN AN ORGANIZED HEALTH CARE EDUCATION/TRAINING PROGRAM

## 2021-01-23 PROCEDURE — 93010 ELECTROCARDIOGRAM REPORT: CPT | Performed by: INTERNAL MEDICINE

## 2021-01-23 PROCEDURE — 80048 BASIC METABOLIC PNL TOTAL CA: CPT

## 2021-01-23 PROCEDURE — 82962 GLUCOSE BLOOD TEST: CPT

## 2021-01-23 PROCEDURE — 2580000003 HC RX 258: Performed by: STUDENT IN AN ORGANIZED HEALTH CARE EDUCATION/TRAINING PROGRAM

## 2021-01-23 PROCEDURE — 2000000000 HC ICU R&B

## 2021-01-23 PROCEDURE — 0BH18EZ INSERTION OF ENDOTRACHEAL AIRWAY INTO TRACHEA, VIA NATURAL OR ARTIFICIAL OPENING ENDOSCOPIC: ICD-10-PCS | Performed by: EMERGENCY MEDICINE

## 2021-01-23 PROCEDURE — 36620 INSERTION CATHETER ARTERY: CPT

## 2021-01-23 PROCEDURE — 94003 VENT MGMT INPAT SUBQ DAY: CPT

## 2021-01-23 PROCEDURE — 87077 CULTURE AEROBIC IDENTIFY: CPT

## 2021-01-23 PROCEDURE — 82533 TOTAL CORTISOL: CPT

## 2021-01-23 PROCEDURE — C9113 INJ PANTOPRAZOLE SODIUM, VIA: HCPCS | Performed by: STUDENT IN AN ORGANIZED HEALTH CARE EDUCATION/TRAINING PROGRAM

## 2021-01-23 PROCEDURE — 83735 ASSAY OF MAGNESIUM: CPT

## 2021-01-23 PROCEDURE — 6370000000 HC RX 637 (ALT 250 FOR IP): Performed by: INTERNAL MEDICINE

## 2021-01-23 PROCEDURE — 84484 ASSAY OF TROPONIN QUANT: CPT

## 2021-01-23 PROCEDURE — 94664 DEMO&/EVAL PT USE INHALER: CPT

## 2021-01-23 PROCEDURE — 36556 INSERT NON-TUNNEL CV CATH: CPT

## 2021-01-23 PROCEDURE — 51702 INSERT TEMP BLADDER CATH: CPT

## 2021-01-23 PROCEDURE — 87206 SMEAR FLUORESCENT/ACID STAI: CPT

## 2021-01-23 PROCEDURE — 6360000002 HC RX W HCPCS: Performed by: SURGERY

## 2021-01-23 PROCEDURE — 82803 BLOOD GASES ANY COMBINATION: CPT

## 2021-01-23 PROCEDURE — 84145 PROCALCITONIN (PCT): CPT

## 2021-01-23 PROCEDURE — 36415 COLL VENOUS BLD VENIPUNCTURE: CPT

## 2021-01-23 PROCEDURE — C9113 INJ PANTOPRAZOLE SODIUM, VIA: HCPCS | Performed by: INTERNAL MEDICINE

## 2021-01-23 PROCEDURE — 87186 SC STD MICRODIL/AGAR DIL: CPT

## 2021-01-23 PROCEDURE — 87070 CULTURE OTHR SPECIMN AEROBIC: CPT

## 2021-01-23 PROCEDURE — 2580000003 HC RX 258: Performed by: SURGERY

## 2021-01-23 RX ORDER — ACETAMINOPHEN 325 MG/1
650 TABLET ORAL EVERY 6 HOURS PRN
Status: DISCONTINUED | OUTPATIENT
Start: 2021-01-23 | End: 2021-02-12 | Stop reason: HOSPADM

## 2021-01-23 RX ORDER — BUDESONIDE 0.5 MG/2ML
500 INHALANT ORAL 2 TIMES DAILY
Status: DISCONTINUED | OUTPATIENT
Start: 2021-01-23 | End: 2021-02-12 | Stop reason: HOSPADM

## 2021-01-23 RX ORDER — SENNA PLUS 8.6 MG/1
1 TABLET ORAL 2 TIMES DAILY
Status: DISCONTINUED | OUTPATIENT
Start: 2021-01-23 | End: 2021-02-12 | Stop reason: HOSPADM

## 2021-01-23 RX ORDER — POLYETHYLENE GLYCOL 3350 17 G/17G
17 POWDER, FOR SOLUTION ORAL DAILY PRN
Status: DISCONTINUED | OUTPATIENT
Start: 2021-01-23 | End: 2021-02-12 | Stop reason: HOSPADM

## 2021-01-23 RX ORDER — SODIUM CHLORIDE 0.9 % (FLUSH) 0.9 %
10 SYRINGE (ML) INJECTION EVERY 12 HOURS SCHEDULED
Status: DISCONTINUED | OUTPATIENT
Start: 2021-01-23 | End: 2021-02-12 | Stop reason: HOSPADM

## 2021-01-23 RX ORDER — MAGNESIUM SULFATE 1 G/100ML
1 INJECTION INTRAVENOUS ONCE
Status: COMPLETED | OUTPATIENT
Start: 2021-01-23 | End: 2021-01-23

## 2021-01-23 RX ORDER — PROMETHAZINE HYDROCHLORIDE 25 MG/1
12.5 TABLET ORAL EVERY 6 HOURS PRN
Status: DISCONTINUED | OUTPATIENT
Start: 2021-01-23 | End: 2021-02-12 | Stop reason: HOSPADM

## 2021-01-23 RX ORDER — NICOTINE POLACRILEX 4 MG
15 LOZENGE BUCCAL PRN
Status: DISCONTINUED | OUTPATIENT
Start: 2021-01-23 | End: 2021-02-12 | Stop reason: HOSPADM

## 2021-01-23 RX ORDER — PANTOPRAZOLE SODIUM 40 MG/10ML
40 INJECTION, POWDER, LYOPHILIZED, FOR SOLUTION INTRAVENOUS DAILY
Status: DISCONTINUED | OUTPATIENT
Start: 2021-01-23 | End: 2021-02-12 | Stop reason: HOSPADM

## 2021-01-23 RX ORDER — AMIODARONE HYDROCHLORIDE 200 MG/1
100 TABLET ORAL DAILY
Status: DISCONTINUED | OUTPATIENT
Start: 2021-01-23 | End: 2021-02-12 | Stop reason: HOSPADM

## 2021-01-23 RX ORDER — SODIUM CHLORIDE 9 MG/ML
INJECTION, SOLUTION INTRAVENOUS CONTINUOUS
Status: DISCONTINUED | OUTPATIENT
Start: 2021-01-23 | End: 2021-01-24

## 2021-01-23 RX ORDER — IPRATROPIUM BROMIDE AND ALBUTEROL SULFATE 2.5; .5 MG/3ML; MG/3ML
1 SOLUTION RESPIRATORY (INHALATION)
Status: DISCONTINUED | OUTPATIENT
Start: 2021-01-23 | End: 2021-01-23

## 2021-01-23 RX ORDER — LINEZOLID 2 MG/ML
600 INJECTION, SOLUTION INTRAVENOUS EVERY 12 HOURS
Status: DISCONTINUED | OUTPATIENT
Start: 2021-01-23 | End: 2021-01-27

## 2021-01-23 RX ORDER — DEXTROSE MONOHYDRATE 50 MG/ML
100 INJECTION, SOLUTION INTRAVENOUS PRN
Status: DISCONTINUED | OUTPATIENT
Start: 2021-01-23 | End: 2021-02-12 | Stop reason: HOSPADM

## 2021-01-23 RX ORDER — SODIUM CHLORIDE 9 MG/ML
INJECTION, SOLUTION INTRAVENOUS EVERY 8 HOURS
Status: DISCONTINUED | OUTPATIENT
Start: 2021-01-23 | End: 2021-02-03 | Stop reason: SDUPTHER

## 2021-01-23 RX ORDER — ATORVASTATIN CALCIUM 20 MG/1
20 TABLET, FILM COATED ORAL DAILY
Status: DISCONTINUED | OUTPATIENT
Start: 2021-01-23 | End: 2021-02-12 | Stop reason: HOSPADM

## 2021-01-23 RX ORDER — ONDANSETRON 2 MG/ML
4 INJECTION INTRAMUSCULAR; INTRAVENOUS EVERY 6 HOURS PRN
Status: DISCONTINUED | OUTPATIENT
Start: 2021-01-23 | End: 2021-02-12 | Stop reason: HOSPADM

## 2021-01-23 RX ORDER — DEXTROSE MONOHYDRATE 25 G/50ML
12.5 INJECTION, SOLUTION INTRAVENOUS PRN
Status: DISCONTINUED | OUTPATIENT
Start: 2021-01-23 | End: 2021-02-12 | Stop reason: HOSPADM

## 2021-01-23 RX ORDER — LATANOPROST 50 UG/ML
1 SOLUTION/ DROPS OPHTHALMIC DAILY
Status: DISCONTINUED | OUTPATIENT
Start: 2021-01-23 | End: 2021-02-12 | Stop reason: HOSPADM

## 2021-01-23 RX ORDER — ACETAMINOPHEN 160 MG/5ML
650 SOLUTION ORAL EVERY 4 HOURS PRN
Status: DISCONTINUED | OUTPATIENT
Start: 2021-01-23 | End: 2021-02-12 | Stop reason: HOSPADM

## 2021-01-23 RX ORDER — SODIUM CHLORIDE 9 MG/ML
10 INJECTION INTRAVENOUS DAILY
Status: DISCONTINUED | OUTPATIENT
Start: 2021-01-23 | End: 2021-02-12 | Stop reason: HOSPADM

## 2021-01-23 RX ORDER — ARFORMOTEROL TARTRATE 15 UG/2ML
15 SOLUTION RESPIRATORY (INHALATION) 2 TIMES DAILY
Status: DISCONTINUED | OUTPATIENT
Start: 2021-01-23 | End: 2021-01-25

## 2021-01-23 RX ORDER — ACETAMINOPHEN 650 MG/1
650 SUPPOSITORY RECTAL EVERY 6 HOURS PRN
Status: DISCONTINUED | OUTPATIENT
Start: 2021-01-23 | End: 2021-02-12 | Stop reason: HOSPADM

## 2021-01-23 RX ORDER — SODIUM CHLORIDE FOR INHALATION 3 %
4 VIAL, NEBULIZER (ML) INHALATION 2 TIMES DAILY
Status: DISCONTINUED | OUTPATIENT
Start: 2021-01-23 | End: 2021-02-12 | Stop reason: HOSPADM

## 2021-01-23 RX ORDER — IPRATROPIUM BROMIDE AND ALBUTEROL SULFATE 2.5; .5 MG/3ML; MG/3ML
1 SOLUTION RESPIRATORY (INHALATION)
Status: DISCONTINUED | OUTPATIENT
Start: 2021-01-24 | End: 2021-01-29

## 2021-01-23 RX ORDER — SODIUM CHLORIDE 0.9 % (FLUSH) 0.9 %
10 SYRINGE (ML) INJECTION PRN
Status: DISCONTINUED | OUTPATIENT
Start: 2021-01-23 | End: 2021-02-12 | Stop reason: HOSPADM

## 2021-01-23 RX ORDER — LACTULOSE 10 G/15ML
20 SOLUTION ORAL DAILY
Status: DISCONTINUED | OUTPATIENT
Start: 2021-01-23 | End: 2021-02-12 | Stop reason: HOSPADM

## 2021-01-23 RX ORDER — MIDODRINE HYDROCHLORIDE 5 MG/1
5 TABLET ORAL 3 TIMES DAILY
Status: DISCONTINUED | OUTPATIENT
Start: 2021-01-23 | End: 2021-01-23

## 2021-01-23 RX ORDER — METOCLOPRAMIDE HYDROCHLORIDE 5 MG/ML
10 INJECTION INTRAMUSCULAR; INTRAVENOUS EVERY 6 HOURS
Status: DISCONTINUED | OUTPATIENT
Start: 2021-01-23 | End: 2021-01-25

## 2021-01-23 RX ADMIN — SODIUM CHLORIDE: 9 INJECTION, SOLUTION INTRAVENOUS at 18:39

## 2021-01-23 RX ADMIN — ATORVASTATIN CALCIUM 20 MG: 20 TABLET, FILM COATED ORAL at 08:42

## 2021-01-23 RX ADMIN — LINEZOLID 600 MG: 600 INJECTION, SOLUTION INTRAVENOUS at 08:38

## 2021-01-23 RX ADMIN — INSULIN LISPRO 5 UNITS: 100 INJECTION, SOLUTION INTRAVENOUS; SUBCUTANEOUS at 20:55

## 2021-01-23 RX ADMIN — METOCLOPRAMIDE HYDROCHLORIDE 10 MG: 5 INJECTION INTRAMUSCULAR; INTRAVENOUS at 08:42

## 2021-01-23 RX ADMIN — SENNOSIDES 8.6 MG: 8.6 TABLET, FILM COATED ORAL at 08:41

## 2021-01-23 RX ADMIN — MEROPENEM 1000 MG: 1 INJECTION, POWDER, FOR SOLUTION INTRAVENOUS at 16:32

## 2021-01-23 RX ADMIN — BUDESONIDE 500 MCG: 0.5 SUSPENSION RESPIRATORY (INHALATION) at 12:14

## 2021-01-23 RX ADMIN — PHENYLEPHRINE HYDROCHLORIDE 25 MCG/MIN: 10 INJECTION INTRAVENOUS at 01:14

## 2021-01-23 RX ADMIN — HYDROCORTISONE SODIUM SUCCINATE 25 MG: 100 INJECTION, POWDER, FOR SOLUTION INTRAMUSCULAR; INTRAVENOUS at 08:42

## 2021-01-23 RX ADMIN — HYDROCORTISONE SODIUM SUCCINATE 25 MG: 100 INJECTION, POWDER, FOR SOLUTION INTRAMUSCULAR; INTRAVENOUS at 01:55

## 2021-01-23 RX ADMIN — ACETAMINOPHEN ORAL SOLUTION 650 MG: 650 SOLUTION ORAL at 07:27

## 2021-01-23 RX ADMIN — IPRATROPIUM BROMIDE AND ALBUTEROL SULFATE 1 AMPULE: .5; 2.5 SOLUTION RESPIRATORY (INHALATION) at 17:12

## 2021-01-23 RX ADMIN — SODIUM CHLORIDE SOLN NEBU 3% 4 ML: 3 NEBU SOLN at 09:47

## 2021-01-23 RX ADMIN — Medication 28 MCG/MIN: at 16:36

## 2021-01-23 RX ADMIN — INSULIN LISPRO 9 UNITS: 100 INJECTION, SOLUTION INTRAVENOUS; SUBCUTANEOUS at 12:21

## 2021-01-23 RX ADMIN — HYDROCORTISONE SODIUM SUCCINATE 100 MG: 100 INJECTION, POWDER, FOR SOLUTION INTRAMUSCULAR; INTRAVENOUS at 16:36

## 2021-01-23 RX ADMIN — IPRATROPIUM BROMIDE AND ALBUTEROL SULFATE 1 AMPULE: .5; 2.5 SOLUTION RESPIRATORY (INHALATION) at 20:30

## 2021-01-23 RX ADMIN — BUDESONIDE 500 MCG: 0.5 SUSPENSION RESPIRATORY (INHALATION) at 20:33

## 2021-01-23 RX ADMIN — ARFORMOTEROL TARTRATE 15 MCG: 15 SOLUTION RESPIRATORY (INHALATION) at 20:32

## 2021-01-23 RX ADMIN — PANTOPRAZOLE SODIUM 80 MG: 40 INJECTION, POWDER, FOR SOLUTION INTRAVENOUS at 07:02

## 2021-01-23 RX ADMIN — MIDODRINE HYDROCHLORIDE 5 MG: 5 TABLET ORAL at 08:42

## 2021-01-23 RX ADMIN — IPRATROPIUM BROMIDE AND ALBUTEROL SULFATE 1 AMPULE: .5; 2.5 SOLUTION RESPIRATORY (INHALATION) at 12:13

## 2021-01-23 RX ADMIN — Medication 10 ML: at 08:44

## 2021-01-23 RX ADMIN — POTASSIUM & SODIUM PHOSPHATES POWDER PACK 280-160-250 MG 250 MG: 280-160-250 PACK at 08:43

## 2021-01-23 RX ADMIN — POTASSIUM BICARBONATE 40 MEQ: 782 TABLET, EFFERVESCENT ORAL at 08:43

## 2021-01-23 RX ADMIN — NYSTATIN AND TRIAMCINOLONE ACETONIDE: 100000; 1 CREAM TOPICAL at 16:33

## 2021-01-23 RX ADMIN — LATANOPROST 1 DROP: 50 SOLUTION OPHTHALMIC at 08:42

## 2021-01-23 RX ADMIN — NYSTATIN AND TRIAMCINOLONE ACETONIDE: 100000; 1 CREAM TOPICAL at 12:42

## 2021-01-23 RX ADMIN — METOCLOPRAMIDE HYDROCHLORIDE 10 MG: 5 INJECTION INTRAMUSCULAR; INTRAVENOUS at 18:43

## 2021-01-23 RX ADMIN — METOCLOPRAMIDE HYDROCHLORIDE 10 MG: 5 INJECTION INTRAMUSCULAR; INTRAVENOUS at 01:56

## 2021-01-23 RX ADMIN — AMIODARONE HYDROCHLORIDE 100 MG: 200 TABLET ORAL at 08:41

## 2021-01-23 RX ADMIN — Medication 75 MCG/HR: at 14:55

## 2021-01-23 RX ADMIN — INSULIN LISPRO 6 UNITS: 100 INJECTION, SOLUTION INTRAVENOUS; SUBCUTANEOUS at 01:53

## 2021-01-23 RX ADMIN — INSULIN LISPRO 4 UNITS: 100 INJECTION, SOLUTION INTRAVENOUS; SUBCUTANEOUS at 09:00

## 2021-01-23 RX ADMIN — PANTOPRAZOLE SODIUM 40 MG: 40 INJECTION, POWDER, FOR SOLUTION INTRAVENOUS at 08:42

## 2021-01-23 RX ADMIN — SODIUM CHLORIDE: 9 INJECTION, SOLUTION INTRAVENOUS at 01:30

## 2021-01-23 RX ADMIN — MEROPENEM 1000 MG: 1 INJECTION, POWDER, FOR SOLUTION INTRAVENOUS at 09:48

## 2021-01-23 RX ADMIN — PHENYLEPHRINE HYDROCHLORIDE 150 MCG/MIN: 10 INJECTION INTRAVENOUS at 07:05

## 2021-01-23 RX ADMIN — ARFORMOTEROL TARTRATE 15 MCG: 15 SOLUTION RESPIRATORY (INHALATION) at 12:14

## 2021-01-23 RX ADMIN — INSULIN LISPRO 6 UNITS: 100 INJECTION, SOLUTION INTRAVENOUS; SUBCUTANEOUS at 16:34

## 2021-01-23 RX ADMIN — SODIUM CHLORIDE, PRESERVATIVE FREE 10 ML: 5 INJECTION INTRAVENOUS at 08:44

## 2021-01-23 RX ADMIN — LINEZOLID 600 MG: 600 INJECTION, SOLUTION INTRAVENOUS at 23:04

## 2021-01-23 RX ADMIN — SENNOSIDES 8.6 MG: 8.6 TABLET, FILM COATED ORAL at 01:59

## 2021-01-23 RX ADMIN — Medication 10 ML: at 20:55

## 2021-01-23 RX ADMIN — SODIUM CHLORIDE: 9 INJECTION, SOLUTION INTRAVENOUS at 06:04

## 2021-01-23 RX ADMIN — SENNOSIDES 8.6 MG: 8.6 TABLET, FILM COATED ORAL at 20:54

## 2021-01-23 RX ADMIN — LACTULOSE 20 G: 20 SOLUTION ORAL at 08:41

## 2021-01-23 RX ADMIN — NYSTATIN AND TRIAMCINOLONE ACETONIDE: 100000; 1 CREAM TOPICAL at 08:44

## 2021-01-23 RX ADMIN — ENOXAPARIN SODIUM 40 MG: 40 INJECTION SUBCUTANEOUS at 08:41

## 2021-01-23 RX ADMIN — PIPERACILLIN AND TAZOBACTAM 3375 MG: 3; .375 INJECTION, POWDER, LYOPHILIZED, FOR SOLUTION INTRAVENOUS at 01:58

## 2021-01-23 RX ADMIN — SODIUM CHLORIDE SOLN NEBU 3% 4 ML: 3 NEBU SOLN at 20:33

## 2021-01-23 RX ADMIN — Medication 30 MCG/MIN: at 06:39

## 2021-01-23 RX ADMIN — MAGNESIUM SULFATE HEPTAHYDRATE 1000 MG: 1 INJECTION, SOLUTION INTRAVENOUS at 03:19

## 2021-01-23 RX ADMIN — METOCLOPRAMIDE HYDROCHLORIDE 10 MG: 5 INJECTION INTRAMUSCULAR; INTRAVENOUS at 12:44

## 2021-01-23 RX ADMIN — NYSTATIN AND TRIAMCINOLONE ACETONIDE: 100000; 1 CREAM TOPICAL at 20:54

## 2021-01-23 ASSESSMENT — PULMONARY FUNCTION TESTS
PIF_VALUE: 28
PIF_VALUE: 28
PIF_VALUE: 26
PIF_VALUE: 23
PIF_VALUE: 31
PIF_VALUE: 23
PIF_VALUE: 22
PIF_VALUE: 23
PIF_VALUE: 26
PIF_VALUE: 24
PIF_VALUE: 28
PIF_VALUE: 30
PIF_VALUE: 24
PIF_VALUE: 22
PIF_VALUE: 24

## 2021-01-23 ASSESSMENT — PAIN SCALES - GENERAL
PAINLEVEL_OUTOF10: 0

## 2021-01-23 NOTE — CONSULTS
Trell Root MD at [address] [facility] 1/15/2021    EGD WITH CONVERSION TO J TUBE performed by Sheryle Fix, [place] at [id number] [facility] N/A 1/6/2021    VENA CAVA FILTER INSERTION, RIGHT FEMORAL APPROACH performed by Thiago Moody MD at [facility] OR     Current Medications:   Scheduled Meds:   amiodarone  100 mg PEG Tube Daily    atorvastatin  20 mg PEG Tube Daily    hydrocortisone sodium succinate PF  25 mg Intravenous Q8H    lactulose  20 g PEG Tube Daily    latanoprost  1 drop Both Eyes Daily    metoclopramide  10 mg Intravenous Q6H    midodrine  5 mg PEG Tube TID    nystatin-triamcinolone   Topical 4x Daily    pantoprazole  40 mg Intravenous Daily    And    sodium chloride (PF)  10 mL Intravenous Daily    potassium & sodium phosphates  1 packet Per G Tube Daily    potassium bicarb-citric acid  40 mEq Per G Tube Daily    senna  1 tablet PEG Tube BID    sodium chloride (Inhalant)  4 mL Nebulization BID    sodium chloride flush  10 mL Intravenous 2 times per day    enoxaparin  40 mg Subcutaneous Daily    piperacillin-tazobactam  3,375 mg Intravenous Q8H    insulin lispro  0-12 Units Subcutaneous Q6H     Continuous Infusions:   phenylephrine (BIBI-SYNEPHRINE) 50mg/250mL infusion 150 mcg/min (01/23/21 0705)    dextrose      sodium chloride 100 mL/hr at 01/23/21 0604    sodium chloride      sodium chloride 100 mL/hr at 01/22/21 2302    fentaNYL 5 mcg/ml in 0.9%  ml infusion 75 mcg/hr (01/22/21 2219)    norepinephrine 30 mcg/min (01/23/21 0639)     PRN Meds:acetaminophen, docusate sodium, glucose, dextrose, glucagon (rDNA), dextrose, sodium chloride flush, promethazine **OR** ondansetron, polyethylene glycol, acetaminophen **OR** acetaminophen, fentanNYL    Allergies:  Imuran [azathioprine], Doxycycline, and Levaquin [levofloxacin]    Social History:   Social History     Socioeconomic History    Marital status:       Spouse name: Not on file    Number of children: Not on file    Years of education: Not on file    Highest education level: Not on file   Occupational History    Not on file   Social Needs    Financial resource strain: Not on file    Food insecurity     Worry: Not on file     Inability: Not on file    Transportation needs     Medical: Not on file     Non-medical: Not on file   Tobacco Use    Smoking status: Former Smoker     Packs/day: 2.00     Years: 50.00     Pack years: 100.00     Types: Cigarettes     Start date: 3/30/1955     Quit date: 3/30/2006     Years since quittin.8    Smokeless tobacco: Never Used   Substance and Sexual Activity    Alcohol use: No     Alcohol/week: 0.0 standard drinks    Drug use: No    Sexual activity: Never   Lifestyle    Physical activity     Days per week: Not on file     Minutes per session: Not on file    Stress: Not on file   Relationships    Social connections     Talks on phone: Not on file     Gets together: Not on file     Attends Restoration service: Not on file     Active member of club or organization: Not on file     Attends meetings of clubs or organizations: Not on file     Relationship status: Not on file    Intimate partner violence     Fear of current or ex partner: Not on file     Emotionally abused: Not on file     Physically abused: Not on file     Forced sexual activity: Not on file   Other Topics Concern    Not on file   Social History Narrative    Not on file      Nursing home resident Marilu Carter    Family History:       Problem Relation Age of Onset    Diabetes Mother     Kidney Disease Mother     Cancer Father         esophagus    Cancer Sister      REVIEW OF SYSTEMS: could not be obtained       PHYSICAL EXAM:    Vitals:   /63   Pulse 88   Temp 101.8 °F (38.8 °C) (Bladder)   Resp 18   Ht 6' (1.829 m)   Wt 255 lb (115.7 kg)   SpO2 100%   BMI 34.58 kg/m²      Constitutional: The patient is intubated and sedated FiO2 50 %, PEEP 5  Skin: Warm and dry. Multiple partial thickness skin wounds    HEENT:Pallor +, no LN , ET tube,   Neck: Supple to movements. No lymphadenopathy.    Chest: Bilateral coarse rhonchi, moderate amount of thick secretion   Cardiovascular: Regular, no murmur   Abdomen:soft, bowel sound +  PEG - no drainage or erythema   Extremities: +++ edema    Lines: Right  femoral TLC     CBC with Differential:      Lab Results   Component Value Date    WBC 7.3 01/22/2021    RBC 2.78 01/22/2021    HGB 7.6 01/22/2021    HCT 25.8 01/22/2021     01/22/2021    MCV 92.8 01/22/2021    MCH 27.3 01/22/2021    MCHC 29.5 01/22/2021    RDW 19.0 01/22/2021    NRBC 0.9 01/08/2021    SEGSPCT 64 09/27/2013    METASPCT 6.0 01/06/2021    LYMPHOPCT 26.8 01/22/2021    PROMYELOPCT 0.9 12/21/2020    MONOPCT 7.9 01/22/2021    MYELOPCT 3.0 01/06/2021    BASOPCT 0.3 01/22/2021    MONOSABS 0.58 01/22/2021    LYMPHSABS 1.96 01/22/2021    EOSABS 0.23 01/22/2021    BASOSABS 0.02 01/22/2021       CMP:    Lab Results   Component Value Date     01/23/2021    K 3.9 01/23/2021    CL 95 01/23/2021    CO2 30 01/23/2021    BUN 28 01/23/2021    CREATININE 0.9 01/23/2021    GFRAA >60 01/23/2021    LABGLOM >60 01/23/2021    GLUCOSE 222 01/23/2021    GLUCOSE 138 03/26/2011    PROT 4.3 01/22/2021    LABALBU 2.0 01/22/2021    LABALBU 4.0 03/26/2011    CALCIUM 7.4 01/23/2021    BILITOT 0.3 01/22/2021    ALKPHOS 123 01/22/2021    AST 18 01/22/2021    ALT 8 01/22/2021       Hepatic Function Panel:    Lab Results   Component Value Date    ALKPHOS 123 01/22/2021    ALT 8 01/22/2021    AST 18 01/22/2021    PROT 4.3 01/22/2021    BILITOT 0.3 01/22/2021    BILIDIR 0.4 01/14/2021    IBILI 0.5 01/14/2021    LABALBU 2.0 01/22/2021    LABALBU 4.0 03/26/2011         Microbiology :    Blood culture - pending     Urine Culture - pending     Sputum Culture - pending       Radiology :    Chest X ray - bilateral infiltrates       Assessment:  · Respiratory failure   · HCAP / Aspiration pneumonia  ( hx of MRSA / Pseudomonas and ESBL  Klebsiella )   · Acute respiratory failure - intubated   · Fever     Plan:    · Meropenem 1 gram IV q 8 hrs, Linezolid 600 mg IV q 12 hrs   · Blood cx, sputum cx   · Contact isolation   · Procalcitonin,   · Diuretics       Thank you for having us see this patient in consultation.      Ruben Espinoza  8:01 AM  1/23/2021

## 2021-01-23 NOTE — ED NOTES
Patient in respiratory distress, preparing to intubate, respiratory at bedside, Dr Angela Palma, Dr. Mayte Reyes a bedside       Prabhjot Workman, SHEYLA  01/22/21 2041

## 2021-01-23 NOTE — ED PROVIDER NOTES
[facility]  Department of Emergency Medicine     Written by: Claudia Gamble DO  Patient Name: Renuka Millan  Attending Provider: Georges Woodward DO  Admit Date: 2021  8:35 PM  MRN: [id number]                   : 1940        Chief Complaint   Patient presents with    Respiratory Distress     pt brought in by EMS for possible aspiration. pt had an episode of emesis while wearing Bipap. pt was being bagged by ems upon arrival    - Chief complaint    The history is provided by the EMS personnel, a relative and medical records. Patient is an 49-year-old male extensive past medical history including dementia, insulin-dependent DM 2, multiple pressure ulcers, atrial fibrillation, who presents to the ED from an outside facility via EMS due to hypoxia after vomiting while on his BiPAP machine prior to arrival.  He reportedly went unresponsive while en route. He did experience a similar event 1 month ago and required intubation at that time. According to documentation this patient is DNR CCA status, after arrival Dr. Mateo Lagunas spoke to family over the phone who expressed that they want him to be intubated if need be and for all treatment measures to be taken until the event of cardiac arrest.  Patient GCS 7 on arrival, being bagged by EMS. He is minimally responsive to painful stimuli, intermittently opens his eyes to loud verbal noises. History is limited due to this. Review of Systems   Unable to perform ROS: Patient nonverbal        Physical Exam  Vitals signs and nursing note reviewed. Constitutional:       Appearance: He is obese. He is ill-appearing. HENT:      Right Ear: External ear normal.      Left Ear: External ear normal.      Nose: Nose normal.      Mouth/Throat:      Mouth: Mucous membranes are moist.   Eyes:      Extraocular Movements: Extraocular movements intact. Pupils: Pupils are equal, round, and reactive to light.    Neck:      Musculoskeletal: Neck supple. Pulmonary:      Breath sounds: Rhonchi and rales present. Abdominal:      Palpations: Abdomen is soft. Tenderness: There is no abdominal tenderness. There is no guarding. Musculoskeletal:         General: Swelling present. Right lower leg: Edema present. Left lower leg: Edema present. Skin:     General: Skin is warm and dry. Capillary Refill: Capillary refill takes less than 2 seconds. Neurological:      GCS: GCS eye subscore is 3. GCS verbal subscore is 1. GCS motor subscore is 4. Comments: Nonverbal at baseline, less responsive than usual.          Procedures     Intubation Procedure Note    Indication: Respiratory failure    Consent: Unable to be obtained due to patient's condition. Medications Used: etomidate intravenously and succinycholine intravenously    Procedure: The patient was placed in the appropriate position. Cricoid pressure was not required. Intubation was performed by direct laryngoscopy using a laryngoscope and an 8.0 cuffed endotracheal tube. The cuff was then inflated and the tube was secured appropriately at a distance of 23 cm to the dental ridge. Initial confirmation of placement included bilateral breath sounds, an end tidal CO2 detector, absence of sounds over the stomach, tube fogging, adequate chest rise and adequate pulse oximetry reading. A chest x-ray to verify correct placement of the tube showed appropriate tube position. The patient tolerated the procedure well. Complications: None          Central Line Placement Procedure Note    Indication: vascular access    Consent: Unable to be obtained due to patient's condition. Procedure: The patient was positioned appropriately and the skin over the right femoral vein was prepped with chlorhexidine and draped in a sterile fashion. Local anesthesia was not performed due to the emergent nature of this procedure. A large bore needle was used to identify the vein.   A guide wire was then inserted into the vein through the needle. A triple lumen catheter was then inserted into the vessel over the guide wire using the Seldinger technique. All ports showed good, free flowing blood return and were flushed with saline solution. The catheter was then securely fastened to the skin with suture at the hub. Two sutures were placed into the proximal eyelets. An antibiotic disk was placed and the site was then covered with a sterile dressing. A post procedure X-ray was not indicated. The patient tolerated the procedure well. Complications: None        MDM     Patient presents from nursing facility to the ED via EMS due to vomiting while on BiPAP, possible aspiration, hypoxia, and unresponsiveness. On arrival patient's blood pressure is 95/51, he is being bagged by EMS and saturation is 95%. Attending spoke with the over the phone, they state that they want all measures taken until the point of cardiac arrest including intubation. Patient's initial examination shows GCS 8, he is responsive to painful stimuli but does not localize, he intermittently will open his eyes to loud verbal commands, he is nonverbal (this is apparently his baseline). He was subsequently intubated without complication. Post intubation sedation was provided with fentanyl bolus and drip, Levophed was started with increasing titrations required during this encounter. Initial ABG shows respiratory alkalosis with metabolic compensation. CT head is negative for any acute intracranial findings. Lactic is 2.2. Trop 0.11; EKG shows NSR with LAD; hold ASA given patient's drop in hgb to 7.6 in the last week from 9.5 and stool hemoccult positive. He is Covid negative. UA positive for UTI.  CXR read shows no new airspace disease or effusions however subsequent CT chest shows multifocal areas of consolidation with associated areas of groundglass densities, \"highly concerning for an underlying infectious/inflammatory condition. \"  Also shows moderate bilateral pleural effusions. Per chart review patient has extensive history of multidrug-resistant organisms; he was started on vancomycin and meropenem. Decision to admit this patient for acute hypoxic respiratory failure due to aspiration, septic shock, UTI, positive troponin, GI bleed, anemia. EM attending spoke with both hospitalist and intensivist; this patient is accepted for admission to the MICU. I have discussed this patient with my attending, who has seen the patient and agrees with this disposition. Patient was seen and evaluated by myself and my attending Nakul Hernández DO. Assessment and Plan discussed with attending provider, please see attestation for final plan of care. ED Course as of Jan 24 1313   Fri Jan 22, 2021   2247 Likely demand ischemia; no ST changes on EKG. Troponin(!): 0.11 [VG]   2247 SARS-CoV-2, NAAT: Not Detected [VG]   7756 Stool hemoccult positive. No gross blood noted in stool     [VG]   2321 Dr. Sarita Odonnell spoke with Dr. Woody Carrasco, he accepts this patient for admission.     [VG]      ED Course User Index  [VG] Chris Isbell, DO       --------------------------------------------- PAST HISTORY ---------------------------------------------  Past Medical History:  has a past medical history of Acute deep vein thrombosis (DVT) of femoral vein of left lower extremity ([facility] Anxiety, Benign localized hyperplasia of prostate with urinary obstruction, Bullous pemphigoid, Dementia ([facility] [address] Diabetes mellitus ([address] Enlarged prostate, Hx of blood clots, Hyperlipidemia, Hypertension, Paroxysmal atrial fibrillation ([address] Respiratory failure ([facility] [address] and Rheumatoid disease ([facility] [address] Past Surgical History:  has a past surgical history that includes fracture surgery; doppler echocardiography (N/A); Upper gastrointestinal endoscopy (N/A, 7/29/2018); pr egd percutaneous placement gastrostomy tube (N/A, 7/31/2018);  Hip fracture surgery (Left, 11/17/2019); picc line insertion nurse (12/18/2020); Vena Cava Filter Placement (N/A, 1/6/2021); and Upper gastrointestinal endoscopy (N/A, 1/15/2021). Social History:  reports that he quit smoking about 14 years ago. His smoking use included cigarettes. He started smoking about 65 years ago. He has a 100.00 pack-year smoking history. He has never used smokeless tobacco. He reports that he does not drink alcohol or use drugs. Family History: family history includes Cancer in his father and sister; Diabetes in his mother; Kidney Disease in his mother. The patients home medications have been reviewed.     Allergies: Imuran [azathioprine], Doxycycline, and Levaquin [levofloxacin]    -------------------------------------------------- RESULTS -------------------------------------------------    Lab  Results for orders placed or performed during the hospital encounter of 01/22/21   Culture, Blood 2    Specimen: Blood   Result Value Ref Range    Culture, Blood 2 24 Hours no growth    Culture, Blood 1    Specimen: Blood   Result Value Ref Range    Blood Culture, Routine 24 Hours no growth    Culture, Urine    Specimen: Urine, clean catch   Result Value Ref Range    Organism Pseudomonas aeruginosa (A)     Urine Culture, Routine       >100,000 CFU/ml  Identification and sensitivity to follow         Susceptibility    Pseudomonas aeruginosa - BACTERIAL SUSCEPTIBILITY PANEL BY SNEHA     gentamicin <=^1 Sensitive mcg/mL     levofloxacin <=^0.12 Sensitive mcg/mL     tobramycin <=^1 Sensitive mcg/mL   Culture, Respiratory    Specimen: Endotracheal   Result Value Ref Range    CULTURE, RESPIRATORY Oral Pharyngeal Tamika reduced (A)     Smear, Respiratory       Group 5: >25 PMN's/LPF and <10 Epithelial cells/LPF  Abundant Polymorphonuclear leukocytes  Epithelial cells not seen  Rare Gram negative diplococci  Rare Gram positive cocci in clusters  Few Gram negative rods  Rare Gram positive cocci in pairs      Organism GNR oxidase positive (A)     CULTURE, RESPIRATORY       Heavy growth  Identification and sensitivity to follow      Organism Proteus mirabilis (A)     CULTURE, RESPIRATORY Heavy growth  Sensitivity to follow      CBC Auto Differential   Result Value Ref Range    WBC 7.3 4.5 - 11.5 E9/L    RBC 2.78 (L) 3.80 - 5.80 E12/L    Hemoglobin 7.6 (L) 12.5 - 16.5 g/dL    Hematocrit 25.8 (L) 37.0 - 54.0 %    MCV 92.8 80.0 - 99.9 fL    MCH 27.3 26.0 - 35.0 pg    MCHC 29.5 (L) 32.0 - 34.5 %    RDW 19.0 (H) 11.5 - 15.0 fL    Platelets 593 400 - 669 E9/L    MPV 9.7 7.0 - 12.0 fL    Neutrophils % 60.8 43.0 - 80.0 %    Immature Granulocytes % 1.1 0.0 - 5.0 %    Lymphocytes % 26.8 20.0 - 42.0 %    Monocytes % 7.9 2.0 - 12.0 %    Eosinophils % 3.1 0.0 - 6.0 %    Basophils % 0.3 0.0 - 2.0 %    Neutrophils Absolute 4.44 1.80 - 7.30 E9/L    Immature Granulocytes # 0.08 E9/L    Lymphocytes Absolute 1.96 1.50 - 4.00 E9/L    Monocytes Absolute 0.58 0.10 - 0.95 E9/L    Eosinophils Absolute 0.23 0.05 - 0.50 E9/L    Basophils Absolute 0.02 0.00 - 0.20 E9/L   Comprehensive Metabolic Panel   Result Value Ref Range    Sodium 140 132 - 146 mmol/L    Potassium 3.4 (L) 3.5 - 5.0 mmol/L    Chloride 99 98 - 107 mmol/L    CO2 33 (H) 22 - 29 mmol/L    Anion Gap 8 7 - 16 mmol/L    Glucose 124 (H) 74 - 99 mg/dL    BUN 29 (H) 8 - 23 mg/dL    CREATININE 0.9 0.7 - 1.2 mg/dL    GFR Non-African American >60 >=60 mL/min/1.73    GFR African American >60     Calcium 6.8 (L) 8.6 - 10.2 mg/dL    Total Protein 4.3 (L) 6.4 - 8.3 g/dL    Alb 2.0 (L) 3.5 - 5.2 g/dL    Total Bilirubin 0.3 0.0 - 1.2 mg/dL    Alkaline Phosphatase 123 40 - 129 U/L    ALT 8 0 - 40 U/L    AST 18 0 - 39 U/L   Brain Natriuretic Peptide   Result Value Ref Range    Pro-BNP 2,189 (H) 0 - 450 pg/mL   Troponin   Result Value Ref Range    Troponin 0.11 (H) 0.00 - 0.03 ng/mL   Lactic Acid, Plasma   Result Value Ref Range    Lactic Acid 2.2 0.5 - 2.2 mmol/L   Lactic Acid, Plasma   Result Value Ref Range    Lactic Acid 2.0 0.5 - 2.2 mmol/L   Urinalysis   Result Value Ref Range    Color, UA Yellow Straw/Yellow    Clarity, UA CLOUDY (A) Clear    Glucose, Ur Negative Negative mg/dL    Bilirubin Urine Negative Negative    Ketones, Urine Negative Negative mg/dL    Specific Gravity, UA 1.015 1.005 - 1.030    Blood, Urine TRACE-INTACT Negative    pH, UA 6.0 5.0 - 9.0    Protein, UA Negative Negative mg/dL    Urobilinogen, Urine 0.2 <2.0 E.U./dL    Nitrite, Urine Negative Negative    Leukocyte Esterase, Urine LARGE (A) Negative   COVID-19   Result Value Ref Range    SARS-CoV-2, NAAT Not Detected Not Detected   Blood Gas, Arterial   Result Value Ref Range    Date Analyzed [id number]     Time Analyzed 2116     Source: Blood Arterial     pH, Blood Gas 7.396 7.350 - 7.450    PCO2 46.7 (H) 35.0 - 45.0 mmHg    PO2 69.6 (L) 75.0 - 100.0 mmHg    HCO3 28.0 (H) 22.0 - 26.0 mmol/L    B.E. 2.8 -3.0 - 3.0 mmol/L    O2 Sat 92.7 92.0 - 98.5 %    PO2/FIO2 1.74 mmHg/%    AaDO2 151.9 mmHg    RI(T) 218 %    O2Hb 90.7 (L) 94.0 - 97.0 %    COHb 1.9 (H) 0.0 - 1.5 %    MetHb 0.3 0.0 - 1.5 %    O2 Content 10.7 mL/dL    HHb 7.1 (H) 0.0 - 5.0 %    tHb (est) 8.3 (L) 11.5 - 16.5 g/dL    Mode AC     FIO2 40.0 %    Rr Mechanical 18.0 b/min    Vt Mechanical 500.0 mL    Peep/Cpap 5.0 cmH2O    Date Of Collection      Time Collected      Pt Temp 37.0 C     ID [id number]     Lab [id number]     Critical(s) Notified .  No Critical Values    Microscopic Urinalysis   Result Value Ref Range    WBC, UA 10-20 (A) 0 - 5 /HPF    RBC, UA 0-1 0 - 2 /HPF    Bacteria, UA RARE (A) None Seen /HPF   Magnesium   Result Value Ref Range    Magnesium 1.1 (LL) 1.6 - 2.6 mg/dL   Basic Metabolic Panel w/ Reflex to MG   Result Value Ref Range    Sodium 135 132 - 146 mmol/L    Potassium reflex Magnesium 3.9 3.5 - 5.0 mmol/L    Chloride 95 (L) 98 - 107 mmol/L    CO2 30 (H) 22 - 29 mmol/L    Anion Gap 10 7 - 16 mmol/L    Glucose 222 (H) 74 - 99 mg/dL    BUN 28 (H) 8 - 23 mg/dL    CREATININE 0.9 0.7 - 1.2 mg/dL    GFR Non-African American >60 >=60 mL/min/1.73    GFR African American >60     Calcium 7.4 (L) 8.6 - 10.2 mg/dL   Cortisol   Result Value Ref Range    Cortisol 3.77 2.68 - 18.40 mcg/dL   Troponin   Result Value Ref Range    Troponin 0.12 (H) 0.00 - 0.03 ng/mL   Troponin   Result Value Ref Range    Troponin 0.11 (H) 0.00 - 0.03 ng/mL   Troponin   Result Value Ref Range    Troponin 0.09 (H) 0.00 - 0.03 ng/mL   Arterial Blood Gas, Respiratory Only   Result Value Ref Range    Source: Arterial     FIO2 Arterial 50.0     Pt Temp 37.0     PH (TEMPERATURE CORRECTED) 7.511 (H) 7.350 - 7.450    PCO2 (TEMP CORRECTED) 39.5 35.0 - 45.0 mmHg    PO2 (TEMP CORRECTED) 92.2 (H) 60.0 - 80.0 mmHg    HCO3, Arterial 31.6 (H) 22.0 - 26.0 mmol/L    B.E. 7.9 (H) -3.0 - 0.0 mmol/L    O2 Sat 97.9 92.0 - 98.5 %    HGB, (EST) 9.1 (L) 12.5 - 15.5 g/dL    HCT (EST) 27.0 (L) 37.0 - 54.0 %     ID [id number]     DEVICE [id number]     Mode AC     Tidal Volume 500 mL    Positive End EXP Press 5 cmH2O    Respiratory Rate 18 b/min   Procalcitonin   Result Value Ref Range    Procalcitonin 1.23 (H) 0.00 - 0.08 ng/mL   Basic Metabolic Panel w/ Reflex to MG   Result Value Ref Range    Sodium 138 132 - 146 mmol/L    Potassium reflex Magnesium 3.6 3.5 - 5.0 mmol/L    Chloride 99 98 - 107 mmol/L    CO2 28 22 - 29 mmol/L    Anion Gap 11 7 - 16 mmol/L    Glucose 241 (H) 74 - 99 mg/dL    BUN 21 8 - 23 mg/dL    CREATININE 0.8 0.7 - 1.2 mg/dL    GFR Non-African American >60 >=60 mL/min/1.73    GFR African American >60     Calcium 7.4 (L) 8.6 - 10.2 mg/dL   CBC Auto Differential   Result Value Ref Range    WBC 12.7 (H) 4.5 - 11.5 E9/L    RBC 3.28 (L) 3.80 - 5.80 E12/L    Hemoglobin 8.9 (L) 12.5 - 16.5 g/dL    Hematocrit 28.9 (L) 37.0 - 54.0 %    MCV 88.1 80.0 - 99.9 fL    MCH 27.1 26.0 - 35.0 pg    MCHC 30.8 (L) 32.0 - 34.5 %    RDW 18.8 (H) 11.5 - 15.0 fL    Platelets 037 605 - 530 E9/L    MPV 9.8 7.0 - 12.0 fL    Neutrophils % 86.4 (H) 43.0 - 80.0 %    Immature Granulocytes % 0.7 0.0 - 5.0 %    Lymphocytes % 9.3 (L) 20.0 - 42.0 %    Monocytes % 3.4 2.0 - 12.0 %    Eosinophils % 0.0 0.0 - 6.0 %    Basophils % 0.2 0.0 - 2.0 %    Neutrophils Absolute 11.02 (H) 1.80 - 7.30 E9/L    Immature Granulocytes # 0.09 E9/L    Lymphocytes Absolute 1.18 (L) 1.50 - 4.00 E9/L    Monocytes Absolute 0.43 0.10 - 0.95 E9/L    Eosinophils Absolute 0.00 (L) 0.05 - 0.50 E9/L    Basophils Absolute 0.02 0.00 - 0.20 E9/L   Blood Gas, Arterial   Result Value Ref Range    Date Analyzed [id number]     Time Analyzed 0516     Source: Blood Arterial     pH, Blood Gas 7.470 (H) 7.350 - 7.450    PCO2 38.8 35.0 - 45.0 mmHg    PO2 96.6 75.0 - 100.0 mmHg    HCO3 27.6 (H) 22.0 - 26.0 mmol/L    B.E. 3.7 (H) -3.0 - 3.0 mmol/L    O2 Sat 97.7 92.0 - 98.5 %    PO2/FIO2 2.41 mmHg/%    AaDO2 134.0 mmHg    RI(T) 1.39     O2Hb 95.8 94.0 - 97.0 %    COHb 1.6 (H) 0.0 - 1.5 %    MetHb 0.3 0.0 - 1.5 %    O2 Content 13.1 mL/dL    HHb 2.3 0.0 - 5.0 %    tHb (est) 9.6 (L) 11.5 - 16.5 g/dL    Mode AC     FIO2 40.0 %    Rr Mechanical 18.0 b/min    Vt Mechanical 500.0 mL    Peep/Cpap 5.0 cmH2O    Date Of Collection      Time Collected      Pt Temp 37.0 C     ID [id number]     Lab [id number]     Critical(s) Notified .  No Critical Values    POCT Glucose   Result Value Ref Range    Meter Glucose 266 (H) 74 - 99 mg/dL   POCT Glucose   Result Value Ref Range    Meter Glucose 218 (H) 74 - 99 mg/dL   POCT Glucose   Result Value Ref Range    Meter Glucose 264 (H) 74 - 99 mg/dL   POCT Glucose   Result Value Ref Range    Meter Glucose 238 (H) 74 - 99 mg/dL   POCT Glucose   Result Value Ref Range    Meter Glucose 267 (H) 74 - 99 mg/dL   POCT Glucose   Result Value Ref Range    Meter Glucose 238 (H) 74 - 99 mg/dL   POCT Glucose   Result Value Ref Range    Meter Glucose 240 (H) 74 - 99 mg/dL   EKG 12 Lead   Result Value Ref Range    Ventricular Rate 92 BPM    Atrial Rate 92 BPM    P-R Interval 174 ms    QRS Duration 84 ms    Q-T Interval 364 ms    QTc Calculation (Bazett) 450 ms    P Axis 49 degrees    R Axis -48 degrees    T Axis 36 degrees       Radiology  XR CHEST PORTABLE   Final Result   Stable positioning of endotracheal tube. Slight increase in the patchy airspace opacities since the prior study. Findings are nonspecific and may be on the basis of multifocal pulmonary   edema. CT HEAD WO CONTRAST   Final Result   No acute intracranial abnormality. Age-related loss of brain volume and   chronic periventricular ischemic changes. Partial opacification of mastoid air cells and right middle which may reflect   sequela from mila mastoiditis. CT CHEST WO CONTRAST   Final Result   Multifocal areas of consolidation of the lungs with associated areas of   ground-glass densities are highly concerning for an underlying   infectious/inflammatory condition. Moderate bilateral pleural effusion and thickening along the bronchovascular   bundles are likely suggestive of underlying pulmonary vascular   congestion/pulmonary edema. NG tube and peg  tube and endotracheal tube are in satisfactory position. XR ABDOMEN FOR NG/OG/NE TUBE PLACEMENT   Final Result   1. Satisfactory position of endotracheal tube. Cardiopulmonary findings are   stable. 2.  Satisfactory position of enteric tube. XR CHEST PORTABLE   Final Result   1. Satisfactory position of endotracheal tube. Cardiopulmonary findings are   stable. 2.  Satisfactory position of enteric tube. XR CHEST PORTABLE    (Results Pending)       EKG: This EKG is signed and interpreted by the emergency department physician.     Rate: 92  Rhythm: Sinus  Interpretation: NSR, LAD, normal NC interval, normal QRS duration, no QTC prolongation, no ST elevations    ------------------------- NURSING NOTES AND VITALS REVIEWED ---------------------------  Date / Time Roomed:  1/22/2021  8:35 PM  ED Bed Assignment:  [id number]    The nursing notes within the ED encounter and vital signs as below have been reviewed.    Patient Vitals for the past 24 hrs:   BP Temp Temp src Pulse Resp SpO2 Height   01/24/21 1300 -- -- -- 66 18 97 % --   01/24/21 1224 -- -- -- -- -- -- 6' (1.829 m)   01/24/21 1219 -- -- -- 69 18 97 % --   01/24/21 1200 (!) 104/51 98.2 °F (36.8 °C) Bladder 67 18 98 % --   01/24/21 1100 -- -- -- 65 18 96 % --   01/24/21 1000 -- -- -- 67 18 98 % --   01/24/21 0907 -- -- -- 67 18 98 % --   01/24/21 0904 -- -- -- -- 18 98 % --   01/24/21 0903 -- -- -- -- 18 98 % --   01/24/21 0901 -- -- -- -- 18 99 % --   01/24/21 0800 (!) 112/48 98.8 °F (37.1 °C) -- 63 18 96 % --   01/24/21 0700 -- -- -- 71 18 96 % --   01/24/21 0600 (!) 155/69 -- -- 74 18 97 % --   01/24/21 0500 (!) 142/64 -- -- 77 18 96 % --   01/24/21 0400 (!) 140/66 98.8 °F (37.1 °C) Bladder 80 18 97 % --   01/24/21 0300 (!) 133/57 -- -- 69 18 97 % --   01/24/21 0200 (!) 146/57 -- -- 71 18 97 % --   01/24/21 0100 (!) 129/54 -- -- 69 15 97 % --   01/24/21 0000 (!) 110/54 98.6 °F (37 °C) Bladder 85 18 99 % --   01/23/21 2300 (!) 124/52 -- -- 72 18 98 % --   01/23/21 2200 (!) 166/69 -- -- 76 18 99 % --   01/23/21 2100 (!) 138/57 -- -- 74 16 99 % --   01/23/21 2037 -- -- -- 61 19 100 % --   01/23/21 2000 139/60 99.4 °F (37.4 °C) Bladder 69 18 99 % --   01/23/21 1900 -- -- -- 65 18 98 % --   01/23/21 1800 -- -- -- 70 18 99 % --   01/23/21 1708 -- -- -- 66 18 99 % --   01/23/21 1700 -- -- -- 70 18 100 % --   01/23/21 1645 -- -- -- 72 16 100 % --   01/23/21 1630 -- -- -- 79 18 100 % --   01/23/21 1615 -- -- -- 74 14 99 % --   01/23/21 1600 (!) 111/47 99.3 °F (37.4 °C) Bladder 67 18 99 % --   01/23/21 1545 -- -- -- 71 18 99 % --   01/23/21 1530 -- -- -- 71 18 99 % --   01/23/21 1515 -- -- -- 72 18 100 % --   01/23/21 1500 -- -- -- 77 18 100 % --   01/23/21 1445 -- -- -- 70 18 100 % --   01/23/21 1430 -- -- -- 74 18 100 % --   01/23/21 1415 -- -- -- 77 18 99 % --   01/23/21 1400 (!) 107/45 -- -- 71 18 98 % --       Oxygen Saturation Interpretation: Improved after treatment      ------------------------------------------ PROGRESS NOTES ------------------------------------------  Re-evaluation(s):  Please see ED course      --------------------------------- ADDITIONAL PROVIDER NOTES ---------------------------------  Consultations:  Please see ED course    This patient's ED course included: a personal history and physicial examination, re-evaluation prior to disposition, multiple bedside re-evaluations, IV medications, cardiac monitoring, continuous pulse oximetry and complex medical decision making and emergency management    This patient has been closely monitored during their ED course. Please note that the withdrawal or failure to initiate urgent interventions for this patient would likely result in a life threatening deterioration or permanent disability. Accordingly this patient received 30 minutes of critical care time, excluding separately billable procedures. Clinical Impression  1. Acute respiratory failure with hypoxia ([address]    2. Septic shock ([address]    3. Gastrointestinal hemorrhage, unspecified gastrointestinal hemorrhage type    4. Elevated troponin    5. Urinary tract infection without hematuria, site unspecified    6. Aspiration into respiratory tract, initial encounter    7.  Anemia, unspecified type          Disposition  Patient's disposition: Admit to CCU/ICU  Patient's condition is critical.       Lizzette Albert DO  Resident  01/24/21 3048

## 2021-01-23 NOTE — PROGRESS NOTES
Pt arrived to floor at [facility]. Notified Dr. Bonnie Wilson of admission at [place] [address]. He explained he would place admission orders in. Pt stable, monitoring closely for any critical changes.      Krista Lim RN

## 2021-01-23 NOTE — ED NOTES
Bed: 10  Expected date: 1/22/21  Expected time:   Means of arrival: [facility]ulance  Comments:  [address], [address]  01/22/21 2035

## 2021-01-23 NOTE — PROCEDURES
Procedure: Right brachial arterial line placement. Indications: Continuous monitoring of blood pressure in a patient with hypotension +/- shock, on Levophed. Anesthesia: Local infiltration of 1% lidocaine. Consent:  Informed written obtained. Technique:  Procedure was done using strict aseptic technique. Right brachiial site was cleaned with chloraprep and draped. brachial artery was identified, then Lidocaine 1% was infiltrated locally. brachial arterial line was inserted, a good blood flow was obtained, after which guidewire was inserted all the way with no resistance. Then the canula was inserted and needle with guidewire was withdrawn. Pulsatile bright red blood flow was observed. The canula was connected to BP monitoring apparatus and a good waveform was noted. Then the canula was secured with 2 stay sutures of 3-0 silk after Lidocaine infiltration, following which dressing was applied. Number of sticks: 1. Number of Kits used: 1. Complications: No immediate complication. Estimated blood loss: About 1 ml. Comment: Patient tolerated the procedure well.      [facility]

## 2021-01-23 NOTE — H&P
meals) Pantoprazole/ sodium bicarb liquid 2mg/ml oral liquid 40 mg peg tube BID  glucagon, rDNA, 1 MG injection, Inject 1 mg into the muscle as needed for Low blood sugar (Blood glucose less than 70 mg/dL and patient NOT ALERT or NPO and does not have IV access.)  folic acid (FOLVITE) 1 MG tablet, 1 tablet by Per G Tube route daily  docusate sodium (ENEMEEZ) 283 MG enema, Place 5 mLs rectally daily  glucose (GLUTOSE) 40 % GEL, Take 15 g by mouth as needed  latanoprost (XALATAN) 0.005 % ophthalmic solution, Place 1 drop into both eyes daily   acetaminophen (TYLENOL) 325 MG tablet, Take 650 mg by mouth every 4 hours as needed for Pain or Fever (max = 3000 mg/24 hours)    Allergies:  Imuran [azathioprine], Doxycycline, and Levaquin [levofloxacin]    Social History:   TOBACCO:   reports that he quit smoking about 14 years ago. His smoking use included cigarettes. He started smoking about 65 years ago. He has a 100.00 pack-year smoking history. He has never used smokeless tobacco.    Family History:       Problem Relation Age of Onset    Diabetes Mother     Kidney Disease Mother     Cancer Father         esophagus    Cancer Sister      REVIEW OF SYSTEMS:  Review of systems not obtained due to patient factors - intubation and mental status  PHYSICAL EXAM:    Vitals:  BP (!) 109/46   Pulse 85   Temp 100.5 °F (38.1 °C) (Bladder)   Resp 18   Ht 6' (1.829 m)   Wt 255 lb (115.7 kg)   SpO2 99%   BMI 34.58 kg/m²     CONSTITUTIONAL:  Lethargic and edematous  EYES:  Lids and lashes normal, pupils equal, round and reactive to light, extra ocular muscles intact, sclera clear, conjunctiva normal  ENT:  Normocephalic, without obvious abnormality, atraumatic, sinuses nontender on palpation, external ears without lesions, oral pharynx with moist mucus membranes, tonsils without erythema or exudates, gums normal and good dentition.   NECK:  Supple, symmetrical, trachea midline, no adenopathy, thyroid symmetric, not enlarged and no tenderness, skin normal  HEMATOLOGIC/LYMPHATICS:  no cervical lymphadenopathy  BACK:  Symmetric, no curvature, spinous processes are non-tender on palpation, paraspinous muscles are non-tender on palpation, no costal vertebral tenderness  LUNGS:  Intubated. Sedated. CARDIOVASCULAR:  Normal apical impulse, regular rate and rhythm, normal S1 and S2, no S3 or S4, and no murmur noted  ABDOMEN:  Soft, nontender. Normal bs    MUSCULOSKELETAL:  1-2+ edema bilaterally  NEUROLOGIC:  Sedated.  On the ventilator  SKIN:  no bruising or bleeding    DATA:  CBC:   Lab Results   Component Value Date    WBC 7.3 01/22/2021    RBC 2.78 01/22/2021    HGB 7.6 01/22/2021    HCT 25.8 01/22/2021    MCV 92.8 01/22/2021    MCH 27.3 01/22/2021    MCHC 29.5 01/22/2021    RDW 19.0 01/22/2021     01/22/2021    MPV 9.7 01/22/2021     CMP:    Lab Results   Component Value Date     01/23/2021    K 3.9 01/23/2021    CL 95 01/23/2021    CO2 30 01/23/2021    BUN 28 01/23/2021    CREATININE 0.9 01/23/2021    GFRAA >60 01/23/2021    LABGLOM >60 01/23/2021    GLUCOSE 222 01/23/2021    GLUCOSE 138 03/26/2011    PROT 4.3 01/22/2021    LABALBU 2.0 01/22/2021    LABALBU 4.0 03/26/2011    CALCIUM 7.4 01/23/2021    BILITOT 0.3 01/22/2021    ALKPHOS 123 01/22/2021    AST 18 01/22/2021    ALT 8 01/22/2021     LDH:  No results found for: LDH  Warfarin PT/INR:  No components found for: Franklin Clutter  Last 3 Troponin:    Lab Results   Component Value Date    TROPONINI 0.11 01/23/2021    TROPONINI 0.12 01/23/2021    TROPONINI 0.11 01/22/2021     ABG:    Lab Results   Component Value Date    PH 7.396 01/22/2021    PCO2 46.7 01/22/2021    PO2 69.6 01/22/2021    HCO3 28.0 01/22/2021    BE 7.9 01/23/2021    O2SAT 97.9 01/23/2021     HgBA1c:    Lab Results   Component Value Date    LABA1C 6.0 07/17/2018     TSH:  No results found for: TSH  VITAMIN B12: No components found for: B12  FOLATE:  No results found for: FOLATE  IRON:    Lab Results Component Value Date    IRON 60 12/31/2020     Iron Saturation:  No components found for: PERCENTFE  TIBC:    Lab Results   Component Value Date    TIBC 124 12/31/2020     FERRITIN:    Lab Results   Component Value Date    FERRITIN 252 12/31/2020     RPR:  No results found for: RPR  HASMUKH:    Lab Results   Component Value Date    HASMUKH NEGATIVE 08/14/2018     AMYLASE:    Lab Results   Component Value Date    AMYLASE 26 07/18/2018     LIPASE:    Lab Results   Component Value Date    LIPASE 9 12/13/2020       IMAGING    Ct Abdomen Pelvis Wo Contrast Additional Contrast? Oral    Result Date: 12/30/2020  EXAMINATION: CT OF THE ABDOMEN AND PELVIS WITHOUT CONTRAST 12/30/2020 3:30 pm TECHNIQUE: CT of the abdomen and pelvis was performed without the administration of intravenous contrast. Multiplanar reformatted images are provided for review. Dose modulation, iterative reconstruction, and/or weight based adjustment of the mA/kV was utilized to reduce the radiation dose to as low as reasonably achievable. COMPARISON: CT abdomen and pelvis, 12/17/2028. HISTORY: ORDERING SYSTEM PROVIDED HISTORY: sbo TECHNOLOGIST PROVIDED HISTORY: Reason for Exam:->PO CONTRAST-- SBO??? Height:182.9cm Weight:116.2kg Reason for exam:->sbo Additional Contrast?->Oral FINDINGS: Lower Chest: Pulmonary consolidations are seen in the lower lungs. Compressive atelectasis is seen in the lower lobes, overlying small bilateral pleural effusions. The heart is normal in size. Prominent calcified coronary atherosclerosis is seen. Organs: Liver: Unremarkable. Gallbladder: Unremarkable. Pancreas: Moderately atrophic. Otherwise, unremarkable. Spleen:  Unremarkable. Adrenals: Unremarkable. Kidneys: Hypodense lesions in the kidneys bilaterally are incompletely evaluated. The likely represent cysts. 4 mm nonobstructive calculus seen within an inferior pole calyx of the right kidney. No ureterolithiasis or hydronephrosis.  GI/Bowel: Liquid stool in the colon evaluation with oral contrast CT or small-bowel series recommended. Xr Abdomen (kub) (single Ap View)    Result Date: 12/29/2020  EXAMINATION: ONE SUPINE XRAY VIEW(S) OF THE ABDOMEN 12/29/2020 3:32 am COMPARISON: 12/17/2020 HISTORY: ORDERING SYSTEM PROVIDED HISTORY: possible ileus TECHNOLOGIST PROVIDED HISTORY: Reason for Exam:->possible ileus Portable? ->Yes Height:182.9cm Weight:114.3kg FINDINGS: Diffuse gaseous distention of the small bowel loops measure up to 4.2 cm in diameter. Gas-filled colon as well. Bladder catheter present. Aortoiliac atherosclerotic disease. The bones are osteopenic. Diffuse gaseous distention of small bowel and colon suggesting ileus. Continued radiographic follow-up recommended to ensure resolution. Ct Head Wo Contrast    Result Date: 1/22/2021  EXAMINATION: CT OF THE HEAD WITHOUT CONTRAST  1/22/2021 9:38 pm TECHNIQUE: CT of the head was performed without the administration of intravenous contrast. Dose modulation, iterative reconstruction, and/or weight based adjustment of the mA/kV was utilized to reduce the radiation dose to as low as reasonably achievable. COMPARISON: December 13, 2020 HISTORY: ORDERING SYSTEM PROVIDED HISTORY: mental status change TECHNOLOGIST PROVIDED HISTORY: Has a \"code stroke\" or \"stroke alert\" been called? ->No Reason for exam:->mental status change What reading provider will be dictating this exam?->CRC FINDINGS: BRAIN/VENTRICLES: There is no acute intracranial hemorrhage, mass effect or midline shift. No abnormal extra-axial fluid collection. The gray-white differentiation is maintained without evidence of an acute infarct. There is prominence of the ventricles and sulci due to global parenchymal volume loss. There are nonspecific areas of hypoattenuation within the periventricular and subcortical white matter, which likely represent chronic microvascular ischemic change.  ORBITS: The visualized portion of the orbits demonstrate no acute abnormality. SINUSES: Paranasal sinus mucosal thickening, likely chronic. Mastoid air cells are partially opacified which may reflect sequela from mastoiditis. Soft tissue density in the right middle ear. SOFT TISSUES/SKULL: No acute abnormality of the visualized skull or soft tissues. No acute intracranial abnormality. Age-related loss of brain volume and chronic periventricular ischemic changes. Partial opacification of mastoid air cells and right middle which may reflect sequela from mila mastoiditis. Ct Chest Wo Contrast    Result Date: 1/22/2021  EXAMINATION: CT OF THE CHEST WITHOUT CONTRAST 1/22/2021 10:38 pm TECHNIQUE: CT of the chest was performed without the administration of intravenous contrast. Multiplanar reformatted images are provided for review. Dose modulation, iterative reconstruction, and/or weight based adjustment of the mA/kV was utilized to reduce the radiation dose to as low as reasonably achievable. COMPARISON: None. HISTORY: ORDERING SYSTEM PROVIDED HISTORY: resp failure TECHNOLOGIST PROVIDED HISTORY: Reason for exam:->resp failure What reading provider will be dictating this exam?->CRC FINDINGS: Lungs and Airways and Pleura:  Central airways are patent. There are moderate bilateral pleural effusion. There are extensive consolidative changes involving bilateral lungs predominantly within the dependent portion of the lower lobes and along the bronchovascular bundles in upper lower lobes. There is also significant thickening of soft tissues along the bronchovascular bundles. There is associated air bronchogram.  There are also subtle areas of ground-glass densities and inflammatory nodules throughout both lungs which are likely related to underlying infectious/inflammatory condition. Lymph nodes: No pathologically enlarged mediastinal, hilar, lower cervical, or chest wall lymph nodes.  Cardiovascular and Mediastinum:  Cardiac chamber sizes appear to measure within normal limits on this non ECG gated study. No pericardial effusion. Calcification of the coronary arteries. Calcification of the thoracic aorta. Endotracheal tube is in satisfactory position . Lauren Sandy Bones/Soft tissues:  No definite suspicious lytic or blastic foci. Upper abdomen: Visualized aspects of the upper abdomen are unremarkable. NG tube and peg  tube are in satisfactory position    Multifocal areas of consolidation of the lungs with associated areas of ground-glass densities are highly concerning for an underlying infectious/inflammatory condition. Moderate bilateral pleural effusion and thickening along the bronchovascular bundles are likely suggestive of underlying pulmonary vascular congestion/pulmonary edema. NG tube and peg  tube and endotracheal tube are in satisfactory position. Xr Chest Portable    Result Date: 1/22/2021  EXAMINATION: ONE SUPINE XRAY VIEW(S) OF THE ABDOMEN; ONE XRAY VIEW OF THE CHEST 1/22/2021 9:47 pm COMPARISON: Chest series from January 20, 2021 HISTORY: ORDERING SYSTEM PROVIDED HISTORY: Confirmation of course of NG/OG/NE tube and location of tip of tube TECHNOLOGIST PROVIDED HISTORY: Reason for exam:->Confirmation of course of NG/OG/NE tube and location of tip of tube Portable? ->Yes What reading provider will be dictating this exam?->CRC FINDINGS: Chest: Endotracheal tube terminates in the mid to distal thoracic trachea approximately 2.5 cm above the emmanuel. Previous left upper extremity PICC line is not seen on this exam. Low lung volumes and coarse interstitial markings. Stable interstitial opacities. No new airspace disease, pleural effusions, or pneumothoraces. Stable atherosclerotic disease and cardiomegaly. Abdomen: Enteric tube extends subdiaphragmatic with distal tip and side port projecting over the left upper quadrant. IVC filter. There appears to be a GJ feeding tube. Bowel gas pattern in the upper abdomen is unremarkable. 1.  Satisfactory position of endotracheal tube. Cardiopulmonary findings are stable. 2.  Satisfactory position of enteric tube. Xr Chest Portable    Result Date: 1/20/2021  EXAMINATION: ONE XRAY VIEW OF THE CHEST 1/20/2021 7:34 am COMPARISON: 01/13/2021 HISTORY: ORDERING SYSTEM PROVIDED HISTORY: pneumonia, hypoxia TECHNOLOGIST PROVIDED HISTORY: Reason for Exam:->pneumonia, hypoxia Portable? ->Yes Height:182.9cm Weight:116.2kg FINDINGS: The heart is enlarged. Note is made of a left-sided PICC line. Diffuse multifocal bilateral airspace disease is noted. The airspace disease has progressed when compared with the patient's prior study. Worsening of the multifocal bilateral airspace disease. Cardiomegaly. Xr Chest Portable    Result Date: 1/6/2021  EXAMINATION: ONE XRAY VIEW OF THE CHEST 1/6/2021 7:53 am COMPARISON: Comparison is dated January 5, 2021 HISTORY: ORDERING SYSTEM PROVIDED HISTORY: picc placement TECHNOLOGIST PROVIDED HISTORY: Reason for Exam:->picc placement Portable? ->Yes Height:182.9cm Weight:121.7kg FINDINGS: ET tube remains in satisfactory position. Bilateral pulmonary infiltrates are unchanged. No pleural effusion identified. There is a right PICC line with tip at the SVC. There is a left PICC line with tip at the junction of the brachiocephalic and SVC. Cardiac and mediastinal contours do not appear changed. ET and other life support devices unchanged Bilateral pulmonary interstitial infiltrates unchanged    Xr Chest Portable    Result Date: 1/5/2021  EXAMINATION: ONE XRAY VIEW OF THE CHEST 1/5/2021 6:42 am COMPARISON: 01/02/2021 HISTORY: ORDERING SYSTEM PROVIDED HISTORY: pneumonia, vent/ETT TECHNOLOGIST PROVIDED HISTORY: Tomorrow am Reason for Exam:->pneumonia, vent/ETT Height:182.9cm Weight:120.4kg FINDINGS: Endotracheal tube tip is 2.4 cm above the emmanuel. Bilateral PICC lines are unchanged. There is pulmonary vascular congestion with alveolar and interstitial infiltrate which is suggestive of edema.   There is mildly improved aeration in the left lower lobe. Findings are otherwise similar to previous. There is likely a small left pleural effusion. Cardiomegaly is unchanged. Congestion and infiltrates suggestive of CHF. Aeration in the left lower lobe is mildly improved although findings are otherwise similar to prior. Xr Chest Portable    Result Date: 1/2/2021  EXAMINATION: ONE XRAY VIEW OF THE CHEST portable upright 1/2/2021 6:59 am COMPARISON: 01/01/2021 HISTORY: ORDERING SYSTEM PROVIDED HISTORY: vent, COVID TECHNOLOGIST PROVIDED HISTORY: Tomorrow am Reason for Exam:->vent, COVID Height:182.9cm Weight:119.8kg FINDINGS: Somewhat limited detail secondary to portable technique and large body habitus. Stable position of the endotracheal tube and right-sided PICC line. Cardiomegaly with small bilateral pleural effusions, larger on the left. Bilateral airspace opacities without significant change compatible with pulmonary edema. Superimposed pneumonia not excluded. No significant change. CHF pattern with cardiomegaly, pulmonary edema, and small pleural effusions. Superimposed pneumonia not excluded. Xr Chest Portable    Result Date: 1/1/2021  EXAMINATION: ONE XRAY VIEW OF THE CHEST 12/31/2020 2:01 pm COMPARISON: 12/30/2020 HISTORY: ORDERING SYSTEM PROVIDED HISTORY: vent, pneumonia TECHNOLOGIST PROVIDED HISTORY: Tomorrow am Reason for Exam:->vent, pneumonia Portable? ->Yes Height:182.9cm Weight:116.2kg FINDINGS: There is stable position of the support lines and tubes. There is no interval change in extensive multifocal bilateral airspace disease. The heart is enlarged. There is no gross pleural effusion. No interval change in extensive multifocal bilateral airspace disease.     Xr Chest Portable    Result Date: 12/30/2020  EXAMINATION: ONE XRAY VIEW OF THE CHEST 12/30/2020 7:01 am COMPARISON: 12/29/2020 HISTORY: ORDERING SYSTEM PROVIDED HISTORY: intubated, vent TECHNOLOGIST PROVIDED HISTORY: Tomorrow am Reason for Exam:->intubated, vent Portable? ->Yes Height:182.9cm [id number] FINDINGS: Bilateral interstitial and alveolar infiltrates, particularly in left lower lobe are unchanged. Heart remains moderately enlarged. Lines/tubes are appropriate and unchanged. No interval change    Xr Chest Portable    Addendum Date: 12/29/2020    ADDENDUM: The chest x-ray as stated was compared with the patient's earlier study obtained less than 1 hour earlier. The chest x-ray stated support lines and tubes were unchanged in position. That includes the PICC line. The PICC line tip is at the SVC right atrial junction. Result Date: 12/29/2020  EXAMINATION: ONE XRAY VIEW OF THE CHEST 12/29/2020 11:05 am COMPARISON: None. HISTORY: ORDERING SYSTEM PROVIDED HISTORY: pull back picc line- reverify TECHNOLOGIST PROVIDED HISTORY: Reason for Exam:->pull back picc line- reverify Portable? ->Yes Height:182.9cm [id number] FINDINGS: The heart is enlarged. Extensive multifocal bilateral airspace disease is noted. The support lines and tubes are unchanged in position. There is no gross pleural effusion. .    1. No interval change in extensive multifocal bilateral airspace disease. Xr Chest Portable    Result Date: 12/29/2020  EXAMINATION: ONE XRAY VIEW OF THE CHEST 12/29/2020 9:33 am COMPARISON: Same date 03/01 3 hours HISTORY: ORDERING SYSTEM PROVIDED HISTORY: picc placement TECHNOLOGIST PROVIDED HISTORY: Reason for Exam:->picc placement Portable? ->Yes Height:182.9cm [id number] FINDINGS: Bilateral interstitial scarring/infiltrates are unchanged with suggestion of left lower lobe infiltrate. ET tube is appropriate. There has been placement of right-sided PICC line with the tip in the right atrium.     No interval change in the appearance of the chest    Xr Chest Portable    Result Date: 12/29/2020  EXAMINATION: ONE XRAY VIEW OF THE CHEST 12/29/2020 3:32 am COMPARISON: 12/24/2020 HISTORY: ORDERING SYSTEM PROVIDED HISTORY: et placement TECHNOLOGIST PROVIDED HISTORY: Reason for Exam:->et placement Portable? ->Yes Height:182.9cm Weight:114.3kg FINDINGS: ETT tip 3.3 cm above the emmanuel. Heart size is enlarged, stable. Thoracic aortic atherosclerotic disease. Hypoinflated lungs. Diffuse interstitial opacities. No significant pleural effusion or pneumothorax. 1. ETT tip 3.3 cm above the emmanuel. 2. Diffuse bilateral interstitial opacities may represent pulmonary edema and/or multifocal pneumonia. Xr Chest 1 View    Result Date: 1/13/2021  EXAMINATION: ONE XRAY VIEW OF THE CHEST 1/13/2021 3:50 pm COMPARISON: Comparison studies of January 2nd of January 8 HISTORY: ORDERING SYSTEM PROVIDED HISTORY: Aspiration into airway, initial encounter TECHNOLOGIST PROVIDED HISTORY: Reason for exam:->possible aspiration FINDINGS: Heart is enlarged. The bilateral infiltrates are seen predominant observed in the right parahilar region. No size pleural effusions are seen. There is no katja signs of perihilar vascular congestion all to mild component of perihilar vascular congestion cannot be excluded. The endotracheal tube has been removed since the previous study of January 8. Cardiomegaly, persistent bilateral infiltrates. .    Us Dup Upper Extremity Right Venous    Result Date: 12/27/2020  EXAMINATION: DUPLEX ULTRASOUND OF THE RIGHT UPPER EXTREMITY FOR DVT, 12/27/2020 7:27 pm TECHNIQUE: Duplex ultrasound and Doppler images were obtained of the deep venous structures of the upper extremity. COMPARISON: None. HISTORY: ORDERING SYSTEM PROVIDED HISTORY: swelling TECHNOLOGIST PROVIDED HISTORY: Reason for Exam:->swelling Portable? ->Yes Height:182.9cm Weight:114.6kg What reading provider will be dictating this exam?->CRC FINDINGS: Limitations: Cephalic vein, radial vein, and ulnar veins not visualized. There is normal flow and compressibility of the visualized venous structures of the right upper extremity. There is no evidence of echogenic thrombus.     No evidence of DVT in the right upper extremity given the limitations detailed above. Us Dup Upper Extremity Left Venous    Addendum Date: 1/22/2021    ADDENDUM: History-left arm PICC line edema, swelling    Result Date: 1/22/2021  EXAMINATION: VENOUS ULTRASOUND OF THE LEFT UPPER EXTREMITY, 1/3/2021 7:08 pm TECHNIQUE: Duplex ultrasound and Doppler images were obtained of the deep venous structures of the upper extremity. COMPARISON: None. HISTORY: ORDERING SYSTEM PROVIDED HISTORY: r/o DVT TECHNOLOGIST PROVIDED HISTORY: Reason for Exam:->r/o DVT Height:182.9cm Weight:121kg What reading provider will be dictating this exam?->CRC FINDINGS: There is normal flow and compressibility of the visualized venous structures of left upper extremity. PICC line visualized as. There is no evidence of echogenic thrombus. No evidence of DVT. Us Dup Lower Extremity Left Can    Result Date: 1/3/2021  EXAMINATION: DUPLEX VENOUS ULTRASOUND OF THE LEFT LOWER EXTREMITY 1/3/2021 7:07 pm TECHNIQUE: Duplex ultrasound and Doppler images were obtained of the left lower extremity. COMPARISON: December 26, 2020 HISTORY: ORDERING SYSTEM PROVIDED HISTORY: pain and swelling TECHNOLOGIST PROVIDED HISTORY: Reason for Exam:->only need to do left leg and for redness and swelling Portable? ->Yes Reason for portable exam:->vented Height:182.9cm Weight:121kg Reason for exam:->pain and swelling What reading provider will be dictating this exam?->CRC FINDINGS: There is non compressibility associated with the distal left common femoral vein and proximal left superficial femoral vein. Doppler blood flow is demonstrated. Remaining veins of left lower extremity show normal compressibility and Doppler blood flow. Calf veins are limited due to overlying edema. Positive examination for nonocclusive deep vein thrombosis involving distal left common femoral vein and proximal left superficial femoral vein.     Us Dup Lower Extremity Left Can    Result Date: 12/26/2020  EXAMINATION: DUPLEX VENOUS ULTRASOUND OF THE LEFT LOWER EXTREMITY 12/26/2020 10:40 am TECHNIQUE: Duplex ultrasound and Doppler images were obtained of the left lower extremity. COMPARISON: None. HISTORY: ORDERING SYSTEM PROVIDED HISTORY: only need to do left leg and for redness and swelling TECHNOLOGIST PROVIDED HISTORY: Reason for Exam:->only need to do left leg and for redness and swelling Height:182.9cm Weight:113.8kg Reason for exam:->only need to do left leg and for redness and swelling What reading provider will be dictating this exam?->CRC FINDINGS: The visualized veins of the left lower extremity are patent and free of echogenic thrombus. The veins are normally compressible and have normal phasic flow. Somewhat suboptimal visualization of the calf veins due to edema. No evidence of DVT in the left lower extremity. Xr Abdomen For Ng/og/ne Tube Placement    Result Date: 1/22/2021  EXAMINATION: ONE SUPINE XRAY VIEW(S) OF THE ABDOMEN; ONE XRAY VIEW OF THE CHEST 1/22/2021 9:47 pm COMPARISON: Chest series from January 20, 2021 HISTORY: ORDERING SYSTEM PROVIDED HISTORY: Confirmation of course of NG/OG/NE tube and location of tip of tube TECHNOLOGIST PROVIDED HISTORY: Reason for exam:->Confirmation of course of NG/OG/NE tube and location of tip of tube Portable? ->Yes What reading provider will be dictating this exam?->CRC FINDINGS: Chest: Endotracheal tube terminates in the mid to distal thoracic trachea approximately 2.5 cm above the emmanuel. Previous left upper extremity PICC line is not seen on this exam. Low lung volumes and coarse interstitial markings. Stable interstitial opacities. No new airspace disease, pleural effusions, or pneumothoraces. Stable atherosclerotic disease and cardiomegaly. Abdomen: Enteric tube extends subdiaphragmatic with distal tip and side port projecting over the left upper quadrant. IVC filter. There appears to be a GJ feeding tube.  Bowel gas pattern in the upper abdomen is unremarkable. 1.  Satisfactory position of endotracheal tube. Cardiopulmonary findings are stable. 2.  Satisfactory position of enteric tube. Fluoro For Surgical Procedures    Result Date: 1/6/2021  EXAMINATION: SPOT FLUOROSCOPIC IMAGES 1/6/2021 1:24 pm TECHNIQUE: Fluoroscopy was provided by the radiology department for procedure. Radiologist was not present during examination. FLUOROSCOPY DOSE AND TYPE OR TIME AND EXPOSURES: Images: 5 set of cine fluoro images obtained Fluoro time: 1.7 minutes TD: 39.8 M Gy DAP: 1.17 image Gy meter square COMPARISON: None HISTORY: ORDERING SYSTEM PROVIDED HISTORY: vena cava filter TECHNOLOGIST PROVIDED HISTORY: Reason for exam:->vena cava filter Intraprocedural imaging. FINDINGS: 5 set of seen fluoro images being spot images of the abdomen were obtained. Fluoroscopy provided for deployment of an IVC filter. Intraprocedural fluoroscopic spot images as above. See separate procedure report for more information.     ASSESSMENT AND PLAN:    Respiratory failure secondary to hospital acquired pneumonia/aspiration pneumonia  Septic shock  Chronic atrial fibrillation  anasarca  Pulmonary and infectious disease consultations    I can be reached though [facility] or [facility] at [phone]

## 2021-01-23 NOTE — CONSULTS
Critical Care Admit/Consult Note         Patient - Dorys Heller   MRN -  [id number]   Rolanda # - [de-identified]   - 1940      Date of Admission -  2021  8:35 PM  Date of evaluation -  2021  [address] - 1            ADMIT/CONSULT DETAILS     Reason for Admit/Consult     Respiratory failure   hcap      Consulting [address] [place] [address]  Primary Care Physician - No primary care provider on file. HPI     Mr. Aguliar Nuñez is a 76-year-old  male extensive past medical history including dementia, insulin-dependent DM 2, multiple pressure ulcers, atrial fibrillation, who presented to the ED from an outside facility via EMS due to hypoxia after vomiting while on his BiPAP machine prior to arrival.  He reportedly went unresponsive while en route. He did experience a similar event 1 month ago and required intubation was in ICU taken care of at that time by my partner Dr. Taryn Stacy. According to documentation this patient is DNR CCA status, after arrival Dr. Myles Palma spoke to family over the phone who expressed that they want him to be intubated if need be and for all treatment measures to be taken until the event of cardiac arrest. He is currently on multiple pressors and abx.             Past Medical History         Diagnosis Date    Acute deep vein thrombosis (DVT) of femoral vein of left lower extremity ([address] 2021    Anxiety     Benign localized hyperplasia of prostate with urinary obstruction     Bullous pemphigoid     Dementia ([address]     Diabetes mellitus ([address]     Enlarged prostate     Hx of blood clots     Hyperlipidemia     Hypertension     Paroxysmal atrial fibrillation ([address] 2019    Respiratory failure ([address] 2020    Rheumatoid disease ([address]         Past Surgical History           Procedure Laterality Date    DOPPLER ECHOCARDIOGRAPHY N/A     FRACTURE SURGERY      HIP FRACTURE SURGERY Left 2019    HIP OPEN REDUCTION INTERNAL FIXATION performed by Justino Marie MD at [facility]  12/18/2020         [place] EGD PERCUTANEOUS PLACEMENT GASTROSTOMY TUBE N/A 7/31/2018    EGD  PEG TUBE INSERTION,  (ENDO STAFF NEEDED) performed by Colletta Frankel, MD at [place] [address] N/A 7/29/2018    EGD DIAGNOSTIC ONLY performed by Nacho Mcdaniels MD at [place] [address] N/A 1/15/2021    EGD WITH CONVERSION TO J TUBE performed by Katelyn Lewis MD at [address] [facility] N/A 1/6/2021    VENA CAVA FILTER INSERTION, RIGHT FEMORAL APPROACH performed by Elayne Jordan MD at [facility] OR       Current Medications   Current Medications    amiodarone  100 mg PEG Tube Daily    atorvastatin  20 mg PEG Tube Daily    hydrocortisone sodium succinate PF  25 mg Intravenous Q8H    lactulose  20 g PEG Tube Daily    latanoprost  1 drop Both Eyes Daily    metoclopramide  10 mg Intravenous Q6H    midodrine  5 mg PEG Tube TID    nystatin-triamcinolone   Topical 4x Daily    pantoprazole  40 mg Intravenous Daily    And    sodium chloride (PF)  10 mL Intravenous Daily    potassium & sodium phosphates  1 packet Per G Tube Daily    potassium bicarb-citric acid  40 mEq Per G Tube Daily    senna  1 tablet PEG Tube BID    sodium chloride (Inhalant)  4 mL Nebulization BID    sodium chloride flush  10 mL Intravenous 2 times per day    enoxaparin  40 mg Subcutaneous Daily    insulin lispro  0-12 Units Subcutaneous Q6H    meropenem  1,000 mg Intravenous Q8H    linezolid  600 mg Intravenous Q12H     acetaminophen, docusate sodium, glucose, dextrose, glucagon (rDNA), dextrose, sodium chloride flush, promethazine **OR** ondansetron, polyethylene glycol, acetaminophen **OR** acetaminophen, fentanNYL  IV Drips/Infusions   phenylephrine (BIBI-SYNEPHRINE) 50mg/250mL infusion 25 mcg/min (01/23/21 1010)    dextrose      sodium chloride 100 mL/hr at 01/23/21 0604    sodium chloride      sodium chloride 100 mL/hr at 01/22/21 2302    fentaNYL 5 mcg/ml in 0.9%  ml infusion 75 mcg/hr (01/22/21 2219)    norepinephrine 30 mcg/min (01/23/21 0639)     Home Medications  Medications Prior to Admission: dextrose 50 % solution, Infuse 25 g intravenously as needed  potassium bicarb-citric acid (EFFER-K) 20 MEQ TBEF effervescent tablet, Take 40 tablets by mouth daily  potassium & sodium phosphates (PHOS-NAK) 280-160-250 MG PACK, 1 packet by Per G Tube route daily  sodium chloride, PF, 0.9 % injection, Infuse 50 mLs intravenously continuous  sodium chloride, Inhalant, 3 % nebulizer solution, Take 4 mLs by nebulization 2 times daily  atorvastatin (LIPITOR) 20 MG tablet, 20 mg daily Peg tube  amiodarone (PACERONE) 100 MG tablet, 100 mg by PEG Tube route daily  promethazine (PHENERGAN) 12.5 MG tablet, 12.5 mg by PEG Tube route every 6 hours as needed for Nausea  Benzocaine-Docusate Sodium (ENEMEEZ PLUS)  MG ENEM, Place rectally as needed  hydrocortisone sodium succinate PF (SOLU-CORTEF) 100 MG injection, Infuse 25 mg intravenously every 8 hours  insulin lispro (HUMALOG) 100 UNIT/ML injection vial, Inject into the skin every 6 hours 0-16 units sliding scale  lactulose (CHRONULAC) 10 GM/15ML solution, Take 20 g by mouth daily  metoclopramide (REGLAN) 5 MG/ML injection, Infuse 10 mg intravenously every 6 hours  midodrine (PROAMATINE) 5 MG tablet, 5 mg by PEG Tube route 3 times daily  nystatin-triamcinolone (MYCOLOG II) 892488-5.1 UNIT/GM-% cream, Apply topically 4 times daily Apply topically 4 times daily.   pantoprazole sodium (PROTONIX) 40 MG PACK packet, 40 mg by Per G Tube route 2 times daily (before meals) Pantoprazole/ sodium bicarb liquid 2mg/ml oral liquid 40 mg peg tube BID  glucagon, rDNA, 1 MG injection, Inject 1 mg into the muscle as needed for Low blood sugar (Blood glucose less than 70 mg/dL and patient NOT ALERT or NPO and does not have IV access.)  folic acid (FOLVITE) 1 MG tablet, 1 tablet by Per G Tube route daily  docusate sodium (ENEMEEZ) 283 MG enema, Place 5 mLs rectally daily  glucose (GLUTOSE) 40 % GEL, Take 15 g by mouth as needed  latanoprost (XALATAN) 0.005 % ophthalmic solution, Place 1 drop into both eyes daily   acetaminophen (TYLENOL) 325 MG tablet, Take 650 mg by mouth every 4 hours as needed for Pain or Fever (max = 3000 mg/24 hours)    Diet/Nutrition   Diet NPO Effective Now    Allergies   Imuran [azathioprine], Doxycycline, and Levaquin [levofloxacin]    Social History   Tobacco   reports that he quit smoking about 14 years ago. His smoking use included cigarettes. He started smoking about 65 years ago. He has a 100.00 pack-year smoking history. He has never used smokeless tobacco.    Alcohol     reports no history of alcohol use.     Occupational history :    Family History         Problem Relation Age of Onset    Diabetes Mother     Kidney Disease Mother     Cancer Father         esophagus    Cancer Sister        ROS     REVIEW OF SYSTEMS:  Review of systems not obtained due to patient factors - intubation and mental status    Lines and Devices    tlc     Mechanical Ventilation Data   VENT SETTINGS (Comprehensive)  Vent Information  $Ventilation: $Initial Day  Equipment ID: [id number]  Vent Type: [phone]  Vent Mode: AC/VC  Vt Ordered: 500 mL  Rate Set: 18 bmp  Peak Flow: 60 L/min  Pressure Support: 0 cmH20  FiO2 : 50 %  SpO2: 98 %  SpO2/FiO2 ratio: 196  Sensitivity: 3  PEEP/CPAP: 5  I Time/ I Time %: 0 s  Additional Respiratory  Assessments  Pulse: 73  Resp: 18  SpO2: 98 %  Oral Care: Mouth swabbed, Mouth moisturizer, Mouth suctioned, Suction toothette    ABG  Lab Results   Component Value Date    PH 7.396 01/22/2021    PCO2 46.7 01/22/2021    PO2 69.6 01/22/2021    HCO3 28.0 01/22/2021    O2SAT 97.9 01/23/2021     Lab Results   Component Value Date    MODE [facility] 01/23/2021           Vitals    height is 6' (1.829 m) and weight is 255 lb (115.7 kg). His bladder temperature is 102.4 °F (39.1 °C). His blood pressure is 151/80 (abnormal) and his pulse is 73. His respiration is 18 and oxygen saturation is 98%. Temperature Range: Temp: 102.4 °F (39.1 °C) Temp  Av.4 °F (38.6 °C)  Min: 99.9 °F (37.7 °C)  Max: 102.4 °F (39.1 °C)  BP Range:  Systolic (41VJB), NST:691 , Min:60 , GYU:815     Diastolic (04QXL), TBD:24, Min:36, Max:104    Pulse Range: Pulse  Av.8  Min: 67  Max: 124  Respiration Range: Resp  Av.8  Min: 6  Max: 36  Current Pulse Ox[de-identified]  SpO2: 98 %  24HR Pulse Ox Range:  SpO2  Av.4 %  Min: 87 %  Max: 100 %  Oxygen Amount and Delivery:        I/O (24 Hours)    Patient Vitals for the past 8 hrs:   BP Temp Temp src Pulse Resp SpO2   21 1000 (!) 151/80 -- -- 73 18 98 %   21 0948 -- -- -- 95 18 100 %   21 0900 111/60 -- -- 91 18 100 %   21 0800 111/60 102.4 °F (39.1 °C) Bladder 87 18 99 %   21 0700 113/63 -- -- 88 18 100 %   21 0600 137/70 -- -- 73 18 99 %   21 0500 (!) 101/54 -- -- 84 18 99 %   21 0400 (!) 112/52 101.8 °F (38.8 °C) Bladder 67 17 97 %   21 0329 -- -- -- 67 -- --   21 0326 -- -- -- 70 -- --   21 0322 -- -- -- 73 -- --   21 0300 (!) 100/50 -- -- 86 18 98 %   21 0245 (!) 91/49 -- -- 79 16 97 %       Intake/Output Summary (Last 24 hours) at 2021 1030  Last data filed at 2021 0948  Gross per 24 hour   Intake 2247.02 ml   Output 875 ml   Net 1372.02 ml     I/O last 3 completed shifts: In: 2247 [I.V.:2247]  Out: 425 [Urine:425]   Date 21 0000 - 21   Shift 9591-7373 8606-5352 5616-0159 24 Hour Total   INTAKE   P.O.(mL/kg/hr) 0(0)   0   I. V.(mL/kg) 6286(12.5)   3892(14.4)   Shift Total(mL/kg) 3050(39.1)   6612(68.5)   OUTPUT   Urine(mL/kg/hr) 425(0.5) 450  875   Shift Total(mL/kg) 425(3.7) 450(3.9)  875(7.6)   Weight (kg) 115.7 115.7 115.7 115.7     Patient Vitals for the past 96 hrs (Last 3 readings):   Weight 01/23/21 0119 255 lb (115.7 kg)         Drains/Tubes Outputs  [place]   og   Exam     PHYSICAL EXAM:  CONSTITUTIONAL:  Sedated   EYES:  Lids and lashes normal, pupils equal, round and reactive to light, extra ocular muscles intact, sclera clear, conjunctiva normal  ENT:  Normocephalic, without obvious abnormality, atraumatic, sinuses nontender on palpation, external ears without lesions, oral pharynx with moist mucus membranes, tonsils without erythema or exudates, gums normal and good dentition.   LUNGS:  Scattered rhonchi  CARDIOVASCULAR:  Normal apical impulse, regular rate and rhythm, normal S1 and S2, no S3 or S4, and no murmur noted  ABDOMEN:  No scars, normal bowel sounds, soft, non-distended, non-tender, no masses palpated, no hepatosplenomegally  NEUROLOGIC:  Cranial Nerves:  cranial nerves II-XII are grossly intact    Data   Old records and images have been reviewed    Lab Results   CBC     Lab Results   Component Value Date    WBC 7.3 01/22/2021    RBC 2.78 01/22/2021    HGB 7.6 01/22/2021    HCT 25.8 01/22/2021     01/22/2021    MCV 92.8 01/22/2021    MCH 27.3 01/22/2021    MCHC 29.5 01/22/2021    RDW 19.0 01/22/2021    NRBC 0.9 01/08/2021    SEGSPCT 64 09/27/2013    METASPCT 6.0 01/06/2021    LYMPHOPCT 26.8 01/22/2021    PROMYELOPCT 0.9 12/21/2020    MONOPCT 7.9 01/22/2021    MYELOPCT 3.0 01/06/2021    BASOPCT 0.3 01/22/2021    MONOSABS 0.58 01/22/2021    LYMPHSABS 1.96 01/22/2021    EOSABS 0.23 01/22/2021    BASOSABS 0.02 01/22/2021       BMP   Lab Results   Component Value Date     01/23/2021    K 3.9 01/23/2021    CL 95 01/23/2021    CO2 30 01/23/2021    BUN 28 01/23/2021    CREATININE 0.9 01/23/2021    GLUCOSE 222 01/23/2021    GLUCOSE 138 03/26/2011    CALCIUM 7.4 01/23/2021       LFTS  Lab Results   Component Value Date    ALKPHOS 123 01/22/2021    ALT 8 01/22/2021    AST 18 01/22/2021    PROT 4.3 01/22/2021    BILITOT 0.3 01/22/2021    BILIDIR 0.4 01/14/2021    IBILI 0.5 01/14/2021 LABALBU 2.0 01/22/2021    LABALBU 4.0 03/26/2011       INR  No results for input(s): PROTIME, INR in the last 72 hours. APTT  No results for input(s): APTT in the last 72 hours. Lactic Acid  Lab Results   Component Value Date    LACTA 2.0 01/23/2021    LACTA 2.2 01/22/2021    LACTA 1.7 12/31/2020        BNP   No results for input(s): BNP in the last 72 hours. Cultures     No results for input(s): BC in the last 72 hours. No results for input(s): Elane Burdock in the last 72 hours. No results for input(s): LABURIN in the last 72 hours. Radiology   CXR          SYSTEMS ASSESSMENT    Neuro   Sedated with fentanyl maintain RASS -2   Daily sedation holiday   Hx mild dementia     Respiratory   Acute on chronic hypoxic respiratory requiring mechanical ventilation   HCAP. Culture growing MRSA and ESBL + Klebsiella  Bronchodilators   Abx   Cxr daily   Pulmonary toilet   Wean oxygen as tolerated. Keep O2 sat 90-92%    Cardiovascular   Septic shock   Insert alex  Titrate pressors to maintain map >/= 65   IVF resuscitation   icu telemetry     afib on amiodarone     Gastrointestinal      Aspiration pna   Statin for hlp   Npo   ppi       Renal   ivf   Insert hernandez   Monitor urine output   Hypokalemia- Replace lytes as needed   Chronic metabolic alkalosis      Infectious Disease   Septic shock  HCAP. Culture growing MRSA and ESBL + Klebsiella  Abx: meropenem, zyvox   ID following     Hematology/Oncology   Anemia acute on chronic   Trend h/h daily   dvt prophylaxis   Platelets trending down on zyvoxx     Endocrine   BG uncontrolled adjust insulin to maintain bg 140-180    Social/Spiritual/DNR/Other   Full code   Prognosis guarded patient critically ill  Consult palliative care as he has been in icu x2 within 30 days     Juni Motley M.D. Pulmonary/Critical Care Medicine   \"Thank you for asking us to see this complex patient. \"    Total critical care time caring for this patient with life threatening, unstable organ failure, including direct patient contact, management of life support systems, review of data including imaging and labs, discussions with other team members and physicians at least 36  Min so far today, excluding procedures. Please feel free to call with questions or concerns.

## 2021-01-24 ENCOUNTER — APPOINTMENT (OUTPATIENT)
Dept: GENERAL RADIOLOGY | Age: 81
DRG: 870 | End: 2021-01-24
Payer: COMMERCIAL

## 2021-01-24 LAB
AADO2: 134 MMHG
ANION GAP SERPL CALCULATED.3IONS-SCNC: 11 MMOL/L (ref 7–16)
B.E.: 3.7 MMOL/L (ref -3–3)
BASOPHILS ABSOLUTE: 0.02 E9/L (ref 0–0.2)
BASOPHILS RELATIVE PERCENT: 0.2 % (ref 0–2)
BUN BLDV-MCNC: 21 MG/DL (ref 8–23)
CALCIUM SERPL-MCNC: 7.4 MG/DL (ref 8.6–10.2)
CHLORIDE BLD-SCNC: 99 MMOL/L (ref 98–107)
CO2: 28 MMOL/L (ref 22–29)
COHB: 1.6 % (ref 0–1.5)
CREAT SERPL-MCNC: 0.8 MG/DL (ref 0.7–1.2)
CRITICAL: ABNORMAL
DATE ANALYZED: ABNORMAL
DATE OF COLLECTION: ABNORMAL
EOSINOPHILS ABSOLUTE: 0 E9/L (ref 0.05–0.5)
EOSINOPHILS RELATIVE PERCENT: 0 % (ref 0–6)
FIO2: 40 %
GFR AFRICAN AMERICAN: >60
GFR NON-AFRICAN AMERICAN: >60 ML/MIN/1.73
GLUCOSE BLD-MCNC: 241 MG/DL (ref 74–99)
HCO3: 27.6 MMOL/L (ref 22–26)
HCT VFR BLD CALC: 28.9 % (ref 37–54)
HEMOGLOBIN: 8.9 G/DL (ref 12.5–16.5)
HHB: 2.3 % (ref 0–5)
IMMATURE GRANULOCYTES #: 0.09 E9/L
IMMATURE GRANULOCYTES %: 0.7 % (ref 0–5)
LAB: ABNORMAL
LYMPHOCYTES ABSOLUTE: 1.18 E9/L (ref 1.5–4)
LYMPHOCYTES RELATIVE PERCENT: 9.3 % (ref 20–42)
Lab: ABNORMAL
MCH RBC QN AUTO: 27.1 PG (ref 26–35)
MCHC RBC AUTO-ENTMCNC: 30.8 % (ref 32–34.5)
MCV RBC AUTO: 88.1 FL (ref 80–99.9)
METER GLUCOSE: 214 MG/DL (ref 74–99)
METER GLUCOSE: 238 MG/DL (ref 74–99)
METER GLUCOSE: 238 MG/DL (ref 74–99)
METER GLUCOSE: 240 MG/DL (ref 74–99)
METHB: 0.3 % (ref 0–1.5)
MODE: AC
MONOCYTES ABSOLUTE: 0.43 E9/L (ref 0.1–0.95)
MONOCYTES RELATIVE PERCENT: 3.4 % (ref 2–12)
NEUTROPHILS ABSOLUTE: 11.02 E9/L (ref 1.8–7.3)
NEUTROPHILS RELATIVE PERCENT: 86.4 % (ref 43–80)
O2 CONTENT: 13.1 ML/DL
O2 SATURATION: 97.7 % (ref 92–98.5)
O2HB: 95.8 % (ref 94–97)
OPERATOR ID: 1632
PATIENT TEMP: 37 C
PCO2: 38.8 MMHG (ref 35–45)
PDW BLD-RTO: 18.8 FL (ref 11.5–15)
PEEP/CPAP: 5 CMH2O
PFO2: 2.41 MMHG/%
PH BLOOD GAS: 7.47 (ref 7.35–7.45)
PLATELET # BLD: 311 E9/L (ref 130–450)
PMV BLD AUTO: 9.8 FL (ref 7–12)
PO2: 96.6 MMHG (ref 75–100)
POTASSIUM REFLEX MAGNESIUM: 3.6 MMOL/L (ref 3.5–5)
RBC # BLD: 3.28 E12/L (ref 3.8–5.8)
RI(T): 1.39
RR MECHANICAL: 18 B/MIN
SODIUM BLD-SCNC: 138 MMOL/L (ref 132–146)
SOURCE, BLOOD GAS: ABNORMAL
THB: 9.6 G/DL (ref 11.5–16.5)
TIME ANALYZED: 516
VT MECHANICAL: 500 ML
WBC # BLD: 12.7 E9/L (ref 4.5–11.5)

## 2021-01-24 PROCEDURE — 6360000002 HC RX W HCPCS: Performed by: INTERNAL MEDICINE

## 2021-01-24 PROCEDURE — 36415 COLL VENOUS BLD VENIPUNCTURE: CPT

## 2021-01-24 PROCEDURE — 94003 VENT MGMT INPAT SUBQ DAY: CPT

## 2021-01-24 PROCEDURE — 80048 BASIC METABOLIC PNL TOTAL CA: CPT

## 2021-01-24 PROCEDURE — 6370000000 HC RX 637 (ALT 250 FOR IP): Performed by: INTERNAL MEDICINE

## 2021-01-24 PROCEDURE — 71045 X-RAY EXAM CHEST 1 VIEW: CPT

## 2021-01-24 PROCEDURE — 6360000002 HC RX W HCPCS: Performed by: EMERGENCY MEDICINE

## 2021-01-24 PROCEDURE — 82805 BLOOD GASES W/O2 SATURATION: CPT

## 2021-01-24 PROCEDURE — 2580000003 HC RX 258: Performed by: INTERNAL MEDICINE

## 2021-01-24 PROCEDURE — 94640 AIRWAY INHALATION TREATMENT: CPT

## 2021-01-24 PROCEDURE — 2000000000 HC ICU R&B

## 2021-01-24 PROCEDURE — 85025 COMPLETE CBC W/AUTO DIFF WBC: CPT

## 2021-01-24 PROCEDURE — C9113 INJ PANTOPRAZOLE SODIUM, VIA: HCPCS | Performed by: INTERNAL MEDICINE

## 2021-01-24 PROCEDURE — 2500000003 HC RX 250 WO HCPCS: Performed by: EMERGENCY MEDICINE

## 2021-01-24 PROCEDURE — 82962 GLUCOSE BLOOD TEST: CPT

## 2021-01-24 RX ORDER — INSULIN GLARGINE 100 [IU]/ML
10 INJECTION, SOLUTION SUBCUTANEOUS NIGHTLY
Status: DISCONTINUED | OUTPATIENT
Start: 2021-01-24 | End: 2021-01-26

## 2021-01-24 RX ADMIN — Medication 175 MCG/HR: at 11:33

## 2021-01-24 RX ADMIN — INSULIN LISPRO 3 UNITS: 100 INJECTION, SOLUTION INTRAVENOUS; SUBCUTANEOUS at 21:11

## 2021-01-24 RX ADMIN — INSULIN LISPRO 6 UNITS: 100 INJECTION, SOLUTION INTRAVENOUS; SUBCUTANEOUS at 08:33

## 2021-01-24 RX ADMIN — POTASSIUM & SODIUM PHOSPHATES POWDER PACK 280-160-250 MG 250 MG: 280-160-250 PACK at 08:31

## 2021-01-24 RX ADMIN — HYDROCORTISONE SODIUM SUCCINATE 100 MG: 100 INJECTION, POWDER, FOR SOLUTION INTRAMUSCULAR; INTRAVENOUS at 18:00

## 2021-01-24 RX ADMIN — ATORVASTATIN CALCIUM 20 MG: 20 TABLET, FILM COATED ORAL at 08:37

## 2021-01-24 RX ADMIN — IPRATROPIUM BROMIDE AND ALBUTEROL SULFATE 1 AMPULE: 2.5; .5 SOLUTION RESPIRATORY (INHALATION) at 12:19

## 2021-01-24 RX ADMIN — ENOXAPARIN SODIUM 40 MG: 40 INJECTION SUBCUTANEOUS at 08:30

## 2021-01-24 RX ADMIN — SENNOSIDES 8.6 MG: 8.6 TABLET, FILM COATED ORAL at 21:07

## 2021-01-24 RX ADMIN — SODIUM CHLORIDE: 9 INJECTION, SOLUTION INTRAVENOUS at 05:47

## 2021-01-24 RX ADMIN — ARFORMOTEROL TARTRATE 15 MCG: 15 SOLUTION RESPIRATORY (INHALATION) at 20:36

## 2021-01-24 RX ADMIN — SODIUM CHLORIDE, PRESERVATIVE FREE 10 ML: 5 INJECTION INTRAVENOUS at 08:32

## 2021-01-24 RX ADMIN — NYSTATIN AND TRIAMCINOLONE ACETONIDE: 100000; 1 CREAM TOPICAL at 13:05

## 2021-01-24 RX ADMIN — Medication 10 ML: at 08:32

## 2021-01-24 RX ADMIN — AMIODARONE HYDROCHLORIDE 100 MG: 200 TABLET ORAL at 08:28

## 2021-01-24 RX ADMIN — POTASSIUM BICARBONATE 40 MEQ: 782 TABLET, EFFERVESCENT ORAL at 08:32

## 2021-01-24 RX ADMIN — METOCLOPRAMIDE HYDROCHLORIDE 10 MG: 5 INJECTION INTRAMUSCULAR; INTRAVENOUS at 06:30

## 2021-01-24 RX ADMIN — Medication 150 MCG/HR: at 03:23

## 2021-01-24 RX ADMIN — METOCLOPRAMIDE HYDROCHLORIDE 10 MG: 5 INJECTION INTRAMUSCULAR; INTRAVENOUS at 02:33

## 2021-01-24 RX ADMIN — IPRATROPIUM BROMIDE AND ALBUTEROL SULFATE 1 AMPULE: 2.5; .5 SOLUTION RESPIRATORY (INHALATION) at 20:36

## 2021-01-24 RX ADMIN — Medication 200 MCG/HR: at 19:35

## 2021-01-24 RX ADMIN — Medication 10 ML: at 21:07

## 2021-01-24 RX ADMIN — INSULIN LISPRO 6 UNITS: 100 INJECTION, SOLUTION INTRAVENOUS; SUBCUTANEOUS at 17:07

## 2021-01-24 RX ADMIN — SODIUM CHLORIDE SOLN NEBU 3% 4 ML: 3 NEBU SOLN at 20:36

## 2021-01-24 RX ADMIN — HYDROCORTISONE SODIUM SUCCINATE 100 MG: 100 INJECTION, POWDER, FOR SOLUTION INTRAMUSCULAR; INTRAVENOUS at 02:32

## 2021-01-24 RX ADMIN — MEROPENEM 1000 MG: 1 INJECTION, POWDER, FOR SOLUTION INTRAVENOUS at 17:07

## 2021-01-24 RX ADMIN — METOCLOPRAMIDE HYDROCHLORIDE 10 MG: 5 INJECTION INTRAMUSCULAR; INTRAVENOUS at 18:35

## 2021-01-24 RX ADMIN — DORNASE ALFA 2.5 MG: 1 SOLUTION RESPIRATORY (INHALATION) at 20:37

## 2021-01-24 RX ADMIN — FENTANYL CITRATE 50 MCG: 50 INJECTION, SOLUTION INTRAMUSCULAR; INTRAVENOUS at 23:01

## 2021-01-24 RX ADMIN — SENNOSIDES 8.6 MG: 8.6 TABLET, FILM COATED ORAL at 08:30

## 2021-01-24 RX ADMIN — SODIUM CHLORIDE SOLN NEBU 3% 4 ML: 3 NEBU SOLN at 09:04

## 2021-01-24 RX ADMIN — MEROPENEM 1000 MG: 1 INJECTION, POWDER, FOR SOLUTION INTRAVENOUS at 08:30

## 2021-01-24 RX ADMIN — BUDESONIDE 500 MCG: 0.5 SUSPENSION RESPIRATORY (INHALATION) at 09:03

## 2021-01-24 RX ADMIN — LINEZOLID 600 MG: 600 INJECTION, SOLUTION INTRAVENOUS at 21:07

## 2021-01-24 RX ADMIN — INSULIN LISPRO 6 UNITS: 100 INJECTION, SOLUTION INTRAVENOUS; SUBCUTANEOUS at 11:33

## 2021-01-24 RX ADMIN — HYDROCORTISONE SODIUM SUCCINATE 100 MG: 100 INJECTION, POWDER, FOR SOLUTION INTRAMUSCULAR; INTRAVENOUS at 08:30

## 2021-01-24 RX ADMIN — NYSTATIN AND TRIAMCINOLONE ACETONIDE: 100000; 1 CREAM TOPICAL at 08:31

## 2021-01-24 RX ADMIN — LATANOPROST 1 DROP: 50 SOLUTION OPHTHALMIC at 08:31

## 2021-01-24 RX ADMIN — Medication 175 MCG/HR: at 19:01

## 2021-01-24 RX ADMIN — NYSTATIN AND TRIAMCINOLONE ACETONIDE: 100000; 1 CREAM TOPICAL at 21:08

## 2021-01-24 RX ADMIN — BUDESONIDE 500 MCG: 0.5 SUSPENSION RESPIRATORY (INHALATION) at 20:36

## 2021-01-24 RX ADMIN — LACTULOSE 20 G: 20 SOLUTION ORAL at 08:30

## 2021-01-24 RX ADMIN — IPRATROPIUM BROMIDE AND ALBUTEROL SULFATE 1 AMPULE: 2.5; .5 SOLUTION RESPIRATORY (INHALATION) at 17:15

## 2021-01-24 RX ADMIN — IPRATROPIUM BROMIDE AND ALBUTEROL SULFATE 1 AMPULE: 2.5; .5 SOLUTION RESPIRATORY (INHALATION) at 09:03

## 2021-01-24 RX ADMIN — PANTOPRAZOLE SODIUM 40 MG: 40 INJECTION, POWDER, FOR SOLUTION INTRAVENOUS at 08:30

## 2021-01-24 RX ADMIN — MEROPENEM 1000 MG: 1 INJECTION, POWDER, FOR SOLUTION INTRAVENOUS at 02:32

## 2021-01-24 RX ADMIN — ARFORMOTEROL TARTRATE 15 MCG: 15 SOLUTION RESPIRATORY (INHALATION) at 09:03

## 2021-01-24 RX ADMIN — NYSTATIN AND TRIAMCINOLONE ACETONIDE: 100000; 1 CREAM TOPICAL at 18:00

## 2021-01-24 RX ADMIN — Medication 20 MCG/MIN: at 05:46

## 2021-01-24 RX ADMIN — INSULIN GLARGINE 10 UNITS: 100 INJECTION, SOLUTION SUBCUTANEOUS at 21:08

## 2021-01-24 RX ADMIN — LINEZOLID 600 MG: 600 INJECTION, SOLUTION INTRAVENOUS at 08:39

## 2021-01-24 RX ADMIN — METOCLOPRAMIDE HYDROCHLORIDE 10 MG: 5 INJECTION INTRAMUSCULAR; INTRAVENOUS at 13:04

## 2021-01-24 ASSESSMENT — PULMONARY FUNCTION TESTS
PIF_VALUE: 23
PIF_VALUE: 21
PIF_VALUE: 22
PIF_VALUE: 22
PIF_VALUE: 30
PIF_VALUE: 24
PIF_VALUE: 26
PIF_VALUE: 22
PIF_VALUE: 25
PIF_VALUE: 24
PIF_VALUE: 23
PIF_VALUE: 26
PIF_VALUE: 24
PIF_VALUE: 24
PIF_VALUE: 21
PIF_VALUE: 26

## 2021-01-24 ASSESSMENT — PAIN SCALES - GENERAL: PAINLEVEL_OUTOF10: 0

## 2021-01-24 NOTE — PLAN OF CARE
Problem: Skin Integrity:  Goal: Will show no infection signs and symptoms  Description: Will show no infection signs and symptoms  Outcome: Met This Shift  Goal: Absence of new skin breakdown  Description: Absence of new skin breakdown  1/24/2021 0642 by Elsa Hough RN  Outcome: Met This Shift  1/23/2021 1905 by Paula Lizama RN  Outcome: Met This Shift     Problem: Falls - Risk of:  Goal: Will remain free from falls  Description: Will remain free from falls  1/24/2021 0642 by Elsa Hough RN  Outcome: Met This Shift  1/23/2021 1905 by Paula Lizama RN  Outcome: Met This Shift  Goal: Absence of physical injury  Description: Absence of physical injury  1/24/2021 3447 by Elsa Hough RN  Outcome: Met This Shift  1/23/2021 1905 by Paula Lizama RN  Outcome: Met This Shift

## 2021-01-24 NOTE — CONSULTS
[facility]  [address], [address]  [facility], [address] [place] [address]    Pulmonary Consultation      Reason for Consult:  sob    Requesting Physician:  No primary care provider on file. CHIEF COMPLAINT:  sob    History Obtained From:  electronic medical record    HISTORY OF PRESENT ILLNESS:                The patient is a 78 y.o. male with significant past medical history of pneumonia and COPD who presents with increased sob. Apparently vomited while on BIPAP and became unresponsive, intubated in ER and sent to ICU. Currently on AC18, IB=145, Fio2=40%, PEEP=5, RRtot=18, sedated and on IV pressors, antibiotics started by ID, suctioning of thick secretions mentioned in notes, not much reaction to stimulus. Had been recently discharged from [facility] where he completed his course of antibiotics from a previous hospitalization. Also had IVC filter on the 6th and PEG changed to jejunostomy tube on the 15th. He had also been intubated while at [facility] for respiratory failure and extubated after several days, still with issues with secretions, poor cough/pulmonary hygiene, awake but not very interactive, eventually sent to facility on [place].     Past Medical History:        Diagnosis Date    Acute deep vein thrombosis (DVT) of femoral vein of left lower extremity ([address] 1/5/2021    Anxiety     Benign localized hyperplasia of prostate with urinary obstruction     Bullous pemphigoid     Dementia ([address]     Diabetes mellitus ([address]     Enlarged prostate     Hx of blood clots     Hyperlipidemia     Hypertension     Paroxysmal atrial fibrillation ([address] 1/12/2019    Respiratory failure ([address] 12/2020    Rheumatoid disease ([address]        Past Surgical History:        Procedure Laterality Date    DOPPLER ECHOCARDIOGRAPHY N/A     FRACTURE SURGERY      HIP FRACTURE SURGERY Left 11/17/2019    HIP OPEN REDUCTION INTERNAL FIXATION performed by Jos Hernandez MD at [facility] OR    PICC LINE INSERTION NURSE  12/18/2020         ME EGD PERCUTANEOUS PLACEMENT GASTROSTOMY TUBE N/A 7/31/2018    EGD  PEG TUBE INSERTION,  (ENDO STAFF NEEDED) performed by Shayna Griggs MD at [facility] N/A 7/29/2018    EGD DIAGNOSTIC ONLY performed by Nehemias Shah MD at [facility] N/A 1/15/2021    EGD WITH CONVERSION TO J TUBE performed by Randy Teague MD at [address] [facility] N/A 1/6/2021    VENA CAVA FILTER INSERTION, RIGHT FEMORAL APPROACH performed by Maya Almeida MD at [facility] OR       Current Medications:    Current Facility-Administered Medications: insulin glargine (LANTUS) injection vial 10 Units, 10 Units, Subcutaneous, Nightly  dornase alpha (PULMOZYME) nebulizer solution 2.5 mg, 2.5 mg, Inhalation, Daily  phenylephrine (BIBI-SYNEPHRINE) 50 mg in dextrose 5 % 250 mL infusion,  mcg/min, Intravenous, Continuous  acetaminophen (TYLENOL) 160 MG/5ML solution 650 mg, 650 mg, Per G Tube, Q4H PRN  amiodarone (CORDARONE) tablet 100 mg, 100 mg, PEG Tube, Daily  atorvastatin (LIPITOR) tablet 20 mg, 20 mg, PEG Tube, Daily  docusate sodium (ENEMEEZ) enema 283 mg, 1 enema, Rectal, PRN  glucose (GLUTOSE) 40 % oral gel 15 g, 15 g, Oral, PRN  dextrose 50 % IV solution, 12.5 g, Intravenous, PRN  glucagon (rDNA) injection 1 mg, 1 mg, Intramuscular, PRN  dextrose 5 % solution, 100 mL/hr, Intravenous, PRN  lactulose (CHRONULAC) 10 GM/15ML solution 20 g, 20 g, PEG Tube, Daily  latanoprost (XALATAN) 0.005 % ophthalmic solution 1 drop, 1 drop, Both Eyes, Daily  metoclopramide (REGLAN) injection 10 mg, 10 mg, Intravenous, Q6H  nystatin-triamcinolone (MYCOLOG II) cream, , Topical, 4x Daily  pantoprazole (PROTONIX) injection 40 mg, 40 mg, Intravenous, Daily **AND** sodium chloride (PF) 0.9 % injection 10 mL, 10 mL, Intravenous, Daily  potassium & sodium phosphates (PHOS-NAK) 280-160-250 MG packet 250 mg, 1 packet, Per G Tube, Daily  potassium bicarb-citric acid (EFFER-K) effervescent tablet 40 mEq, 40 mEq, Per G Tube, Daily  senna (SENOKOT) tablet 8.6 mg, 1 tablet, PEG Tube, BID  sodium chloride (Inhalant) 3 % nebulizer solution 4 mL, 4 mL, Nebulization, BID  sodium chloride flush 0.9 % injection 10 mL, 10 mL, Intravenous, 2 times per day  sodium chloride flush 0.9 % injection 10 mL, 10 mL, Intravenous, PRN  enoxaparin (LOVENOX) injection 40 mg, 40 mg, Subcutaneous, Daily  promethazine (PHENERGAN) tablet 12.5 mg, 12.5 mg, Oral, Q6H PRN **OR** ondansetron (ZOFRAN) injection 4 mg, 4 mg, Intravenous, Q6H PRN  polyethylene glycol (GLYCOLAX) packet 17 g, 17 g, Oral, Daily PRN  acetaminophen (TYLENOL) tablet 650 mg, 650 mg, Oral, Q6H PRN **OR** acetaminophen (TYLENOL) suppository 650 mg, 650 mg, Rectal, Q6H PRN  0.9 % sodium chloride infusion, , Intravenous, Continuous  0.9 % sodium chloride infusion, , Intravenous, Q8H  meropenem (MERREM) 1,000 mg in sodium chloride 0.9 % 100 mL IVPB (mini-bag), 1,000 mg, Intravenous, Q8H  linezolid (ZYVOX) IVPB 600 mg, 600 mg, Intravenous, Q12H  hydrocortisone sodium succinate PF (SOLU-CORTEF) injection 100 mg, 100 mg, Intravenous, Q8H  budesonide (PULMICORT) nebulizer suspension 500 mcg, 500 mcg, Nebulization, BID  Arformoterol Tartrate (BROVANA) nebulizer solution 15 mcg, 15 mcg, Nebulization, BID  insulin lispro (HUMALOG) injection vial 0-18 Units, 0-18 Units, Subcutaneous, TID WC  insulin lispro (HUMALOG) injection vial 0-9 Units, 0-9 Units, Subcutaneous, Nightly  ipratropium-albuterol (DUONEB) nebulizer solution 1 ampule, 1 ampule, Inhalation, Q4H WA  0.9 % sodium chloride infusion, , Intravenous, Continuous  fentaNYL 5 mcg/ml in 0.9%  ml infusion, 12.5-200 mcg/hr, Intravenous, Continuous  fentaNYL (SUBLIMAZE) injection 50 mcg, 50 mcg, Intravenous, Q1H PRN  norepinephrine (LEVOPHED) 16 mg in dextrose 5% 250 mL infusion, 2-100 mcg/min, Intravenous, Continuous    Allergies:  Imuran [azathioprine], Doxycycline, and Levaquin [levofloxacin]    Social History:    TOBACCO:   reports that he quit smoking about 14 years ago. His smoking use included cigarettes. He started smoking about 65 years ago. He has a 100.00 pack-year smoking history. He has never used smokeless tobacco.    Family History:       Problem Relation Age of Onset    Diabetes Mother     Kidney Disease Mother     Cancer Father         esophagus    Cancer Sister        REVIEW OF SYSTEMS:    Review of systems not obtained due to patient factors - intubation and mental status  Remainder of complete ROS is negative. PHYSICAL EXAM:      Vitals:    BP (!) 95/47   Pulse 71   Temp 97.9 °F (36.6 °C) (Bladder)   Resp 18   Ht 6' (1.829 m)   Wt 255 lb (115.7 kg)   SpO2 100%   BMI 34.58 kg/m²     CONSTITUTIONAL:  obese  EYES:  Lids and lashes normal, pupils equal, round and reactive to light, extra ocular muscles intact, sclera clear, conjunctiva normal  ENT:  (+) ETT  NECK:  Supple, symmetrical, trachea midline, no adenopathy, thyroid symmetric, not enlarged and no tenderness, skin normal  LUNGS:  Bilateral ronchi  CARDIOVASCULAR:  Normal apical impulse, regular rate and rhythm, normal S1 and S2, no S3 or S4, and no murmur noted  ABDOMEN:  (+) jejunostomy tube  MUSCULOSKELETAL:  Bipedal edema  NEUROLOGIC:  Sedated, not much reaction to stimulus    DATA:    CBC:   Recent Labs     01/22/21 2112 01/24/21  0450   WBC 7.3 12.7*   HGB 7.6* 8.9*   HCT 25.8* 28.9*   MCV 92.8 88.1    311       BMP:  Recent Labs     01/22/21 2112 01/23/21  0643 01/24/21  0450    135 138   K 3.4* 3.9 3.6   CL 99 95* 99   CO2 33* 30* 28   BUN 29* 28* 21   CREATININE 0.9 0.9 0.8    ALB:3,BILIDIR:3,BILITOT:3,ALKPHOS:3)@    PT/INR: No results for input(s): PROTIME, INR in the last 72 hours.     ABG:   Recent Labs     01/24/21  0516   PH 7.470*   PO2 96.6   PCO2 38.8   HCO3 27.6*   BE 3.7*   O2SAT 97.7   METHB 0.3   O2HB 95.8   COHB 1.6*   O2CON 13.1 HHB 2.3   THB 9.6*     FiO2 : 40 %  I:E Ratio: 1:2.70    Radiology Review:  CXR reviewed with increased patchy opacity right lower lung field, possible opacity/atelectasis retrocardiac area    IMPRESSION/RECOMMENDATIONS:      Aspiration pneumonia  COPD  Hypoxia    1. Cont with current vent settings, wean when more stable  2. Suction as needed, observe amount of secretions  3. Antibiotics as per ID  4. Has been intubated a few times already, family still persistent with full code status, may have to consider tracheostomy in future which I believe they were ok with when the subject came up at [facility]  5. Other issues as per Intensivist        Time at the bedside, reviewing labs and radiographs, reviewing notes and consultations, discussing with staff and family was more than 50 minutes. Thanks for letting us see this patient in consultation. Please contact us with any questions. Office [phone] or after hours through [employer], [phone].

## 2021-01-24 NOTE — PROGRESS NOTES
PT SEEN AND EXAMINED. Chart reviewed. meds reviewed. D/w nursing + family as available. EXAM: IN GENERAL, NAD. on vent. ROS NEGx10 EXCEPT:   BP (!) 112/48   Pulse 63   Temp 98.8 °F (37.1 °C)   Resp 18   Ht 6' (1.829 m)   Wt 255 lb (115.7 kg)   SpO2 96%   BMI 34.58 kg/m²   GEN: sedated NAD. HEENT: NCAT. EOMI. VIRGIE  NECK: NO JVD. TRACH MIDLINE. NO BRUITS. NO THYROMEGALY. LUNGS: CTA BL NO RALES, RHONCHI OR WHEEZES. GOOD EXCURSION. CV: Regular rate and rhythm, NO Murmurs, Rubs, Or gallops  ABD: Soft. Nontender. Normal bowel sounds.  No organomegaly  EXT:No clubbing cyanosis or edema  Neuro: sedated   Labs/data reviewedLABS: CBC with Differential:    Lab Results   Component Value Date    WBC 12.7 01/24/2021    RBC 3.28 01/24/2021    HGB 8.9 01/24/2021    HCT 28.9 01/24/2021     01/24/2021    MCV 88.1 01/24/2021    MCH 27.1 01/24/2021    MCHC 30.8 01/24/2021    RDW 18.8 01/24/2021    NRBC 0.9 01/08/2021    SEGSPCT 64 09/27/2013    METASPCT 6.0 01/06/2021    LYMPHOPCT 9.3 01/24/2021    PROMYELOPCT 0.9 12/21/2020    MONOPCT 3.4 01/24/2021    MYELOPCT 3.0 01/06/2021    BASOPCT 0.2 01/24/2021    MONOSABS 0.43 01/24/2021    LYMPHSABS 1.18 01/24/2021    EOSABS 0.00 01/24/2021    BASOSABS 0.02 01/24/2021     Platelets:    Lab Results   Component Value Date     01/24/2021     CMP:    Lab Results   Component Value Date     01/24/2021    K 3.6 01/24/2021    CL 99 01/24/2021    CO2 28 01/24/2021    BUN 21 01/24/2021    CREATININE 0.8 01/24/2021    GFRAA >60 01/24/2021    LABGLOM >60 01/24/2021    GLUCOSE 241 01/24/2021    GLUCOSE 138 03/26/2011    PROT 4.3 01/22/2021    LABALBU 2.0 01/22/2021    LABALBU 4.0 03/26/2011    CALCIUM 7.4 01/24/2021    BILITOT 0.3 01/22/2021    ALKPHOS 123 01/22/2021    AST 18 01/22/2021    ALT 8 01/22/2021     Magnesium:    Lab Results   Component Value Date    MG 1.1 01/23/2021     LDH:  No results found for: LDH  PT/INR:    Lab Results   Component Value Date    PROTIME 14.1 01/15/2021    INR 1.2 01/15/2021     Last 3 Troponin:    Lab Results   Component Value Date    TROPONINI 0.09 01/23/2021    TROPONINI 0.11 01/23/2021    TROPONINI 0.12 01/23/2021     ABG:    Lab Results   Component Value Date    PH 7.470 01/24/2021    PCO2 38.8 01/24/2021    PO2 96.6 01/24/2021    HCO3 27.6 01/24/2021    BE 3.7 01/24/2021    O2SAT 97.7 01/24/2021     IRON:    Lab Results   Component Value Date    IRON 60 12/31/2020     IMAGING    Ct Abdomen Pelvis Wo Contrast Additional Contrast? Oral    Result Date: 12/30/2020  EXAMINATION: CT OF THE ABDOMEN AND PELVIS WITHOUT CONTRAST 12/30/2020 3:30 pm TECHNIQUE: CT of the abdomen and pelvis was performed without the administration of intravenous contrast. Multiplanar reformatted images are provided for review. Dose modulation, iterative reconstruction, and/or weight based adjustment of the mA/kV was utilized to reduce the radiation dose to as low as reasonably achievable. COMPARISON: CT abdomen and pelvis, 12/17/2028. HISTORY: ORDERING SYSTEM PROVIDED HISTORY: sbo TECHNOLOGIST PROVIDED HISTORY: Reason for Exam:->PO CONTRAST-- SBO??? Height:182.9cm Weight:116.2kg Reason for exam:->sbo Additional Contrast?->Oral FINDINGS: Lower Chest: Pulmonary consolidations are seen in the lower lungs. Compressive atelectasis is seen in the lower lobes, overlying small bilateral pleural effusions. The heart is normal in size. Prominent calcified coronary atherosclerosis is seen. Organs: Liver: Unremarkable. Gallbladder: Unremarkable. Pancreas: Moderately atrophic. Otherwise, unremarkable. Spleen:  Unremarkable. Adrenals: Unremarkable. Kidneys: Hypodense lesions in the kidneys bilaterally are incompletely evaluated. The likely represent cysts. 4 mm nonobstructive calculus seen within an inferior pole calyx of the right kidney. No ureterolithiasis or hydronephrosis. GI/Bowel: Liquid stool in the colon indicates a diarrheal illness.   No bowel wall thickening or the coronary arteries. Calcification of the thoracic aorta. Endotracheal tube is in satisfactory position . Aida Libman Bones/Soft tissues:  No definite suspicious lytic or blastic foci. Upper abdomen: Visualized aspects of the upper abdomen are unremarkable. NG tube and peg  tube are in satisfactory position    Multifocal areas of consolidation of the lungs with associated areas of ground-glass densities are highly concerning for an underlying infectious/inflammatory condition. Moderate bilateral pleural effusion and thickening along the bronchovascular bundles are likely suggestive of underlying pulmonary vascular congestion/pulmonary edema. NG tube and peg  tube and endotracheal tube are in satisfactory position. Xr Chest Portable    Stable positioning endotracheal tube. Slight increase in the patchy airspace opacities since the prior study. Findings are nonspecific and may be basis of multifocal pulmonary edema. Xr Chest Portable    Result Date: 1/22/2021  EXAMINATION: ONE SUPINE XRAY VIEW(S) OF THE ABDOMEN; ONE XRAY VIEW OF THE CHEST 1/22/2021 9:47 pm COMPARISON: Chest series from January 20, 2021 HISTORY: ORDERING SYSTEM PROVIDED HISTORY: Confirmation of course of NG/OG/NE tube and location of tip of tube TECHNOLOGIST PROVIDED HISTORY: Reason for exam:->Confirmation of course of NG/OG/NE tube and location of tip of tube Portable? ->Yes What reading provider will be dictating this exam?->CRC FINDINGS: Chest: Endotracheal tube terminates in the mid to distal thoracic trachea approximately 2.5 cm above the emmanuel. Previous left upper extremity PICC line is not seen on this exam. Low lung volumes and coarse interstitial markings. Stable interstitial opacities. No new airspace disease, pleural effusions, or pneumothoraces. Stable atherosclerotic disease and cardiomegaly. Abdomen: Enteric tube extends subdiaphragmatic with distal tip and side port projecting over the left upper quadrant. IVC filter.  There appears to be a GJ feeding tube. Bowel gas pattern in the upper abdomen is unremarkable. 1.  Satisfactory position of endotracheal tube. Cardiopulmonary findings are stable. 2.  Satisfactory position of enteric tube. Xr Chest Portable    Result Date: 1/20/2021  EXAMINATION: ONE XRAY VIEW OF THE CHEST 1/20/2021 7:34 am COMPARISON: 01/13/2021 HISTORY: ORDERING SYSTEM PROVIDED HISTORY: pneumonia, hypoxia TECHNOLOGIST PROVIDED HISTORY: Reason for Exam:->pneumonia, hypoxia Portable? ->Yes Height:182.9cm Weight:116.2kg FINDINGS: The heart is enlarged. Note is made of a left-sided PICC line. Diffuse multifocal bilateral airspace disease is noted. The airspace disease has progressed when compared with the patient's prior study. Worsening of the multifocal bilateral airspace disease. Cardiomegaly. Xr Chest Portable    Result Date: 1/6/2021  EXAMINATION: ONE XRAY VIEW OF THE CHEST 1/6/2021 7:53 am COMPARISON: Comparison is dated January 5, 2021 HISTORY: ORDERING SYSTEM PROVIDED HISTORY: picc placement TECHNOLOGIST PROVIDED HISTORY: Reason for Exam:->picc placement Portable? ->Yes Height:182.9cm Weight:121.7kg FINDINGS: ET tube remains in satisfactory position. Bilateral pulmonary infiltrates are unchanged. No pleural effusion identified. There is a right PICC line with tip at the SVC. There is a left PICC line with tip at the junction of the brachiocephalic and SVC. Cardiac and mediastinal contours do not appear changed. ET and other life support devices unchanged Bilateral pulmonary interstitial infiltrates unchanged    Xr Chest Portable    Result Date: 1/5/2021  EXAMINATION: ONE XRAY VIEW OF THE CHEST 1/5/2021 6:42 am COMPARISON: 01/02/2021 HISTORY: ORDERING SYSTEM PROVIDED HISTORY: pneumonia, vent/ETT TECHNOLOGIST PROVIDED HISTORY: Tomorrow am Reason for Exam:->pneumonia, vent/ETT Height:182.9cm Weight:120.4kg FINDINGS: Endotracheal tube tip is 2.4 cm above the emmanuel. Bilateral PICC lines are unchanged.   There is pulmonary vascular congestion with alveolar and interstitial infiltrate which is suggestive of edema. There is mildly improved aeration in the left lower lobe. Findings are otherwise similar to previous. There is likely a small left pleural effusion. Cardiomegaly is unchanged. Congestion and infiltrates suggestive of CHF. Aeration in the left lower lobe is mildly improved although findings are otherwise similar to prior. Xr Chest Portable    Result Date: 1/2/2021  EXAMINATION: ONE XRAY VIEW OF THE CHEST portable upright 1/2/2021 6:59 am COMPARISON: 01/01/2021 HISTORY: ORDERING SYSTEM PROVIDED HISTORY: vent, COVID TECHNOLOGIST PROVIDED HISTORY: Tomorrow am Reason for Exam:->estevan COVID Height:182.9cm Weight:119.8kg FINDINGS: Somewhat limited detail secondary to portable technique and large body habitus. Stable position of the endotracheal tube and right-sided PICC line. Cardiomegaly with small bilateral pleural effusions, larger on the left. Bilateral airspace opacities without significant change compatible with pulmonary edema. Superimposed pneumonia not excluded. No significant change. CHF pattern with cardiomegaly, pulmonary edema, and small pleural effusions. Superimposed pneumonia not excluded. Xr Chest Portable    Result Date: 1/1/2021  EXAMINATION: ONE XRAY VIEW OF THE CHEST 12/31/2020 2:01 pm COMPARISON: 12/30/2020 HISTORY: ORDERING SYSTEM PROVIDED HISTORY: estevan pneumonia TECHNOLOGIST PROVIDED HISTORY: Tomorrow am Reason for Exam:->vent, pneumonia Portable? ->Yes Height:182.9cm Weight:116.2kg FINDINGS: There is stable position of the support lines and tubes. There is no interval change in extensive multifocal bilateral airspace disease. The heart is enlarged. There is no gross pleural effusion. No interval change in extensive multifocal bilateral airspace disease.     Xr Chest Portable    Result Date: 12/30/2020  EXAMINATION: ONE XRAY VIEW OF THE CHEST 12/30/2020 7:01 am COMPARISON: 12/29/2020 HISTORY: ORDERING SYSTEM PROVIDED HISTORY: intubated, vent TECHNOLOGIST PROVIDED HISTORY: Tomorrow am Reason for Exam:->intubated, vent Portable? ->Yes Height:182.9cm [id number] FINDINGS: Bilateral interstitial and alveolar infiltrates, particularly in left lower lobe are unchanged. Heart remains moderately enlarged. Lines/tubes are appropriate and unchanged. No interval change    Xr Chest Portable    Addendum Date: 12/29/2020    ADDENDUM: The chest x-ray as stated was compared with the patient's earlier study obtained less than 1 hour earlier. The chest x-ray stated support lines and tubes were unchanged in position. That includes the PICC line. The PICC line tip is at the SVC right atrial junction. Result Date: 12/29/2020  EXAMINATION: ONE XRAY VIEW OF THE CHEST 12/29/2020 11:05 am COMPARISON: None. HISTORY: ORDERING SYSTEM PROVIDED HISTORY: pull back picc line- reverify TECHNOLOGIST PROVIDED HISTORY: Reason for Exam:->pull back picc line- reverify Portable? ->Yes Height:182.9cm [id number] FINDINGS: The heart is enlarged. Extensive multifocal bilateral airspace disease is noted. The support lines and tubes are unchanged in position. There is no gross pleural effusion. .    1. No interval change in extensive multifocal bilateral airspace disease. Xr Chest Portable    Result Date: 12/29/2020  EXAMINATION: ONE XRAY VIEW OF THE CHEST 12/29/2020 9:33 am COMPARISON: Same date 03/01 3 hours HISTORY: ORDERING SYSTEM PROVIDED HISTORY: picc placement TECHNOLOGIST PROVIDED HISTORY: Reason for Exam:->picc placement Portable? ->Yes Height:182.9cm [id number] FINDINGS: Bilateral interstitial scarring/infiltrates are unchanged with suggestion of left lower lobe infiltrate. ET tube is appropriate. There has been placement of right-sided PICC line with the tip in the right atrium.     No interval change in the appearance of the chest    Xr Chest Portable    Result Date: 12/29/2020  EXAMINATION: ONE XRAY VIEW OF THE CHEST 12/29/2020 3:32 am COMPARISON: 12/24/2020 HISTORY: ORDERING SYSTEM PROVIDED HISTORY: et placement TECHNOLOGIST PROVIDED HISTORY: Reason for Exam:->et placement Portable? ->Yes Height:182.9cm Weight:114.3kg FINDINGS: ETT tip 3.3 cm above the emmanuel. Heart size is enlarged, stable. Thoracic aortic atherosclerotic disease. Hypoinflated lungs. Diffuse interstitial opacities. No significant pleural effusion or pneumothorax. 1. ETT tip 3.3 cm above the emmanuel. 2. Diffuse bilateral interstitial opacities may represent pulmonary edema and/or multifocal pneumonia. Xr Chest 1 View    Result Date: 1/13/2021  EXAMINATION: ONE XRAY VIEW OF THE CHEST 1/13/2021 3:50 pm COMPARISON: Comparison studies of January 2nd of January 8 HISTORY: ORDERING SYSTEM PROVIDED HISTORY: Aspiration into airway, initial encounter TECHNOLOGIST PROVIDED HISTORY: Reason for exam:->possible aspiration FINDINGS: Heart is enlarged. The bilateral infiltrates are seen predominant observed in the right parahilar region. No size pleural effusions are seen. There is no katja signs of perihilar vascular congestion all to mild component of perihilar vascular congestion cannot be excluded. The endotracheal tube has been removed since the previous study of January 8. Cardiomegaly, persistent bilateral infiltrates. .    Us Dup Upper Extremity Right Venous    Result Date: 12/27/2020  EXAMINATION: DUPLEX ULTRASOUND OF THE RIGHT UPPER EXTREMITY FOR DVT, 12/27/2020 7:27 pm TECHNIQUE: Duplex ultrasound and Doppler images were obtained of the deep venous structures of the upper extremity. COMPARISON: None. HISTORY: ORDERING SYSTEM PROVIDED HISTORY: swelling TECHNOLOGIST PROVIDED HISTORY: Reason for Exam:->swelling Portable? ->Yes Height:182.9cm Weight:114.6kg What reading provider will be dictating this exam?->CRC FINDINGS: Limitations: Cephalic vein, radial vein, and ulnar veins not visualized.  There is normal flow and compressibility of the visualized venous structures of the right upper extremity. There is no evidence of echogenic thrombus. No evidence of DVT in the right upper extremity given the limitations detailed above. Us Dup Upper Extremity Left Venous    Addendum Date: 1/22/2021    ADDENDUM: History-left arm PICC line edema, swelling    Result Date: 1/22/2021  EXAMINATION: VENOUS ULTRASOUND OF THE LEFT UPPER EXTREMITY, 1/3/2021 7:08 pm TECHNIQUE: Duplex ultrasound and Doppler images were obtained of the deep venous structures of the upper extremity. COMPARISON: None. HISTORY: ORDERING SYSTEM PROVIDED HISTORY: r/o DVT TECHNOLOGIST PROVIDED HISTORY: Reason for Exam:->r/o DVT Height:182.9cm Weight:121kg What reading provider will be dictating this exam?->CRC FINDINGS: There is normal flow and compressibility of the visualized venous structures of left upper extremity. PICC line visualized as. There is no evidence of echogenic thrombus. No evidence of DVT. Us Dup Lower Extremity Left Can    Result Date: 1/3/2021  EXAMINATION: DUPLEX VENOUS ULTRASOUND OF THE LEFT LOWER EXTREMITY 1/3/2021 7:07 pm TECHNIQUE: Duplex ultrasound and Doppler images were obtained of the left lower extremity. COMPARISON: December 26, 2020 HISTORY: ORDERING SYSTEM PROVIDED HISTORY: pain and swelling TECHNOLOGIST PROVIDED HISTORY: Reason for Exam:->only need to do left leg and for redness and swelling Portable? ->Yes Reason for portable exam:->vented Height:182.9cm Weight:121kg Reason for exam:->pain and swelling What reading provider will be dictating this exam?->CRC FINDINGS: There is non compressibility associated with the distal left common femoral vein and proximal left superficial femoral vein. Doppler blood flow is demonstrated. Remaining veins of left lower extremity show normal compressibility and Doppler blood flow. Calf veins are limited due to overlying edema.     Positive examination for nonocclusive deep vein thrombosis and side port projecting over the left upper quadrant. IVC filter. There appears to be a GJ feeding tube. Bowel gas pattern in the upper abdomen is unremarkable. 1.  Satisfactory position of endotracheal tube. Cardiopulmonary findings are stable. 2.  Satisfactory position of enteric tube. Fluoro For Surgical Procedures    Result Date: 1/6/2021  EXAMINATION: SPOT FLUOROSCOPIC IMAGES 1/6/2021 1:24 pm TECHNIQUE: Fluoroscopy was provided by the radiology department for procedure. Radiologist was not present during examination. FLUOROSCOPY DOSE AND TYPE OR TIME AND EXPOSURES: Images: 5 set of cine fluoro images obtained Fluoro time: 1.7 minutes TD: 39.8 M Gy DAP: 1.17 image Gy meter square COMPARISON: None HISTORY: ORDERING SYSTEM PROVIDED HISTORY: vena cava filter TECHNOLOGIST PROVIDED HISTORY: Reason for exam:->vena cava filter Intraprocedural imaging. FINDINGS: 5 set of seen fluoro images being spot images of the abdomen were obtained. Fluoroscopy provided for deployment of an IVC filter. Intraprocedural fluoroscopic spot images as above. See separate procedure report for more information.       DIET:  Diet NPO Effective Now    Medications:    Scheduled Meds:   amiodarone  100 mg PEG Tube Daily    atorvastatin  20 mg PEG Tube Daily    lactulose  20 g PEG Tube Daily    latanoprost  1 drop Both Eyes Daily    metoclopramide  10 mg Intravenous Q6H    nystatin-triamcinolone   Topical 4x Daily    pantoprazole  40 mg Intravenous Daily    And    sodium chloride (PF)  10 mL Intravenous Daily    potassium & sodium phosphates  1 packet Per G Tube Daily    potassium bicarb-citric acid  40 mEq Per G Tube Daily    senna  1 tablet PEG Tube BID    sodium chloride (Inhalant)  4 mL Nebulization BID    sodium chloride flush  10 mL Intravenous 2 times per day    enoxaparin  40 mg Subcutaneous Daily    meropenem  1,000 mg Intravenous Q8H    linezolid  600 mg Intravenous Q12H    hydrocortisone sodium succinate PF 100 mg Intravenous Q8H    budesonide  500 mcg Nebulization BID    Arformoterol Tartrate  15 mcg Nebulization BID    insulin lispro  0-18 Units Subcutaneous TID WC    insulin lispro  0-9 Units Subcutaneous Nightly    ipratropium-albuterol  1 ampule Inhalation Q4H WA       Continuous Infusions:   phenylephrine (BIBI-SYNEPHRINE) 50mg/250mL infusion Stopped (01/23/21 1217)    dextrose      sodium chloride 100 mL/hr at 01/24/21 0547    sodium chloride      sodium chloride 100 mL/hr at 01/22/21 2302    fentaNYL 5 mcg/ml in 0.9%  ml infusion 175 mcg/hr (01/24/21 0628)    norepinephrine 15 mcg/min (01/24/21 0702)       PRN Meds:acetaminophen, docusate sodium, glucose, dextrose, glucagon (rDNA), dextrose, sodium chloride flush, promethazine **OR** ondansetron, polyethylene glycol, acetaminophen **OR** acetaminophen, fentanNYL    A/P:      Patient Active Problem List   Diagnosis    Severe sepsis with septic shock (HCC)    Dementia (HCC)    Metabolic encephalopathy    Diabetes mellitus type 2, uncontrolled ([address]    Hyperlipidemia LDL goal <100    Essential hypertension    Venous ulcer of left leg (HCC)    Non-pressure chronic ulcer of left lower leg with fat layer exposed ([address]    Non-pressure chronic ulcer left lower leg, limited to breakdown skin (HCC)    Severe protein-calorie malnutrition (HCC)    Non-pressure chronic ulcer right lower leg, limited to breakdown skin (HCC)    Pressure injury of contiguous region involving back, buttock, and hip, stage 2 (HCC)    Pressure injury of calf, stage 2 ([address]    HCAP (healthcare-associated pneumonia)    Paroxysmal atrial fibrillation (HCC)    Acute respiratory failure with hypoxia (HCC)    Acute deep vein thrombosis (DVT) of femoral vein of left lower extremity (HCC)    Leg swelling    Respiratory failure (HCC)   ·  Pseudomonas bacteriuria    · HCAP / Aspiration pneumonia  ( hx of MRSA / Pseudomonas and ESBL  Klebsiella )   · Acute respiratory failure - intubated   Elevated procalcitonin     PLAN:  · Meropenem 1 gram IV q 8 hrs, Linezolid 600 mg IV q 12 hrs   · Blood cx, sputum cx   · Contact isolation   · Vent support   I can be reached though [facility] or [facility] [place] at [phone]

## 2021-01-24 NOTE — PROGRESS NOTES
[address] [place] [address] [facility]  Progress  Note   C/C : pneumonia, resp failure,     Pt is intubated, sedated  No fever     Current Facility-Administered Medications   Medication Dose Route Frequency Provider Last Rate Last Admin    phenylephrine (BIBI-SYNEPHRINE) 50 mg in dextrose 5 % 250 mL infusion   mcg/min Intravenous Continuous Dejon Anguiano MD   Stopped at 01/23/21 1217    acetaminophen (TYLENOL) 160 MG/5ML solution 650 mg  650 mg Per G Tube Q4H PRN Philly Spann MD   650 mg at 01/23/21 0727    amiodarone (CORDARONE) tablet 100 mg  100 mg PEG Tube Daily Philly Spann MD   100 mg at 01/24/21 0828    atorvastatin (LIPITOR) tablet 20 mg  20 mg PEG Tube Daily Philly Spann MD   20 mg at 01/23/21 0842    docusate sodium (ENEMEEZ) enema 283 mg  1 enema Rectal PRN Philly Spann MD        glucose (GLUTOSE) 40 % oral gel 15 g  15 g Oral PRN Philly Spann MD        dextrose 50 % IV solution  12.5 g Intravenous PRN Philly Spann MD        glucagon (rDNA) injection 1 mg  1 mg Intramuscular PRN Philly Spann MD        dextrose 5 % solution  100 mL/hr Intravenous PRN Philly Spann MD        lactulose (CHRONULAC) 10 GM/15ML solution 20 g  20 g PEG Tube Daily Felix Hughes MD   20 g at 01/24/21 0830    latanoprost (XALATAN) 0.005 % ophthalmic solution 1 drop  1 drop Both Eyes Daily Philly Spann MD   1 drop at 01/24/21 0831    metoclopramide (REGLAN) injection 10 mg  10 mg Intravenous Q6H Felix Hughes MD   10 mg at 01/24/21 0630    nystatin-triamcinolone (MYCOLOG II) cream   Topical 4x Daily Philly Spann MD   Given at 01/24/21 0831    pantoprazole (PROTONIX) injection 40 mg  40 mg Intravenous Daily Philly Spann MD   40 mg at 01/24/21 0830    And    sodium chloride (PF) 0.9 % injection 10 mL  10 mL Intravenous Daily Philly Spann MD   10 mL at 01/24/21 0832    potassium & sodium phosphates (PHOS-NAK) 280-160-250 MG packet 250 mg  1 packet Per G Tube Daily Felix GRECO MD Ellen   250 mg at 01/24/21 0831    potassium bicarb-citric acid (EFFER-K) effervescent tablet 40 mEq  40 mEq Per G Tube Daily Ping David MD   40 mEq at 01/24/21 0832    senna (SENOKOT) tablet 8.6 mg  1 tablet PEG Tube BID Ping David MD   8.6 mg at 01/24/21 0830    sodium chloride (Inhalant) 3 % nebulizer solution 4 mL  4 mL Nebulization BID Ping David MD   4 mL at 01/23/21 2033    sodium chloride flush 0.9 % injection 10 mL  10 mL Intravenous 2 times per day Ping David MD   10 mL at 01/24/21 1485    sodium chloride flush 0.9 % injection 10 mL  10 mL Intravenous PRN Ping David MD        enoxaparin (LOVENOX) injection 40 mg  40 mg Subcutaneous Daily Ping David MD   40 mg at 01/24/21 0830    promethazine (PHENERGAN) tablet 12.5 mg  12.5 mg Oral Q6H PRN Ping David MD        Or    ondansetron (ZOFRAN) injection 4 mg  4 mg Intravenous Q6H PRN Ping David MD        polyethylene glycol (GLYCOLAX) packet 17 g  17 g Oral Daily PRN Ping David MD        acetaminophen (TYLENOL) tablet 650 mg  650 mg Oral Q6H PRN Ping David MD        Or    acetaminophen (TYLENOL) suppository 650 mg  650 mg Rectal Q6H PRN Ping David MD        0.9 % sodium chloride infusion   Intravenous Continuous Ping David  mL/hr at 01/24/21 0547 New Bag at 01/24/21 0547    0.9 % sodium chloride infusion   Intravenous Q8H Felix Hughes MD        meropenem (MERREM) 1,000 mg in sodium chloride 0.9 % 100 mL IVPB (mini-bag)  1,000 mg Intravenous Q8H Ruben TINA Espinoza  mL/hr at 01/24/21 0830 1,000 mg at 01/24/21 0830    linezolid (ZYVOX) IVPB 600 mg  600 mg Intravenous Q12H Ruben P MD Olga   Stopped at 01/24/21 0004    hydrocortisone sodium succinate PF (SOLU-CORTEF) injection 100 mg  100 mg Intravenous Q8H Lynne Daub, MD   100 mg at 01/24/21 0830    budesonide (PULMICORT) nebulizer suspension 500 mcg  500 mcg Nebulization BID Lynne Graves MD   500 mcg at 01/23/21 2033    Arformoterol Tartrate (BROVANA) nebulizer solution 15 mcg  15 mcg Nebulization BID Shane Sanders MD   15 mcg at 01/23/21 2032    insulin lispro (HUMALOG) injection vial 0-18 Units  0-18 Units Subcutaneous TID [facility] Shane Sadners MD   6 Units at 01/24/21 5409    insulin lispro (HUMALOG) injection vial 0-9 Units  0-9 Units Subcutaneous Nightly Shane Sanders MD   5 Units at 01/23/21 2055    ipratropium-albuterol (DUONEB) nebulizer solution 1 ampule  1 ampule Inhalation Q4H WA Shane Sanders MD        0.9 % sodium chloride infusion   Intravenous Continuous Agapito Bledsoe  mL/hr at 01/22/21 2302 New Bag at 01/22/21 2302    fentaNYL 5 mcg/ml in 0.9%  ml infusion  12.5-200 mcg/hr Intravenous Continuous Agapito Bledsoe MD 35 mL/hr at 01/24/21 0628 175 mcg/hr at 01/24/21 0628    fentaNYL (SUBLIMAZE) injection 50 mcg  50 mcg Intravenous Q1H PRN Agapito Bledsoe MD   50 mcg at 01/22/21 2222    norepinephrine (LEVOPHED) 16 mg in dextrose 5% 250 mL infusion  2-100 mcg/min Intravenous Continuous Agapito Bledsoe MD 14.1 mL/hr at 01/24/21 0702 15 mcg/min at 01/24/21 0702       REVIEW OF SYSTEMS: could not be obtained       PHYSICAL EXAM:    Vitals:   BP (!) 155/69   Pulse 71   Temp 98.8 °F (37.1 °C) (Bladder)   Resp 18   Ht 6' (1.829 m)   Wt 255 lb (115.7 kg)   SpO2 96%   BMI 34.58 kg/m²      Constitutional: Intubated and sedated FiO2 50 %, PEEP 5  Skin: Warm and dry. Multiple partial thickness skin wounds    HEENT:Pallor +, no LN , ET tube,   Neck: Supple to movements. No lymphadenopathy.    Chest: Bilateral scattered rhonchi   Cardiovascular: Regular, no murmur   Abdomen:soft, bowel sound +  PEG - no drainage or erythema   Extremities: +++ edema    Lines: Right  femoral TLC     CBC with Differential:      Lab Results   Component Value Date    WBC 12.7 01/24/2021    RBC 3.28 01/24/2021    HGB 8.9 01/24/2021    HCT 28.9 01/24/2021     01/24/2021    MCV 88.1 01/24/2021    MCH 27.1 01/24/2021    MCHC 30.8 01/24/2021    RDW 18.8 01/24/2021    NRBC 0.9 01/08/2021    SEGSPCT 64 09/27/2013    METASPCT 6.0 01/06/2021    LYMPHOPCT 9.3 01/24/2021    PROMYELOPCT 0.9 12/21/2020    MONOPCT 3.4 01/24/2021    MYELOPCT 3.0 01/06/2021    BASOPCT 0.2 01/24/2021    MONOSABS 0.43 01/24/2021    LYMPHSABS 1.18 01/24/2021    EOSABS 0.00 01/24/2021    BASOSABS 0.02 01/24/2021       CMP:    Lab Results   Component Value Date     01/24/2021    K 3.6 01/24/2021    CL 99 01/24/2021    CO2 28 01/24/2021    BUN 21 01/24/2021    CREATININE 0.8 01/24/2021    GFRAA >60 01/24/2021    LABGLOM >60 01/24/2021    GLUCOSE 241 01/24/2021    GLUCOSE 138 03/26/2011    PROT 4.3 01/22/2021    LABALBU 2.0 01/22/2021    LABALBU 4.0 03/26/2011    CALCIUM 7.4 01/24/2021    BILITOT 0.3 01/22/2021    ALKPHOS 123 01/22/2021    AST 18 01/22/2021    ALT 8 01/22/2021       Hepatic Function Panel:    Lab Results   Component Value Date    ALKPHOS 123 01/22/2021    ALT 8 01/22/2021    AST 18 01/22/2021    PROT 4.3 01/22/2021    BILITOT 0.3 01/22/2021    BILIDIR 0.4 01/14/2021    IBILI 0.5 01/14/2021    LABALBU 2.0 01/22/2021    LABALBU 4.0 03/26/2011         Microbiology :    Blood culture - neg to date   Urine Culture -     Susceptibility    Pseudomonas aeruginosa (1)    Antibiotic Interpretation SNEHA Status    gentamicin Sensitive <=^1 mcg/mL     levofloxacin Sensitive <=^0.12 mcg/mL     tobramycin Sensitive <=^1 mcg/mL     Lab and Collection        Sputum Culture-     Group 5: >25 PMN's/LPF and <10 Epithelial cells/LPF   Abundant Polymorphonuclear leukocytes   Epithelial cells not seen   Rare Gram negative diplococci   Rare Gram positive cocci in clusters   Few Gram negative rods   Rare Gram positive cocci in pairs    Narrative:          procalcitonin 1.23       Radiology :    Chest X ray - bilateral infiltrates       Assessment:  · Pseudomonas bacteriuria    · HCAP / Aspiration pneumonia  ( hx of MRSA / Pseudomonas and ESBL  Klebsiella ) · Acute respiratory failure - intubated   · Elevated procalcitonin       Plan:    · Meropenem 1 gram IV q 8 hrs, Linezolid 600 mg IV q 12 hrs   · Blood cx, sputum cx   · Contact isolation   · Vent support       Ruben WILDER [facility]  8:34 AM  1/24/2021

## 2021-01-24 NOTE — PROGRESS NOTES
Comprehensive Nutrition Assessment    Type and Reason for Visit:  Initial, Positive Nutrition Screen    Nutrition Recommendations/Plan: If tube feedings are initiated, then recommend Diabetic 1.5 (Glucerna 1.5) @55/hr plus 1 protein modular daily to provide 1320ml, 1980 calories, 109g protein, 1001ml water (2084 calories and 135g protein w/ protein modular). Nutrition Assessment:  Pt currently NPO ; pt also intubated and sedated ; adm w/ aspiration pneumonia ; pt at further nutritional risk d/t increased needs from wound healing ; noted septic shock ; hx of DM and dementia ; s/p vena cava filter insertion 1/6 ; s/p EGD WITH CONVERSION TO J TUBE on 1/15 ; will provide TF recommendations    Malnutrition Assessment:  Malnutrition Status: At risk for malnutrition (Comment)    Context:  Acute Illness     Findings of the 6 clinical characteristics of malnutrition:  Energy Intake:  Mild decrease in energy intake (Comment)(NPO x 2 days)  Weight Loss:  No significant weight loss     Body Fat Loss:  Unable to assess(data not available to assess at this time d/t isolation)     Muscle Mass Loss:  Unable to assess(data not available to assess at this time d/t isolation)    Fluid Accumulation:  No significant fluid accumulation     Strength:  Not Performed    Estimated Daily Nutrient Needs:  Energy (kcal):  5613-7263 (PS10 REE 2015 ; minute volume 8.91 ; Tmax 99.4); Weight Used for Energy Requirements:  Current     Protein (g):  120-145 (1.5-1.8g/kg IBW); Weight Used for Protein Requirements:  Ideal        Fluid (ml/day):  per critical care; Method Used for Fluid Requirements:  1 ml/kcal      Nutrition Related Findings:  +I&Os (+4.4 L), 2-3+ edema, intubated/sedated, ETT, edentulous, PEG, active BS, rounded abd, red/excoriation to buttocks, boggy heels      Wounds:  Multiple, Open Wounds, Surgical Incision, Deep Tissue Injury(wounds x 7 ;  Incision x 1)       Current Nutrition Therapies:    Diet NPO Effective Now    Anthropometric Measures:  · Height: 6' (182.9 cm)  · Current Body Weight: 255 lb (115.7 kg)(1/23, no method)    · Usual Body Weight: 259 lb (117.5 kg)(12/13/20, bedscale)     · Ideal Body Weight: 178 lbs; % Ideal Body Weight 143.3 %   · BMI: 34.6  · BMI Categories: Obese Class 1 (BMI 30.0-34. 9)       Nutrition Diagnosis:   · Inadequate oral intake related to impaired respiratory function as evidenced by NPO or clear liquid status due to medical condition, intubation      Nutrition Interventions:   Food and/or Nutrient Delivery:  Continue NPO, Start Tube Feeding  Nutrition Education/Counseling:  Education not indicated   Coordination of Nutrition Care:  Continue to monitor while inpatient    Goals:  Tube feeding will meet nutritional needs with good tolerance       Nutrition Monitoring and Evaluation:   Behavioral-Environmental Outcomes:  Beliefs and Attitutes   Food/Nutrient Intake Outcomes:  Enteral Nutrition Intake/Tolerance  Physical Signs/Symptoms Outcomes:  Biochemical Data, GI Status, Fluid Status or Edema, Hemodynamic Status, Nutrition Focused Physical Findings, Skin, Weight     Discharge Planning:     Too soon to determine     Electronically signed by Tootie Oliveira RD, LD on 1/24/21 at 12:39 PM EST    Contact: [phone]

## 2021-01-24 NOTE — PLAN OF CARE
Problem: Skin Integrity:  Goal: Will show no infection signs and symptoms  Description: Will show no infection signs and symptoms  1/24/2021 1840 by Carmie Boas, RN  Outcome: Met This Shift  1/24/2021 0642 by Keny Farley RN  Outcome: Met This Shift  Goal: Absence of new skin breakdown  Description: Absence of new skin breakdown  1/24/2021 1840 by Carmie Boas, RN  Outcome: Met This Shift  1/24/2021 0642 by Keny Farley RN  Outcome: Met This Shift     Problem: Falls - Risk of:  Goal: Will remain free from falls  Description: Will remain free from falls  1/24/2021 1840 by Carmie Boas, RN  Outcome: Met This Shift  1/24/2021 0642 by Keny Farley RN  Outcome: Met This Shift  Goal: Absence of physical injury  Description: Absence of physical injury  1/24/2021 1840 by Carmie Boas, RN  Outcome: Met This Shift  1/24/2021 0642 by Keny Farley RN  Outcome: Met This Shift

## 2021-01-24 NOTE — PROGRESS NOTES
Critical Care Team - Daily Progress Note         Date and time: 2021 3:39 PM  Patient's name:  Alexa Prather  Medical Record Number: [id number]  Patient's account/billing number: [de-identified]  Patient's YOB: 1940  Age: 78 y.o. Date of Admission: 2021  8:35 PM  Length of stay during current admission: 2      Primary Care Physician: No primary care provider on file.   ICU Attending Physician: Dr. Ada Currie Status: Full Code    Reason for ICU admission: respiratory failure   Sepsis       SUBJECTIVE:     OVERNIGHT EVENTS:       No acute events   Still on levophed         CURRENT VENTILATION STATUS:     [x] Ventilator  [] BIPAP  [] Nasal Cannula [] Room Air      IF INTUBATED, ET TUBE MARKING AT LOWER LIP:     24  cms    SECRETIONS Amount:  [] Small [] Moderate  [x] Large  [] None  Color:     [] White [x] Colored  [] Bloody    SEDATION:  RAAS Score:  [] Propofol gtt  [] Versed gtt  [x] fentanyl  gtt   [] No Sedation    PARALYZED:  [x] No    [] Yes      VASOPRESSORS:  [] No    [x] Yes    If yes -   [x] Levophed       [] Dopamine     [] Vasopressin       [] Dobutamine  [] Phenylephrine         [] Epinephrine    CENTRAL LINES:     [] No   [x] Yes   (Date of Insertion:   )           If yes -     [] Right IJ     [] Left IJ [x] Right Femoral [] Left Femoral                   [] Right Subclavian [] Left Subclavian       GREEN'S CATHETER:   [] No   [x] Yes  (Date of Insertion:   )     URINE OUTPUT:            [] Good   [x] Low              [] Anuric      OBJECTIVE:     VITAL SIGNS:  BP (!) 104/51   Pulse 72   Temp 98.2 °F (36.8 °C) (Bladder)   Resp 19   Ht 6' (1.829 m)   Wt 255 lb (115.7 kg)   SpO2 97%   BMI 34.58 kg/m²   Tmax over 24 hours:  Temp (24hrs), Av.9 °F (37.2 °C), Min:98.2 °F (36.8 °C), Max:99.4 °F (37.4 °C)      Patient Vitals for the past 6 hrs:   BP Temp Temp src Pulse Resp SpO2 Height   21 1400 -- -- -- 72 19 97 % --   21 1300 -- -- -- 66 18 97 % -- 01/24/21 1224 -- -- -- -- -- -- 6' (1.829 m)   01/24/21 1219 -- -- -- 69 18 97 % --   01/24/21 1200 (!) 104/51 98.2 °F (36.8 °C) Bladder 67 18 98 % --   01/24/21 1100 -- -- -- 65 18 96 % --   01/24/21 1000 -- -- -- 67 18 98 % --         Intake/Output Summary (Last 24 hours) at 1/24/2021 1539  Last data filed at 1/24/2021 1455  Gross per 24 hour   Intake 3781 ml   Output 1265 ml   Net 2516 ml     Wt Readings from Last 2 Encounters:   01/23/21 255 lb (115.7 kg)   01/14/21 255 lb (115.7 kg)     Body mass index is 34.58 kg/m². PHYSICAL EXAMINATION:    CONSTITUTIONAL:  Sedated   EYES:  Lids and lashes normal, pupils equal, round and reactive to light, extra ocular muscles intact, sclera clear, conjunctiva normal  ENT:  Normocephalic, without obvious abnormality, atraumatic, sinuses nontender on palpation, external ears without lesions, oral pharynx with moist mucus membranes, tonsils without erythema or exudates, gums normal and good dentition.   LUNGS:  Scattered rhonchi  CARDIOVASCULAR:  Normal apical impulse, regular rate and rhythm, normal S1 and S2, no S3 or S4, and no murmur noted  ABDOMEN:  No scars, normal bowel sounds, soft, non-distended, non-tender, no masses palpated, no hepatosplenomegally  NEUROLOGIC:  Cranial Nerves:  cranial nerves II-XII are grossly intact     Any additional physical findings:    MEDICATIONS:    Scheduled Meds:   amiodarone  100 mg PEG Tube Daily    atorvastatin  20 mg PEG Tube Daily    lactulose  20 g PEG Tube Daily    latanoprost  1 drop Both Eyes Daily    metoclopramide  10 mg Intravenous Q6H    nystatin-triamcinolone   Topical 4x Daily    pantoprazole  40 mg Intravenous Daily    And    sodium chloride (PF)  10 mL Intravenous Daily    potassium & sodium phosphates  1 packet Per G Tube Daily    potassium bicarb-citric acid  40 mEq Per G Tube Daily    senna  1 tablet PEG Tube BID    sodium chloride (Inhalant)  4 mL Nebulization BID    sodium chloride flush  10 mL Intravenous 2 times per day    enoxaparin  40 mg Subcutaneous Daily    meropenem  1,000 mg Intravenous Q8H    linezolid  600 mg Intravenous Q12H    hydrocortisone sodium succinate PF  100 mg Intravenous Q8H    budesonide  500 mcg Nebulization BID    Arformoterol Tartrate  15 mcg Nebulization BID    insulin lispro  0-18 Units Subcutaneous TID WC    insulin lispro  0-9 Units Subcutaneous Nightly    ipratropium-albuterol  1 ampule Inhalation Q4H WA     Continuous Infusions:   phenylephrine (BIBI-SYNEPHRINE) 50mg/250mL infusion Stopped (01/23/21 1217)    dextrose      sodium chloride 100 mL/hr at 01/24/21 0547    sodium chloride      sodium chloride 100 mL/hr at 01/22/21 2302    fentaNYL 5 mcg/ml in 0.9%  ml infusion 175 mcg/hr (01/24/21 1133)    norepinephrine 10 mcg/min (01/24/21 1000)     PRN Meds:       acetaminophen, 650 mg, Q4H PRN      docusate sodium, 1 enema, PRN      glucose, 15 g, PRN      dextrose, 12.5 g, PRN      glucagon (rDNA), 1 mg, PRN      dextrose, 100 mL/hr, PRN      sodium chloride flush, 10 mL, PRN      promethazine, 12.5 mg, Q6H PRN    Or      ondansetron, 4 mg, Q6H PRN      polyethylene glycol, 17 g, Daily PRN      acetaminophen, 650 mg, Q6H PRN    Or      acetaminophen, 650 mg, Q6H PRN      fentanNYL, 50 mcg, Q1H PRN          VENT SETTINGS (Comprehensive) (if applicable):  Vent Information  $Ventilation: $Subsequent Day  Equipment ID: [id number]  Vent Type: [phone]  Vent Mode: AC/VC  Vt Ordered: 500 mL  Rate Set: 18 bmp  Peak Flow: 60 L/min  Pressure Support: 0 cmH20  FiO2 : 40 %  SpO2: 97 %  SpO2/FiO2 ratio: 242.5  Sensitivity: 3  PEEP/CPAP: 5  I Time/ I Time %: 0 s  Humidification Source: Heated wire  Humidification Temp: 37  Humidification Temp Measured: 37  Additional Respiratory  Assessments  Pulse: 72  Resp: 19  SpO2: 97 %  Humidification Source: Heated wire  Humidification Temp: 37  Oral Care: Mouth swabbed, Mouth moisturizer, Mouth suctioned, Suction toothette  Subglottic Suction Done?: Yes  Cuff Pressure (cm H2O): 29 cm H2O    ABGs:   Recent Labs     01/24/21  0516   PH 7.470*   PCO2 38.8   PO2 96.6   HCO3 27.6*   BE 3.7*   O2SAT 97.7       Laboratory findings:    Complete Blood Count:   Recent Labs     01/22/21 2112 01/24/21  0450   WBC 7.3 12.7*   HGB 7.6* 8.9*   HCT 25.8* 28.9*    311        Last 3 Blood Glucose:   Recent Labs     01/22/21 2112 01/23/21  0643 01/24/21  0450   GLUCOSE 124* 222* 241*        PT/INR:    Lab Results   Component Value Date    PROTIME 14.1 01/15/2021    INR 1.2 01/15/2021     PTT:    Lab Results   Component Value Date    APTT 21.0 01/06/2021       Comprehensive Metabolic Profile:   Recent Labs     01/22/21 2112 01/23/21  0643 01/24/21  0450    135 138   K 3.4* 3.9 3.6   CL 99 95* 99   CO2 33* 30* 28   BUN 29* 28* 21   CREATININE 0.9 0.9 0.8   GLUCOSE 124* 222* 241*   CALCIUM 6.8* 7.4* 7.4*   PROT 4.3*  --   --    LABALBU 2.0*  --   --    BILITOT 0.3  --   --    ALKPHOS 123  --   --    AST 18  --   --    ALT 8  --   --       Magnesium:   Lab Results   Component Value Date    MG 1.1 01/23/2021     Phosphorus:   Lab Results   Component Value Date    PHOS 4.0 01/14/2021     Ionized Calcium:   Lab Results   Component Value Date    CAION 1.06 01/18/2021        Urinalysis:     Troponin:   Recent Labs     01/23/21  0142 01/23/21  0643 01/23/21  1450   TROPONINI 0.12* 0.11* 0.09*       Microbiology:    Cultures during this admission:     Blood cultures:                 [] None drawn      [] Negative             []  Positive (Details:  )  Urine Culture:                   [] None drawn      [] Negative             []  Positive (Details:  )  Sputum Culture:               [] None drawn       [] Negative             []  Positive (Details:  )   Endotracheal aspirate:     [] None drawn       [] Negative             []  Positive (Details:  )     Other pertinent Labs:       Radiology/Imaging:     Chest Xray (1/24/2021):  Reviewed ASSESSMENT       Neuro   Sedated with fentanyl maintain RASS -2   Daily sedation holiday   Hx mild dementia      Respiratory   Acute on chronic hypoxic respiratory requiring mechanical ventilation   HCAP.  Culture growing MRSA and ESBL + Klebsiella  Bronchodilators   Abx   Cxr daily   Pulmonary toilet   Add pulmozyme for thick secretions   Wean oxygen as tolerated. Keep O2 sat 90-92%     Cardiovascular   Septic shock   Insert alex  Titrate pressors to maintain map >/= 65   IVF resuscitation   icu telemetry      afib on amiodarone      Gastrointestinal       Aspiration pna   Statin for hlp   Npo   ppi         Renal   ivf   Monitor urine output   Hypokalemia- Replace lytes as needed   Chronic metabolic alkalosis      Infectious Disease   Septic shock  HCAP.  Culture growing MRSA and ESBL + Klebsiella  Abx: meropenem, zyvox   ID following      Hematology/Oncology   Anemia acute on chronic   Trend h/h daily   dvt prophylaxis   Platelets trending down on zyvoxx      Endocrine   BG uncontrolled adjust insulin to maintain bg 140-180  Adrenal insufficiency on hydrocortisone replacement      Social/Spiritual/DNR/Other   Full code   Prognosis guarded patient critically ill  Consult palliative care as he has been in icu x2 within 30 days     SAGRARIO Lares P                   39 min cct excluding procedures   1/24/2021, 3:39 PM      =

## 2021-01-24 NOTE — PROGRESS NOTES
Stage  CI HR MAP TPRI SVI   Baseline 2.9 67 81 2256 43   Challenge 3.0 69 82 2157 46   Result (%Ä) 5.9% 2.2% 1.2% -4.4% 8.1%

## 2021-01-25 ENCOUNTER — APPOINTMENT (OUTPATIENT)
Dept: GENERAL RADIOLOGY | Age: 81
DRG: 870 | End: 2021-01-25
Payer: COMMERCIAL

## 2021-01-25 LAB
AADO2: 162.8 MMHG
ANION GAP SERPL CALCULATED.3IONS-SCNC: 9 MMOL/L (ref 7–16)
B.E.: 1.5 MMOL/L (ref -3–3)
BASOPHILS ABSOLUTE: 0.01 E9/L (ref 0–0.2)
BASOPHILS RELATIVE PERCENT: 0.1 % (ref 0–2)
BUN BLDV-MCNC: 19 MG/DL (ref 8–23)
CALCIUM SERPL-MCNC: 7.4 MG/DL (ref 8.6–10.2)
CHLORIDE BLD-SCNC: 96 MMOL/L (ref 98–107)
CO2: 30 MMOL/L (ref 22–29)
COHB: 1.7 % (ref 0–1.5)
CREAT SERPL-MCNC: 0.7 MG/DL (ref 0.7–1.2)
CRITICAL: ABNORMAL
DATE ANALYZED: ABNORMAL
DATE OF COLLECTION: ABNORMAL
EOSINOPHILS ABSOLUTE: 0 E9/L (ref 0.05–0.5)
EOSINOPHILS RELATIVE PERCENT: 0 % (ref 0–6)
FIO2: 40 %
GFR AFRICAN AMERICAN: >60
GFR NON-AFRICAN AMERICAN: >60 ML/MIN/1.73
GLUCOSE BLD-MCNC: 204 MG/DL (ref 74–99)
HCO3: 23.7 MMOL/L (ref 22–26)
HCT VFR BLD CALC: 28.2 % (ref 37–54)
HEMOGLOBIN: 8.9 G/DL (ref 12.5–16.5)
HHB: 3.3 % (ref 0–5)
IMMATURE GRANULOCYTES #: 0.07 E9/L
IMMATURE GRANULOCYTES %: 0.6 % (ref 0–5)
LAB: ABNORMAL
LYMPHOCYTES ABSOLUTE: 0.92 E9/L (ref 1.5–4)
LYMPHOCYTES RELATIVE PERCENT: 8.1 % (ref 20–42)
Lab: ABNORMAL
MAGNESIUM: 1.7 MG/DL (ref 1.6–2.6)
MCH RBC QN AUTO: 27.8 PG (ref 26–35)
MCHC RBC AUTO-ENTMCNC: 31.6 % (ref 32–34.5)
MCV RBC AUTO: 88.1 FL (ref 80–99.9)
METER GLUCOSE: 171 MG/DL (ref 74–99)
METER GLUCOSE: 178 MG/DL (ref 74–99)
METER GLUCOSE: 192 MG/DL (ref 74–99)
METER GLUCOSE: 244 MG/DL (ref 74–99)
METHB: 0.3 % (ref 0–1.5)
MODE: AC
MONOCYTES ABSOLUTE: 0.5 E9/L (ref 0.1–0.95)
MONOCYTES RELATIVE PERCENT: 4.4 % (ref 2–12)
NEUTROPHILS ABSOLUTE: 9.82 E9/L (ref 1.8–7.3)
NEUTROPHILS RELATIVE PERCENT: 86.8 % (ref 43–80)
O2 CONTENT: 12.2 ML/DL
O2 SATURATION: 96.6 % (ref 92–98.5)
O2HB: 94.7 % (ref 94–97)
OPERATOR ID: 2067
ORGANISM: ABNORMAL
PATIENT TEMP: 37 C
PCO2: 28.3 MMHG (ref 35–45)
PDW BLD-RTO: 18.5 FL (ref 11.5–15)
PEEP/CPAP: 5 CMH2O
PFO2: 2 MMHG/%
PH BLOOD GAS: 7.54 (ref 7.35–7.45)
PHOSPHORUS: 2.6 MG/DL (ref 2.5–4.5)
PLATELET # BLD: 369 E9/L (ref 130–450)
PMV BLD AUTO: 9.8 FL (ref 7–12)
PO2: 79.9 MMHG (ref 75–100)
POTASSIUM REFLEX MAGNESIUM: 3.7 MMOL/L (ref 3.5–5)
POTASSIUM SERPL-SCNC: 3.6 MMOL/L (ref 3.5–5)
POTASSIUM SERPL-SCNC: 4.1 MMOL/L (ref 3.5–5)
PREALBUMIN: 8 MG/DL (ref 20–40)
RBC # BLD: 3.2 E12/L (ref 3.8–5.8)
RI(T): 2.04
RR MECHANICAL: 18 B/MIN
SODIUM BLD-SCNC: 135 MMOL/L (ref 132–146)
SOURCE, BLOOD GAS: ABNORMAL
THB: 9.1 G/DL (ref 11.5–16.5)
TIME ANALYZED: 614
TSH SERPL DL<=0.05 MIU/L-ACNC: 2.3 UIU/ML (ref 0.27–4.2)
URINE CULTURE, ROUTINE: ABNORMAL
VT MECHANICAL: 500 ML
WBC # BLD: 11.3 E9/L (ref 4.5–11.5)

## 2021-01-25 PROCEDURE — 6360000002 HC RX W HCPCS: Performed by: INTERNAL MEDICINE

## 2021-01-25 PROCEDURE — 94003 VENT MGMT INPAT SUBQ DAY: CPT

## 2021-01-25 PROCEDURE — 84134 ASSAY OF PREALBUMIN: CPT

## 2021-01-25 PROCEDURE — 94640 AIRWAY INHALATION TREATMENT: CPT

## 2021-01-25 PROCEDURE — 84132 ASSAY OF SERUM POTASSIUM: CPT

## 2021-01-25 PROCEDURE — 2580000003 HC RX 258: Performed by: INTERNAL MEDICINE

## 2021-01-25 PROCEDURE — 02HV33Z INSERTION OF INFUSION DEVICE INTO SUPERIOR VENA CAVA, PERCUTANEOUS APPROACH: ICD-10-PCS | Performed by: INTERNAL MEDICINE

## 2021-01-25 PROCEDURE — 80048 BASIC METABOLIC PNL TOTAL CA: CPT

## 2021-01-25 PROCEDURE — 6370000000 HC RX 637 (ALT 250 FOR IP): Performed by: INTERNAL MEDICINE

## 2021-01-25 PROCEDURE — 2000000000 HC ICU R&B

## 2021-01-25 PROCEDURE — 71045 X-RAY EXAM CHEST 1 VIEW: CPT

## 2021-01-25 PROCEDURE — 85025 COMPLETE CBC W/AUTO DIFF WBC: CPT

## 2021-01-25 PROCEDURE — 82962 GLUCOSE BLOOD TEST: CPT

## 2021-01-25 PROCEDURE — 84443 ASSAY THYROID STIM HORMONE: CPT

## 2021-01-25 PROCEDURE — 83735 ASSAY OF MAGNESIUM: CPT

## 2021-01-25 PROCEDURE — 36415 COLL VENOUS BLD VENIPUNCTURE: CPT

## 2021-01-25 PROCEDURE — C9113 INJ PANTOPRAZOLE SODIUM, VIA: HCPCS | Performed by: INTERNAL MEDICINE

## 2021-01-25 PROCEDURE — 84100 ASSAY OF PHOSPHORUS: CPT

## 2021-01-25 PROCEDURE — 2500000003 HC RX 250 WO HCPCS: Performed by: EMERGENCY MEDICINE

## 2021-01-25 PROCEDURE — 87081 CULTURE SCREEN ONLY: CPT

## 2021-01-25 PROCEDURE — 99223 1ST HOSP IP/OBS HIGH 75: CPT | Performed by: NURSE PRACTITIONER

## 2021-01-25 PROCEDURE — 2500000003 HC RX 250 WO HCPCS: Performed by: INTERNAL MEDICINE

## 2021-01-25 PROCEDURE — 82805 BLOOD GASES W/O2 SATURATION: CPT

## 2021-01-25 PROCEDURE — 99233 SBSQ HOSP IP/OBS HIGH 50: CPT | Performed by: INTERNAL MEDICINE

## 2021-01-25 RX ORDER — LIDOCAINE HYDROCHLORIDE 10 MG/ML
5 INJECTION, SOLUTION EPIDURAL; INFILTRATION; INTRACAUDAL; PERINEURAL ONCE
Status: DISCONTINUED | OUTPATIENT
Start: 2021-01-25 | End: 2021-02-12 | Stop reason: HOSPADM

## 2021-01-25 RX ORDER — BUMETANIDE 0.25 MG/ML
1 INJECTION, SOLUTION INTRAMUSCULAR; INTRAVENOUS 2 TIMES DAILY
Status: DISCONTINUED | OUTPATIENT
Start: 2021-01-25 | End: 2021-01-27

## 2021-01-25 RX ORDER — SODIUM CHLORIDE 0.9 % (FLUSH) 0.9 %
10 SYRINGE (ML) INJECTION PRN
Status: DISCONTINUED | OUTPATIENT
Start: 2021-01-25 | End: 2021-02-12 | Stop reason: HOSPADM

## 2021-01-25 RX ORDER — CHLORHEXIDINE GLUCONATE 0.12 MG/ML
15 RINSE ORAL 2 TIMES DAILY
Status: DISCONTINUED | OUTPATIENT
Start: 2021-01-25 | End: 2021-02-12 | Stop reason: HOSPADM

## 2021-01-25 RX ORDER — HEPARIN SODIUM (PORCINE) LOCK FLUSH IV SOLN 100 UNIT/ML 100 UNIT/ML
3 SOLUTION INTRAVENOUS PRN
Status: DISCONTINUED | OUTPATIENT
Start: 2021-01-25 | End: 2021-02-12 | Stop reason: HOSPADM

## 2021-01-25 RX ORDER — MAGNESIUM SULFATE IN WATER 40 MG/ML
2000 INJECTION, SOLUTION INTRAVENOUS ONCE
Status: COMPLETED | OUTPATIENT
Start: 2021-01-25 | End: 2021-01-25

## 2021-01-25 RX ORDER — METOCLOPRAMIDE HYDROCHLORIDE 5 MG/ML
10 INJECTION INTRAMUSCULAR; INTRAVENOUS 2 TIMES DAILY
Status: DISCONTINUED | OUTPATIENT
Start: 2021-01-25 | End: 2021-01-26

## 2021-01-25 RX ORDER — HEPARIN SODIUM (PORCINE) LOCK FLUSH IV SOLN 100 UNIT/ML 100 UNIT/ML
3 SOLUTION INTRAVENOUS EVERY 12 HOURS SCHEDULED
Status: DISCONTINUED | OUTPATIENT
Start: 2021-01-25 | End: 2021-02-12 | Stop reason: HOSPADM

## 2021-01-25 RX ORDER — POTASSIUM CHLORIDE 29.8 MG/ML
20 INJECTION INTRAVENOUS PRN
Status: DISCONTINUED | OUTPATIENT
Start: 2021-01-25 | End: 2021-02-12 | Stop reason: HOSPADM

## 2021-01-25 RX ADMIN — METOCLOPRAMIDE HYDROCHLORIDE 10 MG: 5 INJECTION INTRAMUSCULAR; INTRAVENOUS at 20:12

## 2021-01-25 RX ADMIN — LACTULOSE 20 G: 20 SOLUTION ORAL at 10:11

## 2021-01-25 RX ADMIN — NYSTATIN AND TRIAMCINOLONE ACETONIDE: 100000; 1 CREAM TOPICAL at 20:14

## 2021-01-25 RX ADMIN — IPRATROPIUM BROMIDE AND ALBUTEROL SULFATE 1 AMPULE: 2.5; .5 SOLUTION RESPIRATORY (INHALATION) at 08:33

## 2021-01-25 RX ADMIN — HYDROCORTISONE SODIUM SUCCINATE 100 MG: 100 INJECTION, POWDER, FOR SOLUTION INTRAMUSCULAR; INTRAVENOUS at 01:09

## 2021-01-25 RX ADMIN — SENNOSIDES 8.6 MG: 8.6 TABLET, FILM COATED ORAL at 20:19

## 2021-01-25 RX ADMIN — BUDESONIDE 500 MCG: 0.5 SUSPENSION RESPIRATORY (INHALATION) at 08:33

## 2021-01-25 RX ADMIN — INSULIN LISPRO 3 UNITS: 100 INJECTION, SOLUTION INTRAVENOUS; SUBCUTANEOUS at 13:35

## 2021-01-25 RX ADMIN — NYSTATIN AND TRIAMCINOLONE ACETONIDE: 100000; 1 CREAM TOPICAL at 10:14

## 2021-01-25 RX ADMIN — PANTOPRAZOLE SODIUM 40 MG: 40 INJECTION, POWDER, FOR SOLUTION INTRAVENOUS at 09:17

## 2021-01-25 RX ADMIN — Medication 150 MCG/HR: at 09:28

## 2021-01-25 RX ADMIN — SODIUM CHLORIDE, PRESERVATIVE FREE 10 ML: 5 INJECTION INTRAVENOUS at 09:18

## 2021-01-25 RX ADMIN — CHLORHEXIDINE GLUCONATE 0.12% ORAL RINSE 15 ML: 1.2 LIQUID ORAL at 20:12

## 2021-01-25 RX ADMIN — INSULIN LISPRO 2 UNITS: 100 INJECTION, SOLUTION INTRAVENOUS; SUBCUTANEOUS at 20:19

## 2021-01-25 RX ADMIN — POTASSIUM & SODIUM PHOSPHATES POWDER PACK 280-160-250 MG 250 MG: 280-160-250 PACK at 10:14

## 2021-01-25 RX ADMIN — INSULIN GLARGINE 10 UNITS: 100 INJECTION, SOLUTION SUBCUTANEOUS at 20:19

## 2021-01-25 RX ADMIN — Medication 125 MG/HR: at 18:31

## 2021-01-25 RX ADMIN — HYDROCORTISONE SODIUM SUCCINATE 100 MG: 100 INJECTION, POWDER, FOR SOLUTION INTRAMUSCULAR; INTRAVENOUS at 09:17

## 2021-01-25 RX ADMIN — MEROPENEM 1000 MG: 1 INJECTION, POWDER, FOR SOLUTION INTRAVENOUS at 01:09

## 2021-01-25 RX ADMIN — Medication 200 MCG/HR: at 02:19

## 2021-01-25 RX ADMIN — ENOXAPARIN SODIUM 40 MG: 40 INJECTION SUBCUTANEOUS at 10:11

## 2021-01-25 RX ADMIN — DORNASE ALFA 2.5 MG: 1 SOLUTION RESPIRATORY (INHALATION) at 08:33

## 2021-01-25 RX ADMIN — INSULIN LISPRO 6 UNITS: 100 INJECTION, SOLUTION INTRAVENOUS; SUBCUTANEOUS at 10:16

## 2021-01-25 RX ADMIN — MEROPENEM 1000 MG: 1 INJECTION, POWDER, FOR SOLUTION INTRAVENOUS at 16:15

## 2021-01-25 RX ADMIN — METOCLOPRAMIDE HYDROCHLORIDE 10 MG: 5 INJECTION INTRAMUSCULAR; INTRAVENOUS at 01:09

## 2021-01-25 RX ADMIN — IPRATROPIUM BROMIDE AND ALBUTEROL SULFATE 1 AMPULE: 2.5; .5 SOLUTION RESPIRATORY (INHALATION) at 20:03

## 2021-01-25 RX ADMIN — SODIUM CHLORIDE SOLN NEBU 3% 4 ML: 3 NEBU SOLN at 20:03

## 2021-01-25 RX ADMIN — LATANOPROST 1 DROP: 50 SOLUTION OPHTHALMIC at 10:14

## 2021-01-25 RX ADMIN — SODIUM CHLORIDE: 9 INJECTION, SOLUTION INTRAVENOUS at 22:08

## 2021-01-25 RX ADMIN — NYSTATIN AND TRIAMCINOLONE ACETONIDE: 100000; 1 CREAM TOPICAL at 13:33

## 2021-01-25 RX ADMIN — MAGNESIUM SULFATE HEPTAHYDRATE 2000 MG: 40 INJECTION, SOLUTION INTRAVENOUS at 20:20

## 2021-01-25 RX ADMIN — IPRATROPIUM BROMIDE AND ALBUTEROL SULFATE 1 AMPULE: 2.5; .5 SOLUTION RESPIRATORY (INHALATION) at 12:32

## 2021-01-25 RX ADMIN — NYSTATIN AND TRIAMCINOLONE ACETONIDE: 100000; 1 CREAM TOPICAL at 16:16

## 2021-01-25 RX ADMIN — BUMETANIDE 1 MG: 0.25 INJECTION, SOLUTION INTRAMUSCULAR; INTRAVENOUS at 13:39

## 2021-01-25 RX ADMIN — LINEZOLID 600 MG: 600 INJECTION, SOLUTION INTRAVENOUS at 10:13

## 2021-01-25 RX ADMIN — BUMETANIDE 1 MG: 0.25 INJECTION, SOLUTION INTRAMUSCULAR; INTRAVENOUS at 20:13

## 2021-01-25 RX ADMIN — BUDESONIDE 500 MCG: 0.5 SUSPENSION RESPIRATORY (INHALATION) at 20:03

## 2021-01-25 RX ADMIN — METOCLOPRAMIDE HYDROCHLORIDE 10 MG: 5 INJECTION INTRAMUSCULAR; INTRAVENOUS at 06:30

## 2021-01-25 RX ADMIN — INSULIN LISPRO 3 UNITS: 100 INJECTION, SOLUTION INTRAVENOUS; SUBCUTANEOUS at 16:46

## 2021-01-25 RX ADMIN — IPRATROPIUM BROMIDE AND ALBUTEROL SULFATE 1 AMPULE: 2.5; .5 SOLUTION RESPIRATORY (INHALATION) at 16:37

## 2021-01-25 RX ADMIN — Medication 10 ML: at 20:14

## 2021-01-25 RX ADMIN — MEROPENEM 1000 MG: 1 INJECTION, POWDER, FOR SOLUTION INTRAVENOUS at 09:17

## 2021-01-25 RX ADMIN — Medication 10 ML: at 09:18

## 2021-01-25 RX ADMIN — CHLORHEXIDINE GLUCONATE 0.12% ORAL RINSE 15 ML: 1.2 LIQUID ORAL at 13:35

## 2021-01-25 RX ADMIN — SODIUM CHLORIDE SOLN NEBU 3% 4 ML: 3 NEBU SOLN at 08:33

## 2021-01-25 RX ADMIN — AMIODARONE HYDROCHLORIDE 100 MG: 200 TABLET ORAL at 10:11

## 2021-01-25 RX ADMIN — POTASSIUM BICARBONATE 40 MEQ: 782 TABLET, EFFERVESCENT ORAL at 10:11

## 2021-01-25 RX ADMIN — HYDROCORTISONE SODIUM SUCCINATE 50 MG: 100 INJECTION, POWDER, FOR SOLUTION INTRAMUSCULAR; INTRAVENOUS at 16:15

## 2021-01-25 RX ADMIN — SENNOSIDES 8.6 MG: 8.6 TABLET, FILM COATED ORAL at 10:12

## 2021-01-25 RX ADMIN — LINEZOLID 600 MG: 600 INJECTION, SOLUTION INTRAVENOUS at 20:13

## 2021-01-25 RX ADMIN — ARFORMOTEROL TARTRATE 15 MCG: 15 SOLUTION RESPIRATORY (INHALATION) at 08:33

## 2021-01-25 RX ADMIN — ATORVASTATIN CALCIUM 20 MG: 20 TABLET, FILM COATED ORAL at 10:11

## 2021-01-25 ASSESSMENT — PAIN SCALES - GENERAL
PAINLEVEL_OUTOF10: 3
PAINLEVEL_OUTOF10: 0

## 2021-01-25 ASSESSMENT — PULMONARY FUNCTION TESTS
PIF_VALUE: 22
PIF_VALUE: 23
PIF_VALUE: 21
PIF_VALUE: 21
PIF_VALUE: 22
PIF_VALUE: 22
PIF_VALUE: 21
PIF_VALUE: 22
PIF_VALUE: 21
PIF_VALUE: 22
PIF_VALUE: 25
PIF_VALUE: 21
PIF_VALUE: 21
PIF_VALUE: 22

## 2021-01-25 NOTE — CONSULTS
Palliative Care Department  [phone]  Palliative Care Initial Consult  Provider [place] APRN-CNP, AGACNP-BC    Suan Toscano  [id number]  Hospital Day: 4  Date of Initial Consult: 1/25/2021  Referring Provider: [place]. Jewelene Leu, DO  Palliative Medicine was consulted for assistance with: Goals of care, CODE STATUS discussion, and family support    HPI:   Matt Toscano is a 78 y.o. with a past medical history of DM, HTN, enlarged prostate, anxiety, bullous pemphigoid, HLD, rheumatoid disease, proximal atrial fibrillation, dementia, blood clots, DVT of LLE who was admitted on 1/22/2021 from [facility] with a CHIEF COMPLAINT of emesis while on BiPAP. ASSESSMENT/PLAN:     Pertinent Hospital Diagnoses      Acute respiratory failure-intubated, MICU   HCAP/aspiration pneumonia-meropenem, linezolid, contact isolation      Palliative Care Encounter / Counseling Regarding Goals of Care  Please see detailed goals of care discussion as below   At this time, Matt Toscano, Does Not have capacity for medical decision-making. Capacity is time limited and situation/question specific   During encounter Irma Tuttle was surrogate medical decision-maker   Outcome of goals of care meeting:   Code status DNR-CCA, OK with re-intubation    Advanced Directives: no POA or living will in [facility]   Surrogate/Legal NOK:    Becky Cloud [phone] primary contact daughter  Martha Nieto [phone] son     Spiritual assessment: no spiritual distress identified   Bereavement and grief: to be determined  Referrals to: none today  SUBJECTIVE:     Current medical issues leading to Palliative Medicine involvement include   Active Hospital Problems    Diagnosis Date Noted    Respiratory failure [facility]) [J96.90] 01/22/2021       Details of Conversation:   1/25/2021 Patient recently discharged from hospital on 12/23/2020. Patient with reported emesis with BiPAP in place at [facility]. Brought to facility by EMS. Currently in the MICU on ventilator at this time, on Levophed. Patient with declining mentation, family stated no trach if it comes to that point and not following commands. Palliatve Care was consulted to help with CODE STATUS discussion, family support and goals of care. Call placed to [facility]. 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 Contact information for Palliative Care given. Patient was discharged on 12/23/2020 and went to [facility]; per daughter: arrested, down for 9 minutes, ended up on vent. Last Wednesday discharged back to [facility], Thursday placed on BiPAP, Friday while on BiPAP had emesis that went down into the patient's lung. Patient's daughter stated that they had paperwork for a DNR-CCA, but it was not completed and signed before the patient needed to come back into the hospital.  Changed the patient to a DNR CCA at this time with the daughter's permission. Daughter [facility] stated that she will call her brother to inform him. Explained to [facility] that with a DNR-CCA that her father may be reintubated after extubation. Goal-for her father to be comfortable. OBJECTIVE:   Prognosis: Guarded    Physical Exam:  BP (!) 169/71   Pulse 73   Temp 97.4 °F (36.3 °C)   Resp 18   Ht 6' (1.829 m)   Wt 255 lb (115.7 kg)   SpO2 97%   BMI 34.58 kg/m²   Gen: ETT/vent elderly, NAD, opens eyes when name is called.    HEENT:  Normocephalic, atraumatic, mucosa moist, EOMI  Neck:  Supple, trachea midline, no JVD  Lungs:  Course rhonchi, respirations unlabored on the vent  Heart[de-identified]  RRR, distant heart tones, no murmur, rub, or gallop noted during exam  Abd:  Soft, non tender, non distended, bowel sounds present  : Catheter-clear yellow urine  Ext:  Moving all extremities, 3+ edema, pulses present  Skin:  Warm and dry, Bilateral lower legs- redness, swollen, hot,    Neuro:  PERRL, Arousalible when name called, Weakly wiggles toes to command     Objective data reviewed: labs, images, records, medication use, vitals and chart    Discussed patient and the plan of care with the other IDT members: Palliative Medicine IDT Team, Primary Team, Floor Nurse, Patient and Family    Time/Communication  Greater than 50% of time spent, total 70 minutes in counseling and coordination of care at the bedside regarding CODE STATUS discussion, family support and goals of care. Thank you for allowing Palliative Medicine to participate in the care of Taye Gaviria. Note: This report was completed using computerVormetric voiced recognition software. Every effort has been made to ensure accuracy; however, inadvertent computerized transcription errors may be present.

## 2021-01-25 NOTE — PROGRESS NOTES
[address] [place] [address]  Internal Medicine Department  Division of Pulmonary, Critical Care and Sleep Medicine  Daily Intensive Care Unit Progress  Note    Admit Date: 1/22/2021    MRN: [id number]        Patient:  Maryellen Mccarthy 78 y.o. male     PCP: No primary care provider on file. Date of Service: 1/25/2021      Allergy: Imuran [azathioprine], Doxycycline, and Levaquin [levofloxacin]    Subjective   Length of stay during current admission: 3  Events of the past 24 hours are in the residents notes and we discussed at bedside briefly. .. 1/25 Care Coordination: Pt in ED from [facility] due to hypoxia after vomiting while on his BiPAP machine. He was just discharged from [address] 1/20 back to [address] [place] [address] to MICU, Full Code, on vent. fio2 40%, peep 5. Physical Exam:     VITALS:  BP (!) 169/71   Pulse 73   Temp 97.4 °F (36.3 °C)   Resp 18   Ht 6' (1.829 m)   Wt 255 lb (115.7 kg)   SpO2 97%   BMI 34.58 kg/m²   24HR INTAKE/OUTPUT:    Intake/Output Summary (Last 24 hours) at 1/25/2021 1143  Last data filed at 1/25/2021 1014  Gross per 24 hour   Intake 1179 ml   Output 755 ml   Net 424 ml     General: Alert  HEENT: Conjunctiva/corneas clear, No icterus. No exudates. Neck: Supple, + JVD  Chest wall: Symmetric excursion,   Lung: Course to auscultation + rales , No wheezing  Heart: Regular with crisp AS murmur. No rub or  gallop. Abdomen: Obese, BS +, soft, no rigidity, rebound or guarding  Extremities: ++ pitting edema. Palp pulses. Femoral TLC  Skin: venous stasis, open dry wounds on legs, weaping   Musculokeletal: No trauma signs   Lymph nodes: No adenopathy  Neurologic: Non focal examination    Medications   Home and Current medications were discussed & reviewed on rounds with team and pharmacy liaison. Labs &  Imaging Studies   The data collected below information that was obtained, reviewed, analyzed and interpreted today.  Imaging test are reviewed with the radiologist during rounds. Comparison to previous images are always explored. CBC:   Lab Results   Component Value Date    WBC 11.3 01/25/2021    RBC 3.20 01/25/2021    HGB 8.9 01/25/2021    HCT 28.2 01/25/2021     01/25/2021    MCV 88.1 01/25/2021       BMP:    Lab Results   Component Value Date     01/25/2021    K 3.7 01/25/2021    CL 96 01/25/2021    CO2 30 01/25/2021    BUN 19 01/25/2021    CREATININE 0.7 01/25/2021    GLUCOSE 204 01/25/2021    GLUCOSE 138 03/26/2011    CALCIUM 7.4 01/25/2021       TSH:  No results found for: TSH    Imaging Studies:    RADIOLOGY: Films were read/reviewed and or discussed with radiology and the consulting teams which show pneumonia and edema    EKG: ICU Rhythm Strips were reviewed and discussed. Sinus, Rare Ectopy    ECHOCARDIOGRAM: 2020   Left ventricle is normal in size . Moderate left ventricular concentric hypertrophy noted. No regional wall motion abnormalities seen. Normal left ventricular ejection fraction. The left atrium is moderately dilated. Mild mitral annular calcification. The aortic valve appears mildly sclerotic. Mild aortic stenosis. Assessment    Dorys Heller is a 78 y.o. male was seen, examined and discussed with on multi-disciplinary team rounds. This note may be separate or in addition to the resident records. I have personally seen and examined the patient and the key elements of the encounter were performed by me. The medications & laboratory data was discussed and adjusted where necessary. The radiographic images were reviewed either as a group or with radiologist if felt dis-concordant with the exam or history. The above findings were corroborated, plans confirmed and changes made if needed. Current issues are : 78year old with recurrent hospitalization with RF/ Vlvular heart disease who has been recently discharged from [facility] where he completed his course of antibiotics from a previous hospitalization.  Also had IVC filter on the 6th and PEG changed to jejunostomy tube on the 15th. He had also been intubated while at [facility] for respiratory failure and extubated after several days, still with issues with secretions, poor cough/pulmonary hygiene,   1. Acute respiratory failure with hypoxia    2. Septic shock   3. Gastrointestinal hemorrhage, unspecified gastrointestinal hemorrhage type   4. Elevated troponin   5. Urinary tract infection without hematuria, site unspecified   6. Aspiration into respiratory tract, initial encounter  7. Anemia, unspecified type  8. Pseudomonas bacteriuria   9. HCAP / Aspiration pneumonia  (Pseudomonas and Proteus)  10. COPD  6. Elevated procalcitonin   12. Valvular heart disease         Plan   Family was updated at the bedside if present and available. Key issues of the case were discussed among consultants. Critical Care time is documented if appropriate. 1. Wean RR   2. ID following and patient is on Meropenem 1 gram IV q 8 hrs, Linezolid 600 mg IV q 12 hrs  3. Contact isolation   4. Pallative care consult for reasonable EOL discussion  5. Start Diuresis   6. PICC line need, remove femoral  7. Continue Tube feeding  8. Prealbumin level  9. On hydrocortisone as cortisol level was 3.77 on 1/23 will wean  10.  Check TSH on [address] DO Eduin, MPH, Kathi Reich  Professor of Internal Medicine

## 2021-01-25 NOTE — PROGRESS NOTES
[address] [place] [address] [facility]  Progress  Note   C/C : pneumonia, resp failure,     Pt is intubated, sedated  No fever     Current Facility-Administered Medications   Medication Dose Route Frequency Provider Last Rate Last Admin    insulin glargine (LANTUS) injection vial 10 Units  10 Units Subcutaneous Nightly Laurita Easley MD   10 Units at 01/24/21 2108    dornase alpha (PULMOZYME) nebulizer solution 2.5 mg  2.5 mg Inhalation Daily Laurita Easley MD   2.5 mg at 01/25/21 9218    phenylephrine (BIBI-SYNEPHRINE) 50 mg in dextrose 5 % 250 mL infusion   mcg/min Intravenous Continuous Burak Baker MD   Stopped at 01/23/21 1217    acetaminophen (TYLENOL) 160 MG/5ML solution 650 mg  650 mg Per G Tube Q4H PRN Nata Claudio MD   650 mg at 01/23/21 0727    amiodarone (CORDARONE) tablet 100 mg  100 mg PEG Tube Daily Nata Claudio MD   100 mg at 01/25/21 1011    atorvastatin (LIPITOR) tablet 20 mg  20 mg PEG Tube Daily Nata Claudio MD   20 mg at 01/25/21 1011    docusate sodium (ENEMEEZ) enema 283 mg  1 enema Rectal PRN Nata Claudio MD        glucose (GLUTOSE) 40 % oral gel 15 g  15 g Oral PRN Nata Claudio MD        dextrose 50 % IV solution  12.5 g Intravenous PRN Nata Claudio MD        glucagon (rDNA) injection 1 mg  1 mg Intramuscular PRN Nata Claudio MD        dextrose 5 % solution  100 mL/hr Intravenous PRN Nata Claudio MD        lactulose (CHRONULAC) 10 GM/15ML solution 20 g  20 g PEG Tube Daily Felix Hughes MD   20 g at 01/25/21 1011    latanoprost (XALATAN) 0.005 % ophthalmic solution 1 drop  1 drop Both Eyes Daily Nata Claudio MD   1 drop at 01/25/21 1014    metoclopramide (REGLAN) injection 10 mg  10 mg Intravenous Q6H Felix Hughes MD   10 mg at 01/25/21 0630    nystatin-triamcinolone (MYCOLOG II) cream   Topical 4x Daily Nata Claudio MD   Given at 01/25/21 1014    pantoprazole (PROTONIX) injection 40 mg  40 mg Intravenous Daily Felix GRECO MD Ellen   40 mg at 01/25/21 0917    And    sodium chloride (PF) 0.9 % injection 10 mL  10 mL Intravenous Daily Hussain Monteiro MD   10 mL at 01/25/21 0918    potassium & sodium phosphates (PHOS-NAK) 280-160-250 MG packet 250 mg  1 packet Per G Tube Daily Hussain Monteiro MD   250 mg at 01/25/21 1014    potassium bicarb-citric acid (EFFER-K) effervescent tablet 40 mEq  40 mEq Per G Tube Daily Hussain Monteiro MD   40 mEq at 01/25/21 1011    senna (SENOKOT) tablet 8.6 mg  1 tablet PEG Tube BID Hussain Monteiro MD   8.6 mg at 01/25/21 1012    sodium chloride (Inhalant) 3 % nebulizer solution 4 mL  4 mL Nebulization BID Hussain Monteiro MD   4 mL at 01/25/21 0833    sodium chloride flush 0.9 % injection 10 mL  10 mL Intravenous 2 times per day Hussain Monteiro MD   10 mL at 01/25/21 0918    sodium chloride flush 0.9 % injection 10 mL  10 mL Intravenous PRN Hussain Monteiro MD        enoxaparin (LOVENOX) injection 40 mg  40 mg Subcutaneous Daily Hussain Monteiro MD   40 mg at 01/25/21 1011    promethazine (PHENERGAN) tablet 12.5 mg  12.5 mg Oral Q6H PRN Hussain Monteiro MD        Or    ondansetron (ZOFRAN) injection 4 mg  4 mg Intravenous Q6H PRN Hussain Monteiro MD        polyethylene glycol (GLYCOLAX) packet 17 g  17 g Oral Daily PRN Hussain Monteiro MD        acetaminophen (TYLENOL) tablet 650 mg  650 mg Oral Q6H PRN Hussain Monteiro MD        Or    acetaminophen (TYLENOL) suppository 650 mg  650 mg Rectal Q6H PRN Felix Hughes MD        0.9 % sodium chloride infusion   Intravenous Q8H Felix Hughes MD        meropenem (MERREM) 1,000 mg in sodium chloride 0.9 % 100 mL IVPB (mini-bag)  1,000 mg Intravenous Q8H Ruben P MD Olga   Stopped at 01/25/21 1014    linezolid (ZYVOX) IVPB 600 mg  600 mg Intravenous Q12H Ruben P MD Olga 300 mL/hr at 01/25/21 1013 600 mg at 01/25/21 1013    hydrocortisone sodium succinate PF (SOLU-CORTEF) injection 100 mg  100 mg Intravenous Q8H Za Alatorre MD   100 mg at 01/25/21 0917    budesonide (PULMICORT) nebulizer suspension 500 mcg  500 mcg Nebulization BID Marie Wright MD   500 mcg at 01/25/21 8158    Arformoterol Tartrate (BROVANA) nebulizer solution 15 mcg  15 mcg Nebulization BID Marie Wright MD   15 mcg at 01/25/21 9651    insulin lispro (HUMALOG) injection vial 0-18 Units  0-18 Units Subcutaneous TID [facility] Marie Wright MD   6 Units at 01/24/21 1707    insulin lispro (HUMALOG) injection vial 0-9 Units  0-9 Units Subcutaneous Nightly Marie Wright MD   3 Units at 01/24/21 2111    ipratropium-albuterol (DUONEB) nebulizer solution 1 ampule  1 ampule Inhalation Q4H WA Marie Wright MD   1 ampule at 01/25/21 4163    fentaNYL 5 mcg/ml in 0.9%  ml infusion  12.5-200 mcg/hr Intravenous Continuous Anna Joya MD 30 mL/hr at 01/25/21 0928 150 mcg/hr at 01/25/21 0928    fentaNYL (SUBLIMAZE) injection 50 mcg  50 mcg Intravenous Q1H PRN Anna Joya MD   50 mcg at 01/24/21 2301    norepinephrine (LEVOPHED) 16 mg in dextrose 5% 250 mL infusion  2-100 mcg/min Intravenous Continuous Anna Joya MD 11.3 mL/hr at 01/25/21 0915 12 mcg/min at 01/25/21 0915       REVIEW OF SYSTEMS: could not be obtained       PHYSICAL EXAM:    Vitals:   BP (!) 169/71   Pulse 88   Temp 99.5 °F (37.5 °C)   Resp 18   Ht 6' (1.829 m)   Wt 255 lb (115.7 kg)   SpO2 100%   BMI 34.58 kg/m²      Constitutional: Intubated and sedated, opened eyes to tactile stimuli    FiO2 40 %, PEEP 6  Skin: Warm and dry. Multiple partial thickness skin wounds    HEENT:Pallor +, no LN , ET tube,   Neck: Supple to movements. No lymphadenopathy.    Chest: Bilateral scattered rhonchi   Cardiovascular: Regular, no murmur   Abdomen:soft, bowel sound +  PEG - no drainage or erythema   Extremities: +++ edema    Lines: Right  femoral TLC     CBC with Differential:      Lab Results   Component Value Date    WBC 11.3 01/25/2021    RBC 3.20 01/25/2021    HGB 8.9 01/25/2021    HCT 28.2 01/25/2021     01/25/2021    MCV 88.1 01/25/2021    MCH 27.8 01/25/2021    MCHC 31.6 01/25/2021    RDW 18.5 01/25/2021    NRBC 0.9 01/08/2021    SEGSPCT 64 09/27/2013    METASPCT 6.0 01/06/2021    LYMPHOPCT 8.1 01/25/2021    PROMYELOPCT 0.9 12/21/2020    MONOPCT 4.4 01/25/2021    MYELOPCT 3.0 01/06/2021    BASOPCT 0.1 01/25/2021    MONOSABS 0.50 01/25/2021    LYMPHSABS 0.92 01/25/2021    EOSABS 0.00 01/25/2021    BASOSABS 0.01 01/25/2021       CMP:    Lab Results   Component Value Date     01/25/2021    K 3.7 01/25/2021    CL 96 01/25/2021    CO2 30 01/25/2021    BUN 19 01/25/2021    CREATININE 0.7 01/25/2021    GFRAA >60 01/25/2021    LABGLOM >60 01/25/2021    GLUCOSE 204 01/25/2021    GLUCOSE 138 03/26/2011    PROT 4.3 01/22/2021    LABALBU 2.0 01/22/2021    LABALBU 4.0 03/26/2011    CALCIUM 7.4 01/25/2021    BILITOT 0.3 01/22/2021    ALKPHOS 123 01/22/2021    AST 18 01/22/2021    ALT 8 01/22/2021       Hepatic Function Panel:    Lab Results   Component Value Date    ALKPHOS 123 01/22/2021    ALT 8 01/22/2021    AST 18 01/22/2021    PROT 4.3 01/22/2021    BILITOT 0.3 01/22/2021    BILIDIR 0.4 01/14/2021    IBILI 0.5 01/14/2021    LABALBU 2.0 01/22/2021    LABALBU 4.0 03/26/2011         Microbiology :    Blood culture - neg to date   Urine Culture -     Susceptibility    Pseudomonas aeruginosa (1)    Antibiotic Interpretation SNEHA Status    gentamicin Sensitive <=^1 mcg/mL     levofloxacin Sensitive <=^0.12 mcg/mL     tobramycin Sensitive <=^1 mcg/mL     Lab and Collection        Sputum Culture-      Susceptibility    Pseudomonas aeruginosa (1)    Antibiotic Interpretation SNEHA Status    gentamicin Sensitive <=^1 mcg/mL     levofloxacin Sensitive <=^0.12 mcg/mL     tobramycin Sensitive <=^1 mcg/mL     Proteus mirabilis (4)    Antibiotic Interpretation SNEHA Status    ampicillin Sensitive <=^2 mcg/mL     ceFAZolin Sensitive <=^4 mcg/mL     cefepime Sensitive <=^0.12 mcg/mL     cefTRIAXone Sensitive <=^0.25 mcg/mL     ertapenem Sensitive <=^0.12 mcg/mL     gentamicin Sensitive <=^1 mcg/mL     levofloxacin Resistant >=^8 mcg/mL     piperacillin-tazobactam Sensitive <=^4 mcg/mL     trimethoprim-sulfamethoxazole Sensitive <=^20 mcg/mL      Condensed View   Lab and Collection    Culture, Respiratory - 1/23/2021         procalcitonin 1.23       Radiology :    Chest X ray - bilateral infiltrates       Assessment:  · Pseudomonas bacteriuria    · HCAP / Aspiration pneumonia  (Pseudomonas and Proteus)   · Acute respiratory failure - intubated   · Elevated procalcitonin       Plan:    · Meropenem 1 gram IV q 8 hrs, Linezolid 600 mg IV q 12 hrs   · Contact isolation   · Vent support       Ruben P Limbu  10:15 AM  1/25/2021

## 2021-01-25 NOTE — PROGRESS NOTES
Arabella Lepe is a 78 y.o. male  Chief Complaint   Patient presents with    Respiratory Distress     pt brought in by EMS for possible aspiration. pt had an episode of emesis while wearing Bipap. pt was being bagged by ems upon arrival     HPI  Pt intubated, events of weekend reviewed with nursing staff and records. No current facility-administered medications on file prior to encounter. Current Outpatient Medications on File Prior to Encounter   Medication Sig Dispense Refill    dextrose 50 % solution Infuse 25 g intravenously as needed      potassium bicarb-citric acid (EFFER-K) 20 MEQ TBEF effervescent tablet Take 40 tablets by mouth daily      potassium & sodium phosphates (PHOS-NAK) 280-160-250 MG PACK 1 packet by Per G Tube route daily      sodium chloride, PF, 0.9 % injection Infuse 50 mLs intravenously continuous      sodium chloride, Inhalant, 3 % nebulizer solution Take 4 mLs by nebulization 2 times daily      atorvastatin (LIPITOR) 20 MG tablet 20 mg daily Peg tube      amiodarone (PACERONE) 100 MG tablet 100 mg by PEG Tube route daily      promethazine (PHENERGAN) 12.5 MG tablet 12.5 mg by PEG Tube route every 6 hours as needed for Nausea      Benzocaine-Docusate Sodium (ENEMEEZ PLUS)  MG ENEM Place rectally as needed      hydrocortisone sodium succinate PF (SOLU-CORTEF) 100 MG injection Infuse 25 mg intravenously every 8 hours      insulin lispro (HUMALOG) 100 UNIT/ML injection vial Inject into the skin every 6 hours 0-16 units sliding scale      lactulose (CHRONULAC) 10 GM/15ML solution Take 20 g by mouth daily      metoclopramide (REGLAN) 5 MG/ML injection Infuse 10 mg intravenously every 6 hours      midodrine (PROAMATINE) 5 MG tablet 5 mg by PEG Tube route 3 times daily      nystatin-triamcinolone (MYCOLOG II) 765279-0.1 UNIT/GM-% cream Apply topically 4 times daily Apply topically 4 times daily.       pantoprazole sodium (PROTONIX) 40 MG PACK packet 40 mg by Per G site unspecified    6. Aspiration into respiratory tract, initial encounter    7.  Anemia, unspecified type      PLAN  Abx per ID team  Critical care  Monitor I/O  Attempt to wean

## 2021-01-25 NOTE — PROGRESS NOTES
picc Placement 1/25/2021    Product number: [id number]   Lot Number: [id number]      Ultrasound: yes   Right Brachial vein:                Upper Arm Circumference: 40cm    Size: 5    Exposed Length: 2cm    Internal Length: 42cm   Cut: 6cm   Vein Measurement: 0.56cm    Lea Musa ordered  1/25/2021  12:44 PM

## 2021-01-25 NOTE — PLAN OF CARE
Problem: Skin Integrity:  Goal: Will show no infection signs and symptoms  Description: Will show no infection signs and symptoms  1/25/2021 0701 by Antonieta Corado RN  Outcome: Met This Shift  1/24/2021 1840 by Leyla Petit RN  Outcome: Met This Shift  Goal: Absence of new skin breakdown  Description: Absence of new skin breakdown  1/25/2021 0701 by Antonieta Corado RN  Outcome: Met This Shift  1/24/2021 1840 by Leyla Petit RN  Outcome: Met This Shift     Problem: Falls - Risk of:  Goal: Will remain free from falls  Description: Will remain free from falls  1/25/2021 0701 by Antonieta Corado RN  Outcome: Met This Shift  1/24/2021 1840 by Leyla Petit RN  Outcome: Met This Shift  Goal: Absence of physical injury  Description: Absence of physical injury  1/25/2021 0701 by Antonieta Corado RN  Outcome: Met This Shift  1/24/2021 1840 by Leyla Petit RN  Outcome: Met This Shift

## 2021-01-26 ENCOUNTER — APPOINTMENT (OUTPATIENT)
Dept: GENERAL RADIOLOGY | Age: 81
DRG: 870 | End: 2021-01-26
Payer: COMMERCIAL

## 2021-01-26 LAB
AADO2: 175.2 MMHG
ANION GAP SERPL CALCULATED.3IONS-SCNC: 9 MMOL/L (ref 7–16)
B.E.: 4.9 MMOL/L (ref -3–3)
BASOPHILS ABSOLUTE: 0.01 E9/L (ref 0–0.2)
BASOPHILS RELATIVE PERCENT: 0.1 % (ref 0–2)
BUN BLDV-MCNC: 15 MG/DL (ref 8–23)
CALCIUM SERPL-MCNC: 7 MG/DL (ref 8.6–10.2)
CHLORIDE BLD-SCNC: 100 MMOL/L (ref 98–107)
CO2: 30 MMOL/L (ref 22–29)
COHB: 1.9 % (ref 0–1.5)
CREAT SERPL-MCNC: 0.7 MG/DL (ref 0.7–1.2)
CRITICAL: ABNORMAL
CULTURE, RESPIRATORY: ABNORMAL
DATE ANALYZED: ABNORMAL
DATE OF COLLECTION: ABNORMAL
EOSINOPHILS ABSOLUTE: 0 E9/L (ref 0.05–0.5)
EOSINOPHILS RELATIVE PERCENT: 0 % (ref 0–6)
FIO2: 40 %
GFR AFRICAN AMERICAN: >60
GFR NON-AFRICAN AMERICAN: >60 ML/MIN/1.73
GLUCOSE BLD-MCNC: 207 MG/DL (ref 74–99)
HCO3: 27.4 MMOL/L (ref 22–26)
HCT VFR BLD CALC: 26.5 % (ref 37–54)
HEMOGLOBIN: 8.2 G/DL (ref 12.5–16.5)
HHB: 6.3 % (ref 0–5)
IMMATURE GRANULOCYTES #: 0.1 E9/L
IMMATURE GRANULOCYTES %: 1.1 % (ref 0–5)
LAB: ABNORMAL
LYMPHOCYTES ABSOLUTE: 0.97 E9/L (ref 1.5–4)
LYMPHOCYTES RELATIVE PERCENT: 10.8 % (ref 20–42)
Lab: ABNORMAL
MAGNESIUM: 1.9 MG/DL (ref 1.6–2.6)
MCH RBC QN AUTO: 26.9 PG (ref 26–35)
MCHC RBC AUTO-ENTMCNC: 30.9 % (ref 32–34.5)
MCV RBC AUTO: 86.9 FL (ref 80–99.9)
METER GLUCOSE: 176 MG/DL (ref 74–99)
METER GLUCOSE: 187 MG/DL (ref 74–99)
METER GLUCOSE: 221 MG/DL (ref 74–99)
METER GLUCOSE: 231 MG/DL (ref 74–99)
METHB: 0.3 % (ref 0–1.5)
MODE: AC
MONOCYTES ABSOLUTE: 0.65 E9/L (ref 0.1–0.95)
MONOCYTES RELATIVE PERCENT: 7.2 % (ref 2–12)
NEUTROPHILS ABSOLUTE: 7.29 E9/L (ref 1.8–7.3)
NEUTROPHILS RELATIVE PERCENT: 80.8 % (ref 43–80)
O2 CONTENT: 11.5 ML/DL
O2 SATURATION: 93.6 % (ref 92–98.5)
O2HB: 91.5 % (ref 94–97)
OPERATOR ID: ABNORMAL
ORGANISM: ABNORMAL
PATIENT TEMP: 37 C
PCO2: 32.6 MMHG (ref 35–45)
PDW BLD-RTO: 18.5 FL (ref 11.5–15)
PEEP/CPAP: 8 CMH2O
PFO2: 1.56 MMHG/%
PH BLOOD GAS: 7.54 (ref 7.35–7.45)
PLATELET # BLD: 343 E9/L (ref 130–450)
PMV BLD AUTO: 9.7 FL (ref 7–12)
PO2: 62.5 MMHG (ref 75–100)
POTASSIUM REFLEX MAGNESIUM: 3.2 MMOL/L (ref 3.5–5)
POTASSIUM SERPL-SCNC: 3.3 MMOL/L (ref 3.5–5)
RBC # BLD: 3.05 E12/L (ref 3.8–5.8)
RI(T): 2.8
RR MECHANICAL: 18 B/MIN
SMEAR, RESPIRATORY: ABNORMAL
SODIUM BLD-SCNC: 139 MMOL/L (ref 132–146)
SOURCE, BLOOD GAS: ABNORMAL
THB: 8.9 G/DL (ref 11.5–16.5)
TIME ANALYZED: 444
VT MECHANICAL: 500 ML
WBC # BLD: 9 E9/L (ref 4.5–11.5)

## 2021-01-26 PROCEDURE — 99233 SBSQ HOSP IP/OBS HIGH 50: CPT | Performed by: INTERNAL MEDICINE

## 2021-01-26 PROCEDURE — 84132 ASSAY OF SERUM POTASSIUM: CPT

## 2021-01-26 PROCEDURE — 6370000000 HC RX 637 (ALT 250 FOR IP): Performed by: INTERNAL MEDICINE

## 2021-01-26 PROCEDURE — 82962 GLUCOSE BLOOD TEST: CPT

## 2021-01-26 PROCEDURE — 80048 BASIC METABOLIC PNL TOTAL CA: CPT

## 2021-01-26 PROCEDURE — C9113 INJ PANTOPRAZOLE SODIUM, VIA: HCPCS | Performed by: INTERNAL MEDICINE

## 2021-01-26 PROCEDURE — 71045 X-RAY EXAM CHEST 1 VIEW: CPT

## 2021-01-26 PROCEDURE — 6360000002 HC RX W HCPCS: Performed by: INTERNAL MEDICINE

## 2021-01-26 PROCEDURE — 2500000003 HC RX 250 WO HCPCS: Performed by: INTERNAL MEDICINE

## 2021-01-26 PROCEDURE — 83735 ASSAY OF MAGNESIUM: CPT

## 2021-01-26 PROCEDURE — 85025 COMPLETE CBC W/AUTO DIFF WBC: CPT

## 2021-01-26 PROCEDURE — 2580000003 HC RX 258: Performed by: INTERNAL MEDICINE

## 2021-01-26 PROCEDURE — 36415 COLL VENOUS BLD VENIPUNCTURE: CPT

## 2021-01-26 PROCEDURE — 2700000000 HC OXYGEN THERAPY PER DAY

## 2021-01-26 PROCEDURE — 82805 BLOOD GASES W/O2 SATURATION: CPT

## 2021-01-26 PROCEDURE — 2500000003 HC RX 250 WO HCPCS: Performed by: EMERGENCY MEDICINE

## 2021-01-26 PROCEDURE — 94640 AIRWAY INHALATION TREATMENT: CPT

## 2021-01-26 PROCEDURE — 94003 VENT MGMT INPAT SUBQ DAY: CPT

## 2021-01-26 PROCEDURE — 2000000000 HC ICU R&B

## 2021-01-26 RX ORDER — MINERAL OIL/HYDROPHIL PETROLAT
OINTMENT (GRAM) TOPICAL 2 TIMES DAILY
Status: DISCONTINUED | OUTPATIENT
Start: 2021-01-26 | End: 2021-02-12 | Stop reason: HOSPADM

## 2021-01-26 RX ORDER — INSULIN GLARGINE 100 [IU]/ML
20 INJECTION, SOLUTION SUBCUTANEOUS NIGHTLY
Status: DISCONTINUED | OUTPATIENT
Start: 2021-01-26 | End: 2021-01-28

## 2021-01-26 RX ORDER — MINERAL OIL/HYDROPHIL PETROLAT
OINTMENT (GRAM) TOPICAL 4 TIMES DAILY PRN
Status: DISCONTINUED | OUTPATIENT
Start: 2021-01-26 | End: 2021-02-12 | Stop reason: HOSPADM

## 2021-01-26 RX ADMIN — LINEZOLID 600 MG: 600 INJECTION, SOLUTION INTRAVENOUS at 20:30

## 2021-01-26 RX ADMIN — Medication 9 MCG/MIN: at 05:30

## 2021-01-26 RX ADMIN — POTASSIUM CHLORIDE 20 MEQ: 400 INJECTION, SOLUTION INTRAVENOUS at 17:23

## 2021-01-26 RX ADMIN — ENOXAPARIN SODIUM 40 MG: 40 INJECTION SUBCUTANEOUS at 07:30

## 2021-01-26 RX ADMIN — IPRATROPIUM BROMIDE AND ALBUTEROL SULFATE 1 AMPULE: 2.5; .5 SOLUTION RESPIRATORY (INHALATION) at 17:18

## 2021-01-26 RX ADMIN — CHLORHEXIDINE GLUCONATE 0.12% ORAL RINSE 15 ML: 1.2 LIQUID ORAL at 07:32

## 2021-01-26 RX ADMIN — HYDROCORTISONE SODIUM SUCCINATE 50 MG: 100 INJECTION, POWDER, FOR SOLUTION INTRAMUSCULAR; INTRAVENOUS at 18:06

## 2021-01-26 RX ADMIN — SENNOSIDES 8.6 MG: 8.6 TABLET, FILM COATED ORAL at 07:31

## 2021-01-26 RX ADMIN — MEROPENEM 1000 MG: 1 INJECTION, POWDER, FOR SOLUTION INTRAVENOUS at 16:18

## 2021-01-26 RX ADMIN — SODIUM CHLORIDE SOLN NEBU 3% 4 ML: 3 NEBU SOLN at 08:39

## 2021-01-26 RX ADMIN — IPRATROPIUM BROMIDE AND ALBUTEROL SULFATE 1 AMPULE: 2.5; .5 SOLUTION RESPIRATORY (INHALATION) at 08:38

## 2021-01-26 RX ADMIN — INSULIN LISPRO 6 UNITS: 100 INJECTION, SOLUTION INTRAVENOUS; SUBCUTANEOUS at 07:24

## 2021-01-26 RX ADMIN — Medication: at 14:05

## 2021-01-26 RX ADMIN — HYDROCORTISONE SODIUM SUCCINATE 50 MG: 100 INJECTION, POWDER, FOR SOLUTION INTRAMUSCULAR; INTRAVENOUS at 02:09

## 2021-01-26 RX ADMIN — INSULIN LISPRO 3 UNITS: 100 INJECTION, SOLUTION INTRAVENOUS; SUBCUTANEOUS at 16:20

## 2021-01-26 RX ADMIN — PANTOPRAZOLE SODIUM 40 MG: 40 INJECTION, POWDER, FOR SOLUTION INTRAVENOUS at 07:29

## 2021-01-26 RX ADMIN — SODIUM CHLORIDE: 9 INJECTION, SOLUTION INTRAVENOUS at 06:40

## 2021-01-26 RX ADMIN — MEROPENEM 1000 MG: 1 INJECTION, POWDER, FOR SOLUTION INTRAVENOUS at 07:31

## 2021-01-26 RX ADMIN — MEROPENEM 1000 MG: 1 INJECTION, POWDER, FOR SOLUTION INTRAVENOUS at 02:09

## 2021-01-26 RX ADMIN — POTASSIUM CHLORIDE 20 MEQ: 400 INJECTION, SOLUTION INTRAVENOUS at 07:31

## 2021-01-26 RX ADMIN — SENNOSIDES 8.6 MG: 8.6 TABLET, FILM COATED ORAL at 20:13

## 2021-01-26 RX ADMIN — AMIODARONE HYDROCHLORIDE 100 MG: 200 TABLET ORAL at 07:31

## 2021-01-26 RX ADMIN — NYSTATIN AND TRIAMCINOLONE ACETONIDE: 100000; 1 CREAM TOPICAL at 07:32

## 2021-01-26 RX ADMIN — SODIUM CHLORIDE, PRESERVATIVE FREE 10 ML: 5 INJECTION INTRAVENOUS at 07:29

## 2021-01-26 RX ADMIN — LATANOPROST 1 DROP: 50 SOLUTION OPHTHALMIC at 07:32

## 2021-01-26 RX ADMIN — LINEZOLID 600 MG: 600 INJECTION, SOLUTION INTRAVENOUS at 08:19

## 2021-01-26 RX ADMIN — POTASSIUM BICARBONATE 40 MEQ: 782 TABLET, EFFERVESCENT ORAL at 07:30

## 2021-01-26 RX ADMIN — Medication: at 18:46

## 2021-01-26 RX ADMIN — Medication 10 ML: at 20:13

## 2021-01-26 RX ADMIN — BUMETANIDE 1 MG: 0.25 INJECTION, SOLUTION INTRAMUSCULAR; INTRAVENOUS at 20:13

## 2021-01-26 RX ADMIN — BUMETANIDE 1 MG: 0.25 INJECTION, SOLUTION INTRAMUSCULAR; INTRAVENOUS at 07:30

## 2021-01-26 RX ADMIN — SODIUM CHLORIDE SOLN NEBU 3% 4 ML: 3 NEBU SOLN at 20:18

## 2021-01-26 RX ADMIN — INSULIN LISPRO 6 UNITS: 100 INJECTION, SOLUTION INTRAVENOUS; SUBCUTANEOUS at 12:27

## 2021-01-26 RX ADMIN — DORNASE ALFA 2.5 MG: 1 SOLUTION RESPIRATORY (INHALATION) at 08:38

## 2021-01-26 RX ADMIN — POTASSIUM CHLORIDE 20 MEQ: 400 INJECTION, SOLUTION INTRAVENOUS at 17:25

## 2021-01-26 RX ADMIN — POTASSIUM CHLORIDE 20 MEQ: 400 INJECTION, SOLUTION INTRAVENOUS at 06:40

## 2021-01-26 RX ADMIN — BUDESONIDE 500 MCG: 0.5 SUSPENSION RESPIRATORY (INHALATION) at 08:38

## 2021-01-26 RX ADMIN — IPRATROPIUM BROMIDE AND ALBUTEROL SULFATE 1 AMPULE: 2.5; .5 SOLUTION RESPIRATORY (INHALATION) at 11:40

## 2021-01-26 RX ADMIN — BUDESONIDE 500 MCG: 0.5 SUSPENSION RESPIRATORY (INHALATION) at 20:18

## 2021-01-26 RX ADMIN — IPRATROPIUM BROMIDE AND ALBUTEROL SULFATE 1 AMPULE: 2.5; .5 SOLUTION RESPIRATORY (INHALATION) at 20:18

## 2021-01-26 RX ADMIN — Medication 50 MCG/HR: at 16:21

## 2021-01-26 RX ADMIN — Medication 150 MCG/HR: at 03:58

## 2021-01-26 RX ADMIN — ATORVASTATIN CALCIUM 20 MG: 20 TABLET, FILM COATED ORAL at 07:33

## 2021-01-26 RX ADMIN — METOCLOPRAMIDE HYDROCHLORIDE 10 MG: 5 INJECTION INTRAMUSCULAR; INTRAVENOUS at 07:33

## 2021-01-26 RX ADMIN — LACTULOSE 20 G: 20 SOLUTION ORAL at 07:31

## 2021-01-26 RX ADMIN — POTASSIUM & SODIUM PHOSPHATES POWDER PACK 280-160-250 MG 250 MG: 280-160-250 PACK at 07:31

## 2021-01-26 RX ADMIN — SODIUM CHLORIDE: 9 INJECTION, SOLUTION INTRAVENOUS at 22:40

## 2021-01-26 RX ADMIN — INSULIN LISPRO 2 UNITS: 100 INJECTION, SOLUTION INTRAVENOUS; SUBCUTANEOUS at 20:14

## 2021-01-26 RX ADMIN — HYDROCORTISONE SODIUM SUCCINATE 50 MG: 100 INJECTION, POWDER, FOR SOLUTION INTRAMUSCULAR; INTRAVENOUS at 07:30

## 2021-01-26 RX ADMIN — Medication: at 20:16

## 2021-01-26 RX ADMIN — CHLORHEXIDINE GLUCONATE 0.12% ORAL RINSE 15 ML: 1.2 LIQUID ORAL at 20:13

## 2021-01-26 RX ADMIN — INSULIN GLARGINE 20 UNITS: 100 INJECTION, SOLUTION SUBCUTANEOUS at 20:13

## 2021-01-26 ASSESSMENT — PULMONARY FUNCTION TESTS
PIF_VALUE: 22
PIF_VALUE: 25
PIF_VALUE: 24
PIF_VALUE: 24
PIF_VALUE: 25
PIF_VALUE: 22
PIF_VALUE: 23
PIF_VALUE: 22
PIF_VALUE: 25
PIF_VALUE: 25
PIF_VALUE: 22

## 2021-01-26 ASSESSMENT — PAIN SCALES - GENERAL
PAINLEVEL_OUTOF10: 0
PAINLEVEL_OUTOF10: 0

## 2021-01-26 NOTE — PROGRESS NOTES
Palliative Care Department  [phone]  Palliative Care Progress Note  Provider [place] APRN-CNP, AGACNP-BC    Dorys Heller  [id number]  Hospital Day: 5  Date of Initial Consult: 1/25/2021  Referring Provider: Orville York. Ted Coleman DO  Palliative Medicine was consulted for assistance with: Goals of care, CODE STATUS discussion, and family support    HPI:   Dorys Heller is a 78 y.o. with a past medical history of DM, HTN, enlarged prostate, anxiety, bullous pemphigoid, HLD, rheumatoid disease, proximal atrial fibrillation, dementia, blood clots, DVT of LLE who was admitted on 1/22/2021 from [facility] with a CHIEF COMPLAINT of emesis while on BiPAP. ASSESSMENT/PLAN:     Pertinent Hospital Diagnoses      Acute respiratory failure-intubated, MICU   HCAP/aspiration pneumonia-meropenem, linezolid, contact isolation      Palliative Care Encounter / Counseling Regarding Goals of Care  Please see detailed goals of care discussion as below   At this time, Dorys Heller, Does Not have capacity for medical decision-making. Capacity is time limited and situation/question specific   During encounter Niranjan Barnes was surrogate medical decision-maker   Outcome of goals of care meeting:   Code status DNR-CCA, OK with re-intubation    Advanced Directives: no POA or living will in [facility]   Surrogate/Legal NOK:    Bienvenido Webb [phone] primary contact daughter  Ernie Cowart. [phone] son     Spiritual assessment: no spiritual distress identified   Bereavement and grief: to be determined  Referrals to: none today  SUBJECTIVE:     Current medical issues leading to Palliative Medicine involvement include   Active Hospital Problems    Diagnosis Date Noted    Respiratory failure [facility]) [J96.90] 01/22/2021       Details of Conversation:   1/26/2021: Labs and chart reviewed. Reviewed the patient with Staff RN. Per RN, the patient is going to be tested for COVID-19.  Infectious disease consulted. Patient remains on the ventilator, on Levophed. Did not personally assess the patient to preserve PPE for Physicians and nursing staff. No change in the plan of care. Per , plan remains the patient will return to [facility] upon discharge. Palliative Care following for goals of care. 1/25/2021 Patient recently discharged from hospital on 12/23/2020. Patient with reported emesis with BiPAP in place at [facility]. Brought to facility by EMS. Currently in the MICU on ventilator at this time, on Levophed. Patient with declining mentation, family stated no trach if it comes to that point and not following commands. Palliatve Care was consulted to help with CODE STATUS discussion, family support and goals of care. Call placed to Janey Gorman. 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 Contact information for Palliative Care given. Patient was discharged on 12/23/2020 and went to [facility]; per daughter: arrested, down for 9 minutes, ended up on vent. Last Wednesday discharged back to [facility], Thursday placed on BiPAP, Friday while on BiPAP had emesis that went down into the patient's lung. Patient's daughter stated that they had paperwork for a DNR-CCA, but it was not completed and signed before the patient needed to come back into the hospital.  Changed the patient to a DNR CCA at this time with the daughter's permission. Daughter Janey Gorman stated that she will call her brother to inform him. Explained to Janey Gorman that with a DNR-CCA that her father may be reintubated after extubation. Goal-for her father to be comfortable. OBJECTIVE:   Prognosis: Guarded       According to institutional recommendations and guidelines, a face-to-face encounter was not performed due to the current efforts to prevent transmission of COVID-19 and the need to preserve PPE for other caregivers.   Relevant records, nursing assessment, and diagnostic

## 2021-01-26 NOTE — PROGRESS NOTES
[facility] is a 78 y.o. male  Chief Complaint   Patient presents with    Respiratory Distress     pt brought in by EMS for possible aspiration. pt had an episode of emesis while wearing Bipap. pt was being bagged by ems upon arrival     HPI  Pt is intubated, events of last 24 hrs reviewed with staff and records. No current facility-administered medications on file prior to encounter. Current Outpatient Medications on File Prior to Encounter   Medication Sig Dispense Refill    dextrose 50 % solution Infuse 25 g intravenously as needed      potassium bicarb-citric acid (EFFER-K) 20 MEQ TBEF effervescent tablet Take 40 tablets by mouth daily      potassium & sodium phosphates (PHOS-NAK) 280-160-250 MG PACK 1 packet by Per G Tube route daily      sodium chloride, PF, 0.9 % injection Infuse 50 mLs intravenously continuous      sodium chloride, Inhalant, 3 % nebulizer solution Take 4 mLs by nebulization 2 times daily      atorvastatin (LIPITOR) 20 MG tablet 20 mg daily Peg tube      amiodarone (PACERONE) 100 MG tablet 100 mg by PEG Tube route daily      promethazine (PHENERGAN) 12.5 MG tablet 12.5 mg by PEG Tube route every 6 hours as needed for Nausea      Benzocaine-Docusate Sodium (ENEMEEZ PLUS)  MG ENEM Place rectally as needed      hydrocortisone sodium succinate PF (SOLU-CORTEF) 100 MG injection Infuse 25 mg intravenously every 8 hours      insulin lispro (HUMALOG) 100 UNIT/ML injection vial Inject into the skin every 6 hours 0-16 units sliding scale      lactulose (CHRONULAC) 10 GM/15ML solution Take 20 g by mouth daily      metoclopramide (REGLAN) 5 MG/ML injection Infuse 10 mg intravenously every 6 hours      midodrine (PROAMATINE) 5 MG tablet 5 mg by PEG Tube route 3 times daily      nystatin-triamcinolone (MYCOLOG II) 320321-2.1 UNIT/GM-% cream Apply topically 4 times daily Apply topically 4 times daily.       pantoprazole sodium (PROTONIX) 40 MG PACK packet 40 mg by Per G Tube route 2 times daily (before meals) Pantoprazole/ sodium bicarb liquid 2mg/ml oral liquid 40 mg peg tube BID      glucagon, rDNA, 1 MG injection Inject 1 mg into the muscle as needed for Low blood sugar (Blood glucose less than 70 mg/dL and patient NOT ALERT or NPO and does not have IV access. ) 1 each 0    folic acid (FOLVITE) 1 MG tablet 1 tablet by Per G Tube route daily 30 tablet 3    docusate sodium (ENEMEEZ) 283 MG enema Place 5 mLs rectally daily 1 each 0    glucose (GLUTOSE) 40 % GEL Take 15 g by mouth as needed 45 g 1    latanoprost (XALATAN) 0.005 % ophthalmic solution Place 1 drop into both eyes daily       acetaminophen (TYLENOL) 325 MG tablet Take 650 mg by mouth every 4 hours as needed for Pain or Fever (max = 3000 mg/24 hours)       Past Medical History:   Diagnosis Date    Acute deep vein thrombosis (DVT) of femoral vein of left lower extremity (HCC) 1/5/2021    Anxiety     Benign localized hyperplasia of prostate with urinary obstruction     Bullous pemphigoid     D[facility]     Diabetes mellitus (HCC)     Enlarged prostate     Hx of blood clots     Hyperlipidemia     Hypertension     Paroxysmal atrial fibrillation ([facility] 1/12/2019    Respiratory failure ([facility] 12/2020    Rheumatoid disease ([facility]          OBJECTIVE  Vitals:    01/26/21 0600   BP: 116/63   Pulse: 73   Resp: 18   Temp:    SpO2: 99%     Gen: Intubated   Head: AT/NC, PERRL, EOMIx2, no icterus, conjunctival injection  Neck: No JVD, carotid bruits, LAD, thyroid non-palpable  Heart: RRR with no murmurs, rubs, gallops  Lungs: Diminished in all fields   Abd: soft, NT, ND, BS+, no G/R, no HSM  Ext: No C/C +edema lower with erythema  pulses intact distally B/L  Lab Results   Component Value Date    WBC 9.0 01/26/2021    HGB 8.2 (L) 01/26/2021    HCT 26.5 (L) 01/26/2021    MCV 86.9 01/26/2021     01/26/2021    LYMPHOPCT 10.8 (L) 01/26/2021    RBC 3.05 (L) 01/26/2021    MCH 26.9 01/26/2021    MCHC 30.9 (L) 01/26/2021 RDW 18.5 (H) 01/26/2021         Lab Results   Component Value Date     01/26/2021    K 3.2 01/26/2021     01/26/2021    CO2 30 01/26/2021    BUN 15 01/26/2021    CREATININE 0.7 01/26/2021    GLUCOSE 207 01/26/2021    GLUCOSE 138 03/26/2011    CALCIUM 7.0 01/26/2021          ASSESSMENT  1. Acute respiratory failure with hypoxia ([facility]    2. Septic shock ([facility]    3. Gastrointestinal hemorrhage, unspecified gastrointestinal hemorrhage type    4. Elevated troponin    5. Urinary tract infection without hematuria, site unspecified    6. Aspiration into respiratory tract, initial encounter    7.  Anemia, unspecified type      PLAN  Attempt to wean  Abx per ID team  Critical care  Monitor labs

## 2021-01-26 NOTE — FLOWSHEET NOTE
Inpatient Wound Care(initial evaluation)  [id number]    Admit Date: 1/22/2021  8:35 PM    Reason for consult:  Sacrum, arms, legs    Significant history:    CHIEF COMPLAINT:  Aspiration  Extensive past medical history including dementia, insulin-dependent DM 2, multiple pressure ulcers, atrial fibrillation; presented to the ED from an outside facility via EMS due to hypoxia after vomiting while on his BiPAP machine prior to arrival. Ervin De reportedly went unresponsive while en route.       Wound history:  Admitted with wounds from ECF    Findings:       01/26/21 1045   Skin Integrity   Skin Integrity Bruising; Redness; Weeping  (dried scabs, skin tears)   Location BUE   Skin Integrity Site 2   Skin Integrity Location 2 Redness  (dried scabs)   Location 2 BLE- left greater than right   Wound 01/23/21 Sacrum   Date First Assessed/Time First Assessed: 01/23/21 0045   Present on Hospital Admission: Yes  Primary Wound Type: Pressure Injury  Location: Sacrum   Wound Image    Wound Etiology Deep tissue/Injury   Dressing/Treatment Pharmaceutical agent (see MAR)   Wound Length (cm) 5 cm   Wound Width (cm) 7 cm   Wound Depth (cm) 0.1 cm   Wound Surface Area (cm^2) 35 cm^2   Change in Wound Size % (l*w) -25591   Wound Volume (cm^3) 3.5 cm^3   Wound Assessment Pink/red;Purple/maroon   Drainage Amount Scant   Drainage Description Serosanguinous   Odor None   Susy-wound Assessment   (erosion, dry patchy)   Wound 01/23/21 Left forearm   Date First Assessed/Time First Assessed: 01/23/21 0045   Present on Hospital Admission: Yes  Wound Location Orientation: Left  Wound Description (Comments): forearm   Wound Image    Wound Etiology Skin Tear   Dressing Status New dressing applied   Dressing/Treatment Non adherent   Wound Length (cm) 3 cm   Wound Width (cm) 4 cm   Wound Depth (cm) 0.1 cm   Wound Surface Area (cm^2) 12 cm^2   Change in Wound Size % (l*w) -60   Wound Volume (cm^3) 1.2 cm^3   Wound Assessment Pink/red  (irregular)   Drainage Amount Scant   Drainage Description Serosanguinous   Odor None   Susy-wound Assessment Ecchymosis   Wound Thickness Description not for Pressure Injury Partial thickness   Urethral Catheter Temperature probe 16 fr   Placement Date/Time: 01/22/21 2127   Urethral Catheter Timeout: Patient;Procedure;Sterile technique; Appropriate Equipment  Inserted by: RIAN  Catheter Type: Temperature probe  Tube Size (fr): 16 fr  Catheter Balloon Size: 10 mL  Collection Container: St... Output (mL) 100 mL   erosion, dry patchy areas to buttocks  **Informed Consent**    photos taken of wounds and inserted into their chart as part of their permanent medical record for purposes of documentation, treatment management and/or medical review. All Images taken on 1/26/21 of patient name: Marek Lucio were transmitted and stored on secured [employer] located within [employer] by a registered [employer] Mobile Application Device.    Intubated with vent support    Impression:  DTI sacrum  Partial thickness skin tears to arms    Plan:  Aquaphor to buttocks, coccyx, sacrum, dry skin  versatel to skin tear left arm  Will need continued preventative care    Jarrod Greogrio 1/26/2021 12:02 PM

## 2021-01-26 NOTE — PROGRESS NOTES
[address] [place] [address]  Internal Medicine Department  Division of Pulmonary, Critical Care and Sleep Medicine  Daily Intensive Care Unit Progress  Note    Admit Date: 1/22/2021    MRN: [id number]        Patient:  Chelo Camacho 78 y.o. male     PCP: No primary care provider on file. Date of Service: 1/26/2021      Allergy: Imuran [azathioprine], Doxycycline, and Levaquin [levofloxacin]    Subjective   Length of stay during current admission: 4  Events of the past 24 hours are in the residents notes and we discussed at bedside briefly. .. Pt in ED from [facility] due to hypoxia after vomiting while on his BiPAP machine. He was just discharged from . [address] 1/20 back to [facility].   On Vent FiO2 40%, PEEP 5  MAP note  + 6 liters     Physical Exam:     VITALS:  /63   Pulse 71   Temp 99.7 °F (37.6 °C) (Bladder)   Resp 17   Ht 6' (1.829 m)   Wt 255 lb (115.7 kg)   SpO2 98%   BMI 34.58 kg/m²   24HR INTAKE/OUTPUT:      Intake/Output Summary (Last 24 hours) at 1/26/2021 0824  Last data filed at 1/26/2021 0731  Gross per 24 hour   Intake 4441 ml   Output 2870 ml   Net 1571 ml     General: Alert  HEENT: Conjunctiva/corneas clear, No icterus. No exudates. Neck: Supple, + JVD  Chest wall: Symmetric excursion,   Lung: Course to auscultation + rales , No wheezing  Heart: Regular with crisp AS murmur. No rub or  gallop. Abdomen: Obese, BS +, soft, no rigidity, rebound or guarding  Extremities: ++ pitting edema. Palp pulses. Femoral TLC  Skin: venous stasis, open dry wounds on legs, weaping   Musculokeletal: No trauma signs   Lymph nodes: No adenopathy  Neurologic: Non focal examination    Medications   Home and Current medications were discussed & reviewed on rounds with team and pharmacy liaison. Labs &  Imaging Studies   The data collected below information that was obtained, reviewed, analyzed and interpreted today. Imaging test are reviewed with the radiologist during rounds. Comparison to previous images are always explored. CBC:   Lab Results   Component Value Date    WBC 9.0 01/26/2021    RBC 3.05 01/26/2021    HGB 8.2 01/26/2021    HCT 26.5 01/26/2021     01/26/2021    MCV 86.9 01/26/2021       BMP:    Lab Results   Component Value Date     01/26/2021    K 3.2 01/26/2021     01/26/2021    CO2 30 01/26/2021    BUN 15 01/26/2021    CREATININE 0.7 01/26/2021    GLUCOSE 207 01/26/2021    GLUCOSE 138 03/26/2011    CALCIUM 7.0 01/26/2021       TSH:    Lab Results   Component Value Date    TSH 2.300 01/25/2021       Imaging Studies:    RADIOLOGY: Films were read/reviewed and or discussed with radiology and the consulting teams which show pneumonia and edema    EKG: ICU Rhythm Strips were reviewed and discussed. Sinus, Rare Ectopy    ECHOCARDIOGRAM: 2020   Left ventricle is normal in size . Moderate left ventricular concentric hypertrophy noted. No regional wall motion abnormalities seen. Normal left ventricular ejection fraction. The left atrium is moderately dilated. Mild mitral annular calcification. The aortic valve appears mildly sclerotic. Mild aortic stenosis. CULTURES    Pseudomonas aeruginosaAbnormal      CULTURE, RESPIRATORY Heavy growth    Organism Proteus mirabilisAbnormal     CULTURE, RESPIRATORY Heavy growth    Organism Escherichia coliAbnormal            Assessment    Wes Chan is a 78 y.o. male was seen, examined and discussed with on multi-disciplinary team rounds. This note may be separate or in addition to the resident records. I have personally seen and examined the patient and the key elements of the encounter were performed by me. The medications & laboratory data was discussed and adjusted where necessary. The radiographic images were reviewed either as a group or with radiologist if felt dis-concordant with the exam or history. The above findings were corroborated, plans confirmed and changes made if needed. Current issues are : 78year old with recurrent hospitalization with RF/ Vlvular heart disease who has been recently discharged from [facility] where he completed his course of antibiotics from a previous hospitalization. Also had IVC filter on the 6th and PEG changed to jejunostomy tube on the 15th. He had also been intubated while at [facility] for respiratory failure and extubated after several days, still with issues with secretions, poor cough/pulmonary hygiene,   1. Acute respiratory failure with hypoxia    2. Septic shock   3. Gastrointestinal hemorrhage, unspecified gastrointestinal hemorrhage type   4. Elevated troponin   5. Urinary tract infection without hematuria, site unspecified   6. Aspiration into respiratory tract, initial encounter  7. Anemia, unspecified type  8. Pseudomonas bacteriuria   9. HCAP / Aspiration pneumonia  (Pseudomonas and Proteus)  10. COPD  6. Elevated procalcitonin   12. Valvular heart disease         Plan   Family was updated at the bedside if present and available. Key issues of the case were discussed among consultants. Critical Care time is documented if appropriate. 1. ID following and patient is on Meropenem 1 gram IV q 8 hrs, Linezolid 600 mg IV q 12 hrs  2. Contact isolation   3. Pallative care consult for reasonable EOL discussion  4. Start Diuresis BID and follow K  5. Change to IMV or VC  6. Start weaning vent  7. Wean off pressors -as able  8. PICC line need, remove femoral = done 1/25/21  9. Continue Tube feeding - start   10. Bowel prep  11. Increase basal insulin to 20 units  12. Prealbumin level only 8 check weekly  13. On hydrocortisone as cortisol level was 3.77 on 1/23 will wean today again Q12  14.  Check TSH on amio = normal      Edith Zazueta DO, MPH, Solange Faulkner  Professor of Internal Medicine

## 2021-01-26 NOTE — PROGRESS NOTES
[address] [place] [address] [facility]  Progress  Note   C/C : pneumonia, resp failure,     Pt is intubated, sedated  Labile blood pressure     No fever     Current Facility-Administered Medications   Medication Dose Route Frequency Provider Last Rate Last Admin    hydrocortisone sodium succinate PF (SOLU-CORTEF) injection 50 mg  50 mg Intravenous Q12H Salas Davenport,         insulin glargine (LANTUS) injection vial 20 Units  20 Units Subcutaneous Nightly Anil Malcolmt, DO        Mineral Oil-Hydrophil Petrolat OINT   Topical BID Hodges Paget, DO        And    Mineral Oil-Hydrophil Petrolat OINT   Topical 4x Daily PRN Anil Malcolmt, DO        lidocaine PF 1 % injection 5 mL  5 mL Intradermal Once Hodges Paget, DO        sodium chloride flush 0.9 % injection 10 mL  10 mL Intravenous PRN Anil Malcolmt, DO        heparin flush 100 UNIT/ML injection 300 Units  3 mL Intravenous 2 times per day Hodges Paget, DO        heparin flush 100 UNIT/ML injection 300 Units  3 mL Intracatheter PRN Hodges Paget, DO        chlorhexidine (PERIDEX) 0.12 % solution 15 mL  15 mL Mouth/Throat BID Doloris Marisol Palaciosiro, DO   15 mL at 01/26/21 0732    bumetanide (BUMEX) injection 1 mg  1 mg Intravenous BID Doloris Marisol Palaciosiro, DO   1 mg at 01/26/21 0730    potassium chloride 20 mEq/50 mL IVPB (Central Line)  20 mEq Intravenous PRN Hodges Paget, DO 50 mL/hr at 01/26/21 0731 20 mEq at 01/26/21 0731    dornase alpha (PULMOZYME) nebulizer solution 2.5 mg  2.5 mg Inhalation Daily Darryl Leal MD   2.5 mg at 01/26/21 0838    acetaminophen (TYLENOL) 160 MG/5ML solution 650 mg  650 mg Per G Tube Q4H PRN Romaine Reynolds MD   650 mg at 01/23/21 0727    amiodarone (CORDARONE) tablet 100 mg  100 mg PEG Tube Daily Romaine Reynolds MD   100 mg at 01/26/21 0731    atorvastatin (LIPITOR) tablet 20 mg  20 mg PEG Tube Daily Romaine Reynolds MD   20 mg at 01/26/21 0733    docusate sodium (ENEMEEZ) enema 283 mg  1 enema Rectal PRN Hussain Monteiro MD        glucose (GLUTOSE) 40 % oral gel 15 g  15 g Oral PRN Hussain Monteiro MD        dextrose 50 % IV solution  12.5 g Intravenous PRN Hussain Monteiro MD        glucagon (rDNA) injection 1 mg  1 mg Intramuscular PRN Hussain Monteiro MD        dextrose 5 % solution  100 mL/hr Intravenous PRN Hussain Monteiro MD        lactulose (CHRONULAC) 10 GM/15ML solution 20 g  20 g PEG Tube Daily Hussain Monteiro MD   20 g at 01/26/21 0731    latanoprost (XALATAN) 0.005 % ophthalmic solution 1 drop  1 drop Both Eyes Daily Hussain Monteiro MD   1 drop at 01/26/21 0732    pantoprazole (PROTONIX) injection 40 mg  40 mg Intravenous Daily Hussain Monteiro MD   40 mg at 01/26/21 0729    And    sodium chloride (PF) 0.9 % injection 10 mL  10 mL Intravenous Daily Hussain Monteiro MD   10 mL at 01/26/21 0729    potassium bicarb-citric acid (EFFER-K) effervescent tablet 40 mEq  40 mEq Per G Tube Daily Hussain Monteiro MD   40 mEq at 01/26/21 0730    senna (SENOKOT) tablet 8.6 mg  1 tablet PEG Tube BID Hussain Monteiro MD   8.6 mg at 01/26/21 0731    sodium chloride (Inhalant) 3 % nebulizer solution 4 mL  4 mL Nebulization BID Hussain Monteiro MD   4 mL at 01/26/21 0839    sodium chloride flush 0.9 % injection 10 mL  10 mL Intravenous 2 times per day Hussain Monteiro MD   10 mL at 01/25/21 2014    sodium chloride flush 0.9 % injection 10 mL  10 mL Intravenous PRN Hussain Monteiro MD        enoxaparin (LOVENOX) injection 40 mg  40 mg Subcutaneous Daily Hussain Monteiro MD   40 mg at 01/26/21 0730    promethazine (PHENERGAN) tablet 12.5 mg  12.5 mg Oral Q6H PRN Hussain Monteiro MD        Or    ondansetron (ZOFRAN) injection 4 mg  4 mg Intravenous Q6H PRN Felix Hughes MD        polyethylene glycol (GLYCOLAX) packet 17 g  17 g Oral Daily PRN Hussain Monteiro MD        acetaminophen (TYLENOL) tablet 650 mg  650 mg Oral Q6H PRN Hussain Monteiro MD        Or    acetaminophen (TYLENOL) suppository 650 mg  650 mg Rectal Q6H PRN Philly Spann MD        0.9 % sodium chloride infusion   Intravenous Q8H Felix Hughes MD 12.5 mL/hr at 01/26/21 0640 New Bag at 01/26/21 0640    meropenem (MERREM) 1,000 mg in sodium chloride 0.9 % 100 mL IVPB (mini-bag)  1,000 mg Intravenous Q8H Ruben Espinoza MD   Stopped at 01/26/21 0818    linezolid (ZYVOX) IVPB 600 mg  600 mg Intravenous Q12H Kristy Bright MD   Stopped at 01/26/21 0938    budesonide (PULMICORT) nebulizer suspension 500 mcg  500 mcg Nebulization BID Shane Sanders MD   500 mcg at 01/26/21 0838    insulin lispro (HUMALOG) injection vial 0-18 Units  0-18 Units Subcutaneous TID WC Shane Sanders MD   6 Units at 01/26/21 0724    insulin lispro (HUMALOG) injection vial 0-9 Units  0-9 Units Subcutaneous Nightly Shane Sanders MD   2 Units at 01/25/21 2019    ipratropium-albuterol (DUONEB) nebulizer solution 1 ampule  1 ampule Inhalation Q4H WA Shane Sanders MD   1 ampule at 01/26/21 1140    fentaNYL 5 mcg/ml in 0.9%  ml infusion  25 mcg/hr Intravenous Continuous Phoebe Sorrel, DO 15 mL/hr at 01/26/21 0944 75 mcg/hr at 01/26/21 0944    fentaNYL (SUBLIMAZE) injection 50 mcg  50 mcg Intravenous Q1H PRN Agapito Bledsoe MD   50 mcg at 01/24/21 2301    norepinephrine (LEVOPHED) 16 mg in dextrose 5% 250 mL infusion  2-100 mcg/min Intravenous Continuous Agapito Bledsoe MD 7.5 mL/hr at 01/26/21 0946 8 mcg/min at 01/26/21 0946       REVIEW OF SYSTEMS: could not be obtained       PHYSICAL EXAM:    Vitals:   /63   Pulse 90   Temp 99 °F (37.2 °C)   Resp (!) 1   Ht 6' (1.829 m)   Wt 255 lb (115.7 kg)   SpO2 97%   BMI 34.58 kg/m²      Constitutional: Intubated and sedated, opened eyes    FiO2 40 %, PEEP 6  Skin: Warm and dry. Multiple partial thickness skin wounds    HEENT:Pallor +, no LN , ET tube,   Neck: Supple to movements. No lymphadenopathy.    Chest: Bilateral scattered rhonchi   Cardiovascular: Regular, no murmur Abdomen:soft, bowel sound +  PEG - no drainage or erythema   Extremities: +++ edema, erythema legs     Lines: Right  femoral TLC     CBC with Differential:      Lab Results   Component Value Date    WBC 9.0 01/26/2021    RBC 3.05 01/26/2021    HGB 8.2 01/26/2021    HCT 26.5 01/26/2021     01/26/2021    MCV 86.9 01/26/2021    MCH 26.9 01/26/2021    MCHC 30.9 01/26/2021    RDW 18.5 01/26/2021    NRBC 0.9 01/08/2021    SEGSPCT 64 09/27/2013    METASPCT 6.0 01/06/2021    LYMPHOPCT 10.8 01/26/2021    PROMYELOPCT 0.9 12/21/2020    MONOPCT 7.2 01/26/2021    MYELOPCT 3.0 01/06/2021    BASOPCT 0.1 01/26/2021    MONOSABS 0.65 01/26/2021    LYMPHSABS 0.97 01/26/2021    EOSABS 0.00 01/26/2021    BASOSABS 0.01 01/26/2021       CMP:    Lab Results   Component Value Date     01/26/2021    K 3.2 01/26/2021     01/26/2021    CO2 30 01/26/2021    BUN 15 01/26/2021    CREATININE 0.7 01/26/2021    GFRAA >60 01/26/2021    LABGLOM >60 01/26/2021    GLUCOSE 207 01/26/2021    GLUCOSE 138 03/26/2011    PROT 4.3 01/22/2021    LABALBU 2.0 01/22/2021    LABALBU 4.0 03/26/2011    CALCIUM 7.0 01/26/2021    BILITOT 0.3 01/22/2021    ALKPHOS 123 01/22/2021    AST 18 01/22/2021    ALT 8 01/22/2021       Hepatic Function Panel:    Lab Results   Component Value Date    ALKPHOS 123 01/22/2021    ALT 8 01/22/2021    AST 18 01/22/2021    PROT 4.3 01/22/2021    BILITOT 0.3 01/22/2021    BILIDIR 0.4 01/14/2021    IBILI 0.5 01/14/2021    LABALBU 2.0 01/22/2021    LABALBU 4.0 03/26/2011         Microbiology :    Blood culture - neg to date   Urine Culture -     Susceptibility    Pseudomonas aeruginosa (1)    Antibiotic Interpretation SNEHA Status    gentamicin Sensitive <=^1 mcg/mL     levofloxacin Sensitive <=^0.12 mcg/mL     tobramycin Sensitive <=^1 mcg/mL     Lab and Collection        Sputum Culture-      Susceptibility    Pseudomonas aeruginosa (1)    Antibiotic Interpretation SNEHA Status    gentamicin Sensitive <=^1 mcg/mL levofloxacin Sensitive <=^0.12 mcg/mL     tobramycin Sensitive <=^1 mcg/mL     Proteus mirabilis (4)    Antibiotic Interpretation SNEHA Status    ampicillin Sensitive <=^2 mcg/mL     ceFAZolin Sensitive <=^4 mcg/mL     cefepime Sensitive <=^0.12 mcg/mL     cefTRIAXone Sensitive <=^0.25 mcg/mL     ertapenem Sensitive <=^0.12 mcg/mL     gentamicin Sensitive <=^1 mcg/mL     levofloxacin Resistant >=^8 mcg/mL     piperacillin-tazobactam Sensitive <=^4 mcg/mL     trimethoprim-sulfamethoxazole Sensitive <=^20 mcg/mL      Condensed View   Lab and Collection    Culture, Respiratory - 1/23/2021         procalcitonin 1.23       Radiology :    Chest X ray - bilateral multifocal infiltrates       Assessment:  · Pseudomonas bacteriuria    · HCAP / Aspiration pneumonia  (Pseudomonas, Klebsiella and Proteus)   · Acute respiratory failure - intubated   · Elevated procalcitonin       Plan:    · Meropenem 1 gram IV q 8 hrs, Linezolid 600 mg IV q 12 hrs   · Contact isolation   · Vent support       Ruben Espinoza  12:19 PM  1/26/2021

## 2021-01-27 ENCOUNTER — APPOINTMENT (OUTPATIENT)
Dept: GENERAL RADIOLOGY | Age: 81
DRG: 870 | End: 2021-01-27
Payer: COMMERCIAL

## 2021-01-27 LAB
ANION GAP SERPL CALCULATED.3IONS-SCNC: 10 MMOL/L (ref 7–16)
B.E.: 9.5 MMOL/L (ref -3–0)
BASOPHILS ABSOLUTE: 0.01 E9/L (ref 0–0.2)
BASOPHILS RELATIVE PERCENT: 0.1 % (ref 0–2)
BLOOD CULTURE, ROUTINE: NORMAL
BUN BLDV-MCNC: 16 MG/DL (ref 8–23)
CALCIUM SERPL-MCNC: 7.2 MG/DL (ref 8.6–10.2)
CHLORIDE BLD-SCNC: 98 MMOL/L (ref 98–107)
CO2: 30 MMOL/L (ref 22–29)
CREAT SERPL-MCNC: 0.7 MG/DL (ref 0.7–1.2)
CULTURE, BLOOD 2: NORMAL
DELIVERY SYSTEMS: ABNORMAL
DEVICE: ABNORMAL
EOSINOPHILS ABSOLUTE: 0 E9/L (ref 0.05–0.5)
EOSINOPHILS RELATIVE PERCENT: 0 % (ref 0–6)
FIO2 ARTERIAL: 40
GFR AFRICAN AMERICAN: >60
GFR NON-AFRICAN AMERICAN: >60 ML/MIN/1.73
GLUCOSE BLD-MCNC: 226 MG/DL (ref 74–99)
HCO3 ARTERIAL: 32.2 MMOL/L (ref 22–26)
HCT VFR BLD CALC: 29.3 % (ref 37–54)
HEMOGLOBIN: 9.1 G/DL (ref 12.5–16.5)
IMMATURE GRANULOCYTES #: 0.22 E9/L
IMMATURE GRANULOCYTES %: 1.9 % (ref 0–5)
LYMPHOCYTES ABSOLUTE: 1.93 E9/L (ref 1.5–4)
LYMPHOCYTES RELATIVE PERCENT: 17 % (ref 20–42)
MAGNESIUM: 1.7 MG/DL (ref 1.6–2.6)
MAGNESIUM: 1.7 MG/DL (ref 1.6–2.6)
MCH RBC QN AUTO: 26.8 PG (ref 26–35)
MCHC RBC AUTO-ENTMCNC: 31.1 % (ref 32–34.5)
MCV RBC AUTO: 86.2 FL (ref 80–99.9)
METER GLUCOSE: 214 MG/DL (ref 74–99)
METER GLUCOSE: 215 MG/DL (ref 74–99)
METER GLUCOSE: 222 MG/DL (ref 74–99)
METER GLUCOSE: 264 MG/DL (ref 74–99)
MODE: AC
MONOCYTES ABSOLUTE: 1.35 E9/L (ref 0.1–0.95)
MONOCYTES RELATIVE PERCENT: 11.9 % (ref 2–12)
MRSA CULTURE ONLY: NORMAL
NEUTROPHILS ABSOLUTE: 7.82 E9/L (ref 1.8–7.3)
NEUTROPHILS RELATIVE PERCENT: 69.1 % (ref 43–80)
O2 SATURATION: 97.3 % (ref 92–98.5)
OPERATOR ID: 1921
PATIENT TEMP: 37
PCO2 (TEMP CORRECTED): 35.5 MMHG (ref 35–45)
PDW BLD-RTO: 18.5 FL (ref 11.5–15)
PH (TEMPERATURE CORRECTED): 7.57 (ref 7.35–7.45)
PHOSPHORUS: 2.6 MG/DL (ref 2.5–4.5)
PLATELET # BLD: 466 E9/L (ref 130–450)
PMV BLD AUTO: 9.7 FL (ref 7–12)
PO2 (TEMP CORRECTED): 80.1 MMHG (ref 60–80)
POSITIVE END EXP PRESS: 5 CMH2O
POTASSIUM REFLEX MAGNESIUM: 3.4 MMOL/L (ref 3.5–5)
RBC # BLD: 3.4 E12/L (ref 3.8–5.8)
RESPIRATORY RATE: 18 B/MIN
SODIUM BLD-SCNC: 138 MMOL/L (ref 132–146)
SOURCE, BLOOD GAS: ABNORMAL
TIDAL VOLUME: 500 ML
WBC # BLD: 11.3 E9/L (ref 4.5–11.5)

## 2021-01-27 PROCEDURE — 85025 COMPLETE CBC W/AUTO DIFF WBC: CPT

## 2021-01-27 PROCEDURE — 6360000002 HC RX W HCPCS: Performed by: INTERNAL MEDICINE

## 2021-01-27 PROCEDURE — 99291 CRITICAL CARE FIRST HOUR: CPT | Performed by: INTERNAL MEDICINE

## 2021-01-27 PROCEDURE — 2580000003 HC RX 258: Performed by: INTERNAL MEDICINE

## 2021-01-27 PROCEDURE — 94640 AIRWAY INHALATION TREATMENT: CPT

## 2021-01-27 PROCEDURE — 6370000000 HC RX 637 (ALT 250 FOR IP): Performed by: INTERNAL MEDICINE

## 2021-01-27 PROCEDURE — 82962 GLUCOSE BLOOD TEST: CPT

## 2021-01-27 PROCEDURE — 82803 BLOOD GASES ANY COMBINATION: CPT

## 2021-01-27 PROCEDURE — C9113 INJ PANTOPRAZOLE SODIUM, VIA: HCPCS | Performed by: INTERNAL MEDICINE

## 2021-01-27 PROCEDURE — 2500000003 HC RX 250 WO HCPCS: Performed by: INTERNAL MEDICINE

## 2021-01-27 PROCEDURE — 83735 ASSAY OF MAGNESIUM: CPT

## 2021-01-27 PROCEDURE — 94003 VENT MGMT INPAT SUBQ DAY: CPT

## 2021-01-27 PROCEDURE — 2500000003 HC RX 250 WO HCPCS: Performed by: EMERGENCY MEDICINE

## 2021-01-27 PROCEDURE — 71045 X-RAY EXAM CHEST 1 VIEW: CPT

## 2021-01-27 PROCEDURE — 2000000000 HC ICU R&B

## 2021-01-27 PROCEDURE — 80048 BASIC METABOLIC PNL TOTAL CA: CPT

## 2021-01-27 PROCEDURE — 84100 ASSAY OF PHOSPHORUS: CPT

## 2021-01-27 RX ORDER — MAGNESIUM SULFATE IN WATER 40 MG/ML
2000 INJECTION, SOLUTION INTRAVENOUS ONCE
Status: COMPLETED | OUTPATIENT
Start: 2021-01-27 | End: 2021-01-27

## 2021-01-27 RX ORDER — POTASSIUM CHLORIDE 20 MEQ/1
40 TABLET, EXTENDED RELEASE ORAL
Status: DISCONTINUED | OUTPATIENT
Start: 2021-01-28 | End: 2021-01-27

## 2021-01-27 RX ORDER — BUMETANIDE 0.25 MG/ML
2 INJECTION, SOLUTION INTRAMUSCULAR; INTRAVENOUS 2 TIMES DAILY
Status: DISCONTINUED | OUTPATIENT
Start: 2021-01-27 | End: 2021-02-01

## 2021-01-27 RX ADMIN — POTASSIUM CHLORIDE 20 MEQ: 400 INJECTION, SOLUTION INTRAVENOUS at 06:31

## 2021-01-27 RX ADMIN — SENNOSIDES 8.6 MG: 8.6 TABLET, FILM COATED ORAL at 07:50

## 2021-01-27 RX ADMIN — ACETAMINOPHEN ORAL SOLUTION 650 MG: 650 SOLUTION ORAL at 21:25

## 2021-01-27 RX ADMIN — SODIUM CHLORIDE, PRESERVATIVE FREE 10 ML: 5 INJECTION INTRAVENOUS at 07:51

## 2021-01-27 RX ADMIN — INSULIN LISPRO 6 UNITS: 100 INJECTION, SOLUTION INTRAVENOUS; SUBCUTANEOUS at 18:00

## 2021-01-27 RX ADMIN — MEROPENEM 1000 MG: 1 INJECTION, POWDER, FOR SOLUTION INTRAVENOUS at 07:53

## 2021-01-27 RX ADMIN — IPRATROPIUM BROMIDE AND ALBUTEROL SULFATE 1 AMPULE: 2.5; .5 SOLUTION RESPIRATORY (INHALATION) at 22:04

## 2021-01-27 RX ADMIN — SODIUM CHLORIDE SOLN NEBU 3% 4 ML: 3 NEBU SOLN at 09:06

## 2021-01-27 RX ADMIN — HYDROCORTISONE SODIUM SUCCINATE 50 MG: 100 INJECTION, POWDER, FOR SOLUTION INTRAMUSCULAR; INTRAVENOUS at 06:30

## 2021-01-27 RX ADMIN — Medication 35 MCG/MIN: at 18:19

## 2021-01-27 RX ADMIN — PANTOPRAZOLE SODIUM 40 MG: 40 INJECTION, POWDER, FOR SOLUTION INTRAVENOUS at 07:50

## 2021-01-27 RX ADMIN — BUDESONIDE 500 MCG: 0.5 SUSPENSION RESPIRATORY (INHALATION) at 09:06

## 2021-01-27 RX ADMIN — MAGNESIUM SULFATE HEPTAHYDRATE 2000 MG: 40 INJECTION, SOLUTION INTRAVENOUS at 12:23

## 2021-01-27 RX ADMIN — ACETAMINOPHEN ORAL SOLUTION 650 MG: 650 SOLUTION ORAL at 01:08

## 2021-01-27 RX ADMIN — CHLORHEXIDINE GLUCONATE 0.12% ORAL RINSE 15 ML: 1.2 LIQUID ORAL at 21:26

## 2021-01-27 RX ADMIN — Medication 14 MCG/MIN: at 05:36

## 2021-01-27 RX ADMIN — Medication 10 ML: at 21:26

## 2021-01-27 RX ADMIN — HYDROCORTISONE SODIUM SUCCINATE 25 MG: 100 INJECTION, POWDER, FOR SOLUTION INTRAMUSCULAR; INTRAVENOUS at 17:54

## 2021-01-27 RX ADMIN — INSULIN GLARGINE 20 UNITS: 100 INJECTION, SOLUTION SUBCUTANEOUS at 21:31

## 2021-01-27 RX ADMIN — DORNASE ALFA 2.5 MG: 1 SOLUTION RESPIRATORY (INHALATION) at 09:06

## 2021-01-27 RX ADMIN — LACTULOSE 20 G: 20 SOLUTION ORAL at 07:49

## 2021-01-27 RX ADMIN — CHLORHEXIDINE GLUCONATE 0.12% ORAL RINSE 15 ML: 1.2 LIQUID ORAL at 07:49

## 2021-01-27 RX ADMIN — AMIODARONE HYDROCHLORIDE 100 MG: 200 TABLET ORAL at 07:50

## 2021-01-27 RX ADMIN — Medication: at 07:49

## 2021-01-27 RX ADMIN — ACETAMINOPHEN ORAL SOLUTION 650 MG: 650 SOLUTION ORAL at 10:43

## 2021-01-27 RX ADMIN — MEROPENEM 1000 MG: 1 INJECTION, POWDER, FOR SOLUTION INTRAVENOUS at 17:53

## 2021-01-27 RX ADMIN — INSULIN LISPRO 6 UNITS: 100 INJECTION, SOLUTION INTRAVENOUS; SUBCUTANEOUS at 07:53

## 2021-01-27 RX ADMIN — LATANOPROST 1 DROP: 50 SOLUTION OPHTHALMIC at 08:07

## 2021-01-27 RX ADMIN — POTASSIUM BICARBONATE 40 MEQ: 782 TABLET, EFFERVESCENT ORAL at 08:16

## 2021-01-27 RX ADMIN — Medication: at 21:25

## 2021-01-27 RX ADMIN — LINEZOLID 600 MG: 600 INJECTION, SOLUTION INTRAVENOUS at 07:49

## 2021-01-27 RX ADMIN — IPRATROPIUM BROMIDE AND ALBUTEROL SULFATE 1 AMPULE: 2.5; .5 SOLUTION RESPIRATORY (INHALATION) at 09:06

## 2021-01-27 RX ADMIN — BUMETANIDE 1 MG: 0.25 INJECTION, SOLUTION INTRAMUSCULAR; INTRAVENOUS at 07:50

## 2021-01-27 RX ADMIN — IPRATROPIUM BROMIDE AND ALBUTEROL SULFATE 1 AMPULE: 2.5; .5 SOLUTION RESPIRATORY (INHALATION) at 13:27

## 2021-01-27 RX ADMIN — BUMETANIDE 2 MG: 0.25 INJECTION, SOLUTION INTRAMUSCULAR; INTRAVENOUS at 21:25

## 2021-01-27 RX ADMIN — BUDESONIDE 500 MCG: 0.5 SUSPENSION RESPIRATORY (INHALATION) at 22:03

## 2021-01-27 RX ADMIN — INSULIN LISPRO 9 UNITS: 100 INJECTION, SOLUTION INTRAVENOUS; SUBCUTANEOUS at 12:37

## 2021-01-27 RX ADMIN — IPRATROPIUM BROMIDE AND ALBUTEROL SULFATE 1 AMPULE: 2.5; .5 SOLUTION RESPIRATORY (INHALATION) at 17:49

## 2021-01-27 RX ADMIN — Medication 10 ML: at 07:50

## 2021-01-27 RX ADMIN — POTASSIUM CHLORIDE 20 MEQ: 400 INJECTION, SOLUTION INTRAVENOUS at 07:49

## 2021-01-27 RX ADMIN — ACETAMINOPHEN ORAL SOLUTION 650 MG: 650 SOLUTION ORAL at 17:15

## 2021-01-27 RX ADMIN — ENOXAPARIN SODIUM 40 MG: 40 INJECTION SUBCUTANEOUS at 07:50

## 2021-01-27 RX ADMIN — MEROPENEM 1000 MG: 1 INJECTION, POWDER, FOR SOLUTION INTRAVENOUS at 01:08

## 2021-01-27 RX ADMIN — SODIUM CHLORIDE: 9 INJECTION, SOLUTION INTRAVENOUS at 05:37

## 2021-01-27 RX ADMIN — INSULIN LISPRO 3 UNITS: 100 INJECTION, SOLUTION INTRAVENOUS; SUBCUTANEOUS at 21:31

## 2021-01-27 RX ADMIN — ATORVASTATIN CALCIUM 20 MG: 20 TABLET, FILM COATED ORAL at 07:53

## 2021-01-27 RX ADMIN — SODIUM CHLORIDE, PRESERVATIVE FREE 300 UNITS: 5 INJECTION INTRAVENOUS at 07:50

## 2021-01-27 RX ADMIN — SODIUM CHLORIDE SOLN NEBU 3% 4 ML: 3 NEBU SOLN at 22:04

## 2021-01-27 ASSESSMENT — PULMONARY FUNCTION TESTS
PIF_VALUE: 23
PIF_VALUE: 21
PIF_VALUE: 22
PIF_VALUE: 17
PIF_VALUE: 21
PIF_VALUE: 21
PIF_VALUE: 23
PIF_VALUE: 23
PIF_VALUE: 21
PIF_VALUE: 23
PIF_VALUE: 17
PIF_VALUE: 23
PIF_VALUE: 23
PIF_VALUE: 22

## 2021-01-27 ASSESSMENT — PAIN SCALES - GENERAL
PAINLEVEL_OUTOF10: 0

## 2021-01-27 NOTE — PROGRESS NOTES
Dannie Barton is a 78 y.o. male  Chief Complaint   Patient presents with    Respiratory Distress     pt brought in by EMS for possible aspiration. pt had an episode of emesis while wearing Bipap. pt was being bagged by ems upon arrival     HPI  Pt was agitated last night, had to be placed in restraints to avoid injury. This am he is on the vent and resting. Events reviewed with nursing staff at bedside. No current facility-administered medications on file prior to encounter.       Current Outpatient Medications on File Prior to Encounter   Medication Sig Dispense Refill    dextrose 50 % solution Infuse 25 g intravenously as needed      potassium bicarb-citric acid (EFFER-K) 20 MEQ TBEF effervescent tablet Take 40 tablets by mouth daily      potassium & sodium phosphates (PHOS-NAK) 280-160-250 MG PACK 1 packet by Per G Tube route daily      sodium chloride, PF, 0.9 % injection Infuse 50 mLs intravenously continuous      sodium chloride, Inhalant, 3 % nebulizer solution Take 4 mLs by nebulization 2 times daily      atorvastatin (LIPITOR) 20 MG tablet 20 mg daily Peg tube      amiodarone (PACERONE) 100 MG tablet 100 mg by PEG Tube route daily      promethazine (PHENERGAN) 12.5 MG tablet 12.5 mg by PEG Tube route every 6 hours as needed for Nausea      Benzocaine-Docusate Sodium (ENEMEEZ PLUS)  MG ENEM Place rectally as needed      hydrocortisone sodium succinate PF (SOLU-CORTEF) 100 MG injection Infuse 25 mg intravenously every 8 hours      insulin lispro (HUMALOG) 100 UNIT/ML injection vial Inject into the skin every 6 hours 0-16 units sliding scale      lactulose (CHRONULAC) 10 GM/15ML solution Take 20 g by mouth daily      metoclopramide (REGLAN) 5 MG/ML injection Infuse 10 mg intravenously every 6 hours      midodrine (PROAMATINE) 5 MG tablet 5 mg by PEG Tube route 3 times daily      nystatin-triamcinolone (MYCOLOG II) 649344-2.1 UNIT/GM-% cream Apply topically 4 times daily Apply topically 4 times daily.  pantoprazole sodium (PROTONIX) 40 MG PACK packet 40 mg by Per G Tube route 2 times daily (before meals) Pantoprazole/ sodium bicarb liquid 2mg/ml oral liquid 40 mg peg tube BID      glucagon, rDNA, 1 MG injection Inject 1 mg into the muscle as needed for Low blood sugar (Blood glucose less than 70 mg/dL and patient NOT ALERT or NPO and does not have IV access. ) 1 each 0    folic acid (FOLVITE) 1 MG tablet 1 tablet by Per G Tube route daily 30 tablet 3    docusate sodium (ENEMEEZ) 283 MG enema Place 5 mLs rectally daily 1 each 0    glucose (GLUTOSE) 40 % GEL Take 15 g by mouth as needed 45 g 1    latanoprost (XALATAN) 0.005 % ophthalmic solution Place 1 drop into both eyes daily       acetaminophen (TYLENOL) 325 MG tablet Take 650 mg by mouth every 4 hours as needed for Pain or Fever (max = 3000 mg/24 hours)       Past Medical History:   Diagnosis Date    Acute deep vein thrombosis (DVT) of femoral vein of left lower extremity (HCC) 1/5/2021    Anxiety     Benign localized hyperplasia of prostate with urinary obstruction     Bullous pemphigoid     Dementia ([facility] [address]     Diabetes mellitus (HCC)     Enlarged prostate     Hx of blood clots     Hyperlipidemia     Hypertension     Paroxysmal atrial fibrillation ([facility] [address] 1/12/2019    Respiratory failure ([facility] [address] 12/2020    Rheumatoid disease ([facility] [address]        OBJECTIVE  Vitals:    01/27/21 0600   BP: 108/87   Pulse: 87   Resp: 18   Temp:    SpO2: 100%     Gen: Intubated   Head: AT/NC, PERRL, EOMIx2, no icterus, conjunctival injection  Neck: No JVD, carotid bruits, LAD, thyroid non-palpable  Heart: RRR with no murmurs, rubs, gallops  Lungs: CTA B/L, no W/R/R  Abd: soft, NT, ND, BS+, no G/R, no HSM  Ext: +edema and erythema of lower   Lab Results   Component Value Date    WBC 11.3 01/27/2021    HGB 9.1 (L) 01/27/2021    HCT 29.3 (L) 01/27/2021    MCV 86.2 01/27/2021     (H) 01/27/2021    LYMPHOPCT 17.0 (L) 01/27/2021    RBC 3.40 (L) 01/27/2021    MCH 26.8 01/27/2021    MCHC 31.1 (L) 01/27/2021    RDW 18.5 (H) 01/27/2021         Lab Results   Component Value Date     01/27/2021    K 3.4 01/27/2021    CL 98 01/27/2021    CO2 30 01/27/2021    BUN 16 01/27/2021    CREATININE 0.7 01/27/2021    GLUCOSE 226 01/27/2021    GLUCOSE 138 03/26/2011    CALCIUM 7.2 01/27/2021          ASSESSMENT  1. Acute respiratory failure with hypoxia ([facility] [address]    2. Septic shock ([facility] [address]    3. Gastrointestinal hemorrhage, unspecified gastrointestinal hemorrhage type    4. Elevated troponin    5. Urinary tract infection without hematuria, site unspecified    6. Aspiration into respiratory tract, initial encounter    7.  Anemia, unspecified type      PLAN  Critical Care  Abx per ID team  Cont in restraints, he is at risk for self-injury and pulling out lines and trach

## 2021-01-27 NOTE — PLAN OF CARE
Problem: Restraint Use - Nonviolent/Non-Self-Destructive Behavior:  Goal: Absence of restraint indications  Description: Absence of restraint indications  1/27/2021 0507 by Polina Bird RN  Outcome: Not Met This Shift     Problem: Restraint Use - Nonviolent/Non-Self-Destructive Behavior:  Goal: Absence of restraint-related injury  Description: Absence of restraint-related injury  1/27/2021 0507 by Polina Bird RN  Outcome: Met This Shift

## 2021-01-27 NOTE — PROGRESS NOTES
Physician Progress Note      PATIENT:               Yoanna Gonzalez  CSN #:                  [id number]  :                       1940  ADMIT DATE:       2021 8:35 PM  [address] DATE:  RESPONDING  PROVIDER #:        Ramos Rivera DO          QUERY TEXT:    Pt admitted with Sepsis due to aspiration. Noted documentation of Sacral   Pressure Ulcer-deep tissue injury POA by  wound care consultant. If   possible, please document in progress notes and discharge summary:    The medical record reflects the following:  Risk Factors: IDDM, hx pressure ulcers-multiple, sepsis with septic shock  Clinical Indicators: sacral pressure ulcer -deep tissue injury POA  Treatment: wound care consult, nutrition consult, Aquaphor to buttocks,   coccyx, sacrum, dry skin versatel to skin tear left arm, preventative care  Thank You, Paige Craig RN BSN [facility]  contact number [phone]  Options provided:  -- Sacral Pressure ulcer-deep tissue injury confirmed present on admission  -- Sacral Pressure ulcer-DTI ruled out  -- Other - I will add my own diagnosis  -- Disagree - Not applicable / Not valid  -- Disagree - Clinically unable to determine / Unknown  -- Refer to Clinical Documentation Reviewer    PROVIDER RESPONSE TEXT:    The diagnosis of Sacral Pressure ulcer-deep tissue injury was confirmed as   present on admission.     Query created by: Griselda Quiros on 2021 7:06 AM      Electronically signed by:  Ramos Rivera DO 2021 7:24 AM

## 2021-01-27 NOTE — PROGRESS NOTES
[address] [place] [address] [facility]  BIBIIDA  Progress  Note   C/C : pneumonia, resp failure,     Pt is intubated, sedated  Labile blood pressure   Low grade fever  Temp 100.4 F       Current Facility-Administered Medications   Medication Dose Route Frequency Provider Last Rate Last Admin    magnesium sulfate 2000 mg in 50 mL IVPB premix  2,000 mg Intravenous Once Sepideh Byes, DO 25 mL/hr at 01/27/21 1223 2,000 mg at 01/27/21 1223    hydrocortisone sodium succinate PF (SOLU-CORTEF) injection 25 mg  25 mg Intravenous Q12H Christiane Davenport, DO        [START ON 1/28/2021] potassium chloride (KLOR-CON M) extended release tablet 40 mEq  40 mEq Oral Daily with breakfast Christiane Davenport DO        bumetanide (BUMEX) injection 2 mg  2 mg Intravenous BID Christiane Davenport DO        insulin glargine (LANTUS) injection vial 20 Units  20 Units Subcutaneous Nightly Sepideh Byes, DO   20 Units at 01/26/21 2013    Mineral Oil-Hydrophil Petrolat OINT   Topical BID Sepideh Byes, DO   Given at 01/27/21 8811    And    Mineral Oil-Hydrophil Petrolat OINT   Topical 4x Daily PRN Sepideh Byes, DO   Given at 01/26/21 1846    lidocaine PF 1 % injection 5 mL  5 mL Intradermal Once Sepideh Byes, DO        sodium chloride flush 0.9 % injection 10 mL  10 mL Intravenous PRN Sepideh Byes, DO        heparin flush 100 UNIT/ML injection 300 Units  3 mL Intravenous 2 times per day Sepideh Byes, DO   300 Units at 01/27/21 0750    heparin flush 100 UNIT/ML injection 300 Units  3 mL Intracatheter PRN Sepideh Byes, DO        chlorhexidine (PERIDEX) 0.12 % solution 15 mL  15 mL Mouth/Throat BID Christiane Davenport, DO   15 mL at 01/27/21 0749    potassium chloride 20 mEq/50 mL IVPB (Central Line)  20 mEq Intravenous PRN Sepideh Byes, DO 50 mL/hr at 01/27/21 0749 20 mEq at 01/27/21 0749    dornase alpha (PULMOZYME) nebulizer solution 2.5 mg  2.5 mg Inhalation Daily Pranav Hopkins MD Kimberlee   2.5 mg at 01/27/21 0906    acetaminophen (TYLENOL) 160 MG/5ML solution 650 mg  650 mg Per G Tube Q4H PRN Oscar Gomez MD   650 mg at 01/27/21 1043    amiodarone (CORDARONE) tablet 100 mg  100 mg PEG Tube Daily Oscar Gomez MD   100 mg at 01/27/21 0750    atorvastatin (LIPITOR) tablet 20 mg  20 mg PEG Tube Daily Oscar Gomez MD   20 mg at 01/27/21 0753    docusate sodium (ENEMEEZ) enema 283 mg  1 enema Rectal PRN Oscar Gomez MD        glucose (GLUTOSE) 40 % oral gel 15 g  15 g Oral PRN Oscar Gomez MD        dextrose 50 % IV solution  12.5 g Intravenous PRN Oscar Gomez MD        glucagon (rDNA) injection 1 mg  1 mg Intramuscular PRN Oscar Gomez MD        dextrose 5 % solution  100 mL/hr Intravenous PRN Oscar Gomez MD        lactulose (CHRONULAC) 10 GM/15ML solution 20 g  20 g PEG Tube Daily Felix Hughes MD   20 g at 01/27/21 0749    latanoprost (XALATAN) 0.005 % ophthalmic solution 1 drop  1 drop Both Eyes Daily Oscar Gomez MD   1 drop at 01/27/21 0807    pantoprazole (PROTONIX) injection 40 mg  40 mg Intravenous Daily Oscar Gomez MD   40 mg at 01/27/21 0750    And    sodium chloride (PF) 0.9 % injection 10 mL  10 mL Intravenous Daily Oscar Gomez MD   10 mL at 01/27/21 0751    potassium bicarb-citric acid (EFFER-K) effervescent tablet 40 mEq  40 mEq Per G Tube Daily Oscar Gomez MD   40 mEq at 01/27/21 0816    senna (SENOKOT) tablet 8.6 mg  1 tablet PEG Tube BID Oscar Gomez MD   8.6 mg at 01/27/21 0750    sodium chloride (Inhalant) 3 % nebulizer solution 4 mL  4 mL Nebulization BID Oscar Gomez MD   4 mL at 01/27/21 0906    sodium chloride flush 0.9 % injection 10 mL  10 mL Intravenous 2 times per day Oscar Gomez MD   10 mL at 01/27/21 0750    sodium chloride flush 0.9 % injection 10 mL  10 mL Intravenous PRN Oscar Gomez MD        enoxaparin (LOVENOX) injection 40 mg  40 mg Subcutaneous Daily Oscar Gomez MD   40 mg at 01/27/21 0750    promethazine (PHENERGAN) tablet 12.5 mg  12.5 mg Oral Q6H PRN Mauricio Harper MD        Or    ondansetron (ZOFRAN) injection 4 mg  4 mg Intravenous Q6H PRN Mauricio Harper MD        polyethylene glycol (GLYCOLAX) packet 17 g  17 g Oral Daily PRN Mauricio Harper MD        acetaminophen (TYLENOL) tablet 650 mg  650 mg Oral Q6H PRN Mauricio Harper MD        Or    acetaminophen (TYLENOL) suppository 650 mg  650 mg Rectal Q6H PRN Mauricio Harper MD        0.9 % sodium chloride infusion   Intravenous Q8H Mauricio Harper MD   Stopped at 01/27/21 0551    meropenem (MERREM) 1,000 mg in sodium chloride 0.9 % 100 mL IVPB (mini-bag)  1,000 mg Intravenous Q8H Ruben Espinoza MD   Stopped at 01/27/21 0823    linezolid (ZYVOX) IVPB 600 mg  600 mg Intravenous Q12H Ricardo Wright MD   Stopped at 01/27/21 0849    budesonide (PULMICORT) nebulizer suspension 500 mcg  500 mcg Nebulization BID Emmanuel Kaminski MD   500 mcg at 01/27/21 0906    insulin lispro (HUMALOG) injection vial 0-18 Units  0-18 Units Subcutaneous TID WC Emmanuel Kaminski MD   6 Units at 01/27/21 0753    insulin lispro (HUMALOG) injection vial 0-9 Units  0-9 Units Subcutaneous Nightly Emmanuel Kaminski MD   2 Units at 01/26/21 2014    ipratropium-albuterol (DUONEB) nebulizer solution 1 ampule  1 ampule Inhalation Q4H WA Emmanuel Kaminski MD   1 ampule at 01/27/21 0906    fentaNYL (SUBLIMAZE) injection 50 mcg  50 mcg Intravenous Q1H PRN Anuja Meier MD   50 mcg at 01/24/21 2301    norepinephrine (LEVOPHED) 16 mg in dextrose 5% 250 mL infusion  2-100 mcg/min Intravenous Continuous Anuja Meier MD 23.4 mL/hr at 01/27/21 1221 25 mcg/min at 01/27/21 1221       REVIEW OF SYSTEMS: could not be obtained       PHYSICAL EXAM:    Vitals:   BP (!) 90/54   Pulse 90   Temp 100.4 °F (38 °C) (Bladder)   Resp 17   Ht 6' (1.829 m)   Wt 255 lb (115.7 kg)   SpO2 98%   BMI 34.58 kg/m²      Constitutional: Intubated and sedated, opened eyes    FiO2 40 %, PEEP 5  Skin: Warm and dry. Multiple partial thickness skin wounds    HEENT:Pallor +, no LN , ET tube,   Neck: Supple to movements. No lymphadenopathy.    Chest: Bilateral scattered rhonchi   Cardiovascular: Regular, no murmur   Abdomen:soft, bowel sound +  PEG - no drainage or erythema   Extremities: +++ edema, erythema legs     Lines: Right  femoral TLC     CBC with Differential:      Lab Results   Component Value Date    WBC 11.3 01/27/2021    RBC 3.40 01/27/2021    HGB 9.1 01/27/2021    HCT 29.3 01/27/2021     01/27/2021    MCV 86.2 01/27/2021    MCH 26.8 01/27/2021    MCHC 31.1 01/27/2021    RDW 18.5 01/27/2021    NRBC 0.9 01/08/2021    SEGSPCT 64 09/27/2013    METASPCT 6.0 01/06/2021    LYMPHOPCT 17.0 01/27/2021    PROMYELOPCT 0.9 12/21/2020    MONOPCT 11.9 01/27/2021    MYELOPCT 3.0 01/06/2021    BASOPCT 0.1 01/27/2021    MONOSABS 1.35 01/27/2021    LYMPHSABS 1.93 01/27/2021    EOSABS 0.00 01/27/2021    BASOSABS 0.01 01/27/2021       CMP:    Lab Results   Component Value Date     01/27/2021    K 3.4 01/27/2021    CL 98 01/27/2021    CO2 30 01/27/2021    BUN 16 01/27/2021    CREATININE 0.7 01/27/2021    GFRAA >60 01/27/2021    LABGLOM >60 01/27/2021    GLUCOSE 226 01/27/2021    GLUCOSE 138 03/26/2011    PROT 4.3 01/22/2021    LABALBU 2.0 01/22/2021    LABALBU 4.0 03/26/2011    CALCIUM 7.2 01/27/2021    BILITOT 0.3 01/22/2021    ALKPHOS 123 01/22/2021    AST 18 01/22/2021    ALT 8 01/22/2021       Hepatic Function Panel:    Lab Results   Component Value Date    ALKPHOS 123 01/22/2021    ALT 8 01/22/2021    AST 18 01/22/2021    PROT 4.3 01/22/2021    BILITOT 0.3 01/22/2021    BILIDIR 0.4 01/14/2021    IBILI 0.5 01/14/2021    LABALBU 2.0 01/22/2021    LABALBU 4.0 03/26/2011         Microbiology :    Blood culture - neg to date   Urine Culture -     Susceptibility    Pseudomonas aeruginosa (1)    Antibiotic Interpretation SNEHA Status    gentamicin Sensitive <=^1 mcg/mL     levofloxacin Sensitive <=^0.12 mcg/mL     tobramycin Sensitive <=^1 mcg/mL     Lab and Collection        Sputum Culture-      Susceptibility    Pseudomonas aeruginosa (1)    Antibiotic Interpretation SNEHA Status    gentamicin Sensitive <=^1 mcg/mL     levofloxacin Sensitive <=^0.12 mcg/mL     tobramycin Sensitive <=^1 mcg/mL     Proteus mirabilis (4)    Antibiotic Interpretation SNEHA Status    ampicillin Sensitive <=^2 mcg/mL     ceFAZolin Sensitive <=^4 mcg/mL     cefepime Sensitive <=^0.12 mcg/mL     cefTRIAXone Sensitive <=^0.25 mcg/mL     ertapenem Sensitive <=^0.12 mcg/mL     gentamicin Sensitive <=^1 mcg/mL     levofloxacin Resistant >=^8 mcg/mL     piperacillin-tazobactam Sensitive <=^4 mcg/mL     trimethoprim-sulfamethoxazole Sensitive <=^20 mcg/mL      Condensed View   Lab and Collection    Culture, Respiratory - 1/23/2021         procalcitonin 1.23       Radiology :    Chest X ray - bilateral multifocal infiltrates       Assessment:  · Pseudomonas bacteriuria    · HCAP / Aspiration pneumonia  (Pseudomonas, Klebsiella, E coli and Proteus)   · Acute respiratory failure - intubated   · Elevated procalcitonin       Plan:    · Meropenem 1 gram IV q 8 hrs  · Stop linezolid   · Contact isolation   · Vent support       Ruben WILDER Limbu  12:36 PM  1/27/2021

## 2021-01-27 NOTE — PLAN OF CARE
Problem: Skin Integrity:  Goal: Will show no infection signs and symptoms  Description: Will show no infection signs and symptoms  Outcome: Met This Shift  Goal: Absence of new skin breakdown  Description: Absence of new skin breakdown  Outcome: Met This Shift     Problem: Falls - Risk of:  Goal: Will remain free from falls  Description: Will remain free from falls  Outcome: Met This Shift  Goal: Absence of physical injury  Description: Absence of physical injury  Outcome: Met This Shift     Problem: Inadequate oral food/beverage intake (NI-2.1)  Goal: Food and/or Nutrient Delivery  Description: Individualized approach for food/nutrient provision.   1/27/2021 1310 by Dariela Fry RD, LD  Outcome: Met This Shift     Problem: Restraint Use - Nonviolent/Non-Self-Destructive Behavior:  Goal: Absence of restraint indications  Description: Absence of restraint indications  1/27/2021 1857 by Dedrick Mcghee RN  Outcome: Met This Shift  1/27/2021 0507 by Vineet Yu RN  Outcome: Not Met This Shift  1/27/2021 0507 by Vineet Yu RN  Outcome: Not Met This Shift  Goal: Absence of restraint-related injury  Description: Absence of restraint-related injury  1/27/2021 1857 by Dedrick Mcghee RN  Outcome: Met This Shift  1/27/2021 0507 by Vineet Yu RN  Outcome: Met This Shift  1/27/2021 0507 by Vineet Yu RN  Outcome: Met This Shift

## 2021-01-27 NOTE — PROGRESS NOTES
remains intubated, weaning trial.  Fever of 101.7 F at 1600 today. Reviewed patient with Staff RN. Will follow along closely for support of patient and family. 1/26/2021: Labs and chart reviewed. Reviewed the patient with Staff RN. Per RN, the patient is going to be tested for COVID-19. Infectious disease consulted. Patient remains on the ventilator, on Levophed. Did not personally assess the patient to preserve PPE for Physicians and nursing staff. No change in the plan of care. Per , plan remains the patient will return to [facility] upon discharge. Palliative Care following for goals of care. 1/25/2021 Patient recently discharged from hospital on 12/23/2020. Patient with reported emesis with BiPAP in place at [facility]. Brought to facility by EMS. Currently in the MICU on ventilator at this time, on Levophed. Patient with declining mentation, family stated no trach if it comes to that point and not following commands. Palliatve Care was consulted to help with CODE STATUS discussion, family support and goals of care. Call placed to [facility] [place]. 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 Contact information for Palliative Care given. Patient was discharged on 12/23/2020 and went to [facility]; per daughter: arrested, down for 9 minutes, ended up on vent. Last Wednesday discharged back to [facility], Thursday placed on BiPAP, Friday while on BiPAP had emesis that went down into the patient's lung. Patient's daughter stated that they had paperwork for a DNR-CCA, but it was not completed and signed before the patient needed to come back into the hospital.  Changed the patient to a DNR CCA at this time with the daughter's permission. Daughter [facility] [place] stated that she will call her brother to inform him. Explained to [facility] [place] that with a DNR-CCA that her father may be reintubated after extubation.   Goal-for her father to be comfortable. OBJECTIVE:   Prognosis: Guarded     According to institutional recommendations and guidelines, a face-to-face encounter was not performed due to the need to preserve PPE for other caregivers. Relevant records, nursing assessment, and diagnostic testing including laboratory results and imaging were reviewed. Please reference any relevant documentation elsewhere. Patient was observed through the window and observation as reported below. Care will be coordinated with the primary services and consultants. Physical Exam: Per MICU RN  BP (!) 96/59   Pulse 88   Temp 100.4 °F (38 °C) (Bladder)   Resp 19   Ht 6' (1.829 m)   Wt 255 lb (115.7 kg)   SpO2 99%   BMI 34.58 kg/m²   Gen: ETT/vent elderly, NAD,   HEENT:  Normocephalic, atraumatic  Neck:  Supple, trachea midline, no JVD  Lungs:  Course rhonchi, respirations unlabored on the vent  Heart[de-identified]  RRR- monitor   Abd:  Deferred  : Catheter-clear yellow urine  Ext:  Deferred  Skin:  Deferred  Neuro:  Deferred     Objective data reviewed: labs, images, records, medication use, vitals and chart    Discussed patient and the plan of care with the other IDT members: Palliative Medicine IDT Team, Primary Team, Floor Nurse, Patient and Family    Time/Communication  Greater than 50% of time spent, total 10 minutes in counseling and coordination of care at the bedside regarding CODE STATUS discussion, family support and goals of care. Thank you for allowing Palliative Medicine to participate in the care of Wes Chan. Note: This report was completed using [employer] voiced recognition software. Every effort has been made to ensure accuracy; however, inadvertent computerized transcription errors may be present.

## 2021-01-27 NOTE — PROGRESS NOTES
Comprehensive Nutrition Assessment    Type and Reason for Visit:  Reassess    Nutrition Recommendations/Plan: Continue NPO, Modify Tube Feeding    Rec Semi-Elemental (Vital AF) for optimal GI tolerance @ 60 ml/hr + 1 protein modular daily. Will provide: 1440 ml tv, 1728 kcals, 108 gm pro (1828 kcals & 134 gm pro w/ mod), 1167 ml free water    Nutrition Assessment:  Pt status remains unchanged w/ ongoing intubation noted septic shock/aspiration PNA. Noted SDTI. Noted recent PEG conversion to J-tube. EN support initiated, will provide updated TF rec & continue to monitor. Malnutrition Assessment:  Malnutrition Status: At risk for malnutrition  Context:  Acute Illness     Findings of the 6 clinical characteristics of malnutrition:  Energy Intake: 50% or less of estimated energy requirements for 5 or more days  Weight Loss:  Unable to assess     Body Fat Loss:  Unable to assess   Muscle Mass Loss:  Unable to assess  Fluid Accumulation:  No significant fluid accumulation   Strength:  Not Performed    Estimated Daily Nutrient Needs:  Energy (kcal):  ; 5812-6195; Weight Used for Energy Requirements:  Current     Protein (g):  120-145(1.5-1.8 g/kg);  Weight Used for Protein Requirements:  Ideal        Fluid (ml/day):  per critical care; Method Used for Fluid Requirements:  1 ml/kcal      Nutrition Related Findings:  Pt intubated, hypotension on pressor, +I/O's, +2 edema, hyperactive BS, diarrhea noted, J-tube w/ TF      Wounds:  Multiple, Deep Tissue Injury, Skin Tears       Current Nutrition Therapies:    Current Tube Feeding (TF) Orders:  · Feeding Route: Jejunostomy  · Formula: Diabetic  · Schedule: Continuous(20 ml/hr= 480 ml tv)  · Water Flushes: 30 ml q 4 hr = 180 ml water  · Goal TF & Flush Orders Provides: 576 kcals, 29 gm pro, 386 ml free water    Anthropometric Measures:  · Height: 6' (182.9 cm)  · Current Body Weight: 255 lb (115.7 kg)(no method UTO updated wt)   · Usual Body Weight: 259 lb

## 2021-01-27 NOTE — PLAN OF CARE
Problem: Skin Integrity:  Goal: Will show no infection signs and symptoms  Description: Will show no infection signs and symptoms  1/27/2021 0030 by Malachi Baumgarten, RN  Outcome: Met This Shift     Problem: Skin Integrity:  Goal: Will show no infection signs and symptoms  Description: Will show no infection signs and symptoms  1/26/2021 2053 by Malachi Baumgarten, RN  Outcome: Met This Shift     Problem: Skin Integrity:  Goal: Absence of new skin breakdown  Description: Absence of new skin breakdown  1/27/2021 0030 by Malachi Baumgarten, RN  Outcome: Met This Shift     Problem: Skin Integrity:  Goal: Absence of new skin breakdown  Description: Absence of new skin breakdown  1/26/2021 2053 by Malachi Baumgarten, RN  Outcome: Met This Shift     Problem: Falls - Risk of:  Goal: Will remain free from falls  Description: Will remain free from falls  1/27/2021 0030 by Malachi Baumgarten, RN  Outcome: Met This Shift     Problem: Falls - Risk of:  Goal: Will remain free from falls  Description: Will remain free from falls  1/26/2021 2053 by Malachi Baumgarten, RN  Outcome: Met This Shift     Problem: Falls - Risk of:  Goal: Absence of physical injury  Description: Absence of physical injury  1/27/2021 0030 by Malachi Baumgarten, RN  Outcome: Met This Shift     Problem: Falls - Risk of:  Goal: Absence of physical injury  Description: Absence of physical injury  1/26/2021 2053 by Malachi Baumgarten, RN  Outcome: Met This Shift

## 2021-01-28 ENCOUNTER — APPOINTMENT (OUTPATIENT)
Dept: GENERAL RADIOLOGY | Age: 81
DRG: 870 | End: 2021-01-28
Payer: COMMERCIAL

## 2021-01-28 LAB
AADO2: 150.9 MMHG
ANION GAP SERPL CALCULATED.3IONS-SCNC: 11 MMOL/L (ref 7–16)
B.E.: 4.7 MMOL/L (ref -3–3)
BASOPHILS ABSOLUTE: 0.02 E9/L (ref 0–0.2)
BASOPHILS RELATIVE PERCENT: 0.2 % (ref 0–2)
BUN BLDV-MCNC: 16 MG/DL (ref 8–23)
CALCIUM SERPL-MCNC: 7 MG/DL (ref 8.6–10.2)
CHLORIDE BLD-SCNC: 96 MMOL/L (ref 98–107)
CO2: 29 MMOL/L (ref 22–29)
COHB: 1.4 % (ref 0–1.5)
CREAT SERPL-MCNC: 0.7 MG/DL (ref 0.7–1.2)
CRITICAL: ABNORMAL
DATE ANALYZED: ABNORMAL
DATE OF COLLECTION: ABNORMAL
EOSINOPHILS ABSOLUTE: 0.03 E9/L (ref 0.05–0.5)
EOSINOPHILS RELATIVE PERCENT: 0.2 % (ref 0–6)
FIO2: 40 %
GFR AFRICAN AMERICAN: >60
GFR NON-AFRICAN AMERICAN: >60 ML/MIN/1.73
GLUCOSE BLD-MCNC: 242 MG/DL (ref 74–99)
HCO3: 28 MMOL/L (ref 22–26)
HCT VFR BLD CALC: 29.5 % (ref 37–54)
HEMOGLOBIN: 9.1 G/DL (ref 12.5–16.5)
HHB: 3.7 % (ref 0–5)
IMMATURE GRANULOCYTES #: 0.23 E9/L
IMMATURE GRANULOCYTES %: 1.9 % (ref 0–5)
LAB: ABNORMAL
LYMPHOCYTES ABSOLUTE: 2.99 E9/L (ref 1.5–4)
LYMPHOCYTES RELATIVE PERCENT: 24.3 % (ref 20–42)
Lab: ABNORMAL
MAGNESIUM: 1.9 MG/DL (ref 1.6–2.6)
MAGNESIUM: 1.9 MG/DL (ref 1.6–2.6)
MCH RBC QN AUTO: 26.9 PG (ref 26–35)
MCHC RBC AUTO-ENTMCNC: 30.8 % (ref 32–34.5)
MCV RBC AUTO: 87.3 FL (ref 80–99.9)
METER GLUCOSE: 244 MG/DL (ref 74–99)
METER GLUCOSE: 280 MG/DL (ref 74–99)
METER GLUCOSE: 305 MG/DL (ref 74–99)
METER GLUCOSE: 307 MG/DL (ref 74–99)
METHB: 0.3 % (ref 0–1.5)
MODE: ABNORMAL
MONOCYTES ABSOLUTE: 1.22 E9/L (ref 0.1–0.95)
MONOCYTES RELATIVE PERCENT: 9.9 % (ref 2–12)
NEUTROPHILS ABSOLUTE: 7.79 E9/L (ref 1.8–7.3)
NEUTROPHILS RELATIVE PERCENT: 63.5 % (ref 43–80)
O2 CONTENT: 13.5 ML/DL
O2 SATURATION: 96.2 % (ref 92–98.5)
O2HB: 94.6 % (ref 94–97)
OPERATOR ID: 2962
PATIENT TEMP: 37 C
PCO2: 36.6 MMHG (ref 35–45)
PDW BLD-RTO: 18.6 FL (ref 11.5–15)
PEEP/CPAP: 5 CMH2O
PFO2: 2.05 MMHG/%
PH BLOOD GAS: 7.5 (ref 7.35–7.45)
PHOSPHORUS: 2.8 MG/DL (ref 2.5–4.5)
PIP: 12 CMH2O
PLATELET # BLD: 459 E9/L (ref 130–450)
PMV BLD AUTO: 9.8 FL (ref 7–12)
PO2: 82.2 MMHG (ref 75–100)
POTASSIUM REFLEX MAGNESIUM: 3.6 MMOL/L (ref 3.5–5)
POTASSIUM SERPL-SCNC: 3.7 MMOL/L (ref 3.5–5)
RBC # BLD: 3.38 E12/L (ref 3.8–5.8)
RI(T): 1.84
RR MECHANICAL: 8 B/MIN
SODIUM BLD-SCNC: 136 MMOL/L (ref 132–146)
SOURCE, BLOOD GAS: ABNORMAL
THB: 10.1 G/DL (ref 11.5–16.5)
TIME ANALYZED: 437
VT MECHANICAL: 500 ML
WBC # BLD: 12.3 E9/L (ref 4.5–11.5)

## 2021-01-28 PROCEDURE — 2580000003 HC RX 258: Performed by: INTERNAL MEDICINE

## 2021-01-28 PROCEDURE — 84100 ASSAY OF PHOSPHORUS: CPT

## 2021-01-28 PROCEDURE — 36415 COLL VENOUS BLD VENIPUNCTURE: CPT

## 2021-01-28 PROCEDURE — P9047 ALBUMIN (HUMAN), 25%, 50ML: HCPCS | Performed by: INTERNAL MEDICINE

## 2021-01-28 PROCEDURE — 6360000002 HC RX W HCPCS: Performed by: INTERNAL MEDICINE

## 2021-01-28 PROCEDURE — 6360000002 HC RX W HCPCS: Performed by: EMERGENCY MEDICINE

## 2021-01-28 PROCEDURE — 80048 BASIC METABOLIC PNL TOTAL CA: CPT

## 2021-01-28 PROCEDURE — 6370000000 HC RX 637 (ALT 250 FOR IP): Performed by: INTERNAL MEDICINE

## 2021-01-28 PROCEDURE — 6360000002 HC RX W HCPCS

## 2021-01-28 PROCEDURE — 93005 ELECTROCARDIOGRAM TRACING: CPT | Performed by: INTERNAL MEDICINE

## 2021-01-28 PROCEDURE — 2500000003 HC RX 250 WO HCPCS: Performed by: INTERNAL MEDICINE

## 2021-01-28 PROCEDURE — 82805 BLOOD GASES W/O2 SATURATION: CPT

## 2021-01-28 PROCEDURE — 94003 VENT MGMT INPAT SUBQ DAY: CPT

## 2021-01-28 PROCEDURE — 85025 COMPLETE CBC W/AUTO DIFF WBC: CPT

## 2021-01-28 PROCEDURE — 2000000000 HC ICU R&B

## 2021-01-28 PROCEDURE — 71045 X-RAY EXAM CHEST 1 VIEW: CPT

## 2021-01-28 PROCEDURE — 87040 BLOOD CULTURE FOR BACTERIA: CPT

## 2021-01-28 PROCEDURE — 82962 GLUCOSE BLOOD TEST: CPT

## 2021-01-28 PROCEDURE — 99233 SBSQ HOSP IP/OBS HIGH 50: CPT | Performed by: NURSE PRACTITIONER

## 2021-01-28 PROCEDURE — C9113 INJ PANTOPRAZOLE SODIUM, VIA: HCPCS | Performed by: INTERNAL MEDICINE

## 2021-01-28 PROCEDURE — 2500000003 HC RX 250 WO HCPCS: Performed by: EMERGENCY MEDICINE

## 2021-01-28 PROCEDURE — 94640 AIRWAY INHALATION TREATMENT: CPT

## 2021-01-28 PROCEDURE — 99291 CRITICAL CARE FIRST HOUR: CPT | Performed by: INTERNAL MEDICINE

## 2021-01-28 PROCEDURE — 83735 ASSAY OF MAGNESIUM: CPT

## 2021-01-28 PROCEDURE — 84132 ASSAY OF SERUM POTASSIUM: CPT

## 2021-01-28 RX ORDER — MIDODRINE HYDROCHLORIDE 5 MG/1
10 TABLET ORAL
Status: DISCONTINUED | OUTPATIENT
Start: 2021-01-28 | End: 2021-01-29

## 2021-01-28 RX ORDER — DILTIAZEM HYDROCHLORIDE 5 MG/ML
5 INJECTION INTRAVENOUS ONCE
Status: COMPLETED | OUTPATIENT
Start: 2021-01-28 | End: 2021-01-28

## 2021-01-28 RX ORDER — DIGOXIN 0.25 MG/ML
INJECTION INTRAMUSCULAR; INTRAVENOUS
Status: COMPLETED
Start: 2021-01-28 | End: 2021-01-28

## 2021-01-28 RX ORDER — POTASSIUM CHLORIDE 20 MEQ/1
40 TABLET, EXTENDED RELEASE ORAL ONCE
Status: COMPLETED | OUTPATIENT
Start: 2021-01-28 | End: 2021-01-28

## 2021-01-28 RX ORDER — INSULIN GLARGINE 100 [IU]/ML
22 INJECTION, SOLUTION SUBCUTANEOUS NIGHTLY
Status: DISCONTINUED | OUTPATIENT
Start: 2021-01-28 | End: 2021-01-29

## 2021-01-28 RX ORDER — DIGOXIN 0.25 MG/ML
125 INJECTION INTRAMUSCULAR; INTRAVENOUS ONCE
Status: COMPLETED | OUTPATIENT
Start: 2021-01-28 | End: 2021-01-28

## 2021-01-28 RX ORDER — ALBUMIN (HUMAN) 12.5 G/50ML
25 SOLUTION INTRAVENOUS EVERY 12 HOURS
Status: COMPLETED | OUTPATIENT
Start: 2021-01-28 | End: 2021-01-29

## 2021-01-28 RX ADMIN — MIDODRINE HYDROCHLORIDE 10 MG: 5 TABLET ORAL at 14:31

## 2021-01-28 RX ADMIN — INSULIN LISPRO 6 UNITS: 100 INJECTION, SOLUTION INTRAVENOUS; SUBCUTANEOUS at 21:17

## 2021-01-28 RX ADMIN — MIDODRINE HYDROCHLORIDE 10 MG: 5 TABLET ORAL at 20:10

## 2021-01-28 RX ADMIN — DORNASE ALFA 2.5 MG: 1 SOLUTION RESPIRATORY (INHALATION) at 09:42

## 2021-01-28 RX ADMIN — IPRATROPIUM BROMIDE AND ALBUTEROL SULFATE 1 AMPULE: 2.5; .5 SOLUTION RESPIRATORY (INHALATION) at 14:20

## 2021-01-28 RX ADMIN — Medication 10 ML: at 09:17

## 2021-01-28 RX ADMIN — SENNOSIDES 8.6 MG: 8.6 TABLET, FILM COATED ORAL at 20:10

## 2021-01-28 RX ADMIN — MEROPENEM 1000 MG: 1 INJECTION, POWDER, FOR SOLUTION INTRAVENOUS at 09:27

## 2021-01-28 RX ADMIN — ATORVASTATIN CALCIUM 20 MG: 20 TABLET, FILM COATED ORAL at 09:27

## 2021-01-28 RX ADMIN — BUDESONIDE 500 MCG: 0.5 SUSPENSION RESPIRATORY (INHALATION) at 09:08

## 2021-01-28 RX ADMIN — Medication 30 MCG/MIN: at 17:31

## 2021-01-28 RX ADMIN — ALBUMIN (HUMAN) 25 G: 0.25 INJECTION, SOLUTION INTRAVENOUS at 20:10

## 2021-01-28 RX ADMIN — Medication 10 ML: at 20:11

## 2021-01-28 RX ADMIN — SODIUM CHLORIDE SOLN NEBU 3% 4 ML: 3 NEBU SOLN at 20:30

## 2021-01-28 RX ADMIN — ALBUMIN (HUMAN) 25 G: 0.25 INJECTION, SOLUTION INTRAVENOUS at 09:15

## 2021-01-28 RX ADMIN — CHLORHEXIDINE GLUCONATE 0.12% ORAL RINSE 15 ML: 1.2 LIQUID ORAL at 21:17

## 2021-01-28 RX ADMIN — SODIUM CHLORIDE, PRESERVATIVE FREE 10 ML: 5 INJECTION INTRAVENOUS at 09:53

## 2021-01-28 RX ADMIN — Medication: at 21:17

## 2021-01-28 RX ADMIN — POTASSIUM CHLORIDE 40 MEQ: 1500 TABLET, EXTENDED RELEASE ORAL at 09:15

## 2021-01-28 RX ADMIN — IPRATROPIUM BROMIDE AND ALBUTEROL SULFATE 1 AMPULE: 2.5; .5 SOLUTION RESPIRATORY (INHALATION) at 17:36

## 2021-01-28 RX ADMIN — ACETAMINOPHEN ORAL SOLUTION 650 MG: 650 SOLUTION ORAL at 22:36

## 2021-01-28 RX ADMIN — IPRATROPIUM BROMIDE AND ALBUTEROL SULFATE 1 AMPULE: 2.5; .5 SOLUTION RESPIRATORY (INHALATION) at 20:30

## 2021-01-28 RX ADMIN — AMIODARONE HYDROCHLORIDE 100 MG: 200 TABLET ORAL at 09:16

## 2021-01-28 RX ADMIN — Medication 17 MCG/MIN: at 06:48

## 2021-01-28 RX ADMIN — FENTANYL CITRATE 50 MCG: 50 INJECTION, SOLUTION INTRAMUSCULAR; INTRAVENOUS at 23:58

## 2021-01-28 RX ADMIN — BUMETANIDE 2 MG: 0.25 INJECTION, SOLUTION INTRAMUSCULAR; INTRAVENOUS at 09:32

## 2021-01-28 RX ADMIN — Medication: at 09:17

## 2021-01-28 RX ADMIN — SODIUM CHLORIDE SOLN NEBU 3% 4 ML: 3 NEBU SOLN at 09:09

## 2021-01-28 RX ADMIN — INSULIN LISPRO 12 UNITS: 100 INJECTION, SOLUTION INTRAVENOUS; SUBCUTANEOUS at 18:33

## 2021-01-28 RX ADMIN — POTASSIUM BICARBONATE 40 MEQ: 782 TABLET, EFFERVESCENT ORAL at 09:27

## 2021-01-28 RX ADMIN — INSULIN GLARGINE 22 UNITS: 100 INJECTION, SOLUTION SUBCUTANEOUS at 21:33

## 2021-01-28 RX ADMIN — DIGOXIN 125 MCG: 0.25 INJECTION INTRAMUSCULAR; INTRAVENOUS at 14:42

## 2021-01-28 RX ADMIN — MEROPENEM 1000 MG: 1 INJECTION, POWDER, FOR SOLUTION INTRAVENOUS at 01:07

## 2021-01-28 RX ADMIN — CHLORHEXIDINE GLUCONATE 0.12% ORAL RINSE 15 ML: 1.2 LIQUID ORAL at 09:15

## 2021-01-28 RX ADMIN — INSULIN LISPRO 12 UNITS: 100 INJECTION, SOLUTION INTRAVENOUS; SUBCUTANEOUS at 12:45

## 2021-01-28 RX ADMIN — IPRATROPIUM BROMIDE AND ALBUTEROL SULFATE 1 AMPULE: 2.5; .5 SOLUTION RESPIRATORY (INHALATION) at 09:08

## 2021-01-28 RX ADMIN — DEXTROSE MONOHYDRATE 150 MG: 50 INJECTION, SOLUTION INTRAVENOUS at 15:19

## 2021-01-28 RX ADMIN — INSULIN LISPRO 9 UNITS: 100 INJECTION, SOLUTION INTRAVENOUS; SUBCUTANEOUS at 09:33

## 2021-01-28 RX ADMIN — FENTANYL CITRATE 50 MCG: 50 INJECTION, SOLUTION INTRAMUSCULAR; INTRAVENOUS at 20:49

## 2021-01-28 RX ADMIN — BUMETANIDE 2 MG: 0.25 INJECTION, SOLUTION INTRAMUSCULAR; INTRAVENOUS at 21:17

## 2021-01-28 RX ADMIN — PANTOPRAZOLE SODIUM 40 MG: 40 INJECTION, POWDER, FOR SOLUTION INTRAVENOUS at 09:16

## 2021-01-28 RX ADMIN — BUDESONIDE 500 MCG: 0.5 SUSPENSION RESPIRATORY (INHALATION) at 20:30

## 2021-01-28 RX ADMIN — MEROPENEM 1000 MG: 1 INJECTION, POWDER, FOR SOLUTION INTRAVENOUS at 18:33

## 2021-01-28 RX ADMIN — LACTULOSE 20 G: 20 SOLUTION ORAL at 09:15

## 2021-01-28 RX ADMIN — SENNOSIDES 8.6 MG: 8.6 TABLET, FILM COATED ORAL at 09:16

## 2021-01-28 RX ADMIN — LATANOPROST 1 DROP: 50 SOLUTION OPHTHALMIC at 09:17

## 2021-01-28 RX ADMIN — ENOXAPARIN SODIUM 40 MG: 40 INJECTION SUBCUTANEOUS at 09:15

## 2021-01-28 RX ADMIN — HYDROCORTISONE SODIUM SUCCINATE 25 MG: 100 INJECTION, POWDER, FOR SOLUTION INTRAMUSCULAR; INTRAVENOUS at 06:48

## 2021-01-28 RX ADMIN — SODIUM CHLORIDE, PRESERVATIVE FREE 300 UNITS: 5 INJECTION INTRAVENOUS at 09:16

## 2021-01-28 RX ADMIN — HYDROCORTISONE SODIUM SUCCINATE 25 MG: 100 INJECTION, POWDER, FOR SOLUTION INTRAMUSCULAR; INTRAVENOUS at 18:33

## 2021-01-28 RX ADMIN — ACETAMINOPHEN ORAL SOLUTION 650 MG: 650 SOLUTION ORAL at 06:48

## 2021-01-28 RX ADMIN — ACETAMINOPHEN ORAL SOLUTION 650 MG: 650 SOLUTION ORAL at 18:33

## 2021-01-28 RX ADMIN — DILTIAZEM HYDROCHLORIDE 5 MG: 5 INJECTION INTRAVENOUS at 18:31

## 2021-01-28 ASSESSMENT — PULMONARY FUNCTION TESTS
PIF_VALUE: 16
PIF_VALUE: 18
PIF_VALUE: 16
PIF_VALUE: 16
PIF_VALUE: 18
PIF_VALUE: 22
PIF_VALUE: 25
PIF_VALUE: 29
PIF_VALUE: 25
PIF_VALUE: 24
PIF_VALUE: 20
PIF_VALUE: 25
PIF_VALUE: 24
PIF_VALUE: 24

## 2021-01-28 ASSESSMENT — PAIN SCALES - GENERAL
PAINLEVEL_OUTOF10: 0

## 2021-01-28 NOTE — PROGRESS NOTES
[address] [place] [address]  Internal Medicine Department  Division of Pulmonary, Critical Care and Sleep Medicine  Daily Intensive Care Unit Progress  Note    Admit Date: 1/22/2021    MRN: [id number]        Patient:  Dannie Barton 78 y.o. male     PCP: No primary care provider on file. Date of Service: 1/28/2021      Allergy: Imuran [azathioprine], Doxycycline, and Levaquin [levofloxacin]    Subjective   Length of stay during current admission: 6  Events of the past 24 hours are in the residents notes and we discussed at bedside briefly. .. Abx continue per ID  Still on Hydrocortisone and Levophed dependent  Will add midodrine today   Resp alk and still with edema- add alb then ace with diuretic  On Vent FiO2 40%, PEEP 5  + 7 liters   TF ok     Physical Exam:     VITALS:  BP (!) 93/48   Pulse 108   Temp 101.1 °F (38.4 °C) (Bladder)   Resp 22   Ht 6' (1.829 m)   Wt 255 lb (115.7 kg)   SpO2 96%   BMI 34.58 kg/m²   24HR INTAKE/OUTPUT:      Intake/Output Summary (Last 24 hours) at 1/28/2021 0837  Last data filed at 1/27/2021 2200  Gross per 24 hour   Intake 1590.43 ml   Output 1230 ml   Net 360.43 ml     General: Alert  HEENT: Conjunctiva/corneas clear, No icterus. No exudates. Neck: Supple, + JVD  Chest wall: Symmetric excursion,   Lung: Course to auscultation + rales , No wheezing  Heart: Regular with crisp AS murmur. No rub or  gallop. Abdomen: Obese, BS +, soft, no rigidity, rebound or guarding  Extremities: ++ pitting edema. Palp pulses. Femoral TLC  Skin: venous stasis, open dry wounds on legs, weaping   Musculokeletal: No trauma signs   Lymph nodes: No adenopathy  Neurologic: Non focal examination    Medications   Home and Current medications were discussed & reviewed on rounds with team and pharmacy liaison. Labs &  Imaging Studies   The data collected below information that was obtained, reviewed, analyzed and interpreted today.  Imaging test are reviewed with the radiologist during rounds. Comparison to previous images are always explored. CBC:   Lab Results   Component Value Date    WBC 12.3 01/28/2021    RBC 3.38 01/28/2021    HGB 9.1 01/28/2021    HCT 29.5 01/28/2021     01/28/2021    MCV 87.3 01/28/2021       BMP:    Lab Results   Component Value Date     01/28/2021    K 3.6 01/28/2021    CL 96 01/28/2021    CO2 29 01/28/2021    BUN 16 01/28/2021    CREATININE 0.7 01/28/2021    GLUCOSE 242 01/28/2021    GLUCOSE 138 03/26/2011    CALCIUM 7.0 01/28/2021       TSH:    Lab Results   Component Value Date    TSH 2.300 01/25/2021       Imaging Studies:    RADIOLOGY: Films were read/reviewed and or discussed with radiology and the consulting teams which show pneumonia and edema    EKG: ICU Rhythm Strips were reviewed and discussed. Sinus, Rare Ectopy    ECHOCARDIOGRAM: 2020   Left ventricle is normal in size . Moderate left ventricular concentric hypertrophy noted. No regional wall motion abnormalities seen. Normal left ventricular ejection fraction. The left atrium is moderately dilated. Mild mitral annular calcification. The aortic valve appears mildly sclerotic. Mild aortic stenosis. CULTURES    Pseudomonas aeruginosaAbnormal      CULTURE, RESPIRATORY Heavy growth    Organism Proteus mirabilisAbnormal     CULTURE, RESPIRATORY Heavy growth    Organism Escherichia coliAbnormal            Assessment    Kimberly Powell is a 78 y.o. male was seen, examined and discussed with on multi-disciplinary team rounds. This note may be separate or in addition to the resident records. I have personally seen and examined the patient and the key elements of the encounter were performed by me. The medications & laboratory data was discussed and adjusted where necessary. The radiographic images were reviewed either as a group or with radiologist if felt dis-concordant with the exam or history. The above findings were corroborated, plans confirmed and changes made if needed. Génesis Glover DO, MPH, [place]  Professor of Internal Medicine [id number]

## 2021-01-28 NOTE — PROGRESS NOTES
Deena Bailey is a 78 y.o. male  Chief Complaint   Patient presents with    Respiratory Distress     pt brought in by EMS for possible aspiration. pt had an episode of emesis while wearing Bipap. pt was being bagged by ems upon arrival     HPI  Pt is on vent, he does awaken, but he is lethargic, he bites on ET tube. Events of last 24 hrs reviewed with staff. No current facility-administered medications on file prior to encounter. Current Outpatient Medications on File Prior to Encounter   Medication Sig Dispense Refill    dextrose 50 % solution Infuse 25 g intravenously as needed      potassium bicarb-citric acid (EFFER-K) 20 MEQ TBEF effervescent tablet Take 40 tablets by mouth daily      potassium & sodium phosphates (PHOS-NAK) 280-160-250 MG PACK 1 packet by Per G Tube route daily      sodium chloride, PF, 0.9 % injection Infuse 50 mLs intravenously continuous      sodium chloride, Inhalant, 3 % nebulizer solution Take 4 mLs by nebulization 2 times daily      atorvastatin (LIPITOR) 20 MG tablet 20 mg daily Peg tube      amiodarone (PACERONE) 100 MG tablet 100 mg by PEG Tube route daily      promethazine (PHENERGAN) 12.5 MG tablet 12.5 mg by PEG Tube route every 6 hours as needed for Nausea      Benzocaine-Docusate Sodium (ENEMEEZ PLUS)  MG ENEM Place rectally as needed      hydrocortisone sodium succinate PF (SOLU-CORTEF) 100 MG injection Infuse 25 mg intravenously every 8 hours      insulin lispro (HUMALOG) 100 UNIT/ML injection vial Inject into the skin every 6 hours 0-16 units sliding scale      lactulose (CHRONULAC) 10 GM/15ML solution Take 20 g by mouth daily      metoclopramide (REGLAN) 5 MG/ML injection Infuse 10 mg intravenously every 6 hours      midodrine (PROAMATINE) 5 MG tablet 5 mg by PEG Tube route 3 times daily      nystatin-triamcinolone (MYCOLOG II) 479429-1.1 UNIT/GM-% cream Apply topically 4 times daily Apply topically 4 times daily.       pantoprazole sodium (PROTONIX) 40 MG PACK packet 40 mg by Per G Tube route 2 times daily (before meals) Pantoprazole/ sodium bicarb liquid 2mg/ml oral liquid 40 mg peg tube BID      glucagon, rDNA, 1 MG injection Inject 1 mg into the muscle as needed for Low blood sugar (Blood glucose less than 70 mg/dL and patient NOT ALERT or NPO and does not have IV access. ) 1 each 0    folic acid (FOLVITE) 1 MG tablet 1 tablet by Per G Tube route daily 30 tablet 3    docusate sodium (ENEMEEZ) 283 MG enema Place 5 mLs rectally daily 1 each 0    glucose (GLUTOSE) 40 % GEL Take 15 g by mouth as needed 45 g 1    latanoprost (XALATAN) 0.005 % ophthalmic solution Place 1 drop into both eyes daily       acetaminophen (TYLENOL) 325 MG tablet Take 650 mg by mouth every 4 hours as needed for Pain or Fever (max = 3000 mg/24 hours)       Past Medical History:   Diagnosis Date    Acute deep vein thrombosis (DVT) of femoral vein of left lower extremity (HCC) 1/5/2021    Anxiety     Benign localized hyperplasia of prostate with urinary obstruction     Bullous pemphigoid     D[facility]     Diabetes mellitus (HCC)     Enlarged prostate     Hx of blood clots     Hyperlipidemia     Hypertension     Paroxysmal atrial fibrillation ([facility] 1/12/2019    Respiratory failure ([facility] 12/2020    Rheumatoid disease ([facility]          OBJECTIVE  Vitals:    01/28/21 0600   BP:    Pulse: 108   Resp: 22   Temp:    SpO2: 96%     Gen: Intubated   Head: AT/NC, PERRL, EOMIx2, no icterus, conjunctival injection  Neck: No JVD, carotid bruits, LAD, thyroid non-palpable  Heart: RRR with no murmurs, rubs, gallops  Lungs: Diminished in all fields   Abd: soft, NT, ND, BS+, no G/R, no HSM  Ext: +edema with erythema   Lab Results   Component Value Date    WBC 12.3 (H) 01/28/2021    HGB 9.1 (L) 01/28/2021    HCT 29.5 (L) 01/28/2021    MCV 87.3 01/28/2021     (H) 01/28/2021     Lab Results   Component Value Date     01/28/2021    K 3.6 01/28/2021    CL 96 01/28/2021 CO2 29 01/28/2021    BUN 16 01/28/2021    CREATININE 0.7 01/28/2021    GLUCOSE 242 01/28/2021    GLUCOSE 138 03/26/2011    CALCIUM 7.0 01/28/2021            ASSESSMENT  1. Acute respiratory failure with hypoxia ([facility]    2. Septic shock ([facility]    3. Gastrointestinal hemorrhage, unspecified gastrointestinal hemorrhage type    4. Elevated troponin    5. Urinary tract infection without hematuria, site unspecified    6. Aspiration into respiratory tract, initial encounter    7.  Anemia, unspecified type      PLAN  Critical care  Wean as tolerated  Abx per ID team

## 2021-01-28 NOTE — PROGRESS NOTES
Infectious disease managing antibiotics. Will follow along closely for support of patient and family. 1430- Call placed to daughter Madalyn Díaz to let her know how her father is doing a this time and see if she would like to make any changes to her father's plan of care. Stated that she was here yesterday and that he was responding to her, and that she was a little hopeful. Asked if I could call her brother and update him on the condition of her father. Brother Kitty Holly is going to come in this evening between 4:30- 5:00 to see his father. If he agrees that the patient is doing poorly, it will be ok with Madalyn Díaz to make changes to plan of care. Jose J Gupta, he is coming in this evening after 5 pm.  Explained his father's current health status. Son asked what the options were with removing ventilator if needed. Plans will remain the same today. Family to decide if they want to change anything after son visits this evening. 1/27/2021 Patient's chart reviewed. According to [facility], Levophed was increased overnight to keep MAP>65. Levophed is slowly being wean down today. Patient remains intubated, weaning trial.  Fever of 101.7 F at 1600 today. Reviewed patient with Staff RN. Will follow along closely for support of patient and family. 1/26/2021: Labs and chart reviewed. Reviewed the patient with Staff RN. Per RN, the patient is going to be tested for COVID-19. Infectious disease consulted. Patient remains on the ventilator, on Levophed. Did not personally assess the patient to preserve PPE for Physicians and nursing staff. No change in the plan of care. Per , plan remains the patient will return to [facility] upon discharge. Palliative Care following for goals of care. 1/25/2021 Patient recently discharged from hospital on 12/23/2020. Patient with reported emesis with BiPAP in place at [facility]. Brought to facility by EMS. Currently in the MICU on ventilator at this time, on Levophed. Patient with declining mentation, family stated no trach if it comes to that point and not following commands. Palliatve Care was consulted to help with CODE STATUS discussion, family support and goals of care. Call placed to Mario Grandchild. 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 Contact information for Palliative Care given. Patient was discharged on 12/23/2020 and went to [facility]; per daughter: arrested, down for 9 minutes, ended up on vent. Last Wednesday discharged back to [facility], Thursday placed on BiPAP, Friday while on BiPAP had emesis that went down into the patient's lung. Patient's daughter stated that they had paperwork for a DNR-CCA, but it was not completed and signed before the patient needed to come back into the hospital.  Changed the patient to a DNR CCA at this time with the daughter's permission. Daughter Mario Dutton stated that she will call her brother to inform him. Explained to Mario Dutton that with a DNR-CCA that her father may be reintubated after extubation. Goal-for her father to be comfortable. OBJECTIVE:   Prognosis: Guarded     According to institutional recommendations and guidelines, a face-to-face encounter was not performed due to the need to preserve PPE for other caregivers. Relevant records, nursing assessment, and diagnostic testing including laboratory results and imaging were reviewed. Please reference any relevant documentation elsewhere. Patient was observed through the window and observation as reported below. Care will be coordinated with the primary services and consultants.     Physical Exam: Per MICU RN  /60   Pulse 95   Temp 100.7 °F (38.2 °C) (Bladder)   Resp 23   Ht 6' (1.829 m)   Wt 255 lb (115.7 kg)   SpO2 99%   BMI 34.58 kg/m²   Gen: ETT/vent elderly, NAD,   HEENT:  Normocephalic, atraumatic  Neck:  Supple, trachea midline, no JVD  Lungs:  Course rhonchi, respirations unlabored on the vent  Heart[de-identified]  RRR- monitor   Abd:  Deferred  : Catheter-clear yellow urine  Ext:  Deferred  Skin:  Deferred  Neuro:  Deferred     Objective data reviewed: labs, images, records, medication use, vitals and chart    Discussed patient and the plan of care with the other IDT members: Palliative Medicine IDT Team, Primary Team, Floor Nurse, Patient and Family    Time/Communication  Greater than 50% of time spent, total 35 minutes in counseling and coordination of care at the bedside regarding CODE STATUS discussion, family support and goals of care. Thank you for allowing Palliative Medicine to participate in the care of Wes Chan. Note: This report was completed using computerHunite voiced recognition software. Every effort has been made to ensure accuracy; however, inadvertent computerized transcription errors may be present.

## 2021-01-28 NOTE — PROGRESS NOTES
[address] [place] [address] [facility]  BIBI[facility]  Progress  Note   C/C : pneumonia, resp failure,     Pt is intubated, sedated  Labile blood pressure   Low grade fever  Temp  F        Current Facility-Administered Medications   Medication Dose Route Frequency Provider Last Rate Last Admin    albumin human 25 % IV solution 25 g  25 g Intravenous Q12H Dion Davenport,  mL/hr at 01/28/21 0915 25 g at 01/28/21 0915    midodrine (PROAMATINE) tablet 10 mg  10 mg Oral TID  Salas Davenport, DO        insulin lispro (HUMALOG) injection vial 0-18 Units  0-18 Units Subcutaneous Q4H Ashleyayo Davenport, DO        insulin glargine (LANTUS) injection vial 22 Units  22 Units Subcutaneous Nightly Sheridan Giraldo, DO        hydrocortisone sodium succinate PF (SOLU-CORTEF) injection 25 mg  25 mg Intravenous Q12H Rex Esteban Davenport, DO   25 mg at 01/28/21 3704    bumetanide (BUMEX) injection 2 mg  2 mg Intravenous BID Rex Esteban Davenport, DO   2 mg at 01/28/21 0932    Mineral Oil-Hydrophil Petrolat OINT   Topical BID Sheridan Goldberglin, DO   Given at 01/28/21 6548    And    Mineral Oil-Hydrophil Petrolat OINT   Topical 4x Daily PRN Sheridan Goldberglin, DO   Given at 01/26/21 1846    lidocaine PF 1 % injection 5 mL  5 mL Intradermal Once Sheridan Giraldo, DO        sodium chloride flush 0.9 % injection 10 mL  10 mL Intravenous PRN Sheridan Goldberglin, DO        heparin flush 100 UNIT/ML injection 300 Units  3 mL Intravenous 2 times per day Sheridan Goldberglin, DO   300 Units at 01/28/21 0916    heparin flush 100 UNIT/ML injection 300 Units  3 mL Intracatheter PRN Sheridan Goldberglin, DO        chlorhexidine (PERIDEX) 0.12 % solution 15 mL  15 mL Mouth/Throat BID Ashleyayo Davenport, DO   15 mL at 01/28/21 0915    potassium chloride 20 mEq/50 mL IVPB (Central Line)  20 mEq Intravenous PRN Sheridan Goldberglin, DO 50 mL/hr at 01/27/21 0749 20 mEq at 01/27/21 0749    dornase alpha (PULMOZYME) nebulizer solution 2.5 mg  2.5 mg Inhalation Daily Yefri Givens MD   2.5 mg at 01/28/21 0942    acetaminophen (TYLENOL) 160 MG/5ML solution 650 mg  650 mg Per G Tube Q4H PRN Ana Fish MD   650 mg at 01/28/21 0648    amiodarone (CORDARONE) tablet 100 mg  100 mg PEG Tube Daily Ana Fish MD   100 mg at 01/28/21 0916    atorvastatin (LIPITOR) tablet 20 mg  20 mg PEG Tube Daily Ana Fish MD   20 mg at 01/28/21 7748    docusate sodium (ENEMEEZ) enema 283 mg  1 enema Rectal PRN Ana Fish MD        glucose (GLUTOSE) 40 % oral gel 15 g  15 g Oral PRN Ana Fish MD        dextrose 50 % IV solution  12.5 g Intravenous PRN Ana Fish MD        glucagon (rDNA) injection 1 mg  1 mg Intramuscular PRN Ana Fish MD        dextrose 5 % solution  100 mL/hr Intravenous PRN Ana Fish MD        lactulose (CHRONULAC) 10 GM/15ML solution 20 g  20 g PEG Tube Daily Felix Hughes MD   20 g at 01/28/21 0915    latanoprost (XALATAN) 0.005 % ophthalmic solution 1 drop  1 drop Both Eyes Daily Ana Fish MD   1 drop at 01/28/21 0917    pantoprazole (PROTONIX) injection 40 mg  40 mg Intravenous Daily Ana Fish MD   40 mg at 01/28/21 0916    And    sodium chloride (PF) 0.9 % injection 10 mL  10 mL Intravenous Daily Ana Fish MD   10 mL at 01/28/21 0953    potassium bicarb-citric acid (EFFER-K) effervescent tablet 40 mEq  40 mEq Per G Tube Daily Ana Fish MD   40 mEq at 01/28/21 0927    senna (SENOKOT) tablet 8.6 mg  1 tablet PEG Tube BID Ana Fish MD   8.6 mg at 01/28/21 0916    sodium chloride (Inhalant) 3 % nebulizer solution 4 mL  4 mL Nebulization BID Ana Fish MD   4 mL at 01/28/21 0909    sodium chloride flush 0.9 % injection 10 mL  10 mL Intravenous 2 times per day Ana Fish MD   10 mL at 01/28/21 0917    sodium chloride flush 0.9 % injection 10 mL  10 mL Intravenous PRN Ana Fish MD        enoxaparin (LOVENOX) injection 40 mg  40 mg Subcutaneous Daily Irma Rodriguez MD   40 mg at 01/28/21 0915    promethazine (PHENERGAN) tablet 12.5 mg  12.5 mg Oral Q6H PRN Irma Rodriguez MD        Or    ondansetron (ZOFRAN) injection 4 mg  4 mg Intravenous Q6H PRN Irma Rodriguez MD        polyethylene glycol (GLYCOLAX) packet 17 g  17 g Oral Daily PRN Irma Rodriguez MD        acetaminophen (TYLENOL) tablet 650 mg  650 mg Oral Q6H PRN Irma Rodriguez MD        Or    acetaminophen (TYLENOL) suppository 650 mg  650 mg Rectal Q6H PRN Irma Rodriguez MD        0.9 % sodium chloride infusion   Intravenous Q8H Irma Rodriguez MD   Stopped at 01/27/21 0551    meropenem (MERREM) 1,000 mg in sodium chloride 0.9 % 100 mL IVPB (mini-bag)  1,000 mg Intravenous Q8H Ruben Espinoza  mL/hr at 01/28/21 0927 1,000 mg at 01/28/21 0927    budesonide (PULMICORT) nebulizer suspension 500 mcg  500 mcg Nebulization BID Herlinda Lackey MD   500 mcg at 01/28/21 0908    ipratropium-albuterol (DUONEB) nebulizer solution 1 ampule  1 ampule Inhalation Q4H WA Herlinda Lackey MD   1 ampule at 01/28/21 0908    fentaNYL (SUBLIMAZE) injection 50 mcg  50 mcg Intravenous Q1H PRN Darrel Sofia MD   50 mcg at 01/24/21 2301    norepinephrine (LEVOPHED) 16 mg in dextrose 5% 250 mL infusion  2-100 mcg/min Intravenous Continuous Darrel Sofia MD 15.9 mL/hr at 01/28/21 0648 17 mcg/min at 01/28/21 0648       REVIEW OF SYSTEMS: could not be obtained       PHYSICAL EXAM:    Vitals:   BP (!) 93/48   Pulse 114   Temp 101.1 °F (38.4 °C) (Bladder)   Resp 27   Ht 6' (1.829 m)   Wt 255 lb (115.7 kg)   SpO2 99%   BMI 34.58 kg/m²      Constitutional: Intubated and sedated, opened eyes    FiO2 40 %, PEEP 5  Skin: Warm and dry. Multiple partial thickness skin wounds    HEENT:Pallor +, no LN , ET tube,   Neck: Supple to movements. No lymphadenopathy.    Chest: Bilateral scattered rhonchi   Cardiovascular: Regular, no murmur   Abdomen:soft, bowel sound +  PEG - no drainage or erythema   Extremities: ++ edema, erythema legs     Lines:  Right brachial PICC ( 1/25)     CBC with Differential:      Lab Results   Component Value Date    WBC 12.3 01/28/2021    RBC 3.38 01/28/2021    HGB 9.1 01/28/2021    HCT 29.5 01/28/2021     01/28/2021    MCV 87.3 01/28/2021    MCH 26.9 01/28/2021    MCHC 30.8 01/28/2021    RDW 18.6 01/28/2021    NRBC 0.9 01/08/2021    SEGSPCT 64 09/27/2013    METASPCT 6.0 01/06/2021    LYMPHOPCT 24.3 01/28/2021    PROMYELOPCT 0.9 12/21/2020    MONOPCT 9.9 01/28/2021    MYELOPCT 3.0 01/06/2021    BASOPCT 0.2 01/28/2021    MONOSABS 1.22 01/28/2021    LYMPHSABS 2.99 01/28/2021    EOSABS 0.03 01/28/2021    BASOSABS 0.02 01/28/2021       CMP:    Lab Results   Component Value Date     01/28/2021    K 3.6 01/28/2021    CL 96 01/28/2021    CO2 29 01/28/2021    BUN 16 01/28/2021    CREATININE 0.7 01/28/2021    GFRAA >60 01/28/2021    LABGLOM >60 01/28/2021    GLUCOSE 242 01/28/2021    GLUCOSE 138 03/26/2011    PROT 4.3 01/22/2021    LABALBU 2.0 01/22/2021    LABALBU 4.0 03/26/2011    CALCIUM 7.0 01/28/2021    BILITOT 0.3 01/22/2021    ALKPHOS 123 01/22/2021    AST 18 01/22/2021    ALT 8 01/22/2021       Hepatic Function Panel:    Lab Results   Component Value Date    ALKPHOS 123 01/22/2021    ALT 8 01/22/2021    AST 18 01/22/2021    PROT 4.3 01/22/2021    BILITOT 0.3 01/22/2021    BILIDIR 0.4 01/14/2021    IBILI 0.5 01/14/2021    LABALBU 2.0 01/22/2021    LABALBU 4.0 03/26/2011         Microbiology :    Blood culture - neg to date   Urine Culture -     Susceptibility    Pseudomonas aeruginosa (1)    Antibiotic Interpretation SNEHA Status    gentamicin Sensitive <=^1 mcg/mL     levofloxacin Sensitive <=^0.12 mcg/mL     tobramycin Sensitive <=^1 mcg/mL     Lab and Collection        Sputum Culture-      Susceptibility    Pseudomonas aeruginosa (1)    Antibiotic Interpretation SNEHA Status    gentamicin Sensitive <=^1 mcg/mL     levofloxacin Sensitive <=^0.12 mcg/mL     tobramycin Sensitive <=^1 mcg/mL Proteus mirabilis (4)    Antibiotic Interpretation SNEHA Status    ampicillin Sensitive <=^2 mcg/mL     ceFAZolin Sensitive <=^4 mcg/mL     cefepime Sensitive <=^0.12 mcg/mL     cefTRIAXone Sensitive <=^0.25 mcg/mL     ertapenem Sensitive <=^0.12 mcg/mL     gentamicin Sensitive <=^1 mcg/mL     levofloxacin Resistant >=^8 mcg/mL     piperacillin-tazobactam Sensitive <=^4 mcg/mL     trimethoprim-sulfamethoxazole Sensitive <=^20 mcg/mL      Condensed View   Lab and Collection    Culture, Respiratory - 1/23/2021         procalcitonin 1.23       Radiology :    Chest X ray - bilateral multifocal infiltrates       Assessment:  · Pseudomonas bacteriuria    · HCAP / Aspiration pneumonia  (Pseudomonas, Klebsiella, E coli and Proteus)   · Acute respiratory failure - intubated   · Elevated procalcitonin       Plan:    · Meropenem 1 gram IV q 8 hrs  · Blood cx    · Contact isolation   · Vent support       Ruben WILDER Limbu  10:50 AM  1/28/2021

## 2021-01-28 NOTE — CARE COORDINATION
1/28 Care Coordination: Portia Jeff remains in MICU, On Vent,fio2 40%, peep 5. would like Portia Jeff to go back to [facility]  if able. Also is in agreement if need LTAC can go to [facility]. Referral was called to [facility]. CM/SW will continue to follow for discharge planning.    Ariella SHOREN,RN-BC  [phone]

## 2021-01-28 NOTE — PLAN OF CARE
Problem: Skin Integrity:  Goal: Will show no infection signs and symptoms  Description: Will show no infection signs and symptoms  1/28/2021 0006 by Georgia Busby RN  Outcome: Met This Shift  1/27/2021 1857 by Yomi Samuels RN  Outcome: Met This Shift  Goal: Absence of new skin breakdown  Description: Absence of new skin breakdown  1/28/2021 0006 by Georgia Busby RN  Outcome: Met This Shift  1/27/2021 1857 by Yomi Samuels RN  Outcome: Met This Shift     Problem: Falls - Risk of:  Goal: Will remain free from falls  Description: Will remain free from falls  1/28/2021 0006 by Georgia Busby RN  Outcome: Met This Shift  1/27/2021 1857 by Yomi Samuels RN  Outcome: Met This Shift  Goal: Absence of physical injury  Description: Absence of physical injury  1/28/2021 0006 by Georgia Busby RN  Outcome: Met This Shift  1/27/2021 1857 by Yomi Samuels RN  Outcome: Met This Shift     Problem: Inadequate oral food/beverage intake (NI-2.1)  Goal: Food and/or Nutrient Delivery  Description: Individualized approach for food/nutrient provision.   1/27/2021 1310 by Zula Burkitt, RD, LD  Outcome: Met This Shift     Problem: Restraint Use - Nonviolent/Non-Self-Destructive Behavior:  Goal: Absence of restraint-related injury  Description: Absence of restraint-related injury  1/28/2021 0006 by Georgia Busby RN  Outcome: Met This Shift  1/27/2021 1857 by Yomi Samuels RN  Outcome: Met This Shift     Problem: Restraint Use - Nonviolent/Non-Self-Destructive Behavior:  Goal: Absence of restraint indications  Description: Absence of restraint indications  1/28/2021 0006 by Georgia Busby RN  Outcome: Not Met This Shift  1/27/2021 1857 by Yomi Samuels RN  Outcome: Met This Shift

## 2021-01-28 NOTE — PLAN OF CARE
Problem: Restraint Use - Nonviolent/Non-Self-Destructive Behavior:  Goal: Absence of restraint indications  Description: Absence of restraint indications  Outcome: Completed  Goal: Absence of restraint-related injury  Description: Absence of restraint-related injury  Outcome: Completed

## 2021-01-29 ENCOUNTER — APPOINTMENT (OUTPATIENT)
Dept: GENERAL RADIOLOGY | Age: 81
DRG: 870 | End: 2021-01-29
Payer: COMMERCIAL

## 2021-01-29 LAB
AADO2: 140.9 MMHG
ANION GAP SERPL CALCULATED.3IONS-SCNC: 11 MMOL/L (ref 7–16)
B.E.: 9.9 MMOL/L (ref -3–3)
BASOPHILS ABSOLUTE: 0.02 E9/L (ref 0–0.2)
BASOPHILS RELATIVE PERCENT: 0.2 % (ref 0–2)
BUN BLDV-MCNC: 17 MG/DL (ref 8–23)
CALCIUM SERPL-MCNC: 7.5 MG/DL (ref 8.6–10.2)
CHLORIDE BLD-SCNC: 95 MMOL/L (ref 98–107)
CO2: 33 MMOL/L (ref 22–29)
COHB: 1.1 % (ref 0–1.5)
CREAT SERPL-MCNC: 0.7 MG/DL (ref 0.7–1.2)
CRITICAL: ABNORMAL
DATE ANALYZED: ABNORMAL
DATE OF COLLECTION: ABNORMAL
EOSINOPHILS ABSOLUTE: 0.03 E9/L (ref 0.05–0.5)
EOSINOPHILS RELATIVE PERCENT: 0.2 % (ref 0–6)
FIO2: 40 %
GFR AFRICAN AMERICAN: >60
GFR NON-AFRICAN AMERICAN: >60 ML/MIN/1.73
GLUCOSE BLD-MCNC: 133 MG/DL (ref 74–99)
HCO3: 33.5 MMOL/L (ref 22–26)
HCT VFR BLD CALC: 28.3 % (ref 37–54)
HEMOGLOBIN: 8.8 G/DL (ref 12.5–16.5)
HHB: 2.9 % (ref 0–5)
IMMATURE GRANULOCYTES #: 0.24 E9/L
IMMATURE GRANULOCYTES %: 1.9 % (ref 0–5)
LAB: ABNORMAL
LYMPHOCYTES ABSOLUTE: 2.64 E9/L (ref 1.5–4)
LYMPHOCYTES RELATIVE PERCENT: 20.5 % (ref 20–42)
Lab: ABNORMAL
MAGNESIUM: 1.7 MG/DL (ref 1.6–2.6)
MCH RBC QN AUTO: 27.3 PG (ref 26–35)
MCHC RBC AUTO-ENTMCNC: 31.1 % (ref 32–34.5)
MCV RBC AUTO: 87.9 FL (ref 80–99.9)
METER GLUCOSE: 135 MG/DL (ref 74–99)
METER GLUCOSE: 145 MG/DL (ref 74–99)
METER GLUCOSE: 148 MG/DL (ref 74–99)
METER GLUCOSE: 160 MG/DL (ref 74–99)
METER GLUCOSE: 194 MG/DL (ref 74–99)
METER GLUCOSE: 212 MG/DL (ref 74–99)
METHB: 0.3 % (ref 0–1.5)
MODE: AC
MONOCYTES ABSOLUTE: 1.21 E9/L (ref 0.1–0.95)
MONOCYTES RELATIVE PERCENT: 9.4 % (ref 2–12)
NEUTROPHILS ABSOLUTE: 8.75 E9/L (ref 1.8–7.3)
NEUTROPHILS RELATIVE PERCENT: 67.8 % (ref 43–80)
O2 CONTENT: 13.2 ML/DL
O2 SATURATION: 97.1 % (ref 92–98.5)
O2HB: 95.7 % (ref 94–97)
OPERATOR ID: 2577
PATIENT TEMP: 37 C
PCO2: 40.9 MMHG (ref 35–45)
PDW BLD-RTO: 18.6 FL (ref 11.5–15)
PEEP/CPAP: 5 CMH2O
PFO2: 2.18 MMHG/%
PH BLOOD GAS: 7.53 (ref 7.35–7.45)
PHOSPHORUS: 2.6 MG/DL (ref 2.5–4.5)
PLATELET # BLD: 407 E9/L (ref 130–450)
PMV BLD AUTO: 9.9 FL (ref 7–12)
PO2: 87.3 MMHG (ref 75–100)
POTASSIUM REFLEX MAGNESIUM: 3.5 MMOL/L (ref 3.5–5)
RBC # BLD: 3.22 E12/L (ref 3.8–5.8)
RI(T): 1.61
RR MECHANICAL: 10 B/MIN
SODIUM BLD-SCNC: 139 MMOL/L (ref 132–146)
SOURCE, BLOOD GAS: ABNORMAL
THB: 9.7 G/DL (ref 11.5–16.5)
TIME ANALYZED: 606
VT MECHANICAL: 500 ML
WBC # BLD: 12.9 E9/L (ref 4.5–11.5)

## 2021-01-29 PROCEDURE — 6360000002 HC RX W HCPCS: Performed by: EMERGENCY MEDICINE

## 2021-01-29 PROCEDURE — 84100 ASSAY OF PHOSPHORUS: CPT

## 2021-01-29 PROCEDURE — 2500000003 HC RX 250 WO HCPCS: Performed by: EMERGENCY MEDICINE

## 2021-01-29 PROCEDURE — 83735 ASSAY OF MAGNESIUM: CPT

## 2021-01-29 PROCEDURE — 2580000003 HC RX 258: Performed by: INTERNAL MEDICINE

## 2021-01-29 PROCEDURE — 71045 X-RAY EXAM CHEST 1 VIEW: CPT

## 2021-01-29 PROCEDURE — 6360000002 HC RX W HCPCS: Performed by: INTERNAL MEDICINE

## 2021-01-29 PROCEDURE — 94640 AIRWAY INHALATION TREATMENT: CPT

## 2021-01-29 PROCEDURE — 82805 BLOOD GASES W/O2 SATURATION: CPT

## 2021-01-29 PROCEDURE — 6370000000 HC RX 637 (ALT 250 FOR IP): Performed by: INTERNAL MEDICINE

## 2021-01-29 PROCEDURE — 99231 SBSQ HOSP IP/OBS SF/LOW 25: CPT | Performed by: NURSE PRACTITIONER

## 2021-01-29 PROCEDURE — 2500000003 HC RX 250 WO HCPCS: Performed by: INTERNAL MEDICINE

## 2021-01-29 PROCEDURE — 82962 GLUCOSE BLOOD TEST: CPT

## 2021-01-29 PROCEDURE — P9047 ALBUMIN (HUMAN), 25%, 50ML: HCPCS | Performed by: INTERNAL MEDICINE

## 2021-01-29 PROCEDURE — 94003 VENT MGMT INPAT SUBQ DAY: CPT

## 2021-01-29 PROCEDURE — 99291 CRITICAL CARE FIRST HOUR: CPT | Performed by: INTERNAL MEDICINE

## 2021-01-29 PROCEDURE — 80048 BASIC METABOLIC PNL TOTAL CA: CPT

## 2021-01-29 PROCEDURE — C9113 INJ PANTOPRAZOLE SODIUM, VIA: HCPCS | Performed by: INTERNAL MEDICINE

## 2021-01-29 PROCEDURE — 85025 COMPLETE CBC W/AUTO DIFF WBC: CPT

## 2021-01-29 PROCEDURE — 2000000000 HC ICU R&B

## 2021-01-29 RX ORDER — INSULIN GLARGINE 100 [IU]/ML
25 INJECTION, SOLUTION SUBCUTANEOUS NIGHTLY
Status: DISCONTINUED | OUTPATIENT
Start: 2021-01-29 | End: 2021-02-01

## 2021-01-29 RX ORDER — MAGNESIUM SULFATE IN WATER 40 MG/ML
2000 INJECTION, SOLUTION INTRAVENOUS ONCE
Status: COMPLETED | OUTPATIENT
Start: 2021-01-29 | End: 2021-01-29

## 2021-01-29 RX ORDER — MIDODRINE HYDROCHLORIDE 5 MG/1
15 TABLET ORAL
Status: DISCONTINUED | OUTPATIENT
Start: 2021-01-29 | End: 2021-01-30

## 2021-01-29 RX ORDER — IPRATROPIUM BROMIDE AND ALBUTEROL SULFATE 2.5; .5 MG/3ML; MG/3ML
1 SOLUTION RESPIRATORY (INHALATION) 4 TIMES DAILY
Status: DISCONTINUED | OUTPATIENT
Start: 2021-01-30 | End: 2021-02-12 | Stop reason: HOSPADM

## 2021-01-29 RX ORDER — POTASSIUM CHLORIDE 20 MEQ/1
40 TABLET, EXTENDED RELEASE ORAL ONCE
Status: COMPLETED | OUTPATIENT
Start: 2021-01-29 | End: 2021-01-29

## 2021-01-29 RX ADMIN — IPRATROPIUM BROMIDE AND ALBUTEROL SULFATE 1 AMPULE: 2.5; .5 SOLUTION RESPIRATORY (INHALATION) at 16:36

## 2021-01-29 RX ADMIN — Medication 10 ML: at 20:20

## 2021-01-29 RX ADMIN — CHLORHEXIDINE GLUCONATE 0.12% ORAL RINSE 15 ML: 1.2 LIQUID ORAL at 20:10

## 2021-01-29 RX ADMIN — ENOXAPARIN SODIUM 40 MG: 40 INJECTION SUBCUTANEOUS at 09:24

## 2021-01-29 RX ADMIN — FENTANYL CITRATE 50 MCG: 50 INJECTION, SOLUTION INTRAMUSCULAR; INTRAVENOUS at 15:00

## 2021-01-29 RX ADMIN — MIDODRINE HYDROCHLORIDE 15 MG: 5 TABLET ORAL at 11:47

## 2021-01-29 RX ADMIN — LATANOPROST 1 DROP: 50 SOLUTION OPHTHALMIC at 11:42

## 2021-01-29 RX ADMIN — MIDODRINE HYDROCHLORIDE 15 MG: 5 TABLET ORAL at 17:25

## 2021-01-29 RX ADMIN — BUDESONIDE 500 MCG: 0.5 SUSPENSION RESPIRATORY (INHALATION) at 08:45

## 2021-01-29 RX ADMIN — SODIUM CHLORIDE 0.2 MCG/KG/HR: 9 INJECTION, SOLUTION INTRAVENOUS at 17:16

## 2021-01-29 RX ADMIN — CHLORHEXIDINE GLUCONATE 0.12% ORAL RINSE 15 ML: 1.2 LIQUID ORAL at 09:24

## 2021-01-29 RX ADMIN — SODIUM CHLORIDE, PRESERVATIVE FREE 10 ML: 5 INJECTION INTRAVENOUS at 10:01

## 2021-01-29 RX ADMIN — Medication: at 20:20

## 2021-01-29 RX ADMIN — INSULIN LISPRO 6 UNITS: 100 INJECTION, SOLUTION INTRAVENOUS; SUBCUTANEOUS at 12:58

## 2021-01-29 RX ADMIN — BUMETANIDE 2 MG: 0.25 INJECTION, SOLUTION INTRAMUSCULAR; INTRAVENOUS at 09:30

## 2021-01-29 RX ADMIN — INSULIN LISPRO 3 UNITS: 100 INJECTION, SOLUTION INTRAVENOUS; SUBCUTANEOUS at 20:11

## 2021-01-29 RX ADMIN — BUMETANIDE 2 MG: 0.25 INJECTION, SOLUTION INTRAMUSCULAR; INTRAVENOUS at 20:10

## 2021-01-29 RX ADMIN — PANTOPRAZOLE SODIUM 40 MG: 40 INJECTION, POWDER, FOR SOLUTION INTRAVENOUS at 09:23

## 2021-01-29 RX ADMIN — SODIUM CHLORIDE: 9 INJECTION, SOLUTION INTRAVENOUS at 22:10

## 2021-01-29 RX ADMIN — BUDESONIDE 500 MCG: 0.5 SUSPENSION RESPIRATORY (INHALATION) at 20:05

## 2021-01-29 RX ADMIN — INSULIN LISPRO 3 UNITS: 100 INJECTION, SOLUTION INTRAVENOUS; SUBCUTANEOUS at 09:24

## 2021-01-29 RX ADMIN — MEROPENEM 1000 MG: 1 INJECTION, POWDER, FOR SOLUTION INTRAVENOUS at 17:25

## 2021-01-29 RX ADMIN — Medication: at 10:01

## 2021-01-29 RX ADMIN — MEROPENEM 1000 MG: 1 INJECTION, POWDER, FOR SOLUTION INTRAVENOUS at 09:24

## 2021-01-29 RX ADMIN — MIDODRINE HYDROCHLORIDE 10 MG: 5 TABLET ORAL at 09:23

## 2021-01-29 RX ADMIN — INSULIN LISPRO 3 UNITS: 100 INJECTION, SOLUTION INTRAVENOUS; SUBCUTANEOUS at 02:05

## 2021-01-29 RX ADMIN — Medication 18 MCG/MIN: at 16:23

## 2021-01-29 RX ADMIN — MAGNESIUM SULFATE HEPTAHYDRATE 2000 MG: 40 INJECTION, SOLUTION INTRAVENOUS at 11:47

## 2021-01-29 RX ADMIN — MEROPENEM 1000 MG: 1 INJECTION, POWDER, FOR SOLUTION INTRAVENOUS at 01:52

## 2021-01-29 RX ADMIN — IPRATROPIUM BROMIDE AND ALBUTEROL SULFATE 1 AMPULE: 2.5; .5 SOLUTION RESPIRATORY (INHALATION) at 08:45

## 2021-01-29 RX ADMIN — ALBUMIN (HUMAN) 25 G: 0.25 INJECTION, SOLUTION INTRAVENOUS at 09:15

## 2021-01-29 RX ADMIN — FENTANYL CITRATE 50 MCG: 50 INJECTION, SOLUTION INTRAMUSCULAR; INTRAVENOUS at 04:48

## 2021-01-29 RX ADMIN — IPRATROPIUM BROMIDE AND ALBUTEROL SULFATE 1 AMPULE: 2.5; .5 SOLUTION RESPIRATORY (INHALATION) at 11:34

## 2021-01-29 RX ADMIN — POTASSIUM BICARBONATE 40 MEQ: 782 TABLET, EFFERVESCENT ORAL at 09:24

## 2021-01-29 RX ADMIN — ATORVASTATIN CALCIUM 20 MG: 20 TABLET, FILM COATED ORAL at 09:24

## 2021-01-29 RX ADMIN — SODIUM CHLORIDE SOLN NEBU 3% 4 ML: 3 NEBU SOLN at 20:05

## 2021-01-29 RX ADMIN — INSULIN GLARGINE 25 UNITS: 100 INJECTION, SOLUTION SUBCUTANEOUS at 20:11

## 2021-01-29 RX ADMIN — SODIUM CHLORIDE, PRESERVATIVE FREE 300 UNITS: 5 INJECTION INTRAVENOUS at 09:24

## 2021-01-29 RX ADMIN — DORNASE ALFA 2.5 MG: 1 SOLUTION RESPIRATORY (INHALATION) at 08:55

## 2021-01-29 RX ADMIN — LACTULOSE 20 G: 20 SOLUTION ORAL at 09:24

## 2021-01-29 RX ADMIN — INSULIN LISPRO 3 UNITS: 100 INJECTION, SOLUTION INTRAVENOUS; SUBCUTANEOUS at 18:27

## 2021-01-29 RX ADMIN — POTASSIUM CHLORIDE 40 MEQ: 1500 TABLET, EXTENDED RELEASE ORAL at 11:47

## 2021-01-29 RX ADMIN — SENNOSIDES 8.6 MG: 8.6 TABLET, FILM COATED ORAL at 09:23

## 2021-01-29 RX ADMIN — HYDROCORTISONE SODIUM SUCCINATE 25 MG: 100 INJECTION, POWDER, FOR SOLUTION INTRAMUSCULAR; INTRAVENOUS at 09:24

## 2021-01-29 RX ADMIN — AMIODARONE HYDROCHLORIDE 100 MG: 200 TABLET ORAL at 10:00

## 2021-01-29 RX ADMIN — SODIUM CHLORIDE SOLN NEBU 3% 4 ML: 3 NEBU SOLN at 11:34

## 2021-01-29 RX ADMIN — Medication 22 MCG/MIN: at 01:54

## 2021-01-29 RX ADMIN — SENNOSIDES 8.6 MG: 8.6 TABLET, FILM COATED ORAL at 20:10

## 2021-01-29 RX ADMIN — Medication 10 ML: at 09:25

## 2021-01-29 ASSESSMENT — PAIN SCALES - GENERAL
PAINLEVEL_OUTOF10: 0

## 2021-01-29 ASSESSMENT — PULMONARY FUNCTION TESTS
PIF_VALUE: 23
PIF_VALUE: 18
PIF_VALUE: 21
PIF_VALUE: 22
PIF_VALUE: 18
PIF_VALUE: 26
PIF_VALUE: 21
PIF_VALUE: 18
PIF_VALUE: 17
PIF_VALUE: 17
PIF_VALUE: 18

## 2021-01-29 NOTE — PROGRESS NOTES
Subjective:    Pt had issues last night with A fib with rvr. This am he is now NSR on monitor. He awakens, denies any cp or sob. Objective:  Pt is resting in nad  /66   Pulse 88   Temp 100 °F (37.8 °C) (Bladder)   Resp 20   Ht 6' (1.829 m)   Wt 255 lb (115.7 kg)   SpO2 100%   BMI 34.58 kg/m²   HEENT no adenopathy no bruits  ET tube in place   Heart:  RRR, no murmurs, gallops, or rubs.   Lungs:  Scattered rhonchi   Abd: bowel sounds present, nontender, nondistended, no masses  Extrem:  No clubbing, cyanosis, +edema and erythema   WBC/Hgb/Hct/Plts:  12.9/8.8/28.3/407 (01/29 3669) basic metabolic panel   Lab Results   Component Value Date     01/29/2021    K 3.5 01/29/2021    CL 95 01/29/2021    CO2 33 01/29/2021    BUN 17 01/29/2021    CREATININE 0.7 01/29/2021    GLUCOSE 133 01/29/2021    GLUCOSE 138 03/26/2011    CALCIUM 7.5 01/29/2021        Assessment:    Patient Active Problem List   Diagnosis    Severe sepsis with septic shock (HCC)    Dementia ([address]    Metabolic encephalopathy    Diabetes mellitus type 2, uncontrolled ([address]    Hyperlipidemia LDL goal <100    Essential hypertension    Venous ulcer of left leg (HCC)    Non-pressure chronic ulcer of left lower leg with fat layer exposed ([address]    Non-pressure chronic ulcer left lower leg, limited to breakdown skin (HCC)    Severe protein-calorie malnutrition (HCC)    Non-pressure chronic ulcer right lower leg, limited to breakdown skin (HCC)    Pressure injury of contiguous region involving back, buttock, and hip, stage 2 (HCC)    Pressure injury of calf, stage 2 ([address]    HCAP (healthcare-associated pneumonia)    Paroxysmal atrial fibrillation (HCC)    Acute respiratory failure with hypoxia (HCC)    Acute deep vein thrombosis (DVT) of femoral vein of left lower extremity (HCC)    Leg swelling    Respiratory failure ([address]    Palliative care encounter    DNR no code (do not resuscitate)    Goals of care, counseling/discussion       Plan:  Critical care  Family discussing with Palliative care Code status  Cont to monitor HR      Elizabeth Jean  7:18 AM  1/29/2021

## 2021-01-29 NOTE — PLAN OF CARE
Problem: Skin Integrity:  Goal: Will show no infection signs and symptoms  Description: Will show no infection signs and symptoms  1/29/2021 0255 by Traci España RN  Outcome: Met This Shift     Problem: Skin Integrity:  Goal: Absence of new skin breakdown  Description: Absence of new skin breakdown  1/29/2021 0255 by Traci España RN  Outcome: Met This Shift

## 2021-01-29 NOTE — PROGRESS NOTES
[facility]  Internal Medicine Department  Division of Pulmonary, Critical Care and Sleep Medicine  Daily Intensive Care Unit Progress  Note    Admit Date: 1/22/2021    MRN: [id number]        Patient:  Tess Ochoa 78 y.o. male     PCP: No primary care provider on file. Date of Service: 1/29/2021      Allergy: Imuran [azathioprine], Doxycycline, and Levaquin [levofloxacin]    Subjective   Length of stay during current admission: 7  Events of the past 24 hours are in the residents notes and we discussed at bedside briefly. .. Abx continue per ID  Still on Hydrocortisone and Levophed dependent  Weaning cause Afib with RVR  Increase in  midodrine today   + 6 liters ? On Vent FiO2 40%, PEEP 5  TF ok  45ml / hr    Physical Exam:     VITALS:  /61   Pulse 90   Temp 99 °F (37.2 °C) (Bladder)   Resp 22   Ht 6' (1.829 m)   Wt 255 lb (115.7 kg)   SpO2 99%   BMI 34.58 kg/m²   24HR INTAKE/OUTPUT:      Intake/Output Summary (Last 24 hours) at 1/29/2021 1044  Last data filed at 1/29/2021 0900  Gross per 24 hour   Intake 2553 ml   Output 3015 ml   Net -462 ml     General: Alert  HEENT: Conjunctiva/corneas clear, No icterus. No exudates. Neck: Supple, + JVD  Chest wall: Symmetric excursion,   Lung: Course to auscultation + rales , No wheezing  Heart: Regular with crisp AS murmur. No rub or  gallop. Abdomen: Obese, BS +, soft, no rigidity, rebound or guarding  Extremities: + pitting edema. Palp pulses. Femoral TLC  Skin: venous stasis, open dry wounds on legs, weaping   Musculokeletal: No trauma signs   Lymph nodes: No adenopathy  Neurologic: Non focal examination    Medications   Home and Current medications were discussed & reviewed on rounds with team and pharmacy liaison. Labs &  Imaging Studies   The data collected below information that was obtained, reviewed, analyzed and interpreted today. Imaging test are reviewed with the radiologist during rounds.  Comparison to previous images are always explored. CBC:   Lab Results   Component Value Date    WBC 12.9 01/29/2021    RBC 3.22 01/29/2021    HGB 8.8 01/29/2021    HCT 28.3 01/29/2021     01/29/2021    MCV 87.9 01/29/2021       BMP:    Lab Results   Component Value Date     01/29/2021    K 3.5 01/29/2021    CL 95 01/29/2021    CO2 33 01/29/2021    BUN 17 01/29/2021    CREATININE 0.7 01/29/2021    GLUCOSE 133 01/29/2021    GLUCOSE 138 03/26/2011    CALCIUM 7.5 01/29/2021       TSH:    Lab Results   Component Value Date    TSH 2.300 01/25/2021       Imaging Studies:    RADIOLOGY: Films were read/reviewed and or discussed with radiology and the consulting teams which show pneumonia and edema    EKG: ICU Rhythm Strips were reviewed and discussed. Sinus, Rare Ectopy    ECHOCARDIOGRAM: 2020   Left ventricle is normal in size . Moderate left ventricular concentric hypertrophy noted. No regional wall motion abnormalities seen. Normal left ventricular ejection fraction. The left atrium is moderately dilated. Mild mitral annular calcification. The aortic valve appears mildly sclerotic. Mild aortic stenosis. CULTURES    Pseudomonas aeruginosaAbnormal      CULTURE, RESPIRATORY Heavy growth    Organism Proteus mirabilisAbnormal     CULTURE, RESPIRATORY Heavy growth    Organism Escherichia coliAbnormal            Assessment    Gideon Bowman is a 78 y.o. male was seen, examined and discussed with on multi-disciplinary team rounds. This note may be separate or in addition to the resident records. I have personally seen and examined the patient and the key elements of the encounter were performed by me. The medications & laboratory data was discussed and adjusted where necessary. The radiographic images were reviewed either as a group or with radiologist if felt dis-concordant with the exam or history. The above findings were corroborated, plans confirmed and changes made if needed.        Current issues are : 78year old with recurrent hospitalization with RF/ Vlvular heart disease who has been recently discharged from [facility] where he completed his course of antibiotics from a previous hospitalization. Also had IVC filter on the 6th and PEG changed to jejunostomy tube on the 15th. He had also been intubated while at [facility] for respiratory failure and extubated after several days, still with issues with secretions, poor cough/pulmonary hygiene,   1. Acute respiratory failure with hypoxia    2. Septic shock   3. Gastrointestinal hemorrhage, unspecified gastrointestinal hemorrhage type   4. Elevated troponin   5. Urinary tract infection without hematuria, site unspecified   6. Aspiration into respiratory tract, initial encounter  7. Anemia, unspecified type  8. Pseudomonas bacteriuria   9. HCAP / Aspiration pneumonia  (Pseudomonas and Proteus)  10. COPD  6. Elevated procalcitonin   12. Valvular heart disease         Plan   Family was updated at the bedside if present and available. Key issues of the case were discussed among consultants. Critical Care time is documented if appropriate. 1. ID following and patient is on Meropenem 1 gram IV q 8 hrs, Linezolid 600 mg IV q 12 hrs  2. Contact isolation for infection  3. Pallative care consult for reasonable EOL discussion  4. Increased diuresis TID and follow K, add dailly K + protocol, add albumin before lasix to help diuresis  5. Change to IMV or VS or PS mode and wean to extubate  6. Continue with SBT daily   7. On hydrocortisone as cortisol level was 3.77 on 1/23 Wean hydrocortisone to 25 Qdaily  8. Wean off pressors -as able  9. Increase dose of midodrine   10. PICC line need, remove femoral = done 1/25/21  11. Continue Tube feeding - start   12. Bowel prep  13. Continue with basal insulin to 25 units  14. Prealbumin level only 8 check weekly   15. Check TSH on amio = normal  16. Stop Fentanyl gtt  17. Precedex if get agitated   18.  Keep K > 76 [facility], DO, MPH, Daysi Sellers  Professor of Internal Medicine [id number]

## 2021-01-29 NOTE — PROGRESS NOTES
[address] [place] [address] [facility]  YUAN  Progress  Note   C/C : pneumonia, resp failure,     Pt is intubated, awake   Afebrile today    Current Facility-Administered Medications   Medication Dose Route Frequency Provider Last Rate Last Admin    magnesium sulfate 2000 mg in 50 mL IVPB premix  2,000 mg Intravenous Once Salas Davenport DO        potassium chloride (KLOR-CON M) extended release tablet 40 mEq  40 mEq Oral Once Salas Davenport DO        midodrine (PROAMATINE) tablet 15 mg  15 mg Oral TID  Salas Davenport DO        [START ON 1/30/2021] hydrocortisone sodium succinate PF (SOLU-CORTEF) injection 25 mg  25 mg Intravenous Daily Salas Davenport DO        insulin glargine (LANTUS) injection vial 25 Units  25 Units Subcutaneous Nightly Aashish Davenport DO        insulin lispro (HUMALOG) injection vial 0-18 Units  0-18 Units Subcutaneous Q4H Suri Nails, DO   3 Units at 01/29/21 4104    bumetanide (BUMEX) injection 2 mg  2 mg Intravenous BID Aashish Davenport DO   2 mg at 01/29/21 0930    Mineral Oil-Hydrophil Petrolat OINT   Topical BID Suri Ishihara, DO   Given at 01/29/21 1001    And    Mineral Oil-Hydrophil Petrolat OINT   Topical 4x Daily PRN Suri Ishihara, DO   Given at 01/26/21 1846    lidocaine PF 1 % injection 5 mL  5 mL Intradermal Once Suri Ishihara, DO        sodium chloride flush 0.9 % injection 10 mL  10 mL Intravenous PRN Suri Ishihara, DO        heparin flush 100 UNIT/ML injection 300 Units  3 mL Intravenous 2 times per day Suri Ishihara, DO   300 Units at 01/29/21 0924    heparin flush 100 UNIT/ML injection 300 Units  3 mL Intracatheter PRN Suri Ishihara, DO        chlorhexidine (PERIDEX) 0.12 % solution 15 mL  15 mL Mouth/Throat BID Aashish Davenport, DO   15 mL at 01/29/21 0924    potassium chloride 20 mEq/50 mL IVPB (Central Line)  20 mEq Intravenous PRN Suri Ishihara, DO 50 mL/hr at 01/27/21 0749 20 mEq at 01/27/21 0749    dornase alpha (PULMOZYME) nebulizer solution 2.5 mg  2.5 mg Inhalation Daily Ulises Doss MD   2.5 mg at 01/29/21 0855    acetaminophen (TYLENOL) 160 MG/5ML solution 650 mg  650 mg Per G Tube Q4H PRN Kemal Birmingham MD   650 mg at 01/28/21 2236    amiodarone (CORDARONE) tablet 100 mg  100 mg PEG Tube Daily Kemal Birmingham MD   100 mg at 01/29/21 1000    atorvastatin (LIPITOR) tablet 20 mg  20 mg PEG Tube Daily Kemal Birmingham MD   20 mg at 01/29/21 0924    docusate sodium (ENEMEEZ) enema 283 mg  1 enema Rectal PRN Kemal Birmingham MD        glucose (GLUTOSE) 40 % oral gel 15 g  15 g Oral PRN Kemal Birmingham MD        dextrose 50 % IV solution  12.5 g Intravenous PRN Kemal Birmingham MD        glucagon (rDNA) injection 1 mg  1 mg Intramuscular PRN Kemal Birmingham MD        dextrose 5 % solution  100 mL/hr Intravenous PRN Kemal Birmingham MD        lactulose (CHRONULAC) 10 GM/15ML solution 20 g  20 g PEG Tube Daily Felix Hughes MD   20 g at 01/29/21 0924    latanoprost (XALATAN) 0.005 % ophthalmic solution 1 drop  1 drop Both Eyes Daily Kemal Birmingham MD   1 drop at 01/28/21 0917    pantoprazole (PROTONIX) injection 40 mg  40 mg Intravenous Daily Kemal Birmingham MD   40 mg at 01/29/21 3288    And    sodium chloride (PF) 0.9 % injection 10 mL  10 mL Intravenous Daily Felix Hughes MD   10 mL at 01/29/21 1001    potassium bicarb-citric acid (EFFER-K) effervescent tablet 40 mEq  40 mEq Per G Tube Daily Kemal Birmingham MD   40 mEq at 01/29/21 0924    senna (SENOKOT) tablet 8.6 mg  1 tablet PEG Tube BID Kemal Birmingham MD   8.6 mg at 01/29/21 7143    sodium chloride (Inhalant) 3 % nebulizer solution 4 mL  4 mL Nebulization BID Kemal Birmingham MD   4 mL at 01/29/21 1134    sodium chloride flush 0.9 % injection 10 mL  10 mL Intravenous 2 times per day Kemal Birmingham MD   10 mL at 01/29/21 0925    sodium chloride flush 0.9 % injection 10 mL  10 mL Intravenous PRN MD Rachel Freyams enoxaparin (LOVENOX) injection 40 mg  40 mg Subcutaneous Daily Philly Spann MD   40 mg at 01/29/21 0924    promethazine (PHENERGAN) tablet 12.5 mg  12.5 mg Oral Q6H PRN Philly Spann MD        Or    ondansetron (ZOFRAN) injection 4 mg  4 mg Intravenous Q6H PRN Philly Spann MD        polyethylene glycol (GLYCOLAX) packet 17 g  17 g Oral Daily PRN Philly Spann MD        acetaminophen (TYLENOL) tablet 650 mg  650 mg Oral Q6H PRN Philly Spann MD        Or    acetaminophen (TYLENOL) suppository 650 mg  650 mg Rectal Q6H PRN Philly Spann MD        0.9 % sodium chloride infusion   Intravenous Q8H Philly Spann MD   Stopped at 01/27/21 0551    meropenem (MERREM) 1,000 mg in sodium chloride 0.9 % 100 mL IVPB (mini-bag)  1,000 mg Intravenous Q8H Ruben Espinoza MD   Stopped at 01/29/21 0954    budesonide (PULMICORT) nebulizer suspension 500 mcg  500 mcg Nebulization BID Shane Sanders MD   500 mcg at 01/29/21 0845    ipratropium-albuterol (DUONEB) nebulizer solution 1 ampule  1 ampule Inhalation Q4H WA Shane Sanders MD   1 ampule at 01/29/21 1134    fentaNYL (SUBLIMAZE) injection 50 mcg  50 mcg Intravenous Q1H PRN Agapito Bledsoe MD   50 mcg at 01/29/21 0448    norepinephrine (LEVOPHED) 16 mg in dextrose 5% 250 mL infusion  2-100 mcg/min Intravenous Continuous Roma Davenport DO 16.9 mL/hr at 01/29/21 0603 18 mcg/min at 01/29/21 0603       REVIEW OF SYSTEMS: could not be obtained       PHYSICAL EXAM:    Vitals:   /60   Pulse 96   Temp 99 °F (37.2 °C) (Bladder)   Resp 21   Ht 6' (1.829 m)   Wt 255 lb (115.7 kg)   SpO2 99%   BMI 34.58 kg/m²      Constitutional: Intubated and awake, opened eyes    FiO2 40 %, PEEP 5  Skin: No pallor    HEENT:Pallor +, no LN , ET tube,   Neck: Supple to movements. No lymphadenopathy.    Chest: Bilateral scattered rhonchi   Cardiovascular: Regular, no murmur   Abdomen:soft, bowel sound +  PEG - no drainage or erythema   Extremities: ++ edema, erythema legs , not warm     Lines:  Right brachial PICC ( 1/25)     CBC with Differential:      Lab Results   Component Value Date    WBC 12.9 01/29/2021    RBC 3.22 01/29/2021    HGB 8.8 01/29/2021    HCT 28.3 01/29/2021     01/29/2021    MCV 87.9 01/29/2021    MCH 27.3 01/29/2021    MCHC 31.1 01/29/2021    RDW 18.6 01/29/2021    NRBC 0.9 01/08/2021    SEGSPCT 64 09/27/2013    METASPCT 6.0 01/06/2021    LYMPHOPCT 20.5 01/29/2021    PROMYELOPCT 0.9 12/21/2020    MONOPCT 9.4 01/29/2021    MYELOPCT 3.0 01/06/2021    BASOPCT 0.2 01/29/2021    MONOSABS 1.21 01/29/2021    LYMPHSABS 2.64 01/29/2021    EOSABS 0.03 01/29/2021    BASOSABS 0.02 01/29/2021       CMP:    Lab Results   Component Value Date     01/29/2021    K 3.5 01/29/2021    CL 95 01/29/2021    CO2 33 01/29/2021    BUN 17 01/29/2021    CREATININE 0.7 01/29/2021    GFRAA >60 01/29/2021    LABGLOM >60 01/29/2021    GLUCOSE 133 01/29/2021    GLUCOSE 138 03/26/2011    PROT 4.3 01/22/2021    LABALBU 2.0 01/22/2021    LABALBU 4.0 03/26/2011    CALCIUM 7.5 01/29/2021    BILITOT 0.3 01/22/2021    ALKPHOS 123 01/22/2021    AST 18 01/22/2021    ALT 8 01/22/2021       Hepatic Function Panel:    Lab Results   Component Value Date    ALKPHOS 123 01/22/2021    ALT 8 01/22/2021    AST 18 01/22/2021    PROT 4.3 01/22/2021    BILITOT 0.3 01/22/2021    BILIDIR 0.4 01/14/2021    IBILI 0.5 01/14/2021    LABALBU 2.0 01/22/2021    LABALBU 4.0 03/26/2011         Microbiology :    Blood culture - neg to date   Urine Culture -     Susceptibility    Pseudomonas aeruginosa (1)    Antibiotic Interpretation SNEHA Status    gentamicin Sensitive <=^1 mcg/mL     levofloxacin Sensitive <=^0.12 mcg/mL     tobramycin Sensitive <=^1 mcg/mL     Lab and Collection        Sputum Culture-      Susceptibility    Pseudomonas aeruginosa (1)    Antibiotic Interpretation SNEHA Status    gentamicin Sensitive <=^1 mcg/mL     levofloxacin Sensitive <=^0.12 mcg/mL     tobramycin Sensitive <=^1 mcg/mL Proteus mirabilis (4)    Antibiotic Interpretation SNEHA Status    ampicillin Sensitive <=^2 mcg/mL     ceFAZolin Sensitive <=^4 mcg/mL     cefepime Sensitive <=^0.12 mcg/mL     cefTRIAXone Sensitive <=^0.25 mcg/mL     ertapenem Sensitive <=^0.12 mcg/mL     gentamicin Sensitive <=^1 mcg/mL     levofloxacin Resistant >=^8 mcg/mL     piperacillin-tazobactam Sensitive <=^4 mcg/mL     trimethoprim-sulfamethoxazole Sensitive <=^20 mcg/mL      Condensed View   Lab and Collection    Culture, Respiratory - 1/23/2021         procalcitonin 1.23       Radiology :    Chest X ray - bilateral multifocal infiltrates , cardiomegaly       Assessment:  · Respiratory failure - intubated   · Pseudomonas bacteriuria    · HCAP / Aspiration pneumonia  (Pseudomonas, Klebsiella, E coli and Proteus)   · Elevated procalcitonin        Plan:    · Meropenem 1 gram IV q 8 hrs  · Contact isolation   · Vent support       Ruben Espinoza  11:35 AM  1/29/2021

## 2021-01-29 NOTE — PROGRESS NOTES
call to patient's son Kylie Elizalde. He was able to see his father last evening. Stated that he was able to communicate with him and that he was doing somewhat better. Patient's HR is now below 100, and his temperature is 99 F. Patient's brother is going to visit today. Kylie Elizalde stated that family all need to be able to see the patient, and since visiting is limited it is hard. Family would like to continue to [facility] at this time and see if the patient is able to be weaned off of the ventilator. Kylie Elizalde said that he asked his dad if he wanted to get out of hospital and he said nodded yes. No long term ventilator, but still want to try and wean. NO change to plan of care. 1/28/2021 Patient's chart reviewed and reviewed with Staff RN. Patient still on Levophed. Patient remains intubated, weaning trial. Fever of 101.3 at 0000. Infectious disease managing antibiotics. Will follow along closely for support of patient and family. 1430- Call placed to daughter Lukas Kumari to let her know how her father is doing a this time and see if she would like to make any changes to her father's plan of care. Stated that she was here yesterday and that he was responding to her, and that she was a little hopeful. Asked if I could call her brother and update him on the condition of her father. Brother Kylie Elizalde is going to come in this evening between 4:30- 5:00 to see his father. If he agrees that the patient is doing poorly, it will be ok with Lukas Kumari to make changes to plan of care. Zain Lentz, he is coming in this evening after 5 pm.  Explained his father's current health status. Son asked what the options were with removing ventilator if needed. Plans will remain the same today. Family to decide if they want to change anything after son visits this evening. 1/27/2021 Patient's chart reviewed. According to [facility], Levophed was increased overnight to keep MAP>65. Levophed is slowly being wean down today.  Patient remains intubated, weaning trial.  Fever of 101.7 F at 1600 today. Reviewed patient with Staff RN. Will follow along closely for support of patient and family. 1/26/2021: Labs and chart reviewed. Reviewed the patient with Staff RN. Per RN, the patient is going to be tested for COVID-19. Infectious disease consulted. Patient remains on the ventilator, on Levophed. Did not personally assess the patient to preserve PPE for Physicians and nursing staff. No change in the plan of care. Per , plan remains the patient will return to [facility] [place] discharge. Palliative Care following for goals of care. 1/25/2021 Patient recently discharged from hospital on 12/23/2020. Patient with reported emesis with BiPAP in place at [facility]. Brought to facility by EMS. Currently in the MICU on ventilator at this time, on Levophed. Patient with declining mentation, family stated no trach if it comes to that point and not following commands. Palliatve Care was consulted to help with CODE STATUS discussion, family support and goals of care. Call placed to [facility]. 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 Contact information for Palliative Care given. Patient was discharged on 12/23/2020 and went to [facility]; per daughter: arrested, down for 9 minutes, ended up on vent. Last Wednesday discharged back to [facility], Thursday placed on BiPAP, Friday while on BiPAP had emesis that went down into the patient's lung. Patient's daughter stated that they had paperwork for a DNR-CCA, but it was not completed and signed before the patient needed to come back into the hospital.  Changed the patient to a DNR CCA at this time with the daughter's permission. Daughter [facility] stated that she will call her brother to inform him. Explained to [facility] that with a DNR-CCA that her father may be reintubated after extubation. Goal-for her father to be comfortable.     OBJECTIVE: Prognosis: Guarded     According to institutional recommendations and guidelines, a face-to-face encounter was not performed due to the need to preserve PPE for other caregivers. Relevant records, nursing assessment, and diagnostic testing including laboratory results and imaging were reviewed. Please reference any relevant documentation elsewhere. Patient was observed through the window and observation as reported below. Care will be coordinated with the primary services and consultants. Physical Exam: Per MICU RN  /61   Pulse 90   Temp 99 °F (37.2 °C) (Bladder)   Resp 22   Ht 6' (1.829 m)   Wt 255 lb (115.7 kg)   SpO2 99%   BMI 34.58 kg/m²   Gen: ETT/vent elderly, NAD,   HEENT:  Normocephalic, atraumatic  Neck:  Supple, trachea midline, no JVD  Lungs:  Course rhonchi, respirations unlabored on the vent  Heart[de-identified]  RRR- monitor   Abd:  Deferred  : Catheter-clear yellow urine  Ext:  Deferred  Skin:  Deferred  Neuro:  Deferred     Objective data reviewed: labs, images, records, medication use, vitals and chart    Discussed patient and the plan of care with the other IDT members: Palliative Medicine IDT Team, Primary Team, Floor Nurse, Patient and Family    Time/Communication  Greater than 50% of time spent, total 15 minutes in counseling and coordination of care at the bedside regarding CODE STATUS discussion, family support and goals of care. Thank you for allowing Palliative Medicine to participate in the care of [facility]. Note: This report was completed using computerInsights voiced recognition software. Every effort has been made to ensure accuracy; however, inadvertent computerized transcription errors may be present.

## 2021-01-30 ENCOUNTER — APPOINTMENT (OUTPATIENT)
Dept: GENERAL RADIOLOGY | Age: 81
DRG: 870 | End: 2021-01-30
Payer: COMMERCIAL

## 2021-01-30 LAB
ANION GAP SERPL CALCULATED.3IONS-SCNC: 11 MMOL/L (ref 7–16)
BASOPHILS ABSOLUTE: 0 E9/L (ref 0–0.2)
BASOPHILS RELATIVE PERCENT: 0 % (ref 0–2)
BUN BLDV-MCNC: 14 MG/DL (ref 8–23)
CALCIUM SERPL-MCNC: 7.4 MG/DL (ref 8.6–10.2)
CHLORIDE BLD-SCNC: 90 MMOL/L (ref 98–107)
CO2: 36 MMOL/L (ref 22–29)
CREAT SERPL-MCNC: 0.6 MG/DL (ref 0.7–1.2)
EOSINOPHILS ABSOLUTE: 0.49 E9/L (ref 0.05–0.5)
EOSINOPHILS RELATIVE PERCENT: 4.6 % (ref 0–6)
GFR AFRICAN AMERICAN: >60
GFR NON-AFRICAN AMERICAN: >60 ML/MIN/1.73
GLUCOSE BLD-MCNC: 93 MG/DL (ref 74–99)
HCT VFR BLD CALC: 28.1 % (ref 37–54)
HEMOGLOBIN: 8.6 G/DL (ref 12.5–16.5)
IMMATURE GRANULOCYTES #: 0.15 E9/L
IMMATURE GRANULOCYTES %: 1.4 % (ref 0–5)
LYMPHOCYTES ABSOLUTE: 2.74 E9/L (ref 1.5–4)
LYMPHOCYTES RELATIVE PERCENT: 25.5 % (ref 20–42)
MAGNESIUM: 2 MG/DL (ref 1.6–2.6)
MCH RBC QN AUTO: 27.2 PG (ref 26–35)
MCHC RBC AUTO-ENTMCNC: 30.6 % (ref 32–34.5)
MCV RBC AUTO: 88.9 FL (ref 80–99.9)
METER GLUCOSE: 112 MG/DL (ref 74–99)
METER GLUCOSE: 131 MG/DL (ref 74–99)
METER GLUCOSE: 166 MG/DL (ref 74–99)
METER GLUCOSE: 168 MG/DL (ref 74–99)
METER GLUCOSE: 92 MG/DL (ref 74–99)
METER GLUCOSE: 97 MG/DL (ref 74–99)
MONOCYTES ABSOLUTE: 0.94 E9/L (ref 0.1–0.95)
MONOCYTES RELATIVE PERCENT: 8.7 % (ref 2–12)
NEUTROPHILS ABSOLUTE: 6.44 E9/L (ref 1.8–7.3)
NEUTROPHILS RELATIVE PERCENT: 59.8 % (ref 43–80)
PDW BLD-RTO: 19.1 FL (ref 11.5–15)
PHOSPHORUS: 2 MG/DL (ref 2.5–4.5)
PLATELET # BLD: 389 E9/L (ref 130–450)
PMV BLD AUTO: 9.9 FL (ref 7–12)
POTASSIUM SERPL-SCNC: 2.9 MMOL/L (ref 3.5–5)
RBC # BLD: 3.16 E12/L (ref 3.8–5.8)
SODIUM BLD-SCNC: 137 MMOL/L (ref 132–146)
WBC # BLD: 10.8 E9/L (ref 4.5–11.5)

## 2021-01-30 PROCEDURE — 2000000000 HC ICU R&B

## 2021-01-30 PROCEDURE — 36415 COLL VENOUS BLD VENIPUNCTURE: CPT

## 2021-01-30 PROCEDURE — 83735 ASSAY OF MAGNESIUM: CPT

## 2021-01-30 PROCEDURE — 6370000000 HC RX 637 (ALT 250 FOR IP): Performed by: INTERNAL MEDICINE

## 2021-01-30 PROCEDURE — C9113 INJ PANTOPRAZOLE SODIUM, VIA: HCPCS | Performed by: INTERNAL MEDICINE

## 2021-01-30 PROCEDURE — 84100 ASSAY OF PHOSPHORUS: CPT

## 2021-01-30 PROCEDURE — 85025 COMPLETE CBC W/AUTO DIFF WBC: CPT

## 2021-01-30 PROCEDURE — 2500000003 HC RX 250 WO HCPCS: Performed by: INTERNAL MEDICINE

## 2021-01-30 PROCEDURE — 99233 SBSQ HOSP IP/OBS HIGH 50: CPT | Performed by: INTERNAL MEDICINE

## 2021-01-30 PROCEDURE — 94003 VENT MGMT INPAT SUBQ DAY: CPT

## 2021-01-30 PROCEDURE — 6360000002 HC RX W HCPCS: Performed by: INTERNAL MEDICINE

## 2021-01-30 PROCEDURE — 2580000003 HC RX 258: Performed by: INTERNAL MEDICINE

## 2021-01-30 PROCEDURE — 94640 AIRWAY INHALATION TREATMENT: CPT

## 2021-01-30 PROCEDURE — 71045 X-RAY EXAM CHEST 1 VIEW: CPT

## 2021-01-30 PROCEDURE — 82962 GLUCOSE BLOOD TEST: CPT

## 2021-01-30 PROCEDURE — 80048 BASIC METABOLIC PNL TOTAL CA: CPT

## 2021-01-30 RX ORDER — POTASSIUM CHLORIDE 29.8 MG/ML
20 INJECTION INTRAVENOUS ONCE
Status: COMPLETED | OUTPATIENT
Start: 2021-01-30 | End: 2021-01-30

## 2021-01-30 RX ORDER — MIDODRINE HYDROCHLORIDE 5 MG/1
20 TABLET ORAL
Status: DISCONTINUED | OUTPATIENT
Start: 2021-01-30 | End: 2021-02-12 | Stop reason: HOSPADM

## 2021-01-30 RX ADMIN — Medication 10 ML: at 20:41

## 2021-01-30 RX ADMIN — Medication: at 09:11

## 2021-01-30 RX ADMIN — INSULIN GLARGINE 25 UNITS: 100 INJECTION, SOLUTION SUBCUTANEOUS at 20:41

## 2021-01-30 RX ADMIN — LATANOPROST 1 DROP: 50 SOLUTION OPHTHALMIC at 09:13

## 2021-01-30 RX ADMIN — SENNOSIDES 8.6 MG: 8.6 TABLET, FILM COATED ORAL at 09:07

## 2021-01-30 RX ADMIN — CHLORHEXIDINE GLUCONATE 0.12% ORAL RINSE 15 ML: 1.2 LIQUID ORAL at 09:12

## 2021-01-30 RX ADMIN — POTASSIUM PHOSPHATE, MONOBASIC AND POTASSIUM PHOSPHATE, DIBASIC 30 MMOL: 224; 236 INJECTION, SOLUTION, CONCENTRATE INTRAVENOUS at 12:36

## 2021-01-30 RX ADMIN — LACTULOSE 20 G: 20 SOLUTION ORAL at 09:07

## 2021-01-30 RX ADMIN — SODIUM CHLORIDE 0.2 MCG/KG/HR: 9 INJECTION, SOLUTION INTRAVENOUS at 11:31

## 2021-01-30 RX ADMIN — POTASSIUM CHLORIDE 20 MEQ: 400 INJECTION, SOLUTION INTRAVENOUS at 11:46

## 2021-01-30 RX ADMIN — POTASSIUM BICARBONATE 40 MEQ: 782 TABLET, EFFERVESCENT ORAL at 20:41

## 2021-01-30 RX ADMIN — SODIUM CHLORIDE SOLN NEBU 3% 4 ML: 3 NEBU SOLN at 19:54

## 2021-01-30 RX ADMIN — MIDODRINE HYDROCHLORIDE 20 MG: 5 TABLET ORAL at 17:36

## 2021-01-30 RX ADMIN — SODIUM CHLORIDE, PRESERVATIVE FREE 10 ML: 5 INJECTION INTRAVENOUS at 09:11

## 2021-01-30 RX ADMIN — SODIUM CHLORIDE SOLN NEBU 3% 4 ML: 3 NEBU SOLN at 12:32

## 2021-01-30 RX ADMIN — BUDESONIDE 500 MCG: 0.5 SUSPENSION RESPIRATORY (INHALATION) at 08:50

## 2021-01-30 RX ADMIN — IPRATROPIUM BROMIDE AND ALBUTEROL SULFATE 1 AMPULE: .5; 3 SOLUTION RESPIRATORY (INHALATION) at 12:32

## 2021-01-30 RX ADMIN — ENOXAPARIN SODIUM 40 MG: 40 INJECTION SUBCUTANEOUS at 09:08

## 2021-01-30 RX ADMIN — Medication 20 MCG/MIN: at 09:08

## 2021-01-30 RX ADMIN — CHLORHEXIDINE GLUCONATE 0.12% ORAL RINSE 15 ML: 1.2 LIQUID ORAL at 20:41

## 2021-01-30 RX ADMIN — MEROPENEM 1000 MG: 1 INJECTION, POWDER, FOR SOLUTION INTRAVENOUS at 09:08

## 2021-01-30 RX ADMIN — DORNASE ALFA 2.5 MG: 1 SOLUTION RESPIRATORY (INHALATION) at 08:50

## 2021-01-30 RX ADMIN — Medication: at 20:50

## 2021-01-30 RX ADMIN — ACETAMINOPHEN 650 MG: 325 TABLET, FILM COATED ORAL at 19:23

## 2021-01-30 RX ADMIN — Medication 10 ML: at 09:10

## 2021-01-30 RX ADMIN — POTASSIUM BICARBONATE 40 MEQ: 782 TABLET, EFFERVESCENT ORAL at 09:24

## 2021-01-30 RX ADMIN — SODIUM CHLORIDE 0.4 MCG/KG/HR: 9 INJECTION, SOLUTION INTRAVENOUS at 00:31

## 2021-01-30 RX ADMIN — MIDODRINE HYDROCHLORIDE 15 MG: 5 TABLET ORAL at 09:01

## 2021-01-30 RX ADMIN — MIDODRINE HYDROCHLORIDE 20 MG: 5 TABLET ORAL at 11:32

## 2021-01-30 RX ADMIN — INSULIN LISPRO 3 UNITS: 100 INJECTION, SOLUTION INTRAVENOUS; SUBCUTANEOUS at 20:43

## 2021-01-30 RX ADMIN — IPRATROPIUM BROMIDE AND ALBUTEROL SULFATE 1 AMPULE: .5; 3 SOLUTION RESPIRATORY (INHALATION) at 19:52

## 2021-01-30 RX ADMIN — HYDROCORTISONE SODIUM SUCCINATE 25 MG: 100 INJECTION, POWDER, FOR SOLUTION INTRAMUSCULAR; INTRAVENOUS at 09:08

## 2021-01-30 RX ADMIN — BUMETANIDE 2 MG: 0.25 INJECTION, SOLUTION INTRAMUSCULAR; INTRAVENOUS at 20:41

## 2021-01-30 RX ADMIN — IPRATROPIUM BROMIDE AND ALBUTEROL SULFATE 1 AMPULE: .5; 3 SOLUTION RESPIRATORY (INHALATION) at 08:50

## 2021-01-30 RX ADMIN — IPRATROPIUM BROMIDE AND ALBUTEROL SULFATE 1 AMPULE: .5; 3 SOLUTION RESPIRATORY (INHALATION) at 16:47

## 2021-01-30 RX ADMIN — AMIODARONE HYDROCHLORIDE 100 MG: 200 TABLET ORAL at 09:07

## 2021-01-30 RX ADMIN — BUMETANIDE 2 MG: 0.25 INJECTION, SOLUTION INTRAMUSCULAR; INTRAVENOUS at 09:08

## 2021-01-30 RX ADMIN — BUDESONIDE 500 MCG: 0.5 SUSPENSION RESPIRATORY (INHALATION) at 19:52

## 2021-01-30 RX ADMIN — SODIUM CHLORIDE 0.2 MCG/KG/HR: 9 INJECTION, SOLUTION INTRAVENOUS at 23:42

## 2021-01-30 RX ADMIN — SENNOSIDES 8.6 MG: 8.6 TABLET, FILM COATED ORAL at 20:41

## 2021-01-30 RX ADMIN — PANTOPRAZOLE SODIUM 40 MG: 40 INJECTION, POWDER, FOR SOLUTION INTRAVENOUS at 09:07

## 2021-01-30 RX ADMIN — ATORVASTATIN CALCIUM 20 MG: 20 TABLET, FILM COATED ORAL at 09:11

## 2021-01-30 RX ADMIN — MEROPENEM 1000 MG: 1 INJECTION, POWDER, FOR SOLUTION INTRAVENOUS at 17:36

## 2021-01-30 RX ADMIN — MEROPENEM 1000 MG: 1 INJECTION, POWDER, FOR SOLUTION INTRAVENOUS at 00:31

## 2021-01-30 ASSESSMENT — PULMONARY FUNCTION TESTS
PIF_VALUE: 24
PIF_VALUE: 23
PIF_VALUE: 24
PIF_VALUE: 25
PIF_VALUE: 21
PIF_VALUE: 24
PIF_VALUE: 26
PIF_VALUE: 25
PIF_VALUE: 24
PIF_VALUE: 18
PIF_VALUE: 18

## 2021-01-30 NOTE — FLOWSHEET NOTE
Patient attempts to pull at lines and tubes when not restrained. Unable to be verbally redirected. Patient remains in bilateral soft wrist restraints for patient safety.

## 2021-01-30 NOTE — PLAN OF CARE
Problem: Skin Integrity:  Goal: Will show no infection signs and symptoms  Description: Will show no infection signs and symptoms  1/29/2021 2108 by Mello Valencia RN  Outcome: Met This Shift     Problem: Skin Integrity:  Goal: Absence of new skin breakdown  Description: Absence of new skin breakdown  1/29/2021 2108 by Mello Valencia RN  Outcome: Met This Shift     Problem: Falls - Risk of:  Goal: Will remain free from falls  Description: Will remain free from falls  1/29/2021 2108 by Mello Valencia RN  Outcome: Met This Shift     Problem: Falls - Risk of:  Goal: Absence of physical injury  Description: Absence of physical injury  1/29/2021 2108 by Mello Valencia RN  Outcome: Met This Shift     Problem: Restraint Use - Nonviolent/Non-Self-Destructive Behavior:  Goal: Absence of restraint-related injury  Description: Absence of restraint-related injury  Outcome: Met This Shift     Problem: Restraint Use - Nonviolent/Non-Self-Destructive Behavior:  Goal: Absence of restraint indications  Description: Absence of restraint indications  Outcome: Not Met This Shift

## 2021-01-30 NOTE — PROGRESS NOTES
[address] [place] [address]  Internal Medicine Department  Division of Pulmonary, Critical Care and Sleep Medicine  Daily Intensive Care Unit Progress  Note    Admit Date: 1/22/2021    MRN: [id number]        Patient:  Cherise Bernal 78 y.o. male     PCP: No primary care provider on file. Date of Service: 1/30/2021      Allergy: Imuran [azathioprine], Doxycycline, and Levaquin [levofloxacin]    Subjective   Length of stay during current admission: 8  Events of the past 24 hours are in the residents notes and we discussed at bedside briefly. .. Abx continue per ID  Will increase midodrine and stop Hydrocortisone   Need to wean off the Levophed  Weaning cause Afib with RVR   DNR CCA  Family meeting completed  + 4 liters ? On Vent FiO2 40%, PEEP 5  TF ok  45ml / hr    Physical Exam:     VITALS:  BP (!) 96/54   Pulse 81   Temp 98.6 °F (37 °C) (Bladder)   Resp 12   Ht 6' (1.829 m)   Wt 255 lb (115.7 kg)   SpO2 100%   BMI 34.58 kg/m²   24HR INTAKE/OUTPUT:      Intake/Output Summary (Last 24 hours) at 1/30/2021 1026  Last data filed at 1/30/2021 0600  Gross per 24 hour   Intake 1903.55 ml   Output 4720 ml   Net -2816.45 ml     General: Alert  HEENT: Conjunctiva/corneas clear, No icterus. No exudates. Neck: Supple, + JVD  Chest wall: Symmetric excursion,   Lung: Course to auscultation + rales , No wheezing  Heart: Regular with crisp AS murmur. No rub or  gallop. Abdomen: Obese, BS +, soft, no rigidity, rebound or guarding  Extremities: + pitting edema. Palp pulses. Femoral TLC  Skin: venous stasis, open dry wounds on legs, weaping   Musculokeletal: No trauma signs   Lymph nodes: No adenopathy  Neurologic: Non focal examination    Medications   Home and Current medications were discussed & reviewed on rounds with team and pharmacy liaison. Labs &  Imaging Studies   The data collected below information that was obtained, reviewed, analyzed and interpreted today.  Imaging test are reviewed with the radiologist during rounds. Comparison to previous images are always explored. CBC:   Lab Results   Component Value Date    WBC 10.8 01/30/2021    RBC 3.16 01/30/2021    HGB 8.6 01/30/2021    HCT 28.1 01/30/2021     01/30/2021    MCV 88.9 01/30/2021       BMP:    Lab Results   Component Value Date     01/30/2021    K 2.9 01/30/2021    K 3.5 01/29/2021    CL 90 01/30/2021    CO2 36 01/30/2021    BUN 14 01/30/2021    CREATININE 0.6 01/30/2021    GLUCOSE 93 01/30/2021    GLUCOSE 138 03/26/2011    CALCIUM 7.4 01/30/2021       TSH:    Lab Results   Component Value Date    TSH 2.300 01/25/2021       Imaging Studies:    RADIOLOGY: Films were read/reviewed and or discussed with radiology and the consulting teams which show pneumonia and edema    EKG: ICU Rhythm Strips were reviewed and discussed. Sinus, Rare Ectopy    ECHOCARDIOGRAM: 2020   Left ventricle is normal in size . Moderate left ventricular concentric hypertrophy noted. No regional wall motion abnormalities seen. Normal left ventricular ejection fraction. The left atrium is moderately dilated. Mild mitral annular calcification. The aortic valve appears mildly sclerotic. Mild aortic stenosis. CULTURES    Pseudomonas aeruginosaAbnormal      CULTURE, RESPIRATORY Heavy growth    Organism Proteus mirabilisAbnormal     CULTURE, RESPIRATORY Heavy growth    Organism Escherichia coliAbnormal            Assessment    Briseida Bae is a 78 y.o. male was seen, examined and discussed with on multi-disciplinary team rounds. This note may be separate or in addition to the resident records. I have personally seen and examined the patient and the key elements of the encounter were performed by me. The medications & laboratory data was discussed and adjusted where necessary. The radiographic images were reviewed either as a group or with radiologist if felt dis-concordant with the exam or history.  The above findings were corroborated, plans confirmed and changes made if needed. Current issues are : 78year old with recurrent hospitalization with RF/ Vlvular heart disease who has been recently discharged from [facility] where he completed his course of antibiotics from a previous hospitalization. Also had IVC filter on the 6th and PEG changed to jejunostomy tube on the 15th. He had also been intubated while at [facility] for respiratory failure and extubated after several days, still with issues with secretions, poor cough/pulmonary hygiene,   1. Acute respiratory failure with hypoxia    2. Septic shock   3. Recent CPA - 9 minutes of CPR  4. Gastrointestinal hemorrhage, unspecified gastrointestinal hemorrhage type   5. Elevated troponin   6. Urinary tract infection without hematuria, site unspecified   7. Aspiration into respiratory tract, initial encounter  8. Anemia, unspecified type  9. Pseudomonas bacteriuria   10. HCAP / Aspiration pneumonia  (Pseudomonas and Proteus)  11. COPD  15. Elevated procalcitonin   13. Valvular heart disease         Plan   Family was updated at the bedside if present and available. Key issues of the case were discussed among consultants. Critical Care time is documented if appropriate. 1. ID following and patient is lots of antibiotics  2. Contact isolation for infection  3. Pallative care consult for reasonable EOL discussion  4. Meet with family- no trach, discussed his prolong course and want comfort, ask to see how things go in the next few day, family to some see him in acase  5. Increased diuresis 2 mg BID and follow K, add dailly K + protocol, add albumin before lasix to help diuresis  6. Change to IMV or VS or PS mode and wean to extubate - DID 8 hours of PS yesterday  7. Continue with SBT daily   8. Stop  hydrocortisone  9. Wean off pressors -as able  10. Increase dose of midodrine  20TID  11. PICC line need, remove femoral = done 1/25/21  12. Continue Tube feeding - start   13. Bowel prep  14.  Continue with basal insulin to 25 units  15. Prealbumin level only 8; check weekly   16. Check TSH on amio = normal  17. Stop Fentanyl gtt, On Precedex if get agitated   18. Keep K > 4  19. SUpplement Phos as well  20.  Anticoaguate per the primary team    Sasha Arreguin DO, MPH, Alee Host  Professor of Internal Medicine [facility]9

## 2021-01-30 NOTE — PROGRESS NOTES
[address] [place] [address] [facility]  Progress  Note   C/C : pneumonia, resp failure,     Pt is intubated, sedated  Afebrile today    Current Facility-Administered Medications   Medication Dose Route Frequency Provider Last Rate Last Admin    potassium phosphate 30 mmol in dextrose 5 % 250 mL IVPB  30 mmol Intravenous Once Gretchen Jamse, DO 62.5 mL/hr at 01/30/21 1236 30 mmol at 01/30/21 1236    potassium bicarb-citric acid (EFFER-K) effervescent tablet 40 mEq  40 mEq Per G Tube BID Gretchen James, DO        midodrine (PROAMATINE) tablet 20 mg  20 mg Oral TID WC [facility], DO   20 mg at 01/30/21 1132    insulin glargine (LANTUS) injection vial 25 Units  25 Units Subcutaneous Nightly [facility], DO   25 Units at 01/29/21 2011    dexmedetomidine (PRECEDEX) 400 mcg in sodium chloride 0.9 % 100 mL infusion  0.2-1.4 mcg/kg/hr Intravenous Continuous Gretchen James, DO 5.8 mL/hr at 01/30/21 1131 0.2 mcg/kg/hr at 01/30/21 1131    ipratropium-albuterol (DUONEB) nebulizer solution 1 ampule  1 ampule Inhalation 4x daily Gretchen James, DO   1 ampule at 01/30/21 1232    insulin lispro (HUMALOG) injection vial 0-18 Units  0-18 Units Subcutaneous Q4H Gretchen James, DO   Stopped at 01/30/21 6140    bumetanide (BUMEX) injection 2 mg  2 mg Intravenous BID Coralee Her Issac, DO   2 mg at 01/30/21 2071    Mineral Oil-Hydrophil Petrolat OINT   Topical BID Gretchen James, DO   Given at 01/30/21 1078    And    Mineral Oil-Hydrophil Petrolat OINT   Topical 4x Daily PRN Gretchen James, DO   Given at 01/26/21 1846    lidocaine PF 1 % injection 5 mL  5 mL Intradermal Once Gretchen James, DO        sodium chloride flush 0.9 % injection 10 mL  10 mL Intravenous PRN Gretchen James, DO        heparin flush 100 UNIT/ML injection 300 Units  3 mL Intravenous 2 times per day Gretchen James, DO   Stopped at 01/30/21 0911    heparin flush 100 UNIT/ML injection 300 Units  3 mL Intracatheter PRN Phoebe Sorrel, DO        chlorhexidine (PERIDEX) 0.12 % solution 15 mL  15 mL Mouth/Throat BID Roma Milan Issac, DO   15 mL at 01/30/21 0912    potassium chloride 20 mEq/50 mL IVPB (Central Line)  20 mEq Intravenous PRN Phoebe Sorrel, DO 50 mL/hr at 01/27/21 0749 20 mEq at 01/27/21 0749    dornase alpha (PULMOZYME) nebulizer solution 2.5 mg  2.5 mg Inhalation Daily Shane Sanders MD   2.5 mg at 01/30/21 0850    acetaminophen (TYLENOL) 160 MG/5ML solution 650 mg  650 mg Per G Tube Q4H PRN Philly Spann MD   650 mg at 01/28/21 2236    amiodarone (CORDARONE) tablet 100 mg  100 mg PEG Tube Daily Philly Spann MD   100 mg at 01/30/21 2689    atorvastatin (LIPITOR) tablet 20 mg  20 mg PEG Tube Daily Philly Spann MD   20 mg at 01/30/21 0911    docusate sodium (ENEMEEZ) enema 283 mg  1 enema Rectal PRN Philly Spann MD        glucose (GLUTOSE) 40 % oral gel 15 g  15 g Oral PRN Philly Spann MD        dextrose 50 % IV solution  12.5 g Intravenous PRN Philly Spann MD        glucagon (rDNA) injection 1 mg  1 mg Intramuscular PRN Philly Spann MD        dextrose 5 % solution  100 mL/hr Intravenous PRN Philly Spann MD        lactulose (CHRONULAC) 10 GM/15ML solution 20 g  20 g PEG Tube Daily Felix Hughes MD   20 g at 01/30/21 0907    latanoprost (XALATAN) 0.005 % ophthalmic solution 1 drop  1 drop Both Eyes Daily Philly Spann MD   1 drop at 01/30/21 0913    pantoprazole (PROTONIX) injection 40 mg  40 mg Intravenous Daily Philly Spann MD   40 mg at 01/30/21 3606    And    sodium chloride (PF) 0.9 % injection 10 mL  10 mL Intravenous Daily Philly Spann MD   10 mL at 01/30/21 0911    senna (SENOKOT) tablet 8.6 mg  1 tablet PEG Tube BID Philly Spann MD   8.6 mg at 01/30/21 0907    sodium chloride (Inhalant) 3 % nebulizer solution 4 mL  4 mL Nebulization BID Philly Spann MD   4 mL at 01/30/21 1232    sodium chloride flush 0.9 % injection 10 mL 10 mL Intravenous 2 times per day Parker Mayorga MD   10 mL at 01/30/21 0910    sodium chloride flush 0.9 % injection 10 mL  10 mL Intravenous PRN Parker Mayorga MD        enoxaparin (LOVENOX) injection 40 mg  40 mg Subcutaneous Daily Parker Mayorga MD   40 mg at 01/30/21 0908    promethazine (PHENERGAN) tablet 12.5 mg  12.5 mg Oral Q6H PRN Parker Mayorga MD        Or    ondansetron (ZOFRAN) injection 4 mg  4 mg Intravenous Q6H PRN Parker Mayorga MD        polyethylene glycol (GLYCOLAX) packet 17 g  17 g Oral Daily PRN Parker Mayorga MD        acetaminophen (TYLENOL) tablet 650 mg  650 mg Oral Q6H PRN Parker Mayorga MD        Or    acetaminophen (TYLENOL) suppository 650 mg  650 mg Rectal Q6H PRN Parker Mayorga MD        0.9 % sodium chloride infusion   Intravenous Q8H Parker Mayorga MD   Stopped at 01/29/21 2357    meropenem (MERREM) 1,000 mg in sodium chloride 0.9 % 100 mL IVPB (mini-bag)  1,000 mg Intravenous Q8H Ruben Espinoza MD   Stopped at 01/30/21 0910    budesonide (PULMICORT) nebulizer suspension 500 mcg  500 mcg Nebulization BID Alix Carcamo MD   500 mcg at 01/30/21 0850    fentaNYL (SUBLIMAZE) injection 50 mcg  50 mcg Intravenous Q1H PRN Frances Capone MD   50 mcg at 01/29/21 1500    norepinephrine (LEVOPHED) 16 mg in dextrose 5% 250 mL infusion  2-100 mcg/min Intravenous Continuous Brittney Davenport DO 18.8 mL/hr at 01/30/21 0908 20 mcg/min at 01/30/21 0908       REVIEW OF SYSTEMS: could not be obtained       PHYSICAL EXAM:    Vitals:   BP (!) 96/54   Pulse 102   Temp 99.9 °F (37.7 °C) (Temporal)   Resp 22   Ht 6' (1.829 m)   Wt 255 lb (115.7 kg)   SpO2 90%   BMI 34.58 kg/m²      Constitutional: Intubated and awake, opened eyes    FiO2 40 %, PEEP 5  Skin: No pallor    HEENT:Pallor +, no LN , ET tube,   Neck: Supple to movements. No lymphadenopathy.    Chest: Bilateral scattered rhonchi   Cardiovascular: Regular, no murmur   Abdomen:soft, bowel sound +  PEG - no drainage or erythema   Extremities: ++ edema, erythema legs , not warm     Lines:  Right brachial PICC ( 1/25)     CBC with Differential:      Lab Results   Component Value Date    WBC 10.8 01/30/2021    RBC 3.16 01/30/2021    HGB 8.6 01/30/2021    HCT 28.1 01/30/2021     01/30/2021    MCV 88.9 01/30/2021    MCH 27.2 01/30/2021    MCHC 30.6 01/30/2021    RDW 19.1 01/30/2021    NRBC 0.9 01/08/2021    SEGSPCT 64 09/27/2013    METASPCT 6.0 01/06/2021    LYMPHOPCT 25.5 01/30/2021    PROMYELOPCT 0.9 12/21/2020    MONOPCT 8.7 01/30/2021    MYELOPCT 3.0 01/06/2021    BASOPCT 0.0 01/30/2021    MONOSABS 0.94 01/30/2021    LYMPHSABS 2.74 01/30/2021    EOSABS 0.49 01/30/2021    BASOSABS 0.00 01/30/2021       CMP:    Lab Results   Component Value Date     01/30/2021    K 2.9 01/30/2021    K 3.5 01/29/2021    CL 90 01/30/2021    CO2 36 01/30/2021    BUN 14 01/30/2021    CREATININE 0.6 01/30/2021    GFRAA >60 01/30/2021    LABGLOM >60 01/30/2021    GLUCOSE 93 01/30/2021    GLUCOSE 138 03/26/2011    PROT 4.3 01/22/2021    LABALBU 2.0 01/22/2021    LABALBU 4.0 03/26/2011    CALCIUM 7.4 01/30/2021    BILITOT 0.3 01/22/2021    ALKPHOS 123 01/22/2021    AST 18 01/22/2021    ALT 8 01/22/2021       Hepatic Function Panel:    Lab Results   Component Value Date    ALKPHOS 123 01/22/2021    ALT 8 01/22/2021    AST 18 01/22/2021    PROT 4.3 01/22/2021    BILITOT 0.3 01/22/2021    BILIDIR 0.4 01/14/2021    IBILI 0.5 01/14/2021    LABALBU 2.0 01/22/2021    LABALBU 4.0 03/26/2011         Microbiology :    Blood culture - neg to date   Urine Culture -     Susceptibility    Pseudomonas aeruginosa (1)    Antibiotic Interpretation SNEHA Status    gentamicin Sensitive <=^1 mcg/mL     levofloxacin Sensitive <=^0.12 mcg/mL     tobramycin Sensitive <=^1 mcg/mL     Lab and Collection        Sputum Culture-      Susceptibility    Pseudomonas aeruginosa (1)    Antibiotic Interpretation SNEHA Status    gentamicin Sensitive <=^1 mcg/mL     levofloxacin Sensitive <=^0.12 mcg/mL     tobramycin Sensitive <=^1 mcg/mL     Proteus mirabilis (4)    Antibiotic Interpretation SNEHA Status    ampicillin Sensitive <=^2 mcg/mL     ceFAZolin Sensitive <=^4 mcg/mL     cefepime Sensitive <=^0.12 mcg/mL     cefTRIAXone Sensitive <=^0.25 mcg/mL     ertapenem Sensitive <=^0.12 mcg/mL     gentamicin Sensitive <=^1 mcg/mL     levofloxacin Resistant >=^8 mcg/mL     piperacillin-tazobactam Sensitive <=^4 mcg/mL     trimethoprim-sulfamethoxazole Sensitive <=^20 mcg/mL      Condensed View   Lab and Collection    Culture, Respiratory - 1/23/2021         procalcitonin 1.23       Radiology :    Chest X ray - bilateral multifocal infiltrates , cardiomegaly       Assessment:  · Respiratory failure - intubated   · Pseudomonas bacteriuria    · HCAP / Aspiration pneumonia  (Pseudomonas, Klebsiella, E coli and Proteus)   · Elevated procalcitonin, decreasing       Plan:    · Meropenem 1 gram IV q 8 hrs  · Contact isolation   · Vent support       Nani Luong  2:03 PM  1/30/2021

## 2021-01-30 NOTE — PLAN OF CARE
Problem: Skin Integrity:  Goal: Will show no infection signs and symptoms  Description: Will show no infection signs and symptoms  1/30/2021 0549 by Armida Edouard RN  Outcome: Met This Shift     Problem: Skin Integrity:  Goal: Absence of new skin breakdown  Description: Absence of new skin breakdown  1/30/2021 0549 by Armida Edouard RN  Outcome: Met This Shift     Problem: Falls - Risk of:  Goal: Will remain free from falls  Description: Will remain free from falls  1/30/2021 0549 by Armida Edouard RN  Outcome: Met This Shift     Problem: Falls - Risk of:  Goal: Absence of physical injury  Description: Absence of physical injury  1/30/2021 0549 by Armida Edouard RN  Outcome: Met This Shift     Problem: Restraint Use - Nonviolent/Non-Self-Destructive Behavior:  Goal: Absence of restraint-related injury  Description: Absence of restraint-related injury  1/30/2021 0549 by Armida Edouard RN  Outcome: Met This Shift     Problem: Restraint Use - Nonviolent/Non-Self-Destructive Behavior:  Goal: Absence of restraint indications  Description: Absence of restraint indications  1/30/2021 0549 by Armida Edouard RN  Outcome: Not Met This Shift

## 2021-01-31 LAB
ALBUMIN SERPL-MCNC: 2.8 G/DL (ref 3.5–5.2)
ALP BLD-CCNC: 87 U/L (ref 40–129)
ALT SERPL-CCNC: 7 U/L (ref 0–40)
ANION GAP SERPL CALCULATED.3IONS-SCNC: 11 MMOL/L (ref 7–16)
AST SERPL-CCNC: 21 U/L (ref 0–39)
B.E.: 15.6 MMOL/L
B.E.: 17 MMOL/L (ref -3–0)
BASOPHILS ABSOLUTE: 0.02 E9/L (ref 0–0.2)
BASOPHILS RELATIVE PERCENT: 0.2 % (ref 0–2)
BILIRUB SERPL-MCNC: 0.5 MG/DL (ref 0–1.2)
BUN BLDV-MCNC: 15 MG/DL (ref 8–23)
CALCIUM SERPL-MCNC: 7.7 MG/DL (ref 8.6–10.2)
CHLORIDE BLD-SCNC: 88 MMOL/L (ref 98–107)
CO2: 37 MMOL/L (ref 22–29)
COHB: 1.7 % (ref 0–1.5)
COMMENT: ABNORMAL
CORTISOL TOTAL: 9.2 MCG/DL (ref 2.68–18.4)
CREAT SERPL-MCNC: 0.7 MG/DL (ref 0.7–1.2)
CRITICAL NOTIFICATION: YES
CRITICAL: ABNORMAL
DATE ANALYZED: ABNORMAL
DATE OF COLLECTION: ABNORMAL
DELIVERY SYSTEMS: ABNORMAL
DEVICE: ABNORMAL
EOSINOPHILS ABSOLUTE: 0.21 E9/L (ref 0.05–0.5)
EOSINOPHILS RELATIVE PERCENT: 1.6 % (ref 0–6)
FIO2 ARTERIAL: 40
GFR AFRICAN AMERICAN: >60
GFR NON-AFRICAN AMERICAN: >60 ML/MIN/1.73
GLUCOSE BLD-MCNC: 80 MG/DL (ref 74–99)
HCO3 ARTERIAL: 40.2 MMOL/L (ref 22–26)
HCO3: 39.6 MMOL/L
HCT VFR BLD CALC: 31.9 % (ref 37–54)
HEMOGLOBIN: 9.6 G/DL (ref 12.5–16.5)
HHB: 48.6 %
IMMATURE GRANULOCYTES #: 0.14 E9/L
IMMATURE GRANULOCYTES %: 1.1 % (ref 0–5)
LAB: ABNORMAL
LYMPHOCYTES ABSOLUTE: 4.19 E9/L (ref 1.5–4)
LYMPHOCYTES RELATIVE PERCENT: 31.7 % (ref 20–42)
Lab: ABNORMAL
MAGNESIUM: 1.7 MG/DL (ref 1.6–2.6)
MCH RBC QN AUTO: 26.7 PG (ref 26–35)
MCHC RBC AUTO-ENTMCNC: 30.1 % (ref 32–34.5)
MCV RBC AUTO: 88.6 FL (ref 80–99.9)
METER GLUCOSE: 108 MG/DL (ref 74–99)
METER GLUCOSE: 112 MG/DL (ref 74–99)
METER GLUCOSE: 204 MG/DL (ref 74–99)
METER GLUCOSE: 219 MG/DL (ref 74–99)
METER GLUCOSE: 83 MG/DL (ref 74–99)
METER GLUCOSE: 93 MG/DL (ref 74–99)
METHB: 0.3 % (ref 0–1.5)
MODE: AC
MONOCYTES ABSOLUTE: 1.16 E9/L (ref 0.1–0.95)
MONOCYTES RELATIVE PERCENT: 8.8 % (ref 2–12)
NEUTROPHILS ABSOLUTE: 7.51 E9/L (ref 1.8–7.3)
NEUTROPHILS RELATIVE PERCENT: 56.6 % (ref 43–80)
O2 CONTENT: 7.4 ML/DL
O2 SATURATION: 50.4 %
O2 SATURATION: 99.3 % (ref 92–98.5)
O2HB: 49.4 %
OPERATOR ID: 1868
OPERATOR ID: 1921
PATIENT TEMP: 37
PATIENT TEMP: 37 C
PCO2 (TEMP CORRECTED): 40.7 MMHG (ref 35–45)
PCO2: 45.8 MMHG (ref 40–52)
PDW BLD-RTO: 20 FL (ref 11.5–15)
PH (TEMPERATURE CORRECTED): 7.6 (ref 7.35–7.45)
PH BLOOD GAS: 7.55 (ref 7.3–7.42)
PHOSPHORUS: 2.8 MG/DL (ref 2.5–4.5)
PLATELET # BLD: 464 E9/L (ref 130–450)
PMV BLD AUTO: 10.1 FL (ref 7–12)
PO2 (TEMP CORRECTED): 123.6 MMHG (ref 60–80)
PO2: 26.4 MMHG (ref 30–50)
POSITIVE END EXP PRESS: 5 CMH2O
POTASSIUM REFLEX MAGNESIUM: 4 MMOL/L (ref 3.5–5)
RBC # BLD: 3.6 E12/L (ref 3.8–5.8)
RESPIRATORY RATE: 10 B/MIN
SODIUM BLD-SCNC: 136 MMOL/L (ref 132–146)
SOURCE, BLOOD GAS: ABNORMAL
SOURCE, BLOOD GAS: ABNORMAL
THB: 10.7 G/DL (ref 11.5–16.5)
TIDAL VOLUME: 500 ML
TIME ANALYZED: 1211
TOTAL PROTEIN: 5.1 G/DL (ref 6.4–8.3)
WBC # BLD: 13.2 E9/L (ref 4.5–11.5)

## 2021-01-31 PROCEDURE — 6370000000 HC RX 637 (ALT 250 FOR IP): Performed by: INTERNAL MEDICINE

## 2021-01-31 PROCEDURE — 94640 AIRWAY INHALATION TREATMENT: CPT

## 2021-01-31 PROCEDURE — 80053 COMPREHEN METABOLIC PANEL: CPT

## 2021-01-31 PROCEDURE — 6360000002 HC RX W HCPCS: Performed by: INTERNAL MEDICINE

## 2021-01-31 PROCEDURE — 36415 COLL VENOUS BLD VENIPUNCTURE: CPT

## 2021-01-31 PROCEDURE — 2580000003 HC RX 258: Performed by: INTERNAL MEDICINE

## 2021-01-31 PROCEDURE — 82533 TOTAL CORTISOL: CPT

## 2021-01-31 PROCEDURE — 85025 COMPLETE CBC W/AUTO DIFF WBC: CPT

## 2021-01-31 PROCEDURE — 2500000003 HC RX 250 WO HCPCS: Performed by: INTERNAL MEDICINE

## 2021-01-31 PROCEDURE — 2000000000 HC ICU R&B

## 2021-01-31 PROCEDURE — 82803 BLOOD GASES ANY COMBINATION: CPT

## 2021-01-31 PROCEDURE — 82805 BLOOD GASES W/O2 SATURATION: CPT

## 2021-01-31 PROCEDURE — 83735 ASSAY OF MAGNESIUM: CPT

## 2021-01-31 PROCEDURE — 94003 VENT MGMT INPAT SUBQ DAY: CPT

## 2021-01-31 PROCEDURE — 84100 ASSAY OF PHOSPHORUS: CPT

## 2021-01-31 PROCEDURE — 82962 GLUCOSE BLOOD TEST: CPT

## 2021-01-31 PROCEDURE — 87449 NOS EACH ORGANISM AG IA: CPT

## 2021-01-31 PROCEDURE — 87040 BLOOD CULTURE FOR BACTERIA: CPT

## 2021-01-31 PROCEDURE — 99291 CRITICAL CARE FIRST HOUR: CPT | Performed by: INTERNAL MEDICINE

## 2021-01-31 PROCEDURE — C9113 INJ PANTOPRAZOLE SODIUM, VIA: HCPCS | Performed by: INTERNAL MEDICINE

## 2021-01-31 RX ORDER — MAGNESIUM SULFATE IN WATER 40 MG/ML
2000 INJECTION, SOLUTION INTRAVENOUS ONCE
Status: COMPLETED | OUTPATIENT
Start: 2021-01-31 | End: 2021-01-31

## 2021-01-31 RX ORDER — THIAMINE HYDROCHLORIDE 100 MG/ML
500 INJECTION, SOLUTION INTRAMUSCULAR; INTRAVENOUS DAILY
Status: DISCONTINUED | OUTPATIENT
Start: 2021-01-31 | End: 2021-01-31 | Stop reason: SDUPTHER

## 2021-01-31 RX ADMIN — THIAMINE HYDROCHLORIDE 200 MG: 100 INJECTION, SOLUTION INTRAMUSCULAR; INTRAVENOUS at 14:11

## 2021-01-31 RX ADMIN — SODIUM CHLORIDE SOLN NEBU 3% 4 ML: 3 NEBU SOLN at 12:42

## 2021-01-31 RX ADMIN — ACETAMINOPHEN ORAL SOLUTION 650 MG: 650 SOLUTION ORAL at 20:32

## 2021-01-31 RX ADMIN — IPRATROPIUM BROMIDE AND ALBUTEROL SULFATE 1 AMPULE: .5; 3 SOLUTION RESPIRATORY (INHALATION) at 08:37

## 2021-01-31 RX ADMIN — MEROPENEM 1000 MG: 1 INJECTION, POWDER, FOR SOLUTION INTRAVENOUS at 09:59

## 2021-01-31 RX ADMIN — Medication 16 MCG/MIN: at 02:54

## 2021-01-31 RX ADMIN — SENNOSIDES 8.6 MG: 8.6 TABLET, FILM COATED ORAL at 20:33

## 2021-01-31 RX ADMIN — Medication 10 ML: at 08:23

## 2021-01-31 RX ADMIN — SODIUM CHLORIDE 0.2 MCG/KG/HR: 9 INJECTION, SOLUTION INTRAVENOUS at 20:31

## 2021-01-31 RX ADMIN — POTASSIUM BICARBONATE 40 MEQ: 782 TABLET, EFFERVESCENT ORAL at 20:33

## 2021-01-31 RX ADMIN — BUDESONIDE 500 MCG: 0.5 SUSPENSION RESPIRATORY (INHALATION) at 20:17

## 2021-01-31 RX ADMIN — IPRATROPIUM BROMIDE AND ALBUTEROL SULFATE 1 AMPULE: .5; 3 SOLUTION RESPIRATORY (INHALATION) at 20:17

## 2021-01-31 RX ADMIN — POTASSIUM BICARBONATE 40 MEQ: 782 TABLET, EFFERVESCENT ORAL at 08:21

## 2021-01-31 RX ADMIN — INSULIN LISPRO 6 UNITS: 100 INJECTION, SOLUTION INTRAVENOUS; SUBCUTANEOUS at 17:55

## 2021-01-31 RX ADMIN — ASCORBIC ACID 1500 MG: 500 INJECTION INTRAVENOUS at 20:33

## 2021-01-31 RX ADMIN — BUMETANIDE 2 MG: 0.25 INJECTION, SOLUTION INTRAMUSCULAR; INTRAVENOUS at 20:32

## 2021-01-31 RX ADMIN — LATANOPROST 1 DROP: 50 SOLUTION OPHTHALMIC at 08:24

## 2021-01-31 RX ADMIN — HYDROCORTISONE SODIUM SUCCINATE 50 MG: 100 INJECTION, POWDER, FOR SOLUTION INTRAMUSCULAR; INTRAVENOUS at 19:26

## 2021-01-31 RX ADMIN — SODIUM CHLORIDE, PRESERVATIVE FREE 10 ML: 5 INJECTION INTRAVENOUS at 08:22

## 2021-01-31 RX ADMIN — INSULIN LISPRO 6 UNITS: 100 INJECTION, SOLUTION INTRAVENOUS; SUBCUTANEOUS at 20:32

## 2021-01-31 RX ADMIN — MIDODRINE HYDROCHLORIDE 20 MG: 5 TABLET ORAL at 08:22

## 2021-01-31 RX ADMIN — ASCORBIC ACID 1500 MG: 500 INJECTION INTRAVENOUS at 15:34

## 2021-01-31 RX ADMIN — Medication: at 08:22

## 2021-01-31 RX ADMIN — MEROPENEM 1000 MG: 1 INJECTION, POWDER, FOR SOLUTION INTRAVENOUS at 17:20

## 2021-01-31 RX ADMIN — MAGNESIUM SULFATE HEPTAHYDRATE 2000 MG: 40 INJECTION, SOLUTION INTRAVENOUS at 11:52

## 2021-01-31 RX ADMIN — PANTOPRAZOLE SODIUM 40 MG: 40 INJECTION, POWDER, FOR SOLUTION INTRAVENOUS at 08:22

## 2021-01-31 RX ADMIN — SODIUM CHLORIDE SOLN NEBU 3% 4 ML: 3 NEBU SOLN at 20:17

## 2021-01-31 RX ADMIN — AMIODARONE HYDROCHLORIDE 100 MG: 200 TABLET ORAL at 08:21

## 2021-01-31 RX ADMIN — MIDODRINE HYDROCHLORIDE 20 MG: 5 TABLET ORAL at 12:15

## 2021-01-31 RX ADMIN — IPRATROPIUM BROMIDE AND ALBUTEROL SULFATE 1 AMPULE: .5; 3 SOLUTION RESPIRATORY (INHALATION) at 15:42

## 2021-01-31 RX ADMIN — MEROPENEM 1000 MG: 1 INJECTION, POWDER, FOR SOLUTION INTRAVENOUS at 01:04

## 2021-01-31 RX ADMIN — CHLORHEXIDINE GLUCONATE 0.12% ORAL RINSE 15 ML: 1.2 LIQUID ORAL at 08:21

## 2021-01-31 RX ADMIN — BUDESONIDE 500 MCG: 0.5 SUSPENSION RESPIRATORY (INHALATION) at 08:37

## 2021-01-31 RX ADMIN — LACTULOSE 20 G: 20 SOLUTION ORAL at 08:21

## 2021-01-31 RX ADMIN — BUMETANIDE 2 MG: 0.25 INJECTION, SOLUTION INTRAMUSCULAR; INTRAVENOUS at 08:22

## 2021-01-31 RX ADMIN — ACETAMINOPHEN ORAL SOLUTION 650 MG: 650 SOLUTION ORAL at 05:12

## 2021-01-31 RX ADMIN — INSULIN GLARGINE 25 UNITS: 100 INJECTION, SOLUTION SUBCUTANEOUS at 20:32

## 2021-01-31 RX ADMIN — SENNOSIDES 8.6 MG: 8.6 TABLET, FILM COATED ORAL at 08:22

## 2021-01-31 RX ADMIN — DORNASE ALFA 2.5 MG: 1 SOLUTION RESPIRATORY (INHALATION) at 08:37

## 2021-01-31 RX ADMIN — ATORVASTATIN CALCIUM 20 MG: 20 TABLET, FILM COATED ORAL at 08:21

## 2021-01-31 RX ADMIN — HYDROCORTISONE SODIUM SUCCINATE 50 MG: 100 INJECTION, POWDER, FOR SOLUTION INTRAMUSCULAR; INTRAVENOUS at 13:11

## 2021-01-31 RX ADMIN — ENOXAPARIN SODIUM 40 MG: 40 INJECTION SUBCUTANEOUS at 08:22

## 2021-01-31 RX ADMIN — DEXTROSE MONOHYDRATE 200 MG: 50 INJECTION, SOLUTION INTRAVENOUS at 18:18

## 2021-01-31 RX ADMIN — MIDODRINE HYDROCHLORIDE 20 MG: 5 TABLET ORAL at 17:24

## 2021-01-31 RX ADMIN — Medication 10 ML: at 20:32

## 2021-01-31 RX ADMIN — Medication: at 20:37

## 2021-01-31 RX ADMIN — ACETAMINOPHEN ORAL SOLUTION 650 MG: 650 SOLUTION ORAL at 15:34

## 2021-01-31 RX ADMIN — IPRATROPIUM BROMIDE AND ALBUTEROL SULFATE 1 AMPULE: .5; 3 SOLUTION RESPIRATORY (INHALATION) at 12:42

## 2021-01-31 RX ADMIN — CHLORHEXIDINE GLUCONATE 0.12% ORAL RINSE 15 ML: 1.2 LIQUID ORAL at 20:37

## 2021-01-31 ASSESSMENT — PULMONARY FUNCTION TESTS
PIF_VALUE: 19
PIF_VALUE: 22
PIF_VALUE: 25
PIF_VALUE: 19
PIF_VALUE: 22
PIF_VALUE: 22
PIF_VALUE: 23
PIF_VALUE: 18

## 2021-01-31 ASSESSMENT — PAIN SCALES - GENERAL
PAINLEVEL_OUTOF10: 0
PAINLEVEL_OUTOF10: 0

## 2021-01-31 NOTE — PLAN OF CARE
Problem: Skin Integrity:  Goal: Will show no infection signs and symptoms  Description: Will show no infection signs and symptoms  1/31/2021 0626 by Betty Wayne RN  Outcome: Met This Shift     Problem: Skin Integrity:  Goal: Absence of new skin breakdown  Description: Absence of new skin breakdown  1/31/2021 0626 by eBtty Wayne RN  Outcome: Met This Shift     Problem: Falls - Risk of:  Goal: Will remain free from falls  Description: Will remain free from falls  1/31/2021 0626 by Betty Wayne RN  Outcome: Met This Shift     Problem: Falls - Risk of:  Goal: Absence of physical injury  Description: Absence of physical injury  1/31/2021 0626 by Betty Wayne RN  Outcome: Met This Shift

## 2021-01-31 NOTE — PROGRESS NOTES
[address] [place] [address] [facility]  Progress  Note   C/C : pneumonia, resp failure,     Pt is intubated, sedated  Febrile up to 102.4 F today  On vasopressors (Levophed 14 mcg/min)    Current Facility-Administered Medications   Medication Dose Route Frequency Provider Last Rate Last Admin    potassium bicarb-citric acid (EFFER-K) effervescent tablet 40 mEq  40 mEq Per G Tube BID Fayrene Glance Issac, DO   40 mEq at 01/31/21 9776    midodrine (PROAMATINE) tablet 20 mg  20 mg Oral TID WC Fayrene Glance Issac, DO   20 mg at 01/31/21 6975    insulin glargine (LANTUS) injection vial 25 Units  25 Units Subcutaneous Nightly Fayrene Glance Issac, DO   25 Units at 01/30/21 2041    dexmedetomidine (PRECEDEX) 400 mcg in sodium chloride 0.9 % 100 mL infusion  0.2-1.4 mcg/kg/hr Intravenous Continuous Thomas Soulier, DO 5.8 mL/hr at 01/30/21 2342 0.2 mcg/kg/hr at 01/30/21 2342    ipratropium-albuterol (DUONEB) nebulizer solution 1 ampule  1 ampule Inhalation 4x daily Thomas Soulier, DO   1 ampule at 01/31/21 5697    insulin lispro (HUMALOG) injection vial 0-18 Units  0-18 Units Subcutaneous Q4H Fayrene Glance Issac, DO   Stopped at 01/31/21 0130    bumetanide (BUMEX) injection 2 mg  2 mg Intravenous BID Fayrene Glance Issac, DO   2 mg at 01/31/21 6525    Mineral Oil-Hydrophil Petrolat OINT   Topical BID Thomas Soulier, DO   Given at 01/31/21 7279    And    Mineral Oil-Hydrophil Petrolat OINT   Topical 4x Daily PRN Thomas Soulier, DO   Given at 01/26/21 1846    lidocaine PF 1 % injection 5 mL  5 mL Intradermal Once Thomas Soulier, DO        sodium chloride flush 0.9 % injection 10 mL  10 mL Intravenous PRN Thomas Soulier, DO        heparin flush 100 UNIT/ML injection 300 Units  3 mL Intravenous 2 times per day Thomas Soulier, DO   Stopped at 01/30/21 0911    heparin flush 100 UNIT/ML injection 300 Units  3 mL Intracatheter PRN Liborio Soulier, DO        chlorhexidine [facility]) chloride flush 0.9 % injection 10 mL  10 mL Intravenous PRN Michelle Pompa MD        enoxaparin (LOVENOX) injection 40 mg  40 mg Subcutaneous Daily Michelle Pompa MD   40 mg at 01/31/21 6993    promethazine (PHENERGAN) tablet 12.5 mg  12.5 mg Oral Q6H PRN Michelle Pompa MD        Or    ondansetron (ZOFRAN) injection 4 mg  4 mg Intravenous Q6H PRN Michelle Pompa MD        polyethylene glycol (GLYCOLAX) packet 17 g  17 g Oral Daily PRN Michelle Pompa MD        acetaminophen (TYLENOL) tablet 650 mg  650 mg Oral Q6H PRN Michelle Pompa MD   650 mg at 01/30/21 1923    Or    acetaminophen (TYLENOL) suppository 650 mg  650 mg Rectal Q6H PRN Michelle Pompa MD        0.9 % sodium chloride infusion   Intravenous Q8H Michelle Pompa MD   Stopped at 01/29/21 2357    meropenem (MERREM) 1,000 mg in sodium chloride 0.9 % 100 mL IVPB (mini-bag)  1,000 mg Intravenous Q8H Ruben Espinoza MD   Stopped at 01/31/21 1029    budesonide (PULMICORT) nebulizer suspension 500 mcg  500 mcg Nebulization BID Lupe Cabrera MD   500 mcg at 01/31/21 0837    fentaNYL (SUBLIMAZE) injection 50 mcg  50 mcg Intravenous Q1H PRN Nilesh Vasquez MD   50 mcg at 01/29/21 1500    norepinephrine (LEVOPHED) 16 mg in dextrose 5% 250 mL infusion  2-100 mcg/min Intravenous Continuous Emmanuelle Davenport DO 15 mL/hr at 01/31/21 0254 16 mcg/min at 01/31/21 0254       REVIEW OF SYSTEMS: could not be obtained       PHYSICAL EXAM:    Vitals:   /60   Pulse 92   Temp 102.4 °F (39.1 °C) (Bladder)   Resp 28   Ht 6' (1.829 m)   Wt 255 lb (115.7 kg)   SpO2 94%   BMI 34.58 kg/m²      Constitutional: Intubated and awake, opened eyes    FiO2 40 %, PEEP 5  Skin: No pallor    HEENT:Pallor +, no LN , ET tube,   Neck: Supple to movements. No lymphadenopathy.    Chest: Bilateral scattered rhonchi   Cardiovascular: Regular, no murmur   Abdomen:soft, bowel sound +  PEG - no drainage or erythema   Extremities: ++ edema, erythema legs , not warm     Lines: Right brachial PICC ( 1/25)     CBC with Differential:      Lab Results   Component Value Date    WBC 13.2 01/31/2021    RBC 3.60 01/31/2021    HGB 9.6 01/31/2021    HCT 31.9 01/31/2021     01/31/2021    MCV 88.6 01/31/2021    MCH 26.7 01/31/2021    MCHC 30.1 01/31/2021    RDW 20.0 01/31/2021    NRBC 0.9 01/08/2021    SEGSPCT 64 09/27/2013    METASPCT 6.0 01/06/2021    LYMPHOPCT 31.7 01/31/2021    PROMYELOPCT 0.9 12/21/2020    MONOPCT 8.8 01/31/2021    MYELOPCT 3.0 01/06/2021    BASOPCT 0.2 01/31/2021    MONOSABS 1.16 01/31/2021    LYMPHSABS 4.19 01/31/2021    EOSABS 0.21 01/31/2021    BASOSABS 0.02 01/31/2021       CMP:    Lab Results   Component Value Date     01/31/2021    K 4.0 01/31/2021    CL 88 01/31/2021    CO2 37 01/31/2021    BUN 15 01/31/2021    CREATININE 0.7 01/31/2021    GFRAA >60 01/31/2021    LABGLOM >60 01/31/2021    GLUCOSE 80 01/31/2021    GLUCOSE 138 03/26/2011    PROT 5.1 01/31/2021    LABALBU 2.8 01/31/2021    LABALBU 4.0 03/26/2011    CALCIUM 7.7 01/31/2021    BILITOT 0.5 01/31/2021    ALKPHOS 87 01/31/2021    AST 21 01/31/2021    ALT 7 01/31/2021       Hepatic Function Panel:    Lab Results   Component Value Date    ALKPHOS 87 01/31/2021    ALT 7 01/31/2021    AST 21 01/31/2021    PROT 5.1 01/31/2021    BILITOT 0.5 01/31/2021    BILIDIR 0.4 01/14/2021    IBILI 0.5 01/14/2021    LABALBU 2.8 01/31/2021    LABALBU 4.0 03/26/2011         Microbiology :    Blood culture - neg to date   Urine Culture -     Susceptibility    Pseudomonas aeruginosa (1)    Antibiotic Interpretation SNEHA Status    gentamicin Sensitive <=^1 mcg/mL     levofloxacin Sensitive <=^0.12 mcg/mL     tobramycin Sensitive <=^1 mcg/mL     Lab and Collection        Sputum Culture-      Susceptibility    Pseudomonas aeruginosa (1)    Antibiotic Interpretation SNEHA Status    gentamicin Sensitive <=^1 mcg/mL     levofloxacin Sensitive <=^0.12 mcg/mL     tobramycin Sensitive <=^1 mcg/mL     Proteus mirabilis

## 2021-01-31 NOTE — PROGRESS NOTES
[address] [place] [address]  Internal Medicine Department  Division of Pulmonary, Critical Care and Sleep Medicine  Daily Intensive Care Unit Progress  Note    Admit Date: 1/22/2021    MRN: [id number]        Patient:  Carmela Wells Tannery 78 y.o. male     PCP: No primary care provider on file. Date of Service: 1/31/2021      Allergy: Imuran [azathioprine], Doxycycline, and Levaquin [levofloxacin]    Subjective   Length of stay during current admission: 9  Events of the past 24 hours are in the residents notes and we discussed at bedside briefly. .. Abx continue per ID  Will increase midodrine 20 TID but still on pressors  We stopped Hydrocortisone, recheck level  + 1 liter  Need to wean off the Levophed ? Unable  Weaning cause Afib with RVR   DNR CCA - family does not want Trach  On Vent FiO2 40%, PEEP 5 - respiratory alkalosis   TF ok  45ml / hr    Physical Exam:     VITALS:  BP (!) 96/50   Pulse 88   Temp 99 °F (37.2 °C) (Bladder)   Resp 15   Ht 6' (1.829 m)   Wt 255 lb (115.7 kg)   SpO2 96%   BMI 34.58 kg/m²   24HR INTAKE/OUTPUT:      Intake/Output Summary (Last 24 hours) at 1/31/2021 1110  Last data filed at 1/31/2021 1005  Gross per 24 hour   Intake 1748.72 ml   Output 2715 ml   Net -966.28 ml     General: Alert  HEENT: Conjunctiva/corneas clear, No icterus. No exudates. Neck: Supple, + JVD  Chest wall: Symmetric excursion,   Lung: Course to auscultation + rales , No wheezing  Heart: Regular with crisp AS murmur. No rub or  gallop. Abdomen: Obese, BS +, soft, no rigidity, rebound or guarding  Extremities: + pitting edema. Palp pulses. Femoral TLC  Skin: venous stasis, open dry wounds on legs, weaping   Musculokeletal: No trauma signs   Lymph nodes: No adenopathy  Neurologic: Non focal examination    Medications   Home and Current medications were discussed & reviewed on rounds with team and pharmacy liaison.   Labs &  Imaging Studies   The data collected below information that was obtained, or history. The above findings were corroborated, plans confirmed and changes made if needed. Current issues are : 78year old with recurrent hospitalization with RF/ Vlvular heart disease who has been recently discharged from [facility] where he completed his course of antibiotics from a previous hospitalization. Also had IVC filter on the 6th and PEG changed to jejunostomy tube on the 15th. He had also been intubated while at [facility] for respiratory failure and extubated after several days, still with issues with secretions, poor cough/pulmonary hygiene,   1. Acute respiratory failure with hypoxia   2. Vasopressor dependency day 8 ? 3. Septic shock   4. Recent CPA - 9 minutes of CPR  5. Gastrointestinal hemorrhage, unspecified gastrointestinal hemorrhage type   6. Elevated troponin   7. Urinary tract infection without hematuria, site unspecified   8. Aspiration into respiratory tract, initial encounter  9. Anemia, unspecified type  10. Pseudomonas bacteriuria   11. HCAP / Aspiration pneumonia  (Pseudomonas and Proteus)  12. COPD  15. Elevated procalcitonin   14. Valvular heart disease         Plan   Family was updated at the bedside if present and available. Key issues of the case were discussed among consultants. Critical Care time is documented if appropriate. 1. ID following - continue antibiotics  2. New fever, will change art line  3. Contact isolation for infection  4. Pallative care consult for reasonable EOL discussion  5. Meet with family- no trach, discussed his prolong course and want comfort, ask to see how things go in the next few day, family to some see him in acase  6. Increased diuresis 2 mg BID and follow K, add dailly K + protocol, add albumin before lasix to help diuresis  7. Change to IMV or VS or PS mode and wean to extubate   8. Continue with SBT daily   9. Wean off pressors, ? Depended - start Vitamin C protocol and thiamine, Check CI and SVV  10.  Increase dose of midodrine  20

## 2021-01-31 NOTE — PROGRESS NOTES
Palliative Medicine  Social Work Progress Note    Pt Name: Leoncio Enmas    [facility] met with pt's daughter, Janey Gorman, at bedside. She advised that she and pt's son, Mukesh Oliver, have continued to confer regarding pt's care. They would like to continue all care at this time while treating pt's infection. They spoke with physician earlier today regarding this. She reaffirms that if pt does not improve, family does not wish to pursue trach. They are hopeful he will improve and feel that he has been more responsive to them. Janey Gorman discussed that although pt has had much difficulty over the last few months, he was active previous to that at his nursing facility and with his family including multiple young great grandchildren. Pt also had improved while at [facility] following his last hospitalization, so they are hopeful he may improve again although prepared if he should not. Psychosocial support provided.

## 2021-01-31 NOTE — PLAN OF CARE
Problem: Skin Integrity:  Goal: Will show no infection signs and symptoms  Description: Will show no infection signs and symptoms  Outcome: Met This Shift  Goal: Absence of new skin breakdown  Description: Absence of new skin breakdown  Outcome: Met This Shift     Problem: Falls - Risk of:  Goal: Will remain free from falls  Description: Will remain free from falls  Outcome: Met This Shift  Goal: Absence of physical injury  Description: Absence of physical injury  Outcome: Met This Shift     Problem: Restraint Use - Nonviolent/Non-Self-Destructive Behavior:  Goal: Absence of restraint indications  Description: Absence of restraint indications  Outcome: Completed  Goal: Absence of restraint-related injury  Description: Absence of restraint-related injury  Outcome: Completed

## 2021-02-01 ENCOUNTER — APPOINTMENT (OUTPATIENT)
Dept: GENERAL RADIOLOGY | Age: 81
DRG: 870 | End: 2021-02-01
Payer: COMMERCIAL

## 2021-02-01 ENCOUNTER — APPOINTMENT (OUTPATIENT)
Dept: ULTRASOUND IMAGING | Age: 81
DRG: 870 | End: 2021-02-01
Payer: COMMERCIAL

## 2021-02-01 LAB
AADO2: 134.4 MMHG
ACANTHOCYTES: ABNORMAL
ALBUMIN SERPL-MCNC: 2.8 G/DL (ref 3.5–5.2)
ALP BLD-CCNC: 80 U/L (ref 40–129)
ALT SERPL-CCNC: 8 U/L (ref 0–40)
AMMONIA: 24 UMOL/L (ref 16–60)
ANION GAP SERPL CALCULATED.3IONS-SCNC: 10 MMOL/L (ref 7–16)
ANISOCYTOSIS: ABNORMAL
AST SERPL-CCNC: 21 U/L (ref 0–39)
B.E.: 11.6 MMOL/L (ref -3–3)
BASOPHILS ABSOLUTE: 0.01 E9/L (ref 0–0.2)
BASOPHILS RELATIVE PERCENT: 0.1 % (ref 0–2)
BILIRUB SERPL-MCNC: 0.5 MG/DL (ref 0–1.2)
BUN BLDV-MCNC: 16 MG/DL (ref 8–23)
CALCIUM SERPL-MCNC: 7.4 MG/DL (ref 8.6–10.2)
CHLORIDE BLD-SCNC: 89 MMOL/L (ref 98–107)
CO2: 37 MMOL/L (ref 22–29)
COHB: 1.3 % (ref 0–1.5)
CREAT SERPL-MCNC: 0.8 MG/DL (ref 0.7–1.2)
CRITICAL: ABNORMAL
DATE ANALYZED: ABNORMAL
DATE OF COLLECTION: ABNORMAL
EKG ATRIAL RATE: 115 BPM
EKG Q-T INTERVAL: 324 MS
EKG QRS DURATION: 106 MS
EKG QTC CALCULATION (BAZETT): 498 MS
EKG R AXIS: -46 DEGREES
EKG T AXIS: 140 DEGREES
EKG VENTRICULAR RATE: 142 BPM
EOSINOPHILS ABSOLUTE: 0 E9/L (ref 0.05–0.5)
EOSINOPHILS RELATIVE PERCENT: 0 % (ref 0–6)
FIO2: 40 %
GFR AFRICAN AMERICAN: >60
GFR NON-AFRICAN AMERICAN: >60 ML/MIN/1.73
GLUCOSE BLD-MCNC: 238 MG/DL (ref 74–99)
HCO3: 34.8 MMOL/L (ref 22–26)
HCT VFR BLD CALC: 30.6 % (ref 37–54)
HEMOGLOBIN: 9.3 G/DL (ref 12.5–16.5)
HHB: 2.4 % (ref 0–5)
HYPOCHROMIA: ABNORMAL
IMMATURE GRANULOCYTES #: 0.13 E9/L
IMMATURE GRANULOCYTES %: 1.3 % (ref 0–5)
LAB: ABNORMAL
LYMPHOCYTES ABSOLUTE: 1.18 E9/L (ref 1.5–4)
LYMPHOCYTES RELATIVE PERCENT: 12.2 % (ref 20–42)
Lab: ABNORMAL
MAGNESIUM: 2.1 MG/DL (ref 1.6–2.6)
MCH RBC QN AUTO: 27.1 PG (ref 26–35)
MCHC RBC AUTO-ENTMCNC: 30.4 % (ref 32–34.5)
MCV RBC AUTO: 89.2 FL (ref 80–99.9)
METER GLUCOSE: 181 MG/DL (ref 74–99)
METER GLUCOSE: 229 MG/DL (ref 74–99)
METER GLUCOSE: 239 MG/DL (ref 74–99)
METER GLUCOSE: 257 MG/DL (ref 74–99)
METER GLUCOSE: 264 MG/DL (ref 74–99)
METER GLUCOSE: 297 MG/DL (ref 74–99)
METHB: 0.3 % (ref 0–1.5)
MODE: ABNORMAL
MONOCYTES ABSOLUTE: 0.44 E9/L (ref 0.1–0.95)
MONOCYTES RELATIVE PERCENT: 4.5 % (ref 2–12)
NEUTROPHILS ABSOLUTE: 7.94 E9/L (ref 1.8–7.3)
NEUTROPHILS RELATIVE PERCENT: 81.9 % (ref 43–80)
O2 CONTENT: 14.2 ML/DL
O2 SATURATION: 97.6 % (ref 92–98.5)
O2HB: 96 % (ref 94–97)
OPERATOR ID: 1874
OVALOCYTES: ABNORMAL
PAPPENHEIMER BODIES: ABNORMAL
PATIENT TEMP: 37 C
PCO2: 39.9 MMHG (ref 35–45)
PDW BLD-RTO: 20.2 FL (ref 11.5–15)
PEEP/CPAP: 5 CMH2O
PFO2: 2.37 MMHG/%
PH BLOOD GAS: 7.56 (ref 7.35–7.45)
PHOSPHORUS: 3.5 MG/DL (ref 2.5–4.5)
PLATELET # BLD: 398 E9/L (ref 130–450)
PMV BLD AUTO: 9.8 FL (ref 7–12)
PO2: 94.9 MMHG (ref 75–100)
POIKILOCYTES: ABNORMAL
POLYCHROMASIA: ABNORMAL
POTASSIUM REFLEX MAGNESIUM: 3.4 MMOL/L (ref 3.5–5)
PREALBUMIN: 11 MG/DL (ref 20–40)
PS: 14 CMH20
RBC # BLD: 3.43 E12/L (ref 3.8–5.8)
RI(T): 1.42
SCHISTOCYTES: ABNORMAL
SODIUM BLD-SCNC: 136 MMOL/L (ref 132–146)
SOURCE, BLOOD GAS: ABNORMAL
TEAR DROP CELLS: ABNORMAL
THB: 10.4 G/DL (ref 11.5–16.5)
TIME ANALYZED: 423
TOTAL PROTEIN: 5.1 G/DL (ref 6.4–8.3)
WBC # BLD: 9.7 E9/L (ref 4.5–11.5)

## 2021-02-01 PROCEDURE — 2000000000 HC ICU R&B

## 2021-02-01 PROCEDURE — 6360000002 HC RX W HCPCS: Performed by: INTERNAL MEDICINE

## 2021-02-01 PROCEDURE — 84100 ASSAY OF PHOSPHORUS: CPT

## 2021-02-01 PROCEDURE — 37799 UNLISTED PX VASCULAR SURGERY: CPT

## 2021-02-01 PROCEDURE — 2580000003 HC RX 258: Performed by: INTERNAL MEDICINE

## 2021-02-01 PROCEDURE — 6370000000 HC RX 637 (ALT 250 FOR IP): Performed by: INTERNAL MEDICINE

## 2021-02-01 PROCEDURE — 83735 ASSAY OF MAGNESIUM: CPT

## 2021-02-01 PROCEDURE — 93010 ELECTROCARDIOGRAM REPORT: CPT | Performed by: INTERNAL MEDICINE

## 2021-02-01 PROCEDURE — 2500000003 HC RX 250 WO HCPCS: Performed by: INTERNAL MEDICINE

## 2021-02-01 PROCEDURE — 82962 GLUCOSE BLOOD TEST: CPT

## 2021-02-01 PROCEDURE — C9113 INJ PANTOPRAZOLE SODIUM, VIA: HCPCS | Performed by: INTERNAL MEDICINE

## 2021-02-01 PROCEDURE — 93971 EXTREMITY STUDY: CPT

## 2021-02-01 PROCEDURE — 93971 EXTREMITY STUDY: CPT | Performed by: RADIOLOGY

## 2021-02-01 PROCEDURE — 71045 X-RAY EXAM CHEST 1 VIEW: CPT

## 2021-02-01 PROCEDURE — 99233 SBSQ HOSP IP/OBS HIGH 50: CPT | Performed by: INTERNAL MEDICINE

## 2021-02-01 PROCEDURE — 94640 AIRWAY INHALATION TREATMENT: CPT

## 2021-02-01 PROCEDURE — 84134 ASSAY OF PREALBUMIN: CPT

## 2021-02-01 PROCEDURE — 94003 VENT MGMT INPAT SUBQ DAY: CPT

## 2021-02-01 PROCEDURE — 82140 ASSAY OF AMMONIA: CPT

## 2021-02-01 PROCEDURE — 82805 BLOOD GASES W/O2 SATURATION: CPT

## 2021-02-01 PROCEDURE — 80053 COMPREHEN METABOLIC PANEL: CPT

## 2021-02-01 PROCEDURE — 85025 COMPLETE CBC W/AUTO DIFF WBC: CPT

## 2021-02-01 RX ORDER — INSULIN GLARGINE 100 [IU]/ML
28 INJECTION, SOLUTION SUBCUTANEOUS NIGHTLY
Status: DISCONTINUED | OUTPATIENT
Start: 2021-02-01 | End: 2021-02-12 | Stop reason: HOSPADM

## 2021-02-01 RX ADMIN — BUDESONIDE 500 MCG: 0.5 SUSPENSION RESPIRATORY (INHALATION) at 08:47

## 2021-02-01 RX ADMIN — INSULIN LISPRO 9 UNITS: 100 INJECTION, SOLUTION INTRAVENOUS; SUBCUTANEOUS at 06:00

## 2021-02-01 RX ADMIN — THIAMINE HYDROCHLORIDE 200 MG: 100 INJECTION, SOLUTION INTRAMUSCULAR; INTRAVENOUS at 01:14

## 2021-02-01 RX ADMIN — INSULIN LISPRO 6 UNITS: 100 INJECTION, SOLUTION INTRAVENOUS; SUBCUTANEOUS at 21:06

## 2021-02-01 RX ADMIN — SENNOSIDES 8.6 MG: 8.6 TABLET, FILM COATED ORAL at 08:07

## 2021-02-01 RX ADMIN — Medication 10 ML: at 20:25

## 2021-02-01 RX ADMIN — MIDODRINE HYDROCHLORIDE 20 MG: 5 TABLET ORAL at 17:20

## 2021-02-01 RX ADMIN — INSULIN GLARGINE 28 UNITS: 100 INJECTION, SOLUTION SUBCUTANEOUS at 22:32

## 2021-02-01 RX ADMIN — ACETAMINOPHEN ORAL SOLUTION 650 MG: 650 SOLUTION ORAL at 08:06

## 2021-02-01 RX ADMIN — SODIUM CHLORIDE SOLN NEBU 3% 4 ML: 3 NEBU SOLN at 21:49

## 2021-02-01 RX ADMIN — SODIUM CHLORIDE: 9 INJECTION, SOLUTION INTRAVENOUS at 14:23

## 2021-02-01 RX ADMIN — SODIUM CHLORIDE: 9 INJECTION, SOLUTION INTRAVENOUS at 22:00

## 2021-02-01 RX ADMIN — MEROPENEM 1000 MG: 1 INJECTION, POWDER, FOR SOLUTION INTRAVENOUS at 01:47

## 2021-02-01 RX ADMIN — Medication: at 10:24

## 2021-02-01 RX ADMIN — MIDODRINE HYDROCHLORIDE 20 MG: 5 TABLET ORAL at 08:08

## 2021-02-01 RX ADMIN — ATORVASTATIN CALCIUM 20 MG: 20 TABLET, FILM COATED ORAL at 08:07

## 2021-02-01 RX ADMIN — HYDROCORTISONE SODIUM SUCCINATE 50 MG: 100 INJECTION, POWDER, FOR SOLUTION INTRAMUSCULAR; INTRAVENOUS at 08:07

## 2021-02-01 RX ADMIN — IPRATROPIUM BROMIDE AND ALBUTEROL SULFATE 1 AMPULE: .5; 3 SOLUTION RESPIRATORY (INHALATION) at 21:49

## 2021-02-01 RX ADMIN — MEROPENEM 1000 MG: 1 INJECTION, POWDER, FOR SOLUTION INTRAVENOUS at 17:20

## 2021-02-01 RX ADMIN — LATANOPROST 1 DROP: 50 SOLUTION OPHTHALMIC at 08:09

## 2021-02-01 RX ADMIN — SODIUM CHLORIDE SOLN NEBU 3% 4 ML: 3 NEBU SOLN at 12:23

## 2021-02-01 RX ADMIN — Medication 10 ML: at 08:10

## 2021-02-01 RX ADMIN — INSULIN LISPRO 6 UNITS: 100 INJECTION, SOLUTION INTRAVENOUS; SUBCUTANEOUS at 15:27

## 2021-02-01 RX ADMIN — INSULIN LISPRO 9 UNITS: 100 INJECTION, SOLUTION INTRAVENOUS; SUBCUTANEOUS at 01:13

## 2021-02-01 RX ADMIN — BUMETANIDE 2 MG: 0.25 INJECTION, SOLUTION INTRAMUSCULAR; INTRAVENOUS at 08:08

## 2021-02-01 RX ADMIN — ASCORBIC ACID 1500 MG: 500 INJECTION INTRAVENOUS at 17:06

## 2021-02-01 RX ADMIN — ASCORBIC ACID 1500 MG: 500 INJECTION INTRAVENOUS at 02:37

## 2021-02-01 RX ADMIN — MIDODRINE HYDROCHLORIDE 20 MG: 5 TABLET ORAL at 12:48

## 2021-02-01 RX ADMIN — SODIUM CHLORIDE, PRESERVATIVE FREE 10 ML: 5 INJECTION INTRAVENOUS at 08:07

## 2021-02-01 RX ADMIN — ASCORBIC ACID 1500 MG: 500 INJECTION INTRAVENOUS at 23:15

## 2021-02-01 RX ADMIN — IPRATROPIUM BROMIDE AND ALBUTEROL SULFATE 1 AMPULE: .5; 3 SOLUTION RESPIRATORY (INHALATION) at 17:24

## 2021-02-01 RX ADMIN — PANTOPRAZOLE SODIUM 40 MG: 40 INJECTION, POWDER, FOR SOLUTION INTRAVENOUS at 08:07

## 2021-02-01 RX ADMIN — HYDROCORTISONE SODIUM SUCCINATE 50 MG: 100 INJECTION, POWDER, FOR SOLUTION INTRAMUSCULAR; INTRAVENOUS at 14:23

## 2021-02-01 RX ADMIN — INSULIN LISPRO 3 UNITS: 100 INJECTION, SOLUTION INTRAVENOUS; SUBCUTANEOUS at 17:47

## 2021-02-01 RX ADMIN — Medication: at 20:24

## 2021-02-01 RX ADMIN — BUDESONIDE 500 MCG: 0.5 SUSPENSION RESPIRATORY (INHALATION) at 21:49

## 2021-02-01 RX ADMIN — MEROPENEM 1000 MG: 1 INJECTION, POWDER, FOR SOLUTION INTRAVENOUS at 10:41

## 2021-02-01 RX ADMIN — INSULIN LISPRO 9 UNITS: 100 INJECTION, SOLUTION INTRAVENOUS; SUBCUTANEOUS at 10:33

## 2021-02-01 RX ADMIN — Medication 8 MCG/MIN: at 05:11

## 2021-02-01 RX ADMIN — CHLORHEXIDINE GLUCONATE 0.12% ORAL RINSE 15 ML: 1.2 LIQUID ORAL at 08:06

## 2021-02-01 RX ADMIN — IPRATROPIUM BROMIDE AND ALBUTEROL SULFATE 1 AMPULE: .5; 3 SOLUTION RESPIRATORY (INHALATION) at 08:47

## 2021-02-01 RX ADMIN — SODIUM CHLORIDE 0.2 MCG/KG/HR: 9 INJECTION, SOLUTION INTRAVENOUS at 17:08

## 2021-02-01 RX ADMIN — SENNOSIDES 8.6 MG: 8.6 TABLET, FILM COATED ORAL at 20:25

## 2021-02-01 RX ADMIN — POTASSIUM BICARBONATE 40 MEQ: 782 TABLET, EFFERVESCENT ORAL at 08:07

## 2021-02-01 RX ADMIN — DEXTROSE MONOHYDRATE 100 MG: 50 INJECTION, SOLUTION INTRAVENOUS at 17:45

## 2021-02-01 RX ADMIN — THIAMINE HYDROCHLORIDE 200 MG: 100 INJECTION, SOLUTION INTRAMUSCULAR; INTRAVENOUS at 12:49

## 2021-02-01 RX ADMIN — CHLORHEXIDINE GLUCONATE 0.12% ORAL RINSE 15 ML: 1.2 LIQUID ORAL at 20:24

## 2021-02-01 RX ADMIN — AMIODARONE HYDROCHLORIDE 100 MG: 200 TABLET ORAL at 08:08

## 2021-02-01 RX ADMIN — ASCORBIC ACID 1500 MG: 500 INJECTION INTRAVENOUS at 08:06

## 2021-02-01 RX ADMIN — ENOXAPARIN SODIUM 40 MG: 40 INJECTION SUBCUTANEOUS at 10:24

## 2021-02-01 RX ADMIN — HYDROCORTISONE SODIUM SUCCINATE 50 MG: 100 INJECTION, POWDER, FOR SOLUTION INTRAMUSCULAR; INTRAVENOUS at 20:25

## 2021-02-01 RX ADMIN — IPRATROPIUM BROMIDE AND ALBUTEROL SULFATE 1 AMPULE: .5; 3 SOLUTION RESPIRATORY (INHALATION) at 12:23

## 2021-02-01 RX ADMIN — HYDROCORTISONE SODIUM SUCCINATE 50 MG: 100 INJECTION, POWDER, FOR SOLUTION INTRAMUSCULAR; INTRAVENOUS at 01:13

## 2021-02-01 ASSESSMENT — PULMONARY FUNCTION TESTS
PIF_VALUE: 19
PIF_VALUE: 19
PIF_VALUE: 15
PIF_VALUE: 19
PIF_VALUE: 16
PIF_VALUE: 15
PIF_VALUE: 17
PIF_VALUE: 15
PIF_VALUE: 15
PIF_VALUE: 16
PIF_VALUE: 19
PIF_VALUE: 15
PIF_VALUE: 16
PIF_VALUE: 19

## 2021-02-01 ASSESSMENT — PAIN SCALES - GENERAL
PAINLEVEL_OUTOF10: 0

## 2021-02-01 NOTE — PROGRESS NOTES
[facility]  Internal Medicine Department  Division of Pulmonary, Critical Care and Sleep Medicine  Daily Pulmonary  Note    Admit Date: 1/22/2021    MRN: [id number]        Patient:  Glenroy Carrera 78 y.o. male     PCP: No primary care provider on file. Date of Service: 2/1/2021      Allergy: Imuran [azathioprine], Doxycycline, and Levaquin [levofloxacin]    Subjective   Length of stay during current admission: 10  Events of the past 24 hours are in the residents notes and we discussed at bedside briefly. .. Abx continue per ID  NICOM = sepsis numbers    Will increase midodrine 20 TID but still on pressors  We stopped Hydrocortisone, recheck level  Mixed venous sat is only 54% will check Hb and consider milranone,  + 100  Need to wean off the Levophed ? Unable  Weaning cause Afib with RVR   DNR CCA - family does not want Trach  On Vent FiO2 40%, PEEP 5 - respiratory alkalosis   TF ok  45ml / hr    Physical Exam:     VITALS:  BP (!) 113/48   Pulse 61   Temp 100.1 °F (37.8 °C) (Bladder)   Resp 24   Ht 6' (1.829 m)   Wt 255 lb (115.7 kg)   SpO2 100%   BMI 34.58 kg/m²   24HR INTAKE/OUTPUT:      Intake/Output Summary (Last 24 hours) at 2/1/2021 1156  Last data filed at 2/1/2021 1000  Gross per 24 hour   Intake 2296.06 ml   Output 3705 ml   Net -1408.94 ml     General: Alert  HEENT: Conjunctiva/corneas clear, No icterus. No exudates. Neck: Supple, + JVD  Chest wall: Symmetric excursion,   Lung: Course to auscultation + rales , No wheezing  Heart: Regular with crisp AS murmur. No rub or  gallop. Abdomen: Obese, BS +, soft, no rigidity, rebound or guarding  Extremities: + pitting edema. Palp pulses. Femoral TLC  Skin: venous stasis, open dry wounds on legs, weaping   Musculokeletal: No trauma signs   Lymph nodes: No adenopathy  Neurologic: Non focal examination    Medications   Home and Current medications were discussed & reviewed on rounds with team and pharmacy liaison.   Labs &  Imaging Studies   The data collected below information that was obtained, reviewed, analyzed and interpreted today. Imaging test are reviewed with the radiologist during rounds. Comparison to previous images are always explored. CBC:   Lab Results   Component Value Date    WBC 9.7 02/01/2021    RBC 3.43 02/01/2021    HGB 9.3 02/01/2021    HCT 30.6 02/01/2021     02/01/2021    MCV 89.2 02/01/2021       BMP:    Lab Results   Component Value Date     02/01/2021    K 3.4 02/01/2021    CL 89 02/01/2021    CO2 37 02/01/2021    BUN 16 02/01/2021    CREATININE 0.8 02/01/2021    GLUCOSE 238 02/01/2021    GLUCOSE 138 03/26/2011    CALCIUM 7.4 02/01/2021       TSH:    Lab Results   Component Value Date    TSH 2.300 01/25/2021       Imaging Studies:    RADIOLOGY: Films were read/reviewed and or discussed with radiology and the consulting teams which show pneumonia and edema    EKG: ICU Rhythm Strips were reviewed and discussed. Sinus, Rare Ectopy    ECHOCARDIOGRAM: 2020   Left ventricle is normal in size . Moderate left ventricular concentric hypertrophy noted. No regional wall motion abnormalities seen. Normal left ventricular ejection fraction. The left atrium is moderately dilated. Mild mitral annular calcification. The aortic valve appears mildly sclerotic. Mild aortic stenosis. CULTURES    Pseudomonas aeruginosaAbnormal      CULTURE, RESPIRATORY Heavy growth    Organism Proteus mirabilisAbnormal     CULTURE, RESPIRATORY Heavy growth    Organism Escherichia coliAbnormal            Assessment    Alexa Prather is a 78 y.o. male was seen, examined and discussed with on multi-disciplinary team rounds. This note may be separate or in addition to the resident records. I have personally seen and examined the patient and the key elements of the encounter were performed by me. The medications & laboratory data was discussed and adjusted where necessary.  The radiographic images were reviewed either as a group or with radiologist if felt dis-concordant with the exam or history. The above findings were corroborated, plans confirmed and changes made if needed. Current issues are : 78year old with recurrent hospitalization with RF/ Vlvular heart disease who has been recently discharged from [facility] where he completed his course of antibiotics from a previous hospitalization. Also had IVC filter on the 6th and PEG changed to jejunostomy tube on the 15th. He had also been intubated while at [facility] for respiratory failure and extubated after several days, still with issues with secretions, poor cough/pulmonary hygiene,   1. Acute respiratory failure with hypoxia   2. Vasopressor dependency day 8 ? 3. Septic shock   4. Recent CPA - 9 minutes of CPR  5. Gastrointestinal hemorrhage, unspecified gastrointestinal hemorrhage type   6. Elevated troponin   7. Urinary tract infection without hematuria, site unspecified   8. Aspiration into respiratory tract, initial encounter  9. Anemia, unspecified type  10. Pseudomonas bacteriuria   11. HCAP / Aspiration pneumonia  (Pseudomonas and Proteus)  12. COPD  15. Elevated procalcitonin   14. Valvular heart disease         Plan   Family was updated at the bedside if present and available. Key issues of the case were discussed among consultants. Critical Care time is documented if appropriate. 1. ID following - continue antibiotics  2. New fever, will change art line 1/31  3. Contact isolation for infection  4. Pallative care consult for reasonable EOL discussion  5. Meet with family- no trach, discussed his prolong course and want comfort, ask to see how things go in the next few day, family to some see him in acase  6. Discontinue diuresis or cut to once a day, add dailly K + protocol,   7. Change  PS mode and wean to extubate   8. Continue with SBT daily   9. Wean off pressors, ? Depended - start Vitamin C protocol and thiamine, Check CI and SVV  10.  Increase dose of midodrine  20 TID  11. PICC line need, remove femoral = done 1/25/21  12. Continue Tube feeding - monitor prealbumin  13. Bowel prep on TF  14. Continue with basal insulin to 25 units  15. Prealbumin level only 8; check weekly   16. Check TSH on amio = normal  17. Stop Fentanyl gtt, On Precedex if get agitated   18. Keep K > 4  19. Supplement Mg, K and Phos  20. Anticoaguate per the primary team   21.  Mixed venous is low, consider marisol Gomez DO, MPH, FCCP, [place], [place]

## 2021-02-01 NOTE — PLAN OF CARE
Problem: Falls - Risk of:  Goal: Will remain free from falls  Description: Will remain free from falls  2/1/2021 1153 by Azra Reyes RN  Outcome: Met This Shift  2/1/2021 0437 by Nimesh Urbina RN  Outcome: Met This Shift  Goal: Absence of physical injury  Description: Absence of physical injury  2/1/2021 1153 by Azra Reyes RN  Outcome: Met This Shift  2/1/2021 0437 by Nimesh Urbina RN  Outcome: Met This Shift

## 2021-02-01 NOTE — PROGRESS NOTES
Critical Care Team - Daily Progress Note         Date and time: 2021 9:21 AM  Patient's name:  Dony Marin  Medical Record Number: [id number]  Patient's account/billing number: [de-identified]  Patient's YOB: 1940  Age: 78 y.o. Date of Admission: 2021  8:35 PM  Length of stay during current admission: 10    Primary Care Physician: No primary care provider on file. ICU Attending Physician: Dr. Deborah Schulte    Code Status: DNR-CCA    Reason for ICU admission: Septic shock  SUBJECTIVE:      on ventilator and on pressors  OVERNIGHT EVENTS:      Uneventful    CURRENT VENTILATION STATUS:   [x] Ventilator  [] BIPAP  [] Nasal Cannula [] Room Air    IF INTUBATED, ET TUBE MARKING AT LOWER LIP:       cms  SECRETIONS Amount:  [x] Small [] Moderate  [] Large  [x] None  Color:     [] White [] Colored  [] Bloody  SEDATION:  RAAS Score:  On Precedex  [] Propofol gtt  [] Versed gtt  [] Ativan gtt   [] No Sedation  PARALYZED:  [x] No    [] Yes  VASOPRESSORS:  [] No    [x] Yes    If yes - [x] Levophed       [] Dopamine     [] Vasopressin       [] Dobutamine  [] Phenylephrine         [] Epinephrine  CENTRAL LINES:     [] No   [] Yes   (Date of Insertion:   )           If yes -     [] Right IJ     [] Left IJ [] Right Femoral [] Left Femoral                   [] Right Subclavian [] Left Subclavian   [x] PICC Line right arm  GREEN'S CATHETER:   [] No   [x] Yes  (Date of Insertion:   )   URINE OUTPUT:            [] Good   [] Low              [] Anuric      OBJECTIVE:     VITAL SIGNS:  BP (!) 131/55   Pulse 64   Temp 101.2 °F (38.4 °C) (Bladder)   Resp 21   Ht 6' (1.829 m)   Wt 255 lb (115.7 kg)   SpO2 100%   BMI 34.58 kg/m²   Tmax over 24 hours:  Temp (24hrs), Av.4 °F (38.6 °C), Min:98.2 °F (36.8 °C), Max:102.9 °F (39.4 °C)      Patient Vitals for the past 6 hrs:   BP Temp Temp src Pulse Resp SpO2   21 0849 -- -- -- 64 21 100 %   02/01/21 0600 (!) 131/55 101.2 °F (38.4 °C) Bladder 68 15 100 % 02/01/21 0500 (!) 117/49 101.2 °F (38.4 °C) Bladder 61 17 100 %   02/01/21 0400 (!) 124/52 101.3 °F (38.5 °C) Bladder 69 16 100 %   02/01/21 0342 -- -- -- 70 23 100 %         Intake/Output Summary (Last 24 hours) at 2/1/2021 0921  Last data filed at 2/1/2021 0600  Gross per 24 hour   Intake 2296.06 ml   Output 3930 ml   Net -1633.94 ml     Wt Readings from Last 2 Encounters:   01/23/21 255 lb (115.7 kg)   01/14/21 255 lb (115.7 kg)     Body mass index is 34.58 kg/m². PHYSICAL EXAMINATION:    General appearance - alert opens eyes moves right arm obese intubated on low-dose Precedex, and in no distress  Mental status - alert, oriented to person, place, and time  Eyes - pupils equal and reactive, extraocular eye movements intact  Ears - external ear canals normal  Nose - normal and patent,, discharge   Mouth - mucous membranes moist, pharynx normal without lesions  Neck - supple, no significant adenopathy, JVD  Chest - clear to auscultation, no wheezes, rales or rhonchi, symmetric air entry  Heart - normal rate, regular rhythm, normal S1, S2, no murmurs, rubs, clicks or gallops  Abdomen - soft, nontender, nondistended, no masses or organomegaly obese PEJ  Neurological - alert, oriented, normal speech, no focal findings or movement disorder noted  Extremities - peripheral pulses normal, no pedal edema, no clubbing or cyanosis.   Right arm swollen with wound around elbow  Skin - normal coloration and turgor, no rashes, no suspicious skin lesions noted        MEDICATIONS:    Scheduled Meds:   hydrocortisone sodium succinate PF  50 mg Intravenous Q6H    thiamine (VITAMIN B1) IVPB  200 mg Intravenous Q12H    ascorbic acid  1,500 mg Intravenous Q6H    anidulafungin  100 mg Intravenous Q24H    potassium bicarb-citric acid  40 mEq Per G Tube BID    midodrine  20 mg Oral TID WC    insulin glargine  25 Units Subcutaneous Nightly    ipratropium-albuterol  1 ampule Inhalation 4x daily    insulin lispro  0-18 Units Subcutaneous Q4H    bumetanide  2 mg Intravenous BID    Mineral Oil-Hydrophil Petrolat   Topical BID    lidocaine PF  5 mL Intradermal Once    heparin flush  3 mL Intravenous 2 times per day    chlorhexidine  15 mL Mouth/Throat BID    amiodarone  100 mg PEG Tube Daily    atorvastatin  20 mg PEG Tube Daily    lactulose  20 g PEG Tube Daily    latanoprost  1 drop Both Eyes Daily    pantoprazole  40 mg Intravenous Daily    And    sodium chloride (PF)  10 mL Intravenous Daily    senna  1 tablet PEG Tube BID    sodium chloride (Inhalant)  4 mL Nebulization BID    sodium chloride flush  10 mL Intravenous 2 times per day    enoxaparin  40 mg Subcutaneous Daily    meropenem  1,000 mg Intravenous Q8H    budesonide  500 mcg Nebulization BID     Continuous Infusions:   dexmedetomidine (PRECEDEX) IV infusion 0.2 mcg/kg/hr (01/31/21 2031)    dextrose      sodium chloride Stopped (01/29/21 2357)    norepinephrine 8 mcg/min (02/01/21 0511)     PRN Meds:       Mineral Oil-Hydrophil Petrolat, , 4x Daily PRN      sodium chloride flush, 10 mL, PRN      heparin flush, 3 mL, PRN      potassium chloride, 20 mEq, PRN      acetaminophen, 650 mg, Q4H PRN      docusate sodium, 1 enema, PRN      glucose, 15 g, PRN      dextrose, 12.5 g, PRN      glucagon (rDNA), 1 mg, PRN      dextrose, 100 mL/hr, PRN      sodium chloride flush, 10 mL, PRN      promethazine, 12.5 mg, Q6H PRN    Or      ondansetron, 4 mg, Q6H PRN      polyethylene glycol, 17 g, Daily PRN      acetaminophen, 650 mg, Q6H PRN    Or      acetaminophen, 650 mg, Q6H PRN      fentanNYL, 50 mcg, Q1H PRN          VENT SETTINGS (Comprehensive) (if applicable):  Vent Information  $Ventilation: $Subsequent Day  Skin Assessment: Clean, dry, & intact  Equipment ID: [id number]  Equipment Changed: Humidification  Vent Type: [phone]  Vent Mode: PS  Vt Ordered: 0 mL  Rate Set: 0 bmp  Peak Flow: 0 L/min  Pressure Support: (S) 12 cmH20  FiO2 : 40 %  SpO2: 100 %  SpO2/FiO2 ratio: 250  Sensitivity: 3  PEEP/CPAP: 5  I Time/ I Time %: 0 s  Humidification Source: Heated wire  Humidification Temp: 37  Humidification Temp Measured: 37  Circuit Condensation: Drained  Additional Respiratory  Assessments  Pulse: 64  Resp: 21  SpO2: 100 %  Position: Semi-Akhtar's  Humidification Source: Heated wire  Humidification Temp: 37  Circuit Condensation: Drained  Oral Care: Mouth swabbed, Mouth moisturizer, Mouth suctioned  Subglottic Suction Done?: Yes  Airway Type: ET  Airway Size: 8  Cuff Pressure (cm H2O): 29 cm H2O  ABGs:   Recent Labs     02/01/21 0423   PH 7.559*   PCO2 39.9   PO2 94.9   HCO3 34.8*   BE 11.6*   O2SAT 97.6       Laboratory findings:  Complete Blood Count:   Recent Labs     01/30/21 0412 01/31/21 0404 02/01/21 0411   WBC 10.8 13.2* 9.7   HGB 8.6* 9.6* 9.3*   HCT 28.1* 31.9* 30.6*    464* 398        Last 3 Blood Glucose:   Recent Labs     01/30/21 0412 01/31/21 0404 02/01/21 0411   GLUCOSE 93 80 238*        PT/INR:    Lab Results   Component Value Date    PROTIME 14.1 01/15/2021    INR 1.2 01/15/2021     PTT:    Lab Results   Component Value Date    APTT 21.0 01/06/2021       Comprehensive Metabolic Profile:   Recent Labs     01/30/21 0412 01/30/21 0412 01/31/21 0404 02/01/21 0411     --  136 136   K 2.9*  --  4.0 3.4*   CL 90*  --  88* 89*   CO2 36*  --  37* 37*   BUN 14  --  15 16   CREATININE 0.6*  --  0.7 0.8   GLUCOSE 93  --  80 238*   CALCIUM 7.4*  --  7.7* 7.4*   PROT  --    < > 5.1* 5.1*   LABALBU  --    < > 2.8* 2.8*   BILITOT  --    < > 0.5 0.5   ALKPHOS  --    < > 87 80   AST  --    < > 21 21   ALT  --    < > 7 8    < > = values in this interval not displayed.       Magnesium:   Lab Results   Component Value Date    MG 2.1 02/01/2021     Phosphorus:   Lab Results   Component Value Date    PHOS 3.5 02/01/2021     Ionized Calcium:   Lab Results   Component Value Date    CAION 1.06 01/18/2021        Urinalysis:     Troponin: No results for input(s): TROPONINI in the last 72 hours.     Microbiology:    Cultures during this admission:   Source: NARES       Site: nose             Culture, Respiratory [[id number]] (Abnormal)  Collected: 01/23/21 1116   Order Status: Completed Specimen: Endotracheal Updated: 01/26/21 0639    CULTURE, RESPIRATORY Oral Pharyngeal Tamika reducedAbnormal     Smear, Respiratory --    Group 5: >25 PMN's/LPF and <10 Epithelial cells/LPF   Abundant Polymorphonuclear leukocytes   Epithelial cells not seen   Rare Gram negative diplococci   Rare Gram positive cocci in clusters   Few Gram negative rods   Rare Gram positive cocci in pairs     Organism Pseudomonas aeruginosaAbnormal     CULTURE, RESPIRATORY Heavy growth    Organism Proteus mirabilisAbnormal     CULTURE, RESPIRATORY Heavy growth    Organism Escherichia coliAbnormal     CULTURE, RESPIRATORY Moderate growth    Organism Klebsiella pneumoniae ssp pneumoniaeAbnormal     CULTURE, RESPIRATORY Moderate growth   Narrative:     Source: ENTRA       Site: endo tube             Culture, Urine [[id number]] (Abnormal)  Collected: 01/22/21 2111   Order Status: Completed Specimen: Urine, clean catch Updated: 01/25/21 0722    Organism Pseudomonas aeruginosaAbnormal     Urine Culture, Routine >100,000 CFU/ml   Narrative:     Source: URINE       Site: Urine Indewelling Cath                   Blood cultures:                  [] None drawn      [x] Negative     []  Positive     [] Pending   Urine Culture:                    [] None drawn      [] Negative     [x]  Positive     [] Pending  Sputum Culture:                [] None drawn      [] Negative     [x]  Positive     [] Pending  Endotracheal aspirate:      [] None drawn      [] Negative     []  Positive     [] Pending  Stool:    [] None Sent        [] Negative     []  Positive     [] Pending   Other Micro:   [] None Sent        [] Negative     []  Positive     [] Pending     Other pertinent Labs:       Radiology/Imaging:       ASSESSMENT: Active Problems:    Respiratory failure [facility])    Palliative care encounter    DNR no code (do not resuscitate)    Goals of care, counseling/discussion  Resolved Problems:    * No resolved hospital problems. *      78-year-old man 100-pack-year ex-smoker with dementia HTN, HLD, DM, BPH,    12/13/2020-12/23/2020 septic shock from healthcare associated pneumonia with MRSA ESBL Klebsiella requiring pressors extubated 12/15  Patient mucous plugging intubated 12/28 extubated 1/8.  12/23/2020 -1/20/2021  patient at [facility] 19 improved went to SNF. Patient had PEG tube changed to [address] tube 1/15. Patient received 2 units of blood 1/5. IVC filter was placed 1/6 due to high risk for bleeding on anticoagulation  1/22/2021 had emesis while on BIPAP with unresponsiveness at nursing home. Pt was intubated in ER. T-max 102COVID negative  CT chest with b/l consolidations groundglass densities moderate effusion  1/31 T-max 102.4  2/1 T-max 101.1, on pressure support mode 40% FiO2 on Levophed      1/22 CT head negative  1/22 CT chest multifocal infiltrates bilaterally groundglass densities. Bilateral effusions. Pulmonary edema  1/27 no change of multifocal infiltrate  1/29 stable infiltrates right lung  2/1 multifocal airspace disease    1. Septic shock vitamin C, Solu-Cortef every 6 50 mg and on Levophed  2. Acute respiratory failure on mechanical ventilation  3. Aspiration pneumonia MRSA ESBL Klebsiella on meropenem anidulafungin   4. Echo EF 55 to 60% on 12/6/2020, mild aortic stenosis  5. Anemia stable  6. Dysphagia PEG tube changed to PEJ tube 1/15/2021  7. 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 History of A. fib on amiodarone  9. Left CFV SF V DVT 1/3/2021 IVC filter placed 1/6  10. Baseline dementia nonverbal  11. Pulmonary sees Dr. Brnaden Cruz. 12. Probable COPD on aerosols  13.  DNR CCA family does not want to have a trach    PLAN:   White count improving  Attempting to wean pressors on midodrine 3 times daily  On pressure support with respiratory alkalosis wean pressure support from 12-10. Decrease FiO2 to 35. Increase Lantus  Ultrasound right upper extremity pending      WEAN PER PROTOCOL:  [x] No   [] Yes  [] N/A  DISCONTINUE ANY LABS:   [x] No   [] Yes  ICU PROPHYLAXIS:  Stress ulcer:  [x] PPI Agent  [] [id number] [] Sucralfate  [] Other:  VTE:   [x] Enoxaparin  [] Unfract. Heparin Subcut  [] EPC Cuffs  NUTRITION:  [] NPO [x] Tube Feeding (Specify: ) [] TPN  [] PO (Diet: DIET TUBE FEED CONTINUOUS/CYCLIC NPO; Semi-elemental; Orogastric; Continuous; 10; 60; 24  Diet Tube Feed Modular: Protein Modular)  HOME MEDICATIONS RECONCILED: [x] No   [] Yes  INSULIN DRIP:    [x] No   [] Yes  CONSULTATION NEEDED:    [x] No   [] Yes  FAMILY UPDATED:     [x] No   [] Yes  TRANSFER OUT OF ICU:    [x] No   [] Yes      SAGRARIO Barton                     I personally saw, examined and provided care for the patient. Radiographs, labs and medication list were reviewed by me independently. I spoke with bedside nursing, therapists and consultants.  Critical care services and times documented are independent of procedures   2/1/2021, 9:21 AM   > 30 min CCT    Decreased free water  Attempting to wean off Levophed  Blood pressure labile

## 2021-02-01 NOTE — CARE COORDINATION
2/1 Care Coordination: Sanjay Taylor remains in MICU, On Vent,sedation. Family continues to want Aggressive therapy, but don't want a trach and PEG placed. [facility]charge plan remains [facility] or back to [facility]. CM/SW will continue to follow for discharge planning.    Cici SHOREN,RN-BC  [phone]

## 2021-02-01 NOTE — PROGRESS NOTES
Palliative Medicine   Progression of Care     Patient's daughter Lizet Goodwin met with Palliative LSW at the bedside on 1/31. They had a long conversation regarding patient's care. Per review of note, they want to continue all care at this time while treating patient's infection; but do not wish to pursue trach. Will follow along closely for any changes.     Palliative Care following closely for support of patient and family   Lian Saucedo APRN-CNP, AGACNP-BC

## 2021-02-01 NOTE — PROGRESS NOTES
Daily PRN Varghese Borne, DO   Given at 01/26/21 1846    lidocaine PF 1 % injection 5 mL  5 mL Intradermal Once Varghese Borne, DO        sodium chloride flush 0.9 % injection 10 mL  10 mL Intravenous PRN Varghese Borne, DO        heparin flush 100 UNIT/ML injection 300 Units  3 mL Intravenous 2 times per day Varghese Borne, DO   Stopped at 01/30/21 0911    heparin flush 100 UNIT/ML injection 300 Units  3 mL Intracatheter PRN Varghese Borne, DO        chlorhexidine (PERIDEX) 0.12 % solution 15 mL  15 mL Mouth/Throat BID Worthy Fleischer Issac, DO   15 mL at 02/01/21 0806    potassium chloride 20 mEq/50 mL IVPB (Central Line)  20 mEq Intravenous PRN Varghese Borne, DO 50 mL/hr at 01/27/21 0749 20 mEq at 01/27/21 0749    acetaminophen (TYLENOL) 160 MG/5ML solution 650 mg  650 mg Per G Tube Q4H PRN Saritha Wesley MD   650 mg at 02/01/21 0806    amiodarone (CORDARONE) tablet 100 mg  100 mg PEG Tube Daily Saritha Wesley MD   100 mg at 02/01/21 3832    atorvastatin (LIPITOR) tablet 20 mg  20 mg PEG Tube Daily Saritha Wesley MD   20 mg at 02/01/21 0807    docusate sodium (ENEMEEZ) enema 283 mg  1 enema Rectal PRN Saritha Wesley MD        glucose (GLUTOSE) 40 % oral gel 15 g  15 g Oral PRN Saritha Wesley MD        dextrose 50 % IV solution  12.5 g Intravenous PRN Saritha Wesley MD        glucagon (rDNA) injection 1 mg  1 mg Intramuscular PRN Saritha Wesley MD        dextrose 5 % solution  100 mL/hr Intravenous PRN Saritha Wesley MD        lactulose (CHRONULAC) 10 GM/15ML solution 20 g  20 g PEG Tube Daily Felix Hughes MD   20 g at 01/31/21 0821    latanoprost (XALATAN) 0.005 % ophthalmic solution 1 drop  1 drop Both Eyes Daily Saritha Wesley MD   1 drop at 02/01/21 0809    pantoprazole (PROTONIX) injection 40 mg  40 mg Intravenous Daily Saritha Wesley MD   40 mg at 02/01/21 0807    And    sodium chloride (PF) 0.9 % injection 10 mL  10 mL Intravenous Daily Felix Hughes MD   10 mL at 02/01/21 0807    senna (SENOKOT) tablet 8.6 mg  1 tablet PEG Tube BID Tacos Yu MD   8.6 mg at 02/01/21 0807    sodium chloride (Inhalant) 3 % nebulizer solution 4 mL  4 mL Nebulization BID Tacos Yu MD   4 mL at 01/31/21 2017    sodium chloride flush 0.9 % injection 10 mL  10 mL Intravenous 2 times per day Tacos Yu MD   10 mL at 02/01/21 0810    sodium chloride flush 0.9 % injection 10 mL  10 mL Intravenous PRN Tacos Yu MD        enoxaparin (LOVENOX) injection 40 mg  40 mg Subcutaneous Daily Tacos Yu MD   40 mg at 02/01/21 1024    promethazine (PHENERGAN) tablet 12.5 mg  12.5 mg Oral Q6H PRN Tacos Yu MD        Or    ondansetron (ZOFRAN) injection 4 mg  4 mg Intravenous Q6H PRN Tacos Yu MD        polyethylene glycol (GLYCOLAX) packet 17 g  17 g Oral Daily PRN Tacos Yu MD        acetaminophen (TYLENOL) tablet 650 mg  650 mg Oral Q6H PRN Tacos Yu MD   650 mg at 01/30/21 1923    Or    acetaminophen (TYLENOL) suppository 650 mg  650 mg Rectal Q6H PRN Tacos Yu MD        0.9 % sodium chloride infusion   Intravenous Q8H Tacos Yu MD   Stopped at 01/29/21 2357    meropenem (MERREM) 1,000 mg in sodium chloride 0.9 % 100 mL IVPB (mini-bag)  1,000 mg Intravenous Q8H Ruben Espinoza  mL/hr at 02/01/21 1041 1,000 mg at 02/01/21 1041    budesonide (PULMICORT) nebulizer suspension 500 mcg  500 mcg Nebulization BID Carlos Rider MD   500 mcg at 02/01/21 0847    fentaNYL (SUBLIMAZE) injection 50 mcg  50 mcg Intravenous Q1H PRN Niesha Gutierres MD   50 mcg at 01/29/21 1500    norepinephrine (LEVOPHED) 16 mg in dextrose 5% 250 mL infusion  2-100 mcg/min Intravenous Continuous Ele Kim Davenport DO 5.6 mL/hr at 02/01/21 1015 6 mcg/min at 02/01/21 1015       REVIEW OF SYSTEMS: could not be obtained       PHYSICAL EXAM:    Vitals:   BP (!) 131/55   Pulse 82   Temp 100.1 °F (37.8 °C) (Bladder)   Resp 24   Ht 6' (1.829 m)   Wt 255 lb (115.7 kg) SpO2 95%   BMI 34.58 kg/m²      Constitutional: Intubated, sedated    FiO2 40 %, PEEP 5  Skin: No pallor    HEENT:Pallor +, no LN , ET tube,   Neck: Supple to movements. No lymphadenopathy.    Chest: Bilateral coarse  rhonchi   Cardiovascular: Regular, no murmur   Abdomen:soft, bowel sound +  PEG - no drainage or erythema   Extremities: ++ edema, erythema legs , not warm   Lines:  Right brachial PICC ( 1/25)     CBC with Differential:      Lab Results   Component Value Date    WBC 9.7 02/01/2021    RBC 3.43 02/01/2021    HGB 9.3 02/01/2021    HCT 30.6 02/01/2021     02/01/2021    MCV 89.2 02/01/2021    MCH 27.1 02/01/2021    MCHC 30.4 02/01/2021    RDW 20.2 02/01/2021    NRBC 0.9 01/08/2021    SEGSPCT 64 09/27/2013    METASPCT 6.0 01/06/2021    LYMPHOPCT 12.2 02/01/2021    PROMYELOPCT 0.9 12/21/2020    MONOPCT 4.5 02/01/2021    MYELOPCT 3.0 01/06/2021    BASOPCT 0.1 02/01/2021    MONOSABS 0.44 02/01/2021    LYMPHSABS 1.18 02/01/2021    EOSABS 0.00 02/01/2021    BASOSABS 0.01 02/01/2021       CMP:    Lab Results   Component Value Date     02/01/2021    K 3.4 02/01/2021    CL 89 02/01/2021    CO2 37 02/01/2021    BUN 16 02/01/2021    CREATININE 0.8 02/01/2021    GFRAA >60 02/01/2021    LABGLOM >60 02/01/2021    GLUCOSE 238 02/01/2021    GLUCOSE 138 03/26/2011    PROT 5.1 02/01/2021    LABALBU 2.8 02/01/2021    LABALBU 4.0 03/26/2011    CALCIUM 7.4 02/01/2021    BILITOT 0.5 02/01/2021    ALKPHOS 80 02/01/2021    AST 21 02/01/2021    ALT 8 02/01/2021       Hepatic Function Panel:    Lab Results   Component Value Date    ALKPHOS 80 02/01/2021    ALT 8 02/01/2021    AST 21 02/01/2021    PROT 5.1 02/01/2021    BILITOT 0.5 02/01/2021    BILIDIR 0.4 01/14/2021    IBILI 0.5 01/14/2021    LABALBU 2.8 02/01/2021    LABALBU 4.0 03/26/2011         Microbiology :    Blood culture - neg to date   Urine Culture -     Susceptibility    Pseudomonas aeruginosa (1)    Antibiotic Interpretation SNEHA Status    gentamicin Sensitive <=^1 mcg/mL     levofloxacin Sensitive <=^0.12 mcg/mL     tobramycin Sensitive <=^1 mcg/mL     Lab and Collection        Sputum Culture-      Susceptibility    Pseudomonas aeruginosa (1)    Antibiotic Interpretation SNEHA Status    gentamicin Sensitive <=^1 mcg/mL     levofloxacin Sensitive <=^0.12 mcg/mL     tobramycin Sensitive <=^1 mcg/mL     Proteus mirabilis (4)    Antibiotic Interpretation SNEHA Status    ampicillin Sensitive <=^2 mcg/mL     ceFAZolin Sensitive <=^4 mcg/mL     cefepime Sensitive <=^0.12 mcg/mL     cefTRIAXone Sensitive <=^0.25 mcg/mL     ertapenem Sensitive <=^0.12 mcg/mL     gentamicin Sensitive <=^1 mcg/mL     levofloxacin Resistant >=^8 mcg/mL     piperacillin-tazobactam Sensitive <=^4 mcg/mL     trimethoprim-sulfamethoxazole Sensitive <=^20 mcg/mL      Condensed View   Lab and Collection  Radiology :    Chest X ray - bilateral multifocal infiltrates , cardiomegaly       Assessment:  · Respiratory failure - intubated   · Pseudomonas bacteriuria    · HCAP / Aspiration pneumonia  (Pseudomonas, Klebsiella, E coli and Proteus)   · Persistent fever        Plan:    · Meropenem 1 gram IV q 8 hrs  · Eraxis 100 mg IV q 24 hrs ; fungitell assay sent out  · Contact isolation   · Vent support       Ruben Espinoza  11:24 AM  2/1/2021

## 2021-02-01 NOTE — PROCEDURES
Arterial line placement    Procedure: Right radial arterial line placement. Indications: Continuous monitoring of blood pressure in a patient with hypotension +/- shock, on Levophed. Anesthesia: Local infiltration of 1% lidocaine. Consent: The family members were counseled regarding the procedure, its indications, risks, potential complications and alternatives, and any questions were answered. Consent was obtained to proceed. Technique: Time Out: Immediately prior to the procedure a \"timeout\" was called to verify the correct patient and procedure. Procedure was done using strict aseptic technique. Steven's test was performed and was normal. right radial site was cleaned with chloraprep and draped. Radial artery was identified, then Lidocaine 1% was infiltrated locally. Radial arterial line was inserted, a good blood flow was obtained, after which guidewire was inserted all the way with no resistance. Then the canula was inserted and needle with guidewire was withdrawn. Pulsatile bright red blood flow was observed. The canula was connected to BP monitoring apparatus and a good quality waveform was noted. Then the canula was secured with 2 stay sutures of 3-0 silk after Lidocaine infiltration, following which dressing was applied. Number of sticks: 1    Number of Kits used: 1     Complications: No immediate complication. Estimated blood loss: About 1 ml. Comment: Patient tolerated the procedure well. Guanako Vargas MD PGY-2  2/1/2021 7:23 AM      [place]  Department of Pulmonary, Critical Care and Sleep Medicine  [address] [facility]  Department of Internal Medicine  Attending Procedural Note Addendum Statement    This procedure was supervised. The critical elements of this procedure was monitored and, if needed, I was available for the needs of the procedure.      Miguelina Rivas DO, MARIBELP, Cas Alan

## 2021-02-01 NOTE — PROGRESS NOTES
Subjective: The patient is resting on cooling blanket, cont to have a fever. Events of last 24 hrs reviewed with records and staff. Objective:  ET tube in place   BP (!) 131/55   Pulse 68   Temp 101.2 °F (38.4 °C) (Bladder)   Resp 15   Ht 6' (1.829 m)   Wt 255 lb (115.7 kg)   SpO2 100%   BMI 34.58 kg/m²   HEENT no adenopathy no bruits  Heart:  RRR, no murmurs, gallops, or rubs.   Lungs:  Diminished in all fields   Abd: bowel sounds present, nontender, nondistended, no masses  Extrem:  No clubbing, cyanosis, trace edema erythema better   WBC/Hgb/Hct/Plts:  9.7/9.3/30.6/398 (02/01 9655) basic metabolic panel   Lab Results   Component Value Date     02/01/2021    K 3.4 02/01/2021    CL 89 02/01/2021    CO2 37 02/01/2021    BUN 16 02/01/2021    CREATININE 0.8 02/01/2021    GLUCOSE 238 02/01/2021    GLUCOSE 138 03/26/2011    CALCIUM 7.4 02/01/2021        Assessment:    Patient Active Problem List   Diagnosis    Severe sepsis with septic shock (HCC)    Dementia ([address]    Metabolic encephalopathy    Diabetes mellitus type 2, uncontrolled ([address]    Hyperlipidemia LDL goal <100    Essential hypertension    Venous ulcer of left leg (HCC)    Non-pressure chronic ulcer of left lower leg with fat layer exposed ([address]    Non-pressure chronic ulcer left lower leg, limited to breakdown skin (HCC)    Severe protein-calorie malnutrition (HCC)    Non-pressure chronic ulcer right lower leg, limited to breakdown skin (HCC)    Pressure injury of contiguous region involving back, buttock, and hip, stage 2 (HCC)    Pressure injury of calf, stage 2 ([address]    HCAP (healthcare-associated pneumonia)    Paroxysmal atrial fibrillation (HCC)    Acute respiratory failure with hypoxia (HCC)    Acute deep vein thrombosis (DVT) of femoral vein of left lower extremity (HCC)    Leg swelling    Respiratory failure ([address]    Palliative care encounter    DNR no code (do not resuscitate)    Goals of care, counseling/discussion       Plan:  Cont weaning  Abx per ID team  Family continues to want Aggressive therapy, but don't want a trach and PEG placed        Elizabeth Jean  7:30 AM  2/1/2021

## 2021-02-02 LAB
ALBUMIN SERPL-MCNC: 2.6 G/DL (ref 3.5–5.2)
ALP BLD-CCNC: 76 U/L (ref 40–129)
ALT SERPL-CCNC: 8 U/L (ref 0–40)
ANION GAP SERPL CALCULATED.3IONS-SCNC: 8 MMOL/L (ref 7–16)
ANISOCYTOSIS: ABNORMAL
AST SERPL-CCNC: 18 U/L (ref 0–39)
B.E.: 14.6 MMOL/L (ref -3–0)
BASOPHILIC STIPPLING: ABNORMAL
BASOPHILS ABSOLUTE: 0 E9/L (ref 0–0.2)
BASOPHILS RELATIVE PERCENT: 0 % (ref 0–2)
BILIRUB SERPL-MCNC: 0.4 MG/DL (ref 0–1.2)
BLOOD CULTURE, ROUTINE: NORMAL
BUN BLDV-MCNC: 23 MG/DL (ref 8–23)
CALCIUM SERPL-MCNC: 7.5 MG/DL (ref 8.6–10.2)
CHLORIDE BLD-SCNC: 86 MMOL/L (ref 98–107)
CO2: 39 MMOL/L (ref 22–29)
CREAT SERPL-MCNC: 0.7 MG/DL (ref 0.7–1.2)
CULTURE, BLOOD 2: NORMAL
DELIVERY SYSTEMS: ABNORMAL
DEVICE: ABNORMAL
EOSINOPHILS ABSOLUTE: 0 E9/L (ref 0.05–0.5)
EOSINOPHILS RELATIVE PERCENT: 0 % (ref 0–6)
FIO2 ARTERIAL: 35
GFR AFRICAN AMERICAN: >60
GFR NON-AFRICAN AMERICAN: >60 ML/MIN/1.73
GLUCOSE BLD-MCNC: 147 MG/DL (ref 74–99)
HCO3 ARTERIAL: 38.9 MMOL/L (ref 22–26)
HCT VFR BLD CALC: 29.9 % (ref 37–54)
HEMOGLOBIN: 9 G/DL (ref 12.5–16.5)
HYPOCHROMIA: ABNORMAL
IMMATURE GRANULOCYTES #: 0.11 E9/L
IMMATURE GRANULOCYTES %: 1.1 % (ref 0–5)
LYMPHOCYTES ABSOLUTE: 0.83 E9/L (ref 1.5–4)
LYMPHOCYTES RELATIVE PERCENT: 8.1 % (ref 20–42)
MAGNESIUM: 2.1 MG/DL (ref 1.6–2.6)
MCH RBC QN AUTO: 27.1 PG (ref 26–35)
MCHC RBC AUTO-ENTMCNC: 30.1 % (ref 32–34.5)
MCV RBC AUTO: 90.1 FL (ref 80–99.9)
METER GLUCOSE: 160 MG/DL (ref 74–99)
METER GLUCOSE: 177 MG/DL (ref 74–99)
METER GLUCOSE: 210 MG/DL (ref 74–99)
METER GLUCOSE: 215 MG/DL (ref 74–99)
METER GLUCOSE: 240 MG/DL (ref 74–99)
METER GLUCOSE: 244 MG/DL (ref 74–99)
MODE: ABNORMAL
MONOCYTES ABSOLUTE: 0.62 E9/L (ref 0.1–0.95)
MONOCYTES RELATIVE PERCENT: 6 % (ref 2–12)
NEUTROPHILS ABSOLUTE: 8.74 E9/L (ref 1.8–7.3)
NEUTROPHILS RELATIVE PERCENT: 84.8 % (ref 43–80)
O2 SATURATION: 97.9 % (ref 92–98.5)
OPERATOR ID: 1921
OVALOCYTES: ABNORMAL
PATIENT TEMP: 37
PCO2 (TEMP CORRECTED): 46.8 MMHG (ref 35–45)
PDW BLD-RTO: 20.5 FL (ref 11.5–15)
PH (TEMPERATURE CORRECTED): 7.53 (ref 7.35–7.45)
PHOSPHORUS: 3.2 MG/DL (ref 2.5–4.5)
PLATELET # BLD: 397 E9/L (ref 130–450)
PMV BLD AUTO: 9.9 FL (ref 7–12)
PO2 (TEMP CORRECTED): 92.6 MMHG (ref 60–80)
POIKILOCYTES: ABNORMAL
POLYCHROMASIA: ABNORMAL
POSITIVE END EXP PRESS: 5 CMH2O
POTASSIUM REFLEX MAGNESIUM: 3.2 MMOL/L (ref 3.5–5)
PRESSURE SUPPORT: 10 CMH2O
RBC # BLD: 3.32 E12/L (ref 3.8–5.8)
SODIUM BLD-SCNC: 133 MMOL/L (ref 132–146)
SOURCE, BLOOD GAS: ABNORMAL
TOTAL PROTEIN: 5.1 G/DL (ref 6.4–8.3)
WBC # BLD: 10.3 E9/L (ref 4.5–11.5)

## 2021-02-02 PROCEDURE — 2580000003 HC RX 258: Performed by: INTERNAL MEDICINE

## 2021-02-02 PROCEDURE — 6370000000 HC RX 637 (ALT 250 FOR IP): Performed by: INTERNAL MEDICINE

## 2021-02-02 PROCEDURE — C9113 INJ PANTOPRAZOLE SODIUM, VIA: HCPCS | Performed by: INTERNAL MEDICINE

## 2021-02-02 PROCEDURE — 37799 UNLISTED PX VASCULAR SURGERY: CPT

## 2021-02-02 PROCEDURE — 94640 AIRWAY INHALATION TREATMENT: CPT

## 2021-02-02 PROCEDURE — 2000000000 HC ICU R&B

## 2021-02-02 PROCEDURE — 2500000003 HC RX 250 WO HCPCS: Performed by: INTERNAL MEDICINE

## 2021-02-02 PROCEDURE — 85025 COMPLETE CBC W/AUTO DIFF WBC: CPT

## 2021-02-02 PROCEDURE — 6360000002 HC RX W HCPCS: Performed by: INTERNAL MEDICINE

## 2021-02-02 PROCEDURE — 36415 COLL VENOUS BLD VENIPUNCTURE: CPT

## 2021-02-02 PROCEDURE — 99231 SBSQ HOSP IP/OBS SF/LOW 25: CPT | Performed by: NURSE PRACTITIONER

## 2021-02-02 PROCEDURE — 82962 GLUCOSE BLOOD TEST: CPT

## 2021-02-02 PROCEDURE — 94003 VENT MGMT INPAT SUBQ DAY: CPT

## 2021-02-02 PROCEDURE — 83735 ASSAY OF MAGNESIUM: CPT

## 2021-02-02 PROCEDURE — 84100 ASSAY OF PHOSPHORUS: CPT

## 2021-02-02 PROCEDURE — 80053 COMPREHEN METABOLIC PANEL: CPT

## 2021-02-02 PROCEDURE — 82803 BLOOD GASES ANY COMBINATION: CPT

## 2021-02-02 RX ORDER — 0.9 % SODIUM CHLORIDE 0.9 %
2000 INTRAVENOUS SOLUTION INTRAVENOUS ONCE
Status: COMPLETED | OUTPATIENT
Start: 2021-02-02 | End: 2021-02-02

## 2021-02-02 RX ADMIN — INSULIN LISPRO 6 UNITS: 100 INJECTION, SOLUTION INTRAVENOUS; SUBCUTANEOUS at 09:13

## 2021-02-02 RX ADMIN — ASCORBIC ACID 1500 MG: 500 INJECTION INTRAVENOUS at 16:59

## 2021-02-02 RX ADMIN — MIDODRINE HYDROCHLORIDE 20 MG: 5 TABLET ORAL at 17:03

## 2021-02-02 RX ADMIN — THIAMINE HYDROCHLORIDE 200 MG: 100 INJECTION, SOLUTION INTRAMUSCULAR; INTRAVENOUS at 01:30

## 2021-02-02 RX ADMIN — CHLORHEXIDINE GLUCONATE 0.12% ORAL RINSE 15 ML: 1.2 LIQUID ORAL at 09:12

## 2021-02-02 RX ADMIN — SODIUM CHLORIDE 2000 ML: 9 INJECTION, SOLUTION INTRAVENOUS at 11:45

## 2021-02-02 RX ADMIN — Medication: at 12:42

## 2021-02-02 RX ADMIN — LATANOPROST 1 DROP: 50 SOLUTION OPHTHALMIC at 09:37

## 2021-02-02 RX ADMIN — SODIUM CHLORIDE, PRESERVATIVE FREE 10 ML: 5 INJECTION INTRAVENOUS at 09:38

## 2021-02-02 RX ADMIN — ENOXAPARIN SODIUM 40 MG: 40 INJECTION SUBCUTANEOUS at 09:08

## 2021-02-02 RX ADMIN — SODIUM CHLORIDE: 9 INJECTION, SOLUTION INTRAVENOUS at 15:11

## 2021-02-02 RX ADMIN — POTASSIUM CHLORIDE 20 MEQ: 400 INJECTION, SOLUTION INTRAVENOUS at 06:17

## 2021-02-02 RX ADMIN — HYDROCORTISONE SODIUM SUCCINATE 50 MG: 100 INJECTION, POWDER, FOR SOLUTION INTRAMUSCULAR; INTRAVENOUS at 06:16

## 2021-02-02 RX ADMIN — SODIUM CHLORIDE: 9 INJECTION, SOLUTION INTRAVENOUS at 06:21

## 2021-02-02 RX ADMIN — ACETAMINOPHEN ORAL SOLUTION 650 MG: 650 SOLUTION ORAL at 20:53

## 2021-02-02 RX ADMIN — SODIUM CHLORIDE SOLN NEBU 3% 4 ML: 3 NEBU SOLN at 21:45

## 2021-02-02 RX ADMIN — BUDESONIDE 500 MCG: 0.5 SUSPENSION RESPIRATORY (INHALATION) at 08:34

## 2021-02-02 RX ADMIN — MICONAZOLE NITRATE: 20.6 POWDER TOPICAL at 15:10

## 2021-02-02 RX ADMIN — SENNOSIDES 8.6 MG: 8.6 TABLET, FILM COATED ORAL at 21:01

## 2021-02-02 RX ADMIN — Medication: at 20:53

## 2021-02-02 RX ADMIN — SODIUM CHLORIDE, PRESERVATIVE FREE 10 ML: 5 INJECTION INTRAVENOUS at 12:42

## 2021-02-02 RX ADMIN — HYDROCORTISONE SODIUM SUCCINATE 50 MG: 100 INJECTION, POWDER, FOR SOLUTION INTRAMUSCULAR; INTRAVENOUS at 19:54

## 2021-02-02 RX ADMIN — Medication 10 ML: at 09:08

## 2021-02-02 RX ADMIN — AMIODARONE HYDROCHLORIDE 100 MG: 200 TABLET ORAL at 09:12

## 2021-02-02 RX ADMIN — SODIUM CHLORIDE SOLN NEBU 3% 4 ML: 3 NEBU SOLN at 11:24

## 2021-02-02 RX ADMIN — MEROPENEM 1000 MG: 1 INJECTION, POWDER, FOR SOLUTION INTRAVENOUS at 16:49

## 2021-02-02 RX ADMIN — THIAMINE HYDROCHLORIDE 200 MG: 100 INJECTION, SOLUTION INTRAMUSCULAR; INTRAVENOUS at 12:41

## 2021-02-02 RX ADMIN — MIDODRINE HYDROCHLORIDE 20 MG: 5 TABLET ORAL at 12:08

## 2021-02-02 RX ADMIN — LACTULOSE 20 G: 20 SOLUTION ORAL at 09:08

## 2021-02-02 RX ADMIN — ASCORBIC ACID 1500 MG: 500 INJECTION INTRAVENOUS at 23:28

## 2021-02-02 RX ADMIN — IPRATROPIUM BROMIDE AND ALBUTEROL SULFATE 1 AMPULE: .5; 3 SOLUTION RESPIRATORY (INHALATION) at 21:45

## 2021-02-02 RX ADMIN — IPRATROPIUM BROMIDE AND ALBUTEROL SULFATE 1 AMPULE: .5; 3 SOLUTION RESPIRATORY (INHALATION) at 08:33

## 2021-02-02 RX ADMIN — PANTOPRAZOLE SODIUM 40 MG: 40 INJECTION, POWDER, FOR SOLUTION INTRAVENOUS at 09:28

## 2021-02-02 RX ADMIN — ATORVASTATIN CALCIUM 20 MG: 20 TABLET, FILM COATED ORAL at 09:07

## 2021-02-02 RX ADMIN — ASCORBIC ACID 1500 MG: 500 INJECTION INTRAVENOUS at 04:14

## 2021-02-02 RX ADMIN — INSULIN LISPRO 6 UNITS: 100 INJECTION, SOLUTION INTRAVENOUS; SUBCUTANEOUS at 17:14

## 2021-02-02 RX ADMIN — IPRATROPIUM BROMIDE AND ALBUTEROL SULFATE 1 AMPULE: .5; 3 SOLUTION RESPIRATORY (INHALATION) at 11:24

## 2021-02-02 RX ADMIN — MICONAZOLE NITRATE: 20.6 POWDER TOPICAL at 20:53

## 2021-02-02 RX ADMIN — Medication: at 09:37

## 2021-02-02 RX ADMIN — DEXTROSE MONOHYDRATE 100 MG: 50 INJECTION, SOLUTION INTRAVENOUS at 17:03

## 2021-02-02 RX ADMIN — INSULIN LISPRO 3 UNITS: 100 INJECTION, SOLUTION INTRAVENOUS; SUBCUTANEOUS at 21:21

## 2021-02-02 RX ADMIN — MIDODRINE HYDROCHLORIDE 20 MG: 5 TABLET ORAL at 09:07

## 2021-02-02 RX ADMIN — HYDROCORTISONE SODIUM SUCCINATE 50 MG: 100 INJECTION, POWDER, FOR SOLUTION INTRAMUSCULAR; INTRAVENOUS at 12:41

## 2021-02-02 RX ADMIN — HYDROCORTISONE SODIUM SUCCINATE 50 MG: 100 INJECTION, POWDER, FOR SOLUTION INTRAMUSCULAR; INTRAVENOUS at 01:30

## 2021-02-02 RX ADMIN — INSULIN LISPRO 6 UNITS: 100 INJECTION, SOLUTION INTRAVENOUS; SUBCUTANEOUS at 12:41

## 2021-02-02 RX ADMIN — MEROPENEM 1000 MG: 1 INJECTION, POWDER, FOR SOLUTION INTRAVENOUS at 01:29

## 2021-02-02 RX ADMIN — MEROPENEM 1000 MG: 1 INJECTION, POWDER, FOR SOLUTION INTRAVENOUS at 09:38

## 2021-02-02 RX ADMIN — INSULIN GLARGINE 28 UNITS: 100 INJECTION, SOLUTION SUBCUTANEOUS at 21:20

## 2021-02-02 RX ADMIN — SENNOSIDES 8.6 MG: 8.6 TABLET, FILM COATED ORAL at 09:07

## 2021-02-02 RX ADMIN — POTASSIUM BICARBONATE 40 MEQ: 782 TABLET, EFFERVESCENT ORAL at 09:08

## 2021-02-02 RX ADMIN — INSULIN LISPRO 6 UNITS: 100 INJECTION, SOLUTION INTRAVENOUS; SUBCUTANEOUS at 01:53

## 2021-02-02 RX ADMIN — ASCORBIC ACID 1500 MG: 500 INJECTION INTRAVENOUS at 10:44

## 2021-02-02 RX ADMIN — POTASSIUM CHLORIDE 20 MEQ: 400 INJECTION, SOLUTION INTRAVENOUS at 09:40

## 2021-02-02 RX ADMIN — IPRATROPIUM BROMIDE AND ALBUTEROL SULFATE 1 AMPULE: .5; 3 SOLUTION RESPIRATORY (INHALATION) at 17:08

## 2021-02-02 RX ADMIN — INSULIN LISPRO 3 UNITS: 100 INJECTION, SOLUTION INTRAVENOUS; SUBCUTANEOUS at 05:45

## 2021-02-02 RX ADMIN — BUDESONIDE 500 MCG: 0.5 SUSPENSION RESPIRATORY (INHALATION) at 21:45

## 2021-02-02 RX ADMIN — CHLORHEXIDINE GLUCONATE 0.12% ORAL RINSE 15 ML: 1.2 LIQUID ORAL at 20:52

## 2021-02-02 ASSESSMENT — PULMONARY FUNCTION TESTS
PIF_VALUE: 16
PIF_VALUE: 30
PIF_VALUE: 16
PIF_VALUE: 16
PIF_VALUE: 21
PIF_VALUE: 15
PIF_VALUE: 25
PIF_VALUE: 15

## 2021-02-02 ASSESSMENT — PAIN SCALES - GENERAL
PAINLEVEL_OUTOF10: 0

## 2021-02-02 NOTE — PROGRESS NOTES
Comprehensive Nutrition Assessment    Type and Reason for Visit:  Reassess, Consult    Nutrition Recommendations/Plan: Continue NPO, Continue Current Tube Feeding  Regimen meets ~91% est calorie & 100% est protein needs    Nutrition Assessment:  Pt status remains unchanged w/ ongoing intubation noted septic shock/aspiration PNA. Noted SDTI. Noted hx COPD/baseline dementia. Continue current TF. Malnutrition Assessment:  Malnutrition Status:  Insufficient data    Context:  Acute Illness     Findings of the 6 clinical characteristics of malnutrition:  Energy Intake:  (greater than 75% w/ TF)  Weight Loss:  Unable to assess     Body Fat Loss:  Unable to assess   Muscle Mass Loss:  Unable to assess  Fluid Accumulation:  No significant fluid accumulation   Strength:  Not Performed    Estimated Daily Nutrient Needs:  Energy (kcal):  ; 3857-8969; Weight Used for Energy Requirements:  Current     Protein (g):  120-145(1.5-1.8 g/kg);  Weight Used for Protein Requirements:  Ideal        Fluid (ml/day):  per critical care    Nutrition Related Findings:  Pt intubated, hypotension on pressor, +I/O's, +2 edema, active BS, J-tube w/ TF      Wounds:  Multiple, Deep Tissue Injury, Skin Tears       Current Nutrition Therapies:    Current Tube Feeding (TF) Orders:  · Feeding Route: Jejunostomy  · Formula: Semi-Elemental  · Schedule: Continuous(60 ml/hr= 1440 ml tv)  · Additives/Modulars: Protein(daily=26 gm)  · Water Flushes: 30 ml q 6 hr= 120 ml water  · Current TF & Flush Orders Provides: 1728 kcals, 108 gm pro, (1828 kcals & 134 gm pro w/ mod), 1168 ml free water  · Goal TF & Flush Orders Provides: appears at goal per doc flow    Anthropometric Measures:  · Height: 6' (182.9 cm)  · Current Body Weight: 254 lb (115.2 kg)(2/2 actual)   · Admission Body Weight: 255 lb (115.7 kg)(no method)    · Usual Body Weight: 259 lb (117.5 kg)(12/13/20, [facility])     · Ideal Body Weight: 178 lbs; % Ideal Body Weight 143.3 % · BMI: 34.4 BMI Categories: Obese Class 1 (BMI 30.0-34. 9)       Nutrition Diagnosis:   · Inadequate oral intake related to impaired respiratory function as evidenced by NPO or clear liquid status due to medical condition, intubation, nutrition support - enteral nutrition    Nutrition Interventions:   Nutrition Education/Counseling:  Education not indicated   Coordination of Nutrition Care:  Continue to monitor while inpatient    Goals:  Pt to tolerate TF at goal rate       Nutrition Monitoring and Evaluation:   Food/Nutrient Intake Outcomes:  Enteral Nutrition Intake/Tolerance  Physical Signs/Symptoms Outcomes:  Biochemical Data, Nutrition Focused Physical Findings, Skin, Weight, GI Status, Fluid Status or Edema, Hemodynamic Status     Discharge Planning:     Too soon to determine     Electronically signed by Paxton Waldrop RD, LD on 2/2/21 at 1:41 PM EST    Contact: Ext [phone]

## 2021-02-02 NOTE — PROGRESS NOTES
[address] [place] [address] [facility]  Progress  Note   C/C : pneumonia, resp failure, fever      Pt is intubated, sedated   Afebrile today           Current Facility-Administered Medications   Medication Dose Route Frequency Provider Last Rate Last Admin    potassium bicarb-citric acid (EFFER-K) effervescent tablet 40 mEq  40 mEq Per G Tube Daily Gwenith Cutter, DO   40 mEq at 02/02/21 0908    insulin glargine (LANTUS) injection vial 28 Units  28 Units Subcutaneous Nightly Rere López MD   28 Units at 02/01/21 2232    hydrocortisone sodium succinate PF (SOLU-CORTEF) injection 50 mg  50 mg Intravenous Q6H [facility], DO   50 mg at 02/02/21 8168    thiamine (B-1) 200 mg in sodium chloride 0.9 % 100 mL IVPB  200 mg Intravenous Q12H [facility] Cutter, DO   Stopped at 02/02/21 0234    ascorbic acid 1,500 mg in sodium chloride 0.9 % 100 mL IVPB  1,500 mg Intravenous Q6H [facility], DO   1,500 mg at 02/02/21 1044    anidulafungin (ERAXIS) 100 mg in dextrose 5 % 130 mL IVPB  100 mg Intravenous Q24H Marion Cummins MD   Stopped at 02/01/21 1947    midodrine (PROAMATINE) tablet 20 mg  20 mg Oral TID WC [facility] Issac, DO   20 mg at 02/02/21 0907    dexmedetomidine (PRECEDEX) 400 mcg in sodium chloride 0.9 % 100 mL infusion  0.2-1.4 mcg/kg/hr Intravenous Continuous [facility] Issac, DO 2.9 mL/hr at 02/02/21 0943 0.1 mcg/kg/hr at 02/02/21 0943    ipratropium-albuterol (DUONEB) nebulizer solution 1 ampule  1 ampule Inhalation 4x daily [facility] Issac, DO   1 ampule at 02/02/21 6901    insulin lispro (HUMALOG) injection vial 0-18 Units  0-18 Units Subcutaneous Q4H enith Cutter, DO   6 Units at 02/02/21 0913    Mineral Oil-Hydrophil Petrolat OINT   Topical BID enith Cutter, DO   Given at 02/02/21 9336    And    Mineral Oil-Hydrophil Petrolat OINT   Topical 4x Daily PRN Ludy Zimmerman DO   Given at 01/26/21 1846    lidocaine PF 1 % injection 5 mL  5 mL Intradermal Once Gwenith Cutter, DO        sodium chloride flush 0.9 % injection 10 mL  10 mL Intravenous PRN Gwenith Cutter, DO        heparin flush 100 UNIT/ML injection 300 Units  3 mL Intravenous 2 times per day Gwenith Cutter, DO   Stopped at 01/30/21 0911    heparin flush 100 UNIT/ML injection 300 Units  3 mL Intracatheter PRN Gwenith Cutter, DO        chlorhexidine (PERIDEX) 0.12 % solution 15 mL  15 mL Mouth/Throat BID Emmanuelle Harp Issac, DO   15 mL at 02/02/21 0912    potassium chloride 20 mEq/50 mL IVPB (Central Line)  20 mEq Intravenous PRN Gwenith Cutter, DO 50 mL/hr at 02/02/21 0940 20 mEq at 02/02/21 0940    acetaminophen (TYLENOL) 160 MG/5ML solution 650 mg  650 mg Per G Tube Q4H PRN Michelle Pompa MD   650 mg at 02/01/21 0806    amiodarone (CORDARONE) tablet 100 mg  100 mg PEG Tube Daily Michelle Pompa MD   100 mg at 02/02/21 0912    atorvastatin (LIPITOR) tablet 20 mg  20 mg PEG Tube Daily Michelle Pompa MD   20 mg at 02/02/21 0907    docusate sodium (ENEMEEZ) enema 283 mg  1 enema Rectal PRN Michelle Pompa MD        glucose (GLUTOSE) 40 % oral gel 15 g  15 g Oral PRN Michelle Pompa MD        dextrose 50 % IV solution  12.5 g Intravenous PRN Michelle Pompa MD        glucagon (rDNA) injection 1 mg  1 mg Intramuscular PRN Michelle Pompa MD        dextrose 5 % solution  100 mL/hr Intravenous PRN Michelle Pompa MD        lactulose (CHRONULAC) 10 GM/15ML solution 20 g  20 g PEG Tube Daily Felix Hughes MD   20 g at 02/02/21 0908    latanoprost (XALATAN) 0.005 % ophthalmic solution 1 drop  1 drop Both Eyes Daily Michelle Pompa MD   1 drop at 02/02/21 0937    pantoprazole (PROTONIX) injection 40 mg  40 mg Intravenous Daily Michelle Pompa MD   40 mg at 02/02/21 7130    And    sodium chloride (PF) 0.9 % injection 10 mL  10 mL Intravenous Daily Michelle Pompa MD   10 mL at 02/02/21 0938    senna (SENOKOT) tablet 8.6 mg  1 tablet PEG Tube BID Michelle Pompa MD HEENT:Pallor +, no LN , ET tube,   Neck: Supple to movements. No lymphadenopathy.    Chest: Bilateral coarse  rhonchi   Cardiovascular: Regular, no murmur   Abdomen:soft, bowel sound +  PEG - no drainage or erythema   Extremities: ++ edema, erythema legs , not warm   Lines:  Right brachial PICC ( 1/25)     CBC with Differential:      Lab Results   Component Value Date    WBC 10.3 02/02/2021    RBC 3.32 02/02/2021    HGB 9.0 02/02/2021    HCT 29.9 02/02/2021     02/02/2021    MCV 90.1 02/02/2021    MCH 27.1 02/02/2021    MCHC 30.1 02/02/2021    RDW 20.5 02/02/2021    NRBC 0.9 01/08/2021    SEGSPCT 64 09/27/2013    METASPCT 6.0 01/06/2021    LYMPHOPCT 8.1 02/02/2021    PROMYELOPCT 0.9 12/21/2020    MONOPCT 6.0 02/02/2021    MYELOPCT 3.0 01/06/2021    BASOPCT 0.0 02/02/2021    MONOSABS 0.62 02/02/2021    LYMPHSABS 0.83 02/02/2021    EOSABS 0.00 02/02/2021    BASOSABS 0.00 02/02/2021       CMP:    Lab Results   Component Value Date     02/02/2021    K 3.2 02/02/2021    CL 86 02/02/2021    CO2 39 02/02/2021    BUN 23 02/02/2021    CREATININE 0.7 02/02/2021    GFRAA >60 02/02/2021    LABGLOM >60 02/02/2021    GLUCOSE 147 02/02/2021    GLUCOSE 138 03/26/2011    PROT 5.1 02/02/2021    LABALBU 2.6 02/02/2021    LABALBU 4.0 03/26/2011    CALCIUM 7.5 02/02/2021    BILITOT 0.4 02/02/2021    ALKPHOS 76 02/02/2021    AST 18 02/02/2021    ALT 8 02/02/2021       Hepatic Function Panel:    Lab Results   Component Value Date    ALKPHOS 76 02/02/2021    ALT 8 02/02/2021    AST 18 02/02/2021    PROT 5.1 02/02/2021    BILITOT 0.4 02/02/2021    BILIDIR 0.4 01/14/2021    IBILI 0.5 01/14/2021    LABALBU 2.6 02/02/2021    LABALBU 4.0 03/26/2011         Microbiology :    Blood culture - neg to date   Urine Culture -     Susceptibility    Pseudomonas aeruginosa (1)    Antibiotic Interpretation SNEHA Status    gentamicin Sensitive <=^1 mcg/mL     levofloxacin Sensitive <=^0.12 mcg/mL     tobramycin Sensitive <=^1 mcg/mL     Lab and Collection        Sputum Culture-      Susceptibility    Pseudomonas aeruginosa (1)    Antibiotic Interpretation SNEHA Status    gentamicin Sensitive <=^1 mcg/mL     levofloxacin Sensitive <=^0.12 mcg/mL     tobramycin Sensitive <=^1 mcg/mL     Proteus mirabilis (4)    Antibiotic Interpretation SNEHA Status    ampicillin Sensitive <=^2 mcg/mL     ceFAZolin Sensitive <=^4 mcg/mL     cefepime Sensitive <=^0.12 mcg/mL     cefTRIAXone Sensitive <=^0.25 mcg/mL     ertapenem Sensitive <=^0.12 mcg/mL     gentamicin Sensitive <=^1 mcg/mL     levofloxacin Resistant >=^8 mcg/mL     piperacillin-tazobactam Sensitive <=^4 mcg/mL     trimethoprim-sulfamethoxazole Sensitive <=^20 mcg/mL      Condensed View   Lab and Collection  Radiology :    Chest X ray - bilateral multifocal infiltrates , cardiomegaly     Ultrasound - neg for DVT       Assessment:  · Respiratory failure - intubated   · Pseudomonas bacteriuria    · HCAP / Aspiration pneumonia  (Pseudomonas, Klebsiella, E coli and Proteus)   · Persistent fever -afebrile today        Plan:    · Meropenem 1 gram IV q 8 hrs  · Eraxis 100 mg IV q 24 hrs ; fungitell assay sent out  · Contact isolation   · Vent support       Ruben Espinoza  10:57 AM  2/2/2021

## 2021-02-02 NOTE — PROGRESS NOTES
Critical Care Team - Daily Progress Note         Date and time: 2/2/2021 9:02 AM  Patient's name:  Arabella Lepe  Medical Record Number: [id number]  Patient's account/billing number: [de-identified]  Patient's YOB: 1940  Age: 78 y.o. Date of Admission: 1/22/2021  8:35 PM  Length of stay during current admission: 11    Primary Care Physician: No primary care provider on file. ICU Attending Physician: Dr. Devyn Rizo Status: DNR-CCA  ICU Attending Physician: Dr. Juan Gallego     Code Status: DNR-CCA     Reason for ICU admission: Septic shock  SUBJECTIVE:       on ventilator and on pressors  OVERNIGHT EVENTS:      Uneventful     CURRENT VENTILATION STATUS:   [x]? Ventilator   []? BIPAP         []? Nasal Cannula       []? Room Air    IF INTUBATED, ET TUBE MARKING AT LOWER LIP:       cms  SECRETIONS Amount:         [x]? Small          []? Moderate                []? Large  [x]? None                       Color:              []? White          []? Colored                   []? Bloody  SEDATION:    RAAS Score: On Precedex  []? Propofol gtt             []? Versed gtt               []? Ativan gtt                []? No Sedation  PARALYZED:             [x]? No                           []? Yes  VASOPRESSORS:    []? No                           [x]? Yes    If yes - [x]? Levophed       []? Dopamine          []? Vasopressin       []? Dobutamine  []? Phenylephrine         []? Epinephrine  CENTRAL LINES:     []? No               []? Yes   (Date of Insertion:   )           If yes -     []? Right IJ             []? Left IJ         []? Right Femoral        []? Left Femoral                   []? Right Subclavian           []? Left Subclavian                  [x]? PICC Line right arm  GREEN'S CATHETER:   []? No                      [x]? Yes  (Date of Insertion:   )   URINE OUTPUT:            []?  Good                  []? Low              []? Anuric       OBJECTIVE:     VITAL SIGNS:  BP (!) 119/55   Pulse 64   Temp 98.4 Units Subcutaneous Nightly    hydrocortisone sodium succinate PF  50 mg Intravenous Q6H    thiamine (VITAMIN B1) IVPB  200 mg Intravenous Q12H    ascorbic acid  1,500 mg Intravenous Q6H    anidulafungin  100 mg Intravenous Q24H    midodrine  20 mg Oral TID WC    ipratropium-albuterol  1 ampule Inhalation 4x daily    insulin lispro  0-18 Units Subcutaneous Q4H    Mineral Oil-Hydrophil Petrolat   Topical BID    lidocaine PF  5 mL Intradermal Once    heparin flush  3 mL Intravenous 2 times per day    chlorhexidine  15 mL Mouth/Throat BID    amiodarone  100 mg PEG Tube Daily    atorvastatin  20 mg PEG Tube Daily    lactulose  20 g PEG Tube Daily    latanoprost  1 drop Both Eyes Daily    pantoprazole  40 mg Intravenous Daily    And    sodium chloride (PF)  10 mL Intravenous Daily    senna  1 tablet PEG Tube BID    sodium chloride (Inhalant)  4 mL Nebulization BID    sodium chloride flush  10 mL Intravenous 2 times per day    enoxaparin  40 mg Subcutaneous Daily    meropenem  1,000 mg Intravenous Q8H    budesonide  500 mcg Nebulization BID     Continuous Infusions:   dexmedetomidine (PRECEDEX) IV infusion 0.2 mcg/kg/hr (02/01/21 1708)    dextrose      sodium chloride 12.5 mL/hr at 02/02/21 0621    norepinephrine 4 mcg/min (02/02/21 0130)     PRN Meds:       Mineral Oil-Hydrophil Petrolat, , 4x Daily PRN      sodium chloride flush, 10 mL, PRN      heparin flush, 3 mL, PRN      potassium chloride, 20 mEq, PRN      acetaminophen, 650 mg, Q4H PRN      docusate sodium, 1 enema, PRN      glucose, 15 g, PRN      dextrose, 12.5 g, PRN      glucagon (rDNA), 1 mg, PRN      dextrose, 100 mL/hr, PRN      sodium chloride flush, 10 mL, PRN      promethazine, 12.5 mg, Q6H PRN    Or      ondansetron, 4 mg, Q6H PRN      polyethylene glycol, 17 g, Daily PRN      acetaminophen, 650 mg, Q6H PRN    Or      acetaminophen, 650 mg, Q6H PRN      fentanNYL, 50 mcg, Q1H PRN          VENT SETTINGS (Comprehensive) (if applicable):  Vent Information  $Ventilation: $Subsequent Day  Skin Assessment: Clean, dry, & intact  Equipment ID: [id number]  Equipment Changed: Humidification  Vent Type: [phone]  Vent Mode: PS  Vt Ordered: 0 mL  Rate Set: 0 bmp  Peak Flow: 0 L/min  Pressure Support: 10 cmH20  FiO2 : 35 %  SpO2: 99 %  SpO2/FiO2 ratio: 282.86  Sensitivity: 0  PEEP/CPAP: 5  I Time/ I Time %: 0 s  Humidification Source: Heated wire  Humidification Temp: 37  Humidification Temp Measured: 37.5  Circuit Condensation: Drained  Additional Respiratory  Assessments  Pulse: 64  Resp: 21  SpO2: 99 %  Position: Semi-Akhtar's  Humidification Source: Heated wire  Humidification Temp: 37  Circuit Condensation: Drained  Oral Care: Mouth suctioned, Mouth swabbed  Subglottic Suction Done?: Yes  Airway Type: ET  Airway Size: 8  Cuff Pressure (cm H2O): 29 cm H2O  ABGs:   Recent Labs     02/01/21 0423 02/02/21 0434   PH 7.559*  --    PCO2 39.9  --    PO2 94.9  --    HCO3 34.8*  --    BE 11.6* 14.6*   O2SAT 97.6 97.9       Laboratory findings:  Complete Blood Count:   Recent Labs     01/31/21  0404 02/01/21 0411 02/02/21  0400   WBC 13.2* 9.7 10.3   HGB 9.6* 9.3* 9.0*   HCT 31.9* 30.6* 29.9*   * 398 397        Last 3 Blood Glucose:   Recent Labs     01/31/21  0404 02/01/21  0411 02/02/21  0400   GLUCOSE 80 238* 147*        PT/INR:    Lab Results   Component Value Date    PROTIME 14.1 01/15/2021    INR 1.2 01/15/2021     PTT:    Lab Results   Component Value Date    APTT 21.0 01/06/2021       Comprehensive Metabolic Profile:   Recent Labs     01/31/21  0404 02/01/21 0411 02/02/21  0400    136 133   K 4.0 3.4* 3.2*   CL 88* 89* 86*   CO2 37* 37* 39*   BUN 15 16 23   CREATININE 0.7 0.8 0.7   GLUCOSE 80 238* 147*   CALCIUM 7.7* 7.4* 7.5*   PROT 5.1* 5.1* 5.1*   LABALBU 2.8* 2.8* 2.6*   BILITOT 0.5 0.5 0.4   ALKPHOS 87 80 76   AST 21 21 18   ALT 7 8 8      Magnesium:   Lab Results   Component Value Date    MG 2.1 02/02/2021 Phosphorus:   Lab Results   Component Value Date    PHOS 3.2 02/02/2021     Ionized Calcium:   Lab Results   Component Value Date    CAION 1.06 01/18/2021        Urinalysis:     Troponin: No results for input(s): TROPONINI in the last 72 hours. Microbiology:    Cultures during this admission:       Blood cultures:                  [] None drawn      [] Negative     []  Positive     [] Pending   Urine Culture:                    [] None drawn      [] Negative     []  Positive     [] Pending  Sputum Culture:                [] None drawn      [] Negative     []  Positive     [] Pending  Endotracheal aspirate:      [] None drawn      [] Negative     []  Positive     [] Pending  Stool:    [] None Sent        [] Negative     []  Positive     [] Pending   Other Micro:   [] None Sent        [] Negative     []  Positive     [] Pending     Other pertinent Labs:       Radiology/Imaging:       ASSESSMENT:     Active Problems:    Res[facility] failure ([facility]    Palliative care encounter    DNR no code (do not resuscitate)    Goals of care, counseling/discussion    Sepsis due to Pseudomonas species with acute hypercapnic respiratory failure and septic shock ([facility]  Resolved Problems:    * No resolved hospital problems. *    78-year-old man 100-pack-year ex-smoker with dementia HTN, HLD, DM, BPH,     12/13/2020-12/23/2020 septic shock from healthcare associated pneumonia with MRSA ESBL Klebsiella requiring pressors extubated 12/15  Patient mucous plugging intubated 12/28 extubated 1/8.  12/23/2020 -1/20/2021  patient at [facility] improved went to SNF. Patient had PEG tube changed to [address] tube 1/15. Patient received 2 units of blood 1/5. IVC filter was placed 1/6 due to high risk for bleeding on anticoagulation  1/22/2021 had emesis while on BIPAP with unresponsiveness at nursing home. Pt was intubated in ER.   T-max 102COVID negative  CT chest with b/l consolidations groundglass densities moderate effusion  1/31 T-max 102.4  2/1 T-max 101.1, on pressure support mode 40% FiO2 on Levophed        1/22 CT head negative  1/22 CT chest multifocal infiltrates bilaterally groundglass densities. Bilateral effusions. Pulmonary edema  1/27 no change of multifocal infiltrate  1/29 stable infiltrates right lung  2/1 multifocal airspace disease. Right upper extremity ultrasound no DVT     1. Septic shock vitamin C, Solu-Cortef every 6 50 mg and on Levophed  2. Acute respiratory failure on mechanical ventilation  3. Aspiration pneumonia MRSA ESBL Klebsiella on meropenem anidulafungin   4. Echo EF 55 to 60% on 12/6/2020, mild aortic stenosis  5. Anemia stable  6. Dysphagia PEG tube changed to PEJ tube 1/15/2021  7. 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 History of A. fib on amiodarone  9. Left CFV SF V DVT 1/3/2021 IVC filter placed 1/6  10. Baseline dementia nonverbal  11. Pulmonary sees Dr. Nafisa Delgado. 12. Probable COPD on aerosols  13. DNR CCA family does not want to have a trach so no reintubation if patient were to extubate     PLAN:   Still attempting to wean off pressors   Patient did have response with IV fluids we will continue IV fluids for today   Much more awake and alert today       WEAN PER PROTOCOL:                  [x]? No               []? Yes             []? N/A  DISCONTINUE ANY LABS:              [x]? No               []? Yes  ICU PROPHYLAXIS:  Stress ulcer:  [x]? PPI Agent               []? E0Eklpt      []? Sucralfate               []? Other:  VTE:                [x]? Enoxaparin             []? Unfract. Heparin Subcut                []? EPC Cuffs  NUTRITION:  []? NPO            [x]? Tube Feeding (Specify: )   []? TPN                        []? PO (Diet: DIET TUBE FEED CONTINUOUS/CYCLIC NPO; Semi-elemental; Orogastric; Continuous; 10; 60; 24  Diet Tube Feed Modular: Protein Modular)  HOME MEDICATIONS RECONCILED:        [x]?  No                           []? Yes  INSULIN DRIP: [x]? No                           []? Yes  CONSULTATION NEEDED:                          [x]? No                           []? Yes  FAMILY UPDATED:                                       [x]? No                           []? Yes  TRANSFER OUT OF ICU:                             [x]? No                           []? Yes        Louisa Olson M.D. Rafat WILDER                     I personally saw, examined and provided care for the patient. Radiographs, labs and medication list were reviewed by me independently. I spoke with bedside nursing, therapists and consultants.  Critical care services and times documented are independent of procedures     2/2/2021, 9:02 AM  > 30 min

## 2021-02-02 NOTE — PROGRESS NOTES
Subjective: The patient is resting in nad. He is having periods of apnea when on PSV mode. Events of last 24 hrs reviewed. Objective:  Pt is intubated resting   BP (!) 119/55   Pulse 65   Temp 98.4 °F (36.9 °C) (Bladder)   Resp 21   Ht 6' (1.829 m)   Wt 254 lb 12.8 oz (115.6 kg)   SpO2 99%   BMI 34.56 kg/m²   HEENT no adenopathy no bruits  ET tube in place   Heart:  RRR, no murmurs, gallops, or rubs.   Lungs:  Diminished in all fields   Abd: bowel sounds present, nontender, nondistended, no masses  Extrem:  No clubbing, cyanosis, or edema  WBC/Hgb/Hct/Plts:  10.3/9.0/29.9/397 (02/02 5126) basic metabolic panel     Assessment:    Patient Active Problem List   Diagnosis    Severe sepsis with septic shock ([address]    Dementia ([address]    Metabolic encephalopathy    Diabetes mellitus type 2, uncontrolled ([address]    Hyperlipidemia LDL goal <100    Essential hypertension    Venous ulcer of left leg (HCC)    Non-pressure chronic ulcer of left lower leg with fat layer exposed (HCC)    Non-pressure chronic ulcer left lower leg, limited to breakdown skin (HCC)    Severe protein-calorie malnutrition (HCC)    Non-pressure chronic ulcer right lower leg, limited to breakdown skin (HCC)    Pressure injury of contiguous region involving back, buttock, and hip, stage 2 (HCC)    Pressure injury of calf, stage 2 ([address]    HCAP (healthcare-associated pneumonia)    Paroxysmal atrial fibrillation (HCC)    Acute respiratory failure with hypoxia (HCC)    Acute deep vein thrombosis (DVT) of femoral vein of left lower extremity (HCC)    Leg swelling    Respiratory failure (HCC)    Palliative care encounter    DNR no code (do not resuscitate)    Goals of care, counseling/discussion    Sepsis due to Pseudomonas species with acute hypercapnic respiratory failure and septic shock ([address]       Plan:  Critical care  Family wants to continue to be aggressive, but do not want PEG and Trach placed   With the periods of Apnea in weaning mode, poor prognosis moving forward         Buck Crocker  7:21 AM  2/2/2021

## 2021-02-02 NOTE — PROGRESS NOTES
Stage  CI HR MAP TPRI SVI   Baseline 3.0 91 78 2114 33   Challenge 3.3 95 75 1798 39   Result (%Ä) 13.1% 5.0% -3.8% -14.9% 19.2%       Fluid bolus challenge completed and Dr. Juan Gallego notified of results. Fluid bolus ordered.      Josef Louie RN  11:28 AM 02/02/21

## 2021-02-02 NOTE — PROGRESS NOTES
Palliative Care Department  [phone]  Palliative Care Progress Note  Provider Mariano MCGOWAN    Schuyler Mercer  [id number]  Hospital Day: 12  Date of Initial Consult: 1/25/2021  Referring Provider: Eliseo Suggs. Mehreen Coulter DO  Palliative Medicine was consulted for assistance with: Goals of care, CODE STATUS discussion, and family support    HPI:   Schuyler Mercer is a 78 y.o. with a past medical history of DM, HTN, enlarged prostate, anxiety, bullous pemphigoid, HLD, rheumatoid disease, proximal atrial fibrillation, dementia, blood clots, DVT of LLE who was admitted on 1/22/2021 from [facility] with a CHIEF COMPLAINT of emesis while on BiPAP. ASSESSMENT/PLAN:   Current medical issues leading to Palliative Medicine involvement include   Active Hospital Problems    Diagnosis Date Noted    Sepsis due to Pseudomonas species with acute hypercapnic respiratory failure and septic shock ([facility]) [A41.52, R65.21, J96.02]     Palliative care encounter [Z51.5]     DNR no code (do not resuscitate) Jaziel Watkins     Goals of care, counseling/discussion [Z71.89]     Respiratory failure ([facility] [J96.90] 01/22/2021     Pertinent Hospital Diagnoses    Acute respiratory failure-intubated, MICU   HCAP/aspiration pneumonia-meropenem, linezolid, contact isolation      Palliative Care Encounter / Counseling Regarding Goals of Care  Please see detailed goals of care discussion as below   At this time, Schuyler Mercer, Does Not have capacity for medical decision-making. Capacity is time limited and situation/question specific   During encounter Madalyn Díaz was surrogate medical decision-maker   Outcome of goals of care meeting: Continue current care, no tracheostomy/PEG, family to discuss potential wish regarding reintubation prior to hopeful extubation as vent weaning continues.    Code status DNR-CCA   Advanced Directives: no POA or living will in [facility]   Surrogate/Legal NOK:  Kalin Hernandez [phone] primary contact daughter  erika Scott. [phone] son     Spiritual assessment: no spiritual distress identified   Bereavement and grief: to be determined  Referrals to: none today  SUBJECTIVE:   Details of Conversation:   2/2/2021:  Patient seen in the ICU today, no family at the bedside. He continues to be vented no PSV and he appears to be tolerating this fairly well. The plan of care at this time is to continue full supportive care with a plan to liberate from the vent when he is able. He continues as a DNR-CCA and the family has stated they would not pursue tracheostomy if he was unable to be liberated from the vent. I did speak with the daughter by phone and discussed goals and potential needs following extubation. She states that she would like to discuss a plan with her family prior to making any decisions at this time. I did discuss with her that if he failed an extubation attempt the likelihood of requiring a trach would likely be higher and she and her family may want to consider a plan not to reintubate in the event of further respiratory decline following extubation. She state she understands this and will discuss further with her family. 1/29/2021 Returned call to patient's son Petar Comer. He was able to see his father last evening. Stated that he was able to communicate with him and that he was doing somewhat better. Patient's HR is now below 100, and his temperature is 99 F. Patient's brother is going to visit today. Petar Comer stated that family all need to be able toe to [facility] at this ti see the patient, and since visiting is limited it is hard. Family would like to continume and see if the patient is able to be weaned off of the ventilator. Petar Comer said that he asked his dad if he wanted to get out of hospital and he said nodded yes. No long term ventilator, but still want to try and wean. NO change to plan of care. 1/28/2021 Patient's chart reviewed and reviewed with Staff RN.  Patient still on Levophed. Patient remains intubated, weaning trial. Fever of 101.3 at 0000. Infectious disease managing antibiotics. Will follow along closely for support of patient and family. 1430- Call placed to daughter Astrid Henderson to let her know how her father is doing a this time and see if she would like to make any changes to her father's plan of care. Stated that she was here yesterday and that he was responding to her, and that she was a little hopeful. Asked if I could call her brother and update him on the condition of her father. Brother Rupinder Thomas is going to come in this evening between 4:30- 5:00 to see his father. If he agrees that the patient is doing poorly, it will be ok with Astrid Henderson to make changes to plan of care. Tim Rowley, he is coming in this evening after 5 pm.  Explained his father's current health status. Son asked what the options were with removing ventilator if needed. Plans will remain the same today. Family to decide if they want to change anything after son visits this evening. 1/27/2021 Patient's chart reviewed. According to [facility], Levophed was increased overnight to keep MAP>65. Levophed is slowly being wean down today. Patient remains intubated, weaning trial.  Fever of 101.7 F at 1600 today. Reviewed patient with Staff RN. Will follow along closely for support of patient and family. 1/26/2021: Labs and chart reviewed. Reviewed the patient with Staff RN. Per RN, the patient is going to be tested for COVID-19. Infectious disease consulted. Patient remains on the ventilator, on Levophed. Did not personally assess the patient to preserve PPE for Physicians and nursing staff. No change in the plan of care. Per , plan remains the patient will return to [facility] upon discharge. Palliative Care following for goals of care. 1/25/2021 Patient recently discharged from hospital on 12/23/2020. Patient with reported emesis with BiPAP in place at [facility].   Brought to facility by EMS. Currently in the MICU on ventilator at this time, on Levophed. Patient with declining mentation, family stated no trach if it comes to that point and not following commands. Palliatve Care was consulted to help with CODE STATUS discussion, family support and goals of care. Call placed to [facility]. 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 Contact information for Palliative Care given. Patient was discharged on 12/23/2020 and went to [facility]; per daughter: arrested, down for 9 minutes, ended up on vent. Last Wednesday discharged back to [facility], Thursday placed on BiPAP, Friday while on BiPAP had emesis that went down into the patient's lung. Patient's daughter stated that they had paperwork for a DNR-CCA, but it was not completed and signed before the patient needed to come back into the hospital.  Changed the patient to a DNR CCA at this time with the daughter's permission. Daughter [facility] stated that she will call her brother to inform him. Explained to [facility] that with a DNR-CCA that her father may be reintubated after extubation. Goal-for her father to be comfortable. OBJECTIVE:   Prognosis: Guarded     Physical Exam: Per MICU RN  BP (!) 88/51   Pulse 82   Temp 98.6 °F (37 °C) (Bladder)   Resp 16   Ht 6' (1.829 m)   Wt 254 lb 12.8 oz (115.6 kg)   SpO2 99%   BMI 34.56 kg/m²      Full exam deferred to preserve PPE, please refer to attending/ICU/nursing notes for full physical exam finding.     Gen: ETT/vent elderly, NAD  HEENT:  Normocephalic, atraumatic  Neck:  Supple, trachea midline, no JVD  Lungs:  ventilated  Heart:  RRR     Objective data reviewed: labs, images, records, medication use, vitals and chart    Discussed patient and the plan of care with the other IDT members: Palliative Medicine IDT Team, Primary Team, Floor Nurse, Patient and Family    Time/Communication  Greater than 50% of time spent, total 15 minutes in counseling and coordination of care at the bedside regarding CODE STATUS discussion, family support and goals of care. Zach Amaya. [facility] APRN-CNP  Palliative Medicine    Thank you for allowing Palliative Medicine to participate in the care of Dennis Marquez. Note: This report was completed using computerOffermatica voiced recognition software. Every effort has been made to ensure accuracy; however, inadvertent computerized transcription errors may be present.

## 2021-02-02 NOTE — PLAN OF CARE
Problem: Skin Integrity:  Goal: Will show no infection signs and symptoms  Description: Will show no infection signs and symptoms  2/1/2021 2145 by Patria Burgos RN  Outcome: Ongoing  2/1/2021 1153 by Mely Bloom RN  Outcome: Ongoing  Goal: Absence of new skin breakdown  Description: Absence of new skin breakdown  2/1/2021 2145 by Patria Burgos RN  Outcome: Met This Shift  2/1/2021 1153 by Mely Bloom RN  Outcome: Ongoing     Problem: Falls - Risk of:  Goal: Will remain free from falls  Description: Will remain free from falls  2/1/2021 2145 by Patria Burgos RN  Outcome: Met This Shift  2/1/2021 1153 by Mely Bloom RN  Outcome: Met This Shift  Goal: Absence of physical injury  Description: Absence of physical injury  2/1/2021 2145 by Patria Burgos RN  Outcome: Met This Shift  2/1/2021 1153 by Mely Bloom RN  Outcome: Met This Shift

## 2021-02-03 ENCOUNTER — APPOINTMENT (OUTPATIENT)
Dept: GENERAL RADIOLOGY | Age: 81
DRG: 870 | End: 2021-02-03
Payer: COMMERCIAL

## 2021-02-03 LAB
(1,3)-BETA-D-GLUCAN (FUNGITELL) INTERPRETATION: NEGATIVE
(1,3)-BETA-D-GLUCAN (FUNGITELL): <31 PG/ML
AADO2: 117.7 MMHG
ALBUMIN SERPL-MCNC: 2.8 G/DL (ref 3.5–5.2)
ALP BLD-CCNC: 78 U/L (ref 40–129)
ALT SERPL-CCNC: 8 U/L (ref 0–40)
ANION GAP SERPL CALCULATED.3IONS-SCNC: 10 MMOL/L (ref 7–16)
ANISOCYTOSIS: ABNORMAL
AST SERPL-CCNC: 20 U/L (ref 0–39)
B.E.: 7.1 MMOL/L (ref -3–3)
BASOPHILIC STIPPLING: ABNORMAL
BASOPHILS ABSOLUTE: 0.01 E9/L (ref 0–0.2)
BASOPHILS RELATIVE PERCENT: 0.1 % (ref 0–2)
BILIRUB SERPL-MCNC: 0.4 MG/DL (ref 0–1.2)
BUN BLDV-MCNC: 31 MG/DL (ref 8–23)
CALCIUM SERPL-MCNC: 7.7 MG/DL (ref 8.6–10.2)
CHLORIDE BLD-SCNC: 96 MMOL/L (ref 98–107)
CO2: 33 MMOL/L (ref 22–29)
COHB: 1.9 % (ref 0–1.5)
CREAT SERPL-MCNC: 0.7 MG/DL (ref 0.7–1.2)
CRITICAL: ABNORMAL
DATE ANALYZED: ABNORMAL
DATE OF COLLECTION: ABNORMAL
EOSINOPHILS ABSOLUTE: 0 E9/L (ref 0.05–0.5)
EOSINOPHILS RELATIVE PERCENT: 0 % (ref 0–6)
FIO2: 35 %
GFR AFRICAN AMERICAN: >60
GFR NON-AFRICAN AMERICAN: >60 ML/MIN/1.73
GLUCOSE BLD-MCNC: 171 MG/DL (ref 74–99)
HCO3: 30 MMOL/L (ref 22–26)
HCT VFR BLD CALC: 26.1 % (ref 37–54)
HCT VFR BLD CALC: 27.1 % (ref 37–54)
HEMOGLOBIN: 7.8 G/DL (ref 12.5–16.5)
HEMOGLOBIN: 7.9 G/DL (ref 12.5–16.5)
HHB: 3.6 % (ref 0–5)
HYPOCHROMIA: ABNORMAL
IMMATURE GRANULOCYTES #: 0.19 E9/L
IMMATURE GRANULOCYTES %: 1.1 % (ref 0–5)
LAB: ABNORMAL
LYMPHOCYTES ABSOLUTE: 0.75 E9/L (ref 1.5–4)
LYMPHOCYTES RELATIVE PERCENT: 4.3 % (ref 20–42)
Lab: ABNORMAL
MAGNESIUM: 2.1 MG/DL (ref 1.6–2.6)
MCH RBC QN AUTO: 27.3 PG (ref 26–35)
MCHC RBC AUTO-ENTMCNC: 29.9 % (ref 32–34.5)
MCV RBC AUTO: 91.3 FL (ref 80–99.9)
METER GLUCOSE: 164 MG/DL (ref 74–99)
METER GLUCOSE: 193 MG/DL (ref 74–99)
METER GLUCOSE: 198 MG/DL (ref 74–99)
METER GLUCOSE: 204 MG/DL (ref 74–99)
METER GLUCOSE: 212 MG/DL (ref 74–99)
METER GLUCOSE: 216 MG/DL (ref 74–99)
METHB: 0.3 % (ref 0–1.5)
MODE: AC
MONOCYTES ABSOLUTE: 1.03 E9/L (ref 0.1–0.95)
MONOCYTES RELATIVE PERCENT: 5.9 % (ref 2–12)
NEUTROPHILS ABSOLUTE: 15.35 E9/L (ref 1.8–7.3)
NEUTROPHILS RELATIVE PERCENT: 88.6 % (ref 43–80)
O2 CONTENT: 11.6 ML/DL
O2 SATURATION: 96.3 % (ref 92–98.5)
O2HB: 94.2 % (ref 94–97)
OPERATOR ID: 2962
OVALOCYTES: ABNORMAL
PATIENT TEMP: 37 C
PCO2: 35.7 MMHG (ref 35–45)
PDW BLD-RTO: 21.2 FL (ref 11.5–15)
PEEP/CPAP: 5 CMH2O
PFO2: 2.33 MMHG/%
PH BLOOD GAS: 7.54 (ref 7.35–7.45)
PLATELET # BLD: 301 E9/L (ref 130–450)
PMV BLD AUTO: 10.4 FL (ref 7–12)
PO2: 81.6 MMHG (ref 75–100)
POIKILOCYTES: ABNORMAL
POLYCHROMASIA: ABNORMAL
POTASSIUM REFLEX MAGNESIUM: 3.1 MMOL/L (ref 3.5–5)
RBC # BLD: 2.86 E12/L (ref 3.8–5.8)
RI(T): 1.44
RR MECHANICAL: 10 B/MIN
SCHISTOCYTES: ABNORMAL
SODIUM BLD-SCNC: 139 MMOL/L (ref 132–146)
SOURCE, BLOOD GAS: ABNORMAL
TEAR DROP CELLS: ABNORMAL
THB: 8.7 G/DL (ref 11.5–16.5)
TIME ANALYZED: 541
TOTAL PROTEIN: 4.9 G/DL (ref 6.4–8.3)
VT MECHANICAL: 500 ML
WBC # BLD: 17.3 E9/L (ref 4.5–11.5)

## 2021-02-03 PROCEDURE — 6360000002 HC RX W HCPCS: Performed by: INTERNAL MEDICINE

## 2021-02-03 PROCEDURE — 83735 ASSAY OF MAGNESIUM: CPT

## 2021-02-03 PROCEDURE — 6370000000 HC RX 637 (ALT 250 FOR IP): Performed by: INTERNAL MEDICINE

## 2021-02-03 PROCEDURE — 82962 GLUCOSE BLOOD TEST: CPT

## 2021-02-03 PROCEDURE — 2580000003 HC RX 258: Performed by: INTERNAL MEDICINE

## 2021-02-03 PROCEDURE — 71045 X-RAY EXAM CHEST 1 VIEW: CPT

## 2021-02-03 PROCEDURE — 85018 HEMOGLOBIN: CPT

## 2021-02-03 PROCEDURE — C9113 INJ PANTOPRAZOLE SODIUM, VIA: HCPCS | Performed by: INTERNAL MEDICINE

## 2021-02-03 PROCEDURE — 94003 VENT MGMT INPAT SUBQ DAY: CPT

## 2021-02-03 PROCEDURE — 80053 COMPREHEN METABOLIC PANEL: CPT

## 2021-02-03 PROCEDURE — 94660 CPAP INITIATION&MGMT: CPT

## 2021-02-03 PROCEDURE — 85025 COMPLETE CBC W/AUTO DIFF WBC: CPT

## 2021-02-03 PROCEDURE — 6360000002 HC RX W HCPCS: Performed by: EMERGENCY MEDICINE

## 2021-02-03 PROCEDURE — 82805 BLOOD GASES W/O2 SATURATION: CPT

## 2021-02-03 PROCEDURE — 2700000000 HC OXYGEN THERAPY PER DAY

## 2021-02-03 PROCEDURE — 85014 HEMATOCRIT: CPT

## 2021-02-03 PROCEDURE — 2000000000 HC ICU R&B

## 2021-02-03 PROCEDURE — 94640 AIRWAY INHALATION TREATMENT: CPT

## 2021-02-03 PROCEDURE — 2500000003 HC RX 250 WO HCPCS: Performed by: INTERNAL MEDICINE

## 2021-02-03 RX ADMIN — PIPERACILLIN AND TAZOBACTAM 3375 MG: 3; .375 INJECTION, POWDER, LYOPHILIZED, FOR SOLUTION INTRAVENOUS at 21:31

## 2021-02-03 RX ADMIN — Medication 10 ML: at 21:32

## 2021-02-03 RX ADMIN — PIPERACILLIN AND TAZOBACTAM 3375 MG: 3; .375 INJECTION, POWDER, LYOPHILIZED, FOR SOLUTION INTRAVENOUS at 14:27

## 2021-02-03 RX ADMIN — IPRATROPIUM BROMIDE AND ALBUTEROL SULFATE 1 AMPULE: .5; 3 SOLUTION RESPIRATORY (INHALATION) at 09:28

## 2021-02-03 RX ADMIN — MEROPENEM 1000 MG: 1 INJECTION, POWDER, FOR SOLUTION INTRAVENOUS at 01:13

## 2021-02-03 RX ADMIN — MICONAZOLE NITRATE: 20.6 POWDER TOPICAL at 08:49

## 2021-02-03 RX ADMIN — LATANOPROST 1 DROP: 50 SOLUTION OPHTHALMIC at 08:50

## 2021-02-03 RX ADMIN — IPRATROPIUM BROMIDE AND ALBUTEROL SULFATE 1 AMPULE: .5; 3 SOLUTION RESPIRATORY (INHALATION) at 17:10

## 2021-02-03 RX ADMIN — Medication 10 ML: at 08:49

## 2021-02-03 RX ADMIN — BUDESONIDE 500 MCG: 0.5 SUSPENSION RESPIRATORY (INHALATION) at 09:25

## 2021-02-03 RX ADMIN — THIAMINE HYDROCHLORIDE 200 MG: 100 INJECTION, SOLUTION INTRAMUSCULAR; INTRAVENOUS at 12:08

## 2021-02-03 RX ADMIN — SODIUM CHLORIDE, PRESERVATIVE FREE 10 ML: 5 INJECTION INTRAVENOUS at 09:16

## 2021-02-03 RX ADMIN — SODIUM CHLORIDE SOLN NEBU 3% 4 ML: 3 NEBU SOLN at 12:56

## 2021-02-03 RX ADMIN — ACETAMINOPHEN ORAL SOLUTION 650 MG: 650 SOLUTION ORAL at 10:36

## 2021-02-03 RX ADMIN — INSULIN LISPRO 3 UNITS: 100 INJECTION, SOLUTION INTRAVENOUS; SUBCUTANEOUS at 08:40

## 2021-02-03 RX ADMIN — ENOXAPARIN SODIUM 40 MG: 40 INJECTION SUBCUTANEOUS at 08:50

## 2021-02-03 RX ADMIN — INSULIN LISPRO 6 UNITS: 100 INJECTION, SOLUTION INTRAVENOUS; SUBCUTANEOUS at 17:15

## 2021-02-03 RX ADMIN — INSULIN LISPRO 6 UNITS: 100 INJECTION, SOLUTION INTRAVENOUS; SUBCUTANEOUS at 21:31

## 2021-02-03 RX ADMIN — THIAMINE HYDROCHLORIDE 200 MG: 100 INJECTION, SOLUTION INTRAMUSCULAR; INTRAVENOUS at 01:14

## 2021-02-03 RX ADMIN — LACTULOSE 20 G: 20 SOLUTION ORAL at 08:48

## 2021-02-03 RX ADMIN — SENNOSIDES 8.6 MG: 8.6 TABLET, FILM COATED ORAL at 08:49

## 2021-02-03 RX ADMIN — MIDODRINE HYDROCHLORIDE 20 MG: 5 TABLET ORAL at 12:08

## 2021-02-03 RX ADMIN — MEROPENEM 1000 MG: 1 INJECTION, POWDER, FOR SOLUTION INTRAVENOUS at 08:48

## 2021-02-03 RX ADMIN — Medication: at 21:31

## 2021-02-03 RX ADMIN — POTASSIUM CHLORIDE 20 MEQ: 400 INJECTION, SOLUTION INTRAVENOUS at 08:46

## 2021-02-03 RX ADMIN — MIDODRINE HYDROCHLORIDE 20 MG: 5 TABLET ORAL at 17:06

## 2021-02-03 RX ADMIN — Medication: at 08:47

## 2021-02-03 RX ADMIN — CHLORHEXIDINE GLUCONATE 0.12% ORAL RINSE 15 ML: 1.2 LIQUID ORAL at 08:49

## 2021-02-03 RX ADMIN — CHLORHEXIDINE GLUCONATE 0.12% ORAL RINSE 15 ML: 1.2 LIQUID ORAL at 21:32

## 2021-02-03 RX ADMIN — HYDROCORTISONE SODIUM SUCCINATE 50 MG: 100 INJECTION, POWDER, FOR SOLUTION INTRAMUSCULAR; INTRAVENOUS at 01:15

## 2021-02-03 RX ADMIN — INSULIN LISPRO 6 UNITS: 100 INJECTION, SOLUTION INTRAVENOUS; SUBCUTANEOUS at 01:26

## 2021-02-03 RX ADMIN — HYDROCORTISONE SODIUM SUCCINATE 50 MG: 100 INJECTION, POWDER, FOR SOLUTION INTRAMUSCULAR; INTRAVENOUS at 08:48

## 2021-02-03 RX ADMIN — MICONAZOLE NITRATE: 20.6 POWDER TOPICAL at 21:31

## 2021-02-03 RX ADMIN — AMIODARONE HYDROCHLORIDE 100 MG: 200 TABLET ORAL at 08:49

## 2021-02-03 RX ADMIN — POTASSIUM CHLORIDE 20 MEQ: 400 INJECTION, SOLUTION INTRAVENOUS at 08:44

## 2021-02-03 RX ADMIN — INSULIN LISPRO 3 UNITS: 100 INJECTION, SOLUTION INTRAVENOUS; SUBCUTANEOUS at 12:14

## 2021-02-03 RX ADMIN — INSULIN GLARGINE 28 UNITS: 100 INJECTION, SOLUTION SUBCUTANEOUS at 21:31

## 2021-02-03 RX ADMIN — PANTOPRAZOLE SODIUM 40 MG: 40 INJECTION, POWDER, FOR SOLUTION INTRAVENOUS at 08:49

## 2021-02-03 RX ADMIN — ASCORBIC ACID 1500 MG: 500 INJECTION INTRAVENOUS at 17:06

## 2021-02-03 RX ADMIN — ASCORBIC ACID 1500 MG: 500 INJECTION INTRAVENOUS at 05:30

## 2021-02-03 RX ADMIN — IPRATROPIUM BROMIDE AND ALBUTEROL SULFATE 1 AMPULE: .5; 3 SOLUTION RESPIRATORY (INHALATION) at 12:55

## 2021-02-03 RX ADMIN — ACETAMINOPHEN ORAL SOLUTION 650 MG: 650 SOLUTION ORAL at 19:02

## 2021-02-03 RX ADMIN — ASCORBIC ACID 1500 MG: 500 INJECTION INTRAVENOUS at 11:14

## 2021-02-03 RX ADMIN — ATORVASTATIN CALCIUM 20 MG: 20 TABLET, FILM COATED ORAL at 08:48

## 2021-02-03 RX ADMIN — FENTANYL CITRATE 50 MCG: 50 INJECTION, SOLUTION INTRAMUSCULAR; INTRAVENOUS at 20:08

## 2021-02-03 RX ADMIN — MIDODRINE HYDROCHLORIDE 20 MG: 5 TABLET ORAL at 09:50

## 2021-02-03 RX ADMIN — POTASSIUM BICARBONATE 40 MEQ: 782 TABLET, EFFERVESCENT ORAL at 08:49

## 2021-02-03 RX ADMIN — INSULIN LISPRO 3 UNITS: 100 INJECTION, SOLUTION INTRAVENOUS; SUBCUTANEOUS at 05:48

## 2021-02-03 ASSESSMENT — PAIN SCALES - GENERAL: PAINLEVEL_OUTOF10: 5

## 2021-02-03 ASSESSMENT — PULMONARY FUNCTION TESTS
PIF_VALUE: 21
PIF_VALUE: 22
PIF_VALUE: 27
PIF_VALUE: 28
PIF_VALUE: 25

## 2021-02-03 NOTE — PROGRESS NOTES
Critical Care Team - Daily Progress Note         Date and time: 2/3/2021 9:53 AM  Patient's name:  Amalia Erickson  Medical Record Number: [id number]  Patient's account/billing number: [de-identified]  Patient's YOB: 1940  Age: 78 y.o. Date of Admission: 1/22/2021  8:35 PM  Length of stay during current admission: 12    Primary Care Physician: No primary care provider on file. ICU Attending Physician: Dr. Wells Po Status: DNR-CCA     Reason for ICU admission: Septic shock  SUBJECTIVE:       on ventilator PS 12/5+ PEEP   OVERNIGHT EVENTS:      Uneventful   CURRENT VENTILATION STATUS:   [x]? ? Ventilator   []? ? BIPAP         []? ? Nasal Cannula       []? ? Room Air    IF INTUBATED, ET TUBE MARKING AT LOWER LIP:       cms  SECRETIONS Amount:         [x]? ? Small          []? ? Moderate                []? ? Large  [x]? ? ROAF                       TQKNJ:              []? ? White          []? ? Colored                   []? ? Bloody  SEDATION:    RAAS Score: On Precedex  []? ? Propofol gtt             []? ? Versed gtt               []? ?Shital Shae gtt                []? ? No Sedation  PARALYZED:             [x]? ? No                           []? ? Yes  VASOPRESSORS:    []? ? No                           [x]? ? Yes    If yes - [x]? ? Levophed       []? ? Dopamine          []? ? Vasopressin       []? ? Dobutamine  []? ? Phenylephrine         []? ? Epinephrine  CENTRAL LINES:     []? ? No               []? ? Yes   (Date of Insertion:   )           If yes -     []? ? Right IJ             []? ? Left IJ         []? ? Right Femoral        []? ? Left Femoral                   []? ? Right Subclavian           []? ? Left Subclavian                  [x]? ?[address] right arm  GREEN'S CATHETER:   []? ? No                      [x]? ? Yes  (Date of Insertion:   )   URINE OUTPUT:            []? ?[address]                  []? ? Low              []? ? Anuric        OBJECTIVE:     VITAL SIGNS:  BP (!) 89/48   Pulse 114   Temp 102 °F (38.9 °C)   Resp 25 Ht 6' (1.829 m)   Wt 263 lb 14.3 oz (119.7 kg)   SpO2 96%   BMI 35.79 kg/m²   Tmax over 24 hours:  Temp (24hrs), Av.3 °F (37.9 °C), Min:98.6 °F (37 °C), Max:102.2 °F (39 °C)      Patient Vitals for the past 6 hrs:   BP Temp Pulse Resp SpO2 Weight   21 0925 -- -- -- 25 96 % --   21 0923 -- -- 114 29 98 % --   21 0900 (!) 89/48 -- 99 27 96 % --   21 0800 96/67 102 °F (38.9 °C) 126 26 96 % --   21 0700 (!) 94/50 -- 102 30 98 % --   21 0600 (!) 93/51 -- 91 27 98 % 263 lb 14.3 oz (119.7 kg)   21 0500 (!) 93/51 -- 94 28 99 % --   21 0400 (!) 88/56 102.2 °F (39 °C) 96 27 99 % --         Intake/Output Summary (Last 24 hours) at 2/3/2021 0953  Last data filed at 2/3/2021 7232  Gross per 24 hour   Intake 4756 ml   Output 2178 ml   Net 2578 ml     Wt Readings from Last 2 Encounters:   21 263 lb 14.3 oz (119.7 kg)   21 255 lb (115.7 kg)     Body mass index is 35.79 kg/m².         PHYSICAL EXAMINATION:       General appearance - alert opens eyes moves right arm obese intubated on low-dose Precedex, and in no distress  Mental status - alert, oriented to person, place, and time  Eyes - pupils equal and reactive, extraocular eye movements intact  Ears - external ear canals normal  Nose - normal and patent,, discharge   Mouth - mucous membranes moist, pharynx normal without lesions  Neck - supple, no significant adenopathy, JVD  Chest - clear to auscultation, no wheezes, rales or rhonchi, symmetric air entry  Heart - normal rate, regular rhythm, normal S1, S2, no murmurs, rubs, clicks or gallops  Abdomen - soft, nontender, nondistended, no masses or organomegaly obese PEJ  Neurological - alert, oriented, normal speech, no focal findings or movement disorder noted  Extremities - peripheral pulses normal, no pedal edema, no clubbing or cyanosis.  Right arm swollen with wound around elbow  Skin - normal coloration and turgor, no rashes, no suspicious skin lesions noted        MEDICATIONS:    Scheduled Meds:   miconazole   Topical BID    potassium bicarb-citric acid  40 mEq Per G Tube Daily    insulin glargine  28 Units Subcutaneous Nightly    hydrocortisone sodium succinate PF  50 mg Intravenous Q6H    thiamine (VITAMIN B1) IVPB  200 mg Intravenous Q12H    ascorbic acid  1,500 mg Intravenous Q6H    anidulafungin  100 mg Intravenous Q24H    midodrine  20 mg Oral TID WC    ipratropium-albuterol  1 ampule Inhalation 4x daily    insulin lispro  0-18 Units Subcutaneous Q4H    Mineral Oil-Hydrophil Petrolat   Topical BID    lidocaine PF  5 mL Intradermal Once    heparin flush  3 mL Intravenous 2 times per day    chlorhexidine  15 mL Mouth/Throat BID    amiodarone  100 mg PEG Tube Daily    atorvastatin  20 mg PEG Tube Daily    lactulose  20 g PEG Tube Daily    latanoprost  1 drop Both Eyes Daily    pantoprazole  40 mg Intravenous Daily    And    sodium chloride (PF)  10 mL Intravenous Daily    senna  1 tablet PEG Tube BID    sodium chloride (Inhalant)  4 mL Nebulization BID    sodium chloride flush  10 mL Intravenous 2 times per day    enoxaparin  40 mg Subcutaneous Daily    meropenem  1,000 mg Intravenous Q8H    budesonide  500 mcg Nebulization BID     Continuous Infusions:   dexmedetomidine (PRECEDEX) IV infusion Stopped (02/02/21 1044)    dextrose      sodium chloride Stopped (02/02/21 1633)    norepinephrine Stopped (02/03/21 9286)     PRN Meds:       Mineral Oil-Hydrophil Petrolat, , 4x Daily PRN      sodium chloride flush, 10 mL, PRN      heparin flush, 3 mL, PRN      potassium chloride, 20 mEq, PRN      acetaminophen, 650 mg, Q4H PRN      docusate sodium, 1 enema, PRN      glucose, 15 g, PRN      dextrose, 12.5 g, PRN      glucagon (rDNA), 1 mg, PRN      dextrose, 100 mL/hr, PRN      sodium chloride flush, 10 mL, PRN      promethazine, 12.5 mg, Q6H PRN    Or      ondansetron, 4 mg, Q6H PRN      polyethylene glycol, 17 g, Daily PRN      acetaminophen, 650 mg, Q6H PRN    Or      acetaminophen, 650 mg, Q6H PRN      fentanNYL, 50 mcg, Q1H PRN          VENT SETTINGS (Comprehensive) (if applicable):  Vent Information  $Ventilation: $Subsequent Day  Skin Assessment: Clean, dry, & intact  Equipment ID: [id number]  Equipment Changed: Humidification  Vent Type: [phone]  Vent Mode: (S) PS  Vt Ordered: 500 mL  Rate Set: 10 bmp  Peak Flow: 70 L/min  Pressure Support: (S) 15 cmH20  FiO2 : 35 %  SpO2: 96 %  SpO2/FiO2 ratio: 274.29  Sensitivity: 0  PEEP/CPAP: 5  I Time/ I Time %: 0 s  Humidification Source: Heated wire  Humidification Temp: 37  Humidification Temp Measured: 36.8  Circuit Condensation: Drained  Additional Respiratory  Assessments  Pulse: 114  Resp: 25  SpO2: 96 %  Position: Semi-Akhtar's  Humidification Source: Heated wire  Humidification Temp: 37  Circuit Condensation: Drained  Oral Care: Mouth suctioned  Subglottic Suction Done?: Yes  Airway Type: ET  Airway Size: 8  Cuff Pressure (cm H2O): 29 cm H2O  ABGs:   Recent Labs     02/03/21  0541   PH 7.542*   PCO2 35.7   PO2 81.6   HCO3 30.0*   BE 7.1*   O2SAT 96.3       Laboratory findings:  Complete Blood Count:   Recent Labs     02/01/21 0411 02/02/21 0400 02/03/21 0423   WBC 9.7 10.3 17.3*   HGB 9.3* 9.0* 7.8*   HCT 30.6* 29.9* 26.1*    397 301        Last 3 Blood Glucose:   Recent Labs     02/01/21 0411 02/02/21 0400 02/03/21  0423   GLUCOSE 238* 147* 171*        PT/INR:    Lab Results   Component Value Date    PROTIME 14.1 01/15/2021    INR 1.2 01/15/2021     PTT:    Lab Results   Component Value Date    APTT 21.0 01/06/2021       Comprehensive Metabolic Profile:   Recent Labs     02/01/21 0411 02/02/21  0400 02/03/21  0423    133 139   K 3.4* 3.2* 3.1*   CL 89* 86* 96*   CO2 37* 39* 33*   BUN 16 23 31*   CREATININE 0.8 0.7 0.7   GLUCOSE 238* 147* 171*   CALCIUM 7.4* 7.5* 7.7*   PROT 5.1* 5.1* 4.9*   LABALBU 2.8* 2.6* 2.8*   BILITOT 0.5 0.4 0.4   ALKPHOS 80 76 78   AST multifocal infiltrate  1/29 stable infiltrates right lung  2/1 multifocal airspace disease. Right upper extremity ultrasound no DVT   2/2 weaned off of pressors. Responded to fluid. T-max 102. Antibiotics on meropenem anidulafungin changed to Zosyn      1. S/p septic shock vitamin C, Solu-Cortef every 6 50 mg and on Levophed  2. Acute respiratory failure on mechanical ventilation  3. Aspiration pneumonia MRSA ESBL Klebsiella   4. Echo EF 55 to 60% on 12/6/2020, mild aortic stenosis  5. Anemia   6. Dysphagia PEG tube changed to PEJ tube 1/15/2021  7. 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 History of A. fib on amiodarone  9. Left CFV SF V DVT 1/3/2021 IVC filter placed 1/6  10. Baseline dementia nonverbal  11. Adilene Haver. 12. Probable COPD on aerosols  13. DNR CCA family does not want to have a trach so no reintubation if patient were to extubate     PLAN:     Patient tolerated CPAP trial and able to be extubated   mental status much improved more awake and alert  Need to speak to family further about CODE STATUS ( reintubation but no trach?)  Should be able to transfer out of the unit tomorrow   Wean steroids  Check chest x-ray today  Recheck hemoglobin      WEAN PER PROTOCOL:                  [x]? ? No               []? ? OZK             []? ? N/A  DISCONTINUE ANY LABS:              [x]? ? No               []? ? Yes  ICU PROPHYLAXIS:  Stress ulcer:  [x]? ? PPI Agent               []? ? [id number]      []? ? Sucralfate               []? ? Other:  VTE:                [x]? ? Enoxaparin             []? ? Unfract. Heparin Subcut                []? ? EPC Cuffs  NUTRITION:  []?? NPO            [x]? ? Tube Feeding (Specify: )   []? ? TPN                        []? ? PO (Diet: DIET TUBE FEED CONTINUOUS/CYCLIC NPO; Semi-elemental; Orogastric; Continuous; 10; 60; 24  Diet Tube Feed Modular: Protein Modular)  HOME MEDICATIONS RECONCILED:        [x]? ? HN                           []? ? Yes  INSULIN DRIP:                                               [x]? ? EM                           []? ? Yes  CONSULTATION NEEDED:                          [x]? ? NO                           []? ? Yes  FAMILY UPDATED:                                       [x]? ? [place]                           []? ? Yes  TRANSFER OUT OF ICU:                             [x]? ? SN                           []? ?SAGRARIO Moon. C. P                     I personally saw, examined and provided care for the patient. Radiographs, labs and medication list were reviewed by me independently. I spoke with bedside nursing, therapists and consultants.  Critical care services and times documented are independent of procedures        2/3/2021, 9:53 AM

## 2021-02-03 NOTE — PROGRESS NOTES
Subjective:    Pt cont on vent, opens eyes, events of last 24 hrs reviewed with records and staff. Objective:    BP (!) 94/50   Pulse 102   Temp 102.2 °F (39 °C)   Resp 30   Ht 6' (1.829 m)   Wt 263 lb 14.3 oz (119.7 kg)   SpO2 98%   BMI 35.79 kg/m²   HEENT no adenopathy no bruits  ET tube in place   Heart:  RRR, no murmurs, gallops, or rubs.   Lungs:  CTA bilaterally, no wheeze, rales or rhonchi  Abd: bowel sounds present, nontender, nondistended, no masses  Extrem:  No clubbing, cyanosis, +edema and erythema   WBC/Hgb/Hct/Plts:  17.3/7.8/26.1/301 (02/03 7913) basic metabolic panel   Lab Results   Component Value Date     02/03/2021    K 3.1 02/03/2021    CL 96 02/03/2021    CO2 33 02/03/2021    BUN 31 02/03/2021    CREATININE 0.7 02/03/2021    GLUCOSE 171 02/03/2021    GLUCOSE 138 03/26/2011    CALCIUM 7.7 02/03/2021        Assessment:    Patient Active Problem List   Diagnosis    Severe sepsis with septic shock (HCC)    Dementia ([address]    Metabolic encephalopathy    Diabetes mellitus type 2, uncontrolled ([address]    Hyperlipidemia LDL goal <100    Essential hypertension    Venous ulcer of left leg (HCC)    Non-pressure chronic ulcer of left lower leg with fat layer exposed ([address]    Non-pressure chronic ulcer left lower leg, limited to breakdown skin (HCC)    Severe protein-calorie malnutrition (HCC)    Non-pressure chronic ulcer right lower leg, limited to breakdown skin (HCC)    Pressure injury of contiguous region involving back, buttock, and hip, stage 2 (HCC)    Pressure injury of calf, stage 2 ([address]    HCAP (healthcare-associated pneumonia)    Paroxysmal atrial fibrillation (HCC)    Acute respiratory failure with hypoxia (HCC)    Acute deep vein thrombosis (DVT) of femoral vein of left lower extremity (HCC)    Leg swelling    Respiratory failure ([address]    Palliative care encounter    DNR no code (do not resuscitate)    Goals of care, counseling/discussion    Sepsis due to Pseudomonas species with acute hypercapnic respiratory failure and septic shock ([facility]       Plan:  Cont critical care   Family to meet to make decision about terminal wean  Abx per ID team  Monitor labs       Clyde Parra  7:30 AM  2/3/2021

## 2021-02-03 NOTE — PROGRESS NOTES
02/02/21 2150   Vent Information   Vent Type 980   Vent Mode AC/VC   Vt Ordered 500 mL   Rate Set 10 bmp   Peak Flow 70 L/min   Pressure Support 0 cmH20   FiO2  35 %   SpO2 94 %   SpO2/FiO2 ratio 268.57   Sensitivity 0   PEEP/CPAP 5   pt's RR still high in low 30's  St this time I switched pt back to a/c for the night  Rn notified

## 2021-02-03 NOTE — PLAN OF CARE
Problem: Skin Integrity:  Goal: Will show no infection signs and symptoms  2/3/2021 0221 by Akhil Tillman RN  Outcome: Met This Shift     Problem: Skin Integrity:  Goal: Will show no infection signs and symptoms  2/2/2021 2215 by Akhil Tillman RN  Outcome: Met This Shift     Problem: Skin Integrity:  Goal: Absence of new skin breakdown  2/3/2021 0221 by Akhil Tillman RN  Outcome: Met This Shift     Problem: Skin Integrity:  Goal: Absence of new skin breakdown  2/2/2021 2215 by Akhil Tillman RN  Outcome: Met This Shift     Problem: Falls - Risk of:  Goal: Will remain free from falls  2/3/2021 0221 by Akhil Tillman RN  Outcome: Met This Shift     Problem: Falls - Risk of:  Goal: Will remain free from falls  2/2/2021 2215 by Akhil Tillman RN  Outcome: Met This Shift     Problem: Falls - Risk of:  Goal: Absence of physical injury  2/3/2021 0221 by Akhil Tillman RN  Outcome: Met This Shift     Problem: Falls - Risk of:  Goal: Absence of physical injury  2/2/2021 2215 by Akhil Tillman RN  Outcome: Met This Shift

## 2021-02-03 NOTE — PROGRESS NOTES
DAILY VENTILATOR WEANING ASSESSMENT PERFORMED    P/FIO2 Ratio =  233        (<100= do not Wean)                  Cs =              28            (<32= Instability)  Plat. Pressure = 19  MV =0= 11  RSBI =    Instabilities:       Cardiovascular =       CNS =       Respiratory =       Metabolic =    Parameters    no    Wean per protocol  yes    Ask Physician for a weaning plan yes    Additional Comments:     Performed by Christina Montgomery RRT      Reference Table:    Cardiovascular     CNS      1. Mean BP less than or equal to 75   1. Neuromuscular blockade  2. Heart Rate greater than 130   2. RASS of -3, -4, -5  3. Myocardial Ischemia    3. RASS of +3, +4  4. Mechanical Assist Device    4. ICP greater than 15 or             Intracranial Hypertension         Respiratory      Metabolic  1. PEEP equal to or greater than 10cm/H20  1. Temp. (8hrs) less than 95 or > 103  2. Respiratory Rate greater than 35   2. WBC < 5000 or > 56419  3. Minute Volume greater than 15L  4. pH less than 7.30  5.  Deteriorating chest X-ray

## 2021-02-03 NOTE — PROGRESS NOTES
[address] [place] [address] [facility]  [facility]UAN  Progress  Note   C/C : pneumonia, resp failure, fever      Pt's extubated  Fever 102 F  Non communicating       Current Facility-Administered Medications   Medication Dose Route Frequency Provider Last Rate Last Admin    hydrocortisone sodium succinate PF (SOLU-CORTEF) injection 50 mg  50 mg Intravenous BID Leny Ward MD        miconazole (MICOTIN) 2 % powder   Topical BID Leny Ward MD   Given at 02/03/21 0849    potassium bicarb-citric acid (EFFER-K) effervescent tablet 40 mEq  40 mEq Per G Tube Daily Miguelina Rivas, DO   40 mEq at 02/03/21 0849    insulin glargine (LANTUS) injection vial 28 Units  28 Units Subcutaneous Nightly Leny Ward MD   28 Units at 02/02/21 2120    thiamine (B-1) 200 mg in sodium chloride 0.9 % 100 mL IVPB  200 mg Intravenous Q12H Dimitris Davenport  mL/hr at 02/03/21 1208 200 mg at 02/03/21 1208    ascorbic acid 1,500 mg in sodium chloride 0.9 % 100 mL IVPB  1,500 mg Intravenous Q6H Dimitris Davenport DO   1,500 mg at 02/03/21 1114    anidulafungin (ERAXIS) 100 mg in dextrose 5 % 130 mL IVPB  100 mg Intravenous Q24H Briseida Caba MD   Stopped at 02/02/21 1841    midodrine (PROAMATINE) tablet 20 mg  20 mg Oral TID WC Dimitris Davenport, DO   20 mg at 02/03/21 1208    ipratropium-albuterol (DUONEB) nebulizer solution 1 ampule  1 ampule Inhalation 4x daily Dimitris Davenport DO   1 ampule at 02/03/21 9613    insulin lispro (HUMALOG) injection vial 0-18 Units  0-18 Units Subcutaneous Q4H Dimitris Davenport DO   3 Units at 02/03/21 1214    Mineral Oil-Hydrophil Petrolat OINT   Topical BID Miguelina Rob, DO   Given at 02/03/21 5324    And    Mineral Oil-Hydrophil Petrolat OINT   Topical 4x Daily PRN Miguelina Rivas, DO   Given at 01/26/21 1846    lidocaine PF 1 % injection 5 mL  5 mL Intradermal Once Miguelina Rob, DO        sodium chloride flush 0.9 % injection 10 mL  10 mL Intravenous PRN Erin Nuria, DO        heparin flush 100 UNIT/ML injection 300 Units  3 mL Intravenous 2 times per day Erin Miles[place], DO   Stopped at 01/30/21 0911    heparin flush 100 UNIT/ML injection 300 Units  3 mL Intracatheter PRN Erin Milesburg, DO        chlorhexidine (PERIDEX) 0.12 % solution 15 mL  15 mL Mouth/Throat BID Nnoi Palaciosiro, DO   15 mL at 02/03/21 0849    potassium chloride 20 mEq/50 mL IVPB (Central Line)  20 mEq Intravenous PRN Erin Milesburg, DO 50 mL/hr at 02/03/21 0846 20 mEq at 02/03/21 0846    acetaminophen (TYLENOL) 160 MG/5ML solution 650 mg  650 mg Per G Tube Q4H PRN Elizabeth Son MD   650 mg at 02/03/21 1036    amiodarone (CORDARONE) tablet 100 mg  100 mg PEG Tube Daily Elizabeth Son MD   100 mg at 02/03/21 0849    atorvastatin (LIPITOR) tablet 20 mg  20 mg PEG Tube Daily Elizabeth Son MD   20 mg at 02/03/21 0848    docusate sodium (ENEMEEZ) enema 283 mg  1 enema Rectal PRN Elizabeth Son MD        glucose (GLUTOSE) 40 % oral gel 15 g  15 g Oral PRN Elizabeth Son MD        dextrose 50 % IV solution  12.5 g Intravenous PRN Elizabeth Son MD        glucagon (rDNA) injection 1 mg  1 mg Intramuscular PRN Elizabeth Son MD        dextrose 5 % solution  100 mL/hr Intravenous PRN Elizabeth Son MD        lactulose (CHRONULAC) 10 GM/15ML solution 20 g  20 g PEG Tube Daily Felix Hughes MD   20 g at 02/03/21 0848    latanoprost (XALATAN) 0.005 % ophthalmic solution 1 drop  1 drop Both Eyes Daily Elizabeth Son MD   1 drop at 02/03/21 0850    pantoprazole (PROTONIX) injection 40 mg  40 mg Intravenous Daily Elizabeth Son MD   40 mg at 02/03/21 0849    And    sodium chloride (PF) 0.9 % injection 10 mL  10 mL Intravenous Daily Elizabeth Son MD   10 mL at 02/03/21 0916    senna (SENOKOT) tablet 8.6 mg  1 tablet PEG Tube BID Elizabeth Son MD   8.6 mg at 02/03/21 0891    sodium chloride (Inhalant) 3 % nebulizer solution 4 mL  4 mL Nebulization BID Michelle Pompa MD   4 mL at 02/02/21 2145    sodium chloride flush 0.9 % injection 10 mL  10 mL Intravenous 2 times per day Michelle Pompa MD   10 mL at 02/03/21 0849    sodium chloride flush 0.9 % injection 10 mL  10 mL Intravenous PRN Michelle Pompa MD   10 mL at 02/02/21 1242    enoxaparin (LOVENOX) injection 40 mg  40 mg Subcutaneous Daily Michelle Pompa MD   40 mg at 02/03/21 0850    promethazine (PHENERGAN) tablet 12.5 mg  12.5 mg Oral Q6H PRN Michelle Pompa MD        Or    ondansetron (ZOFRAN) injection 4 mg  4 mg Intravenous Q6H PRN Michelle Pompa MD        polyethylene glycol (GLYCOLAX) packet 17 g  17 g Oral Daily PRN Michelle Pompa MD        acetaminophen (TYLENOL) tablet 650 mg  650 mg Oral Q6H PRN Michelle Pompa MD   650 mg at 01/30/21 1923    Or    acetaminophen (TYLENOL) suppository 650 mg  650 mg Rectal Q6H PRN Michelle Pompa MD        0.9 % sodium chloride infusion   Intravenous Q8H Michelle Pompa MD   Stopped at 02/02/21 1733    meropenem (MERREM) 1,000 mg in sodium chloride 0.9 % 100 mL IVPB (mini-bag)  1,000 mg Intravenous Q8H Ruben Espinoza MD   Stopped at 02/03/21 0957    budesonide (PULMICORT) nebulizer suspension 500 mcg  500 mcg Nebulization BID Lupe Cabrera MD   500 mcg at 02/03/21 0925    fentaNYL (SUBLIMAZE) injection 50 mcg  50 mcg Intravenous Q1H PRN Nilesh Vasquez MD   50 mcg at 01/29/21 1500    norepinephrine (LEVOPHED) 16 mg in dextrose 5% 250 mL infusion  2-100 mcg/min Intravenous Continuous Gonzalo Rao DO   Stopped at 02/03/21 0725       REVIEW OF SYSTEMS: could not be obtained       PHYSICAL EXAM:    Vitals:   BP (!) 93/54   Pulse 101   Temp 102.6 °F (39.2 °C)   Resp (!) 31   Ht 6' (1.829 m)   Wt 263 lb 14.3 oz (119.7 kg)   SpO2 97%   BMI 35.79 kg/m²      Constitutional: Extubated, non communicating   Skin:  no rash   HEENT:Pallor +, no LN , ET tube,   Neck: Supple to movements. No lymphadenopathy.    Chest: Bilateral coarse  rhonchi Cardiovascular: Regular, no murmur   Abdomen:soft, bowel sound +  PEG - no drainage or erythema   Extremities: ++ edema, erythema legs , not warm   Lines:  Right brachial PICC ( 1/25)     CBC with Differential:      Lab Results   Component Value Date    WBC 17.3 02/03/2021    RBC 2.86 02/03/2021    HGB 7.8 02/03/2021    HCT 26.1 02/03/2021     02/03/2021    MCV 91.3 02/03/2021    MCH 27.3 02/03/2021    MCHC 29.9 02/03/2021    RDW 21.2 02/03/2021    NRBC 0.9 01/08/2021    SEGSPCT 64 09/27/2013    METASPCT 6.0 01/06/2021    LYMPHOPCT 4.3 02/03/2021    PROMYELOPCT 0.9 12/21/2020    MONOPCT 5.9 02/03/2021    MYELOPCT 3.0 01/06/2021    BASOPCT 0.1 02/03/2021    MONOSABS 1.03 02/03/2021    LYMPHSABS 0.75 02/03/2021    EOSABS 0.00 02/03/2021    BASOSABS 0.01 02/03/2021       CMP:    Lab Results   Component Value Date     02/03/2021    K 3.1 02/03/2021    CL 96 02/03/2021    CO2 33 02/03/2021    BUN 31 02/03/2021    CREATININE 0.7 02/03/2021    GFRAA >60 02/03/2021    LABGLOM >60 02/03/2021    GLUCOSE 171 02/03/2021    GLUCOSE 138 03/26/2011    PROT 4.9 02/03/2021    LABALBU 2.8 02/03/2021    LABALBU 4.0 03/26/2011    CALCIUM 7.7 02/03/2021    BILITOT 0.4 02/03/2021    ALKPHOS 78 02/03/2021    AST 20 02/03/2021    ALT 8 02/03/2021       Hepatic Function Panel:    Lab Results   Component Value Date    ALKPHOS 78 02/03/2021    ALT 8 02/03/2021    AST 20 02/03/2021    PROT 4.9 02/03/2021    BILITOT 0.4 02/03/2021    BILIDIR 0.4 01/14/2021    IBILI 0.5 01/14/2021    LABALBU 2.8 02/03/2021    LABALBU 4.0 03/26/2011         Microbiology :    Blood culture - neg to date   Urine Culture -     Susceptibility    Pseudomonas aeruginosa (1)    Antibiotic Interpretation SNEHA Status    gentamicin Sensitive <=^1 mcg/mL     levofloxacin Sensitive <=^0.12 mcg/mL     tobramycin Sensitive <=^1 mcg/mL     Lab and Collection        Sputum Culture-      Susceptibility    Pseudomonas aeruginosa (1)    Antibiotic Interpretation SNEHA Status    gentamicin Sensitive <=^1 mcg/mL     levofloxacin Sensitive <=^0.12 mcg/mL     tobramycin Sensitive <=^1 mcg/mL     Proteus mirabilis (4)    Antibiotic Interpretation SNEHA Status    ampicillin Sensitive <=^2 mcg/mL     ceFAZolin Sensitive <=^4 mcg/mL     cefepime Sensitive <=^0.12 mcg/mL     cefTRIAXone Sensitive <=^0.25 mcg/mL     ertapenem Sensitive <=^0.12 mcg/mL     gentamicin Sensitive <=^1 mcg/mL     levofloxacin Resistant >=^8 mcg/mL     piperacillin-tazobactam Sensitive <=^4 mcg/mL     trimethoprim-sulfamethoxazole Sensitive <=^20 mcg/mL      Condensed View   Lab and Collection  Radiology :    Chest X ray - bilateral multifocal infiltrates , cardiomegaly     Ultrasound - neg for DVT     Fungitell test negative       Assessment:  · Respiratory failure - s/p extubation   · Pseudomonas bacteriuria    · HCAP / Aspiration pneumonia  (Pseudomonas, Klebsiella, E coli and Proteus)   · Persistent fever / leukocytosis         Plan:    · Stop meropenem and eraxis   · Zosyn 3.375 grams IV q 8 hrs       Ruben P Limbu  12:52 PM  2/3/2021

## 2021-02-03 NOTE — PROGRESS NOTES
02/02/21 1753   Vent Information   Vent Mode PS   Vt Ordered 0 mL   Rate Set 0 bmp   Peak Flow 0 L/min   Pressure Support 15 cmH20   FiO2  35 %   SpO2 96 %   SpO2/FiO2 ratio 274.29   Sensitivity 0   PEEP/CPAP 5   I Time/ I Time % 0 s   Vent Patient Data   High Peep/I Pressure 0   Peak Inspiratory Pressure 21 cmH2O   Mean Airway Pressure 9.8 cmH20   Rate Measured 30 br/min   Vt Exhaled 456 mL   Minute Volume 13.2 Liters   I:E Ratio 1:2.90   Spontaneous Breathing Trial (SBT) RT Doc   Pulse 118   Additional Respiratory  Assessments   Resp 30   Alarm Settings   High Pressure Alarm 45 cmH2O   pt's RR increasing talked with Dr Ben Roberts about increasing ps to help slow down his RR  Ok per her to try this

## 2021-02-03 NOTE — PROGRESS NOTES
Chart reviewed. Patient now extubated to nasal cannula. Spoke with daughter, Ricco Bauman, to discuss goals of care and code status. Juiceluigiluz Bauman wishes to continue DNR-CCA including okay for reintubation. Ricco Bauman states she has been asked this 4 times and does not want to discuss it anymore. Ricco Bauman states she will speak with her brother regarding decision to reintubate if needed and let palliative medicine know of any changes to code status.

## 2021-02-04 ENCOUNTER — APPOINTMENT (OUTPATIENT)
Dept: GENERAL RADIOLOGY | Age: 81
DRG: 870 | End: 2021-02-04
Payer: COMMERCIAL

## 2021-02-04 LAB
AADO2: 262.6 MMHG
ALBUMIN SERPL-MCNC: 2.5 G/DL (ref 3.5–5.2)
ALP BLD-CCNC: 68 U/L (ref 40–129)
ALT SERPL-CCNC: 9 U/L (ref 0–40)
ANION GAP SERPL CALCULATED.3IONS-SCNC: 7 MMOL/L (ref 7–16)
ANISOCYTOSIS: ABNORMAL
AST SERPL-CCNC: 25 U/L (ref 0–39)
B.E.: 9 MMOL/L (ref -3–3)
BASOPHILIC STIPPLING: ABNORMAL
BASOPHILS ABSOLUTE: 0.01 E9/L (ref 0–0.2)
BASOPHILS RELATIVE PERCENT: 0.1 % (ref 0–2)
BILIRUB SERPL-MCNC: 0.3 MG/DL (ref 0–1.2)
BUN BLDV-MCNC: 37 MG/DL (ref 8–23)
CALCIUM SERPL-MCNC: 7.3 MG/DL (ref 8.6–10.2)
CHLORIDE BLD-SCNC: 99 MMOL/L (ref 98–107)
CO2: 37 MMOL/L (ref 22–29)
COHB: 1.1 % (ref 0–1.5)
CREAT SERPL-MCNC: 0.8 MG/DL (ref 0.7–1.2)
CRITICAL: ABNORMAL
DATE ANALYZED: ABNORMAL
DATE OF COLLECTION: ABNORMAL
EOSINOPHILS ABSOLUTE: 0.02 E9/L (ref 0.05–0.5)
EOSINOPHILS RELATIVE PERCENT: 0.2 % (ref 0–6)
FIO2: 60 %
GFR AFRICAN AMERICAN: >60
GFR NON-AFRICAN AMERICAN: >60 ML/MIN/1.73
GLUCOSE BLD-MCNC: 125 MG/DL (ref 74–99)
HCO3: 33.5 MMOL/L (ref 22–26)
HCT VFR BLD CALC: 28.2 % (ref 37–54)
HEMOGLOBIN: 8.1 G/DL (ref 12.5–16.5)
HHB: 2.4 % (ref 0–5)
HYPOCHROMIA: ABNORMAL
IMMATURE GRANULOCYTES #: 0.15 E9/L
IMMATURE GRANULOCYTES %: 1.2 % (ref 0–5)
LAB: ABNORMAL
LYMPHOCYTES ABSOLUTE: 1.89 E9/L (ref 1.5–4)
LYMPHOCYTES RELATIVE PERCENT: 14.9 % (ref 20–42)
Lab: ABNORMAL
MAGNESIUM: 2.2 MG/DL (ref 1.6–2.6)
MCH RBC QN AUTO: 27.1 PG (ref 26–35)
MCHC RBC AUTO-ENTMCNC: 28.7 % (ref 32–34.5)
MCV RBC AUTO: 94.3 FL (ref 80–99.9)
METER GLUCOSE: 127 MG/DL (ref 74–99)
METER GLUCOSE: 144 MG/DL (ref 74–99)
METER GLUCOSE: 160 MG/DL (ref 74–99)
METER GLUCOSE: 170 MG/DL (ref 74–99)
METER GLUCOSE: 182 MG/DL (ref 74–99)
METER GLUCOSE: 202 MG/DL (ref 74–99)
METER GLUCOSE: 209 MG/DL (ref 74–99)
METHB: 0.2 % (ref 0–1.5)
MODE: ABNORMAL
MONOCYTES ABSOLUTE: 1.11 E9/L (ref 0.1–0.95)
MONOCYTES RELATIVE PERCENT: 8.7 % (ref 2–12)
NEUTROPHILS ABSOLUTE: 9.53 E9/L (ref 1.8–7.3)
NEUTROPHILS RELATIVE PERCENT: 74.9 % (ref 43–80)
O2 CONTENT: 12.4 ML/DL
O2 SATURATION: 97.6 % (ref 92–98.5)
O2HB: 96.3 % (ref 94–97)
OPERATOR ID: 2593
OVALOCYTES: ABNORMAL
PATIENT TEMP: 37 C
PCO2: 46.6 MMHG (ref 35–45)
PDW BLD-RTO: 21.1 FL (ref 11.5–15)
PEEP/CPAP: 8 CMH2O
PFO2: 1.65 MMHG/%
PH BLOOD GAS: 7.47 (ref 7.35–7.45)
PIP: 16 CMH2O
PLATELET # BLD: 245 E9/L (ref 130–450)
PMV BLD AUTO: 10.6 FL (ref 7–12)
PO2: 98.9 MMHG (ref 75–100)
POIKILOCYTES: ABNORMAL
POLYCHROMASIA: ABNORMAL
POTASSIUM REFLEX MAGNESIUM: 3 MMOL/L (ref 3.5–5)
PRO-BNP: ABNORMAL PG/ML (ref 0–450)
RBC # BLD: 2.99 E12/L (ref 3.8–5.8)
RI(T): 2.66
SCHISTOCYTES: ABNORMAL
SODIUM BLD-SCNC: 143 MMOL/L (ref 132–146)
SOURCE, BLOOD GAS: ABNORMAL
THB: 9 G/DL (ref 11.5–16.5)
TIME ANALYZED: 528
TOTAL PROTEIN: 4.8 G/DL (ref 6.4–8.3)
WBC # BLD: 12.7 E9/L (ref 4.5–11.5)

## 2021-02-04 PROCEDURE — 94660 CPAP INITIATION&MGMT: CPT

## 2021-02-04 PROCEDURE — 2500000003 HC RX 250 WO HCPCS: Performed by: INTERNAL MEDICINE

## 2021-02-04 PROCEDURE — 85025 COMPLETE CBC W/AUTO DIFF WBC: CPT

## 2021-02-04 PROCEDURE — 82805 BLOOD GASES W/O2 SATURATION: CPT

## 2021-02-04 PROCEDURE — 6360000002 HC RX W HCPCS: Performed by: INTERNAL MEDICINE

## 2021-02-04 PROCEDURE — 80053 COMPREHEN METABOLIC PANEL: CPT

## 2021-02-04 PROCEDURE — 83880 ASSAY OF NATRIURETIC PEPTIDE: CPT

## 2021-02-04 PROCEDURE — C9113 INJ PANTOPRAZOLE SODIUM, VIA: HCPCS | Performed by: INTERNAL MEDICINE

## 2021-02-04 PROCEDURE — 94640 AIRWAY INHALATION TREATMENT: CPT

## 2021-02-04 PROCEDURE — 6370000000 HC RX 637 (ALT 250 FOR IP): Performed by: INTERNAL MEDICINE

## 2021-02-04 PROCEDURE — 82962 GLUCOSE BLOOD TEST: CPT

## 2021-02-04 PROCEDURE — 83735 ASSAY OF MAGNESIUM: CPT

## 2021-02-04 PROCEDURE — 36415 COLL VENOUS BLD VENIPUNCTURE: CPT

## 2021-02-04 PROCEDURE — 2060000000 HC ICU INTERMEDIATE R&B

## 2021-02-04 PROCEDURE — 2580000003 HC RX 258: Performed by: INTERNAL MEDICINE

## 2021-02-04 PROCEDURE — 71045 X-RAY EXAM CHEST 1 VIEW: CPT

## 2021-02-04 RX ORDER — FUROSEMIDE 10 MG/ML
40 INJECTION INTRAMUSCULAR; INTRAVENOUS 2 TIMES DAILY
Status: DISCONTINUED | OUTPATIENT
Start: 2021-02-04 | End: 2021-02-07

## 2021-02-04 RX ADMIN — MICONAZOLE NITRATE: 20.6 POWDER TOPICAL at 20:06

## 2021-02-04 RX ADMIN — Medication: at 08:16

## 2021-02-04 RX ADMIN — INSULIN GLARGINE 28 UNITS: 100 INJECTION, SOLUTION SUBCUTANEOUS at 20:09

## 2021-02-04 RX ADMIN — MICONAZOLE NITRATE: 20.6 POWDER TOPICAL at 08:18

## 2021-02-04 RX ADMIN — Medication: at 18:20

## 2021-02-04 RX ADMIN — INSULIN LISPRO 3 UNITS: 100 INJECTION, SOLUTION INTRAVENOUS; SUBCUTANEOUS at 05:32

## 2021-02-04 RX ADMIN — MIDODRINE HYDROCHLORIDE 20 MG: 5 TABLET ORAL at 12:26

## 2021-02-04 RX ADMIN — PIPERACILLIN AND TAZOBACTAM 3375 MG: 3; .375 INJECTION, POWDER, LYOPHILIZED, FOR SOLUTION INTRAVENOUS at 14:45

## 2021-02-04 RX ADMIN — PIPERACILLIN AND TAZOBACTAM 3375 MG: 3; .375 INJECTION, POWDER, LYOPHILIZED, FOR SOLUTION INTRAVENOUS at 05:33

## 2021-02-04 RX ADMIN — Medication 10 ML: at 08:18

## 2021-02-04 RX ADMIN — PIPERACILLIN AND TAZOBACTAM 3375 MG: 3; .375 INJECTION, POWDER, LYOPHILIZED, FOR SOLUTION INTRAVENOUS at 20:05

## 2021-02-04 RX ADMIN — LATANOPROST 1 DROP: 50 SOLUTION OPHTHALMIC at 08:18

## 2021-02-04 RX ADMIN — ASCORBIC ACID 1500 MG: 500 INJECTION INTRAVENOUS at 00:35

## 2021-02-04 RX ADMIN — CHLORHEXIDINE GLUCONATE 0.12% ORAL RINSE 15 ML: 1.2 LIQUID ORAL at 08:18

## 2021-02-04 RX ADMIN — HYDROCORTISONE SODIUM SUCCINATE 25 MG: 100 INJECTION, POWDER, FOR SOLUTION INTRAMUSCULAR; INTRAVENOUS at 08:16

## 2021-02-04 RX ADMIN — ATORVASTATIN CALCIUM 20 MG: 20 TABLET, FILM COATED ORAL at 08:17

## 2021-02-04 RX ADMIN — SENNOSIDES 8.6 MG: 8.6 TABLET, FILM COATED ORAL at 20:04

## 2021-02-04 RX ADMIN — SODIUM CHLORIDE SOLN NEBU 3% 4 ML: 3 NEBU SOLN at 08:16

## 2021-02-04 RX ADMIN — MIDODRINE HYDROCHLORIDE 20 MG: 5 TABLET ORAL at 17:37

## 2021-02-04 RX ADMIN — BUDESONIDE 500 MCG: 0.5 SUSPENSION RESPIRATORY (INHALATION) at 08:15

## 2021-02-04 RX ADMIN — IPRATROPIUM BROMIDE AND ALBUTEROL SULFATE 1 AMPULE: .5; 3 SOLUTION RESPIRATORY (INHALATION) at 20:12

## 2021-02-04 RX ADMIN — ASCORBIC ACID 1500 MG: 500 INJECTION INTRAVENOUS at 05:33

## 2021-02-04 RX ADMIN — LACTULOSE 20 G: 20 SOLUTION ORAL at 08:17

## 2021-02-04 RX ADMIN — POTASSIUM CHLORIDE 20 MEQ: 400 INJECTION, SOLUTION INTRAVENOUS at 07:42

## 2021-02-04 RX ADMIN — POTASSIUM CHLORIDE 20 MEQ: 400 INJECTION, SOLUTION INTRAVENOUS at 06:23

## 2021-02-04 RX ADMIN — IPRATROPIUM BROMIDE AND ALBUTEROL SULFATE 1 AMPULE: .5; 3 SOLUTION RESPIRATORY (INHALATION) at 08:15

## 2021-02-04 RX ADMIN — IPRATROPIUM BROMIDE AND ALBUTEROL SULFATE 1 AMPULE: .5; 3 SOLUTION RESPIRATORY (INHALATION) at 12:03

## 2021-02-04 RX ADMIN — PANTOPRAZOLE SODIUM 40 MG: 40 INJECTION, POWDER, FOR SOLUTION INTRAVENOUS at 08:17

## 2021-02-04 RX ADMIN — ASCORBIC ACID 1500 MG: 500 INJECTION INTRAVENOUS at 12:26

## 2021-02-04 RX ADMIN — INSULIN LISPRO 3 UNITS: 100 INJECTION, SOLUTION INTRAVENOUS; SUBCUTANEOUS at 08:13

## 2021-02-04 RX ADMIN — POTASSIUM CHLORIDE 20 MEQ: 400 INJECTION, SOLUTION INTRAVENOUS at 07:40

## 2021-02-04 RX ADMIN — FUROSEMIDE 40 MG: 10 INJECTION, SOLUTION INTRAMUSCULAR; INTRAVENOUS at 12:39

## 2021-02-04 RX ADMIN — ENOXAPARIN SODIUM 40 MG: 40 INJECTION SUBCUTANEOUS at 08:17

## 2021-02-04 RX ADMIN — BUDESONIDE 500 MCG: 0.5 SUSPENSION RESPIRATORY (INHALATION) at 20:12

## 2021-02-04 RX ADMIN — THIAMINE HYDROCHLORIDE 200 MG: 100 INJECTION, SOLUTION INTRAMUSCULAR; INTRAVENOUS at 02:27

## 2021-02-04 RX ADMIN — INSULIN LISPRO 3 UNITS: 100 INJECTION, SOLUTION INTRAVENOUS; SUBCUTANEOUS at 12:29

## 2021-02-04 RX ADMIN — Medication 10 ML: at 20:04

## 2021-02-04 RX ADMIN — Medication: at 20:04

## 2021-02-04 RX ADMIN — CHLORHEXIDINE GLUCONATE 0.12% ORAL RINSE 15 ML: 1.2 LIQUID ORAL at 20:04

## 2021-02-04 RX ADMIN — AMIODARONE HYDROCHLORIDE 100 MG: 200 TABLET ORAL at 08:17

## 2021-02-04 RX ADMIN — POTASSIUM BICARBONATE 40 MEQ: 782 TABLET, EFFERVESCENT ORAL at 08:17

## 2021-02-04 RX ADMIN — FUROSEMIDE 40 MG: 10 INJECTION, SOLUTION INTRAMUSCULAR; INTRAVENOUS at 17:37

## 2021-02-04 RX ADMIN — SENNOSIDES 8.6 MG: 8.6 TABLET, FILM COATED ORAL at 08:17

## 2021-02-04 RX ADMIN — SODIUM CHLORIDE SOLN NEBU 3% 4 ML: 3 NEBU SOLN at 20:12

## 2021-02-04 RX ADMIN — INSULIN LISPRO 3 UNITS: 100 INJECTION, SOLUTION INTRAVENOUS; SUBCUTANEOUS at 17:38

## 2021-02-04 RX ADMIN — SODIUM CHLORIDE, PRESERVATIVE FREE 10 ML: 5 INJECTION INTRAVENOUS at 08:17

## 2021-02-04 RX ADMIN — IPRATROPIUM BROMIDE AND ALBUTEROL SULFATE 1 AMPULE: .5; 3 SOLUTION RESPIRATORY (INHALATION) at 16:06

## 2021-02-04 RX ADMIN — INSULIN LISPRO 6 UNITS: 100 INJECTION, SOLUTION INTRAVENOUS; SUBCUTANEOUS at 22:16

## 2021-02-04 ASSESSMENT — PAIN SCALES - GENERAL: PAINLEVEL_OUTOF10: 0

## 2021-02-04 NOTE — PROGRESS NOTES
Palliative Medicine  Progress Note    Patient continues to require BiPAP, not tolerating off bipap for extended time. He remains DNR_CCA and is not for reintubation if respiratory status continues to decline. Will follow and support family pending further clinical progression. Jovanna TOLEDO-REBEKAH  Palliative Medicine    Note: This report was completed using computerCamperoo voiced recognition software. Every effort has been made to ensure accuracy; however, inadvertent computerized transcription errors may be present.

## 2021-02-04 NOTE — PROGRESS NOTES
Subjective:    Events of yesterday reviewed with staff. He is on bipap now, extubated. Objective:  Pt is resting on bipap   BP (!) 86/52   Pulse 110   Temp 99.7 °F (37.6 °C) (Bladder)   Resp 26   Ht 6' (1.829 m)   Wt 256 lb 6.3 oz (116.3 kg)   SpO2 96%   BMI 34.77 kg/m²   HEENT no adenopathy no bruits  Heart:  RRR, no murmurs, gallops, or rubs.   Lungs:  Diminished in all fields   Abd: bowel sounds present, nontender, nondistended, no masses  Extrem:  No clubbing, cyanosis, +edema lower with erythema   WBC/Hgb/Hct/Plts:  12.7/8.1/28.2/245 (02/04 0528) basic metabolic panel     Assessment:    Patient Active Problem List   Diagnosis    Severe sepsis with septic shock ([address]    Dementia ([address]    Metabolic encephalopathy    Diabetes mellitus type 2, uncontrolled ([address]    Hyperlipidemia LDL goal <100    Essential hypertension    Venous ulcer of left leg (HCC)    Non-pressure chronic ulcer of left lower leg with fat layer exposed ([address]    Non-pressure chronic ulcer left lower leg, limited to breakdown skin (HCC)    Severe protein-calorie malnutrition (HCC)    Non-pressure chronic ulcer right lower leg, limited to breakdown skin (HCC)    Pressure injury of contiguous region involving back, buttock, and hip, stage 2 (HCC)    Pressure injury of calf, stage 2 ([address]    HCAP (healthcare-associated pneumonia)    Paroxysmal atrial fibrillation (HCC)    Acute respiratory failure with hypoxia (HCC)    Acute deep vein thrombosis (DVT) of femoral vein of left lower extremity (HCC)    Leg swelling    Respiratory failure (HCC)    Palliative care encounter    DNR no code (do not resuscitate)    Goals of care, counseling/discussion    Sepsis due to Pseudomonas species with acute hypercapnic respiratory failure and septic shock ([address]       Plan:  DNR CCA now, no re-intubation  May need Comfort medications         Slim Castillo  6:44 AM  2/4/2021

## 2021-02-04 NOTE — PROGRESS NOTES
Critical Care Team - Daily Progress Note         Date and time: 2/4/2021 8:38 AM  Patient's name:  Karely Kirby  Medical Record Number: [id number]  Patient's account/billing number: [de-identified]  Patient's YOB: 1940  Age: 78 y.o. Date of Admission: 1/22/2021  8:35 PM  Length of stay during current admission: 13    Primary Care Physician: No primary care provider on file. ICU Attending Physician: Dr. Dr. Monty Rosado Status: DNR-CCA     Reason for ICU admission: Septic shock  SUBJECTIVE:      Desaturated last night   Put on bipap   Lots of secretions   OVERNIGHT EVENTS:       CURRENT VENTILATION STATUS:   []??? Ventilator   [x]? ?? BIPAP         []? ?? Nasal Cannula       []? ?? Room Air    IF INTUBATED, ET TUBE MARKING AT LOWER LIP:       cms  SECRETIONS Amount:         []??? Small          []? ?? Moderate                [x]? ?? Large  []? ?? None                       Color:              []??? White          [x]? ?? Colored    tan               []??? Bloody  SEDATION:    RAAS Score: On Precedex  []? ?? Propofol gtt             []??? Versed gtt               []??? Ativan gtt                [x]? ?? No Sedation  PARALYZED:             [x]? ?? No                           []??? Yes  VASOPRESSORS:    [x]? ?? No                           []??? Yes    If yes - []? ?? Levophed       []? ?? Dopamine          []??? Vasopressin       []? ?? Dobutamine  []? ?? Phenylephrine         []? ?? Epinephrine  CENTRAL LINES:     []? ?? No               []??? Yes   (Date of Insertion:   )           If yes -     []??? Right IJ             []? ?? Left IJ         []??? Right Femoral        []? ?? Left Femoral                   []??? Right Subclavian           []? ?? Left Subclavian                  [x]? ?? PICC Line right arm  GREEN'S CATHETER:   []? ?? No                      [x]? ?? Yes  (Date of Insertion:   )   URINE OUTPUT:            [x]? ?? Good                  []? ?? Low              []??? Anuric         OBJECTIVE:     VITAL SIGNS:  BP (!) 86/52   Pulse 123   Temp 100.4 °F (38 °C)   Resp 26   Ht 6' (1.829 m)   Wt 256 lb 6.3 oz (116.3 kg)   SpO2 94%   BMI 34.77 kg/m²   Tmax over 24 hours:  Temp (24hrs), Av.5 °F (38.6 °C), Min:99.7 °F (37.6 °C), Max:102.6 °F (39.2 °C)      Patient Vitals for the past 6 hrs:   BP Temp Temp src Pulse Resp SpO2 Weight   21 0811 -- -- -- -- 26 -- --   21 0800 -- 100.4 °F (38 °C) -- 123 27 94 % --   21 0738 -- 100.2 °F (37.9 °C) -- 128 25 97 % --   21 0736 -- -- -- 123 26 (!) 65 % --   21 0700 -- -- -- 123 28 90 % --   21 0600 -- -- -- 110 26 96 % 256 lb 6.3 oz (116.3 kg)   21 0500 -- -- -- 100 26 100 % --   21 0400 (!) 86/52 99.7 °F (37.6 °C) Bladder 90 22 100 % --   21 0300 -- -- -- 85 23 97 % --         Intake/Output Summary (Last 24 hours) at 2021 0838  Last data filed at 2021 0800  Gross per 24 hour   Intake 2300.9 ml   Output 805 ml   Net 1495.9 ml     Wt Readings from Last 2 Encounters:   21 256 lb 6.3 oz (116.3 kg)   21 255 lb (115.7 kg)     Body mass index is 34.77 kg/m².         PHYSICAL EXAMINATION:  On BIPAP   General appearance - alert opens eyes moves right arm obese intubated on low-dose Precedex, and in no distress with L side flaccid   Mental status - alert, oriented to person, place, and time  Eyes - pupils equal and reactive, extraocular eye movements intact  Ears - external ear canals normal  Nose - normal and patent,, discharge   Mouth - mucous membranes moist, pharynx normal without lesions  Neck - supple, no significant adenopathy, JVD  Chest - clear to auscultation, no wheezes, rales or rhonchi, symmetric air entry  Heart - normal rate, regular rhythm, normal S1, S2, no murmurs, rubs, clicks or gallops  Abdomen - soft, nontender, nondistended, no masses or organomegaly obese PEJ  Neurological - alert, oriented, normal speech, no focal findings or movement disorder noted  Extremities - peripheral pulses normal, no pedal edema, no clubbing or cyanosis.  Right arm swollen with wound around elbow  Skin - normal coloration and turgor, no rashes, no suspicious skin lesions noted             MEDICATIONS:    Scheduled Meds:   piperacillin-tazobactam  3,375 mg Intravenous Q8H    sodium chloride   Intravenous Q8H    hydrocortisone sodium succinate PF  25 mg Intravenous Daily    miconazole   Topical BID    potassium bicarb-citric acid  40 mEq Per G Tube Daily    insulin glargine  28 Units Subcutaneous Nightly    ascorbic acid  1,500 mg Intravenous Q6H    midodrine  20 mg Oral TID WC    ipratropium-albuterol  1 ampule Inhalation 4x daily    insulin lispro  0-18 Units Subcutaneous Q4H    Mineral Oil-Hydrophil Petrolat   Topical BID    lidocaine PF  5 mL Intradermal Once    heparin flush  3 mL Intravenous 2 times per day    chlorhexidine  15 mL Mouth/Throat BID    amiodarone  100 mg PEG Tube Daily    atorvastatin  20 mg PEG Tube Daily    lactulose  20 g PEG Tube Daily    latanoprost  1 drop Both Eyes Daily    pantoprazole  40 mg Intravenous Daily    And    sodium chloride (PF)  10 mL Intravenous Daily    senna  1 tablet PEG Tube BID    sodium chloride (Inhalant)  4 mL Nebulization BID    sodium chloride flush  10 mL Intravenous 2 times per day    enoxaparin  40 mg Subcutaneous Daily    budesonide  500 mcg Nebulization BID     Continuous Infusions:   dextrose      norepinephrine Stopped (02/03/21 0725)     PRN Meds:       Mineral Oil-Hydrophil Petrolat, , 4x Daily PRN      sodium chloride flush, 10 mL, PRN      heparin flush, 3 mL, PRN      potassium chloride, 20 mEq, PRN      acetaminophen, 650 mg, Q4H PRN      docusate sodium, 1 enema, PRN      glucose, 15 g, PRN      dextrose, 12.5 g, PRN      glucagon (rDNA), 1 mg, PRN      dextrose, 100 mL/hr, PRN      sodium chloride flush, 10 mL, PRN      promethazine, 12.5 mg, Q6H PRN    Or      ondansetron, 4 mg, Q6H PRN      polyethylene glycol, 17 g, Daily PRN      acetaminophen, 650 mg, Q6H PRN    Or      acetaminophen, 650 mg, Q6H PRN      fentanNYL, 50 mcg, Q1H PRN          VENT SETTINGS (Comprehensive) (if applicable): ABGs:   Recent Labs     02/04/21 0528   PH 7.475*   PCO2 46.6*   PO2 98.9   HCO3 33.5*   BE 9.0*   O2SAT 97.6       Laboratory findings:  Complete Blood Count:   Recent Labs     02/02/21  0400 02/03/21  0423 02/03/21  1729 02/04/21  0518   WBC 10.3 17.3*  --  12.7*   HGB 9.0* 7.8* 7.9* 8.1*   HCT 29.9* 26.1* 27.1* 28.2*    301  --  245        Last 3 Blood Glucose:   Recent Labs     02/02/21  0400 02/03/21  0423 02/04/21  0518   GLUCOSE 147* 171* 125*        PT/INR:    Lab Results   Component Value Date    PROTIME 14.1 01/15/2021    INR 1.2 01/15/2021     PTT:    Lab Results   Component Value Date    APTT 21.0 01/06/2021       Comprehensive Metabolic Profile:   Recent Labs     02/02/21  0400 02/03/21  0423 02/04/21  0518    139 143   K 3.2* 3.1* 3.0*   CL 86* 96* 99   CO2 39* 33* 37*   BUN 23 31* 37*   CREATININE 0.7 0.7 0.8   GLUCOSE 147* 171* 125*   CALCIUM 7.5* 7.7* 7.3*   PROT 5.1* 4.9* 4.8*   LABALBU 2.6* 2.8* 2.5*   BILITOT 0.4 0.4 0.3   ALKPHOS 76 78 68   AST 18 20 25   ALT 8 8 9      Magnesium:   Lab Results   Component Value Date    MG 2.2 02/04/2021     Phosphorus:   Lab Results   Component Value Date    PHOS 3.2 02/02/2021     Ionized Calcium:   Lab Results   Component Value Date    CAION 1.06 01/18/2021        Urinalysis:     Troponin: No results for input(s): TROPONINI in the last 72 hours. Microbiology:    Cultures during this admission:     Blood cultures:                        []? None drawn      []? Negative     [x]? Positive     []? Pending   Urine Culture:                         []? None drawn      []? Negative     [x]? Positive     []? Pending  Sputum Culture:                      []? None drawn      []? Negative     []? Positive     []?  Pending  Endotracheal aspirate:            []? None drawn      []? Negative     []? Positive     []? Pending  Stool:                                       []? None Sent        []? Negative     []? Positive     []? Pending            Other Micro:                            []? None Sent        []? Negative     []? Positive     []?  Pending                Collected: 01/23/21 1116   Order Status: Completed Specimen: Endotracheal Updated: 01/26/21 0639    CULTURE, RESPIRATORY Oral Pharyngeal Tamika reducedAbnormal     Smear, Respiratory --    Group 5: >25 PMN's/LPF and <10 Epithelial cells/LPF   Abundant Polymorphonuclear leukocytes   Epithelial cells not seen   Rare Gram negative diplococci   Rare Gram positive cocci in clusters   Few Gram negative rods   Rare Gram positive cocci in pairs     Organism Pseudomonas aeruginosaAbnormal     CULTURE, RESPIRATORY Heavy growth    Organism Proteus mirabilisAbnormal     CULTURE, RESPIRATORY Heavy growth    Organism Escherichia coliAbnormal     CULTURE, RESPIRATORY Moderate growth    Organism Klebsiella pneumoniae ssp pneumoniaeAbnormal     CULTURE, RESPIRATORY Moderate growth     Culture, Urine [[id number]] (Abnormal)  Collected: 01/22/21 2111   Order Status: Completed Specimen: Urine, clean catch Updated: 01/25/21 0722    Organism Pseudomonas aeruginosaAbnormal     Urine Culture, Routine >100,000 CFU/ml   Narrative:         Other pertinent Labs:         Radiology/Imaging:         ASSESSMENT:   78-year-old man 100-pack-year ex-smoker with dementia HTN, HLD, DM, BPH,     12/13/2020-12/23/2020 septic shock from healthcare associated pneumonia with MRSA ESBL Klebsiella requiring pressors extubated 12/15  Patient mucous plugging intubated 12/28 extubated 1/8.  12/23/2020 -1/20/2021  patient [facility] improved went to SNF.  Patient had PEG tube changed to [address] tube 1/15.  Patient received 2 units of blood 1/5.  IVC filter was placed 1/6 due to high risk for bleeding on anticoagulation    This admission  1/22/2021 had emesis while on BIPAP with unresponsiveness at nursing home. Pt was intubated in ER.  T-max 102COVID negative. CT chest with b/l consolidations groundglass densities moderate effusion  CT head negative  CT chest multifocal infiltrates bilaterally groundglass densities.  Bilateral effusions. Pulmonary edema  1/27 no change of multifocal infiltrate  1/29 stable infiltrates right lung  1/31 T-max 102.4   2/1  T-max 101.1, on pressure support mode 40% FiO2 on Levophed multifocal airspace disease.  Right upper extremity ultrasound no DVT  2/2 weaned off of pressors. Responded to fluid. T-max 102. Antibiotics on meropenem anidulafungin changed to Zosyn   2/3 Patient tolerated CPAP trial and able to be extubated. The pt is made DNR with no  intubation Tmax 102.2   2/4 put on BIPAP overnight camila film with worsening infiltrates on the R      1. S/p septic shock vitamin C, Solu-Cortef every 6 at 50 mg and on Levophed  2. Acute respiratory failure on BIPAP  3. Aspiration pneumonia MRSA ESBL Klebsiella   4. Echo EF 55 to 60% on 12/6/2020, mild aortic stenosis  5. Anemia   6. Dysphagia PEG tube changed to PEJ tube 1/15/2021  7. 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 History of A. fib on amiodarone  9. Left CFV SF V DVT 1/3/2021 IVC filter placed 1/6  10. Baseline dementia nonverbal  11. Hennyen Wang. 12. Probable COPD on aerosols  13. DNR CCA family does not want to have a trach so no reintubation if patient were to extubate     PLAN:    BP labile  K replaced   Hgb is stable   Will diurese pt  Check proBNP      WEAN PER PROTOCOL:                  [x]? ?? No               []??? Yes             []??? N/A  DISCONTINUE ANY LABS:              [x]? ?? No               []??? Yes  ICU PROPHYLAXIS:  Stress ulcer:  [x]? ?? PPI Agent               []? ?? W5Mxcmv      []??? Sucralfate               []? ?? Other:  VTE:                [x]? ?? Enoxaparin             []? ?? Unfract. Heparin Subcut                []? ??[facility] Cuffs  NUTRITION:  []??? NPO            [x]? ?? Tube Feeding (Specify: )   []? ?? TPN                        []? ?? PO (Diet: DIET TUBE FEED CONTINUOUS/CYCLIC NPO; Semi-elemental; Orogastric; Continuous; 10; 60; 24  Diet Tube Feed Modular: Protein Modular)  HOME MEDICATIONS RECONCILED:        [x]? ?? No                           []??? Yes  INSULIN DRIP:                                               [x]? ?? No                           []??? Yes  CONSULTATION NEEDED:                          [x]? ?? No                           []??? Yes  FAMILY UPDATED:                                       [x]? ?? No                           []??? Yes  TRANSFER OUT OF ICU:                             [x]? ?? No                           []??? Yes        Leny Ward M.D. [place] Leyden. BRODY P                     I personally saw, examined and provided care for the patient. Radiographs, labs and medication list were reviewed by me independently. I spoke with bedside nursing, therapists and consultants.  Critical care services and times documented are independent of procedures    > 30 min CCT    2/4/2021, 8:38 AM

## 2021-02-04 NOTE — PROGRESS NOTES
Date: 2/3/2021    Time: 8:46 PM    Patient Placed On BIPAP/CPAP/ Non-Invasive Ventilation? Yes    If no must comment. Facial area red/color change? Yes, RN aware        If YES are Blister/Lesion present? No   If yes must notify nursing staff  BIPAP/CPAP skin barrier?   Yes    Skin barrier type:mepilexlite       Comments:        [employer], [employer]

## 2021-02-04 NOTE — PROGRESS NOTES
[address] [place] [address] [facility]  Progress  Note   C/C : pneumonia, resp failure, fever      Pt's extubated  On BiPAP   Temp .4 F    Non communicating       Current Facility-Administered Medications   Medication Dose Route Frequency Provider Last Rate Last Admin    piperacillin-tazobactam (ZOSYN) 3,375 mg in dextrose 5 % 100 mL IVPB extended infusion (mini-bag)  3,375 mg Intravenous Q8H Ruben P MD Olga 25 mL/hr at 02/04/21 0533 3,375 mg at 02/04/21 0533    0.9 % sodium chloride infusion admixture   Intravenous Q8H Ruben P MD Olga        hydrocortisone sodium succinate PF (SOLU-CORTEF) injection 25 mg  25 mg Intravenous Daily Sunni Kimbrough MD   25 mg at 02/04/21 0816    miconazole (MICOTIN) 2 % powder   Topical BID Sunni Kimbrough MD   Given at 02/04/21 0818    potassium bicarb-citric acid (EFFER-K) effervescent tablet 40 mEq  40 mEq Per G Tube Daily Duane Lundborg Barreiro, DO   40 mEq at 02/04/21 0817    insulin glargine (LANTUS) injection vial 28 Units  28 Units Subcutaneous Nightly Sunni Kimbrough MD   28 Units at 02/03/21 2131    ascorbic acid 1,500 mg in sodium chloride 0.9 % 100 mL IVPB  1,500 mg Intravenous Q6H Duane Lundborg Barreiro, DO   1,500 mg at 02/04/21 0533    midodrine (PROAMATINE) tablet 20 mg  20 mg Oral TID WC Duane Lundborg Barreiro, DO   20 mg at 02/03/21 1706    ipratropium-albuterol (DUONEB) nebulizer solution 1 ampule  1 ampule Inhalation 4x daily Duane Lundborg Barreiro, DO   1 ampule at 02/04/21 0815    insulin lispro (HUMALOG) injection vial 0-18 Units  0-18 Units Subcutaneous Q4H [place] Garay, DO   3 Units at 02/04/21 0813    Mineral Oil-Hydrophil Petrolat OINT   Topical BID [place] Jarrod, DO   Given at 02/04/21 4435    And    Mineral Oil-Hydrophil Petrolat OINT   Topical 4x Daily PRN [place] Garay DO   Given at 01/26/21 1846    lidocaine PF 1 % injection 5 mL  5 mL Intradermal Once Mira Garay,         sodium chloride flush 0.9 % injection 10 mL  10 mL Intravenous PRN Phoebe Sorrel, DO        heparin flush 100 UNIT/ML injection 300 Units  3 mL Intravenous 2 times per day Phoebe Sorrel, DO   Stopped at 01/30/21 0911    heparin flush 100 UNIT/ML injection 300 Units  3 mL Intracatheter PRN Phoebe Sorrel, DO        chlorhexidine (PERIDEX) 0.12 % solution 15 mL  15 mL Mouth/Throat BID Roma Palaciosiro, DO   15 mL at 02/04/21 0818    potassium chloride 20 mEq/50 mL IVPB (Central Line)  20 mEq Intravenous PRN Phoebe Sorrel, DO 50 mL/hr at 02/04/21 0742 20 mEq at 02/04/21 0742    acetaminophen (TYLENOL) 160 MG/5ML solution 650 mg  650 mg Per G Tube Q4H PRN Philly Spann MD   650 mg at 02/03/21 1902    amiodarone (CORDARONE) tablet 100 mg  100 mg PEG Tube Daily Philly Spann MD   100 mg at 02/04/21 0817    atorvastatin (LIPITOR) tablet 20 mg  20 mg PEG Tube Daily Philly Spann MD   20 mg at 02/04/21 0817    docusate sodium (ENEMEEZ) enema 283 mg  1 enema Rectal PRN Philly Spann MD        glucose (GLUTOSE) 40 % oral gel 15 g  15 g Oral PRN Philly Spann MD        dextrose 50 % IV solution  12.5 g Intravenous PRN Philly Spann MD        glucagon (rDNA) injection 1 mg  1 mg Intramuscular PRN Philly Spann MD        dextrose 5 % solution  100 mL/hr Intravenous PRN Philly Spann MD        lactulose (CHRONULAC) 10 GM/15ML solution 20 g  20 g PEG Tube Daily Felix Hughes MD   20 g at 02/04/21 0817    latanoprost (XALATAN) 0.005 % ophthalmic solution 1 drop  1 drop Both Eyes Daily Philly Spann MD   1 drop at 02/04/21 0818    pantoprazole (PROTONIX) injection 40 mg  40 mg Intravenous Daily Philly Spann MD   40 mg at 02/04/21 0817    And    sodium chloride (PF) 0.9 % injection 10 mL  10 mL Intravenous Daily Philly Spann MD   10 mL at 02/04/21 0817    senna (SENOKOT) tablet 8.6 mg  1 tablet PEG Tube BID Philly Spann MD   8.6 mg at 02/04/21 0817    sodium chloride (Inhalant) 3 % nebulizer solution 4 mL  4 mL Nebulization BID Allison John MD   4 mL at 02/04/21 0816    sodium chloride flush 0.9 % injection 10 mL  10 mL Intravenous 2 times per day Allison John MD   10 mL at 02/04/21 0818    sodium chloride flush 0.9 % injection 10 mL  10 mL Intravenous PRN Allison John MD   10 mL at 02/02/21 1242    enoxaparin (LOVENOX) injection 40 mg  40 mg Subcutaneous Daily Allison John MD   40 mg at 02/04/21 0817    promethazine (PHENERGAN) tablet 12.5 mg  12.5 mg Oral Q6H PRN Allison John MD        Or    ondansetron (ZOFRAN) injection 4 mg  4 mg Intravenous Q6H PRN Allison John MD        polyethylene glycol (GLYCOLAX) packet 17 g  17 g Oral Daily PRN Allison John MD        acetaminophen (TYLENOL) tablet 650 mg  650 mg Oral Q6H PRN Allison John MD   650 mg at 01/30/21 1923    Or    acetaminophen (TYLENOL) suppository 650 mg  650 mg Rectal Q6H PRN Allison John MD        budesonide (PULMICORT) nebulizer suspension 500 mcg  500 mcg Nebulization BID Octavio Lizama MD   500 mcg at 02/04/21 0815    fentaNYL (SUBLIMAZE) injection 50 mcg  50 mcg Intravenous Q1H PRN Navjot Groves MD   50 mcg at 02/03/21 2008    norepinephrine (LEVOPHED) 16 mg in dextrose 5% 250 mL infusion  2-100 mcg/min Intravenous Continuous Thomas Soulier, DO   Stopped at 02/03/21 0725       REVIEW OF SYSTEMS: could not be obtained       PHYSICAL EXAM:    Vitals:   BP (!) 86/52   Pulse 123   Temp 100.4 °F (38 °C)   Resp 26   Ht 6' (1.829 m)   Wt 256 lb 6.3 oz (116.3 kg)   SpO2 94%   BMI 34.77 kg/m²      Constitutional: Awake, on BiPAP ,   Skin:  no rash   HEENT:Pallor +, no LN    Neck: Supple to movements. No lymphadenopathy.    Chest: Bilateral coarse  rhonchi   Cardiovascular: Regular, no murmur   Abdomen:soft, bowel sound +  PEG - no drainage or erythema   Extremities: ++ edema, erythema on the legs     Lines:  Right brachial PICC ( 1/25)     CBC with Differential:      Lab Results   Component Value Date    WBC 12.7 02/04/2021 RBC 2.99 02/04/2021    HGB 8.1 02/04/2021    HCT 28.2 02/04/2021     02/04/2021    MCV 94.3 02/04/2021    MCH 27.1 02/04/2021    MCHC 28.7 02/04/2021    RDW 21.1 02/04/2021    NRBC 0.9 01/08/2021    SEGSPCT 64 09/27/2013    METASPCT 6.0 01/06/2021    LYMPHOPCT 14.9 02/04/2021    PROMYELOPCT 0.9 12/21/2020    MONOPCT 8.7 02/04/2021    MYELOPCT 3.0 01/06/2021    BASOPCT 0.1 02/04/2021    MONOSABS 1.11 02/04/2021    LYMPHSABS 1.89 02/04/2021    EOSABS 0.02 02/04/2021    BASOSABS 0.01 02/04/2021       CMP:    Lab Results   Component Value Date     02/04/2021    K 3.0 02/04/2021    CL 99 02/04/2021    CO2 37 02/04/2021    BUN 37 02/04/2021    CREATININE 0.8 02/04/2021    GFRAA >60 02/04/2021    LABGLOM >60 02/04/2021    GLUCOSE 125 02/04/2021    GLUCOSE 138 03/26/2011    PROT 4.8 02/04/2021    LABALBU 2.5 02/04/2021    LABALBU 4.0 03/26/2011    CALCIUM 7.3 02/04/2021    BILITOT 0.3 02/04/2021    ALKPHOS 68 02/04/2021    AST 25 02/04/2021    ALT 9 02/04/2021       Hepatic Function Panel:    Lab Results   Component Value Date    ALKPHOS 68 02/04/2021    ALT 9 02/04/2021    AST 25 02/04/2021    PROT 4.8 02/04/2021    BILITOT 0.3 02/04/2021    BILIDIR 0.4 01/14/2021    IBILI 0.5 01/14/2021    LABALBU 2.5 02/04/2021    LABALBU 4.0 03/26/2011         Microbiology :    Blood culture - neg to date   Urine Culture -     Susceptibility    Pseudomonas aeruginosa (1)    Antibiotic Interpretation SNEHA Status    gentamicin Sensitive <=^1 mcg/mL     levofloxacin Sensitive <=^0.12 mcg/mL     tobramycin Sensitive <=^1 mcg/mL     Lab and Collection        Sputum Culture-      Susceptibility    Pseudomonas aeruginosa (1)    Antibiotic Interpretation SNEHA Status    gentamicin Sensitive <=^1 mcg/mL     levofloxacin Sensitive <=^0.12 mcg/mL     tobramycin Sensitive <=^1 mcg/mL     Proteus mirabilis (4)    Antibiotic Interpretation SNEHA Status    ampicillin Sensitive <=^2 mcg/mL     ceFAZolin Sensitive <=^4 mcg/mL     cefepime Sensitive <=^0.12 mcg/mL     cefTRIAXone Sensitive <=^0.25 mcg/mL     ertapenem Sensitive <=^0.12 mcg/mL     gentamicin Sensitive <=^1 mcg/mL     levofloxacin Resistant >=^8 mcg/mL     piperacillin-tazobactam Sensitive <=^4 mcg/mL     trimethoprim-sulfamethoxazole Sensitive <=^20 mcg/mL      Condensed View   Lab and Collection  Radiology :    Chest X ray - bilateral multifocal infiltrates , cardiomegaly     Ultrasound - neg for DVT     Fungitell test negative       Assessment:  · Respiratory failure - on BiPAP    · Pseudomonas bacteriuria    · HCAP / Aspiration pneumonia  (Pseudomonas, Klebsiella, E coli and Proteus)   · Persistent fever / leukocytosis         Plan:    · Zosyn 3.375 grams IV q 8 hrs       Ruben P Limbu  12:02 PM  2/4/2021

## 2021-02-05 ENCOUNTER — APPOINTMENT (OUTPATIENT)
Dept: GENERAL RADIOLOGY | Age: 81
DRG: 870 | End: 2021-02-05
Payer: COMMERCIAL

## 2021-02-05 LAB
ALBUMIN SERPL-MCNC: 2.7 G/DL (ref 3.5–5.2)
ALP BLD-CCNC: 71 U/L (ref 40–129)
ALT SERPL-CCNC: 10 U/L (ref 0–40)
ANION GAP SERPL CALCULATED.3IONS-SCNC: 10 MMOL/L (ref 7–16)
ANISOCYTOSIS: ABNORMAL
AST SERPL-CCNC: 19 U/L (ref 0–39)
B.E.: 10.9 MMOL/L (ref -3–0)
BASOPHILS ABSOLUTE: 0.01 E9/L (ref 0–0.2)
BASOPHILS RELATIVE PERCENT: 0.1 % (ref 0–2)
BILIRUB SERPL-MCNC: 0.4 MG/DL (ref 0–1.2)
BLOOD CULTURE, ROUTINE: NORMAL
BUN BLDV-MCNC: 39 MG/DL (ref 8–23)
BURR CELLS: ABNORMAL
CALCIUM SERPL-MCNC: 7.7 MG/DL (ref 8.6–10.2)
CHLORIDE BLD-SCNC: 98 MMOL/L (ref 98–107)
CO2: 37 MMOL/L (ref 22–29)
CREAT SERPL-MCNC: 0.7 MG/DL (ref 0.7–1.2)
CULTURE, BLOOD 2: NORMAL
DEVICE: ABNORMAL
EOSINOPHILS ABSOLUTE: 0.05 E9/L (ref 0.05–0.5)
EOSINOPHILS RELATIVE PERCENT: 0.4 % (ref 0–6)
FIO2 ARTERIAL: 50
GFR AFRICAN AMERICAN: >60
GFR NON-AFRICAN AMERICAN: >60 ML/MIN/1.73
GLUCOSE BLD-MCNC: 121 MG/DL (ref 74–99)
HCO3 ARTERIAL: 34.8 MMOL/L (ref 22–26)
HCT VFR BLD CALC: 26.8 % (ref 37–54)
HEMOGLOBIN: 8.1 G/DL (ref 12.5–16.5)
HYPOCHROMIA: ABNORMAL
IMMATURE GRANULOCYTES #: 0.14 E9/L
IMMATURE GRANULOCYTES %: 1.2 % (ref 0–5)
LYMPHOCYTES ABSOLUTE: 1.57 E9/L (ref 1.5–4)
LYMPHOCYTES RELATIVE PERCENT: 13 % (ref 20–42)
MAGNESIUM: 2.2 MG/DL (ref 1.6–2.6)
MCH RBC QN AUTO: 28.2 PG (ref 26–35)
MCHC RBC AUTO-ENTMCNC: 30.2 % (ref 32–34.5)
MCV RBC AUTO: 93.4 FL (ref 80–99.9)
METER GLUCOSE: 150 MG/DL (ref 74–99)
METER GLUCOSE: 156 MG/DL (ref 74–99)
METER GLUCOSE: 177 MG/DL (ref 74–99)
METER GLUCOSE: 204 MG/DL (ref 74–99)
METER GLUCOSE: 207 MG/DL (ref 74–99)
METER GLUCOSE: 225 MG/DL (ref 74–99)
MODE: ABNORMAL
MONOCYTES ABSOLUTE: 0.99 E9/L (ref 0.1–0.95)
MONOCYTES RELATIVE PERCENT: 8.2 % (ref 2–12)
NEUTROPHILS ABSOLUTE: 9.33 E9/L (ref 1.8–7.3)
NEUTROPHILS RELATIVE PERCENT: 77.1 % (ref 43–80)
O2 SATURATION: 98.4 % (ref 92–98.5)
OPERATOR ID: 1632
OVALOCYTES: ABNORMAL
PCO2 ARTERIAL: 42.4 MMHG (ref 35–45)
PDW BLD-RTO: 21.2 FL (ref 11.5–15)
PH BLOOD GAS: 7.52 (ref 7.35–7.45)
PLATELET # BLD: 225 E9/L (ref 130–450)
PMV BLD AUTO: 10.9 FL (ref 7–12)
PO2 ARTERIAL: 101.4 MMHG (ref 60–80)
POIKILOCYTES: ABNORMAL
POLYCHROMASIA: ABNORMAL
POSITIVE END EXP PRESS: 8 CMH2O
POTASSIUM REFLEX MAGNESIUM: 3 MMOL/L (ref 3.5–5)
PRESSURE SUPPORT: 16 CMH2O
RBC # BLD: 2.87 E12/L (ref 3.8–5.8)
SCHISTOCYTES: ABNORMAL
SODIUM BLD-SCNC: 145 MMOL/L (ref 132–146)
SOURCE, BLOOD GAS: ABNORMAL
TARGET CELLS: ABNORMAL
TOTAL PROTEIN: 5 G/DL (ref 6.4–8.3)
WBC # BLD: 12.1 E9/L (ref 4.5–11.5)

## 2021-02-05 PROCEDURE — 94660 CPAP INITIATION&MGMT: CPT

## 2021-02-05 PROCEDURE — 97166 OT EVAL MOD COMPLEX 45 MIN: CPT

## 2021-02-05 PROCEDURE — 83735 ASSAY OF MAGNESIUM: CPT

## 2021-02-05 PROCEDURE — 6360000002 HC RX W HCPCS: Performed by: INTERNAL MEDICINE

## 2021-02-05 PROCEDURE — 6370000000 HC RX 637 (ALT 250 FOR IP): Performed by: INTERNAL MEDICINE

## 2021-02-05 PROCEDURE — 2500000003 HC RX 250 WO HCPCS

## 2021-02-05 PROCEDURE — 97162 PT EVAL MOD COMPLEX 30 MIN: CPT

## 2021-02-05 PROCEDURE — 2500000003 HC RX 250 WO HCPCS: Performed by: INTERNAL MEDICINE

## 2021-02-05 PROCEDURE — 2060000000 HC ICU INTERMEDIATE R&B

## 2021-02-05 PROCEDURE — 2580000003 HC RX 258: Performed by: INTERNAL MEDICINE

## 2021-02-05 PROCEDURE — 2700000000 HC OXYGEN THERAPY PER DAY

## 2021-02-05 PROCEDURE — 82803 BLOOD GASES ANY COMBINATION: CPT

## 2021-02-05 PROCEDURE — 85025 COMPLETE CBC W/AUTO DIFF WBC: CPT

## 2021-02-05 PROCEDURE — 94640 AIRWAY INHALATION TREATMENT: CPT

## 2021-02-05 PROCEDURE — P9047 ALBUMIN (HUMAN), 25%, 50ML: HCPCS | Performed by: INTERNAL MEDICINE

## 2021-02-05 PROCEDURE — C9113 INJ PANTOPRAZOLE SODIUM, VIA: HCPCS | Performed by: INTERNAL MEDICINE

## 2021-02-05 PROCEDURE — 97530 THERAPEUTIC ACTIVITIES: CPT

## 2021-02-05 PROCEDURE — 82962 GLUCOSE BLOOD TEST: CPT

## 2021-02-05 PROCEDURE — 99231 SBSQ HOSP IP/OBS SF/LOW 25: CPT | Performed by: PHYSICIAN ASSISTANT

## 2021-02-05 PROCEDURE — 71045 X-RAY EXAM CHEST 1 VIEW: CPT

## 2021-02-05 PROCEDURE — 80053 COMPREHEN METABOLIC PANEL: CPT

## 2021-02-05 RX ORDER — ALBUMIN (HUMAN) 12.5 G/50ML
50 SOLUTION INTRAVENOUS ONCE
Status: COMPLETED | OUTPATIENT
Start: 2021-02-05 | End: 2021-02-05

## 2021-02-05 RX ORDER — METOPROLOL TARTRATE 5 MG/5ML
INJECTION INTRAVENOUS
Status: COMPLETED
Start: 2021-02-05 | End: 2021-02-05

## 2021-02-05 RX ORDER — METOPROLOL TARTRATE 5 MG/5ML
5 INJECTION INTRAVENOUS EVERY 5 MIN PRN
Status: DISCONTINUED | OUTPATIENT
Start: 2021-02-05 | End: 2021-02-12 | Stop reason: HOSPADM

## 2021-02-05 RX ADMIN — INSULIN LISPRO 6 UNITS: 100 INJECTION, SOLUTION INTRAVENOUS; SUBCUTANEOUS at 21:58

## 2021-02-05 RX ADMIN — INSULIN LISPRO 3 UNITS: 100 INJECTION, SOLUTION INTRAVENOUS; SUBCUTANEOUS at 06:23

## 2021-02-05 RX ADMIN — SODIUM CHLORIDE, PRESERVATIVE FREE 10 ML: 5 INJECTION INTRAVENOUS at 08:27

## 2021-02-05 RX ADMIN — AMIODARONE HYDROCHLORIDE 100 MG: 200 TABLET ORAL at 08:24

## 2021-02-05 RX ADMIN — MICONAZOLE NITRATE: 20.6 POWDER TOPICAL at 20:17

## 2021-02-05 RX ADMIN — INSULIN LISPRO 3 UNITS: 100 INJECTION, SOLUTION INTRAVENOUS; SUBCUTANEOUS at 02:09

## 2021-02-05 RX ADMIN — Medication: at 08:22

## 2021-02-05 RX ADMIN — POTASSIUM BICARBONATE 40 MEQ: 782 TABLET, EFFERVESCENT ORAL at 08:24

## 2021-02-05 RX ADMIN — Medication 2 MCG/MIN: at 04:38

## 2021-02-05 RX ADMIN — CHLORHEXIDINE GLUCONATE 0.12% ORAL RINSE 15 ML: 1.2 LIQUID ORAL at 08:24

## 2021-02-05 RX ADMIN — INSULIN GLARGINE 28 UNITS: 100 INJECTION, SOLUTION SUBCUTANEOUS at 20:20

## 2021-02-05 RX ADMIN — SODIUM CHLORIDE: 9 INJECTION, SOLUTION INTRAVENOUS at 09:59

## 2021-02-05 RX ADMIN — ENOXAPARIN SODIUM 40 MG: 40 INJECTION SUBCUTANEOUS at 08:23

## 2021-02-05 RX ADMIN — Medication: at 20:20

## 2021-02-05 RX ADMIN — SODIUM CHLORIDE SOLN NEBU 3% 4 ML: 3 NEBU SOLN at 19:58

## 2021-02-05 RX ADMIN — POTASSIUM CHLORIDE 20 MEQ: 400 INJECTION, SOLUTION INTRAVENOUS at 08:22

## 2021-02-05 RX ADMIN — IPRATROPIUM BROMIDE AND ALBUTEROL SULFATE 1 AMPULE: .5; 3 SOLUTION RESPIRATORY (INHALATION) at 16:12

## 2021-02-05 RX ADMIN — PIPERACILLIN AND TAZOBACTAM 3375 MG: 3; .375 INJECTION, POWDER, LYOPHILIZED, FOR SOLUTION INTRAVENOUS at 20:16

## 2021-02-05 RX ADMIN — INSULIN LISPRO 6 UNITS: 100 INJECTION, SOLUTION INTRAVENOUS; SUBCUTANEOUS at 12:32

## 2021-02-05 RX ADMIN — MICONAZOLE NITRATE: 20.6 POWDER TOPICAL at 08:27

## 2021-02-05 RX ADMIN — CHLORHEXIDINE GLUCONATE 0.12% ORAL RINSE 15 ML: 1.2 LIQUID ORAL at 20:16

## 2021-02-05 RX ADMIN — PIPERACILLIN AND TAZOBACTAM 3375 MG: 3; .375 INJECTION, POWDER, LYOPHILIZED, FOR SOLUTION INTRAVENOUS at 06:17

## 2021-02-05 RX ADMIN — ATORVASTATIN CALCIUM 20 MG: 20 TABLET, FILM COATED ORAL at 08:58

## 2021-02-05 RX ADMIN — Medication 10 ML: at 20:18

## 2021-02-05 RX ADMIN — METOPROLOL TARTRATE 5 MG: 1 INJECTION, SOLUTION INTRAVENOUS at 11:25

## 2021-02-05 RX ADMIN — MIDODRINE HYDROCHLORIDE 20 MG: 5 TABLET ORAL at 08:24

## 2021-02-05 RX ADMIN — INSULIN LISPRO 3 UNITS: 100 INJECTION, SOLUTION INTRAVENOUS; SUBCUTANEOUS at 18:45

## 2021-02-05 RX ADMIN — SODIUM CHLORIDE, PRESERVATIVE FREE 300 UNITS: 5 INJECTION INTRAVENOUS at 08:26

## 2021-02-05 RX ADMIN — ALBUMIN (HUMAN) 50 G: 0.25 INJECTION, SOLUTION INTRAVENOUS at 08:58

## 2021-02-05 RX ADMIN — PIPERACILLIN AND TAZOBACTAM 3375 MG: 3; .375 INJECTION, POWDER, LYOPHILIZED, FOR SOLUTION INTRAVENOUS at 12:31

## 2021-02-05 RX ADMIN — SODIUM CHLORIDE SOLN NEBU 3% 4 ML: 3 NEBU SOLN at 09:09

## 2021-02-05 RX ADMIN — LATANOPROST 1 DROP: 50 SOLUTION OPHTHALMIC at 08:27

## 2021-02-05 RX ADMIN — SODIUM CHLORIDE: 9 INJECTION, SOLUTION INTRAVENOUS at 17:36

## 2021-02-05 RX ADMIN — MIDODRINE HYDROCHLORIDE 20 MG: 5 TABLET ORAL at 18:43

## 2021-02-05 RX ADMIN — MIDODRINE HYDROCHLORIDE 20 MG: 5 TABLET ORAL at 12:31

## 2021-02-05 RX ADMIN — HYDROCORTISONE SODIUM SUCCINATE 25 MG: 100 INJECTION, POWDER, FOR SOLUTION INTRAMUSCULAR; INTRAVENOUS at 08:23

## 2021-02-05 RX ADMIN — Medication 10 ML: at 08:28

## 2021-02-05 RX ADMIN — IPRATROPIUM BROMIDE AND ALBUTEROL SULFATE 1 AMPULE: .5; 3 SOLUTION RESPIRATORY (INHALATION) at 13:18

## 2021-02-05 RX ADMIN — BUDESONIDE 500 MCG: 0.5 SUSPENSION RESPIRATORY (INHALATION) at 09:08

## 2021-02-05 RX ADMIN — LACTULOSE 20 G: 20 SOLUTION ORAL at 08:24

## 2021-02-05 RX ADMIN — BUDESONIDE 500 MCG: 0.5 SUSPENSION RESPIRATORY (INHALATION) at 19:58

## 2021-02-05 RX ADMIN — ACETAMINOPHEN ORAL SOLUTION 650 MG: 650 SOLUTION ORAL at 06:28

## 2021-02-05 RX ADMIN — IPRATROPIUM BROMIDE AND ALBUTEROL SULFATE 1 AMPULE: .5; 3 SOLUTION RESPIRATORY (INHALATION) at 09:08

## 2021-02-05 RX ADMIN — PANTOPRAZOLE SODIUM 40 MG: 40 INJECTION, POWDER, FOR SOLUTION INTRAVENOUS at 08:23

## 2021-02-05 RX ADMIN — IPRATROPIUM BROMIDE AND ALBUTEROL SULFATE 1 AMPULE: .5; 3 SOLUTION RESPIRATORY (INHALATION) at 19:58

## 2021-02-05 ASSESSMENT — PAIN SCALES - GENERAL
PAINLEVEL_OUTOF10: 0

## 2021-02-05 NOTE — PROGRESS NOTES
Subjective:    Pt is on bipap, opens eyes, but not answering questions. Objective:  Pt resting in nad  /72   Pulse 105   Temp 100.4 °F (38 °C) (Bladder)   Resp 28   Ht 6' (1.829 m)   Wt 256 lb 6.1 oz (116.3 kg)   SpO2 99%   BMI 34.77 kg/m²   HEENT no adenopathy no bruits  Heart:  RRR, no murmurs, gallops, or rubs.   Lungs:  CTA bilaterally, no wheeze, rales or rhonchi  Abd: bowel sounds present, nontender, nondistended, no masses  Extrem:  No clubbing, cyanosis, +edema lower and erythema   WBC/Hgb/Hct/Plts:  12.1/8.1/26.8/225 (02/05 6015) basic metabolic panel     Assessment:    Patient Active Problem List   Diagnosis    Severe sepsis with septic shock [facility]    Dementia ([facility]    Metabolic encephalopathy    Diabetes mellitus type 2, uncontrolled ([address]    Hyperlipidemia LDL goal <100    Essential hypertension    Venous ulcer of left leg (HCC)    Non-pressure chronic ulcer of left lower leg with fat layer exposed (HCC)    Non-pressure chronic ulcer left lower leg, limited to breakdown skin (HCC)    Severe protein-calorie malnutrition (HCC)    Non-pressure chronic ulcer right lower leg, limited to breakdown skin (HCC)    Pressure injury of contiguous region involving back, buttock, and hip, stage 2 (HCC)    Pressure injury of calf, stage 2 ([address]    HCAP (healthcare-associated pneumonia)    Paroxysmal atrial fibrillation (HCC)    Acute respiratory failure with hypoxia (HCC)    Acute deep vein thrombosis (DVT) of femoral vein of left lower extremity (HCC)    Leg swelling    Respiratory failure (HCC)    Palliative care encounter    DNR no code (do not resuscitate)    Goals of care, counseling/discussion    Sepsis due to Pseudomonas species with acute hypercapnic respiratory failure and septic shock ([address]       Plan:  Cont weaning bipap  Critical care  Family to make decision about Palliative care         Dave Arndt  7:22 AM  2/5/2021

## 2021-02-05 NOTE — PROGRESS NOTES
Occupational Therapy  Occupational Therapy Initial Evaluation   Date:2021  Patient Name: Renuka Millan  MRN: [id number]  : 1940  Room: [address] [place] [address]A    Referring Provider: Jj Lerner MD    Evaluating OT: Taiwo Zabala, OTR/L #[id number]      Modified Wise Scale   Score     Description  0             No symptoms  1             No significant disability despite symptoms  2             Slight disability; able to look after own affairs  3             Moderate disability; able to ambulate without assist/ requires assist with ADLs  4             Moderate/Severe disability;requires assist to ambulate/assist with ADLs  5             Severe disability;bedridden/incontinent   6               Score:   5       AM-PAC Daily Activity Raw Score:     Recommended Adaptive Equipment: splinting for B hands (contracted)      Diagnosis: Respiratory failure ([facility] [address] [J96.90]    Surgery/Procedure: intubated , extubated 2/3    Pertinent Medical History: hx of DVT, anxiety, dementia, DM, HLD, HTN, a-fib, respiratory failure, Rheumatoid disease      Precautions:  Falls, NRB, FMS, hx of dementia(mostly nonverbal), PEG, contact isolation     Home Living/PLOF: Pt unable to provide home set-up/PLOF d/t cognition. Daughter present towards end of session, reports that pt was admitted from [facility]. Has been in/out of facilities since 2020. Prior to facilities, pt was independent. Pain Level: pt shakes head \"no\" to pain; no indications of pain noted throughout session      Cognition: A&O: 1/ (alert to name)  Follows 1 step commands <15% of the time. Shakes head \"no\" in attempt to answer questions intermittently (unsure of accuracy).      Memory: poor   Comprehension poor   Problem solving: poor   Judgement/safety: poor               Communication skills:            Vision: decreased visual tracking               Glasses:?                                                   Hearing: appears WFL     RASS: -1  CAM-ICU: (NT) Delirium (unable d/t poor command following)    UE Assessment:  Hand Dominance: Right []  Left []? ROM Strength STM goal: PRN   RUE  Proximally: PROM WFL shoulder flexion  Distally: limited elbow AROM; PROM WFL; contracted flexed hand, limited wrist extension Proximally: 1/5  Distally: elbow 2-/5  No  noted d/t contracted hand    Impaired FMC Good tolerance to exercises to increase strength/ROM    LUE Proximally: PROM limited to mid range shoulder flexion  Distally: elbow limited AROM; PROM WFL; limited wrist extension, limited digit AROM Proximally: 1/5  Distally: elbow 2-/5    Poor , impaired FMC Good tolerance to exercises to increase strength/ROM       Sensation: No c/o numbness or tingling in extremities   Tone: impaired (distally)  Edema: poor skin integrity, mild edema noted in BUE     Functional Assessment   Initial Eval Status  Date: 2/5/21 Treatment Status  Date: STG=LTG  5-14  days    Feeding PEG                               Grooming Dep                        Max A     UB dressing Dep                        Max A       LB dressing Dep  Max A   using AE as needed for safe reach/ energy conservation     Bathing Dep                        Max A   using AE as needed for safe reach/ energy conservation       Toileting Dep (FMS, hernandez)                        Max A     Bed Mobility  Supine to sit: Dep x2    Sit to supine: Dep x2                        Max A  in prep of ADL tasks & transfers   Functional Transfers NT (unable to safely complete)                        Max A  sit<>stand/functional bathroom transfers using AD/DME as needed for balance and safety   Functional Mobility NT                       Max A   functional/bathroom mobility using AD as needed & demonstrating fair safety     Balance Sitting:     Static: Dep    Dynamic: Dep  Standing: NT  Max A dynamic sitting balance;  Max A dynamic standing balance  during ADL tasks & transfers   Endurance/Activity Tolerance   poor tolerance with light activity. Pt fatigued throughout session. fair   tolerance with light/moderate activity/self care routine   Visual/  Perceptual Impaired: decreased visual tracking (cueing to make appropriate eye contact)                       Vitals:   HR at rest: 136 bpm HR at end of session: 133 bpm   Spo2 at rest:87% on 6L Spo2 at end of session 100%   BP at rest:102/69 mmHg BP at end of session 107/92 mmHg   SpO2 saturation low on entry -- Respiratory therapist added NRB -- SpO2 increased to 95%    Treatment:   · Bed mobility: Facilitated bed mobility with cues for proper body mechanics and sequencing to prepare for ADL completion. HOB elevated, assist of 2 for safety d/t pt's medical complexity. · Therapeutic Exercises: B UE AROM/AAROM/PROM completed at all levels to maintain strength/flexibility and to decrease edema/contractures to enhance ADL completion. · Postural Balance: Sitting balance retraining to improve righting reactions with postural changes during ADLS. · Cognitive/Perceptual training: retraining exercises to improve attention, mentation, and spatial awareness for ADLs & transfers. · Skilled positioning: Proper positioning to improve interaction with environment, overall functioning and decrease/prevent edema and contractures. Placed pillows underneath BUE/BLEs and added washcloth in R hand to decreased palm breakdown d/t contracted hand and increase functional positioning. · Delirium Prevention/Management: Implementation of non-pharmacologic interventions for delirium prevention incorporated throughout session to patient and family. Including environmental and sensory modifications to decrease patient's internal distraction caused by delirium, and improve overall mentation. Comments: OK from RN to see patient. Upon arrival, patient lying in bed. Pt demo poor tolerance with poor understanding of education/techniques. Pt overall lethargic/fatigued, minimal command following.  At end of session, patient lying in bed (re-positioned). Call light within reach, all lines and tubes intact. Pt instructed on use of call light for assistance and fall prevention. Line management and environmental modifications made prior to and end of session to ensure patient safety and to increase efficiency of session. Skilled monitoring of HR, O2 saturation, blood pressure and patient's response to activity performed throughout session. Overall, pt presents with decreased activity tolerance, dynamic balance, functional mobility limiting completion of ADLs and safe return home. Pt can benefit from continued skilled OT to increase safety and functional independence. Evaluation Complexity: Mod    · History: Expanded chart review of consults, imaging, and psychosocial history related to current functional performance. · Exam: 5+ performance deficits identified limiting functional independence and safe return home   · Assistance/Modification: Min/mod assistance or modifications required to perform tasks. May have comorbidities that affect occupational performance.     Assessment of current deficits   Functional mobility [x]  ADLs [x] Strength [x]  Cognition [x]  Functional transfers  [x] IADLs [x] Safety Awareness [x]  Endurance [x]  Fine Motor Coordination [x] Balance [x] Vision/perception [x] Sensation [x]   Gross Motor Coordination [x] ROM [x] Delirium [x]                  Communication [x]    Plan of Care: 1-3 days/week for 1-2 weeks PRN   Instruction/training on adapted ADL techniques and AE recommendations to increase functional independence within precautions  Training on energy conservation strategies/techniques to improve independence/tolerance for self-care routine  Functional transfer/mobility training/DME recommendations for increased independence, safety, and fall prevention  Patient/Family education to increase follow through with safety techniques and functional independence  Recommendation of environmental modifications for increased safety with functional transfers/mobility and ADLs  Cognitive retraining/development of therapeutic activities to improve problem solving, judgement, memory, and attention for increased safety/participation in ADL/IADL tasks  Sensory re-education to improve body/limb awareness, maintain/improve skin integrity, and improve hand/UE motor function  Visual-perceptual training to improve environmental scanning, visual attention/focus, and oculomotor skills for increased safety/independence with functional transfers/mobility and ADLs  Splinting/positioning for increased function, prevention of contractures, and improve skin integrity  Therapeutic exercise to improve motor endurance, ROM, and functional strength for ADLs/functional transfers  Therapeutic activities to facilitate/challenge dynamic balance, stand tolerance, fine motor dexterity/in-hand manipulation for increased independence with ADLs  Neuro-muscular re-education: facilitation of righting/equilibrium reactions, midline orientation, scapular stability/mobility, normalization of muscle tone, and facilitation of volitional active controled movement  Delirium prevention/treatment  Positioning to improve functional independence    Rehab Potential: Good for established goals     Patient / Family Goal: not stated      Patient and/or family were instructed on functional diagnosis, prognosis/goals and OT plan of care. PT demonstrated poor understanding. Daughter demonstrated fair+ understanding.        Time In: 9:30  Time Out: 10:10  Total Treatment Time: 30 minutes    Min Units   OT Eval Low 69617       OT Eval Medium 97166  x 1   OT Eval High 05653       OT Re-Eval [id number]       Therapeutic Ex [id number]       Therapeutic Activities 81503  30  2   ADL/Self Care [id number]       Orthotic Management 59173       Neuro Re-Ed 57159       Non-Billable Time          Evaluation Time includes thorough review of current medical information, gathering information on past medical history/social history and prior level of function, completion of standardized testing/informal observation of tasks, assessment of data and education on plan of care and goals.     Eduardo Ramachandran, OTR/L #[id number]

## 2021-02-05 NOTE — PROGRESS NOTES
Date: 2/5/2021    Time: 4:21 PM    Patient Placed On BIPAP/CPAP/ Non-Invasive Ventilation? No pt remains on BIPAP from previous shift    If no must comment. Facial area red/color change? Yes           If YES are Blister/Lesion present? Yes   If yes must notify nursing staff  BIPAP/CPAP skin barrier? Yes    Skin barrier type:mepilexlite       Comments: Patient has nasal break down. Patient was switched to a total face mask earlier in the shift. There is a mepilexite on patient nose as well. The total face mask slightly touches patients nose. Pad is placed where the mask is touching.  RN is aware about breakdown         Reubin Burows

## 2021-02-05 NOTE — PROGRESS NOTES
Date: 2/4/2021    Time: 11:31 PM    Patient Placed On BIPAP/CPAP/ Non-Invasive Ventilation? No    If no must comment. FROM PREVIOUS SHIFT  Facial area red/color change? No           If YES are Blister/Lesion present? No   If yes must notify nursing staff  BIPAP/CPAP skin barrier?   Yes    Skin barrier type:mepilexlite       Comments:        Kathi Guzman

## 2021-02-05 NOTE — PROGRESS NOTES
Date: 2/4/2021    Time: 8:18 PM    Patient Placed On BIPAP/CPAP/ Non-Invasive Ventilation? No    If no must comment. ON FROM PREVIOUS SHIFT  Facial area red/color change? No           If YES are Blister/Lesion present? No   If yes must notify nursing staff  BIPAP/CPAP skin barrier?   Yes    Skin barrier type:mepilexlite       Comments:        Ila Bess

## 2021-02-05 NOTE — PROGRESS NOTES
Palliative medicine    Chart reviewed  Pressors restarted, albumin ordered, diuretics held  Oxygen needs-BiPAP full BiPAP mask placed currently  No family is currently at bedside  Patient sleeping in no distress, respirations easy and nonlabored    CODE STATUS and goals for care to continue treatment with hopes of some improvement  Palliative medicine will continue to follow    Nuris Brunner PA-C

## 2021-02-05 NOTE — PROGRESS NOTES
Changed settings on bipap to 14/6 40% due to  patient has right side up and tidal volumes increased to [id number] .

## 2021-02-05 NOTE — PROGRESS NOTES
Critical Care Team - Daily Progress Note         Date and time: 2/5/2021 8:27 AM  Patient's name:  Taye Gaviria  Medical Record Number: [id number]  Patient's account/billing number: [de-identified]  Patient's YOB: 1940  Age: 78 y.o. Date of Admission: 1/22/2021  8:35 PM  Length of stay during current admission: 14    Primary Care Physician: No primary care provider on file. ICU Attending Physician:  Bernardo Reyna  Code Status: DNR-CCA     Reason for ICU admission: Septic shock  SUBJECTIVE:      Opens eyes follow simple command  OVERNIGHT EVENTS:       CURRENT VENTILATION STATUS:   []???? Ventilator   [x]? ??? BIPAP         []???? Nasal Cannula       []???? Room Air    IF INTUBATED, ET TUBE MARKING AT LOWER LIP:       cms  SECRETIONS Amount:         []???? Small          []???? Moderate                [x]? ??? Large  []???? None                       Color:              []???? White          [x]? ??? Colored    tan               []???? Bloody  SEDATION:    RAAS Score: On Precedex  []???? Propofol gtt             []???? Versed gtt               []???? Ativan gtt                [x]???? No Sedation  PARALYZED:             [x]???? No                           []???? Yes  VASOPRESSORS:    [x]???? No                           []???? Yes    If yes - []? ??? Levophed       []???? Dopamine          []???? Vasopressin       []???? Dobutamine  []???? Phenylephrine         []???? Epinephrine  CENTRAL LINES:     []???? No               []???? Yes   (Date of Insertion:   )           If yes -     []???? Right IJ             []???? Left IJ         []???? Right Femoral        []???? Left Femoral                   []???? Right Subclavian           []???? Left Subclavian                  [x]? ??? PICC Line right arm  GREEN'S CATHETER:   []???? No                      [x]? ??? Yes  (Date of Insertion:   )   URINE OUTPUT:            [x]? ??? Good                  []???? Low              []???? Anuric            OBJECTIVE: VITAL SIGNS:  /72   Pulse 105   Temp 100.4 °F (38 °C) (Bladder)   Resp 28   Ht 6' (1.829 m)   Wt 256 lb 6.1 oz (116.3 kg)   SpO2 99%   BMI 34.77 kg/m²   Tmax over 24 hours:  Temp (24hrs), Av.6 °F (38.1 °C), Min:100.4 °F (38 °C), Max:100.8 °F (38.2 °C)      Patient Vitals for the past 6 hrs:   BP Temp Temp src Pulse Resp SpO2 Weight   21 0600 112/72 -- -- 105 28 99 % 256 lb 6.1 oz (116.3 kg)   21 0500 92/60 -- -- 107 27 95 % --   21 0400 (!) 76/58 100.4 °F (38 °C) Bladder 95 21 100 % --   21 0300 81/62 -- -- 100 22 99 % --         Intake/Output Summary (Last 24 hours) at 2021 0827  Last data filed at 2021 0600  Gross per 24 hour   Intake 2250.52 ml   Output 2995 ml   Net -744.48 ml     Wt Readings from Last 2 Encounters:   21 256 lb 6.1 oz (116.3 kg)   21 255 lb (115.7 kg)     Body mass index is 34.77 kg/m².         PHYSICAL EXAMINATION:    PHYSICAL EXAMINATION:  On BIPAP   General appearance - alert opens eyes moves right arm obese intubated on low-dose Precedex, and in no distress with L side flaccid   Mental status - alert, oriented to person, place, and time  Eyes - pupils equal and reactive, extraocular eye movements intact  Ears - external ear canals normal  Nose - normal and patent,, discharge   Mouth - mucous membranes moist, pharynx normal without lesions  Neck - supple, no significant adenopathy, JVD  Chest - clear to auscultation, no wheezes, rales or rhonchi, symmetric air entry  Heart - normal rate, regular rhythm, normal S1, S2, no murmurs, rubs, clicks or gallops  Abdomen - soft, nontender, nondistended, no masses or organomegaly obese [facility]  Neurological - alert, oriented, normal speech, no focal findings or movement disorder noted  Extremities - peripheral pulses normal, no pedal edema, no clubbing or cyanosis.  Right arm swollen with wound around elbow  Skin - normal coloration and turgor, no rashes, no suspicious skin lesions noted           MEDICATIONS:    Scheduled Meds:   furosemide  40 mg Intravenous BID    piperacillin-tazobactam  3,375 mg Intravenous Q8H    sodium chloride   Intravenous Q8H    hydrocortisone sodium succinate PF  25 mg Intravenous Daily    miconazole   Topical BID    potassium bicarb-citric acid  40 mEq Per G Tube Daily    insulin glargine  28 Units Subcutaneous Nightly    midodrine  20 mg Oral TID WC    ipratropium-albuterol  1 ampule Inhalation 4x daily    insulin lispro  0-18 Units Subcutaneous Q4H    Mineral Oil-Hydrophil Petrolat   Topical BID    lidocaine PF  5 mL Intradermal Once    heparin flush  3 mL Intravenous 2 times per day    chlorhexidine  15 mL Mouth/Throat BID    amiodarone  100 mg PEG Tube Daily    atorvastatin  20 mg PEG Tube Daily    lactulose  20 g PEG Tube Daily    latanoprost  1 drop Both Eyes Daily    pantoprazole  40 mg Intravenous Daily    And    sodium chloride (PF)  10 mL Intravenous Daily    senna  1 tablet PEG Tube BID    sodium chloride (Inhalant)  4 mL Nebulization BID    sodium chloride flush  10 mL Intravenous 2 times per day    enoxaparin  40 mg Subcutaneous Daily    budesonide  500 mcg Nebulization BID     Continuous Infusions:   dextrose      norepinephrine 1 mcg/min (02/05/21 0628)     PRN Meds:       Mineral Oil-Hydrophil Petrolat, , 4x Daily PRN      sodium chloride flush, 10 mL, PRN      heparin flush, 3 mL, PRN      potassium chloride, 20 mEq, PRN      acetaminophen, 650 mg, Q4H PRN      docusate sodium, 1 enema, PRN      glucose, 15 g, PRN      dextrose, 12.5 g, PRN      glucagon (rDNA), 1 mg, PRN      dextrose, 100 mL/hr, PRN      sodium chloride flush, 10 mL, PRN      promethazine, 12.5 mg, Q6H PRN    Or      ondansetron, 4 mg, Q6H PRN      polyethylene glycol, 17 g, Daily PRN      acetaminophen, 650 mg, Q6H PRN    Or      acetaminophen, 650 mg, Q6H PRN      fentanNYL, 50 mcg, Q1H PRN          VENT SETTINGS (Comprehensive) (if applicable): ABGs:   Recent Labs     02/04/21 0528 02/05/21 0444   PH 7.475* 7.522*   PCO2 46.6*  --    PO2 98.9  --    HCO3 33.5*  --    BE 9.0* 10.9*   O2SAT 97.6 98.4       Laboratory findings:  Complete Blood Count:   Recent Labs     02/03/21  0423 02/03/21  1729 02/04/21 0518 02/05/21 0421   WBC 17.3*  --  12.7* 12.1*   HGB 7.8* 7.9* 8.1* 8.1*   HCT 26.1* 27.1* 28.2* 26.8*     --  245 225        Last 3 Blood Glucose:   Recent Labs     02/03/21 0423 02/04/21 0518 02/05/21 0421   GLUCOSE 171* 125* 121*        PT/INR:    Lab Results   Component Value Date    PROTIME 14.1 01/15/2021    INR 1.2 01/15/2021     PTT:    Lab Results   Component Value Date    APTT 21.0 01/06/2021       Comprehensive Metabolic Profile:   Recent Labs     02/03/21 0423 02/04/21 0518 02/05/21 0421    143 145   K 3.1* 3.0* 3.0*   CL 96* 99 98   CO2 33* 37* 37*   BUN 31* 37* 39*   CREATININE 0.7 0.8 0.7   GLUCOSE 171* 125* 121*   CALCIUM 7.7* 7.3* 7.7*   PROT 4.9* 4.8* 5.0*   LABALBU 2.8* 2.5* 2.7*   BILITOT 0.4 0.3 0.4   ALKPHOS 78 68 71   AST 20 25 19   ALT 8 9 10      Magnesium:   Lab Results   Component Value Date    MG 2.2 02/05/2021     Phosphorus:   Lab Results   Component Value Date    PHOS 3.2 02/02/2021     Ionized Calcium:   Lab Results   Component Value Date    CAION 1.06 01/18/2021        Urinalysis:     Troponin: No results for input(s): TROPONINI in the last 72 hours. Blood cultures:                        []? ? None drawn      []? ? Negative     [x]? ?  Positive     []? ? Pending   Urine Culture:                         []? ? None drawn      []? ? Negative     [x]? ?  Positive     []? ? Pending  Sputum Culture:                      []? ? None drawn      []? ? Negative     []? ?  Positive     []? ? Pending  Endotracheal aspirate:            []? ? None drawn      []? ? Negative     []? ?  Positive     []? ? Pending  Stool:                                       []? ?Brad Hatchet        []? ? Negative     []? ?  Positive     []? ? Pending            Other Micro:                            []? ?Lavone Bear        []? ? Negative     []? ?  Positive     []? ? Pending                       Collected: 01/23/21 1116   Order Status: Completed Specimen: Endotracheal Updated: 01/26/21 0639     CULTURE, RESPIRATORY Oral Pharyngeal Tamika reducedAbnormal      Smear, Respiratory --     Group 5: >25 PMN's/LPF and <10 Epithelial cells/LPF   Abundant Polymorphonuclear leukocytes   Epithelial cells not seen   Rare Gram negative diplococci   Rare Gram positive cocci in clusters   Few Gram negative rods   Rare Gram positive cocci in pairs      Organism Pseudomonas aeruginosaAbnormal      CULTURE, RESPIRATORY Heavy growth     Organism Proteus mirabilisAbnormal      CULTURE, RESPIRATORY Heavy growth     Organism Escherichia coliAbnormal      CULTURE, RESPIRATORY Moderate growth     Organism Klebsiella pneumoniae ssp pneumoniaeAbnormal      CULTURE, RESPIRATORY Moderate growth            Culture, Urine [[id number]] (Abnormal)  Collected: 01/22/21 2111   Order Status: Completed Specimen: Urine, clean catch Updated: 01/25/21 8813     Organism Pseudomonas aeruginosaAbnormal      Urine Culture, Routine >100,000 CFU/ml   Narrative:           Other pertinent Labs:         Radiology/Imaging:         ASSESSMENT:   39-year-old man 100-pack-year ex-smoker with dementia HTN, HLD, DM, BPH,     12/13/2020-12/23/2020 septic shock from healthcare associated pneumonia with MRSA ESBL Klebsiella requiring pressors extubated 12/15  Patient mucous plugging intubated 12/28 extubated 1/8.  12/23/2020 -1/20/2021  patient at [facility] improved went to SNF.  Patient had PEG tube changed to [address] tube 1/15.  Patient received 2 units of blood 1/5.  IVC filter was placed 1/6 due to high risk for bleeding on anticoagulation     This admission  1/22/2021 had emesis while on BIPAP with unresponsiveness at nursing home. Pt was intubated in ER.  T-max 102COVID negative. CT chest with b/l consolidations groundglass densities moderate effusion  CT head negative  CT chest multifocal infiltrates bilaterally groundglass densities.  Bilateral effusions. Pulmonary edema  1/27 no change of multifocal infiltrate  1/29 stable infiltrates right lung  1/31 T-max 102.4   2/1  T-max 101.1, on pressure support mode 40% FiO2 on Levophed multifocal airspace disease.  Right upper extremity ultrasound no DVT  2/2 weaned off of pressors.  Responded to fluid.  T-max 102.  Antibiotics on meropenem anidulafungin changed to Zosyn   2/3 Patient tolerated CPAP trial and able to be extubated. The pt is made DNR with no  intubation Tmax 102.2   2/4 put on BIPAP overnight camila film with worsening infiltrates on the R given lasix  2/5 back on levophed low dose      1. S/p septic shock vitamin C, Solu-Cortef every 6 at 50 mg and on Levophed  2. Acute respiratory failure on BIPAP  3. Pseudomonas bacteriuria    4. HCAP / Aspiration pneumonia  (Pseudomonas, Klebsiella, E coli and Proteus) on Zosyn 3.375 grams IV q 8 hrs    5. Echo EF 55 to 60% on 12/6/2020, mild aortic stenosis  6. Anemia   7. Dysphagia PEG tube changed to PEJ tube 1/15/2021  8. History of bullous pemphigoid on chronic steroids  9. History of A. fib on amiodarone  10. Left CFV SF V DVT 1/3/2021 IVC filter placed 1/6  11. Baseline dementia nonverbal  12. Sharron Ely. 13. Probable COPD on aerosols  14. DNR CCA family does not want to have a trach so no reintubation if patient were to extubate     PLAN:    BP labile back on  Pressors  Will give 2 gram albumin   K replaced po and IV  Fever curve and wbc coming down   Hold diuretics   PT OT  afib RVR given metoprolol off levaphed drip     WEAN PER PROTOCOL:                  [x]???? No               []???? Yes             []???? N/A  DISCONTINUE ANY LABS:              [x]???? No               []???? Yes  ICU PROPHYLAXIS:  Stress ulcer:  [x]? ??? PPI Agent               []???? [id number]      []???? Sucralfate               []???? Other:  VTE:                [x]? ??? Enoxaparin             []???? Unfract. Heparin Subcut                []???? EPC Cuffs  NUTRITION:  []???? NPO            [x]? ??? Tube Feeding (Specify: )   []???? TPN                        []???? PO (Diet: DIET TUBE FEED CONTINUOUS/CYCLIC NPO; Semi-elemental; Orogastric; Continuous; 10; 60; 24  Diet Tube Feed Modular: Protein Modular)  HOME MEDICATIONS RECONCILED:        [x]???? No                           []???? Yes  INSULIN DRIP:                                               [x]???? No                           []???? Yes  CONSULTATION NEEDED:                          [x]???? No                           []???? Yes  FAMILY UPDATED:                                       [x]???? No                           []???? Yes  TRANSFER OUT OF ICU:                             [x]???? No                           []???? Yes        Oleg Machado M.D. Filemon WILDER                     I personally saw, examined and provided care for the patient. Radiographs, labs and medication list were reviewed by me independently. I spoke with bedside nursing, therapists and consultants.  Critical care services and times documented are independent of procedures    > 30 min CCT    2/5/2021, 8:27 AM

## 2021-02-05 NOTE — PROGRESS NOTES
Physical Therapy    Physical Therapy Initial Assessment     Name: Dony Marin  : 1940  MRN: [id number]    Referring Provider:        Leny Ward MD         Date of Service: 2021    Evaluating PT:  Vanita Rudolph, PT, DPT [id number]     Room #:  3128/8356-M  Diagnosis:  Respiratory failure   PMHx/PSHx:  Acute DVT, anxiety, BPH, dementia, DM, HLD, HTN, PAF, RF, rheumatoid disease   Procedure/Surgery:  Extubated 2/3  Precautions:  Falls, Contact isolation, FMS, O2, hx dementia (non-verbal), PEG  Equipment Needs:  NA    SUBJECTIVE:    Pt admitted from nursing facility. Unable to provide social history or PLOF but chart review indicates pt is non-ambulatory and michel lift for transfers. OBJECTIVE:   Initial Evaluation  Date: 21 Treatment Short Term/ Long Term   Goals   AM-PAC 6 Clicks      Was pt agreeable to Eval/treatment? Yes     Does pt have pain? No reports of pain      Bed Mobility  Rolling: Dep   Supine to sit: Dep x2  Sit to supine: Dep x2  Scooting: Dep   Rolling: Max A  Supine to sit: Max A  Sit to supine: Max A  Scooting: Max A   Transfers Sit to stand: NT  Stand to sit: NT  Stand pivot: NT  Sit to stand: Max A  Stand to sit: Max A  Stand pivot:  Max A with AAD   Ambulation    NT  NA   Stair negotiation: ascended and descended  NT  NA   ROM BUE:  Per OT eval  BLE:  Limited in all planes      Strength BUE:  Per OT eval   BLE:  Grossly 1/5 - 2/5 poor command following      Balance Sitting EOB:  Dep  Dynamic Standing:  NT  Sitting EOB:  Max A  Dynamic Standing:  NT     Pt is A & O x 1? - pt non-verbal   RASS:  0  CAM-ICU:  Positive   Sensation:  Pt does not report numbness and tingling to extremities  Edema:  Pitting edema LE    Vitals:  Blood Pressure at rest 102/69 Blood Pressure post session 107/92   Heart Rate at rest 136 bpm Heart Rate post session 133 bpm   SPO2 at rest 87% on 6 L SPO2 post session 100% on NRB   SpO2 saturation low on entry -- Respiratory therapist added NRB -- SpO2

## 2021-02-05 NOTE — PROGRESS NOTES
[address] [place] [address] [facility]  BIBIIDA  Progress  Note   C/C : pneumonia, resp failure, fever      Pt's awake , non communicating   Discussed with the pt's daughter     Temp .4 F    Non communicating       Current Facility-Administered Medications   Medication Dose Route Frequency Provider Last Rate Last Admin    metoprolol (LOPRESSOR) injection 5 mg  5 mg Intravenous Q5 Min PRN Tyler Chappell MD        furosemide (LASIX) injection 40 mg  40 mg Intravenous BID Tyler Chappell MD   Stopped at 02/05/21 0839    piperacillin-tazobactam (ZOSYN) 3,375 mg in dextrose 5 % 100 mL IVPB extended infusion (mini-bag)  3,375 mg Intravenous Q8H Ruben P MD Olga   Stopped at 02/05/21 0825    0.9 % sodium chloride infusion admixture   Intravenous Q8H Ruben P MD Olga   Stopped at 02/05/21 1203    hydrocortisone sodium succinate PF (SOLU-CORTEF) injection 25 mg  25 mg Intravenous Daily Tyler Chappell MD   25 mg at 02/05/21 0823    miconazole (MICOTIN) 2 % powder   Topical BID Tyler Chappell MD   Given at 02/05/21 0827    potassium bicarb-citric acid (EFFER-K) effervescent tablet 40 mEq  40 mEq Per G Tube Daily Sheeba Davenport, DO   40 mEq at 02/05/21 0824    insulin glargine (LANTUS) injection vial 28 Units  28 Units Subcutaneous Nightly Tyler Chappell MD   28 Units at 02/04/21 2009    midodrine (PROAMATINE) tablet 20 mg  20 mg Oral TID WC Sheeba Davenport, DO   20 mg at 02/05/21 0824    ipratropium-albuterol (DUONEB) nebulizer solution 1 ampule  1 ampule Inhalation 4x daily Sheeba Davenport, DO   1 ampule at 02/05/21 0908    insulin lispro (HUMALOG) injection vial 0-18 Units  0-18 Units Subcutaneous Q4H Jacques Brittle, DO   3 Units at 02/05/21 4713    Mineral Oil-Hydrophil Petrolat OINT   Topical BID Jacques Brittle, DO   Given at 02/05/21 8321    And    Mineral Oil-Hydrophil Petrolat OINT   Topical 4x Daily PRN Jacques Brittle, DO   Given at 02/04/21 1820    lidocaine PF 1 % injection 5 mL  5 mL Intradermal Once Nome Estimable, DO        sodium chloride flush 0.9 % injection 10 mL  10 mL Intravenous PRN Roderick Estimable, DO        heparin flush 100 UNIT/ML injection 300 Units  3 mL Intravenous 2 times per day Roderick Estimable, DO   300 Units at 02/05/21 0826    heparin flush 100 UNIT/ML injection 300 Units  3 mL Intracatheter PRN Nome Estimable, DO        chlorhexidine (PERIDEX) 0.12 % solution 15 mL  15 mL Mouth/Throat BID Dubose Bilis Issac, DO   15 mL at 02/05/21 0824    potassium chloride 20 mEq/50 mL IVPB (Central Line)  20 mEq Intravenous PRN Roderick Estimable, DO 50 mL/hr at 02/05/21 0822 20 mEq at 02/05/21 9422    acetaminophen (TYLENOL) 160 MG/5ML solution 650 mg  650 mg Per G Tube Q4H PRN Nara Harrington MD   650 mg at 02/05/21 5463    amiodarone (CORDARONE) tablet 100 mg  100 mg PEG Tube Daily Nara Harrington MD   100 mg at 02/05/21 0824    atorvastatin (LIPITOR) tablet 20 mg  20 mg PEG Tube Daily Nara Harrington MD   20 mg at 02/05/21 0858    docusate sodium (ENEMEEZ) enema 283 mg  1 enema Rectal PRN Nara Harrington MD        glucose (GLUTOSE) 40 % oral gel 15 g  15 g Oral PRN Nara Harrington MD        dextrose 50 % IV solution  12.5 g Intravenous PRN Nara Harrington MD        glucagon (rDNA) injection 1 mg  1 mg Intramuscular PRN Nara Harrington MD        dextrose 5 % solution  100 mL/hr Intravenous PRN Nara Harrington MD        lactulose (CHRONULAC) 10 GM/15ML solution 20 g  20 g PEG Tube Daily Felix Hughes MD   20 g at 02/05/21 0824    latanoprost (XALATAN) 0.005 % ophthalmic solution 1 drop  1 drop Both Eyes Daily Nara Harrington MD   1 drop at 02/05/21 0827    pantoprazole (PROTONIX) injection 40 mg  40 mg Intravenous Daily Nara Harrington MD   40 mg at 02/05/21 4460    And    sodium chloride (PF) 0.9 % injection 10 mL  10 mL Intravenous Daily Nara Harrington MD   10 mL at 02/05/21 0827    senna (SENOKOT) tablet 8.6 mg  1 tablet PEG Tube BID Felix GRECO Tofil, MD   8.6 mg at 02/04/21 2004    sodium chloride (Inhalant) 3 % nebulizer solution 4 mL  4 mL Nebulization BID Carol Tucker MD   4 mL at 02/05/21 0909    sodium chloride flush 0.9 % injection 10 mL  10 mL Intravenous 2 times per day Carol Tucker MD   10 mL at 02/05/21 0828    sodium chloride flush 0.9 % injection 10 mL  10 mL Intravenous PRN Carol Tucker MD   10 mL at 02/02/21 1242    enoxaparin (LOVENOX) injection 40 mg  40 mg Subcutaneous Daily Carol Tucker MD   40 mg at 02/05/21 0823    promethazine (PHENERGAN) tablet 12.5 mg  12.5 mg Oral Q6H PRN Carol Tucker MD        Or    ondansetron (ZOFRAN) injection 4 mg  4 mg Intravenous Q6H PRN Carol Tucker MD        polyethylene glycol (GLYCOLAX) packet 17 g  17 g Oral Daily PRN Carol Tucker MD        acetaminophen (TYLENOL) tablet 650 mg  650 mg Oral Q6H PRN Carol Tucker MD   650 mg at 01/30/21 1923    Or    acetaminophen (TYLENOL) suppository 650 mg  650 mg Rectal Q6H PRN Carol Tucker MD        budesonide (PULMICORT) nebulizer suspension 500 mcg  500 mcg Nebulization BID Alistair Skinner MD   500 mcg at 02/05/21 0908    fentaNYL (SUBLIMAZE) injection 50 mcg  50 mcg Intravenous Q1H PRN Thea Leavitt MD   50 mcg at 02/03/21 2008    norepinephrine (LEVOPHED) 16 mg in dextrose 5% 250 mL infusion  2-100 mcg/min Intravenous Continuous Latesha Davenport DO 2.8 mL/hr at 02/05/21 0810 3 mcg/min at 02/05/21 0810       REVIEW OF SYSTEMS: could not be obtained       PHYSICAL EXAM:    Vitals:   /65   Pulse 104   Temp 100.9 °F (38.3 °C)   Resp 29   Ht 6' (1.829 m)   Wt 256 lb 6.1 oz (116.3 kg)   SpO2 100%   BMI 34.77 kg/m²      Constitutional: Awake, on BiPAP ,   Skin:  no rash   HEENT:Pallor +, no LN  , dry mucosa   Neck: Supple to movements. No lymphadenopathy.    Chest: Very diminished breath sound   Cardiovascular: Regular, no murmur   Abdomen:soft, bowel sound +  PEG - no drainage or erythema   Extremities: ++ edema, erythema on the legs     Lines:  Right brachial PICC ( 1/25)     CBC with Differential:      Lab Results   Component Value Date    WBC 12.1 02/05/2021    RBC 2.87 02/05/2021    HGB 8.1 02/05/2021    HCT 26.8 02/05/2021     02/05/2021    MCV 93.4 02/05/2021    MCH 28.2 02/05/2021    MCHC 30.2 02/05/2021    RDW 21.2 02/05/2021    NRBC 0.9 01/08/2021    SEGSPCT 64 09/27/2013    METASPCT 6.0 01/06/2021    LYMPHOPCT 13.0 02/05/2021    PROMYELOPCT 0.9 12/21/2020    MONOPCT 8.2 02/05/2021    MYELOPCT 3.0 01/06/2021    BASOPCT 0.1 02/05/2021    MONOSABS 0.99 02/05/2021    LYMPHSABS 1.57 02/05/2021    EOSABS 0.05 02/05/2021    BASOSABS 0.01 02/05/2021       CMP:    Lab Results   Component Value Date     02/05/2021    K 3.0 02/05/2021    CL 98 02/05/2021    CO2 37 02/05/2021    BUN 39 02/05/2021    CREATININE 0.7 02/05/2021    GFRAA >60 02/05/2021    LABGLOM >60 02/05/2021    GLUCOSE 121 02/05/2021    GLUCOSE 138 03/26/2011    PROT 5.0 02/05/2021    LABALBU 2.7 02/05/2021    LABALBU 4.0 03/26/2011    CALCIUM 7.7 02/05/2021    BILITOT 0.4 02/05/2021    ALKPHOS 71 02/05/2021    AST 19 02/05/2021    ALT 10 02/05/2021       Hepatic Function Panel:    Lab Results   Component Value Date    ALKPHOS 71 02/05/2021    ALT 10 02/05/2021    AST 19 02/05/2021    PROT 5.0 02/05/2021    BILITOT 0.4 02/05/2021    BILIDIR 0.4 01/14/2021    IBILI 0.5 01/14/2021    LABALBU 2.7 02/05/2021    LABALBU 4.0 03/26/2011         Microbiology :    Blood culture - neg to date   Urine Culture -     Susceptibility    Pseudomonas aeruginosa (1)    Antibiotic Interpretation SNEHA Status    gentamicin Sensitive <=^1 mcg/mL     levofloxacin Sensitive <=^0.12 mcg/mL     tobramycin Sensitive <=^1 mcg/mL     Lab and Collection        Sputum Culture-      Susceptibility    Pseudomonas aeruginosa (1)    Antibiotic Interpretation SNEHA Status    gentamicin Sensitive <=^1 mcg/mL     levofloxacin Sensitive <=^0.12 mcg/mL     tobramycin Sensitive <=^1 mcg/mL     Proteus mirabilis (4)    Antibiotic Interpretation SNEHA Status    ampicillin Sensitive <=^2 mcg/mL     ceFAZolin Sensitive <=^4 mcg/mL     cefepime Sensitive <=^0.12 mcg/mL     cefTRIAXone Sensitive <=^0.25 mcg/mL     ertapenem Sensitive <=^0.12 mcg/mL     gentamicin Sensitive <=^1 mcg/mL     levofloxacin Resistant >=^8 mcg/mL     piperacillin-tazobactam Sensitive <=^4 mcg/mL     trimethoprim-sulfamethoxazole Sensitive <=^20 mcg/mL      Condensed View   Lab and Collection  Radiology :    Chest X ray - bilateral multifocal infiltrates , cardiomegaly     Ultrasound - neg for DVT     Fungitell test negative       Assessment:  · Respiratory failure - on BiPAP    · Pseudomonas bacteriuria    · HCAP / Aspiration pneumonia  (Pseudomonas, Klebsiella, E coli and Proteus)   · Fever / leukocytosis         Plan:    · Zosyn 3.375 grams IV q 8 hrs       Ruben P Limbu  12:16 PM  2/5/2021

## 2021-02-06 LAB
AADO2: 135.9 MMHG
ALBUMIN SERPL-MCNC: 3.1 G/DL (ref 3.5–5.2)
ALP BLD-CCNC: 63 U/L (ref 40–129)
ALT SERPL-CCNC: 9 U/L (ref 0–40)
ANION GAP SERPL CALCULATED.3IONS-SCNC: 13 MMOL/L (ref 7–16)
ANISOCYTOSIS: ABNORMAL
AST SERPL-CCNC: 19 U/L (ref 0–39)
B.E.: 7.7 MMOL/L (ref -3–3)
BASOPHILS ABSOLUTE: 0 E9/L (ref 0–0.2)
BASOPHILS RELATIVE PERCENT: 0 % (ref 0–2)
BILIRUB SERPL-MCNC: 0.5 MG/DL (ref 0–1.2)
BUN BLDV-MCNC: 42 MG/DL (ref 8–23)
CALCIUM SERPL-MCNC: 8.4 MG/DL (ref 8.6–10.2)
CHLORIDE BLD-SCNC: 105 MMOL/L (ref 98–107)
CO2: 34 MMOL/L (ref 22–29)
COHB: 1.9 % (ref 0–1.5)
CREAT SERPL-MCNC: 0.7 MG/DL (ref 0.7–1.2)
CRITICAL: ABNORMAL
DATE ANALYZED: ABNORMAL
DATE OF COLLECTION: ABNORMAL
EOSINOPHILS ABSOLUTE: 0 E9/L (ref 0.05–0.5)
EOSINOPHILS RELATIVE PERCENT: 0.2 % (ref 0–6)
FIO2: 35 %
GFR AFRICAN AMERICAN: >60
GFR NON-AFRICAN AMERICAN: >60 ML/MIN/1.73
GLUCOSE BLD-MCNC: 138 MG/DL (ref 74–99)
HCO3: 31.6 MMOL/L (ref 22–26)
HCT VFR BLD CALC: 25.9 % (ref 37–54)
HEMOGLOBIN: 7.8 G/DL (ref 12.5–16.5)
HHB: 9.8 % (ref 0–5)
HYPOCHROMIA: ABNORMAL
LAB: ABNORMAL
LYMPHOCYTES ABSOLUTE: 0.72 E9/L (ref 1.5–4)
LYMPHOCYTES RELATIVE PERCENT: 7.1 % (ref 20–42)
Lab: ABNORMAL
MAGNESIUM: 2.1 MG/DL (ref 1.6–2.6)
MCH RBC QN AUTO: 28.2 PG (ref 26–35)
MCHC RBC AUTO-ENTMCNC: 30.1 % (ref 32–34.5)
MCV RBC AUTO: 93.5 FL (ref 80–99.9)
METER GLUCOSE: 134 MG/DL (ref 74–99)
METER GLUCOSE: 145 MG/DL (ref 74–99)
METER GLUCOSE: 151 MG/DL (ref 74–99)
METER GLUCOSE: 157 MG/DL (ref 74–99)
METER GLUCOSE: 184 MG/DL (ref 74–99)
METER GLUCOSE: 197 MG/DL (ref 74–99)
METHB: 0.2 % (ref 0–1.5)
MODE: ABNORMAL
MONOCYTES ABSOLUTE: 0.41 E9/L (ref 0.1–0.95)
MONOCYTES RELATIVE PERCENT: 3.5 % (ref 2–12)
NEUTROPHILS ABSOLUTE: 9.17 E9/L (ref 1.8–7.3)
NEUTROPHILS RELATIVE PERCENT: 89.4 % (ref 43–80)
O2 CONTENT: 10.7 ML/DL
O2 SATURATION: 90 % (ref 92–98.5)
O2HB: 88.1 % (ref 94–97)
OPERATOR ID: 1394
OVALOCYTES: ABNORMAL
PATIENT TEMP: 37 C
PCO2: 41.4 MMHG (ref 35–45)
PDW BLD-RTO: 21.3 FL (ref 11.5–15)
PFO2: 1.62 MMHG/%
PH BLOOD GAS: 7.5 (ref 7.35–7.45)
PLATELET # BLD: 222 E9/L (ref 130–450)
PMV BLD AUTO: 11 FL (ref 7–12)
PO2: 56.8 MMHG (ref 75–100)
POIKILOCYTES: ABNORMAL
POLYCHROMASIA: ABNORMAL
POTASSIUM REFLEX MAGNESIUM: 3.1 MMOL/L (ref 3.5–5)
RBC # BLD: 2.77 E12/L (ref 3.8–5.8)
RI(T): 2.39
SODIUM BLD-SCNC: 152 MMOL/L (ref 132–146)
SOURCE, BLOOD GAS: ABNORMAL
TARGET CELLS: ABNORMAL
THB: 8.6 G/DL (ref 11.5–16.5)
TIME ANALYZED: 445
TOTAL PROTEIN: 5.1 G/DL (ref 6.4–8.3)
WBC # BLD: 10.3 E9/L (ref 4.5–11.5)

## 2021-02-06 PROCEDURE — 6360000002 HC RX W HCPCS: Performed by: INTERNAL MEDICINE

## 2021-02-06 PROCEDURE — 80053 COMPREHEN METABOLIC PANEL: CPT

## 2021-02-06 PROCEDURE — 2700000000 HC OXYGEN THERAPY PER DAY

## 2021-02-06 PROCEDURE — 94640 AIRWAY INHALATION TREATMENT: CPT

## 2021-02-06 PROCEDURE — 6370000000 HC RX 637 (ALT 250 FOR IP): Performed by: INTERNAL MEDICINE

## 2021-02-06 PROCEDURE — 82805 BLOOD GASES W/O2 SATURATION: CPT

## 2021-02-06 PROCEDURE — 94660 CPAP INITIATION&MGMT: CPT

## 2021-02-06 PROCEDURE — 2580000003 HC RX 258: Performed by: INTERNAL MEDICINE

## 2021-02-06 PROCEDURE — C9113 INJ PANTOPRAZOLE SODIUM, VIA: HCPCS | Performed by: INTERNAL MEDICINE

## 2021-02-06 PROCEDURE — 83735 ASSAY OF MAGNESIUM: CPT

## 2021-02-06 PROCEDURE — 85025 COMPLETE CBC W/AUTO DIFF WBC: CPT

## 2021-02-06 PROCEDURE — 82962 GLUCOSE BLOOD TEST: CPT

## 2021-02-06 PROCEDURE — 37799 UNLISTED PX VASCULAR SURGERY: CPT

## 2021-02-06 PROCEDURE — 2060000000 HC ICU INTERMEDIATE R&B

## 2021-02-06 RX ADMIN — IPRATROPIUM BROMIDE AND ALBUTEROL SULFATE 1 AMPULE: .5; 3 SOLUTION RESPIRATORY (INHALATION) at 20:39

## 2021-02-06 RX ADMIN — SODIUM CHLORIDE SOLN NEBU 3% 4 ML: 3 NEBU SOLN at 09:34

## 2021-02-06 RX ADMIN — INSULIN LISPRO 3 UNITS: 100 INJECTION, SOLUTION INTRAVENOUS; SUBCUTANEOUS at 01:55

## 2021-02-06 RX ADMIN — PIPERACILLIN AND TAZOBACTAM 3375 MG: 3; .375 INJECTION, POWDER, LYOPHILIZED, FOR SOLUTION INTRAVENOUS at 05:39

## 2021-02-06 RX ADMIN — SENNOSIDES 8.6 MG: 8.6 TABLET, FILM COATED ORAL at 20:24

## 2021-02-06 RX ADMIN — LATANOPROST 1 DROP: 50 SOLUTION OPHTHALMIC at 07:52

## 2021-02-06 RX ADMIN — Medication: at 20:21

## 2021-02-06 RX ADMIN — POTASSIUM BICARBONATE 40 MEQ: 782 TABLET, EFFERVESCENT ORAL at 07:50

## 2021-02-06 RX ADMIN — LACTULOSE 20 G: 20 SOLUTION ORAL at 07:50

## 2021-02-06 RX ADMIN — Medication 10 ML: at 07:51

## 2021-02-06 RX ADMIN — IPRATROPIUM BROMIDE AND ALBUTEROL SULFATE 1 AMPULE: .5; 3 SOLUTION RESPIRATORY (INHALATION) at 16:57

## 2021-02-06 RX ADMIN — MICONAZOLE NITRATE: 20.6 POWDER TOPICAL at 20:22

## 2021-02-06 RX ADMIN — POTASSIUM CHLORIDE 20 MEQ: 400 INJECTION, SOLUTION INTRAVENOUS at 09:02

## 2021-02-06 RX ADMIN — INSULIN LISPRO 3 UNITS: 100 INJECTION, SOLUTION INTRAVENOUS; SUBCUTANEOUS at 05:41

## 2021-02-06 RX ADMIN — MICONAZOLE NITRATE: 20.6 POWDER TOPICAL at 07:51

## 2021-02-06 RX ADMIN — SODIUM CHLORIDE SOLN NEBU 3% 4 ML: 3 NEBU SOLN at 20:39

## 2021-02-06 RX ADMIN — IPRATROPIUM BROMIDE AND ALBUTEROL SULFATE 1 AMPULE: .5; 3 SOLUTION RESPIRATORY (INHALATION) at 12:55

## 2021-02-06 RX ADMIN — AMIODARONE HYDROCHLORIDE 100 MG: 200 TABLET ORAL at 07:50

## 2021-02-06 RX ADMIN — INSULIN GLARGINE 28 UNITS: 100 INJECTION, SOLUTION SUBCUTANEOUS at 20:34

## 2021-02-06 RX ADMIN — INSULIN LISPRO 3 UNITS: 100 INJECTION, SOLUTION INTRAVENOUS; SUBCUTANEOUS at 12:15

## 2021-02-06 RX ADMIN — FUROSEMIDE 40 MG: 10 INJECTION, SOLUTION INTRAMUSCULAR; INTRAVENOUS at 16:38

## 2021-02-06 RX ADMIN — POTASSIUM CHLORIDE 20 MEQ: 400 INJECTION, SOLUTION INTRAVENOUS at 07:50

## 2021-02-06 RX ADMIN — MIDODRINE HYDROCHLORIDE 20 MG: 5 TABLET ORAL at 16:38

## 2021-02-06 RX ADMIN — BUDESONIDE 500 MCG: 0.5 SUSPENSION RESPIRATORY (INHALATION) at 20:39

## 2021-02-06 RX ADMIN — SODIUM CHLORIDE, PRESERVATIVE FREE 300 UNITS: 5 INJECTION INTRAVENOUS at 20:18

## 2021-02-06 RX ADMIN — SODIUM CHLORIDE, PRESERVATIVE FREE 300 UNITS: 5 INJECTION INTRAVENOUS at 08:09

## 2021-02-06 RX ADMIN — BUDESONIDE 500 MCG: 0.5 SUSPENSION RESPIRATORY (INHALATION) at 09:34

## 2021-02-06 RX ADMIN — MIDODRINE HYDROCHLORIDE 20 MG: 5 TABLET ORAL at 13:03

## 2021-02-06 RX ADMIN — INSULIN LISPRO 3 UNITS: 100 INJECTION, SOLUTION INTRAVENOUS; SUBCUTANEOUS at 16:35

## 2021-02-06 RX ADMIN — ENOXAPARIN SODIUM 40 MG: 40 INJECTION SUBCUTANEOUS at 07:51

## 2021-02-06 RX ADMIN — HYDROCORTISONE SODIUM SUCCINATE 25 MG: 100 INJECTION, POWDER, FOR SOLUTION INTRAMUSCULAR; INTRAVENOUS at 07:51

## 2021-02-06 RX ADMIN — CHLORHEXIDINE GLUCONATE 0.12% ORAL RINSE 15 ML: 1.2 LIQUID ORAL at 20:23

## 2021-02-06 RX ADMIN — MIDODRINE HYDROCHLORIDE 20 MG: 5 TABLET ORAL at 07:50

## 2021-02-06 RX ADMIN — Medication: at 07:51

## 2021-02-06 RX ADMIN — Medication 10 ML: at 20:18

## 2021-02-06 RX ADMIN — ATORVASTATIN CALCIUM 20 MG: 20 TABLET, FILM COATED ORAL at 07:50

## 2021-02-06 RX ADMIN — PIPERACILLIN AND TAZOBACTAM 3375 MG: 3; .375 INJECTION, POWDER, LYOPHILIZED, FOR SOLUTION INTRAVENOUS at 20:30

## 2021-02-06 RX ADMIN — FUROSEMIDE 40 MG: 10 INJECTION, SOLUTION INTRAMUSCULAR; INTRAVENOUS at 07:50

## 2021-02-06 RX ADMIN — IPRATROPIUM BROMIDE AND ALBUTEROL SULFATE 1 AMPULE: .5; 3 SOLUTION RESPIRATORY (INHALATION) at 09:34

## 2021-02-06 RX ADMIN — INSULIN LISPRO 3 UNITS: 100 INJECTION, SOLUTION INTRAVENOUS; SUBCUTANEOUS at 08:00

## 2021-02-06 RX ADMIN — SODIUM CHLORIDE, PRESERVATIVE FREE 10 ML: 5 INJECTION INTRAVENOUS at 07:51

## 2021-02-06 RX ADMIN — PIPERACILLIN AND TAZOBACTAM 3375 MG: 3; .375 INJECTION, POWDER, LYOPHILIZED, FOR SOLUTION INTRAVENOUS at 12:14

## 2021-02-06 RX ADMIN — CHLORHEXIDINE GLUCONATE 0.12% ORAL RINSE 15 ML: 1.2 LIQUID ORAL at 07:53

## 2021-02-06 RX ADMIN — PANTOPRAZOLE SODIUM 40 MG: 40 INJECTION, POWDER, FOR SOLUTION INTRAVENOUS at 07:50

## 2021-02-06 ASSESSMENT — PAIN SCALES - PAIN ASSESSMENT IN ADVANCED DEMENTIA (PAINAD)
TOTALSCORE: 0
CONSOLABILITY: 0
FACIALEXPRESSION: 0
BODYLANGUAGE: 0
BREATHING: 0
BODYLANGUAGE: 0

## 2021-02-06 ASSESSMENT — PAIN SCALES - GENERAL
PAINLEVEL_OUTOF10: 0

## 2021-02-06 NOTE — PROGRESS NOTES
Critical Care Team - Daily Progress Note         Date and time: 2/6/2021 11:51 AM  Patient's name:  Felisha Sorenson  Medical Record Number: [id number]  Patient's account/billing number: [de-identified]  Patient's YOB: 1940  Age: 78 y.o. Date of Admission: 1/22/2021  8:35 PM  Length of stay during current admission: 15    Primary Care Physician: No primary care provider on file. ICU Attending Physician:  Dr. Rinaldi  Code Status: DNR-CCA     Reason for ICU admission: Septic shock  SUBJECTIVE:      Opens eyes follow simple command  Patient denies shortness of breath.     OVERNIGHT EVENTS:       CURRENT VENTILATION STATUS:   []????? Ventilator   [x]????? BIPAP         [x]????? Nasal Cannula       []????? Room Air    IF INTUBATED, ET TUBE MARKING AT LOWER LIP:       cms  SECRETIONS Amount:         []????? Small          []????? Moderate                [x]????? Large  []????? None                       Color:              []????? White          [x]????? Colored    tan               []????? Bloody  SEDATION:    RAAS Score: On Precedex  []????? Propofol gtt             []????? Versed gtt               []????? Ativan gtt                [x]????? No Sedation  PARALYZED:             [x]????? No                           []????? Yes  VASOPRESSORS:    [x]????? No                           []????? Yes    If yes - []????? Levophed       []????? Dopamine          []????? Vasopressin       []????? Dobutamine  []????? Phenylephrine         []????? Epinephrine  CENTRAL LINES:     []????? No               []????? Yes   (Date of Insertion:   )           If yes -     []????? Right IJ             []????? Left IJ         []????? Right Femoral        []????? Left Femoral                   []????? Right Subclavian           []????? Left Subclavian                  [x]????? PICC Line right arm  GREEN'S CATHETER:   []????? No                      [x]????? Yes  (Date of Insertion:   )   URINE OUTPUT:            [x]????? Good                  []????? Low              []????? Anuric         OBJECTIVE:     VITAL SIGNS:  /87   Pulse 94   Temp 98.4 °F (36.9 °C) (Bladder)   Resp 24   Ht 6' (1.829 m)   Wt 256 lb 6.1 oz (116.3 kg)   SpO2 (!) 86%   BMI 34.77 kg/m²   Tmax over 24 hours:  Temp (24hrs), Av.1 °F (37.3 °C), Min:98.4 °F (36.9 °C), Max:100.8 °F (38.2 °C)      Patient Vitals for the past 6 hrs:   BP Temp Temp src Pulse Resp SpO2   21 1059 -- -- -- -- 24 (!) 86 %   21 1033 -- -- -- -- 27 (!) 85 %   21 1000 137/87 -- -- 94 22 90 %   21 0935 -- -- -- -- (!) 39 100 %   21 0934 -- -- -- -- (!) 39 100 %   21 0927 -- -- -- -- 17 (!) 89 %   21 0900 104/64 -- -- 84 (!) 41 93 %   21 0800 104/62 98.4 °F (36.9 °C) Bladder 92 27 (!) 89 %   21 0700 (!) 103/55 -- -- 86 23 94 %   21 0600 108/65 -- -- 81 19 94 %         Intake/Output Summary (Last 24 hours) at 2021 1151  Last data filed at 2021 1000  Gross per 24 hour   Intake 2588.46 ml   Output 1260 ml   Net 1328.46 ml     Wt Readings from Last 2 Encounters:   21 256 lb 6.1 oz (116.3 kg)   21 255 lb (115.7 kg)     Body mass index is 34.77 kg/m².         PHYSICAL EXAMINATION:  On BIPAP   General appearance - alert opens eyes moves right arm obese intubated on low-dose Precedex, and in no distress with L side flaccid   Mental status - alert, oriented to person, place, and time  Eyes - pupils equal and reactive, extraocular eye movements intact  Ears - external ear canals normal  Nose - normal and patent,, discharge   Mouth - mucous membranes moist, pharynx normal without lesions  Neck - supple, no significant adenopathy, JVD  Chest - clear to auscultation, no wheezes, rales or rhonchi, symmetric air entry  Heart - normal rate, regular rhythm, normal S1, S2, no murmurs, rubs, clicks or gallops  Abdomen - soft, nontender, nondistended, no masses or organomegaly obese PEJ  Neurological - alert, oriented, normal speech, no focal findings or movement disorder noted  Extremities - peripheral pulses normal, no pedal edema, no clubbing or cyanosis.  Right arm swollen with wound around elbow  Skin - normal coloration and turgor, no rashes, redness of the shins with scab on left shin       MEDICATIONS:    Scheduled Meds:   furosemide  40 mg Intravenous BID    piperacillin-tazobactam  3,375 mg Intravenous Q8H    sodium chloride   Intravenous Q8H    hydrocortisone sodium succinate PF  25 mg Intravenous Daily    miconazole   Topical BID    potassium bicarb-citric acid  40 mEq Per G Tube Daily    insulin glargine  28 Units Subcutaneous Nightly    midodrine  20 mg Oral TID WC    ipratropium-albuterol  1 ampule Inhalation 4x daily    insulin lispro  0-18 Units Subcutaneous Q4H    Mineral Oil-Hydrophil Petrolat   Topical BID    lidocaine PF  5 mL Intradermal Once    heparin flush  3 mL Intravenous 2 times per day    chlorhexidine  15 mL Mouth/Throat BID    amiodarone  100 mg PEG Tube Daily    atorvastatin  20 mg PEG Tube Daily    lactulose  20 g PEG Tube Daily    latanoprost  1 drop Both Eyes Daily    pantoprazole  40 mg Intravenous Daily    And    sodium chloride (PF)  10 mL Intravenous Daily    senna  1 tablet PEG Tube BID    sodium chloride (Inhalant)  4 mL Nebulization BID    sodium chloride flush  10 mL Intravenous 2 times per day    enoxaparin  40 mg Subcutaneous Daily    budesonide  500 mcg Nebulization BID     Continuous Infusions:   dextrose      norepinephrine Stopped (02/05/21 1305)     PRN Meds:       metoprolol, 5 mg, Q5 Min PRN      Mineral Oil-Hydrophil Petrolat, , 4x Daily PRN      sodium chloride flush, 10 mL, PRN      heparin flush, 3 mL, PRN      potassium chloride, 20 mEq, PRN      acetaminophen, 650 mg, Q4H PRN      docusate sodium, 1 enema, PRN      glucose, 15 g, PRN      dextrose, 12.5 g, PRN      glucagon (rDNA), 1 mg, PRN      dextrose, 100 mL/hr, PRN      sodium chloride 105   CO2 37* 37* 34*   BUN 37* 39* 42*   CREATININE 0.8 0.7 0.7   GLUCOSE 125* 121* 138*   CALCIUM 7.3* 7.7* 8.4*   PROT 4.8* 5.0* 5.1*   LABALBU 2.5* 2.7* 3.1*   BILITOT 0.3 0.4 0.5   ALKPHOS 68 71 63   AST 25 19 19   ALT 9 10 9      Magnesium:   Lab Results   Component Value Date    MG 2.1 02/06/2021     Phosphorus:   Lab Results   Component Value Date    PHOS 3.2 02/02/2021     Ionized Calcium:   Lab Results   Component Value Date    CAION 1.06 01/18/2021        Urinalysis:     Troponin: No results for input(s): TROPONINI in the last 72 hours. Microbiology:    Cultures during this admission:     Blood cultures:                        []? ?? None drawn      []? ?? Negative     [x]? ??  Positive     []? ?? Pending   Urine Culture:                         []? ?? None drawn      []? ?? Negative     [x]? ??  Positive     []? ?? Pending  Sputum Culture:                      []? ?? None drawn      []? ?? Negative     []? ??  Positive     []? ?? Pending  Endotracheal aspirate:            []? ?? None drawn      []? ?? Negative     []? ??  Positive     []? ?? Pending  Stool:                                       []? ?? None Sent        []? ?? Negative     []? ??  Positive     []? ?? Pending            Other Micro:                            []? ?? None Sent        []? ?? Negative     []? ??  Positive     []? ?? Pending                           Collected: 01/23/21 1116   Order Status: Completed Specimen: Endotracheal Updated: 01/26/21 0639     CULTURE, RESPIRATORY Oral Pharyngeal Tamika reducedAbnormal      Smear, Respiratory --     Group 5: >25 PMN's/LPF and <10 Epithelial cells/LPF   Abundant Polymorphonuclear leukocytes   Epithelial cells not seen   Rare Gram negative diplococci   Rare Gram positive cocci in clusters   Few Gram negative rods   Rare Gram positive cocci in pairs      Organism Pseudomonas aeruginosaAbnormal      CULTURE, RESPIRATORY Heavy growth     Organism Proteus mirabilisAbnormal      CULTURE, RESPIRATORY Heavy growth     given metoprolol weaned off of pressors Chest x-ray with multifocal bilateral infiltrates. 2/6 off of pressors on nasal cannula oxygen. On 6 L.      1. Acute respiratory failure on and off BIPAP  2. S/p septic shock vitamin C, Solu-Cortef 25 mg and on midodrine  3. Pseudomonas bacteriuria    4. HCAP / Aspiration pneumonia  (Pseudomonas, Klebsiella, E coli and Proteus) on Zosyn 3.375 grams IV q 8 hrs    5. Echo EF 55 to 60% on 12/6/2020, mild aortic stenosis  6. Anemia following hemoglobin  7. Dysphagia PEG tube changed to PEJ tube 1/15/2021  8. History of bullous pemphigoid on chronic steroids  9. History of A. fib on amiodarone  10. Left CFV SF V DVT 1/3/2021 IVC filter placed 1/6  11. Baseline dementia nonverbal  12. Valri Keto. 13. Probable COPD on aerosols  14. DNR CCA family does not want to have a trach so no reintubation if patient were to extubate     PLAN:   Increase free water  Replace potassium  ABGs with respiratory alkalosis  DC IV steroids  Day 4 of Zosyn     WEAN PER PROTOCOL:                  [x]????? No               []????? Yes             []????? N/A  DISCONTINUE ANY LABS:              [x]????? No               []????? Yes  ICU PROPHYLAXIS:  Stress ulcer:  [x]? ???? PPI Agent               []????? H6Roycp      []????? Sucralfate               []????? Other:  VTE:                [x]????? Enoxaparin             []????? Unfract.  Heparin Subcut                []????? EPC Cuffs  NUTRITION:  []????? NPO            [x]????? Tube Feeding (Specify: )   []????? TPN                        []????? PO (Diet: DIET TUBE FEED CONTINUOUS/CYCLIC NPO; Semi-elemental; Orogastric; Continuous; 10; 60; 24  Diet Tube Feed Modular: Protein Modular)  HOME MEDICATIONS RECONCILED:        [x]????? No                           []????? Yes  INSULIN DRIP:                                               [x]????? No                           []????? Yes  CONSULTATION NEEDED:                          [x]????? No                           []????? Yes  FAMILY UPDATED:                                       [x]????? No                           []????? Yes  TRANSFER OUT OF ICU:                             [x]????? No                           []????? Yes        SAGRARIO Ramsey. BRODY WILDER                     I personally saw, examined and provided care for the patient. Radiographs, labs and medication list were reviewed by me independently. I spoke with bedside nursing, therapists and consultants.  Critical care services and times documented are independent of procedures    > 30 min CCT  2/6/2021, 11:51 AM

## 2021-02-06 NOTE — PROGRESS NOTES
[address] [place] [address] [facility]  Progress  Note   C/C : pneumonia, resp failure, fever      Pt's awake , non communicating   On BiPAP  Fever down         Current Facility-Administered Medications   Medication Dose Route Frequency Provider Last Rate Last Admin    metoprolol (LOPRESSOR) injection 5 mg  5 mg Intravenous Q5 Min PRN Leona Lord MD        furosemide (LASIX) injection 40 mg  40 mg Intravenous BID Leona Lord MD   40 mg at 02/06/21 0750    piperacillin-tazobactam (ZOSYN) 3,375 mg in dextrose 5 % 100 mL IVPB extended infusion (mini-bag)  3,375 mg Intravenous Q8H Ruben P MD Olga 25 mL/hr at 02/06/21 0539 3,375 mg at 02/06/21 0539    0.9 % sodium chloride infusion admixture   Intravenous Q8H Ruben P MD Olga   Stopped at 02/05/21 1843    hydrocortisone sodium succinate PF (SOLU-CORTEF) injection 25 mg  25 mg Intravenous Daily Leona Lord MD   25 mg at 02/06/21 0751    miconazole (MICOTIN) 2 % powder   Topical BID Leona Lord MD   Given at 02/06/21 0751    potassium bicarb-citric acid (EFFER-K) effervescent tablet 40 mEq  40 mEq Per G Tube Daily Aashish Davenport DO   40 mEq at 02/06/21 0750    insulin glargine (LANTUS) injection vial 28 Units  28 Units Subcutaneous Nightly Leona Lord MD   28 Units at 02/05/21 2020    midodrine (PROAMATINE) tablet 20 mg  20 mg Oral TID  Aashish Davenport DO   20 mg at 02/06/21 0750    ipratropium-albuterol (DUONEB) nebulizer solution 1 ampule  1 ampule Inhalation 4x daily Suri Ishihara, DO   1 ampule at 02/05/21 1958    insulin lispro (HUMALOG) injection vial 0-18 Units  0-18 Units Subcutaneous Q4H Aashish Davenport DO   3 Units at 02/06/21 0800    Mineral Oil-Hydrophil Petrolat OINT   Topical BID Suri Ishihara, DO   Given at 02/06/21 1571    And    Mineral Oil-Hydrophil Petrolat OINT   Topical 4x Daily PRN Suri Ishihara, DO   Given at 02/04/21 1820    lidocaine PF 1 % injection 5 mL  5 mL Intradermal Once Ellen Kawasaki, DO        sodium chloride flush 0.9 % injection 10 mL  10 mL Intravenous PRN Ellen Kawasaki, DO        heparin flush 100 UNIT/ML injection 300 Units  3 mL Intravenous 2 times per day Ellen Kawasaki, DO   300 Units at 02/06/21 0809    heparin flush 100 UNIT/ML injection 300 Units  3 mL Intracatheter PRN Ellen Kawasaki, DO        chlorhexidine (PERIDEX) 0.12 % solution 15 mL  15 mL Mouth/Throat BID Wilberto [place], DO   15 mL at 02/06/21 0753    potassium chloride 20 mEq/50 mL IVPB (Central Line)  20 mEq Intravenous PRN Ellen Kawasaki, DO 50 mL/hr at 02/06/21 0750 20 mEq at 02/06/21 0750    acetaminophen (TYLENOL) 160 MG/5ML solution 650 mg  650 mg Per G Tube Q4H PRN Nguyen Loco MD   650 mg at 02/05/21 5053    amiodarone (CORDARONE) tablet 100 mg  100 mg PEG Tube Daily Nguyen Loco MD   100 mg at 02/06/21 0750    atorvastatin (LIPITOR) tablet 20 mg  20 mg PEG Tube Daily Nguyen Loco MD   20 mg at 02/06/21 0750    docusate sodium (ENEMEEZ) enema 283 mg  1 enema Rectal PRN Nguyen Loco MD        glucose (GLUTOSE) 40 % oral gel 15 g  15 g Oral PRN Nguyen oLco MD        dextrose 50 % IV solution  12.5 g Intravenous PRN Nguyen Loco MD        glucagon (rDNA) injection 1 mg  1 mg Intramuscular PRN Nguyen Loco MD        dextrose 5 % solution  100 mL/hr Intravenous PRN Nguyen Loco MD        lactulose (CHRONULAC) 10 GM/15ML solution 20 g  20 g PEG Tube Daily Felix Hguhes MD   20 g at 02/06/21 0750    latanoprost (XALATAN) 0.005 % ophthalmic solution 1 drop  1 drop Both Eyes Daily Nguyen Loco MD   1 drop at 02/06/21 0752    pantoprazole (PROTONIX) injection 40 mg  40 mg Intravenous Daily Nguyen Loco MD   40 mg at 02/06/21 0750    And    sodium chloride (PF) 0.9 % injection 10 mL  10 mL Intravenous Daily Nguyen Loco MD   10 mL at 02/06/21 0751    senna (SENOKOT) tablet 8.6 mg  1 tablet PEG Tube BID Nguyen Loco MD   8.6 mg at 02/04/21 2004  sodium chloride (Inhalant) 3 % nebulizer solution 4 mL  4 mL Nebulization BID Stewart Ashford MD   4 mL at 02/05/21 1958    sodium chloride flush 0.9 % injection 10 mL  10 mL Intravenous 2 times per day Stewart Ashford MD   10 mL at 02/06/21 0751    sodium chloride flush 0.9 % injection 10 mL  10 mL Intravenous PRN Stewart Ashford MD   10 mL at 02/02/21 1242    enoxaparin (LOVENOX) injection 40 mg  40 mg Subcutaneous Daily Stewart Ashford MD   40 mg at 02/06/21 0751    promethazine (PHENERGAN) tablet 12.5 mg  12.5 mg Oral Q6H PRN Stewart Ashford MD        Or    ondansetron (ZOFRAN) injection 4 mg  4 mg Intravenous Q6H PRN Stewart Ashford MD        polyethylene glycol (GLYCOLAX) packet 17 g  17 g Oral Daily PRN Stewart Ashford MD        acetaminophen (TYLENOL) tablet 650 mg  650 mg Oral Q6H PRN Stewart Ashford MD   650 mg at 01/30/21 1923    Or    acetaminophen (TYLENOL) suppository 650 mg  650 mg Rectal Q6H PRN Stewart Ashford MD        budesonide (PULMICORT) nebulizer suspension 500 mcg  500 mcg Nebulization BID Juni Motley MD   500 mcg at 02/05/21 1958    fentaNYL (SUBLIMAZE) injection 50 mcg  50 mcg Intravenous Q1H PRN Julieann Dakins, MD   50 mcg at 02/03/21 2008    norepinephrine (LEVOPHED) 16 mg in dextrose 5% 250 mL infusion  2-100 mcg/min Intravenous Continuous Pawan Trivedi DO   Stopped at 02/05/21 1305       REVIEW OF SYSTEMS: could not be obtained       PHYSICAL EXAM:    Vitals:   BP (!) 103/55   Pulse 86   Temp 98.4 °F (36.9 °C) (Bladder)   Resp 23   Ht 6' (1.829 m)   Wt 256 lb 6.1 oz (116.3 kg)   SpO2 94%   BMI 34.77 kg/m²      Constitutional: Awake, on BiPAP ,   Skin:  no rash   HEENT:Pallor +, no LN  , dry mucosa   Neck: Supple to movements. No lymphadenopathy.    Chest:  Diminished breath sound   Cardiovascular: Regular, no murmur   Abdomen:soft, bowel sound +  PEG - no drainage or erythema   Extremities: ++ edema, erythema on the legs     Lines:  Right brachial PICC ( 1/25) CBC with Differential:      Lab Results   Component Value Date    WBC 10.3 02/06/2021    RBC 2.77 02/06/2021    HGB 7.8 02/06/2021    HCT 25.9 02/06/2021     02/06/2021    MCV 93.5 02/06/2021    MCH 28.2 02/06/2021    MCHC 30.1 02/06/2021    RDW 21.3 02/06/2021    NRBC 0.9 01/08/2021    SEGSPCT 64 09/27/2013    METASPCT 6.0 01/06/2021    LYMPHOPCT 7.1 02/06/2021    PROMYELOPCT 0.9 12/21/2020    MONOPCT 3.5 02/06/2021    MYELOPCT 3.0 01/06/2021    BASOPCT 0.0 02/06/2021    MONOSABS 0.41 02/06/2021    LYMPHSABS 0.72 02/06/2021    EOSABS 0.00 02/06/2021    BASOSABS 0.00 02/06/2021       CMP:    Lab Results   Component Value Date     02/06/2021    K 3.1 02/06/2021     02/06/2021    CO2 34 02/06/2021    BUN 42 02/06/2021    CREATININE 0.7 02/06/2021    GFRAA >60 02/06/2021    LABGLOM >60 02/06/2021    GLUCOSE 138 02/06/2021    GLUCOSE 138 03/26/2011    PROT 5.1 02/06/2021    LABALBU 3.1 02/06/2021    LABALBU 4.0 03/26/2011    CALCIUM 8.4 02/06/2021    BILITOT 0.5 02/06/2021    ALKPHOS 63 02/06/2021    AST 19 02/06/2021    ALT 9 02/06/2021       Hepatic Function Panel:    Lab Results   Component Value Date    ALKPHOS 63 02/06/2021    ALT 9 02/06/2021    AST 19 02/06/2021    PROT 5.1 02/06/2021    BILITOT 0.5 02/06/2021    BILIDIR 0.4 01/14/2021    IBILI 0.5 01/14/2021    LABALBU 3.1 02/06/2021    LABALBU 4.0 03/26/2011         Microbiology :    Blood culture - neg to date   Urine Culture -     Susceptibility    Pseudomonas aeruginosa (1)    Antibiotic Interpretation SNEHA Status    gentamicin Sensitive <=^1 mcg/mL     levofloxacin Sensitive <=^0.12 mcg/mL     tobramycin Sensitive <=^1 mcg/mL     Lab and Collection        Sputum Culture-      Susceptibility    Pseudomonas aeruginosa (1)    Antibiotic Interpretation SNEHA Status    gentamicin Sensitive <=^1 mcg/mL     levofloxacin Sensitive <=^0.12 mcg/mL     tobramycin Sensitive <=^1 mcg/mL     Proteus mirabilis (4)    Antibiotic Interpretation SNEHA Status    ampicillin Sensitive <=^2 mcg/mL     ceFAZolin Sensitive <=^4 mcg/mL     cefepime Sensitive <=^0.12 mcg/mL     cefTRIAXone Sensitive <=^0.25 mcg/mL     ertapenem Sensitive <=^0.12 mcg/mL     gentamicin Sensitive <=^1 mcg/mL     levofloxacin Resistant >=^8 mcg/mL     piperacillin-tazobactam Sensitive <=^4 mcg/mL     trimethoprim-sulfamethoxazole Sensitive <=^20 mcg/mL      Condensed View   Lab and Collection  Radiology :    Chest X ray - bilateral multifocal infiltrates , cardiomegaly     Ultrasound - neg for DVT     Fungitell test negative       Assessment:  · Respiratory failure - on BiPAP    · Pseudomonas bacteriuria    · HCAP / Aspiration pneumonia  (Pseudomonas, Klebsiella, E coli and Proteus)   · Fever / leukocytosis  -improved        Plan:    · Zosyn 3.375 grams IV q 8 hrs       Ruben Espinoza  8:23 AM  2/6/2021

## 2021-02-06 NOTE — PROGRESS NOTES
Subjective: PATIENT SEEN FOR FOLLOW UP IN ICU KNOWN TO ME FROM ECF AND PAST ADMISSION CHART REVIEWED OFF VENT     REMAINS WEAK FRAIL AND ILL FOLLOWS COMMANDS     ROS:  Review of systems not obtained due to patient factors.     Objective:        metoprolol (LOPRESSOR) injection 5 mg, Q5 Min PRN      furosemide (LASIX) injection 40 mg, BID      piperacillin-tazobactam (ZOSYN) 3,375 mg in dextrose 5 % 100 mL IVPB extended infusion (mini-bag), Q8H      0.9 % sodium chloride infusion admixture, Q8H      miconazole (MICOTIN) 2 % powder, BID      potassium bicarb-citric acid (EFFER-K) effervescent tablet 40 mEq, Daily      insulin glargine (LANTUS) injection vial 28 Units, Nightly      midodrine (PROAMATINE) tablet 20 mg, TID WC      ipratropium-albuterol (DUONEB) nebulizer solution 1 ampule, 4x daily      insulin lispro (HUMALOG) injection vial 0-18 Units, Q4H      Mineral Oil-Hydrophil Petrolat OINT, BID    And      Mineral Oil-Hydrophil Petrolat OINT, 4x Daily PRN      lidocaine PF 1 % injection 5 mL, Once      sodium chloride flush 0.9 % injection 10 mL, PRN      heparin flush 100 UNIT/ML injection 300 Units, 2 times per day      heparin flush 100 UNIT/ML injection 300 Units, PRN      chlorhexidine (PERIDEX) 0.12 % solution 15 mL, BID      potassium chloride 20 mEq/50 mL IVPB (Central Line), PRN      acetaminophen (TYLENOL) 160 MG/5ML solution 650 mg, Q4H PRN      amiodarone (CORDARONE) tablet 100 mg, Daily      atorvastatin (LIPITOR) tablet 20 mg, Daily      docusate sodium (ENEMEEZ) enema 283 mg, PRN      glucose (GLUTOSE) 40 % oral gel 15 g, PRN      dextrose 50 % IV solution, PRN      glucagon (rDNA) injection 1 mg, PRN      dextrose 5 % solution, PRN      lactulose (CHRONULAC) 10 GM/15ML solution 20 g, Daily      latanoprost (XALATAN) 0.005 % ophthalmic solution 1 drop, Daily      pantoprazole (PROTONIX) injection 40 mg, Daily    And      sodium chloride (PF) 0.9 % injection 10 mL, Daily      senna (SENOKOT) tablet 8.6 mg, BID      sodium chloride (Inhalant) 3 % nebulizer solution 4 mL, BID      sodium chloride flush 0.9 % injection 10 mL, 2 times per day      sodium chloride flush 0.9 % injection 10 mL, PRN      enoxaparin (LOVENOX) injection 40 mg, Daily      promethazine (PHENERGAN) tablet 12.5 mg, Q6H PRN    Or      ondansetron (ZOFRAN) injection 4 mg, Q6H PRN      polyethylene glycol (GLYCOLAX) packet 17 g, Daily PRN      acetaminophen (TYLENOL) tablet 650 mg, Q6H PRN    Or      acetaminophen (TYLENOL) suppository 650 mg, Q6H PRN      budesonide (PULMICORT) nebulizer suspension 500 mcg, BID      fentaNYL (SUBLIMAZE) injection 50 mcg, Q1H PRN      norepinephrine (LEVOPHED) 16 mg in dextrose 5% 250 mL infusion, Continuous          /73   Pulse 95   Temp 98.8 °F (37.1 °C) (Bladder)   Resp (!) 35   Ht 6' (1.829 m)   Wt 256 lb 6.1 oz (116.3 kg)   SpO2 100%   BMI 34.77 kg/m²     Lungs:  Breath sounds: diminished breath sounds- throughout  Heart:  Heart regular rate and rhythm  Murmur(s)-  3/6 systolic at lower left sternal border  Abdomen:  Soft, non-tender, normal bowel sounds. No bruits, organomegaly or masses. Extremities: Extremities warm to touch, pink, with no edema.     CBC with Differential:    Lab Results   Component Value Date    WBC 10.3 02/06/2021    RBC 2.77 02/06/2021    HGB 7.8 02/06/2021    HCT 25.9 02/06/2021     02/06/2021    MCV 93.5 02/06/2021    MCH 28.2 02/06/2021    MCHC 30.1 02/06/2021    RDW 21.3 02/06/2021    NRBC 0.9 01/08/2021    SEGSPCT 64 09/27/2013    METASPCT 6.0 01/06/2021    LYMPHOPCT 7.1 02/06/2021    PROMYELOPCT 0.9 12/21/2020    MONOPCT 3.5 02/06/2021    MYELOPCT 3.0 01/06/2021    BASOPCT 0.0 02/06/2021    MONOSABS 0.41 02/06/2021    LYMPHSABS 0.72 02/06/2021    EOSABS 0.00 02/06/2021    BASOSABS 0.00 02/06/2021     BMP:    Lab Results   Component Value Date     02/06/2021    K 3.1 02/06/2021     02/06/2021    CO2 34 02/06/2021    BUN 42 02/06/2021    LABALBU 3.1 02/06/2021    LABALBU 4.0 03/26/2011    CREATININE 0.7 02/06/2021    CALCIUM 8.4 02/06/2021    GFRAA >60 02/06/2021    LABGLOM >60 02/06/2021    GLUCOSE 138 02/06/2021    GLUCOSE 138 03/26/2011        Assessment:    Patient Active Problem List   Diagnosis    Severe sepsis with septic shock (HCC)    Dementia (HCC)    Metabolic encephalopathy    Diabetes mellitus type 2, uncontrolled ([address]    Hyperlipidemia LDL goal <100    Essential hypertension    Venous ulcer of left leg (HCC)    Non-pressure chronic ulcer of left lower leg with fat layer exposed ([address]    Non-pressure chronic ulcer left lower leg, limited to breakdown skin (HCC)    Severe protein-calorie malnutrition (HCC)    Non-pressure chronic ulcer right lower leg, limited to breakdown skin (HCC)    Pressure injury of contiguous region involving back, buttock, and hip, stage 2 (HCC)    Pressure injury of calf, stage 2 ([address]    HCAP (healthcare-associated pneumonia)    Paroxysmal atrial fibrillation (HCC)    Acute respiratory failure with hypoxia (HCC)    Acute deep vein thrombosis (DVT) of femoral vein of left lower extremity (HCC)    Leg swelling    Respiratory failure (HCC)    Palliative care encounter    DNR no code (do not resuscitate)    Goals of care, counseling/discussion    Sepsis due to Pseudomonas species with acute hypercapnic respiratory failure and septic shock ([address]       Plan:  CONTINUE PER ICU TEAM NOTE HYPERNATREMIA ?  ADD MORE FREE WATER DEFER TO ICU TEAM         Tomasa Gooden  2:45 PM  2/6/2021

## 2021-02-06 NOTE — PROGRESS NOTES
Patient placed back on noninvasive ventilation due to low saturation to 80% and increased work of breathing.

## 2021-02-06 NOTE — PROGRESS NOTES
Date: 2/6/2021    Time: 12:08 AM    Patient Placed On BIPAP/CPAP/ Non-Invasive Ventilation? No (PATIENT ALREADY ON BIPAP)    If no must comment. Facial area red/color change? Yes           If YES are Blister/Lesion present? Yes   If yes must notify nursing staff  BIPAP/CPAP skin barrier?   Yes    Skin barrier type:mepilexlite       Comments: patient continues to use total face mask        Jennifer Mcghee

## 2021-02-07 LAB
ALBUMIN SERPL-MCNC: 2.9 G/DL (ref 3.5–5.2)
ALP BLD-CCNC: 64 U/L (ref 40–129)
ALT SERPL-CCNC: 9 U/L (ref 0–40)
ANION GAP SERPL CALCULATED.3IONS-SCNC: 8 MMOL/L (ref 7–16)
ANISOCYTOSIS: ABNORMAL
AST SERPL-CCNC: 15 U/L (ref 0–39)
BASOPHILIC STIPPLING: ABNORMAL
BASOPHILS ABSOLUTE: 0 E9/L (ref 0–0.2)
BASOPHILS RELATIVE PERCENT: 0.1 % (ref 0–2)
BILIRUB SERPL-MCNC: 0.5 MG/DL (ref 0–1.2)
BUN BLDV-MCNC: 38 MG/DL (ref 8–23)
CALCIUM SERPL-MCNC: 8 MG/DL (ref 8.6–10.2)
CHLORIDE BLD-SCNC: 104 MMOL/L (ref 98–107)
CO2: 39 MMOL/L (ref 22–29)
CREAT SERPL-MCNC: 0.6 MG/DL (ref 0.7–1.2)
EOSINOPHILS ABSOLUTE: 0.13 E9/L (ref 0.05–0.5)
EOSINOPHILS RELATIVE PERCENT: 0.9 % (ref 0–6)
GFR AFRICAN AMERICAN: >60
GFR NON-AFRICAN AMERICAN: >60 ML/MIN/1.73
GLUCOSE BLD-MCNC: 112 MG/DL (ref 74–99)
HCT VFR BLD CALC: 27.6 % (ref 37–54)
HEMOGLOBIN: 8.1 G/DL (ref 12.5–16.5)
HYPOCHROMIA: ABNORMAL
LYMPHOCYTES ABSOLUTE: 0.71 E9/L (ref 1.5–4)
LYMPHOCYTES RELATIVE PERCENT: 5.2 % (ref 20–42)
MAGNESIUM: 1.9 MG/DL (ref 1.6–2.6)
MCH RBC QN AUTO: 27.9 PG (ref 26–35)
MCHC RBC AUTO-ENTMCNC: 29.3 % (ref 32–34.5)
MCV RBC AUTO: 95.2 FL (ref 80–99.9)
METER GLUCOSE: 111 MG/DL (ref 74–99)
METER GLUCOSE: 124 MG/DL (ref 74–99)
METER GLUCOSE: 138 MG/DL (ref 74–99)
METER GLUCOSE: 144 MG/DL (ref 74–99)
METER GLUCOSE: 163 MG/DL (ref 74–99)
METER GLUCOSE: 178 MG/DL (ref 74–99)
MONOCYTES ABSOLUTE: 0.14 E9/L (ref 0.1–0.95)
MONOCYTES RELATIVE PERCENT: 0.9 % (ref 2–12)
NEUTROPHILS ABSOLUTE: 13.21 E9/L (ref 1.8–7.3)
NEUTROPHILS RELATIVE PERCENT: 93 % (ref 43–80)
NUCLEATED RED BLOOD CELLS: 0.9 /100 WBC
OVALOCYTES: ABNORMAL
PDW BLD-RTO: 21.2 FL (ref 11.5–15)
PLATELET # BLD: 220 E9/L (ref 130–450)
PMV BLD AUTO: 11.1 FL (ref 7–12)
POIKILOCYTES: ABNORMAL
POLYCHROMASIA: ABNORMAL
POTASSIUM REFLEX MAGNESIUM: 2.8 MMOL/L (ref 3.5–5)
RBC # BLD: 2.9 E12/L (ref 3.8–5.8)
SCHISTOCYTES: ABNORMAL
SODIUM BLD-SCNC: 151 MMOL/L (ref 132–146)
TARGET CELLS: ABNORMAL
TOTAL PROTEIN: 5 G/DL (ref 6.4–8.3)
WBC # BLD: 14.2 E9/L (ref 4.5–11.5)

## 2021-02-07 PROCEDURE — 2580000003 HC RX 258: Performed by: INTERNAL MEDICINE

## 2021-02-07 PROCEDURE — 94660 CPAP INITIATION&MGMT: CPT

## 2021-02-07 PROCEDURE — 80053 COMPREHEN METABOLIC PANEL: CPT

## 2021-02-07 PROCEDURE — 6370000000 HC RX 637 (ALT 250 FOR IP): Performed by: INTERNAL MEDICINE

## 2021-02-07 PROCEDURE — 83735 ASSAY OF MAGNESIUM: CPT

## 2021-02-07 PROCEDURE — 82962 GLUCOSE BLOOD TEST: CPT

## 2021-02-07 PROCEDURE — 2700000000 HC OXYGEN THERAPY PER DAY

## 2021-02-07 PROCEDURE — 2060000000 HC ICU INTERMEDIATE R&B

## 2021-02-07 PROCEDURE — C9113 INJ PANTOPRAZOLE SODIUM, VIA: HCPCS | Performed by: INTERNAL MEDICINE

## 2021-02-07 PROCEDURE — 6360000002 HC RX W HCPCS: Performed by: INTERNAL MEDICINE

## 2021-02-07 PROCEDURE — 36415 COLL VENOUS BLD VENIPUNCTURE: CPT

## 2021-02-07 PROCEDURE — 99231 SBSQ HOSP IP/OBS SF/LOW 25: CPT | Performed by: FAMILY MEDICINE

## 2021-02-07 PROCEDURE — 36592 COLLECT BLOOD FROM PICC: CPT

## 2021-02-07 PROCEDURE — 94640 AIRWAY INHALATION TREATMENT: CPT

## 2021-02-07 PROCEDURE — 85025 COMPLETE CBC W/AUTO DIFF WBC: CPT

## 2021-02-07 RX ORDER — FUROSEMIDE 10 MG/ML
40 INJECTION INTRAMUSCULAR; INTRAVENOUS DAILY
Status: DISCONTINUED | OUTPATIENT
Start: 2021-02-08 | End: 2021-02-12 | Stop reason: HOSPADM

## 2021-02-07 RX ADMIN — INSULIN GLARGINE 28 UNITS: 100 INJECTION, SOLUTION SUBCUTANEOUS at 20:28

## 2021-02-07 RX ADMIN — SENNOSIDES 8.6 MG: 8.6 TABLET, FILM COATED ORAL at 21:35

## 2021-02-07 RX ADMIN — POTASSIUM BICARBONATE 40 MEQ: 782 TABLET, EFFERVESCENT ORAL at 08:36

## 2021-02-07 RX ADMIN — SODIUM CHLORIDE, PRESERVATIVE FREE 300 UNITS: 5 INJECTION INTRAVENOUS at 21:35

## 2021-02-07 RX ADMIN — Medication: at 08:48

## 2021-02-07 RX ADMIN — MICONAZOLE NITRATE: 20.6 POWDER TOPICAL at 21:36

## 2021-02-07 RX ADMIN — MIDODRINE HYDROCHLORIDE 20 MG: 5 TABLET ORAL at 17:10

## 2021-02-07 RX ADMIN — PIPERACILLIN AND TAZOBACTAM 3375 MG: 3; .375 INJECTION, POWDER, LYOPHILIZED, FOR SOLUTION INTRAVENOUS at 21:36

## 2021-02-07 RX ADMIN — BUDESONIDE 500 MCG: 0.5 SUSPENSION RESPIRATORY (INHALATION) at 19:58

## 2021-02-07 RX ADMIN — IPRATROPIUM BROMIDE AND ALBUTEROL SULFATE 1 AMPULE: .5; 3 SOLUTION RESPIRATORY (INHALATION) at 16:12

## 2021-02-07 RX ADMIN — SODIUM CHLORIDE: 9 INJECTION, SOLUTION INTRAVENOUS at 00:32

## 2021-02-07 RX ADMIN — SODIUM CHLORIDE, PRESERVATIVE FREE 10 ML: 5 INJECTION INTRAVENOUS at 08:48

## 2021-02-07 RX ADMIN — SODIUM CHLORIDE SOLN NEBU 3% 4 ML: 3 NEBU SOLN at 19:59

## 2021-02-07 RX ADMIN — INSULIN LISPRO 3 UNITS: 100 INJECTION, SOLUTION INTRAVENOUS; SUBCUTANEOUS at 09:36

## 2021-02-07 RX ADMIN — SENNOSIDES 8.6 MG: 8.6 TABLET, FILM COATED ORAL at 08:35

## 2021-02-07 RX ADMIN — AMIODARONE HYDROCHLORIDE 100 MG: 200 TABLET ORAL at 08:35

## 2021-02-07 RX ADMIN — Medication: at 21:35

## 2021-02-07 RX ADMIN — LACTULOSE 20 G: 20 SOLUTION ORAL at 08:34

## 2021-02-07 RX ADMIN — SODIUM CHLORIDE: 9 INJECTION, SOLUTION INTRAVENOUS at 09:31

## 2021-02-07 RX ADMIN — INSULIN LISPRO 3 UNITS: 100 INJECTION, SOLUTION INTRAVENOUS; SUBCUTANEOUS at 13:23

## 2021-02-07 RX ADMIN — ATORVASTATIN CALCIUM 20 MG: 20 TABLET, FILM COATED ORAL at 08:48

## 2021-02-07 RX ADMIN — SODIUM CHLORIDE SOLN NEBU 3% 4 ML: 3 NEBU SOLN at 07:55

## 2021-02-07 RX ADMIN — BUDESONIDE 500 MCG: 0.5 SUSPENSION RESPIRATORY (INHALATION) at 07:55

## 2021-02-07 RX ADMIN — MIDODRINE HYDROCHLORIDE 20 MG: 5 TABLET ORAL at 08:36

## 2021-02-07 RX ADMIN — Medication 10 ML: at 22:26

## 2021-02-07 RX ADMIN — MIDODRINE HYDROCHLORIDE 20 MG: 5 TABLET ORAL at 11:47

## 2021-02-07 RX ADMIN — PANTOPRAZOLE SODIUM 40 MG: 40 INJECTION, POWDER, FOR SOLUTION INTRAVENOUS at 08:37

## 2021-02-07 RX ADMIN — ENOXAPARIN SODIUM 40 MG: 40 INJECTION SUBCUTANEOUS at 08:37

## 2021-02-07 RX ADMIN — PIPERACILLIN AND TAZOBACTAM 3375 MG: 3; .375 INJECTION, POWDER, LYOPHILIZED, FOR SOLUTION INTRAVENOUS at 13:15

## 2021-02-07 RX ADMIN — LATANOPROST 1 DROP: 50 SOLUTION OPHTHALMIC at 08:39

## 2021-02-07 RX ADMIN — MICONAZOLE NITRATE: 20.6 POWDER TOPICAL at 08:48

## 2021-02-07 RX ADMIN — INSULIN LISPRO 3 UNITS: 100 INJECTION, SOLUTION INTRAVENOUS; SUBCUTANEOUS at 17:10

## 2021-02-07 RX ADMIN — IPRATROPIUM BROMIDE AND ALBUTEROL SULFATE 1 AMPULE: .5; 3 SOLUTION RESPIRATORY (INHALATION) at 07:55

## 2021-02-07 RX ADMIN — PIPERACILLIN AND TAZOBACTAM 3375 MG: 3; .375 INJECTION, POWDER, LYOPHILIZED, FOR SOLUTION INTRAVENOUS at 05:23

## 2021-02-07 RX ADMIN — IPRATROPIUM BROMIDE AND ALBUTEROL SULFATE 1 AMPULE: .5; 3 SOLUTION RESPIRATORY (INHALATION) at 11:48

## 2021-02-07 RX ADMIN — Medication 10 ML: at 08:41

## 2021-02-07 RX ADMIN — FUROSEMIDE 40 MG: 10 INJECTION, SOLUTION INTRAMUSCULAR; INTRAVENOUS at 08:37

## 2021-02-07 RX ADMIN — SODIUM CHLORIDE, PRESERVATIVE FREE 300 UNITS: 5 INJECTION INTRAVENOUS at 08:37

## 2021-02-07 RX ADMIN — CHLORHEXIDINE GLUCONATE 0.12% ORAL RINSE 15 ML: 1.2 LIQUID ORAL at 21:27

## 2021-02-07 RX ADMIN — SODIUM CHLORIDE: 9 INJECTION, SOLUTION INTRAVENOUS at 17:16

## 2021-02-07 RX ADMIN — IPRATROPIUM BROMIDE AND ALBUTEROL SULFATE 1 AMPULE: .5; 3 SOLUTION RESPIRATORY (INHALATION) at 19:58

## 2021-02-07 ASSESSMENT — PAIN SCALES - PAIN ASSESSMENT IN ADVANCED DEMENTIA (PAINAD)
FACIALEXPRESSION: 0
FACIALEXPRESSION: 0
NEGVOCALIZATION: 0
BREATHING: 0
NEGVOCALIZATION: 0
TOTALSCORE: 0
FACIALEXPRESSION: 0
CONSOLABILITY: 0
CONSOLABILITY: 0
TOTALSCORE: 0
BODYLANGUAGE: 0
BODYLANGUAGE: 0
FACIALEXPRESSION: 0
CONSOLABILITY: 0

## 2021-02-07 ASSESSMENT — PAIN SCALES - GENERAL: PAINLEVEL_OUTOF10: 0

## 2021-02-07 NOTE — PROGRESS NOTES
Date: 2/7/2021    Time: 12:14 PM    Patient Placed On BIPAP/CPAP/ Non-Invasive Ventilation? No    If no must comment. Facial area red/color change? Yes           If YES are Blister/Lesion present? Yes   If yes must notify nursing staff  BIPAP/CPAP skin barrier? N/A    Skin barrier type:None       Comments: Patient has been wearing BIPAP continuously. Patient is wearing a total facemask due to nose breakdown. Mepilex was removed from the nose. RN aware of nose breakdown.          [facility]

## 2021-02-07 NOTE — PROGRESS NOTES
[address] [place] [address] [facility]  Progress  Note   C/C : pneumonia, resp failure, fever      Pt is unresponsive   On biPAP   Afebrile       Current Facility-Administered Medications   Medication Dose Route Frequency Provider Last Rate Last Admin    metoprolol (LOPRESSOR) injection 5 mg  5 mg Intravenous Q5 Min PRN Tyler Chappell MD        furosemide (LASIX) injection 40 mg  40 mg Intravenous BID Tyler Chappell MD   40 mg at 02/07/21 0837    piperacillin-tazobactam (ZOSYN) 3,375 mg in dextrose 5 % 100 mL IVPB extended infusion (mini-bag)  3,375 mg Intravenous Q8H Ruben P MD Olga   Stopped at 02/07/21 0931    0.9 % sodium chloride infusion admixture   Intravenous Q8H Ruben P MD Olga   Stopped at 02/07/21 1129    miconazole (MICOTIN) 2 % powder   Topical BID Tyler Chappell MD   Given at 02/07/21 0848    potassium bicarb-citric acid (EFFER-K) effervescent tablet 40 mEq  40 mEq Per G Tube Daily Sheeba Davenport, DO   40 mEq at 02/07/21 0836    insulin glargine (LANTUS) injection vial 28 Units  28 Units Subcutaneous Nightly Tyler Chappell MD   28 Units at 02/06/21 2034    midodrine (PROAMATINE) tablet 20 mg  20 mg Oral TID WC Sheeba Davenport, DO   20 mg at 02/07/21 1147    ipratropium-albuterol (DUONEB) nebulizer solution 1 ampule  1 ampule Inhalation 4x daily Jacques Brittle, DO   1 ampule at 02/07/21 1148    insulin lispro (HUMALOG) injection vial 0-18 Units  0-18 Units Subcutaneous Q4H Jacques Brittle, DO   3 Units at 02/07/21 2321    Mineral Oil-Hydrophil Petrolat OINT   Topical BID Jacques Brittle, DO   Given at 02/07/21 0848    And    Mineral Oil-Hydrophil Petrolat OINT   Topical 4x Daily PRN Jacques Brittle, DO   Given at 02/04/21 1820    lidocaine PF 1 % injection 5 mL  5 mL Intradermal Once Jacques Brittle, DO        sodium chloride flush 0.9 % injection 10 mL  10 mL Intravenous PRN Jacques Brittle, DO        heparin flush 100 UNIT/ML injection 300 Units 3 mL Intravenous 2 times per day Hodges Paget, DO   300 Units at 02/07/21 0837    heparin flush 100 UNIT/ML injection 300 Units  3 mL Intracatheter PRN Hodges Paget, DO        chlorhexidine (PERIDEX) 0.12 % solution 15 mL  15 mL Mouth/Throat BID Dandre Palaciosiro, DO   15 mL at 02/06/21 2023    potassium chloride 20 mEq/50 mL IVPB (Central Line)  20 mEq Intravenous PRN Hodges Paget, DO 50 mL/hr at 02/06/21 0902 20 mEq at 02/06/21 0902    acetaminophen (TYLENOL) 160 MG/5ML solution 650 mg  650 mg Per G Tube Q4H PRN Romaine Reynolds MD   650 mg at 02/05/21 4195    amiodarone (CORDARONE) tablet 100 mg  100 mg PEG Tube Daily Romaine Reynolds MD   100 mg at 02/07/21 0835    atorvastatin (LIPITOR) tablet 20 mg  20 mg PEG Tube Daily Rmoaine Reynolds MD   20 mg at 02/07/21 0848    docusate sodium (ENEMEEZ) enema 283 mg  1 enema Rectal PRN Romaine Reynolds MD        glucose (GLUTOSE) 40 % oral gel 15 g  15 g Oral PRN Romaine Reynolds MD        dextrose 50 % IV solution  12.5 g Intravenous PRN Romaine Reynolds MD        glucagon (rDNA) injection 1 mg  1 mg Intramuscular PRN Romaine Reynolds MD        dextrose 5 % solution  100 mL/hr Intravenous PRN Romaine Reynolds MD        lactulose (CHRONULAC) 10 GM/15ML solution 20 g  20 g PEG Tube Daily Felix Hughes MD   20 g at 02/07/21 0834    latanoprost (XALATAN) 0.005 % ophthalmic solution 1 drop  1 drop Both Eyes Daily Romaine Reynolds MD   1 drop at 02/07/21 0839    pantoprazole (PROTONIX) injection 40 mg  40 mg Intravenous Daily Romaine Reynolds MD   40 mg at 02/07/21 0837    And    sodium chloride (PF) 0.9 % injection 10 mL  10 mL Intravenous Daily Romaine Reynolds MD   10 mL at 02/07/21 0848    senna (SENOKOT) tablet 8.6 mg  1 tablet PEG Tube BID Romaine Reynolds MD   8.6 mg at 02/07/21 0835    sodium chloride (Inhalant) 3 % nebulizer solution 4 mL  4 mL Nebulization BID Romaine Reynolds MD   4 mL at 02/07/21 0755    sodium chloride flush 0.9 % injection 10 mL  10 mL Intravenous 2 times per day Whitney Fernández MD   10 mL at 02/07/21 0841    sodium chloride flush 0.9 % injection 10 mL  10 mL Intravenous PRN Whitney Fernández MD   10 mL at 02/02/21 1242    enoxaparin (LOVENOX) injection 40 mg  40 mg Subcutaneous Daily Whitney Fernández MD   40 mg at 02/07/21 0837    promethazine (PHENERGAN) tablet 12.5 mg  12.5 mg Oral Q6H PRN Whitney Fernández MD        Or    ondansetron (ZOFRAN) injection 4 mg  4 mg Intravenous Q6H PRN Whitney Fernández MD        polyethylene glycol (GLYCOLAX) packet 17 g  17 g Oral Daily PRN Whitney Fernández MD        acetaminophen (TYLENOL) tablet 650 mg  650 mg Oral Q6H PRN Whitney Fernández MD   650 mg at 01/30/21 1923    Or    acetaminophen (TYLENOL) suppository 650 mg  650 mg Rectal Q6H PRN Whitney Fernández MD        budesonide (PULMICORT) nebulizer suspension 500 mcg  500 mcg Nebulization BID Festus York MD   500 mcg at 02/07/21 0755    fentaNYL (SUBLIMAZE) injection 50 mcg  50 mcg Intravenous Q1H PRN Arianne Davis MD   50 mcg at 02/03/21 2008       REVIEW OF SYSTEMS: could not be obtained       PHYSICAL EXAM:    Vitals:   /62   Pulse 102   Temp 95.6 °F (35.3 °C) (Temporal)   Resp 23   Ht 6' (1.829 m)   Wt 260 lb 9.6 oz (118.2 kg)   SpO2 100%   BMI 35.34 kg/m²      Constitutional: lethargic  , on BiPAP ,   Skin:  no rash   HEENT:Pallor +, no LN  , dry mucosa   Neck: Supple to movements. No lymphadenopathy.    Chest:  Bilateral rhonchi    Cardiovascular: Regular, no murmur   Abdomen:soft, bowel sound +  PEG - no drainage or erythema   Extremities: ++ edema, erythema on the legs     Lines:  Right brachial PICC ( 1/25)     CBC with Differential:      Lab Results   Component Value Date    WBC 14.2 02/07/2021    RBC 2.90 02/07/2021    HGB 8.1 02/07/2021    HCT 27.6 02/07/2021     02/07/2021    MCV 95.2 02/07/2021    MCH 27.9 02/07/2021    MCHC 29.3 02/07/2021    RDW 21.2 02/07/2021    NRBC 0.9 02/07/2021    SEGSPCT 64 09/27/2013    METASPCT 6.0 01/06/2021    LYMPHOPCT 5.2 02/07/2021    PROMYELOPCT 0.9 12/21/2020    MONOPCT 0.9 02/07/2021    MYELOPCT 3.0 01/06/2021    BASOPCT 0.1 02/07/2021    MONOSABS 0.14 02/07/2021    LYMPHSABS 0.71 02/07/2021    EOSABS 0.13 02/07/2021    BASOSABS 0.00 02/07/2021       CMP:    Lab Results   Component Value Date     02/07/2021    K 2.8 02/07/2021     02/07/2021    CO2 39 02/07/2021    BUN 38 02/07/2021    CREATININE 0.6 02/07/2021    GFRAA >60 02/07/2021    LABGLOM >60 02/07/2021    GLUCOSE 112 02/07/2021    GLUCOSE 138 03/26/2011    PROT 5.0 02/07/2021    LABALBU 2.9 02/07/2021    LABALBU 4.0 03/26/2011    CALCIUM 8.0 02/07/2021    BILITOT 0.5 02/07/2021    ALKPHOS 64 02/07/2021    AST 15 02/07/2021    ALT 9 02/07/2021       Hepatic Function Panel:    Lab Results   Component Value Date    ALKPHOS 64 02/07/2021    ALT 9 02/07/2021    AST 15 02/07/2021    PROT 5.0 02/07/2021    BILITOT 0.5 02/07/2021    BILIDIR 0.4 01/14/2021    IBILI 0.5 01/14/2021    LABALBU 2.9 02/07/2021    LABALBU 4.0 03/26/2011         Microbiology :    Blood culture - neg to date   Urine Culture -     Susceptibility    Pseudomonas aeruginosa (1)    Antibiotic Interpretation SNEHA Status    gentamicin Sensitive <=^1 mcg/mL     levofloxacin Sensitive <=^0.12 mcg/mL     tobramycin Sensitive <=^1 mcg/mL     Lab and Collection        Sputum Culture-      Susceptibility    Pseudomonas aeruginosa (1)    Antibiotic Interpretation SNEHA Status    gentamicin Sensitive <=^1 mcg/mL     levofloxacin Sensitive <=^0.12 mcg/mL     tobramycin Sensitive <=^1 mcg/mL     Proteus mirabilis (4)    Antibiotic Interpretation SNEHA Status    ampicillin Sensitive <=^2 mcg/mL     ceFAZolin Sensitive <=^4 mcg/mL     cefepime Sensitive <=^0.12 mcg/mL     cefTRIAXone Sensitive <=^0.25 mcg/mL     ertapenem Sensitive <=^0.12 mcg/mL     gentamicin Sensitive <=^1 mcg/mL     levofloxacin Resistant >=^8 mcg/mL     piperacillin-tazobactam Sensitive <=^4 mcg/mL trimethoprim-sulfamethoxazole Sensitive <=^20 mcg/mL      Condensed View   Lab and Collection  Radiology :    Chest X ray - bilateral multifocal infiltrates , cardiomegaly     Ultrasound - neg for DVT     Fungitell test negative       Assessment:  · Respiratory failure - on BiPAP - limited code    · Pseudomonas bacteriuria    · Aspiration pneumonia  (Pseudomonas, Klebsiella, E coli and Proteus)   ·  Leukocytosis     Plan:    · Zosyn 3.375 grams IV q 8 hrs   · Supportive care       Ruben WILDER Limbu  12:57 PM  2/7/2021

## 2021-02-07 NOTE — PROGRESS NOTES
[facility]  [address] [place], [address]  [facility], [facility]      Pulmonary Progress Note      SUBJECTIVE:  Currently on BIPAP 12/6 50% on full face mask. Minimally arousable, will open eyes but not much interaction with stimulus, not much coughing as per nurse, looking slightly tachypneic lying down in bed.     OBJECTIVE    Medications    Continuous Infusions:   dextrose         Scheduled Meds:   [START ON 2/8/2021] furosemide  40 mg Intravenous Daily    piperacillin-tazobactam  3,375 mg Intravenous Q8H    sodium chloride   Intravenous Q8H    miconazole   Topical BID    potassium bicarb-citric acid  40 mEq Per G Tube Daily    insulin glargine  28 Units Subcutaneous Nightly    midodrine  20 mg Oral TID WC    ipratropium-albuterol  1 ampule Inhalation 4x daily    insulin lispro  0-18 Units Subcutaneous Q4H    Mineral Oil-Hydrophil Petrolat   Topical BID    lidocaine PF  5 mL Intradermal Once    heparin flush  3 mL Intravenous 2 times per day    chlorhexidine  15 mL Mouth/Throat BID    amiodarone  100 mg PEG Tube Daily    atorvastatin  20 mg PEG Tube Daily    lactulose  20 g PEG Tube Daily    latanoprost  1 drop Both Eyes Daily    pantoprazole  40 mg Intravenous Daily    And    sodium chloride (PF)  10 mL Intravenous Daily    senna  1 tablet PEG Tube BID    sodium chloride (Inhalant)  4 mL Nebulization BID    sodium chloride flush  10 mL Intravenous 2 times per day    enoxaparin  40 mg Subcutaneous Daily    budesonide  500 mcg Nebulization BID       PRN Meds:metoprolol, Mineral Oil-Hydrophil Petrolat **AND** Mineral Oil-Hydrophil Petrolat, sodium chloride flush, heparin flush, potassium chloride, acetaminophen, docusate sodium, glucose, dextrose, glucagon (rDNA), dextrose, sodium chloride flush, promethazine **OR** ondansetron, polyethylene glycol, acetaminophen **OR** acetaminophen, fentanNYL    Physical    VITALS:  BP (!) 145/56   Pulse 92   Temp 96.5 °F (35.8 °C) (Temporal)   Resp 22   Ht 6' (1.829 m)   Wt 260 lb 9.6 oz (118.2 kg)   SpO2 99%   BMI 35.34 kg/m²     24HR INTAKE/OUTPUT:      Intake/Output Summary (Last 24 hours) at 2021 1737  Last data filed at 2021 1718  Gross per 24 hour   Intake 2728.44 ml   Output 1775 ml   Net 953.44 ml       24HR PULSE OXIMETRY RANGE:    SpO2  Av.3 %  Min: 92 %  Max: 100 %    General appearance: appears stated age, obese  Lungs: rhonchi bilaterally  Heart: regular rate and rhythm, S1, S2 normal, no murmur, click, rub or gallop  Abdomen: soft, non-tender; bowel sounds normal; no masses,  no organomegaly  Extremities: edema bipedal  Neurologic: Mental status: arousable, not much interaction to stimulus    Data    CBC:   Recent Labs     21   WBC 12.1* 10.3 14.2*   HGB 8.1* 7.8* 8.1*   HCT 26.8* 25.9* 27.6*   MCV 93.4 93.5 95.2    222 220       BMP:  Recent Labs     21    152* 151*   K 3.0* 3.1* 2.8*   CL 98 105 104   CO2 37* 34* 39*   BUN 39* 42* 38*   CREATININE 0.7 0.7 0.6*    ALB:3,BILIDIR:3,BILITOT:3,ALKPHOS:3)@    PT/INR: No results for input(s): PROTIME, INR in the last 72 hours. ABG:   Recent Labs     21   PH 7.500*   PO2 56.8*   PCO2 41.4   HCO3 31.6*   BE 7.7*   O2SAT 90.0*   METHB 0.2   O2HB 88.1*   COHB 1.9*   O2CON 10.7   HHB 9.8*   THB 8.6*     FiO2 : 50 %  I:E Ratio: 1:1.40    Radiology/Other tests reviewed: none    Assessment:     Active Problems:    Respiratory failure (HCC)    Palliative care encounter    DNR no code (do not resuscitate)    Goals of care, counseling/discussion    Sepsis due to Pseudomonas species with acute hypercapnic respiratory failure and septic shock ([facility])  Resolved Problems:    * No resolved hospital problems. *      Plan:       1.  Cont with BIPAP, will have to see if can tolerate HFNC or optiflow depending on respiratory function and oxygen saturations  2. Cont with nebs and saline nebs, observe respiratory function and secretions  3. Watch fluid balance, currently hyponatremic, on TF and free water  4. Cont with antibiotics as per ID  5. No reintubation if worsens      Time at the bedside, reviewing labs and radiographs, reviewing notes and consultations, discussing with staff and family was more than 35 minutes. Thanks for letting us see this patient in consultation. Please contact us with any questions. Office [phone] or after hours through [employer], [phone].

## 2021-02-07 NOTE — PROGRESS NOTES
Palliative Care Department  [phone]  Palliative Care Progress Note  Provider [address]  [phone]  Hospital Day: 17  Date of Initial Consult: 1/25/2021  Referring Provider: Dante Grover DO  Palliative Medicine was consulted for assistance with: Goals of care, CODE STATUS discussion, and family support    HPI:   Jonathan Ribeiro is a 78 y.o. with a past medical history of DM, HTN, enlarged prostate, anxiety, bullous pemphigoid, HLD, rheumatoid disease, proximal atrial fibrillation, dementia, blood clots, DVT of LLE who was admitted on 1/22/2021 from [facility] with a CHIEF COMPLAINT of emesis while on BiPAP. ASSESSMENT/PLAN:   Current medical issues leading to Palliative Medicine involvement include   Active Hospital Problems    Diagnosis Date Noted    Sepsis due to Pseudomonas species with acute hypercapnic respiratory failure and septic shock ([facility]) [A41.52, R65.21, J96.02]     Palliative care encounter [Z51.5]     DNR no code (do not resuscitate) Holland Clolins     Goals of care, counseling/discussion [Z71.89]     Respiratory failure ([facility] [J96.90] 01/22/2021     Pertinent Hospital Diagnoses    Acute respiratory failure-intubated, MICU   HCAP/aspiration pneumonia-meropenem, linezolid, contact isolation    Palliative Care Encounter / Counseling Regarding Goals of Care  Please see detailed goals of care discussion as below   At this time, Jonathan Ribeiro, Does Not have capacity for medical decision-making. Capacity is time limited and situation/question specific   During encounter Cameron Georgi was surrogate medical decision-maker   Outcome of goals of care meeting: Continue current care, no tracheostomy/PEG, family to discuss potential wish regarding reintubation prior to hopeful extubation as vent weaning continues.    Code status DNR-CCA   Advanced Directives: no POA or living will in [facility]   Surrogate/Legal NOK:  Reford Links [phone] primary contact daughter  erika Du. [phone] son     Spiritual assessment: no spiritual distress identified   Bereavement and grief: to be determined  Referrals to: none today  SUBJECTIVE:   Details of Conversation:   Spoke with patient's daughter Jorge Luis. Plan is for patient to go back to nursing facilit ywhere he is long term resident. Limited code, family denies any acute needs. OBJECTIVE:   Prognosis: Guarded     Physical Exam: Per MICU RN  BP (!) 145/56   Pulse 92   Temp 96.5 °F (35.8 °C) (Temporal)   Resp 22   Ht 6' (1.829 m)   Wt 260 lb 9.6 oz (118.2 kg)   SpO2 99%   BMI 35.34 kg/m²      Full exam deferred to preserve PPE, please refer to attending/ICU/nursing notes for full physical exam finding. Gen: bipap mask in place, NAD  HEENT:  Normocephalic, atraumatic  Neck:  Supple, trachea midline, no JVD  Lungs: Tachypneic, no accessory muscle use, no rales/rhonchi/wheezing  Heart:  RRR , no murmur/rub/gallop  Neuro: disoriented, somnolent  Psych: non-anxious affect  Extremities: no edema  Skin: no rash on visible skin    Objective data reviewed: labs, images, records, medication use, vitals and chart    Discussed patient and the plan of care with the other IDT members: Palliative Medicine IDT Team, Primary Team, Floor Nurse, Patient and Family    Time/Communication  Greater than 50% of time spent, total 15 minutes in counseling and coordination of care at the bedside regarding CODE STATUS discussion, family support and goals of care. El Fishman DO  Palliative Medicine    Thank you for allowing Palliative Medicine to participate in the care of Taye Gaviria.

## 2021-02-07 NOTE — PROGRESS NOTES
Subjective: PATIENT SEEN THIS AM OUT OF ICU LETHARGIC     ROS:  Review of systems not obtained due to patient factors.     Objective:        [START ON 2/8/2021] furosemide (LASIX) injection 40 mg, Daily      metoprolol (LOPRESSOR) injection 5 mg, Q5 Min PRN      piperacillin-tazobactam (ZOSYN) 3,375 mg in dextrose 5 % 100 mL IVPB extended infusion (mini-bag), Q8H      0.9 % sodium chloride infusion admixture, Q8H      miconazole (MICOTIN) 2 % powder, BID      potassium bicarb-citric acid (EFFER-K) effervescent tablet 40 mEq, Daily      insulin glargine (LANTUS) injection vial 28 Units, Nightly      midodrine (PROAMATINE) tablet 20 mg, TID WC      ipratropium-albuterol (DUONEB) nebulizer solution 1 ampule, 4x daily      insulin lispro (HUMALOG) injection vial 0-18 Units, Q4H      Mineral Oil-Hydrophil Petrolat OINT, BID    And      Mineral Oil-Hydrophil Petrolat OINT, 4x Daily PRN      lidocaine PF 1 % injection 5 mL, Once      sodium chloride flush 0.9 % injection 10 mL, PRN      heparin flush 100 UNIT/ML injection 300 Units, 2 times per day      heparin flush 100 UNIT/ML injection 300 Units, PRN      chlorhexidine (PERIDEX) 0.12 % solution 15 mL, BID      potassium chloride 20 mEq/50 mL IVPB (Central Line), PRN      acetaminophen (TYLENOL) 160 MG/5ML solution 650 mg, Q4H PRN      amiodarone (CORDARONE) tablet 100 mg, Daily      atorvastatin (LIPITOR) tablet 20 mg, Daily      docusate sodium (ENEMEEZ) enema 283 mg, PRN      glucose (GLUTOSE) 40 % oral gel 15 g, PRN      dextrose 50 % IV solution, PRN      glucagon (rDNA) injection 1 mg, PRN      dextrose 5 % solution, PRN      lactulose (CHRONULAC) 10 GM/15ML solution 20 g, Daily      latanoprost (XALATAN) 0.005 % ophthalmic solution 1 drop, Daily      pantoprazole (PROTONIX) injection 40 mg, Daily    And      sodium chloride (PF) 0.9 % injection 10 mL, Daily      senna (SENOKOT) tablet 8.6 mg, BID      sodium chloride (Inhalant) 3 % nebulizer solution 4 mL, BID      sodium chloride flush 0.9 % injection 10 mL, 2 times per day      sodium chloride flush 0.9 % injection 10 mL, PRN      enoxaparin (LOVENOX) injection 40 mg, Daily      promethazine (PHENERGAN) tablet 12.5 mg, Q6H PRN    Or      ondansetron (ZOFRAN) injection 4 mg, Q6H PRN      polyethylene glycol (GLYCOLAX) packet 17 g, Daily PRN      acetaminophen (TYLENOL) tablet 650 mg, Q6H PRN    Or      acetaminophen (TYLENOL) suppository 650 mg, Q6H PRN      budesonide (PULMICORT) nebulizer suspension 500 mcg, BID      fentaNYL (SUBLIMAZE) injection 50 mcg, Q1H PRN          BP (!) 145/56   Pulse 92   Temp 96.5 °F (35.8 °C) (Temporal)   Resp 22   Ht 6' (1.829 m)   Wt 260 lb 9.6 oz (118.2 kg)   SpO2 99%   BMI 35.34 kg/m²     General Appearance:  disoriented and appears frail  Lungs:  Breath sounds: rhonchi- scattered  Heart:  Heart regular rate and rhythm  Abdomen:  Soft, non-tender, normal bowel sounds. No bruits, organomegaly or masses. Extremities: Extremities warm to touch, pink, with no edema.     CBC with Differential:    Lab Results   Component Value Date    WBC 14.2 02/07/2021    RBC 2.90 02/07/2021    HGB 8.1 02/07/2021    HCT 27.6 02/07/2021     02/07/2021    MCV 95.2 02/07/2021    MCH 27.9 02/07/2021    MCHC 29.3 02/07/2021    RDW 21.2 02/07/2021    NRBC 0.9 02/07/2021    SEGSPCT 64 09/27/2013    METASPCT 6.0 01/06/2021    LYMPHOPCT 5.2 02/07/2021    PROMYELOPCT 0.9 12/21/2020    MONOPCT 0.9 02/07/2021    MYELOPCT 3.0 01/06/2021    BASOPCT 0.1 02/07/2021    MONOSABS 0.14 02/07/2021    LYMPHSABS 0.71 02/07/2021    EOSABS 0.13 02/07/2021    BASOSABS 0.00 02/07/2021     CMP:    Lab Results   Component Value Date     02/07/2021    K 2.8 02/07/2021     02/07/2021    CO2 39 02/07/2021    BUN 38 02/07/2021    CREATININE 0.6 02/07/2021    GFRAA >60 02/07/2021    LABGLOM >60 02/07/2021    GLUCOSE 112 02/07/2021    GLUCOSE 138 03/26/2011    PROT 5.0 02/07/2021    LABALBU 2.9 02/07/2021    LABALBU 4.0 03/26/2011    CALCIUM 8.0 02/07/2021    BILITOT 0.5 02/07/2021    ALKPHOS 64 02/07/2021    AST 15 02/07/2021    ALT 9 02/07/2021        Assessment:    Patient Active Problem List   Diagnosis    [facility] sepsis [facility]    Dementia [facility]    Metabolic encephalopathy    Diabetes mellitus type 2, uncontrolled ([facility]    Hyperlipidemia LDL goal <100    Essential hypertension    Venous ulcer of left leg (HCC)    Non-pressure chronic ulcer of left lower leg with fat layer exposed ([facility]    Non-pressure chronic ulcer left lower leg, limited to breakdown skin (HCC)    Severe protein-calorie malnutrition (HCC)    Non-pressure chronic ulcer right lower leg, limited to breakdown skin (HCC)    Pressure injury of contiguous region involving back, buttock, and hip, stage 2 (HCC)    Pressure injury of calf, stage 2 ([facility]    HCAP (healthcare-associated pneumonia)    Paroxysmal atrial fibrillation (HCC)    Acute respiratory failure with hypoxia (HCC)    Acute deep vein thrombosis (DVT) of femoral vein of left lower extremity (HCC)    Leg swelling    Respiratory failure (HCC)    Palliative care encounter    DNR no code (do not resuscitate)    Goals of care, counseling/discussion    Sepsis due to Pseudomonas species with acute hypercapnic respiratory failure and septic shock ([facility]       Plan:  HYPERNATREMIA DECREASE LASIX AND INCREASE FREE WATER PROGNOSIS POOR         Mary Kay Martinez  4:48 PM  2/7/2021

## 2021-02-08 ENCOUNTER — APPOINTMENT (OUTPATIENT)
Dept: GENERAL RADIOLOGY | Age: 81
DRG: 870 | End: 2021-02-08
Payer: COMMERCIAL

## 2021-02-08 LAB
ALBUMIN SERPL-MCNC: 2.5 G/DL (ref 3.5–5.2)
ALP BLD-CCNC: 69 U/L (ref 40–129)
ALT SERPL-CCNC: 9 U/L (ref 0–40)
ANION GAP SERPL CALCULATED.3IONS-SCNC: 8 MMOL/L (ref 7–16)
ANISOCYTOSIS: ABNORMAL
AST SERPL-CCNC: 15 U/L (ref 0–39)
BASOPHILS ABSOLUTE: 0.1 E9/L (ref 0–0.2)
BASOPHILS RELATIVE PERCENT: 0.9 % (ref 0–2)
BILIRUB SERPL-MCNC: 0.4 MG/DL (ref 0–1.2)
BUN BLDV-MCNC: 34 MG/DL (ref 8–23)
CALCIUM SERPL-MCNC: 7.8 MG/DL (ref 8.6–10.2)
CHLORIDE BLD-SCNC: 102 MMOL/L (ref 98–107)
CO2: 39 MMOL/L (ref 22–29)
CREAT SERPL-MCNC: 0.7 MG/DL (ref 0.7–1.2)
EOSINOPHILS ABSOLUTE: 0.57 E9/L (ref 0.05–0.5)
EOSINOPHILS RELATIVE PERCENT: 5.3 % (ref 0–6)
GFR AFRICAN AMERICAN: >60
GFR NON-AFRICAN AMERICAN: >60 ML/MIN/1.73
GLUCOSE BLD-MCNC: 147 MG/DL (ref 74–99)
HCT VFR BLD CALC: 30 % (ref 37–54)
HEMOGLOBIN: 8.3 G/DL (ref 12.5–16.5)
HYPOCHROMIA: ABNORMAL
LYMPHOCYTES ABSOLUTE: 1.28 E9/L (ref 1.5–4)
LYMPHOCYTES RELATIVE PERCENT: 12.3 % (ref 20–42)
MAGNESIUM: 1.9 MG/DL (ref 1.6–2.6)
MCH RBC QN AUTO: 26.9 PG (ref 26–35)
MCHC RBC AUTO-ENTMCNC: 27.7 % (ref 32–34.5)
MCV RBC AUTO: 97.4 FL (ref 80–99.9)
METER GLUCOSE: 128 MG/DL (ref 74–99)
METER GLUCOSE: 142 MG/DL (ref 74–99)
METER GLUCOSE: 147 MG/DL (ref 74–99)
METER GLUCOSE: 150 MG/DL (ref 74–99)
METER GLUCOSE: 177 MG/DL (ref 74–99)
MONOCYTES ABSOLUTE: 0.43 E9/L (ref 0.1–0.95)
MONOCYTES RELATIVE PERCENT: 3.5 % (ref 2–12)
NEUTROPHILS ABSOLUTE: 8.35 E9/L (ref 1.8–7.3)
NEUTROPHILS RELATIVE PERCENT: 78.1 % (ref 43–80)
OVALOCYTES: ABNORMAL
PDW BLD-RTO: 20.9 FL (ref 11.5–15)
PLATELET # BLD: 238 E9/L (ref 130–450)
PMV BLD AUTO: 11.4 FL (ref 7–12)
POIKILOCYTES: ABNORMAL
POLYCHROMASIA: ABNORMAL
POTASSIUM REFLEX MAGNESIUM: 2.8 MMOL/L (ref 3.5–5)
PREALBUMIN: 10 MG/DL (ref 20–40)
RBC # BLD: 3.08 E12/L (ref 3.8–5.8)
SODIUM BLD-SCNC: 149 MMOL/L (ref 132–146)
TOTAL PROTEIN: 5.2 G/DL (ref 6.4–8.3)
WBC # BLD: 10.7 E9/L (ref 4.5–11.5)

## 2021-02-08 PROCEDURE — 6370000000 HC RX 637 (ALT 250 FOR IP): Performed by: INTERNAL MEDICINE

## 2021-02-08 PROCEDURE — 6360000002 HC RX W HCPCS: Performed by: INTERNAL MEDICINE

## 2021-02-08 PROCEDURE — 2060000000 HC ICU INTERMEDIATE R&B

## 2021-02-08 PROCEDURE — 82962 GLUCOSE BLOOD TEST: CPT

## 2021-02-08 PROCEDURE — 2700000000 HC OXYGEN THERAPY PER DAY

## 2021-02-08 PROCEDURE — 94640 AIRWAY INHALATION TREATMENT: CPT

## 2021-02-08 PROCEDURE — 2580000003 HC RX 258: Performed by: INTERNAL MEDICINE

## 2021-02-08 PROCEDURE — 83735 ASSAY OF MAGNESIUM: CPT

## 2021-02-08 PROCEDURE — 36415 COLL VENOUS BLD VENIPUNCTURE: CPT

## 2021-02-08 PROCEDURE — C9113 INJ PANTOPRAZOLE SODIUM, VIA: HCPCS | Performed by: INTERNAL MEDICINE

## 2021-02-08 PROCEDURE — 94660 CPAP INITIATION&MGMT: CPT

## 2021-02-08 PROCEDURE — 84134 ASSAY OF PREALBUMIN: CPT

## 2021-02-08 PROCEDURE — 71045 X-RAY EXAM CHEST 1 VIEW: CPT

## 2021-02-08 PROCEDURE — 80053 COMPREHEN METABOLIC PANEL: CPT

## 2021-02-08 PROCEDURE — 85025 COMPLETE CBC W/AUTO DIFF WBC: CPT

## 2021-02-08 RX ADMIN — ATORVASTATIN CALCIUM 20 MG: 20 TABLET, FILM COATED ORAL at 10:48

## 2021-02-08 RX ADMIN — SODIUM CHLORIDE: 9 INJECTION, SOLUTION INTRAVENOUS at 10:00

## 2021-02-08 RX ADMIN — MIDODRINE HYDROCHLORIDE 20 MG: 5 TABLET ORAL at 18:02

## 2021-02-08 RX ADMIN — PIPERACILLIN AND TAZOBACTAM 3375 MG: 3; .375 INJECTION, POWDER, LYOPHILIZED, FOR SOLUTION INTRAVENOUS at 04:42

## 2021-02-08 RX ADMIN — POTASSIUM CHLORIDE 20 MEQ: 400 INJECTION, SOLUTION INTRAVENOUS at 20:21

## 2021-02-08 RX ADMIN — MIDODRINE HYDROCHLORIDE 20 MG: 5 TABLET ORAL at 10:47

## 2021-02-08 RX ADMIN — MICONAZOLE NITRATE: 20.6 POWDER TOPICAL at 12:11

## 2021-02-08 RX ADMIN — CHLORHEXIDINE GLUCONATE 0.12% ORAL RINSE 15 ML: 1.2 LIQUID ORAL at 10:47

## 2021-02-08 RX ADMIN — IPRATROPIUM BROMIDE AND ALBUTEROL SULFATE 1 AMPULE: .5; 3 SOLUTION RESPIRATORY (INHALATION) at 19:37

## 2021-02-08 RX ADMIN — LATANOPROST 1 DROP: 50 SOLUTION OPHTHALMIC at 12:11

## 2021-02-08 RX ADMIN — SODIUM CHLORIDE SOLN NEBU 3% 4 ML: 3 NEBU SOLN at 19:37

## 2021-02-08 RX ADMIN — PIPERACILLIN AND TAZOBACTAM 3375 MG: 3; .375 INJECTION, POWDER, LYOPHILIZED, FOR SOLUTION INTRAVENOUS at 21:02

## 2021-02-08 RX ADMIN — POTASSIUM CHLORIDE 20 MEQ: 400 INJECTION, SOLUTION INTRAVENOUS at 19:28

## 2021-02-08 RX ADMIN — BUDESONIDE 500 MCG: 0.5 SUSPENSION RESPIRATORY (INHALATION) at 08:48

## 2021-02-08 RX ADMIN — ENOXAPARIN SODIUM 40 MG: 40 INJECTION SUBCUTANEOUS at 10:47

## 2021-02-08 RX ADMIN — SODIUM CHLORIDE, PRESERVATIVE FREE 10 ML: 5 INJECTION INTRAVENOUS at 12:12

## 2021-02-08 RX ADMIN — INSULIN LISPRO 3 UNITS: 100 INJECTION, SOLUTION INTRAVENOUS; SUBCUTANEOUS at 12:13

## 2021-02-08 RX ADMIN — PANTOPRAZOLE SODIUM 40 MG: 40 INJECTION, POWDER, FOR SOLUTION INTRAVENOUS at 10:45

## 2021-02-08 RX ADMIN — MIDODRINE HYDROCHLORIDE 20 MG: 5 TABLET ORAL at 14:48

## 2021-02-08 RX ADMIN — FUROSEMIDE 40 MG: 10 INJECTION INTRAMUSCULAR; INTRAVENOUS at 10:30

## 2021-02-08 RX ADMIN — BUDESONIDE 500 MCG: 0.5 SUSPENSION RESPIRATORY (INHALATION) at 19:37

## 2021-02-08 RX ADMIN — IPRATROPIUM BROMIDE AND ALBUTEROL SULFATE 1 AMPULE: .5; 3 SOLUTION RESPIRATORY (INHALATION) at 11:59

## 2021-02-08 RX ADMIN — IPRATROPIUM BROMIDE AND ALBUTEROL SULFATE 1 AMPULE: .5; 3 SOLUTION RESPIRATORY (INHALATION) at 08:49

## 2021-02-08 RX ADMIN — SODIUM CHLORIDE, PRESERVATIVE FREE 10 ML: 5 INJECTION INTRAVENOUS at 05:03

## 2021-02-08 RX ADMIN — Medication 10 ML: at 12:12

## 2021-02-08 RX ADMIN — SODIUM CHLORIDE, PRESERVATIVE FREE 300 UNITS: 5 INJECTION INTRAVENOUS at 21:03

## 2021-02-08 RX ADMIN — SODIUM CHLORIDE: 9 INJECTION, SOLUTION INTRAVENOUS at 02:14

## 2021-02-08 RX ADMIN — INSULIN LISPRO 3 UNITS: 100 INJECTION, SOLUTION INTRAVENOUS; SUBCUTANEOUS at 18:02

## 2021-02-08 RX ADMIN — CHLORHEXIDINE GLUCONATE 0.12% ORAL RINSE 15 ML: 1.2 LIQUID ORAL at 23:29

## 2021-02-08 RX ADMIN — INSULIN GLARGINE 28 UNITS: 100 INJECTION, SOLUTION SUBCUTANEOUS at 21:23

## 2021-02-08 RX ADMIN — Medication: at 12:11

## 2021-02-08 RX ADMIN — IPRATROPIUM BROMIDE AND ALBUTEROL SULFATE 1 AMPULE: .5; 3 SOLUTION RESPIRATORY (INHALATION) at 16:09

## 2021-02-08 RX ADMIN — SENNOSIDES 8.6 MG: 8.6 TABLET, FILM COATED ORAL at 21:03

## 2021-02-08 RX ADMIN — AMIODARONE HYDROCHLORIDE 100 MG: 200 TABLET ORAL at 10:50

## 2021-02-08 RX ADMIN — PIPERACILLIN AND TAZOBACTAM 3375 MG: 3; .375 INJECTION, POWDER, LYOPHILIZED, FOR SOLUTION INTRAVENOUS at 14:49

## 2021-02-08 RX ADMIN — INSULIN LISPRO 3 UNITS: 100 INJECTION, SOLUTION INTRAVENOUS; SUBCUTANEOUS at 02:12

## 2021-02-08 RX ADMIN — SODIUM CHLORIDE, PRESERVATIVE FREE 300 UNITS: 5 INJECTION INTRAVENOUS at 10:48

## 2021-02-08 RX ADMIN — SODIUM CHLORIDE SOLN NEBU 3% 4 ML: 3 NEBU SOLN at 08:49

## 2021-02-08 RX ADMIN — LACTULOSE 20 G: 20 SOLUTION ORAL at 10:48

## 2021-02-08 RX ADMIN — POTASSIUM BICARBONATE 40 MEQ: 782 TABLET, EFFERVESCENT ORAL at 10:47

## 2021-02-08 RX ADMIN — SODIUM CHLORIDE: 9 INJECTION, SOLUTION INTRAVENOUS at 17:48

## 2021-02-08 RX ADMIN — INSULIN LISPRO 3 UNITS: 100 INJECTION, SOLUTION INTRAVENOUS; SUBCUTANEOUS at 05:52

## 2021-02-08 RX ADMIN — SENNOSIDES 8.6 MG: 8.6 TABLET, FILM COATED ORAL at 10:47

## 2021-02-08 ASSESSMENT — PAIN SCALES - WONG BAKER: WONGBAKER_NUMERICALRESPONSE: 0

## 2021-02-08 ASSESSMENT — PAIN SCALES - GENERAL: PAINLEVEL_OUTOF10: 0

## 2021-02-08 NOTE — PROGRESS NOTES
[address] [place] [address] [facility]  YUAN  Progress  Note   C/C : pneumonia, resp failure, fever      Pt is unresponsive   On BiiPAP   Afebrile   Discussed with the pt's daughter       Current Facility-Administered Medications   Medication Dose Route Frequency Provider Last Rate Last Admin    furosemide (LASIX) injection 40 mg  40 mg Intravenous Daily Tomasa Gooden MD        metoprolol (LOPRESSOR) injection 5 mg  5 mg Intravenous Q5 Min PRN Marifer Jones MD        piperacillin-tazobactam (ZOSYN) 3,375 mg in dextrose 5 % 100 mL IVPB extended infusion (mini-bag)  3,375 mg Intravenous Q8H Ruben P MD Olga   Stopped at 02/08/21 1053    0.9 % sodium chloride infusion admixture   Intravenous Q8H Ruben P MD Olga   Stopped at 02/08/21 0437    miconazole (MICOTIN) 2 % powder   Topical BID Marifer Jones MD   Given at 02/07/21 2136    potassium bicarb-citric acid (EFFER-K) effervescent tablet 40 mEq  40 mEq Per G Tube Daily Dion Davenport, DO   40 mEq at 02/08/21 1047    insulin glargine (LANTUS) injection vial 28 Units  28 Units Subcutaneous Nightly Marifer Jones MD   28 Units at 02/07/21 2028    midodrine (PROAMATINE) tablet 20 mg  20 mg Oral TID WC Dion Davenport DO   20 mg at 02/08/21 1047    ipratropium-albuterol (DUONEB) nebulizer solution 1 ampule  1 ampule Inhalation 4x daily Ceciliaer Yusuflin, DO   1 ampule at 02/08/21 0849    insulin lispro (HUMALOG) injection vial 0-18 Units  0-18 Units Subcutaneous Q4H Ceciliaer Yusuflin, DO   3 Units at 02/08/21 6798    Mineral Oil-Hydrophil Petrolat OINT   Topical BID Emmer Curlin, DO   Given at 02/07/21 2135    And    Mineral Oil-Hydrophil Petrolat OINT   Topical 4x Daily PRN Ceciliaer Curlin, DO   Given at 02/04/21 1820    lidocaine PF 1 % injection 5 mL  5 mL Intradermal Once Emmer Curlin, DO        sodium chloride flush 0.9 % injection 10 mL  10 mL Intravenous PRN Sheridan Goldberglin, DO        heparin flush 100 UNIT/ML injection 300 Units  3 mL Intravenous 2 times per day Linford Glassing, DO   300 Units at 02/08/21 1048    heparin flush 100 UNIT/ML injection 300 Units  3 mL Intracatheter PRN Andry Glassing, DO        chlorhexidine (PERIDEX) 0.12 % solution 15 mL  15 mL Mouth/Throat BID Chris Davenport, DO   15 mL at 02/08/21 1047    potassium chloride 20 mEq/50 mL IVPB (Central Line)  20 mEq Intravenous PRN Andry Glassing, DO 50 mL/hr at 02/06/21 0902 20 mEq at 02/06/21 0902    acetaminophen (TYLENOL) 160 MG/5ML solution 650 mg  650 mg Per G Tube Q4H PRN Enzo Hope MD   650 mg at 02/05/21 3545    amiodarone (CORDARONE) tablet 100 mg  100 mg PEG Tube Daily Enzo Hope MD   100 mg at 02/08/21 1050    atorvastatin (LIPITOR) tablet 20 mg  20 mg PEG Tube Daily Enzo Hope MD   20 mg at 02/08/21 1048    docusate sodium (ENEMEEZ) enema 283 mg  1 enema Rectal PRN Enzo Hope MD        glucose (GLUTOSE) 40 % oral gel 15 g  15 g Oral PRN Enzo Hope MD        dextrose 50 % IV solution  12.5 g Intravenous PRN Enzo Hope MD        glucagon (rDNA) injection 1 mg  1 mg Intramuscular PRN Enzo Hope MD        dextrose 5 % solution  100 mL/hr Intravenous PRN Enzo Hope MD        lactulose (CHRONULAC) 10 GM/15ML solution 20 g  20 g PEG Tube Daily Felix Hughes MD   20 g at 02/08/21 1048    latanoprost (XALATAN) 0.005 % ophthalmic solution 1 drop  1 drop Both Eyes Daily Enzo Hope MD   1 drop at 02/07/21 0839    pantoprazole (PROTONIX) injection 40 mg  40 mg Intravenous Daily Enzo Hope MD   40 mg at 02/08/21 1045    And    sodium chloride (PF) 0.9 % injection 10 mL  10 mL Intravenous Daily Enzo Hope MD   10 mL at 02/07/21 0848    senna (SENOKOT) tablet 8.6 mg  1 tablet PEG Tube BID Felix Hughes MD   8.6 mg at 02/08/21 1047    sodium chloride (Inhalant) 3 % nebulizer solution 4 mL  4 mL Nebulization BID Enzo Hope MD   4 mL at 02/08/21 0849    sodium chloride flush 0.9 % injection 10 mL  10 mL Intravenous 2 times per day Carol Tucker MD   10 mL at 02/07/21 2226    sodium chloride flush 0.9 % injection 10 mL  10 mL Intravenous PRN Carol Tucker MD   10 mL at 02/08/21 0503    enoxaparin (LOVENOX) injection 40 mg  40 mg Subcutaneous Daily Carol Tucker MD   40 mg at 02/08/21 1047    promethazine (PHENERGAN) tablet 12.5 mg  12.5 mg Oral Q6H PRN Carlo Tucker MD        Or    ondansetron (ZOFRAN) injection 4 mg  4 mg Intravenous Q6H PRN Carol Tucker MD        polyethylene glycol (GLYCOLAX) packet 17 g  17 g Oral Daily PRN Carol Tucker MD        acetaminophen (TYLENOL) tablet 650 mg  650 mg Oral Q6H PRN Carol Tucker MD   650 mg at 01/30/21 1923    Or    acetaminophen (TYLENOL) suppository 650 mg  650 mg Rectal Q6H PRN Carol Tucker MD        budesonide (PULMICORT) nebulizer suspension 500 mcg  500 mcg Nebulization BID Alistair Skinner MD   500 mcg at 02/08/21 0848    fentaNYL (SUBLIMAZE) injection 50 mcg  50 mcg Intravenous Q1H PRN Thea Leavitt MD   50 mcg at 02/03/21 2008       REVIEW OF SYSTEMS: could not be obtained       PHYSICAL EXAM:    Vitals:   BP (!) 94/51   Pulse 97   Temp 98 °F (36.7 °C) (Temporal)   Resp 19   Ht 6' (1.829 m)   Wt 260 lb 11.2 oz (118.3 kg)   SpO2 100%   BMI 35.36 kg/m²      Constitutional: lethargic, opened eyes to verbal stimuli, on BiPAP ,   Skin:  no rash   HEENT:Pallor +, no LN  , dry mucosa   Neck: Supple to movements. No lymphadenopathy.    Chest:  Bilateral rhonchi , diminished breath sound    Cardiovascular: Regular, no murmur   Abdomen:soft, bowel sound +  PEG - no drainage or erythema   Extremities: ++ edema, erythema on the legs     Lines:  Right brachial PICC ( 1/25)     CBC with Differential:      Lab Results   Component Value Date    WBC 10.7 02/08/2021    RBC 3.08 02/08/2021    HGB 8.3 02/08/2021    HCT 30.0 02/08/2021     02/08/2021    MCV 97.4 02/08/2021    MCH 26.9 02/08/2021    MCHC 27.7 02/08/2021    RDW 20.9 02/08/2021    NRBC 0.9 02/07/2021    SEGSPCT 64 09/27/2013    METASPCT 6.0 01/06/2021    LYMPHOPCT 12.3 02/08/2021    PROMYELOPCT 0.9 12/21/2020    MONOPCT 3.5 02/08/2021    MYELOPCT 3.0 01/06/2021    BASOPCT 0.9 02/08/2021    MONOSABS 0.43 02/08/2021    LYMPHSABS 1.28 02/08/2021    EOSABS 0.57 02/08/2021    BASOSABS 0.10 02/08/2021       CMP:    Lab Results   Component Value Date     02/08/2021    K 2.8 02/08/2021     02/08/2021    CO2 39 02/08/2021    BUN 34 02/08/2021    CREATININE 0.7 02/08/2021    GFRAA >60 02/08/2021    LABGLOM >60 02/08/2021    GLUCOSE 147 02/08/2021    GLUCOSE 138 03/26/2011    PROT 5.2 02/08/2021    LABALBU 2.5 02/08/2021    LABALBU 4.0 03/26/2011    CALCIUM 7.8 02/08/2021    BILITOT 0.4 02/08/2021    ALKPHOS 69 02/08/2021    AST 15 02/08/2021    ALT 9 02/08/2021       Hepatic Function Panel:    Lab Results   Component Value Date    ALKPHOS 69 02/08/2021    ALT 9 02/08/2021    AST 15 02/08/2021    PROT 5.2 02/08/2021    BILITOT 0.4 02/08/2021    BILIDIR 0.4 01/14/2021    IBILI 0.5 01/14/2021    LABALBU 2.5 02/08/2021    LABALBU 4.0 03/26/2011         Microbiology :    Blood culture - neg to date   Urine Culture -     Susceptibility    Pseudomonas aeruginosa (1)    Antibiotic Interpretation SNEHA Status    gentamicin Sensitive <=^1 mcg/mL     levofloxacin Sensitive <=^0.12 mcg/mL     tobramycin Sensitive <=^1 mcg/mL     Lab and Collection        Sputum Culture-      Susceptibility    Pseudomonas aeruginosa (1)    Antibiotic Interpretation SNEHA Status    gentamicin Sensitive <=^1 mcg/mL     levofloxacin Sensitive <=^0.12 mcg/mL     tobramycin Sensitive <=^1 mcg/mL     Proteus mirabilis (4)    Antibiotic Interpretation SNEHA Status    ampicillin Sensitive <=^2 mcg/mL     ceFAZolin Sensitive <=^4 mcg/mL     cefepime Sensitive <=^0.12 mcg/mL     cefTRIAXone Sensitive <=^0.25 mcg/mL     ertapenem Sensitive <=^0.12 mcg/mL     gentamicin Sensitive <=^1 mcg/mL levofloxacin Resistant >=^8 mcg/mL     piperacillin-tazobactam Sensitive <=^4 mcg/mL     trimethoprim-sulfamethoxazole Sensitive <=^20 mcg/mL      Condensed View   Lab and Collection  Radiology :    Chest X ray - bilateral multifocal infiltrates , cardiomegaly     Ultrasound - neg for DVT     Fungitell test negative       Assessment:  · Respiratory failure - on BiPAP - limited code    · Pseudomonas bacteriuria    · Aspiration pneumonia  (Pseudomonas, Klebsiella, E coli and Proteus)   ·  Leukocytosis -improved     Plan:    · Zosyn 3.375 grams IV q 8 hrs   · Supportive care   · Family to decide Code status       Ruben Espinoza  11:03 AM  2/8/2021

## 2021-02-08 NOTE — PROGRESS NOTES
Subjective:  Pt remains on bipap, lethargic. Events of weekend reviewed. Objective:  Pt is resting in nad  /60   Pulse 80   Temp 98.1 °F (36.7 °C) (Temporal)   Resp 30   Ht 6' (1.829 m)   Wt 260 lb 11.2 oz (118.3 kg)   SpO2 100%   BMI 35.36 kg/m²   HEENT no adenopathy no bruits  Heart:  RRR, no murmurs, gallops, or rubs.   Lungs:  Diminished in all fields   Abd: bowel sounds present, nontender, nondistended, no masses  Extrem:  No clubbing, cyanosis, +edema lower   WBC/Hgb/Hct/Plts:  10.7/8.3/30.0/238 (02/08 6622) basic metabolic panel     Assessment:    Patient Active Problem List   Diagnosis    Severe sepsis with septic shock ([address]    Dementia ([facility]    Metabolic encephalopathy    Diabetes mellitus type 2, uncontrolled ([address]    Hyperlipidemia LDL goal <100    Essential hypertension    Venous ulcer of left leg (HCC)    Non-pressure chronic ulcer of left lower leg with fat layer exposed (HCC)    Non-pressure chronic ulcer left lower leg, limited to breakdown skin (HCC)    Severe protein-calorie malnutrition (HCC)    Non-pressure chronic ulcer right lower leg, limited to breakdown skin (HCC)    Pressure injury of contiguous region involving back, buttock, and hip, stage 2 (HCC)    Pressure injury of calf, stage 2 ([address]    HCAP (healthcare-associated pneumonia)    Paroxysmal atrial fibrillation (HCC)    Acute respiratory failure with hypoxia (HCC)    Acute deep vein thrombosis (DVT) of femoral vein of left lower extremity (HCC)    Leg swelling    Respiratory failure (HCC)    Palliative care encounter    DNR no code (do not resuscitate)    Goals of care, counseling/discussion    Sepsis due to Pseudomonas species with acute hypercapnic respiratory failure and septic shock ([address]       Plan:  Cont weaning  To SNF and will have limited code there        Nathalie Pennington  7:40 AM  2/8/2021

## 2021-02-08 NOTE — PROGRESS NOTES
[facility]  [address], [address]  [facility], [facility]      Pulmonary Progress Note      SUBJECTIVE:  Still on BIPAP 12/6 50% with full face mask. arousable, will open eyes but not much interaction with stimulus, not looking any different from yesterday.     OBJECTIVE    Medications    Continuous Infusions:   dextrose         Scheduled Meds:   furosemide  40 mg Intravenous Daily    piperacillin-tazobactam  3,375 mg Intravenous Q8H    sodium chloride   Intravenous Q8H    miconazole   Topical BID    potassium bicarb-citric acid  40 mEq Per G Tube Daily    insulin glargine  28 Units Subcutaneous Nightly    midodrine  20 mg Oral TID WC    ipratropium-albuterol  1 ampule Inhalation 4x daily    insulin lispro  0-18 Units Subcutaneous Q4H    Mineral Oil-Hydrophil Petrolat   Topical BID    lidocaine PF  5 mL Intradermal Once    heparin flush  3 mL Intravenous 2 times per day    chlorhexidine  15 mL Mouth/Throat BID    amiodarone  100 mg PEG Tube Daily    atorvastatin  20 mg PEG Tube Daily    lactulose  20 g PEG Tube Daily    latanoprost  1 drop Both Eyes Daily    pantoprazole  40 mg Intravenous Daily    And    sodium chloride (PF)  10 mL Intravenous Daily    senna  1 tablet PEG Tube BID    sodium chloride (Inhalant)  4 mL Nebulization BID    sodium chloride flush  10 mL Intravenous 2 times per day    enoxaparin  40 mg Subcutaneous Daily    budesonide  500 mcg Nebulization BID       PRN Meds:metoprolol, Mineral Oil-Hydrophil Petrolat **AND** Mineral Oil-Hydrophil Petrolat, sodium chloride flush, heparin flush, potassium chloride, acetaminophen, docusate sodium, glucose, dextrose, glucagon (rDNA), dextrose, sodium chloride flush, promethazine **OR** ondansetron, polyethylene glycol, acetaminophen **OR** acetaminophen, fentanNYL    Physical    VITALS:  BP (!) 94/51   Pulse 97   Temp 98 °F (36.7 °C) (Temporal)   Resp 19   Ht 6' (1.829 m)   Wt 260 lb 11.2 oz (118.3 kg)   SpO2 100%   BMI 35.36 kg/m²     24HR INTAKE/OUTPUT:      Intake/Output Summary (Last 24 hours) at 2021 09  Last data filed at 2021 0513  Gross per 24 hour   Intake 3822 ml   Output 1175 ml   Net 2647 ml       24HR PULSE OXIMETRY RANGE:    SpO2  Av.8 %  Min: 99 %  Max: 100 %    General appearance: appears stated age, obese  Lungs: rhonchi bilaterally  Heart: regular rate and rhythm, S1, S2 normal, no murmur, click, rub or gallop  Abdomen: (+) PEG  Extremities: edema bipedal  Neurologic: Mental status: arousable, not much interaction to stimulus    Data    CBC:   Recent Labs     21  0527 21  0537   WBC 10.3 14.2* 10.7   HGB 7.8* 8.1* 8.3*   HCT 25.9* 27.6* 30.0*   MCV 93.5 95.2 97.4    220 238       BMP:  Recent Labs     21  04321  0527 21  0537   * 151* 149*   K 3.1* 2.8* 2.8*    104 102   CO2 34* 39* 39*   BUN 42* 38* 34*   CREATININE 0.7 0.6* 0.7    ALB:3,BILIDIR:3,BILITOT:3,ALKPHOS:3)@    PT/INR: No results for input(s): PROTIME, INR in the last 72 hours. ABG:   Recent Labs     21  0445   PH 7.500*   PO2 56.8*   PCO2 41.4   HCO3 31.6*   BE 7.7*   O2SAT 90.0*   METHB 0.2   O2HB 88.1*   COHB 1.9*   O2CON 10.7   HHB 9.8*   THB 8.6*     FiO2 : 50 %  I:E Ratio: 1:1.40    Radiology/Other tests reviewed: CXR reviewed with slightly better aeration right lung field    Assessment:     Active Problems:    Respiratory failure (HCC)    Palliative care encounter    DNR no code (do not resuscitate)    Goals of care, counseling/discussion    Sepsis due to Pseudomonas species with acute hypercapnic respiratory failure and septic shock (HCC)  Resolved Problems:    * No resolved hospital problems. *      Plan:       1. Cont with BIPAP, will have to see if can tolerate HFNC or optiflow depending on respiratory function and oxygen saturations  2.  Cont with nebs and saline nebs, observe respiratory function and secretions  3. Watch fluid balance, currently hyponatremic, cont with free water with TF  4. Cont with antibiotics as per ID  5. No reintubation if worsens      Time at the bedside, reviewing labs and radiographs, reviewing notes and consultations, discussing with staff and family was more than 35 minutes. Thanks for letting us see this patient in consultation. Please contact us with any questions. Office [phone] or after hours through [employer], x [phone].

## 2021-02-09 LAB
ALBUMIN SERPL-MCNC: 2.3 G/DL (ref 3.5–5.2)
ALP BLD-CCNC: 60 U/L (ref 40–129)
ALT SERPL-CCNC: 8 U/L (ref 0–40)
ANION GAP SERPL CALCULATED.3IONS-SCNC: 4 MMOL/L (ref 7–16)
ANISOCYTOSIS: ABNORMAL
AST SERPL-CCNC: 16 U/L (ref 0–39)
BASOPHILIC STIPPLING: ABNORMAL
BASOPHILS ABSOLUTE: 0 E9/L (ref 0–0.2)
BASOPHILS RELATIVE PERCENT: 0 % (ref 0–2)
BILIRUB SERPL-MCNC: 0.4 MG/DL (ref 0–1.2)
BUN BLDV-MCNC: 28 MG/DL (ref 8–23)
CALCIUM SERPL-MCNC: 7.8 MG/DL (ref 8.6–10.2)
CHLORIDE BLD-SCNC: 101 MMOL/L (ref 98–107)
CO2: 43 MMOL/L (ref 22–29)
CREAT SERPL-MCNC: 0.7 MG/DL (ref 0.7–1.2)
EOSINOPHILS ABSOLUTE: 0.44 E9/L (ref 0.05–0.5)
EOSINOPHILS RELATIVE PERCENT: 6.2 % (ref 0–6)
GFR AFRICAN AMERICAN: >60
GFR NON-AFRICAN AMERICAN: >60 ML/MIN/1.73
GLUCOSE BLD-MCNC: 133 MG/DL (ref 74–99)
HCT VFR BLD CALC: 26.1 % (ref 37–54)
HEMOGLOBIN: 7.5 G/DL (ref 12.5–16.5)
HYPOCHROMIA: ABNORMAL
IMMATURE GRANULOCYTES #: 0.05 E9/L
IMMATURE GRANULOCYTES %: 0.7 % (ref 0–5)
LYMPHOCYTES ABSOLUTE: 1.3 E9/L (ref 1.5–4)
LYMPHOCYTES RELATIVE PERCENT: 18.3 % (ref 20–42)
MAGNESIUM: 1.8 MG/DL (ref 1.6–2.6)
MCH RBC QN AUTO: 27.9 PG (ref 26–35)
MCHC RBC AUTO-ENTMCNC: 28.7 % (ref 32–34.5)
MCV RBC AUTO: 97 FL (ref 80–99.9)
METER GLUCOSE: 117 MG/DL (ref 74–99)
METER GLUCOSE: 122 MG/DL (ref 74–99)
METER GLUCOSE: 205 MG/DL (ref 74–99)
METER GLUCOSE: 80 MG/DL (ref 74–99)
METER GLUCOSE: 93 MG/DL (ref 74–99)
MONOCYTES ABSOLUTE: 0.42 E9/L (ref 0.1–0.95)
MONOCYTES RELATIVE PERCENT: 5.9 % (ref 2–12)
NEUTROPHILS ABSOLUTE: 4.89 E9/L (ref 1.8–7.3)
NEUTROPHILS RELATIVE PERCENT: 68.9 % (ref 43–80)
OVALOCYTES: ABNORMAL
PDW BLD-RTO: 20.1 FL (ref 11.5–15)
PLATELET # BLD: 205 E9/L (ref 130–450)
PMV BLD AUTO: 11.2 FL (ref 7–12)
POIKILOCYTES: ABNORMAL
POLYCHROMASIA: ABNORMAL
POTASSIUM REFLEX MAGNESIUM: 3.3 MMOL/L (ref 3.5–5)
POTASSIUM SERPL-SCNC: 3.5 MMOL/L (ref 3.5–5)
RBC # BLD: 2.69 E12/L (ref 3.8–5.8)
SODIUM BLD-SCNC: 148 MMOL/L (ref 132–146)
TOTAL PROTEIN: 5 G/DL (ref 6.4–8.3)
WBC # BLD: 7.1 E9/L (ref 4.5–11.5)

## 2021-02-09 PROCEDURE — 6370000000 HC RX 637 (ALT 250 FOR IP): Performed by: INTERNAL MEDICINE

## 2021-02-09 PROCEDURE — 80053 COMPREHEN METABOLIC PANEL: CPT

## 2021-02-09 PROCEDURE — 84132 ASSAY OF SERUM POTASSIUM: CPT

## 2021-02-09 PROCEDURE — C9113 INJ PANTOPRAZOLE SODIUM, VIA: HCPCS | Performed by: INTERNAL MEDICINE

## 2021-02-09 PROCEDURE — 6360000002 HC RX W HCPCS: Performed by: INTERNAL MEDICINE

## 2021-02-09 PROCEDURE — 2060000000 HC ICU INTERMEDIATE R&B

## 2021-02-09 PROCEDURE — 82962 GLUCOSE BLOOD TEST: CPT

## 2021-02-09 PROCEDURE — 94660 CPAP INITIATION&MGMT: CPT

## 2021-02-09 PROCEDURE — 83735 ASSAY OF MAGNESIUM: CPT

## 2021-02-09 PROCEDURE — 2580000003 HC RX 258: Performed by: INTERNAL MEDICINE

## 2021-02-09 PROCEDURE — 85025 COMPLETE CBC W/AUTO DIFF WBC: CPT

## 2021-02-09 PROCEDURE — 94640 AIRWAY INHALATION TREATMENT: CPT

## 2021-02-09 PROCEDURE — 36415 COLL VENOUS BLD VENIPUNCTURE: CPT

## 2021-02-09 PROCEDURE — 2700000000 HC OXYGEN THERAPY PER DAY

## 2021-02-09 RX ADMIN — SODIUM CHLORIDE, PRESERVATIVE FREE 10 ML: 5 INJECTION INTRAVENOUS at 09:31

## 2021-02-09 RX ADMIN — IPRATROPIUM BROMIDE AND ALBUTEROL SULFATE 1 AMPULE: .5; 3 SOLUTION RESPIRATORY (INHALATION) at 20:11

## 2021-02-09 RX ADMIN — PIPERACILLIN AND TAZOBACTAM 3375 MG: 3; .375 INJECTION, POWDER, LYOPHILIZED, FOR SOLUTION INTRAVENOUS at 12:40

## 2021-02-09 RX ADMIN — SODIUM CHLORIDE: 9 INJECTION, SOLUTION INTRAVENOUS at 18:30

## 2021-02-09 RX ADMIN — LATANOPROST 1 DROP: 50 SOLUTION OPHTHALMIC at 09:32

## 2021-02-09 RX ADMIN — SODIUM CHLORIDE SOLN NEBU 3% 4 ML: 3 NEBU SOLN at 07:44

## 2021-02-09 RX ADMIN — SODIUM CHLORIDE: 9 INJECTION, SOLUTION INTRAVENOUS at 01:40

## 2021-02-09 RX ADMIN — PIPERACILLIN AND TAZOBACTAM 3375 MG: 3; .375 INJECTION, POWDER, LYOPHILIZED, FOR SOLUTION INTRAVENOUS at 21:07

## 2021-02-09 RX ADMIN — POTASSIUM BICARBONATE 40 MEQ: 782 TABLET, EFFERVESCENT ORAL at 09:31

## 2021-02-09 RX ADMIN — Medication 10 ML: at 21:07

## 2021-02-09 RX ADMIN — CHLORHEXIDINE GLUCONATE 0.12% ORAL RINSE 15 ML: 1.2 LIQUID ORAL at 21:05

## 2021-02-09 RX ADMIN — MICONAZOLE NITRATE: 20.6 POWDER TOPICAL at 00:14

## 2021-02-09 RX ADMIN — Medication 10 ML: at 00:15

## 2021-02-09 RX ADMIN — FUROSEMIDE 40 MG: 10 INJECTION INTRAMUSCULAR; INTRAVENOUS at 09:31

## 2021-02-09 RX ADMIN — PIPERACILLIN AND TAZOBACTAM 3375 MG: 3; .375 INJECTION, POWDER, LYOPHILIZED, FOR SOLUTION INTRAVENOUS at 04:00

## 2021-02-09 RX ADMIN — Medication: at 21:06

## 2021-02-09 RX ADMIN — Medication: at 09:32

## 2021-02-09 RX ADMIN — SODIUM CHLORIDE, PRESERVATIVE FREE 300 UNITS: 5 INJECTION INTRAVENOUS at 21:05

## 2021-02-09 RX ADMIN — SODIUM CHLORIDE SOLN NEBU 3% 4 ML: 3 NEBU SOLN at 20:11

## 2021-02-09 RX ADMIN — MIDODRINE HYDROCHLORIDE 20 MG: 5 TABLET ORAL at 15:46

## 2021-02-09 RX ADMIN — INSULIN GLARGINE 28 UNITS: 100 INJECTION, SOLUTION SUBCUTANEOUS at 22:46

## 2021-02-09 RX ADMIN — MICONAZOLE NITRATE: 20.6 POWDER TOPICAL at 21:06

## 2021-02-09 RX ADMIN — PANTOPRAZOLE SODIUM 40 MG: 40 INJECTION, POWDER, FOR SOLUTION INTRAVENOUS at 09:31

## 2021-02-09 RX ADMIN — ENOXAPARIN SODIUM 40 MG: 40 INJECTION SUBCUTANEOUS at 09:31

## 2021-02-09 RX ADMIN — Medication: at 00:15

## 2021-02-09 RX ADMIN — IPRATROPIUM BROMIDE AND ALBUTEROL SULFATE 1 AMPULE: .5; 3 SOLUTION RESPIRATORY (INHALATION) at 16:09

## 2021-02-09 RX ADMIN — SENNOSIDES 8.6 MG: 8.6 TABLET, FILM COATED ORAL at 21:05

## 2021-02-09 RX ADMIN — MIDODRINE HYDROCHLORIDE 20 MG: 5 TABLET ORAL at 12:40

## 2021-02-09 RX ADMIN — SODIUM CHLORIDE, PRESERVATIVE FREE 300 UNITS: 5 INJECTION INTRAVENOUS at 09:32

## 2021-02-09 RX ADMIN — INSULIN LISPRO 6 UNITS: 100 INJECTION, SOLUTION INTRAVENOUS; SUBCUTANEOUS at 02:22

## 2021-02-09 RX ADMIN — BUDESONIDE 500 MCG: 0.5 SUSPENSION RESPIRATORY (INHALATION) at 07:44

## 2021-02-09 RX ADMIN — IPRATROPIUM BROMIDE AND ALBUTEROL SULFATE 1 AMPULE: .5; 3 SOLUTION RESPIRATORY (INHALATION) at 11:55

## 2021-02-09 RX ADMIN — AMIODARONE HYDROCHLORIDE 100 MG: 200 TABLET ORAL at 09:31

## 2021-02-09 RX ADMIN — MICONAZOLE NITRATE: 20.6 POWDER TOPICAL at 09:32

## 2021-02-09 RX ADMIN — MIDODRINE HYDROCHLORIDE 20 MG: 5 TABLET ORAL at 09:31

## 2021-02-09 RX ADMIN — ATORVASTATIN CALCIUM 20 MG: 20 TABLET, FILM COATED ORAL at 09:31

## 2021-02-09 RX ADMIN — IPRATROPIUM BROMIDE AND ALBUTEROL SULFATE 1 AMPULE: .5; 3 SOLUTION RESPIRATORY (INHALATION) at 07:44

## 2021-02-09 RX ADMIN — CHLORHEXIDINE GLUCONATE 0.12% ORAL RINSE 15 ML: 1.2 LIQUID ORAL at 09:31

## 2021-02-09 RX ADMIN — BUDESONIDE 500 MCG: 0.5 SUSPENSION RESPIRATORY (INHALATION) at 20:11

## 2021-02-09 RX ADMIN — POTASSIUM CHLORIDE 20 MEQ: 400 INJECTION, SOLUTION INTRAVENOUS at 00:13

## 2021-02-09 ASSESSMENT — PAIN SCALES - GENERAL
PAINLEVEL_OUTOF10: 0
PAINLEVEL_OUTOF10: 0

## 2021-02-09 ASSESSMENT — PAIN SCALES - PAIN ASSESSMENT IN ADVANCED DEMENTIA (PAINAD)
BREATHING: 1
FACIALEXPRESSION: 0

## 2021-02-09 ASSESSMENT — PAIN SCALES - WONG BAKER: WONGBAKER_NUMERICALRESPONSE: 0

## 2021-02-09 NOTE — PROGRESS NOTES
Comprehensive Nutrition Assessment    Type and Reason for Visit:  Reassess    Nutrition Recommendations/Plan: Tube feeding recommendation to better help meet nutritional needs. Recommend Diabetic 1.5 @60/hr plus 1 protein modular daily to provide 1440ml, 2160 calories, 118g protein, 1092ml water (2264 calories and 144g protein w/ protein modular). Nutrition Assessment:  Pt remains NPO and receiving tube feedings without difficulty at this time ; pt at nutritional risk d/t increased needs from wound healing ; adm w/ septic shock and aspiration pneumonia ; hx of DM/COPD/dementia ; s/p vena cava filter placement on 1/6 ; s/p recent PEG conversion to J-tube on 1/15 ; will provide updated TF recommendations    Malnutrition Assessment:  Malnutrition Status:  Insufficient data    Context:  Acute Illness     Findings of the 6 clinical characteristics of malnutrition:  Energy Intake:  No significant decrease in energy intake(via TF)  Weight Loss:  Unable to assess(d/t lack of weight history)     Body Fat Loss:  Unable to assess(data not available to assess at this time)     Muscle Mass Loss:  Unable to assess(data not available to assess at this time)    Fluid Accumulation:  No significant fluid accumulation     Strength:  Not Performed    Estimated Daily Nutrient Needs:  Energy (kcal):  8521-9723 (REE 1919 x 1.2 SF); Weight Used for Energy Requirements:  Current     Protein (g):  120-145 (1.5-1.8g/kg IBW);  Weight Used for Protein Requirements:  Ideal        Fluid (ml/day):  7117-2533; Method Used for Fluid Requirements:  1 ml/kcal      Nutrition Related Findings:  +I&Os (+3.8 L), 1-3+ pitting edema, non-verbal, [place], edentulous, active BS, rounded abd, loose stools, redness around PEG site, contractures, J-tube      Wounds:  Multiple, Open Wounds, Deep Tissue Injury, Skin Tears(wound x 2 ; skin tear x 1)       Current Nutrition Therapies:    Current Tube Feeding (TF) Orders:  · Feeding Route: Jejunostomy  · Formula: Semi-Elemental  · Schedule: Continuous  · Additives/Modulars: Protein  · Water Flushes: per nursing  · Current TF & Flush Orders Provides: 1440ml, 1728 calories, 108g protein, 1168ml water (1832 calories and 134g protein w/ protein modular)      Anthropometric Measures:  · Height: 6' (182.9 cm)  · Current Body Weight: 261 lb (118.4 kg)(2/9, bedscale)   · Admission Body Weight: 255 lb (115.7 kg)(no method)    · Usual Body Weight: 259 lb (117.5 kg)(12/13/20, bedscale)     · Ideal Body Weight: 178 lbs; % Ideal Body Weight 146.6 %   · BMI: 35.4  · BMI Categories: Obese Class 2 (BMI 35.0 -39.9)       Nutrition Diagnosis:   · Inadequate oral intake related to cognitive or neurological impairment as evidenced by NPO or clear liquid status due to medical condition, nutrition support - enteral nutrition      Nutrition Interventions:   Food and/or Nutrient Delivery:  Continue NPO, Modify Tube Feeding  Nutrition Education/Counseling:  Education not indicated   Coordination of Nutrition Care:  Continue to monitor while inpatient    Goals:  Tube feeding will meet nutritional needs with good tolerance       Nutrition Monitoring and Evaluation:   Behavioral-Environmental Outcomes:  Beliefs and Attitutes   Food/Nutrient Intake Outcomes:  Enteral Nutrition Intake/Tolerance  Physical Signs/Symptoms Outcomes:  Biochemical Data, GI Status, Diarrhea, Fluid Status or Edema, Hemodynamic Status, Nutrition Focused Physical Findings, Skin, Weight     Discharge Planning:     Too soon to determine     Electronically signed by Geena Givens RD, LD on 2/9/21 at 11:50 AM EST    Contact: [phone]

## 2021-02-09 NOTE — PLAN OF CARE
Problem: Skin Integrity:  Goal: Will show no infection signs and symptoms  Description: Will show no infection signs and symptoms  Outcome: Met This Shift  Goal: Absence of new skin breakdown  Description: Absence of new skin breakdown  Outcome: Met This Shift     Problem: Falls - Risk of:  Goal: Will remain free from falls  Description: Will remain free from falls  Outcome: Met This Shift  Goal: Absence of physical injury  Description: Absence of physical injury  Outcome: Met This Shift     Problem: Inadequate oral food/beverage intake (NI-2.1)  Goal: Food and/or Nutrient Delivery  Description: Individualized approach for food/nutrient provision.   2/9/2021 1149 by Devin Anton RD, LD  Outcome: Met This Shift

## 2021-02-09 NOTE — PROGRESS NOTES
[facility]  [address], [address]  [facility], [facility]      Pulmonary Progress Note      SUBJECTIVE:  Still on BIPAP 12/6 50% with full face mask. arousable, will open eyes but not much interaction with stimulus, not looking any different from yesterday. Mention of hypoxia, (?) even while on BIPAP or when attempts to remove.     OBJECTIVE    Medications    Continuous Infusions:   dextrose         Scheduled Meds:   furosemide  40 mg Intravenous Daily    piperacillin-tazobactam  3,375 mg Intravenous Q8H    sodium chloride   Intravenous Q8H    miconazole   Topical BID    potassium bicarb-citric acid  40 mEq Per G Tube Daily    insulin glargine  28 Units Subcutaneous Nightly    midodrine  20 mg Oral TID WC    ipratropium-albuterol  1 ampule Inhalation 4x daily    insulin lispro  0-18 Units Subcutaneous Q4H    Mineral Oil-Hydrophil Petrolat   Topical BID    lidocaine PF  5 mL Intradermal Once    heparin flush  3 mL Intravenous 2 times per day    chlorhexidine  15 mL Mouth/Throat BID    amiodarone  100 mg PEG Tube Daily    atorvastatin  20 mg PEG Tube Daily    lactulose  20 g PEG Tube Daily    latanoprost  1 drop Both Eyes Daily    pantoprazole  40 mg Intravenous Daily    And    sodium chloride (PF)  10 mL Intravenous Daily    senna  1 tablet PEG Tube BID    sodium chloride (Inhalant)  4 mL Nebulization BID    sodium chloride flush  10 mL Intravenous 2 times per day    enoxaparin  40 mg Subcutaneous Daily    budesonide  500 mcg Nebulization BID       PRN Meds:metoprolol, Mineral Oil-Hydrophil Petrolat **AND** Mineral Oil-Hydrophil Petrolat, sodium chloride flush, heparin flush, potassium chloride, acetaminophen, docusate sodium, glucose, dextrose, glucagon (rDNA), dextrose, sodium chloride flush, promethazine **OR** ondansetron, polyethylene glycol, acetaminophen **OR** acetaminophen, fentanNYL    Physical    VITALS:  BP (!) 115/59   Pulse 63   Temp 98 °F (36.7 °C) (Temporal)   Resp 18   Ht 6' (1.829 m)   Wt 261 lb 1.6 oz (118.4 kg)   SpO2 100%   BMI 35.41 kg/m²     24HR INTAKE/OUTPUT:      Intake/Output Summary (Last 24 hours) at 2021 1730  Last data filed at 2021 1418  Gross per 24 hour   Intake 3395 ml   Output 1525 ml   Net 1870 ml       24HR PULSE OXIMETRY RANGE:    SpO2  Av.4 %  Min: 97 %  Max: 100 %    General appearance: appears stated age, obese  Lungs: rhonchi bilaterally  Heart: regular rate and rhythm, S1, S2 normal, no murmur, click, rub or gallop  Abdomen: (+) PEG  Extremities: edema bipedal  Neurologic: Mental status: arousable, not much interaction to stimulus    Data    CBC:   Recent Labs     21  0527 21  0537 21  0625   WBC 14.2* 10.7 7.1   HGB 8.1* 8.3* 7.5*   HCT 27.6* 30.0* 26.1*   MCV 95.2 97.4 97.0    238 205       BMP:  Recent Labs     21  0527 21  0537 21  0150 21  0625   * 149*  --  148*   K 2.8* 2.8* 3.5 3.3*    102  --  101   CO2 39* 39*  --  43*   BUN 38* 34*  --  28*   CREATININE 0.6* 0.7  --  0.7    ALB:3,BILIDIR:3,BILITOT:3,ALKPHOS:3)@    PT/INR: No results for input(s): PROTIME, INR in the last 72 hours. ABG:   No results for input(s): PH, PO2, PCO2, HCO3, BE, O2SAT, METHB, O2HB, COHB, O2CON, HHB, THB in the last 72 hours. FiO2 : 60 %  I:E Ratio: 1:1.40    Radiology/Other tests reviewed: CXR reviewed with slightly better aeration right lung field    Assessment:     Active Problems:    Respiratory failure ([address]    Palliative care encounter    DNR no code (do not resuscitate)    Goals of care, counseling/discussion    Sepsis due to Pseudomonas species with acute hypercapnic respiratory failure and septic shock (HCC)  Resolved Problems:    * No resolved hospital problems. *      Plan:       1. Cont with BIPAP, can increase settings either Fio2 or pressures to 14/6 to see if helps with oxygenation  2.  Cont with nebs and saline nebs, observe respiratory function and secretions  3. Watch fluid balance, cont with free water with TF  4. Cont with antibiotics as per ID  5. No reintubation if worsens      Time at the bedside, reviewing labs and radiographs, reviewing notes and consultations, discussing with staff and family was more than 35 minutes. Thanks for letting us see this patient in consultation. Please contact us with any questions. Office [phone] or after hours through [facility], x [phone].

## 2021-02-09 NOTE — PROGRESS NOTES
Subjective: The patient is resting on bipap, no acute issues overnight. He continues to be minimally responsive. Objective:  Pt is resting in nad   BP (!) 110/57   Pulse 60   Temp 97.5 °F (36.4 °C) (Axillary)   Resp 17   Ht 6' (1.829 m)   Wt 261 lb 1.6 oz (118.4 kg)   SpO2 100%   BMI 35.41 kg/m²   HEENT no adenopathy no bruits  Heart:  RRR, no murmurs, gallops, or rubs.   Lungs:  Diminished in all fields   Abd: bowel sounds present, nontender, nondistended, no masses  Extrem:  +edema lower and erythema   WBC/Hgb/Hct/Plts:  10.7/8.3/30.0/238 (02/08 1435) basic metabolic panel     Assessment:    Patient Active Problem List   Diagnosis    Severe sepsis with septic shock ([address]    Dementia ([address]    Metabolic encephalopathy    Diabetes mellitus type 2, uncontrolled ([address]    Hyperlipidemia LDL goal <100    Essential hypertension    Venous ulcer of left leg (HCC)    Non-pressure chronic ulcer of left lower leg with fat layer exposed ([address]    Non-pressure chronic ulcer left lower leg, limited to breakdown skin (HCC)    Severe protein-calorie malnutrition (HCC)    Non-pressure chronic ulcer right lower leg, limited to breakdown skin (HCC)    Pressure injury of contiguous region involving back, buttock, and hip, stage 2 (HCC)    Pressure injury of calf, stage 2 ([address]    HCAP (healthcare-associated pneumonia)    Paroxysmal atrial fibrillation (HCC)    Acute respiratory failure with hypoxia (HCC)    Acute deep vein thrombosis (DVT) of femoral vein of left lower extremity (HCC)    Leg swelling    Respiratory failure (HCC)    Palliative care encounter    DNR no code (do not resuscitate)    Goals of care, counseling/discussion    Sepsis due to Pseudomonas species with acute hypercapnic respiratory failure and septic shock ([address]       Plan:  Cont as ordered  Pulmonary toilet         Deliane [place]  7:38 AM  2/9/2021

## 2021-02-09 NOTE — PROGRESS NOTES
[address] [place] [address] [facility]  [facility]  Progress  Note   C/C : pneumonia, resp failure, fever      Pt is unresponsive   On BiiPAP   Afebrile     Current Facility-Administered Medications   Medication Dose Route Frequency Provider Last Rate Last Admin    furosemide (LASIX) injection 40 mg  40 mg Intravenous Daily Tomasa Gooden MD   40 mg at 02/09/21 0931    metoprolol (LOPRESSOR) injection 5 mg  5 mg Intravenous Q5 Min PRN Tyler Chappell MD        piperacillin-tazobactam (ZOSYN) 3,375 mg in dextrose 5 % 100 mL IVPB extended infusion (mini-bag)  3,375 mg Intravenous Q8H Ruben P MD Olga 25 mL/hr at 02/09/21 1240 3,375 mg at 02/09/21 1240    0.9 % sodium chloride infusion admixture   Intravenous Q8H Ruben P MD Olga   Stopped at 02/09/21 0614    miconazole (MICOTIN) 2 % powder   Topical BID Tyler Chappell MD   Given at 02/09/21 0932    potassium bicarb-citric acid (EFFER-K) effervescent tablet 40 mEq  40 mEq Per G Tube Daily Sheeba Davenport, DO   40 mEq at 02/09/21 0931    insulin glargine (LANTUS) injection vial 28 Units  28 Units Subcutaneous Nightly Tyler Chappell MD   28 Units at 02/08/21 2123    midodrine (PROAMATINE) tablet 20 mg  20 mg Oral TID WC Sheeba Davenport, DO   20 mg at 02/09/21 1240    ipratropium-albuterol (DUONEB) nebulizer solution 1 ampule  1 ampule Inhalation 4x daily Dicie Phy, DO   1 ampule at 02/09/21 1155    insulin lispro (HUMALOG) injection vial 0-18 Units  0-18 Units Subcutaneous Q4H Dicie Phy, DO   Stopped at 02/09/21 7634    Mineral Oil-Hydrophil Petrolat OINT   Topical BID Dicie Phy, DO   Given at 02/09/21 0932    And    Mineral Oil-Hydrophil Petrolat OINT   Topical 4x Daily PRN Dicie Phy, DO   Given at 02/04/21 1820    lidocaine PF 1 % injection 5 mL  5 mL Intradermal Once Dicie Phy, DO        sodium chloride flush 0.9 % injection 10 mL  10 mL Intravenous PRN Ana Phy, DO        heparin flush 100 UNIT/ML injection 300 Units  3 mL Intravenous 2 times per day Candice Marcelos, DO   300 Units at 02/09/21 0932    heparin flush 100 UNIT/ML injection 300 Units  3 mL Intracatheter PRN Candice Marcelos, DO        chlorhexidine (PERIDEX) 0.12 % solution 15 mL  15 mL Mouth/Throat BID Viktoria Davenport, DO   15 mL at 02/09/21 0931    potassium chloride 20 mEq/50 mL IVPB (Central Line)  20 mEq Intravenous PRN Candice Frazier, DO 50 mL/hr at 02/09/21 0435 Rate Change at 02/09/21 0435    acetaminophen (TYLENOL) 160 MG/5ML solution 650 mg  650 mg Per G Tube Q4H PRN Chandrika Tillman MD   650 mg at 02/05/21 6337    amiodarone (CORDARONE) tablet 100 mg  100 mg PEG Tube Daily Chandrika Tillman MD   100 mg at 02/09/21 0931    atorvastatin (LIPITOR) tablet 20 mg  20 mg PEG Tube Daily Chandrika Tillman MD   20 mg at 02/09/21 0931    docusate sodium (ENEMEEZ) enema 283 mg  1 enema Rectal PRN Chandrika Tillman MD        glucose (GLUTOSE) 40 % oral gel 15 g  15 g Oral PRN Chandrika Tillman MD        dextrose 50 % IV solution  12.5 g Intravenous PRN Chandrika Tillman MD        glucagon (rDNA) injection 1 mg  1 mg Intramuscular PRN Chandrika Tillman MD        dextrose 5 % solution  100 mL/hr Intravenous PRN Chandrika Tillman MD        lactulose (CHRONULAC) 10 GM/15ML solution 20 g  20 g PEG Tube Daily Felix Hughes MD   20 g at 02/08/21 1048    latanoprost (XALATAN) 0.005 % ophthalmic solution 1 drop  1 drop Both Eyes Daily Felix Hughes MD   1 drop at 02/09/21 0932    pantoprazole (PROTONIX) injection 40 mg  40 mg Intravenous Daily Chandrika Tillman MD   40 mg at 02/09/21 0931    And    sodium chloride (PF) 0.9 % injection 10 mL  10 mL Intravenous Daily Chandrika Tillman MD   10 mL at 02/09/21 0931    senna (SENOKOT) tablet 8.6 mg  1 tablet PEG Tube BID Chandrika Tillman MD   8.6 mg at 02/08/21 2103    sodium chloride (Inhalant) 3 % nebulizer solution 4 mL  4 mL Nebulization BID Chandrika Tillman MD   4 mL at 02/09/21 0744    sodium chloride flush 0.9 % injection 10 mL  10 mL Intravenous 2 times per day Nicholas Neumann MD   10 mL at 02/09/21 0015    sodium chloride flush 0.9 % injection 10 mL  10 mL Intravenous PRN Nicholas Neumann MD   10 mL at 02/08/21 0503    enoxaparin (LOVENOX) injection 40 mg  40 mg Subcutaneous Daily Nicholas Neumann MD   40 mg at 02/09/21 0931    promethazine (PHENERGAN) tablet 12.5 mg  12.5 mg Oral Q6H PRN Nicholas Neumann MD        Or    ondansetron (ZOFRAN) injection 4 mg  4 mg Intravenous Q6H PRN Nicholas Neumann MD        polyethylene glycol (GLYCOLAX) packet 17 g  17 g Oral Daily PRN Nicholas Neumann MD        acetaminophen (TYLENOL) tablet 650 mg  650 mg Oral Q6H PRN Nicholas Neumann MD   650 mg at 01/30/21 1923    Or    acetaminophen (TYLENOL) suppository 650 mg  650 mg Rectal Q6H PRN Nicholas Neumann MD        budesonide (PULMICORT) nebulizer suspension 500 mcg  500 mcg Nebulization BID Isidro Ramos MD   500 mcg at 02/09/21 0744    fentaNYL (SUBLIMAZE) injection 50 mcg  50 mcg Intravenous Q1H PRN Anna Starr MD   50 mcg at 02/03/21 2008       REVIEW OF SYSTEMS: could not be obtained       PHYSICAL EXAM:    Vitals:   BP (!) 102/54   Pulse 59   Temp 97.1 °F (36.2 °C) (Temporal)   Resp 16   Ht 6' (1.829 m)   Wt 261 lb 1.6 oz (118.4 kg)   SpO2 100%   BMI 35.41 kg/m²      Constitutional: lethargic, opened eyes to verbal stimuli, on BiPAP ,   Skin:  no rash   HEENT:Pallor +, no LN  , dry mucosa   Neck: Supple to movements. No lymphadenopathy.    Chest:  Bilateral rhonchi , diminished breath sound    Cardiovascular: Regular, no murmur   Abdomen:soft, bowel sound +  PEG - no drainage or erythema   Extremities: ++ edema, erythema on the legs     Lines:  Right brachial PICC ( 1/25)     CBC with Differential:      Lab Results   Component Value Date    WBC 7.1 02/09/2021    RBC 2.69 02/09/2021    HGB 7.5 02/09/2021    HCT 26.1 02/09/2021     02/09/2021    MCV 97.0 02/09/2021    MCH 27.9 02/09/2021    MCHC levofloxacin Resistant >=^8 mcg/mL     piperacillin-tazobactam Sensitive <=^4 mcg/mL     trimethoprim-sulfamethoxazole Sensitive <=^20 mcg/mL      Condensed View   Lab and Collection  Radiology :    Chest X ray - bilateral multifocal infiltrates , cardiomegaly     Ultrasound - neg for DVT     Fungitell test negative       Assessment:  · Respiratory failure - on BiPAP - limited code    · Pseudomonas bacteriuria    · Aspiration pneumonia  (Pseudomonas, Klebsiella, E coli and Proteus)   ·  Leukocytosis -improved     Plan:    · Zosyn 3.375 grams IV q 8 hrs X 4 days   · Supportive care         Ruben WILDER Limbu  1:22 PM  2/9/2021

## 2021-02-09 NOTE — PROGRESS NOTES
Date: 2/8/2021    Time: 10:18 PM    Patient Placed On BIPAP/CPAP/ Non-Invasive Ventilation? Yes    If no must comment. Facial area red/color change? No           If YES are Blister/Lesion present? Yes   If yes must notify nursing staff  BIPAP/CPAP skin barrier?   No          Comments:patient has breakdown on nose from continuously being on bipap, nursing is aware and patient is using a total facemask         Ernestine Gonzalez

## 2021-02-10 LAB
ALBUMIN SERPL-MCNC: 2.6 G/DL (ref 3.5–5.2)
ALP BLD-CCNC: 67 U/L (ref 40–129)
ALT SERPL-CCNC: 10 U/L (ref 0–40)
ANION GAP SERPL CALCULATED.3IONS-SCNC: 10 MMOL/L (ref 7–16)
ANISOCYTOSIS: ABNORMAL
AST SERPL-CCNC: 15 U/L (ref 0–39)
BASOPHILIC STIPPLING: ABNORMAL
BASOPHILS ABSOLUTE: 0.06 E9/L (ref 0–0.2)
BASOPHILS RELATIVE PERCENT: 0.9 % (ref 0–2)
BILIRUB SERPL-MCNC: 0.4 MG/DL (ref 0–1.2)
BUN BLDV-MCNC: 25 MG/DL (ref 8–23)
CALCIUM SERPL-MCNC: 8.4 MG/DL (ref 8.6–10.2)
CHLORIDE BLD-SCNC: 98 MMOL/L (ref 98–107)
CO2: 39 MMOL/L (ref 22–29)
CREAT SERPL-MCNC: 0.7 MG/DL (ref 0.7–1.2)
EOSINOPHILS ABSOLUTE: 0.16 E9/L (ref 0.05–0.5)
EOSINOPHILS RELATIVE PERCENT: 2.6 % (ref 0–6)
GFR AFRICAN AMERICAN: >60
GFR NON-AFRICAN AMERICAN: >60 ML/MIN/1.73
GLUCOSE BLD-MCNC: 135 MG/DL (ref 74–99)
HCT VFR BLD CALC: 26.6 % (ref 37–54)
HEMOGLOBIN: 7.6 G/DL (ref 12.5–16.5)
HYPOCHROMIA: ABNORMAL
LYMPHOCYTES ABSOLUTE: 1.13 E9/L (ref 1.5–4)
LYMPHOCYTES RELATIVE PERCENT: 18.4 % (ref 20–42)
MAGNESIUM: 1.8 MG/DL (ref 1.6–2.6)
MCH RBC QN AUTO: 27.2 PG (ref 26–35)
MCHC RBC AUTO-ENTMCNC: 28.6 % (ref 32–34.5)
MCV RBC AUTO: 95.3 FL (ref 80–99.9)
METER GLUCOSE: 125 MG/DL (ref 74–99)
METER GLUCOSE: 136 MG/DL (ref 74–99)
METER GLUCOSE: 145 MG/DL (ref 74–99)
METER GLUCOSE: 157 MG/DL (ref 74–99)
METER GLUCOSE: 157 MG/DL (ref 74–99)
METER GLUCOSE: 166 MG/DL (ref 74–99)
MONOCYTES ABSOLUTE: 0.25 E9/L (ref 0.1–0.95)
MONOCYTES RELATIVE PERCENT: 4.4 % (ref 2–12)
NEUTROPHILS ABSOLUTE: 4.66 E9/L (ref 1.8–7.3)
NEUTROPHILS RELATIVE PERCENT: 73.7 % (ref 43–80)
OVALOCYTES: ABNORMAL
PDW BLD-RTO: 19.6 FL (ref 11.5–15)
PLATELET # BLD: 217 E9/L (ref 130–450)
PMV BLD AUTO: 11 FL (ref 7–12)
POIKILOCYTES: ABNORMAL
POLYCHROMASIA: ABNORMAL
POTASSIUM REFLEX MAGNESIUM: 3.3 MMOL/L (ref 3.5–5)
RBC # BLD: 2.79 E12/L (ref 3.8–5.8)
SODIUM BLD-SCNC: 147 MMOL/L (ref 132–146)
TOTAL PROTEIN: 5.2 G/DL (ref 6.4–8.3)
WBC # BLD: 6.3 E9/L (ref 4.5–11.5)

## 2021-02-10 PROCEDURE — 80053 COMPREHEN METABOLIC PANEL: CPT

## 2021-02-10 PROCEDURE — 2060000000 HC ICU INTERMEDIATE R&B

## 2021-02-10 PROCEDURE — 6360000002 HC RX W HCPCS: Performed by: INTERNAL MEDICINE

## 2021-02-10 PROCEDURE — 97110 THERAPEUTIC EXERCISES: CPT

## 2021-02-10 PROCEDURE — 6370000000 HC RX 637 (ALT 250 FOR IP): Performed by: INTERNAL MEDICINE

## 2021-02-10 PROCEDURE — 85025 COMPLETE CBC W/AUTO DIFF WBC: CPT

## 2021-02-10 PROCEDURE — 2580000003 HC RX 258: Performed by: INTERNAL MEDICINE

## 2021-02-10 PROCEDURE — 36415 COLL VENOUS BLD VENIPUNCTURE: CPT

## 2021-02-10 PROCEDURE — 94640 AIRWAY INHALATION TREATMENT: CPT

## 2021-02-10 PROCEDURE — 94660 CPAP INITIATION&MGMT: CPT

## 2021-02-10 PROCEDURE — 83735 ASSAY OF MAGNESIUM: CPT

## 2021-02-10 PROCEDURE — 82962 GLUCOSE BLOOD TEST: CPT

## 2021-02-10 PROCEDURE — C9113 INJ PANTOPRAZOLE SODIUM, VIA: HCPCS | Performed by: INTERNAL MEDICINE

## 2021-02-10 RX ADMIN — Medication 10 ML: at 09:32

## 2021-02-10 RX ADMIN — IPRATROPIUM BROMIDE AND ALBUTEROL SULFATE 1 AMPULE: .5; 3 SOLUTION RESPIRATORY (INHALATION) at 19:31

## 2021-02-10 RX ADMIN — SENNOSIDES 8.6 MG: 8.6 TABLET, FILM COATED ORAL at 22:35

## 2021-02-10 RX ADMIN — PIPERACILLIN AND TAZOBACTAM 3375 MG: 3; .375 INJECTION, POWDER, LYOPHILIZED, FOR SOLUTION INTRAVENOUS at 12:30

## 2021-02-10 RX ADMIN — MICONAZOLE NITRATE: 20.6 POWDER TOPICAL at 09:36

## 2021-02-10 RX ADMIN — FUROSEMIDE 40 MG: 10 INJECTION INTRAMUSCULAR; INTRAVENOUS at 09:31

## 2021-02-10 RX ADMIN — SODIUM CHLORIDE: 9 INJECTION, SOLUTION INTRAVENOUS at 17:30

## 2021-02-10 RX ADMIN — IPRATROPIUM BROMIDE AND ALBUTEROL SULFATE 1 AMPULE: .5; 3 SOLUTION RESPIRATORY (INHALATION) at 11:56

## 2021-02-10 RX ADMIN — MIDODRINE HYDROCHLORIDE 20 MG: 5 TABLET ORAL at 12:24

## 2021-02-10 RX ADMIN — SODIUM CHLORIDE, PRESERVATIVE FREE 300 UNITS: 5 INJECTION INTRAVENOUS at 21:40

## 2021-02-10 RX ADMIN — AMIODARONE HYDROCHLORIDE 100 MG: 200 TABLET ORAL at 09:30

## 2021-02-10 RX ADMIN — SODIUM CHLORIDE SOLN NEBU 3% 4 ML: 3 NEBU SOLN at 19:31

## 2021-02-10 RX ADMIN — Medication 10 ML: at 21:40

## 2021-02-10 RX ADMIN — POTASSIUM BICARBONATE 40 MEQ: 782 TABLET, EFFERVESCENT ORAL at 09:30

## 2021-02-10 RX ADMIN — SODIUM CHLORIDE SOLN NEBU 3% 4 ML: 3 NEBU SOLN at 08:56

## 2021-02-10 RX ADMIN — PANTOPRAZOLE SODIUM 40 MG: 40 INJECTION, POWDER, FOR SOLUTION INTRAVENOUS at 09:31

## 2021-02-10 RX ADMIN — MIDODRINE HYDROCHLORIDE 20 MG: 5 TABLET ORAL at 09:29

## 2021-02-10 RX ADMIN — LACTULOSE 20 G: 20 SOLUTION ORAL at 09:31

## 2021-02-10 RX ADMIN — MICONAZOLE NITRATE: 20.6 POWDER TOPICAL at 22:01

## 2021-02-10 RX ADMIN — CHLORHEXIDINE GLUCONATE 0.12% ORAL RINSE 15 ML: 1.2 LIQUID ORAL at 22:29

## 2021-02-10 RX ADMIN — ATORVASTATIN CALCIUM 20 MG: 20 TABLET, FILM COATED ORAL at 09:30

## 2021-02-10 RX ADMIN — Medication: at 22:02

## 2021-02-10 RX ADMIN — IPRATROPIUM BROMIDE AND ALBUTEROL SULFATE 1 AMPULE: .5; 3 SOLUTION RESPIRATORY (INHALATION) at 08:56

## 2021-02-10 RX ADMIN — SODIUM CHLORIDE: 9 INJECTION, SOLUTION INTRAVENOUS at 02:20

## 2021-02-10 RX ADMIN — IPRATROPIUM BROMIDE AND ALBUTEROL SULFATE 1 AMPULE: .5; 3 SOLUTION RESPIRATORY (INHALATION) at 16:23

## 2021-02-10 RX ADMIN — ENOXAPARIN SODIUM 40 MG: 40 INJECTION SUBCUTANEOUS at 09:31

## 2021-02-10 RX ADMIN — LATANOPROST 1 DROP: 50 SOLUTION OPHTHALMIC at 09:31

## 2021-02-10 RX ADMIN — BUDESONIDE 500 MCG: 0.5 SUSPENSION RESPIRATORY (INHALATION) at 19:31

## 2021-02-10 RX ADMIN — BUDESONIDE 500 MCG: 0.5 SUSPENSION RESPIRATORY (INHALATION) at 08:55

## 2021-02-10 RX ADMIN — INSULIN LISPRO 3 UNITS: 100 INJECTION, SOLUTION INTRAVENOUS; SUBCUTANEOUS at 22:03

## 2021-02-10 RX ADMIN — PIPERACILLIN AND TAZOBACTAM 3375 MG: 3; .375 INJECTION, POWDER, LYOPHILIZED, FOR SOLUTION INTRAVENOUS at 05:35

## 2021-02-10 RX ADMIN — INSULIN GLARGINE 28 UNITS: 100 INJECTION, SOLUTION SUBCUTANEOUS at 22:02

## 2021-02-10 RX ADMIN — Medication: at 09:30

## 2021-02-10 RX ADMIN — INSULIN LISPRO 3 UNITS: 100 INJECTION, SOLUTION INTRAVENOUS; SUBCUTANEOUS at 01:06

## 2021-02-10 RX ADMIN — MIDODRINE HYDROCHLORIDE 20 MG: 5 TABLET ORAL at 17:12

## 2021-02-10 RX ADMIN — PIPERACILLIN AND TAZOBACTAM 3375 MG: 3; .375 INJECTION, POWDER, LYOPHILIZED, FOR SOLUTION INTRAVENOUS at 22:28

## 2021-02-10 RX ADMIN — SODIUM CHLORIDE, PRESERVATIVE FREE 300 UNITS: 5 INJECTION INTRAVENOUS at 09:32

## 2021-02-10 RX ADMIN — INSULIN LISPRO 3 UNITS: 100 INJECTION, SOLUTION INTRAVENOUS; SUBCUTANEOUS at 13:53

## 2021-02-10 ASSESSMENT — PAIN SCALES - GENERAL: PAINLEVEL_OUTOF10: 0

## 2021-02-10 ASSESSMENT — PAIN SCALES - PAIN ASSESSMENT IN ADVANCED DEMENTIA (PAINAD)
FACIALEXPRESSION: 0
TOTALSCORE: 1
CONSOLABILITY: 0
TOTALSCORE: 1
BODYLANGUAGE: 0

## 2021-02-10 NOTE — FLOWSHEET NOTE
Inpatient Wound Care (follow up) [address]    Admit Date: 1/22/2021  8:35 PM    Reason for consult:  Asked per nursing to assess FMS    Findings:      02/10/21 1030   Wound 02/10/21 Leg Left;Medial;Lower   Date First Assessed/Time First Assessed: 02/10/21 1030   Present on Hospital Admission: No  Location: Leg  Wound Location Orientation: Left;Medial;Lower   Wound Image    Dressing/Treatment Dry dressing;Xeroform;Roll gauze   Wound Length (cm) 3 cm   Wound Width (cm) 4 cm   Wound Depth (cm) 0.1 cm   Wound Surface Area (cm^2) 12 cm^2   Wound Volume (cm^3) 1.2 cm^3   Wound Assessment Pink/red   Drainage Amount None   Susy-wound Assessment Intact   Wound Thickness Description not for Pressure Injury Partial thickness      **Informed Consent**     photos taken of wound and inserted into their chart as part of their permanent medical record for purposes of documentation, treatment management and/or medical review. All Images taken on 2/10/21 of patient name: Dorys Heller were transmitted and stored on secured [employer] located within [facility] by a registered [employer] Mobile Application Device. Impression:  Left medial lower leg wound    Plan: xeroform, 4x4, kerlix  FMS intact and no leakage noted  Low air loss module  Aquaphor, abd pad  Patient will need continued preventative care.       Lisa Rice 2/10/2021 1:16 PM

## 2021-02-10 NOTE — PROGRESS NOTES
Occupational Therapy  OT BEDSIDE TREATMENT NOTE      Date:2/10/2021  Patient Name: Glenroy Carrera  MRN: [id number]  : 1940  Room: [address] [place] [address]-A     Per OT Eval:   Evaluating OT: Javier Berrios OTR/L #[id number]        Modified Martin Scale   Score     Description  0             No symptoms  1             No significant disability despite symptoms  2             Slight disability; able to look after own affairs  3             Moderate disability; able to ambulate without assist/ requires assist with ADLs  4             Moderate/Severe disability;requires assist to ambulate/assist with ADLs  5             Severe disability;bedridden/incontinent   6               Score:            AM-PAC Daily Activity Raw Score:   AM-PAC Daily Activity Raw Score:  (2/10/21)     Recommended Adaptive Equipment: splinting for B hands (contracted)        Diagnosis: Respiratory failure ([facility] [J96.90]     Surgery/Procedure: intubated , extubated 2/3     Pertinent Medical History: hx of DVT, anxiety, dementia, DM, HLD, HTN, a-fib, respiratory failure, Rheumatoid disease        Precautions:  Falls, NRB, FMS, hx of dementia(mostly nonverbal), PEG, contact isolation     Home Living/PLOF: Pt unable to provide home set-up/PLOF d/t cognition. Daughter present towards end of session, reports [place] [facility] [place]out of facilities since [place]. Prior to facilities, pt was independent.      2/10/21  Pain Level: Patient unable to verbalize any pain. Patient demonstrated no signs of pain during session         Cognition: A&O:  , patient unable to state name but opens eyes to name  Patient not following commands              Memory: poor              Comprehension poor              Problem solving: poor              Judgement/safety: poor                 Communication skills:               Vision: decreased visual tracking                     Glasses:? Hearing: appears WFL                RASS: -1  CAM-ICU: (NT) Delirium (unable d/t poor command following)     UE Assessment:  Hand Dominance: Right []? Left []? ?       ROM Strength STM goal: PRN   RUE  Proximally: PROM WFL shoulder flexion  Distally: limited elbow AROM; PROM WFL; contracted flexed hand, limited wrist extension Proximally: 1/5  Distally: elbow 2-/5  No  noted d/t contracted hand     Impaired FMC Good tolerance to exercises to increase strength/ROM    LUE Proximally: PROM limited to mid range shoulder flexion  Distally: elbow limited AROM; PROM WFL; limited wrist extension, limited digit AROM Proximally: 1/5  Distally: elbow 2-/5     Poor , impaired FMC Good tolerance to exercises to increase strength/ROM         Sensation: No c/o numbness or tingling in extremities   Tone: impaired (distally)  Edema: poor skin integrity, mild edema noted in BUE     Functional Assessment    Initial Eval Status  Date: 2/5/21 Treatment Status  Date: 2/10/21 STG=LTG  5-14  days    Feeding PEG  PEG                                Grooming Dep NT                        Max A      UB dressing Dep  NT                        Max A         LB dressing Dep NT   Max A   using AE as needed for safe reach/ energy conservation     Bathing Dep NT                         Max A   using AE as needed for safe reach/ energy conservation        Toileting Dep (FMS, hernandez)  Dependent (FMS, hernandez)                       Max A      Bed Mobility  Supine to sit: Dep x2     Sit to supine: Dep x2  NT   Unable to safely complete due to patient not following commands                       Max A  in prep of ADL tasks & transfers   Functional Transfers NT (unable to safely complete) NT                         Max A  sit<>stand/functional bathroom transfers using AD/DME as needed for balance and safety   Functional Mobility NT  NT                       Max A   functional/bathroom mobility using AD as needed & demonstrating fair safety      Balance Sitting:     Static: Dep    Dynamic: Dep  Standing: NT  NT  Max A dynamic sitting balance; Max A dynamic standing balance  during ADL tasks & transfers   Endurance/Activity Tolerance    poor tolerance with light activity. Pt fatigued throughout session.   poor tolerance fair   tolerance with light/moderate activity/self care routine   Visual/  Perceptual Impaired: decreased visual tracking (cueing to make appropriate eye contact)                                    Treatment: OT treatment provided this date includes:     Therapeutic Exercises- Instruction on BUE ROM exercises to improve strength and function of BUE for improved indep with ADLs. Therapist facilitated B shoulder elbow, wrist, and hand, within tolerated ROM, passive ROM flexion/extension, 15 reps/ 1 set. Patient completed semi-supine in bed with continuous verbal/tactile cues to attend to task, participate, and initiate. Patient demonstrated difficulty following commands with R UE tremor observed intermittently and increased tone noted.   Skilled monitoring of O2 sats-  Skilled monitoring of O2 sats, HR, and pt response throughout treatment. SpO2: 99%, patient on Bipap. Comments: Nursing approved OT session. Upon arrival pt semi-supine in bed, opening eyes to named. Patient lethargic throughout session and non-verbal with exception of patient with unintelligible speech when instructed to state name. Patient remained on Bipap throughout session. At end of session semi-supine in bed all lines and tubes intact, call light within reach. · Pt has made limited progress towards set goals. · Continue with current plan of care with focus on improving ability to attend to task and participate in ADLs/ functional transfers.        Treatment Time In:14:00            Treatment Time Out: 14:15    Total Treatment Time:     15 minutes      Treatment Charges: Mins Units   Ther Ex  [id number] 15 1   Manual Therapy [id number]     Thera Activities      ADL/Home t [id number]     Neuro Re-ed [id number]     Group Therapy      Orthotic manage/training  [id number]     Non-Billable Time     Total Timed Treatment 15 1       Completed by: Vignesh Burciaga OTR/L #[id number]

## 2021-02-10 NOTE — PROGRESS NOTES
[facility]  [address], [address]  [facility] - [facility], [facility]      Pulmonary Progress Note      SUBJECTIVE:  Still on BIPAP 12/6 50% with full face mask. arousable, will open eyes but not much interaction with stimulus, not looking any different from yesterday. Discussed with nurse, episodes of hypoxia yesterday (down to 70's) had occurred when attempted to place on HFNC and NRFM, would go up on saturations to high 90's when placed back on BIPAP.     OBJECTIVE    Medications    Continuous Infusions:   dextrose         Scheduled Meds:   furosemide  40 mg Intravenous Daily    piperacillin-tazobactam  3,375 mg Intravenous Q8H    sodium chloride   Intravenous Q8H    miconazole   Topical BID    potassium bicarb-citric acid  40 mEq Per G Tube Daily    insulin glargine  28 Units Subcutaneous Nightly    midodrine  20 mg Oral TID WC    ipratropium-albuterol  1 ampule Inhalation 4x daily    insulin lispro  0-18 Units Subcutaneous Q4H    mineral oil-hydrophil petrolat   Topical BID    lidocaine PF  5 mL Intradermal Once    heparin flush  3 mL Intravenous 2 times per day    chlorhexidine  15 mL Mouth/Throat BID    amiodarone  100 mg PEG Tube Daily    atorvastatin  20 mg PEG Tube Daily    lactulose  20 g PEG Tube Daily    latanoprost  1 drop Both Eyes Daily    pantoprazole  40 mg Intravenous Daily    And    sodium chloride (PF)  10 mL Intravenous Daily    senna  1 tablet PEG Tube BID    sodium chloride (Inhalant)  4 mL Nebulization BID    sodium chloride flush  10 mL Intravenous 2 times per day    enoxaparin  40 mg Subcutaneous Daily    budesonide  500 mcg Nebulization BID       PRN Meds:metoprolol, mineral oil-hydrophil petrolat **AND** mineral oil-hydrophil petrolat, sodium chloride flush, heparin flush, potassium chloride, acetaminophen, docusate sodium, glucose, dextrose, glucagon (rDNA), dextrose, sodium chloride flush, promethazine **OR** ondansetron, polyethylene glycol, acetaminophen **OR** acetaminophen, fentanNYL    Physical    VITALS:  BP (!) 106/59   Pulse 79   Temp 99.7 °F (37.6 °C) (Axillary)   Resp 22   Ht 6' (1.829 m)   Wt 254 lb 8 oz (115.4 kg)   SpO2 99%   BMI 34.52 kg/m²     24HR INTAKE/OUTPUT:      Intake/Output Summary (Last 24 hours) at 2/10/2021 1549  Last data filed at 2/10/2021 1526  Gross per 24 hour   Intake 1071 ml   Output 1625 ml   Net -554 ml       24HR PULSE OXIMETRY RANGE:    SpO2  Av.3 %  Min: 98 %  Max: 100 %    General appearance: appears stated age, obese  Lungs: rhonchi bilaterally  Heart: regular rate and rhythm, S1, S2 normal, no murmur, click, rub or gallop  Abdomen: (+) PEG  Extremities: edema bipedal  Neurologic: Mental status: arousable, not much interaction to stimulus    Data    CBC:   Recent Labs     21  0537 21  0625 02/10/21  0529   WBC 10.7 7.1 6.3   HGB 8.3* 7.5* 7.6*   HCT 30.0* 26.1* 26.6*   MCV 97.4 97.0 95.3    205 217       BMP:  Recent Labs     21  0537 21  0150 21  0625 02/10/21  0529   *  --  148* 147*   K 2.8* 3.5 3.3* 3.3*     --  101 98   CO2 39*  --  43* 39*   BUN 34*  --  28* 25*   CREATININE 0.7  --  0.7 0.7    ALB:3,BILIDIR:3,BILITOT:3,ALKPHOS:3)@    PT/INR: No results for input(s): PROTIME, INR in the last 72 hours. ABG:   No results for input(s): PH, PO2, PCO2, HCO3, BE, O2SAT, METHB, O2HB, COHB, O2CON, HHB, THB in the last 72 hours. FiO2 : 50 %  I:E Ratio: 1:1.40    Radiology/Other tests reviewed: CXR reviewed with slightly better aeration right lung field    Assessment:     Active Problems:    Respiratory failure ([address]    Palliative care encounter    DNR no code (do not resuscitate)    Goals of care, counseling/discussion    Sepsis due to Pseudomonas species with acute hypercapnic respiratory failure and septic shock (HCC)  Resolved Problems:    * No resolved hospital problems. *      Plan:       1.  Cont with BIPAP, can try optiflow if tolerates this better when trial of coming off BIPAP  2. Cont with nebs and saline nebs, observe respiratory function and secretions  3. Watch fluid balance, cont with free water with TF  4. Cont with antibiotics as per ID  5. No reintubation if worsens  6. Can go to select when bed is available      Time at the bedside, reviewing labs and radiographs, reviewing notes and consultations, discussing with staff and family was more than 35 minutes. Thanks for letting us see this patient in consultation. Please contact us with any questions. Office [phone] or after hours through [employer], [phone].

## 2021-02-10 NOTE — PROGRESS NOTES
[address] [place] [address] [facility]  [facility]  Progress  Note   C/C : Respiratory failure, pneumonia       Pt is unresponsive   On BiiPAP   Afebrile     Current Facility-Administered Medications   Medication Dose Route Frequency Provider Last Rate Last Admin    furosemide (LASIX) injection 40 mg  40 mg Intravenous Daily Tomasa Gooden MD   40 mg at 02/10/21 0931    metoprolol (LOPRESSOR) injection 5 mg  5 mg Intravenous Q5 Min PRN Catarino Cruz MD        piperacillin-tazobactam (ZOSYN) 3,375 mg in dextrose 5 % 100 mL IVPB extended infusion (mini-bag)  3,375 mg Intravenous Q8H Ruben P MD Olga 25 mL/hr at 02/10/21 1230 3,375 mg at 02/10/21 1230    0.9 % sodium chloride infusion admixture   Intravenous Q8H Ruben P MD Olag   Stopped at 02/10/21 0417    miconazole (MICOTIN) 2 % powder   Topical BID Catarino Cruz MD   Given at 02/10/21 0936    potassium bicarb-citric acid (EFFER-K) effervescent tablet 40 mEq  40 mEq Per G Tube Daily Sedrick Davenport, DO   40 mEq at 02/10/21 0930    insulin glargine (LANTUS) injection vial 28 Units  28 Units Subcutaneous Nightly Catarino Cruz MD   28 Units at 02/09/21 2246    midodrine (PROAMATINE) tablet 20 mg  20 mg Oral TID WC Sedrick Davenport, DO   20 mg at 02/10/21 1224    ipratropium-albuterol (DUONEB) nebulizer solution 1 ampule  1 ampule Inhalation 4x daily Roderick Estimable, DO   1 ampule at 02/10/21 1156    insulin lispro (HUMALOG) injection vial 0-18 Units  0-18 Units Subcutaneous Q4H Roderick Estimable, DO   3 Units at 02/10/21 0106    Mineral Oil-Hydrophil Petrolat OINT   Topical BID Roderick Estimable, DO   Given at 02/10/21 0930    And    Mineral Oil-Hydrophil Petrolat OINT   Topical 4x Daily PRN Roderick Estimable, DO   Given at 02/04/21 1820    lidocaine PF 1 % injection 5 mL  5 mL Intradermal Once Roderick Estimable, DO        sodium chloride flush 0.9 % injection 10 mL  10 mL Intravenous PRN Roderick Estimable, DO        heparin flush 100 UNIT/ML injection 300 Units  3 mL Intravenous 2 times per day Phoebe Emporium, DO   300 Units at 02/10/21 0932    heparin flush 100 UNIT/ML injection 300 Units  3 mL Intracatheter PRN Phoebe Sorrel, DO        chlorhexidine (PERIDEX) 0.12 % solution 15 mL  15 mL Mouth/Throat BID Roma Palaciosiro, DO   15 mL at 02/09/21 2105    potassium chloride 20 mEq/50 mL IVPB (Central Line)  20 mEq Intravenous PRN Phoebe Sorrel, DO 50 mL/hr at 02/09/21 0435 Rate Change at 02/09/21 0435    acetaminophen (TYLENOL) 160 MG/5ML solution 650 mg  650 mg Per G Tube Q4H PRN Philly Spann MD   650 mg at 02/05/21 0677    amiodarone (CORDARONE) tablet 100 mg  100 mg PEG Tube Daily Philly Spann MD   100 mg at 02/10/21 0930    atorvastatin (LIPITOR) tablet 20 mg  20 mg PEG Tube Daily Philly Spann MD   20 mg at 02/10/21 0930    docusate sodium (ENEMEEZ) enema 283 mg  1 enema Rectal PRN Philly Spann MD        glucose (GLUTOSE) 40 % oral gel 15 g  15 g Oral PRN Philly Spann MD        dextrose 50 % IV solution  12.5 g Intravenous PRN Philly Spann MD        glucagon (rDNA) injection 1 mg  1 mg Intramuscular PRN Philly Spann MD        dextrose 5 % solution  100 mL/hr Intravenous PRN Philly Spann MD        lactulose (CHRONULAC) 10 GM/15ML solution 20 g  20 g PEG Tube Daily Felix Hughes MD   20 g at 02/10/21 0931    latanoprost (XALATAN) 0.005 % ophthalmic solution 1 drop  1 drop Both Eyes Daily Philly Spann MD   1 drop at 02/10/21 0931    pantoprazole (PROTONIX) injection 40 mg  40 mg Intravenous Daily Philly Spann MD   40 mg at 02/10/21 0931    And    sodium chloride (PF) 0.9 % injection 10 mL  10 mL Intravenous Daily Philly Spann MD   10 mL at 02/09/21 0931    senna (SENOKOT) tablet 8.6 mg  1 tablet PEG Tube BID Philly Spann MD   8.6 mg at 02/09/21 2103    sodium chloride (Inhalant) 3 % nebulizer solution 4 mL  4 mL Nebulization BID Philly Spann MD   4 mL at 02/10/21 0840    sodium chloride flush 0.9 % injection 10 mL  10 mL Intravenous 2 times per day Padma Pierce MD   10 mL at 02/10/21 0932    sodium chloride flush 0.9 % injection 10 mL  10 mL Intravenous PRN Padma Pierce MD   10 mL at 02/08/21 0503    enoxaparin (LOVENOX) injection 40 mg  40 mg Subcutaneous Daily Padma Pierce MD   40 mg at 02/10/21 0931    promethazine (PHENERGAN) tablet 12.5 mg  12.5 mg Oral Q6H PRN Padma Pierce MD        Or    ondansetron (ZOFRAN) injection 4 mg  4 mg Intravenous Q6H PRN Padma Pierce MD        polyethylene glycol (GLYCOLAX) packet 17 g  17 g Oral Daily PRN Padma Pierce MD        acetaminophen (TYLENOL) tablet 650 mg  650 mg Oral Q6H PRN Padma Pierce MD   650 mg at 01/30/21 1923    Or    acetaminophen (TYLENOL) suppository 650 mg  650 mg Rectal Q6H PRN Padma Pierce MD        budesonide (PULMICORT) nebulizer suspension 500 mcg  500 mcg Nebulization BID Marie Wright MD   500 mcg at 02/10/21 0855    fentaNYL (SUBLIMAZE) injection 50 mcg  50 mcg Intravenous Q1H PRN Anna Joya MD   50 mcg at 02/03/21 2008       REVIEW OF SYSTEMS: could not be obtained       PHYSICAL EXAM:    Vitals:   BP (!) 104/51   Pulse 69   Temp 96.7 °F (35.9 °C) (Temporal)   Resp 20   Ht 6' (1.829 m)   Wt 254 lb 8 oz (115.4 kg)   SpO2 98%   BMI 34.52 kg/m²      Constitutional: lethargic, opened eyes to verbal stimuli, on BiPAP ,   Skin:  no rash   HEENT:Pallor +, no LN  , dry mucosa   Neck: Supple to movements. No lymphadenopathy.    Chest:  Bilateral rhonchi    Cardiovascular: Regular, no murmur   Abdomen:soft, bowel sound +  PEG - no drainage or erythema   Extremities: ++ edema, erythema on the legs     Lines:  Right brachial PICC ( 1/25)     CBC with Differential:      Lab Results   Component Value Date    WBC 6.3 02/10/2021    RBC 2.79 02/10/2021    HGB 7.6 02/10/2021    HCT 26.6 02/10/2021     02/10/2021    MCV 95.3 02/10/2021    MCH 27.2 02/10/2021    MCHC 28.6 02/10/2021    RDW 19.6 02/10/2021    NRBC 0.9 02/07/2021    SEGSPCT 64 09/27/2013    METASPCT 6.0 01/06/2021    LYMPHOPCT 18.4 02/10/2021    PROMYELOPCT 0.9 12/21/2020    MONOPCT 4.4 02/10/2021    MYELOPCT 3.0 01/06/2021    BASOPCT 0.9 02/10/2021    MONOSABS 0.25 02/10/2021    LYMPHSABS 1.13 02/10/2021    EOSABS 0.16 02/10/2021    BASOSABS 0.06 02/10/2021       CMP:    Lab Results   Component Value Date     02/10/2021    K 3.3 02/10/2021    CL 98 02/10/2021    CO2 39 02/10/2021    BUN 25 02/10/2021    CREATININE 0.7 02/10/2021    GFRAA >60 02/10/2021    LABGLOM >60 02/10/2021    GLUCOSE 135 02/10/2021    GLUCOSE 138 03/26/2011    PROT 5.2 02/10/2021    LABALBU 2.6 02/10/2021    LABALBU 4.0 03/26/2011    CALCIUM 8.4 02/10/2021    BILITOT 0.4 02/10/2021    ALKPHOS 67 02/10/2021    AST 15 02/10/2021    ALT 10 02/10/2021       Hepatic Function Panel:    Lab Results   Component Value Date    ALKPHOS 67 02/10/2021    ALT 10 02/10/2021    AST 15 02/10/2021    PROT 5.2 02/10/2021    BILITOT 0.4 02/10/2021    BILIDIR 0.4 01/14/2021    IBILI 0.5 01/14/2021    LABALBU 2.6 02/10/2021    LABALBU 4.0 03/26/2011         Microbiology :    Blood culture - neg to date   Urine Culture -     Susceptibility    Pseudomonas aeruginosa (1)    Antibiotic Interpretation SNEHA Status    gentamicin Sensitive <=^1 mcg/mL     levofloxacin Sensitive <=^0.12 mcg/mL     tobramycin Sensitive <=^1 mcg/mL     Lab and Collection        Sputum Culture-      Susceptibility    Pseudomonas aeruginosa (1)    Antibiotic Interpretation SNEHA Status    gentamicin Sensitive <=^1 mcg/mL     levofloxacin Sensitive <=^0.12 mcg/mL     tobramycin Sensitive <=^1 mcg/mL     Proteus mirabilis (4)    Antibiotic Interpretation SNEHA Status    ampicillin Sensitive <=^2 mcg/mL     ceFAZolin Sensitive <=^4 mcg/mL     cefepime Sensitive <=^0.12 mcg/mL     cefTRIAXone Sensitive <=^0.25 mcg/mL     ertapenem Sensitive <=^0.12 mcg/mL     gentamicin Sensitive <=^1 mcg/mL     levofloxacin Resistant >=^8 mcg/mL     piperacillin-tazobactam Sensitive <=^4 mcg/mL     trimethoprim-sulfamethoxazole Sensitive <=^20 mcg/mL      Condensed View   Lab and Collection  Radiology :    Chest X ray - bilateral multifocal infiltrates , cardiomegaly     Ultrasound - neg for DVT     Fungitell test negative       Assessment:  · Respiratory failure - on BiPAP - limited code    · Pseudomonas bacteriuria    · Aspiration pneumonia  (Pseudomonas, Klebsiella, E coli and Proteus)   ·  Leukocytosis -improved     Plan:    · Zosyn 3.375 grams IV q 8 hrs X 3 days   · Supportive care         Ruben WILDER Limbu  1:43 PM  2/10/2021

## 2021-02-11 ENCOUNTER — APPOINTMENT (OUTPATIENT)
Dept: GENERAL RADIOLOGY | Age: 81
DRG: 870 | End: 2021-02-11
Payer: COMMERCIAL

## 2021-02-11 LAB
ABO/RH: NORMAL
ALBUMIN SERPL-MCNC: 1.9 G/DL (ref 3.5–5.2)
ALP BLD-CCNC: 52 U/L (ref 40–129)
ALT SERPL-CCNC: 7 U/L (ref 0–40)
ANION GAP SERPL CALCULATED.3IONS-SCNC: 5 MMOL/L (ref 7–16)
ANION GAP SERPL CALCULATED.3IONS-SCNC: 5 MMOL/L (ref 7–16)
ANISOCYTOSIS: ABNORMAL
ANTIBODY SCREEN: NORMAL
AST SERPL-CCNC: 12 U/L (ref 0–39)
BASOPHILS ABSOLUTE: 0 E9/L (ref 0–0.2)
BASOPHILS RELATIVE PERCENT: 0.2 % (ref 0–2)
BILIRUB SERPL-MCNC: 0.2 MG/DL (ref 0–1.2)
BLOOD BANK DISPENSE STATUS: NORMAL
BLOOD BANK PRODUCT CODE: NORMAL
BPU ID: NORMAL
BUN BLDV-MCNC: 24 MG/DL (ref 8–23)
BUN BLDV-MCNC: 27 MG/DL (ref 8–23)
CALCIUM SERPL-MCNC: 6.6 MG/DL (ref 8.6–10.2)
CALCIUM SERPL-MCNC: 8.5 MG/DL (ref 8.6–10.2)
CHLORIDE BLD-SCNC: 101 MMOL/L (ref 98–107)
CHLORIDE BLD-SCNC: 108 MMOL/L (ref 98–107)
CO2: 36 MMOL/L (ref 22–29)
CO2: 39 MMOL/L (ref 22–29)
CREAT SERPL-MCNC: 0.5 MG/DL (ref 0.7–1.2)
CREAT SERPL-MCNC: 0.6 MG/DL (ref 0.7–1.2)
DESCRIPTION BLOOD BANK: NORMAL
EOSINOPHILS ABSOLUTE: 0.35 E9/L (ref 0.05–0.5)
EOSINOPHILS RELATIVE PERCENT: 7.8 % (ref 0–6)
GFR AFRICAN AMERICAN: >60
GFR AFRICAN AMERICAN: >60
GFR NON-AFRICAN AMERICAN: >60 ML/MIN/1.73
GFR NON-AFRICAN AMERICAN: >60 ML/MIN/1.73
GLUCOSE BLD-MCNC: 132 MG/DL (ref 74–99)
GLUCOSE BLD-MCNC: 139 MG/DL (ref 74–99)
HCT VFR BLD CALC: 23.2 % (ref 37–54)
HEMOGLOBIN: 6.6 G/DL (ref 12.5–16.5)
HYPOCHROMIA: ABNORMAL
LYMPHOCYTES ABSOLUTE: 0.9 E9/L (ref 1.5–4)
LYMPHOCYTES RELATIVE PERCENT: 20 % (ref 20–42)
MAGNESIUM: 1.4 MG/DL (ref 1.6–2.6)
MCH RBC QN AUTO: 27.4 PG (ref 26–35)
MCHC RBC AUTO-ENTMCNC: 28.4 % (ref 32–34.5)
MCV RBC AUTO: 96.3 FL (ref 80–99.9)
METER GLUCOSE: 133 MG/DL (ref 74–99)
METER GLUCOSE: 135 MG/DL (ref 74–99)
METER GLUCOSE: 173 MG/DL (ref 74–99)
METER GLUCOSE: 184 MG/DL (ref 74–99)
METER GLUCOSE: 185 MG/DL (ref 74–99)
METER GLUCOSE: 214 MG/DL (ref 74–99)
METER GLUCOSE: 47 MG/DL (ref 74–99)
MONOCYTES ABSOLUTE: 0.18 E9/L (ref 0.1–0.95)
MONOCYTES RELATIVE PERCENT: 3.5 % (ref 2–12)
NEUTROPHILS ABSOLUTE: 3.11 E9/L (ref 1.8–7.3)
NEUTROPHILS RELATIVE PERCENT: 68.7 % (ref 43–80)
OVALOCYTES: ABNORMAL
PDW BLD-RTO: 19.4 FL (ref 11.5–15)
PLATELET # BLD: 191 E9/L (ref 130–450)
PMV BLD AUTO: 10.9 FL (ref 7–12)
POIKILOCYTES: ABNORMAL
POLYCHROMASIA: ABNORMAL
POTASSIUM REFLEX MAGNESIUM: 2.7 MMOL/L (ref 3.5–5)
POTASSIUM REFLEX MAGNESIUM: 4.7 MMOL/L (ref 3.5–5)
RBC # BLD: 2.41 E12/L (ref 3.8–5.8)
SCHISTOCYTES: ABNORMAL
SODIUM BLD-SCNC: 145 MMOL/L (ref 132–146)
SODIUM BLD-SCNC: 149 MMOL/L (ref 132–146)
TARGET CELLS: ABNORMAL
TOTAL PROTEIN: 4.1 G/DL (ref 6.4–8.3)
WBC # BLD: 4.5 E9/L (ref 4.5–11.5)

## 2021-02-11 PROCEDURE — 83735 ASSAY OF MAGNESIUM: CPT

## 2021-02-11 PROCEDURE — 2700000000 HC OXYGEN THERAPY PER DAY

## 2021-02-11 PROCEDURE — 2580000003 HC RX 258: Performed by: INTERNAL MEDICINE

## 2021-02-11 PROCEDURE — 6370000000 HC RX 637 (ALT 250 FOR IP): Performed by: INTERNAL MEDICINE

## 2021-02-11 PROCEDURE — 6360000002 HC RX W HCPCS: Performed by: INTERNAL MEDICINE

## 2021-02-11 PROCEDURE — 71045 X-RAY EXAM CHEST 1 VIEW: CPT

## 2021-02-11 PROCEDURE — C9113 INJ PANTOPRAZOLE SODIUM, VIA: HCPCS | Performed by: INTERNAL MEDICINE

## 2021-02-11 PROCEDURE — 85025 COMPLETE CBC W/AUTO DIFF WBC: CPT

## 2021-02-11 PROCEDURE — 36415 COLL VENOUS BLD VENIPUNCTURE: CPT

## 2021-02-11 PROCEDURE — 86850 RBC ANTIBODY SCREEN: CPT

## 2021-02-11 PROCEDURE — 86923 COMPATIBILITY TEST ELECTRIC: CPT

## 2021-02-11 PROCEDURE — 36430 TRANSFUSION BLD/BLD COMPNT: CPT

## 2021-02-11 PROCEDURE — 80053 COMPREHEN METABOLIC PANEL: CPT

## 2021-02-11 PROCEDURE — 2060000000 HC ICU INTERMEDIATE R&B

## 2021-02-11 PROCEDURE — 86900 BLOOD TYPING SEROLOGIC ABO: CPT

## 2021-02-11 PROCEDURE — 82962 GLUCOSE BLOOD TEST: CPT

## 2021-02-11 PROCEDURE — 86901 BLOOD TYPING SEROLOGIC RH(D): CPT

## 2021-02-11 PROCEDURE — 94640 AIRWAY INHALATION TREATMENT: CPT

## 2021-02-11 PROCEDURE — 80048 BASIC METABOLIC PNL TOTAL CA: CPT

## 2021-02-11 PROCEDURE — P9016 RBC LEUKOCYTES REDUCED: HCPCS

## 2021-02-11 PROCEDURE — 94660 CPAP INITIATION&MGMT: CPT

## 2021-02-11 RX ORDER — SODIUM CHLORIDE 9 MG/ML
INJECTION, SOLUTION INTRAVENOUS PRN
Status: DISCONTINUED | OUTPATIENT
Start: 2021-02-11 | End: 2021-02-12 | Stop reason: HOSPADM

## 2021-02-11 RX ADMIN — LACTULOSE 20 G: 20 SOLUTION ORAL at 09:14

## 2021-02-11 RX ADMIN — POTASSIUM CHLORIDE 20 MEQ: 400 INJECTION, SOLUTION INTRAVENOUS at 11:03

## 2021-02-11 RX ADMIN — MAGNESIUM GLUCONATE 500 MG ORAL TABLET 400 MG: 500 TABLET ORAL at 21:02

## 2021-02-11 RX ADMIN — POTASSIUM CHLORIDE 20 MEQ: 400 INJECTION, SOLUTION INTRAVENOUS at 10:13

## 2021-02-11 RX ADMIN — POTASSIUM BICARBONATE 40 MEQ: 782 TABLET, EFFERVESCENT ORAL at 21:02

## 2021-02-11 RX ADMIN — PIPERACILLIN AND TAZOBACTAM 3375 MG: 3; .375 INJECTION, POWDER, LYOPHILIZED, FOR SOLUTION INTRAVENOUS at 05:58

## 2021-02-11 RX ADMIN — IPRATROPIUM BROMIDE AND ALBUTEROL SULFATE 1 AMPULE: .5; 3 SOLUTION RESPIRATORY (INHALATION) at 20:32

## 2021-02-11 RX ADMIN — PANTOPRAZOLE SODIUM 40 MG: 40 INJECTION, POWDER, FOR SOLUTION INTRAVENOUS at 09:11

## 2021-02-11 RX ADMIN — IPRATROPIUM BROMIDE AND ALBUTEROL SULFATE 1 AMPULE: .5; 3 SOLUTION RESPIRATORY (INHALATION) at 13:40

## 2021-02-11 RX ADMIN — FUROSEMIDE 40 MG: 10 INJECTION INTRAMUSCULAR; INTRAVENOUS at 09:11

## 2021-02-11 RX ADMIN — MICONAZOLE NITRATE: 20.6 POWDER TOPICAL at 21:02

## 2021-02-11 RX ADMIN — Medication 10 ML: at 21:03

## 2021-02-11 RX ADMIN — MIDODRINE HYDROCHLORIDE 20 MG: 5 TABLET ORAL at 12:26

## 2021-02-11 RX ADMIN — LATANOPROST 1 DROP: 50 SOLUTION OPHTHALMIC at 09:14

## 2021-02-11 RX ADMIN — DEXTROSE MONOHYDRATE 12.5 G: 25 INJECTION, SOLUTION INTRAVENOUS at 01:50

## 2021-02-11 RX ADMIN — ENOXAPARIN SODIUM 40 MG: 40 INJECTION SUBCUTANEOUS at 09:11

## 2021-02-11 RX ADMIN — SODIUM CHLORIDE: 9 INJECTION, SOLUTION INTRAVENOUS at 02:05

## 2021-02-11 RX ADMIN — POTASSIUM BICARBONATE 40 MEQ: 782 TABLET, EFFERVESCENT ORAL at 09:10

## 2021-02-11 RX ADMIN — IPRATROPIUM BROMIDE AND ALBUTEROL SULFATE 1 AMPULE: .5; 3 SOLUTION RESPIRATORY (INHALATION) at 16:45

## 2021-02-11 RX ADMIN — SENNOSIDES 8.6 MG: 8.6 TABLET, FILM COATED ORAL at 21:02

## 2021-02-11 RX ADMIN — SODIUM CHLORIDE, PRESERVATIVE FREE 300 UNITS: 5 INJECTION INTRAVENOUS at 21:04

## 2021-02-11 RX ADMIN — INSULIN GLARGINE 28 UNITS: 100 INJECTION, SOLUTION SUBCUTANEOUS at 21:14

## 2021-02-11 RX ADMIN — BUDESONIDE 500 MCG: 0.5 SUSPENSION RESPIRATORY (INHALATION) at 09:47

## 2021-02-11 RX ADMIN — POTASSIUM CHLORIDE 20 MEQ: 400 INJECTION, SOLUTION INTRAVENOUS at 12:05

## 2021-02-11 RX ADMIN — BUDESONIDE 500 MCG: 0.5 SUSPENSION RESPIRATORY (INHALATION) at 20:31

## 2021-02-11 RX ADMIN — MIDODRINE HYDROCHLORIDE 20 MG: 5 TABLET ORAL at 17:11

## 2021-02-11 RX ADMIN — PIPERACILLIN AND TAZOBACTAM 3375 MG: 3; .375 INJECTION, POWDER, LYOPHILIZED, FOR SOLUTION INTRAVENOUS at 21:03

## 2021-02-11 RX ADMIN — MIDODRINE HYDROCHLORIDE 20 MG: 5 TABLET ORAL at 09:08

## 2021-02-11 RX ADMIN — Medication: at 09:11

## 2021-02-11 RX ADMIN — SODIUM CHLORIDE, PRESERVATIVE FREE 300 UNITS: 5 INJECTION INTRAVENOUS at 09:12

## 2021-02-11 RX ADMIN — CHLORHEXIDINE GLUCONATE 0.12% ORAL RINSE 15 ML: 1.2 LIQUID ORAL at 21:02

## 2021-02-11 RX ADMIN — MICONAZOLE NITRATE: 20.6 POWDER TOPICAL at 09:11

## 2021-02-11 RX ADMIN — SODIUM CHLORIDE: 9 INJECTION, SOLUTION INTRAVENOUS at 17:12

## 2021-02-11 RX ADMIN — AMIODARONE HYDROCHLORIDE 100 MG: 200 TABLET ORAL at 09:11

## 2021-02-11 RX ADMIN — ATORVASTATIN CALCIUM 20 MG: 20 TABLET, FILM COATED ORAL at 09:14

## 2021-02-11 RX ADMIN — SODIUM CHLORIDE SOLN NEBU 3% 4 ML: 3 NEBU SOLN at 20:32

## 2021-02-11 RX ADMIN — Medication 10 ML: at 09:15

## 2021-02-11 RX ADMIN — SODIUM CHLORIDE SOLN NEBU 3% 4 ML: 3 NEBU SOLN at 09:48

## 2021-02-11 RX ADMIN — PIPERACILLIN AND TAZOBACTAM 3375 MG: 3; .375 INJECTION, POWDER, LYOPHILIZED, FOR SOLUTION INTRAVENOUS at 12:30

## 2021-02-11 RX ADMIN — SODIUM CHLORIDE: 9 INJECTION, SOLUTION INTRAVENOUS at 09:13

## 2021-02-11 RX ADMIN — INSULIN LISPRO 3 UNITS: 100 INJECTION, SOLUTION INTRAVENOUS; SUBCUTANEOUS at 21:14

## 2021-02-11 RX ADMIN — Medication: at 21:03

## 2021-02-11 RX ADMIN — IPRATROPIUM BROMIDE AND ALBUTEROL SULFATE 1 AMPULE: .5; 3 SOLUTION RESPIRATORY (INHALATION) at 09:48

## 2021-02-11 RX ADMIN — INSULIN LISPRO 3 UNITS: 100 INJECTION, SOLUTION INTRAVENOUS; SUBCUTANEOUS at 09:23

## 2021-02-11 ASSESSMENT — PAIN SCALES - GENERAL: PAINLEVEL_OUTOF10: 0

## 2021-02-11 NOTE — PROGRESS NOTES
Messaged Dr. Lisette Sena via perfect serve about patient magnesium and potassium levels, called back received, new orders. Dr. Lisette Sena also made aware about patient's wound and that BIPAP mask has been changed to full face mask since Monday.

## 2021-02-11 NOTE — PROGRESS NOTES
[address] [place] [address] [facility]  Progress  Note   C/C : Respiratory failure, pneumonia       Pt is not communicating    Off biPAP   Afebrile     Current Facility-Administered Medications   Medication Dose Route Frequency Provider Last Rate Last Admin    furosemide (LASIX) injection 40 mg  40 mg Intravenous Daily Tomasa Gooden MD   40 mg at 02/11/21 0911    metoprolol (LOPRESSOR) injection 5 mg  5 mg Intravenous Q5 Min PRN Mart Stallings MD        piperacillin-tazobactam (ZOSYN) 3,375 mg in dextrose 5 % 100 mL IVPB extended infusion (mini-bag)  3,375 mg Intravenous Q8H Ruben P MD Olga 25 mL/hr at 02/11/21 1230 3,375 mg at 02/11/21 1230    0.9 % sodium chloride infusion admixture   Intravenous Q8H Ruben P MD Olga 12.5 mL/hr at 02/11/21 0913 New Bag at 02/11/21 0913    miconazole (MICOTIN) 2 % powder   Topical BID Mart Stallings MD   Given at 02/11/21 0911    potassium bicarb-citric acid (EFFER-K) effervescent tablet 40 mEq  40 mEq Per G Tube Daily Mohinder Davenport, DO   40 mEq at 02/11/21 0910    insulin glargine (LANTUS) injection vial 28 Units  28 Units Subcutaneous Nightly Mart Stallings MD   28 Units at 02/10/21 2202    midodrine (PROAMATINE) tablet 20 mg  20 mg Oral TID WC Mohinder Davenport, DO   20 mg at 02/11/21 1226    ipratropium-albuterol (DUONEB) nebulizer solution 1 ampule  1 ampule Inhalation 4x daily Mohinder Davenport, DO   1 ampule at 02/11/21 1340    insulin lispro (HUMALOG) injection vial 0-18 Units  0-18 Units Subcutaneous Q4H Tyrell Sabins, DO   3 Units at 02/11/21 3655    Mineral Oil-Hydrophil Petrolat OINT   Topical BID Tyrell Sabins, DO   Given at 02/11/21 9365    And    Mineral Oil-Hydrophil Petrolat OINT   Topical 4x Daily PRN Tyrell Sabins, DO   Given at 02/04/21 1820    lidocaine PF 1 % injection 5 mL  5 mL Intradermal Once Tyrell Sabins,         sodium chloride flush 0.9 % injection 10 mL  10 mL Intravenous PRN Mohinder Lambert Issac, DO        heparin flush 100 UNIT/ML injection 300 Units  3 mL Intravenous 2 times per day Tha Dopp, DO   300 Units at 02/11/21 0912    heparin flush 100 UNIT/ML injection 300 Units  3 mL Intracatheter PRN Tha Dopp, DO        chlorhexidine (PERIDEX) 0.12 % solution 15 mL  15 mL Mouth/Throat BID Terisa Dials Issac, DO   15 mL at 02/10/21 2229    potassium chloride 20 mEq/50 mL IVPB (Central Line)  20 mEq Intravenous PRN Tha Dopp, DO 50 mL/hr at 02/11/21 1205 20 mEq at 02/11/21 1205    acetaminophen (TYLENOL) 160 MG/5ML solution 650 mg  650 mg Per G Tube Q4H PRN Jovani Yeh MD   650 mg at 02/05/21 8681    amiodarone (CORDARONE) tablet 100 mg  100 mg PEG Tube Daily Jovani Yeh MD   100 mg at 02/11/21 0911    atorvastatin (LIPITOR) tablet 20 mg  20 mg PEG Tube Daily Jovani Yeh MD   20 mg at 02/11/21 0914    docusate sodium (ENEMEEZ) enema 283 mg  1 enema Rectal PRN Jovani Yeh MD        glucose (GLUTOSE) 40 % oral gel 15 g  15 g Oral PRN Jovani Yeh MD        dextrose 50 % IV solution  12.5 g Intravenous PRN Makenna Hughes MD   12.5 g at 02/11/21 0150    glucagon (rDNA) injection 1 mg  1 mg Intramuscular PRN Jovani Yeh MD        dextrose 5 % solution  100 mL/hr Intravenous PRN Jovani Yeh MD        lactulose (CHRONULAC) 10 GM/15ML solution 20 g  20 g PEG Tube Daily Felix Hughes MD   20 g at 02/11/21 0914    latanoprost (XALATAN) 0.005 % ophthalmic solution 1 drop  1 drop Both Eyes Daily Jovani Yeh MD   1 drop at 02/11/21 0914    pantoprazole (PROTONIX) injection 40 mg  40 mg Intravenous Daily Jovani Yeh MD   40 mg at 02/11/21 0911    And    sodium chloride (PF) 0.9 % injection 10 mL  10 mL Intravenous Daily Jovani Yeh MD   10 mL at 02/09/21 0931    senna (SENOKOT) tablet 8.6 mg  1 tablet PEG Tube BID Jovani Yeh MD   8.6 mg at 02/10/21 7214    sodium chloride (Inhalant) 3 % nebulizer solution 4 mL  4 mL Nebulization BID Nara Harrington MD   4 mL at 02/11/21 0948    sodium chloride flush 0.9 % injection 10 mL  10 mL Intravenous 2 times per day Nara Harrington MD   10 mL at 02/11/21 0915    sodium chloride flush 0.9 % injection 10 mL  10 mL Intravenous PRN Nara Harrington MD   10 mL at 02/08/21 0503    enoxaparin (LOVENOX) injection 40 mg  40 mg Subcutaneous Daily Nara Harrington MD   40 mg at 02/11/21 0911    promethazine (PHENERGAN) tablet 12.5 mg  12.5 mg Oral Q6H PRN Nara Harrington MD        Or    ondansetron (ZOFRAN) injection 4 mg  4 mg Intravenous Q6H PRN Nara Harrington MD        polyethylene glycol (GLYCOLAX) packet 17 g  17 g Oral Daily PRN Nara Harrington MD        acetaminophen (TYLENOL) tablet 650 mg  650 mg Oral Q6H PRN Nara Harrington MD   650 mg at 01/30/21 1923    Or    acetaminophen (TYLENOL) suppository 650 mg  650 mg Rectal Q6H PRN Nara Harrington MD        budesonide (PULMICORT) nebulizer suspension 500 mcg  500 mcg Nebulization BID Andre Banegas MD   500 mcg at 02/11/21 0947    fentaNYL (SUBLIMAZE) injection 50 mcg  50 mcg Intravenous Q1H PRN Adarsh Mckinney MD   50 mcg at 02/03/21 2008       REVIEW OF SYSTEMS: could not be obtained       PHYSICAL EXAM:    Vitals:   /76   Pulse 70   Temp 97.5 °F (36.4 °C) (Temporal)   Resp 19   Ht 6' (1.829 m)   Wt 254 lb 8 oz (115.4 kg)   SpO2 100%   BMI 34.52 kg/m²      Constitutional: opened eyes , non communicating   Skin:  no rash   HEENT:Pallor +, no LN  , dry mucosa   Neck: Supple   Chest:  Bilateral rhonchi    Cardiovascular: Regular, no murmur   Abdomen:soft, bowel sound +  PEG - no drainage or erythema   Extremities: ++ edema, erythema on the legs     Lines:  Right brachial PICC ( 1/25)     CBC with Differential:      Lab Results   Component Value Date    WBC 4.5 02/11/2021    RBC 2.41 02/11/2021    HGB 6.6 02/11/2021    HCT 23.2 02/11/2021     02/11/2021    MCV 96.3 02/11/2021    MCH 27.4 02/11/2021    MCHC 28.4 02/11/2021    RDW 19.4 02/11/2021    NRBC 0.9 02/07/2021    SEGSPCT 64 09/27/2013    METASPCT 6.0 01/06/2021    LYMPHOPCT 18.4 02/10/2021    PROMYELOPCT 0.9 12/21/2020    MONOPCT 4.4 02/10/2021    MYELOPCT 3.0 01/06/2021    BASOPCT 0.9 02/10/2021    MONOSABS 0.25 02/10/2021    LYMPHSABS 1.13 02/10/2021    EOSABS 0.16 02/10/2021    BASOSABS 0.06 02/10/2021       CMP:    Lab Results   Component Value Date     02/11/2021    K 2.7 02/11/2021     02/11/2021    CO2 36 02/11/2021    BUN 24 02/11/2021    CREATININE 0.5 02/11/2021    GFRAA >60 02/11/2021    LABGLOM >60 02/11/2021    GLUCOSE 139 02/11/2021    GLUCOSE 138 03/26/2011    PROT 4.1 02/11/2021    LABALBU 1.9 02/11/2021    LABALBU 4.0 03/26/2011    CALCIUM 6.6 02/11/2021    BILITOT 0.2 02/11/2021    ALKPHOS 52 02/11/2021    AST 12 02/11/2021    ALT 7 02/11/2021       Hepatic Function Panel:    Lab Results   Component Value Date    ALKPHOS 52 02/11/2021    ALT 7 02/11/2021    AST 12 02/11/2021    PROT 4.1 02/11/2021    BILITOT 0.2 02/11/2021    BILIDIR 0.4 01/14/2021    IBILI 0.5 01/14/2021    LABALBU 1.9 02/11/2021    LABALBU 4.0 03/26/2011         Microbiology :    Blood culture - neg to date   Urine Culture -     Susceptibility    Pseudomonas aeruginosa (1)    Antibiotic Interpretation SNEHA Status    gentamicin Sensitive <=^1 mcg/mL     levofloxacin Sensitive <=^0.12 mcg/mL     tobramycin Sensitive <=^1 mcg/mL     Lab and Collection        Sputum Culture-      Susceptibility    Pseudomonas aeruginosa (1)    Antibiotic Interpretation SNEHA Status    gentamicin Sensitive <=^1 mcg/mL     levofloxacin Sensitive <=^0.12 mcg/mL     tobramycin Sensitive <=^1 mcg/mL     Proteus mirabilis (4)    Antibiotic Interpretation SNEHA Status    ampicillin Sensitive <=^2 mcg/mL     ceFAZolin Sensitive <=^4 mcg/mL     cefepime Sensitive <=^0.12 mcg/mL     cefTRIAXone Sensitive <=^0.25 mcg/mL     ertapenem Sensitive <=^0.12 mcg/mL     gentamicin Sensitive <=^1 mcg/mL     levofloxacin Resistant >=^8 mcg/mL     piperacillin-tazobactam Sensitive <=^4 mcg/mL     trimethoprim-sulfamethoxazole Sensitive <=^20 mcg/mL      Condensed View   Lab and Collection  Radiology :    Chest X ray - bilateral multifocal infiltrates , cardiomegaly     Ultrasound - neg for DVT     Fungitell test negative       Assessment:  · Respiratory failure - off BiPAP - limited code    · Pseudomonas bacteriuria    · Aspiration pneumonia  (Pseudomonas, Klebsiella, E coli and Proteus)   ·  Leukocytosis -improved     Plan:    · Zosyn 3.375 grams IV q 8 hrs X 2 days   · To [facility]  1:53 PM  2/11/2021

## 2021-02-11 NOTE — PROGRESS NOTES
Subjective: The patient is on bipap. Lethargic, events of last 24 hrs reviewed with staff and records. Objective:  Pt is resting in nad   BP (!) 108/59   Pulse 68   Temp 96.1 °F (35.6 °C) (Temporal)   Resp 17   Ht 6' (1.829 m)   Wt 254 lb 8 oz (115.4 kg)   SpO2 95%   BMI 34.52 kg/m²   HEENT no adenopathy no bruits  Heart:  RRR, no murmurs, gallops, or rubs.   Lungs:  CTA bilaterally, no wheeze, rales or rhonchi  Abd: bowel sounds present, nontender, nondistended, no masses  Extrem:  No clubbing, +edema lower and erythema   WBC/Hgb/Hct/Plts:  6.3/7.6/26.6/217 (02/10 8053) basic metabolic panel     Assessment:    Patient Active Problem List   Diagnosis    Severe sepsis with septic shock ([address]    Dementia ([address]    Metabolic encephalopathy    Diabetes mellitus type 2, uncontrolled ([address]    Hyperlipidemia LDL goal <100    Essential hypertension    Venous ulcer of left leg (HCC)    Non-pressure chronic ulcer of left lower leg with fat layer exposed (HCC)    Non-pressure chronic ulcer left lower leg, limited to breakdown skin (HCC)    Severe protein-calorie malnutrition (HCC)    Non-pressure chronic ulcer right lower leg, limited to breakdown skin (HCC)    Pressure injury of contiguous region involving back, buttock, and hip, stage 2 (HCC)    Pressure injury of calf, stage 2 ([address]    HCAP (healthcare-associated pneumonia)    Paroxysmal atrial fibrillation (HCC)    Acute respiratory failure with hypoxia (HCC)    Acute deep vein thrombosis (DVT) of femoral vein of left lower extremity (HCC)    Leg swelling    Respiratory failure (HCC)    Palliative care encounter    DNR no code (do not resuscitate)    Goals of care, counseling/discussion    Sepsis due to Pseudomonas species with acute hypercapnic respiratory failure and septic shock ([address]       Plan:  To LTAC once precert obtained         Romaine Saul  8:05 AM  2/11/2021

## 2021-02-11 NOTE — FLOWSHEET NOTE
Inpatient Wound Care (Follow up) 7410A    Admit Date: 1/22/2021  8:35 PM    Reason for consult:  Prevalence study skin rounds    Findings:       02/11/21 1252   Wound 02/11/21 Bridge of nose   Date First Assessed/Time First Assessed: 02/11/21 1252   Present on Hospital Admission: No  Wound Description (Comments): Bridge of nose   Wound Image    Wound Etiology Pressure Unstageable   Wound Length (cm) 2 cm   Wound Width (cm) 1 cm   Wound Depth (cm)   (unable to determine)   Wound Surface Area (cm^2) 2 cm^2   Wound Assessment Purple/maroon   Drainage Amount None   Susy-wound Assessment Intact     **Informed Consent**     photos taken of wound and inserted into their chart as part of their permanent medical record for purposes of documentation, treatment management and/or medical review. All Images taken on 2/11/21 of patient name: Dony Marin were transmitted and stored on secured [employer] located within [facility] by a registered [employer] Mobile Application Device. Impression:  Bridge of nose: DTI    Plan: Spoke with patients nurse, in regard to having respiratory change patients bipap mask  Nurse to make Dr. Rehan Dexter.         Ruthie Bettencourt 2/11/2021 3:58 PM

## 2021-02-11 NOTE — PROGRESS NOTES
Pt is ordered a heated high flow nasal cannula Ariella Randle) RN took pt off and placed on high flow nasal cannula. RT came to check on pt and give breathing treatment. Pt spo2 on 15L HFNC was 100%. Pt spo2 did flucuate to lower 90's and up. pt is a mouth breather. I then ended up decreasing HFNC to 13L and pts spo2 remained 100%. RT made nurse aware of pts o2 status. Not putting pt on heated high flow nasal cannula at this time. Will continue to monitor.      Electronically signed by Mayra Jean-Baptiste RCP on 2/11/2021 at 2:16 PM

## 2021-02-11 NOTE — PROGRESS NOTES
[facility]  [address] [facility], [address]  [facility], [facility]      Pulmonary Progress Note      SUBJECTIVE:  Currently on 13 li HFNC, on BIPAP 12/6 50% with full face mask up to this morning. arousable, will open eyes but not much interaction with stimulus, not looking any different from yesterday.     OBJECTIVE    Medications    Continuous Infusions:   dextrose         Scheduled Meds:   furosemide  40 mg Intravenous Daily    piperacillin-tazobactam  3,375 mg Intravenous Q8H    sodium chloride   Intravenous Q8H    miconazole   Topical BID    potassium bicarb-citric acid  40 mEq Per G Tube Daily    insulin glargine  28 Units Subcutaneous Nightly    midodrine  20 mg Oral TID WC    ipratropium-albuterol  1 ampule Inhalation 4x daily    insulin lispro  0-18 Units Subcutaneous Q4H    mineral oil-hydrophil petrolat   Topical BID    lidocaine PF  5 mL Intradermal Once    heparin flush  3 mL Intravenous 2 times per day    chlorhexidine  15 mL Mouth/Throat BID    amiodarone  100 mg PEG Tube Daily    atorvastatin  20 mg PEG Tube Daily    lactulose  20 g PEG Tube Daily    latanoprost  1 drop Both Eyes Daily    pantoprazole  40 mg Intravenous Daily    And    sodium chloride (PF)  10 mL Intravenous Daily    senna  1 tablet PEG Tube BID    sodium chloride (Inhalant)  4 mL Nebulization BID    sodium chloride flush  10 mL Intravenous 2 times per day    enoxaparin  40 mg Subcutaneous Daily    budesonide  500 mcg Nebulization BID       PRN Meds:metoprolol, mineral oil-hydrophil petrolat **AND** mineral oil-hydrophil petrolat, sodium chloride flush, heparin flush, potassium chloride, acetaminophen, docusate sodium, glucose, dextrose, glucagon (rDNA), dextrose, sodium chloride flush, promethazine **OR** ondansetron, polyethylene glycol, acetaminophen **OR** acetaminophen, fentanNYL    Physical    VITALS:  /76   Pulse 70   Temp 97.5 °F (36.4 °C) (Temporal)   Resp 19   Ht 6' (1.829 m)   Wt 254 lb 8 oz (115.4 kg)   SpO2 100%   BMI 34.52 kg/m²     24HR INTAKE/OUTPUT:      Intake/Output Summary (Last 24 hours) at 2021 1441  Last data filed at 2021 0600  Gross per 24 hour   Intake 1812 ml   Output 500 ml   Net 1312 ml       24HR PULSE OXIMETRY RANGE:    SpO2  Av.9 %  Min: 86 %  Max: 100 %    General appearance: appears stated age, obese  Lungs: rhonchi bilaterally  Heart: regular rate and rhythm, S1, S2 normal, no murmur, click, rub or gallop  Abdomen: (+) PEG  Extremities: edema bipedal  Neurologic: Mental status: arousable, not much interaction to stimulus    Data    CBC:   Recent Labs     21  0625 02/10/21  0529 21  0625   WBC 7.1 6.3 4.5   HGB 7.5* 7.6* 6.6*   HCT 26.1* 26.6* 23.2*   MCV 97.0 95.3 96.3    217 191       BMP:  Recent Labs     21  0625 02/10/21  0529 21  0625   * 147* 149*   K 3.3* 3.3* 2.7*    98 108*   CO2 43* 39* 36*   BUN 28* 25* 24*   CREATININE 0.7 0.7 0.5*    ALB:3,BILIDIR:3,BILITOT:3,ALKPHOS:3)@    PT/INR: No results for input(s): PROTIME, INR in the last 72 hours. ABG:   No results for input(s): PH, PO2, PCO2, HCO3, BE, O2SAT, METHB, O2HB, COHB, O2CON, HHB, THB in the last 72 hours. FiO2 : (S) 40 %  I:E Ratio: 1:1.40    Radiology/Other tests reviewed: CXR reviewed with slightly better aeration right lung field    Assessment:     Active Problems:    Respiratory failure ([address]    Palliative care encounter    DNR no code (do not resuscitate)    Goals of care, counseling/discussion    Sepsis due to Pseudomonas species with acute hypercapnic respiratory failure and septic shock (HCC)  Resolved Problems:    * No resolved hospital problems. *      Plan:       1. Cont with HFNC daytime as tolerated and BIPAP at night  2. Cont with nebs and saline nebs, observe respiratory function and secretions  3. Watch fluid balance, cont with free water with TF  4.  Cont with antibiotics as per ID  5. No reintubation if worsens  6. Can go to select when bed is available      Time at the bedside, reviewing labs and radiographs, reviewing notes and consultations, discussing with staff and family was more than 35 minutes. Thanks for letting us see this patient in consultation. Please contact us with any questions. Office [phone] or after hours through [employer], [phone].

## 2021-02-11 NOTE — PROGRESS NOTES
Date: 2/10/2021    Time: 7:33 PM    Patient Placed On BIPAP/CPAP/ Non-Invasive Ventilation? No, continue on    If no must comment. Facial area red/color change? Yes           If YES are Blister/Lesion present? No   If yes must notify nursing staff  BIPAP/CPAP skin barrier?   No    Skin barrier type:total face mask       Comments:        Yamilex Alegre

## 2021-02-12 VITALS
HEART RATE: 80 BPM | TEMPERATURE: 97.8 F | SYSTOLIC BLOOD PRESSURE: 128 MMHG | RESPIRATION RATE: 23 BRPM | HEIGHT: 72 IN | DIASTOLIC BLOOD PRESSURE: 61 MMHG | BODY MASS INDEX: 34.47 KG/M2 | WEIGHT: 254.5 LBS | OXYGEN SATURATION: 100 %

## 2021-02-12 LAB
ACANTHOCYTES: ABNORMAL
ALBUMIN SERPL-MCNC: 2.5 G/DL (ref 3.5–5.2)
ALP BLD-CCNC: 69 U/L (ref 40–129)
ALT SERPL-CCNC: 13 U/L (ref 0–40)
ANION GAP SERPL CALCULATED.3IONS-SCNC: 4 MMOL/L (ref 7–16)
ANISOCYTOSIS: ABNORMAL
AST SERPL-CCNC: 18 U/L (ref 0–39)
BASOPHILS ABSOLUTE: 0.01 E9/L (ref 0–0.2)
BASOPHILS RELATIVE PERCENT: 0.2 % (ref 0–2)
BILIRUB SERPL-MCNC: 0.4 MG/DL (ref 0–1.2)
BUN BLDV-MCNC: 24 MG/DL (ref 8–23)
CALCIUM SERPL-MCNC: 8.6 MG/DL (ref 8.6–10.2)
CHLORIDE BLD-SCNC: 102 MMOL/L (ref 98–107)
CO2: 39 MMOL/L (ref 22–29)
CREAT SERPL-MCNC: 0.6 MG/DL (ref 0.7–1.2)
EOSINOPHILS ABSOLUTE: 0.32 E9/L (ref 0.05–0.5)
EOSINOPHILS RELATIVE PERCENT: 5.3 % (ref 0–6)
GFR AFRICAN AMERICAN: >60
GFR NON-AFRICAN AMERICAN: >60 ML/MIN/1.73
GLUCOSE BLD-MCNC: 163 MG/DL (ref 74–99)
HCT VFR BLD CALC: 27.5 % (ref 37–54)
HCT VFR BLD CALC: 28.8 % (ref 37–54)
HEMOGLOBIN: 8.2 G/DL (ref 12.5–16.5)
HEMOGLOBIN: 8.2 G/DL (ref 12.5–16.5)
HYPOCHROMIA: ABNORMAL
IMMATURE GRANULOCYTES #: 0.03 E9/L
IMMATURE GRANULOCYTES %: 0.5 % (ref 0–5)
LYMPHOCYTES ABSOLUTE: 1.71 E9/L (ref 1.5–4)
LYMPHOCYTES RELATIVE PERCENT: 28.2 % (ref 20–42)
MCH RBC QN AUTO: 27.2 PG (ref 26–35)
MCHC RBC AUTO-ENTMCNC: 28.5 % (ref 32–34.5)
MCV RBC AUTO: 95.4 FL (ref 80–99.9)
METER GLUCOSE: 139 MG/DL (ref 74–99)
METER GLUCOSE: 153 MG/DL (ref 74–99)
METER GLUCOSE: 155 MG/DL (ref 74–99)
METER GLUCOSE: 176 MG/DL (ref 74–99)
MONOCYTES ABSOLUTE: 0.53 E9/L (ref 0.1–0.95)
MONOCYTES RELATIVE PERCENT: 8.7 % (ref 2–12)
NEUTROPHILS ABSOLUTE: 3.46 E9/L (ref 1.8–7.3)
NEUTROPHILS RELATIVE PERCENT: 57.1 % (ref 43–80)
OVALOCYTES: ABNORMAL
PDW BLD-RTO: 18.8 FL (ref 11.5–15)
PLATELET # BLD: 213 E9/L (ref 130–450)
PMV BLD AUTO: 10.7 FL (ref 7–12)
POIKILOCYTES: ABNORMAL
POLYCHROMASIA: ABNORMAL
POTASSIUM REFLEX MAGNESIUM: 4 MMOL/L (ref 3.5–5)
RBC # BLD: 3.02 E12/L (ref 3.8–5.8)
SODIUM BLD-SCNC: 145 MMOL/L (ref 132–146)
TOTAL PROTEIN: 5.4 G/DL (ref 6.4–8.3)
WBC # BLD: 6.1 E9/L (ref 4.5–11.5)

## 2021-02-12 PROCEDURE — 6370000000 HC RX 637 (ALT 250 FOR IP): Performed by: INTERNAL MEDICINE

## 2021-02-12 PROCEDURE — 6360000002 HC RX W HCPCS: Performed by: INTERNAL MEDICINE

## 2021-02-12 PROCEDURE — 2700000000 HC OXYGEN THERAPY PER DAY

## 2021-02-12 PROCEDURE — 2580000003 HC RX 258: Performed by: INTERNAL MEDICINE

## 2021-02-12 PROCEDURE — 85018 HEMOGLOBIN: CPT

## 2021-02-12 PROCEDURE — 36415 COLL VENOUS BLD VENIPUNCTURE: CPT

## 2021-02-12 PROCEDURE — C9113 INJ PANTOPRAZOLE SODIUM, VIA: HCPCS | Performed by: INTERNAL MEDICINE

## 2021-02-12 PROCEDURE — 82962 GLUCOSE BLOOD TEST: CPT

## 2021-02-12 PROCEDURE — 85014 HEMATOCRIT: CPT

## 2021-02-12 PROCEDURE — 80053 COMPREHEN METABOLIC PANEL: CPT

## 2021-02-12 PROCEDURE — 85025 COMPLETE CBC W/AUTO DIFF WBC: CPT

## 2021-02-12 PROCEDURE — 94660 CPAP INITIATION&MGMT: CPT

## 2021-02-12 PROCEDURE — 94640 AIRWAY INHALATION TREATMENT: CPT

## 2021-02-12 RX ORDER — POLYETHYLENE GLYCOL 3350 17 G/17G
17 POWDER, FOR SOLUTION ORAL DAILY PRN
Qty: 527 G | Refills: 1 | DISCHARGE
Start: 2021-02-12 | End: 2021-03-14

## 2021-02-12 RX ORDER — FUROSEMIDE 10 MG/ML
40 INJECTION INTRAMUSCULAR; INTRAVENOUS DAILY
DISCHARGE
Start: 2021-02-13

## 2021-02-12 RX ORDER — FENTANYL CITRATE 50 UG/ML
50 INJECTION, SOLUTION INTRAMUSCULAR; INTRAVENOUS
Refills: 0 | Status: SHIPPED | DISCHARGE
Start: 2021-02-12 | End: 2021-02-15

## 2021-02-12 RX ORDER — SENNA PLUS 8.6 MG/1
1 TABLET ORAL 2 TIMES DAILY
Qty: 60 TABLET | Refills: 0 | DISCHARGE
Start: 2021-02-12 | End: 2021-03-14

## 2021-02-12 RX ORDER — METOPROLOL TARTRATE 5 MG/5ML
5 INJECTION INTRAVENOUS EVERY 5 MIN PRN
Qty: 15 ML | DISCHARGE
Start: 2021-02-12

## 2021-02-12 RX ORDER — BUDESONIDE 0.5 MG/2ML
500 INHALANT ORAL 2 TIMES DAILY
Qty: 60 AMPULE | Refills: 3 | DISCHARGE
Start: 2021-02-12

## 2021-02-12 RX ORDER — CHLORHEXIDINE GLUCONATE 0.12 MG/ML
15 RINSE ORAL 2 TIMES DAILY
Qty: 420 ML | Refills: 0 | DISCHARGE
Start: 2021-02-12 | End: 2021-02-26

## 2021-02-12 RX ORDER — IPRATROPIUM BROMIDE AND ALBUTEROL SULFATE 2.5; .5 MG/3ML; MG/3ML
3 SOLUTION RESPIRATORY (INHALATION) 4 TIMES DAILY
Qty: 360 ML | DISCHARGE
Start: 2021-02-12

## 2021-02-12 RX ORDER — INSULIN GLARGINE 100 [IU]/ML
28 INJECTION, SOLUTION SUBCUTANEOUS NIGHTLY
Qty: 1 VIAL | Refills: 3 | DISCHARGE
Start: 2021-02-12

## 2021-02-12 RX ADMIN — CHLORHEXIDINE GLUCONATE 0.12% ORAL RINSE 15 ML: 1.2 LIQUID ORAL at 10:29

## 2021-02-12 RX ADMIN — BUDESONIDE 500 MCG: 0.5 SUSPENSION RESPIRATORY (INHALATION) at 08:39

## 2021-02-12 RX ADMIN — FUROSEMIDE 40 MG: 10 INJECTION INTRAMUSCULAR; INTRAVENOUS at 10:34

## 2021-02-12 RX ADMIN — IPRATROPIUM BROMIDE AND ALBUTEROL SULFATE 1 AMPULE: .5; 3 SOLUTION RESPIRATORY (INHALATION) at 15:59

## 2021-02-12 RX ADMIN — MICONAZOLE NITRATE: 20.6 POWDER TOPICAL at 10:36

## 2021-02-12 RX ADMIN — MIDODRINE HYDROCHLORIDE 20 MG: 5 TABLET ORAL at 10:31

## 2021-02-12 RX ADMIN — Medication 10 ML: at 10:30

## 2021-02-12 RX ADMIN — IPRATROPIUM BROMIDE AND ALBUTEROL SULFATE 1 AMPULE: .5; 3 SOLUTION RESPIRATORY (INHALATION) at 08:37

## 2021-02-12 RX ADMIN — SODIUM CHLORIDE SOLN NEBU 3% 4 ML: 3 NEBU SOLN at 08:38

## 2021-02-12 RX ADMIN — PIPERACILLIN AND TAZOBACTAM 3375 MG: 3; .375 INJECTION, POWDER, LYOPHILIZED, FOR SOLUTION INTRAVENOUS at 05:36

## 2021-02-12 RX ADMIN — PANTOPRAZOLE SODIUM 40 MG: 40 INJECTION, POWDER, FOR SOLUTION INTRAVENOUS at 10:47

## 2021-02-12 RX ADMIN — INSULIN LISPRO 3 UNITS: 100 INJECTION, SOLUTION INTRAVENOUS; SUBCUTANEOUS at 11:43

## 2021-02-12 RX ADMIN — MAGNESIUM GLUCONATE 500 MG ORAL TABLET 400 MG: 500 TABLET ORAL at 10:31

## 2021-02-12 RX ADMIN — LACTULOSE 20 G: 20 SOLUTION ORAL at 10:30

## 2021-02-12 RX ADMIN — SODIUM CHLORIDE: 9 INJECTION, SOLUTION INTRAVENOUS at 10:35

## 2021-02-12 RX ADMIN — POTASSIUM BICARBONATE 40 MEQ: 782 TABLET, EFFERVESCENT ORAL at 10:30

## 2021-02-12 RX ADMIN — ATORVASTATIN CALCIUM 20 MG: 20 TABLET, FILM COATED ORAL at 10:31

## 2021-02-12 RX ADMIN — INSULIN LISPRO 3 UNITS: 100 INJECTION, SOLUTION INTRAVENOUS; SUBCUTANEOUS at 08:20

## 2021-02-12 RX ADMIN — SODIUM CHLORIDE, PRESERVATIVE FREE 10 ML: 5 INJECTION INTRAVENOUS at 10:47

## 2021-02-12 RX ADMIN — ENOXAPARIN SODIUM 40 MG: 40 INJECTION SUBCUTANEOUS at 10:29

## 2021-02-12 RX ADMIN — INSULIN LISPRO 3 UNITS: 100 INJECTION, SOLUTION INTRAVENOUS; SUBCUTANEOUS at 05:36

## 2021-02-12 RX ADMIN — Medication: at 10:38

## 2021-02-12 RX ADMIN — SODIUM CHLORIDE: 9 INJECTION, SOLUTION INTRAVENOUS at 01:12

## 2021-02-12 RX ADMIN — MIDODRINE HYDROCHLORIDE 20 MG: 5 TABLET ORAL at 13:30

## 2021-02-12 RX ADMIN — SODIUM CHLORIDE, PRESERVATIVE FREE 300 UNITS: 5 INJECTION INTRAVENOUS at 10:29

## 2021-02-12 RX ADMIN — AMIODARONE HYDROCHLORIDE 100 MG: 200 TABLET ORAL at 10:49

## 2021-02-12 RX ADMIN — IPRATROPIUM BROMIDE AND ALBUTEROL SULFATE 1 AMPULE: .5; 3 SOLUTION RESPIRATORY (INHALATION) at 13:48

## 2021-02-12 ASSESSMENT — PAIN SCALES - GENERAL
PAINLEVEL_OUTOF10: 0
PAINLEVEL_OUTOF10: 0

## 2021-02-12 NOTE — DISCHARGE INSTR - COC
Continuity of Care Form    Patient Name: Tess Ochoa   :  1940  MRN:  [id number]    Admit date:  2021  Discharge date:  Laya Cortés    Code Status Order: Limited   Advance Directives:   [facility] Documentation     Date/Time Healthcare Directive Type of Healthcare Directive Copy in [facility] [address] Agent's Name Healthcare Agent's Phone Number    21 0326  No, patient does not have an advance directive for healthcare treatment -- -- -- -- --          Admitting Physician:  Parker Mayorga MD  PCP: No primary care provider on file. Discharging Nurse: RG SAHA LP Unit/Room#: 65/200-A  Discharging Unit Phone Number: [phone]    Emergency Contact:   Extended Emergency Contact Information  Primary Emergency Contact: Cornelparth Phan  Mobile Phone: [phone]  Relation: Child   needed?  No  Secondary Emergency Contact: Chuy Parker  Mobile Phone: [phone]  Relation: Child    Past Surgical History:  Past Surgical History:   Procedure Laterality Date    DOPPLER ECHOCARDIOGRAPHY N/A     FRACTURE SURGERY      HIP FRACTURE SURGERY Left 2019    HIP OPEN REDUCTION INTERNAL FIXATION performed [facility]el [facility] at [facility]  2020         MT EGD PERCUTANEOUS PLACEMENT GASTROSTOMY TUBE N/A 2018    EGD  PEG TUBE INSERTION,  (ENDO STAFF NEEDED) performed by Stas Brasher MD at [address] [place] [address] N/A 2018    EGD DIAGNOSTIC ONLY performed by Argelia Brown MD at [address] [place] [address] N/A 1/15/2021    EGD WITH CONVERSION TO J TUBE performed by Benoit Bryan MD at [address] [place] [address] N/A 2021    VENA CAVA FILTER INSERTION, RIGHT FEMORAL APPROACH performed by Faina Martinez MD at [facility] OR       Immunization History:   Immunization History   Administered Date(s) Administered   Edda Munoz Influenza A (C6C3-79) Vaccine PF IM 12/15/2009    Pneumococcal Conjugate 13-valent ([id number]) 07/28/2018    Tdap (Boostrix, Adacel) 03/05/2019       Active Problems:  Patient Active Problem List   Diagnosis Code    Severe sepsis with septic shock ([facility]) A41.9, R65.21    Dementia ([facility] K65.45    Metabolic encephalopathy M06.67    Diabetes mellitus type 2, uncontrolled ([facility] E11.65    Hyperlipidemia LDL goal <100 E78.5    Essential hypertension I10    Venous ulcer of left leg ([facility]) I83.029, L97.929    Non-pressure chronic ulcer of left lower leg with fat layer exposed ([facility] L97.922    Non-pressure chronic ulcer left lower leg, limited to breakdown skin ([facility]) L97.921    Severe protein-calorie malnutrition ([facility]) E43    Non-pressure chronic ulcer right lower leg, limited to breakdown skin ([facility]) L97.911    Pressure injury of contiguous region involving back, buttock, and hip, stage 2 ([facility]) L89.42    Pressure injury of calf, stage 2 ([facility] L89.892    HCAP (healthcare-associated pneumonia) J18.9    Paroxysmal atrial fibrillation ([facility]) I48.0    Acute respiratory failure with hypoxia ([facility]) J96.01    Acute deep vein thrombosis (DVT) of femoral vein of left lower extremity ([facility]) I82.412    Leg swelling M79.89    Respiratory failure ([facility]) J96.90    Palliative care encounter Z51.5    DNR no code (do not resuscitate) Z66    Goals of care, counseling/discussion Z71.89    Sepsis due to Pseudomonas species with acute hypercapnic respiratory failure and septic shock ([facility]) A41.52, R65.21, J96.02       Isolation/Infection:   Isolation          Contact        Unreconciled Outside Infections     Enable clinical decision support by reconciling outside information with the patient's chart.     .    Infection Onset Last Indicated Last Received Source    MRSA 12/13/20 12/28/20 01/22/21 [facility]      Patient Infection Status     Infection Onset Added Last Indicated Last Indicated By Review Planned Expiration Resolved Resolved By    ESBL (Extended Spectrum Beta Lactamase) 12/14/20 12/17/20 12/14/20 Culture, Respiratory        Klebsiella pneumoniae sputum 12/14/2020    Resolved    COVID-19 Rule Out 01/22/21 01/22/21 01/22/21 COVID-19 (Ordered)   01/22/21 Rule-Out Test Resulted    COVID-19 Rule Out 01/20/21 01/20/21 01/20/21 COVID-19 (Ordered)   01/20/21 Rule-Out Test Resulted    COVID-19 Rule Out 01/20/21 01/20/21 01/20/21 COVID-19 (Ordered)   01/20/21 Rule-Out Test Resulted    COVID-19 Rule Out 01/14/21 01/14/21 01/14/21 COVID-19 (Ordered)   01/14/21 Rule-Out Test Resulted    COVID-19 Rule Out 01/05/21 01/05/21 01/05/21 COVID-19 (Ordered)   01/05/21 Rule-Out Test Resulted    MDRO (multi-drug resistant organism) 12/14/20 12/17/20 12/29/20 Culture, Respiratory   01/25/21 Delfino Kelly RN    1/25/21-Organism sensitivity is > 2 antibiotic classes  Klebsiella pneumoniae sputum 12/29/2020, 12/14/2020    MRSA 12/13/20 12/15/20 12/14/20 Culture, Respiratory   01/25/21 Delfino Kelly RN    MRSA sputum 12/14/2020  MRSA nares 12/13/2020    COVID-19 Rule Out 12/13/20 12/13/20 12/13/20 Respiratory Panel, Molecular, with COVID-19 (Restricted: peds pts or suitable admitted adults) (Ordered)   12/13/20 Rule-Out Test Resulted    MRSA 04/09/19 04/11/19 04/09/19 MRSA SCREENING CULTURE ONLY   04/12/19 Ellen Joshi RN    MRSA 01/11/19 01/13/19 01/11/19 WOUND CULTURE   01/16/19 Delfino Kelly RN          Nurse Assessment:  Last Vital Signs: /61   Pulse 80   Temp 97.8 °F (36.6 °C) (Temporal)   Resp 23   Ht 6' (1.829 m)   Wt 254 lb 8 oz (115.4 kg)   SpO2 99%   BMI 34.52 kg/m²     Last documented pain score (0-10 scale): Pain Level: 0  Last Weight:   Wt Readings from Last 1 Encounters:   02/10/21 254 lb 8 oz (115.4 kg)     Mental Status:  disoriented    IV Access:  - PICC - site  R Upper Arm, insertion date: 1/25/21    Nursing Mobility/ADLs:  Walking   Dependent  Transfer  Dependent  Bathing  Dependent  Dressing Dependent  Toileting  Dependent  Feeding  Dependent  Med Admin  Dependent  Med Delivery   crushed    Wound Care Documentation and Therapy:  Wound 01/23/21 Sacrum (Active)   Wound Image   01/26/21 1045   Wound Etiology Deep tissue/Injury 02/01/21 0800   Dressing Status New drainage noted 02/12/21 1030   Wound Cleansed Cleansed with saline 02/07/21 0515   Dressing/Treatment Pharmaceutical agent (see MAR); Open to air 02/12/21 1030   Wound Length (cm) 4 cm 02/03/21 0925   Wound Width (cm) 10 cm 02/03/21 0925   Wound Depth (cm) 0.1 cm 02/03/21 0925   Wound Surface Area (cm^2) 40 cm^2 02/03/21 0925   Change in Wound Size % (l*w) -19900 02/03/21 0925   Wound Volume (cm^3) 4 cm^3 02/03/21 0925   Wound Healing % -14 02/03/21 0925   Wound Assessment Bleeding;Pink/red;Purple/maroon 02/12/21 1030   Drainage Amount Scant 02/12/21 1030   Drainage Description Sanguinous 02/12/21 1030   Odor None 02/12/21 1030   Susy-wound Assessment Blanchable erythema 02/12/21 1030   Number of days: 20       Wound 01/23/21 Left forearm (Active)   Wound Image   01/26/21 1045   Wound Etiology Skin Tear 01/31/21 2000   Dressing Status Old drainage noted 02/12/21 1030   Wound Cleansed Not Cleansed 02/07/21 0515   Dressing/Treatment Other (comment) 02/12/21 1030   Wound Length (cm) 3 cm 01/26/21 1045   Wound Width (cm) 4 cm 01/26/21 1045   Wound Depth (cm) 0.1 cm 01/26/21 1045   Wound Surface Area (cm^2) 12 cm^2 01/26/21 1045   Change in Wound Size % (l*w) -60 01/26/21 1045   Wound Volume (cm^3) 1.2 cm^3 01/26/21 1045   Wound Assessment Dry;Pink/red 02/12/21 1030   Drainage Amount None 02/12/21 1030   Drainage Description Serosanguinous 02/12/21 1030   Odor None 02/12/21 1030   Susy-wound Assessment Ecchymosis 02/12/21 1030   Wound Thickness Description not for Pressure Injury Partial thickness 02/12/21 1030   Number of days: 20       Wound 02/07/21 Right forearm (Active)   Dressing Status New drainage noted 02/12/21 1030   Wound Cleansed Not Cleansed 24 hour   Intake 1119 ml   Output 2300 ml   Net -1181 ml     I/O last 3 completed shifts:   In: 1119 [Blood:440; NG/GT:679]  Out: 5097 [Urine:1800; Stool:500]    Safety Concerns:     Aspiration Risk    Impairments/Disabilities:      Speech    Nutrition Therapy:  Current Nutrition Therapy:   - Tube Feedings:  Semi-elemental    Routes of Feeding: Gastrostomy Tube  Liquids: {Slp liquid thickness:50291}  Daily Fluid Restriction: no  Last Modified Barium Swallow with Video (Video Swallowing Test): {Done Not Done POKW:349124362}    Treatments at the Time of Hospital Discharge:   Respiratory Treatments: ***  Oxygen Therapy:  {Therapy; copd oxygen:25772}  Ventilator:    {[facility] Vent KUOC:398560862}    Rehab Therapies: {THERAPEUTIC INTERVENTION:1162474123}  Weight Bearing Status/Restrictions: {[facility] Weight Bearin}  Other Medical Equipment (for information only, NOT a DME order):  {EQUIPMENT:710062262}  Other Treatments: ***    Patient's personal belongings (please select all that are sent with patient):  {[facility] DME Belongings:326460833}    RN SIGNATURE: Ana Cristina Angel {Esignature:[id number]}    CASE MANAGEMENT/SOCIAL WORK SECTION    Inpatient Status Date: ***    Readmission Risk Assessment Score:  Readmission Risk              Risk of Unplanned Readmission:        37           Discharging to Facility/ Agency   · Name:   · Address:  · Phone:  · Fax:    Dialysis Facility (if applicable)   · Name:  · Address:  · Dialysis Schedule:  · Phone:  · Fax:    / signature: {Esignature:210538783}    PHYSICIAN SECTION    Prognosis: {Prognosis:5713926340}    Condition at Discharge: [facility] Patient Condition:186407467}    Rehab Potential (if transferring to Rehab): {Prognosis:1698574200}    Recommended Labs or Other Treatments After Discharge: ***    Physician Certification: I certify the above information and transfer of Олег Ashley  is necessary for the continuing treatment of the diagnosis listed and that he requires {Admit to Appropriate Level of Care:36484} for {GREATER/LESS:578734553} 30 days.      Update Admission H&P: {CHP DME Changes in VTECB:781746316}    PHYSICIAN SIGNATURE:  {Esignature:930650505}

## 2021-02-12 NOTE — PROGRESS NOTES
glycol, acetaminophen **OR** acetaminophen, fentanNYL    Physical    VITALS:  /61   Pulse 80   Temp 97.8 °F (36.6 °C) (Temporal)   Resp 23   Ht 6' (1.829 m)   Wt 254 lb 8 oz (115.4 kg)   SpO2 99%   BMI 34.52 kg/m²     24HR INTAKE/OUTPUT:      Intake/Output Summary (Last 24 hours) at 2021 1234  Last data filed at 2021 1126  Gross per 24 hour   Intake 1119 ml   Output 2300 ml   Net -1181 ml       24HR PULSE OXIMETRY RANGE:    SpO2  Av.4 %  Min: 94 %  Max: 100 %    General appearance: appears stated age, obese  Lungs: rhonchi bilaterally  Heart: regular rate and rhythm, S1, S2 normal, no murmur, click, rub or gallop  Abdomen: (+) PEG  Extremities: edema bipedal  Neurologic: Mental status: arousable, not much interaction to stimulus    Data    CBC:   Recent Labs     02/10/21  0529 21  0625 21  0105 21  0546   WBC 6.3 4.5  --  6.1   HGB 7.6* 6.6* 8.2* 8.2*   HCT 26.6* 23.2* 27.5* 28.8*   MCV 95.3 96.3  --  95.4    191  --  213       BMP:  Recent Labs     21  0625 21  1420 21  0546   * 145 145   K 2.7* 4.7 4.0   * 101 102   CO2 36* 39* 39*   BUN 24* 27* 24*   CREATININE 0.5* 0.6* 0.6*    ALB:3,BILIDIR:3,BILITOT:3,ALKPHOS:3)@    PT/INR: No results for input(s): PROTIME, INR in the last 72 hours. ABG:   No results for input(s): PH, PO2, PCO2, HCO3, BE, O2SAT, METHB, O2HB, COHB, O2CON, HHB, THB in the last 72 hours. FiO2 : 40 %  I:E Ratio: 1:1.40    Radiology/Other tests reviewed: CXR 5/11 reviewed with slightly better lung volume right lung, similar right lower lung field opacity    Assessment:     Active Problems:    Respiratory failure ([address]    Palliative care encounter    DNR no code (do not resuscitate)    Goals of care, counseling/discussion    Sepsis due to Pseudomonas species with acute hypercapnic respiratory failure and septic shock (HCC)  Resolved Problems:    * No resolved hospital problems. *      Plan:       1.  Cont with HFNC

## 2021-02-12 NOTE — CARE COORDINATION
Pt received approval for [facility], notified attending, transport set-up for 1130 via [facility], room 735, accepting physician Dr. Jocelyn Ricci; notified pt's daughter Lisandro Shea, and charge RN Claudine of the aforementioned. Lisandro Shea requested f/u information to be provided by bedside RN Arianne which I relayed and gave contact information to.

## 2021-02-12 NOTE — PROGRESS NOTES
Once Castillo Dania, DO        sodium chloride flush 0.9 % injection 10 mL  10 mL Intravenous PRN Castillo Dania, DO        heparin flush 100 UNIT/ML injection 300 Units  3 mL Intravenous 2 times per day Castillo Dania, DO   300 Units at 02/12/21 1029    heparin flush 100 UNIT/ML injection 300 Units  3 mL Intracatheter PRN Castillo [place], DO        chlorhexidine (PERIDEX) 0.12 % solution 15 mL  15 mL Mouth/Throat BID Kym Atwood Issac, DO   15 mL at 02/12/21 1029    potassium chloride 20 mEq/50 mL IVPB (Central Line)  20 mEq Intravenous PRN Castillo Dania, DO 50 mL/hr at 02/11/21 1205 20 mEq at 02/11/21 1205    acetaminophen (TYLENOL) 160 MG/5ML solution 650 mg  650 mg Per G Tube Q4H PRN Ron Yu MD   650 mg at 02/05/21 7356    amiodarone (CORDARONE) tablet 100 mg  100 mg PEG Tube Daily Ron Yu MD   100 mg at 02/12/21 1049    atorvastatin (LIPITOR) tablet 20 mg  20 mg PEG Tube Daily Ron Yu MD   20 mg at 02/12/21 1031    docusate sodium (ENEMEEZ) enema 283 mg  1 enema Rectal PRN Ron Yu MD        glucose (GLUTOSE) 40 % oral gel 15 g  15 g Oral PRN Ron Yu MD        dextrose 50 % IV solution  12.5 g Intravenous PRN Ron Yu MD   12.5 g at 02/11/21 0150    glucagon (rDNA) injection 1 mg  1 mg Intramuscular PRN Ron Yu MD        dextrose 5 % solution  100 mL/hr Intravenous PRN oRn Yu MD        lactulose (CHRONULAC) 10 GM/15ML solution 20 g  20 g PEG Tube Daily Felix Hughes MD   20 g at 02/12/21 1030    latanoprost (XALATAN) 0.005 % ophthalmic solution 1 drop  1 drop Both Eyes Daily Ron Yu MD   1 drop at 02/11/21 0914    pantoprazole (PROTONIX) injection 40 mg  40 mg Intravenous Daily Ron Yu MD   40 mg at 02/12/21 1047    And    sodium chloride (PF) 0.9 % injection 10 mL  10 mL Intravenous Daily Felix Hughes MD   10 mL at 02/12/21 1047    senna (SENOKOT) tablet 8.6 mg  1 tablet PEG Tube BID Ron Yu MD 8.6 mg at 02/11/21 2102    sodium chloride (Inhalant) 3 % nebulizer solution 4 mL  4 mL Nebulization BID Mauricio Harper MD   4 mL at 02/12/21 0838    sodium chloride flush 0.9 % injection 10 mL  10 mL Intravenous 2 times per day Mauricio Harper MD   10 mL at 02/12/21 1030    sodium chloride flush 0.9 % injection 10 mL  10 mL Intravenous PRN Mauricio Harper MD   10 mL at 02/08/21 0503    enoxaparin (LOVENOX) injection 40 mg  40 mg Subcutaneous Daily Mauricio Harper MD   40 mg at 02/12/21 1029    promethazine (PHENERGAN) tablet 12.5 mg  12.5 mg Oral Q6H PRN Mauricio Hraper MD        Or    ondansetron (ZOFRAN) injection 4 mg  4 mg Intravenous Q6H PRN Mauricio Harper MD        polyethylene glycol (GLYCOLAX) packet 17 g  17 g Oral Daily PRN Mauricio Harper MD        acetaminophen (TYLENOL) tablet 650 mg  650 mg Oral Q6H PRN Mauricio Harper MD   650 mg at 01/30/21 1923    Or    acetaminophen (TYLENOL) suppository 650 mg  650 mg Rectal Q6H PRN Mauricio Harper MD        budesonide (PULMICORT) nebulizer suspension 500 mcg  500 mcg Nebulization BID Emmanuel Kaminski MD   500 mcg at 02/12/21 0839    fentaNYL (SUBLIMAZE) injection 50 mcg  50 mcg Intravenous Q1H PRN Anuja Meier MD   50 mcg at 02/03/21 2008       REVIEW OF SYSTEMS: could not be obtained       PHYSICAL EXAM:    Vitals:   /61   Pulse 80   Temp 97.8 °F (36.6 °C) (Temporal)   Resp 23   Ht 6' (1.829 m)   Wt 254 lb 8 oz (115.4 kg)   SpO2 100%   BMI 34.52 kg/m²      Constitutional: opened eyes , non communicating   Skin: No rash   HEENT:Pallor +, no LN  , dry mucosa   Neck: Supple   Chest:  Bilateral rhonchi    Cardiovascular: Regular, no murmur   Abdomen:soft, bowel sound +  PEG - no drainage or erythema   Extremities: ++ edema, erythema on the legs     Lines:  Right brachial PICC ( 1/25)     CBC with Differential:      Lab Results   Component Value Date    WBC 6.1 02/12/2021    RBC 3.02 02/12/2021    HGB 8.2 02/12/2021    HCT 28.8 02/12/2021  02/12/2021    MCV 95.4 02/12/2021    MCH 27.2 02/12/2021    MCHC 28.5 02/12/2021    RDW 18.8 02/12/2021    NRBC 0.9 02/07/2021    SEGSPCT 64 09/27/2013    METASPCT 6.0 01/06/2021    LYMPHOPCT 28.2 02/12/2021    PROMYELOPCT 0.9 12/21/2020    MONOPCT 8.7 02/12/2021    MYELOPCT 3.0 01/06/2021    BASOPCT 0.2 02/12/2021    MONOSABS 0.53 02/12/2021    LYMPHSABS 1.71 02/12/2021    EOSABS 0.32 02/12/2021    BASOSABS 0.01 02/12/2021       CMP:    Lab Results   Component Value Date     02/12/2021    K 4.0 02/12/2021     02/12/2021    CO2 39 02/12/2021    BUN 24 02/12/2021    CREATININE 0.6 02/12/2021    GFRAA >60 02/12/2021    LABGLOM >60 02/12/2021    GLUCOSE 163 02/12/2021    GLUCOSE 138 03/26/2011    PROT 5.4 02/12/2021    LABALBU 2.5 02/12/2021    LABALBU 4.0 03/26/2011    CALCIUM 8.6 02/12/2021    BILITOT 0.4 02/12/2021    ALKPHOS 69 02/12/2021    AST 18 02/12/2021    ALT 13 02/12/2021       Hepatic Function Panel:    Lab Results   Component Value Date    ALKPHOS 69 02/12/2021    ALT 13 02/12/2021    AST 18 02/12/2021    PROT 5.4 02/12/2021    BILITOT 0.4 02/12/2021    BILIDIR 0.4 01/14/2021    IBILI 0.5 01/14/2021    LABALBU 2.5 02/12/2021    LABALBU 4.0 03/26/2011         Microbiology :    Blood culture - neg to date   Urine Culture -     Susceptibility    Pseudomonas aeruginosa (1)    Antibiotic Interpretation SNEHA Status    gentamicin Sensitive <=^1 mcg/mL     levofloxacin Sensitive <=^0.12 mcg/mL     tobramycin Sensitive <=^1 mcg/mL     Lab and Collection        Sputum Culture-      Susceptibility    Pseudomonas aeruginosa (1)    Antibiotic Interpretation SNEHA Status    gentamicin Sensitive <=^1 mcg/mL     levofloxacin Sensitive <=^0.12 mcg/mL     tobramycin Sensitive <=^1 mcg/mL     Proteus mirabilis (4)    Antibiotic Interpretation SNEHA Status    ampicillin Sensitive <=^2 mcg/mL     ceFAZolin Sensitive <=^4 mcg/mL     cefepime Sensitive <=^0.12 mcg/mL     cefTRIAXone Sensitive <=^0.25 mcg/mL

## 2021-02-12 NOTE — PROGRESS NOTES
Update given to patient's daughter Jorge Luis and family notified patient will be transferred to [facility]

## 2021-02-12 NOTE — PROGRESS NOTES
Date: 2/11/2021    Time: 8:56 PM    Patient Placed On BIPAP/CPAP/ Non-Invasive Ventilation? Yes    If no must comment. Facial area red/color change? Yes           If YES are Blister/Lesion present? Yes   If yes must notify nursing staff  BIPAP/CPAP skin barrier? No    Skin barrier type:mepilex      Comments:  Patient placed on at this time with total face mask      02/11/21 2055   NIV Type   Skin Assessment Skin breakdown present (see comment/note)  (on nose.  pt now using total face mask HS)   NIV Started/Stopped On   Equipment Type v60   Mode Biphasic   Mask Type Total face   Mask Size Large   Settings/Measurements   IPAP 12 cmH20   CPAP/EPAP 6 cmH2O   Rate Ordered 14   Resp 24   FiO2  40 %   I Time/ I Time % 0.9 s   Vt Exhaled 532 mL   Minute Volume 12.2 Liters   Mask Leak (lpm) 22 lpm   Comfort Level Good   Using Accessory Muscles No   SpO2 100         Chiqui Franco

## 2021-02-12 NOTE — PLAN OF CARE
Problem: Skin Integrity:  Goal: Absence of new skin breakdown  Description: Absence of new skin breakdown  2/12/2021 0307 by Cleta Spurling, RN  Outcome: Met This Shift     Problem: Falls - Risk of:  Goal: Will remain free from falls  Description: Will remain free from falls  2/12/2021 0307 by Cleta Spurling, RN  Outcome: Met This Shift     Problem: Falls - Risk of:  Goal: Absence of physical injury  Description: Absence of physical injury  2/12/2021 0307 by Cleta Spurling, RN  Outcome: Met This Shift

## 2021-02-19 NOTE — DISCHARGE SUMMARY
Physician Discharge Summary     Patient ID:  Dorys Heller  [id number]  76 y.o.  1940    Admit date: 1/22/2021    Discharge date and time: 2/12/2021  5:21 PM     Admission Diagnoses: Respiratory failure [facility]) [J96.90]    Discharge Diagnoses:   Patient Active Problem List   Diagnosis    [facility] [address] [facility]    Dementia [facility] [address] [facility]    Metabolic encephalopathy    Diabetes mellitus type 2, uncontrolled [facility] [address] [facility]    Hyperlipidemia LDL goal <100    Essential hypertension    Venous ulcer of left leg (HCC)    Non-pressure chronic ulcer of left lower leg with fat layer exposed [facility] [address] [facility]    Non-pressure chronic ulcer left lower leg, limited to breakdown skin (HCC)    Severe protein-calorie malnutrition (HCC)    Non-pressure chronic ulcer right lower leg, limited to breakdown skin (HCC)    Pressure injury of contiguous region involving back, buttock, and hip, stage 2 (HCC)    Pressure injury of calf, stage 2 ([facility] [address]    HCAP (healthcare-associated pneumonia)    Paroxysmal atrial fibrillation (HCC)    Acute respiratory failure with hypoxia (HCC)    Acute deep vein thrombosis (DVT) of femoral vein of left lower extremity (HCC)    Leg swelling    Respiratory failure (HCC)    Palliative care encounter    DNR no code (do not resuscitate)    Goals of care, counseling/discussion    Sepsis due to Pseudomonas species with acute hypercapnic respiratory failure and septic shock (HCC)     No current facility-administered medications for this encounter.      Current Outpatient Medications:     senna (SENOKOT) 8.6 MG tablet, 1 tablet by PEG Tube route 2 times daily, Disp: 60 tablet, Rfl: 0    chlorhexidine (PERIDEX) 0.12 % solution, Take 15 mLs by mouth 2 times daily for 14 days, Disp: 420 mL, Rfl: 0    magnesium oxide (MAG-OX) 400 (241.3 Mg) MG TABS tablet, Take 1 tablet by mouth 2 times daily, Disp: 30 tablet, Rfl:     polyethylene glycol (GLYCOLAX) 17 g packet, Take 17 g by mouth daily as needed for Constipation, Disp: 527 g, Rfl: 1    furosemide (LASIX) 10 MG/ML injection, Infuse 4 mLs intravenously daily, Disp:  , Rfl:     miconazole (MICOTIN) 2 % powder, Apply topically 2 times daily. , Disp: 45 g, Rfl: 1    metoprolol (LOPRESSOR) 5 MG/5ML SOLN injection, Infuse 5 mLs intravenously every 5 minutes as needed (HTN), Disp: 15 mL, Rfl:     insulin glargine (LANTUS) 100 UNIT/ML injection vial, Inject 28 Units into the skin nightly, Disp: 1 vial, Rfl: 3    enoxaparin (LOVENOX) 40 MG/0.4ML injection, Inject 0.4 mLs into the skin daily, Disp:  , Rfl: 3    ipratropium-albuterol (DUONEB) 0.5-2.5 (3) MG/3ML SOLN nebulizer solution, Inhale 3 mLs into the lungs 4 times daily, Disp: 360 mL, Rfl:     budesonide (PULMICORT) 0.5 MG/2ML nebulizer suspension, Take 2 mLs by nebulization 2 times daily, Disp: 60 ampule, Rfl: 3    Respiratory Therapy Supplies (FULL KIT NEBULIZER SET) MISC, Use as directed with nebulized medication. , Disp: 1 each, Rfl: 0    dextrose 50 % solution, Infuse 25 g intravenously as needed, Disp: , Rfl:     potassium bicarb-citric acid (EFFER-K) 20 MEQ TBEF effervescent tablet, Take 40 tablets by mouth daily, Disp: , Rfl:     potassium & sodium phosphates (PHOS-NAK) 280-160-250 MG PACK, 1 packet by Per G Tube route daily, Disp: , Rfl:     sodium chloride, PF, 0.9 % injection, Infuse 50 mLs intravenously continuous, Disp: , Rfl:     sodium chloride, Inhalant, 3 % nebulizer solution, Take 4 mLs by nebulization 2 times daily, Disp: , Rfl:     atorvastatin (LIPITOR) 20 MG tablet, 20 mg daily Peg tube, Disp: , Rfl:     amiodarone (PACERONE) 100 MG tablet, 100 mg by PEG Tube route daily, Disp: , Rfl:     promethazine (PHENERGAN) 12.5 MG tablet, 12.5 mg by PEG Tube route every 6 hours as needed for Nausea, Disp: , Rfl:     Benzocaine-Docusate Sodium (ENEMEEZ PLUS)  MG ENEM, Place rectally as needed, Disp: , Rfl:     hydrocortisone sodium succinate PF (SOLU-CORTEF) 100 MG injection, Infuse 25 mg intravenously every 8 hours, Disp: , Rfl:     insulin lispro (HUMALOG) 100 UNIT/ML injection vial, Inject into the skin every 6 hours 0-16 units sliding scale, Disp: , Rfl:     lactulose (CHRONULAC) 10 GM/15ML solution, Take 20 g by mouth daily, Disp: , Rfl:     metoclopramide (REGLAN) 5 MG/ML injection, Infuse 10 mg intravenously every 6 hours, Disp: , Rfl:     midodrine (PROAMATINE) 5 MG tablet, 5 mg by PEG Tube route 3 times daily, Disp: , Rfl:     nystatin-triamcinolone (MYCOLOG II) 513149-3.1 UNIT/GM-% cream, Apply topically 4 times daily Apply topically 4 times daily. , Disp: , Rfl:     pantoprazole sodium (PROTONIX) 40 MG PACK packet, 40 mg by Per G Tube route 2 times daily (before meals) Pantoprazole/ sodium bicarb liquid 2mg/ml oral liquid 40 mg peg tube BID, Disp: , Rfl:     glucagon, rDNA, 1 MG injection, Inject 1 mg into the muscle as needed for Low blood sugar (Blood glucose less than 70 mg/dL and patient NOT ALERT or NPO and does not have IV access.), Disp: 1 each, Rfl: 0    folic acid (FOLVITE) 1 MG tablet, 1 tablet by Per G Tube route daily, Disp: 30 tablet, Rfl: 3    docusate sodium (ENEMEEZ) 283 MG enema, Place 5 mLs rectally daily, Disp: 1 each, Rfl: 0    glucose (GLUTOSE) 40 % GEL, Take 15 g by mouth as needed, Disp: 45 g, Rfl: 1    latanoprost (XALATAN) 0.005 % ophthalmic solution, Place 1 drop into both eyes daily , Disp: , Rfl:     acetaminophen (TYLENOL) 325 MG tablet, Take 650 mg by mouth every 4 hours as needed for Pain or Fever (max = 3000 mg/24 hours), Disp: , Rfl:           Procedures: None    Hospital Course: Pt had a complicated course. He was intubated and transferred to MICU. He was no IV abx, but continued to have fevers and was on a cooling blanket. He also was combative at times and needed to be restrained. His family made him a DNR CCA and did not want him to be reintubated once he was extubated. He was on bipap almost continuously.   He was then transferred to [facility] for further management. Discharge Exam:  See progress note from today    Disposition: to LTAC in poor condition, long-term prognosis is very poor.       Satya Carlson  2/19/2021

## 2021-08-30 NOTE — FLOWSHEET NOTE
Inpatient Wound Care    Admit Date: 8/11/2018  1:59 PM    Reason for consult:  Leg, buttocks    Significant history:  Admitted with sepsis. History includes: DM, dementia, cystic fibrosis. DM, C-diff. HTN. Wound history:  POA    Findings:     08/13/18 1346   Wound 08/08/18 Venous ulcer Leg Right;Proximal;Medial;Posterior #14 blocked  acq: 8-2-18   Date First Assessed/Time First Assessed: 08/08/18 1401   Wound Type: Venous ulcer  Wound Event: Not known  Location: Leg  Wound Location Orientation: Right;Proximal;Medial;Posterior  Wound Description (Comments): #14 blocked  acq: 8-2-18  Pre-existing. ..    Wound Type Wound   Wound (vascular)   Dressing Status Changed   Dressing Changed Changed/New   Dressing/Treatment Ace Wrap;Alginate;Non adherent   Wound Cleansed Rinsed/Irrigated with saline   Dressing Change Due 08/14/18   Wound Length (cm) 1 cm   Wound Width (cm) 1 cm   Wound Depth (cm)  0.2   Calculated Wound Size (cm^2) (l*w) 1 cm^2   Change in Wound Size % (l*w) 89.42   Wound Assessment Red   Drainage Amount Small   Drainage Description Serosanguinous   Susy-wound Assessment Edema   Wound 08/08/18 Skin tear Ankle Right;Distal;Medial #15 acq: 8-2-18   Date First Assessed/Time First Assessed: 08/08/18 1407   Wound Type: Skin tear  Wound Event: Trauma  Location: Ankle  Wound Location Orientation: Right;Distal;Medial  Wound Description (Comments): #15 acq: 8-2-18  Pre-existing: Yes  Photo Taken: Yes   Wound Type Wound   Wound (vascular)   Dressing Status Changed   Dressing Changed Changed/New   Dressing/Treatment Alginate;Non adherent   Wound Cleansed Rinsed/Irrigated with saline   Dressing Change Due 08/14/18   Wound Length (cm) 2.4 cm   Wound Width (cm) 1 cm   Wound Depth (cm)  0.2   Calculated Wound Size (cm^2) (l*w) 2.4 cm^2   Change in Wound Size % (l*w) 73.33   Wound Assessment Red   Drainage Amount None   Drainage Description Serous   Odor None   Susy-wound Assessment Edema   Wound 08/08/18 Skin tear Ankle
None

## 2022-07-01 NOTE — CARE COORDINATION
Checked with General Henson, pre-cert remains pending at this time however, provided update and anticipates response today, will notify this CM when determination is provided. Yes
